# Patient Record
Sex: MALE | Race: BLACK OR AFRICAN AMERICAN | NOT HISPANIC OR LATINO | URBAN - METROPOLITAN AREA
[De-identification: names, ages, dates, MRNs, and addresses within clinical notes are randomized per-mention and may not be internally consistent; named-entity substitution may affect disease eponyms.]

---

## 2023-10-09 ENCOUNTER — APPOINTMENT (OUTPATIENT)
Dept: NEUROLOGY | Facility: HOSPITAL | Age: 63
End: 2023-10-09
Payer: COMMERCIAL

## 2023-11-20 ENCOUNTER — APPOINTMENT (OUTPATIENT)
Dept: NEUROLOGY | Facility: HOSPITAL | Age: 63
End: 2023-11-20
Payer: COMMERCIAL

## 2023-11-21 ENCOUNTER — APPOINTMENT (OUTPATIENT)
Dept: NEUROLOGY | Facility: HOSPITAL | Age: 63
End: 2023-11-21
Payer: COMMERCIAL

## 2023-12-19 ENCOUNTER — APPOINTMENT (OUTPATIENT)
Dept: NEUROLOGY | Facility: HOSPITAL | Age: 63
End: 2023-12-19
Payer: COMMERCIAL

## 2024-02-13 ENCOUNTER — APPOINTMENT (OUTPATIENT)
Dept: NEUROLOGY | Facility: HOSPITAL | Age: 64
End: 2024-02-13
Payer: COMMERCIAL

## 2025-03-22 ENCOUNTER — APPOINTMENT (OUTPATIENT)
Dept: RADIOLOGY | Facility: HOSPITAL | Age: 65
End: 2025-03-22
Payer: COMMERCIAL

## 2025-03-22 ENCOUNTER — HOSPITAL ENCOUNTER (INPATIENT)
Facility: HOSPITAL | Age: 65
End: 2025-03-22
Attending: EMERGENCY MEDICINE | Admitting: INTERNAL MEDICINE
Payer: COMMERCIAL

## 2025-03-22 ENCOUNTER — APPOINTMENT (OUTPATIENT)
Dept: CARDIOLOGY | Facility: HOSPITAL | Age: 65
End: 2025-03-22
Payer: COMMERCIAL

## 2025-03-22 DIAGNOSIS — Z93.0 TRACHEOSTOMY DEPENDENT (MULTI): ICD-10-CM

## 2025-03-22 DIAGNOSIS — D64.9 ANEMIA, UNSPECIFIED TYPE: ICD-10-CM

## 2025-03-22 DIAGNOSIS — R04.2 HEMOPTYSIS: ICD-10-CM

## 2025-03-22 DIAGNOSIS — I69.359 CVA, OLD, HEMIPARESIS (MULTI): Primary | ICD-10-CM

## 2025-03-22 DIAGNOSIS — K92.1 MELENA: ICD-10-CM

## 2025-03-22 DIAGNOSIS — M79.89 OTHER SPECIFIED SOFT TISSUE DISORDERS: ICD-10-CM

## 2025-03-22 PROBLEM — Z93.1 STATUS POST INSERTION OF PERCUTANEOUS ENDOSCOPIC GASTROSTOMY (PEG) TUBE (MULTI): Status: ACTIVE | Noted: 2025-03-22

## 2025-03-22 PROBLEM — E11.9 INSULIN DEPENDENT TYPE 2 DIABETES MELLITUS (MULTI): Status: ACTIVE | Noted: 2025-03-22

## 2025-03-22 PROBLEM — J18.9 PNEUMONIA DUE TO INFECTIOUS ORGANISM: Status: ACTIVE | Noted: 2025-03-22

## 2025-03-22 PROBLEM — Z79.4 INSULIN DEPENDENT TYPE 2 DIABETES MELLITUS (MULTI): Status: ACTIVE | Noted: 2025-03-22

## 2025-03-22 PROBLEM — I48.92 ATRIAL FLUTTER (MULTI): Status: ACTIVE | Noted: 2025-03-22

## 2025-03-22 PROBLEM — R53.1 RIGHT SIDED WEAKNESS: Status: ACTIVE | Noted: 2025-03-22

## 2025-03-22 LAB
ABO GROUP (TYPE) IN BLOOD: NORMAL
ABO GROUP (TYPE) IN BLOOD: NORMAL
ANION GAP SERPL CALC-SCNC: 16 MMOL/L (ref 10–20)
ANTIBODY SCREEN: NORMAL
APTT PPP: 25 SECONDS (ref 26–36)
BASOPHILS # BLD AUTO: 0.09 X10*3/UL (ref 0–0.1)
BASOPHILS NFR BLD AUTO: 1 %
BUN SERPL-MCNC: 36 MG/DL (ref 6–23)
CALCIUM SERPL-MCNC: 9.6 MG/DL (ref 8.6–10.3)
CHLORIDE SERPL-SCNC: 103 MMOL/L (ref 98–107)
CO2 SERPL-SCNC: 27 MMOL/L (ref 21–32)
CREAT SERPL-MCNC: 0.81 MG/DL (ref 0.5–1.3)
EGFRCR SERPLBLD CKD-EPI 2021: >90 ML/MIN/1.73M*2
EOSINOPHIL # BLD AUTO: 0.3 X10*3/UL (ref 0–0.7)
EOSINOPHIL NFR BLD AUTO: 3.2 %
ERYTHROCYTE [DISTWIDTH] IN BLOOD BY AUTOMATED COUNT: 15.6 % (ref 11.5–14.5)
GLUCOSE BLD MANUAL STRIP-MCNC: 180 MG/DL (ref 74–99)
GLUCOSE SERPL-MCNC: 223 MG/DL (ref 74–99)
HCT VFR BLD AUTO: 29.4 % (ref 41–52)
HGB BLD-MCNC: 8 G/DL (ref 13.5–17.5)
IMM GRANULOCYTES # BLD AUTO: 0.14 X10*3/UL (ref 0–0.7)
IMM GRANULOCYTES NFR BLD AUTO: 1.5 % (ref 0–0.9)
INR PPP: 1.4 (ref 0.9–1.1)
LYMPHOCYTES # BLD AUTO: 1.21 X10*3/UL (ref 1.2–4.8)
LYMPHOCYTES NFR BLD AUTO: 13.1 %
MCH RBC QN AUTO: 26.5 PG (ref 26–34)
MCHC RBC AUTO-ENTMCNC: 27.2 G/DL (ref 32–36)
MCV RBC AUTO: 97 FL (ref 80–100)
MONOCYTES # BLD AUTO: 0.38 X10*3/UL (ref 0.1–1)
MONOCYTES NFR BLD AUTO: 4.1 %
MRSA DNA SPEC QL NAA+PROBE: NOT DETECTED
NEUTROPHILS # BLD AUTO: 7.14 X10*3/UL (ref 1.2–7.7)
NEUTROPHILS NFR BLD AUTO: 77.1 %
NRBC BLD-RTO: 0.3 /100 WBCS (ref 0–0)
PLATELET # BLD AUTO: 197 X10*3/UL (ref 150–450)
POTASSIUM SERPL-SCNC: 4.3 MMOL/L (ref 3.5–5.3)
PROTHROMBIN TIME: 15.1 SECONDS (ref 9.8–12.4)
RBC # BLD AUTO: 3.02 X10*6/UL (ref 4.5–5.9)
RH FACTOR (ANTIGEN D): NORMAL
RH FACTOR (ANTIGEN D): NORMAL
SODIUM SERPL-SCNC: 142 MMOL/L (ref 136–145)
WBC # BLD AUTO: 9.3 X10*3/UL (ref 4.4–11.3)

## 2025-03-22 PROCEDURE — 71250 CT THORAX DX C-: CPT | Performed by: STUDENT IN AN ORGANIZED HEALTH CARE EDUCATION/TRAINING PROGRAM

## 2025-03-22 PROCEDURE — 71045 X-RAY EXAM CHEST 1 VIEW: CPT | Performed by: RADIOLOGY

## 2025-03-22 PROCEDURE — 36415 COLL VENOUS BLD VENIPUNCTURE: CPT | Performed by: EMERGENCY MEDICINE

## 2025-03-22 PROCEDURE — 87449 NOS EACH ORGANISM AG IA: CPT | Mod: PARLAB | Performed by: NURSE PRACTITIONER

## 2025-03-22 PROCEDURE — 85025 COMPLETE CBC W/AUTO DIFF WBC: CPT | Performed by: EMERGENCY MEDICINE

## 2025-03-22 PROCEDURE — 71275 CT ANGIOGRAPHY CHEST: CPT | Performed by: STUDENT IN AN ORGANIZED HEALTH CARE EDUCATION/TRAINING PROGRAM

## 2025-03-22 PROCEDURE — 99223 1ST HOSP IP/OBS HIGH 75: CPT | Performed by: NURSE PRACTITIONER

## 2025-03-22 PROCEDURE — 71250 CT THORAX DX C-: CPT

## 2025-03-22 PROCEDURE — 87899 AGENT NOS ASSAY W/OPTIC: CPT | Mod: PARLAB | Performed by: NURSE PRACTITIONER

## 2025-03-22 PROCEDURE — 2500000004 HC RX 250 GENERAL PHARMACY W/ HCPCS (ALT 636 FOR OP/ED): Performed by: NURSE PRACTITIONER

## 2025-03-22 PROCEDURE — 2500000005 HC RX 250 GENERAL PHARMACY W/O HCPCS: Performed by: EMERGENCY MEDICINE

## 2025-03-22 PROCEDURE — 71275 CT ANGIOGRAPHY CHEST: CPT

## 2025-03-22 PROCEDURE — 86900 BLOOD TYPING SEROLOGIC ABO: CPT | Performed by: EMERGENCY MEDICINE

## 2025-03-22 PROCEDURE — 71045 X-RAY EXAM CHEST 1 VIEW: CPT

## 2025-03-22 PROCEDURE — 99285 EMERGENCY DEPT VISIT HI MDM: CPT | Mod: 25 | Performed by: EMERGENCY MEDICINE

## 2025-03-22 PROCEDURE — 93005 ELECTROCARDIOGRAM TRACING: CPT

## 2025-03-22 PROCEDURE — 87640 STAPH A DNA AMP PROBE: CPT | Performed by: NURSE PRACTITIONER

## 2025-03-22 PROCEDURE — 80048 BASIC METABOLIC PNL TOTAL CA: CPT | Performed by: EMERGENCY MEDICINE

## 2025-03-22 PROCEDURE — 1200000002 HC GENERAL ROOM WITH TELEMETRY DAILY

## 2025-03-22 PROCEDURE — 94640 AIRWAY INHALATION TREATMENT: CPT

## 2025-03-22 PROCEDURE — 85610 PROTHROMBIN TIME: CPT | Performed by: EMERGENCY MEDICINE

## 2025-03-22 PROCEDURE — 2500000001 HC RX 250 WO HCPCS SELF ADMINISTERED DRUGS (ALT 637 FOR MEDICARE OP): Performed by: NURSE PRACTITIONER

## 2025-03-22 PROCEDURE — 2500000005 HC RX 250 GENERAL PHARMACY W/O HCPCS: Performed by: NURSE PRACTITIONER

## 2025-03-22 PROCEDURE — 2500000002 HC RX 250 W HCPCS SELF ADMINISTERED DRUGS (ALT 637 FOR MEDICARE OP, ALT 636 FOR OP/ED): Performed by: NURSE PRACTITIONER

## 2025-03-22 PROCEDURE — 2550000001 HC RX 255 CONTRASTS: Performed by: EMERGENCY MEDICINE

## 2025-03-22 PROCEDURE — 85730 THROMBOPLASTIN TIME PARTIAL: CPT | Performed by: EMERGENCY MEDICINE

## 2025-03-22 PROCEDURE — 82947 ASSAY GLUCOSE BLOOD QUANT: CPT

## 2025-03-22 RX ORDER — TRANEXAMIC ACID 100 MG/ML
500 INJECTION, SOLUTION INTRAVENOUS ONCE
Status: COMPLETED | OUTPATIENT
Start: 2025-03-22 | End: 2025-03-22

## 2025-03-22 RX ORDER — DOCUSATE SODIUM 100 MG/1
100 CAPSULE, LIQUID FILLED ORAL 2 TIMES DAILY
Status: DISCONTINUED | OUTPATIENT
Start: 2025-03-22 | End: 2025-03-25

## 2025-03-22 RX ORDER — ZINC OXIDE 20 G/100G
1 OINTMENT TOPICAL
Status: DISCONTINUED | OUTPATIENT
Start: 2025-03-22 | End: 2025-04-03 | Stop reason: HOSPADM

## 2025-03-22 RX ORDER — SUCRALFATE 1 G/1
1 TABLET ORAL 2 TIMES DAILY
COMMUNITY

## 2025-03-22 RX ORDER — IPRATROPIUM BROMIDE AND ALBUTEROL SULFATE 2.5; .5 MG/3ML; MG/3ML
3 SOLUTION RESPIRATORY (INHALATION)
COMMUNITY
End: 2025-04-03 | Stop reason: HOSPADM

## 2025-03-22 RX ORDER — ACETYLCYSTEINE 200 MG/ML
1 SOLUTION ORAL; RESPIRATORY (INHALATION)
Status: DISCONTINUED | OUTPATIENT
Start: 2025-03-22 | End: 2025-03-31

## 2025-03-22 RX ORDER — DEXTROSE 50 % IN WATER (D50W) INTRAVENOUS SYRINGE
12.5
Status: DISCONTINUED | OUTPATIENT
Start: 2025-03-22 | End: 2025-04-03 | Stop reason: HOSPADM

## 2025-03-22 RX ORDER — DEXTROSE 50 % IN WATER (D50W) INTRAVENOUS SYRINGE
25
Status: DISCONTINUED | OUTPATIENT
Start: 2025-03-22 | End: 2025-04-03 | Stop reason: HOSPADM

## 2025-03-22 RX ORDER — INSULIN LISPRO 100 [IU]/ML
0-10 INJECTION, SOLUTION INTRAVENOUS; SUBCUTANEOUS EVERY 6 HOURS
Status: DISCONTINUED | OUTPATIENT
Start: 2025-03-22 | End: 2025-04-03 | Stop reason: HOSPADM

## 2025-03-22 RX ORDER — INSULIN LISPRO 100 [IU]/ML
0-10 INJECTION, SOLUTION INTRAVENOUS; SUBCUTANEOUS
Status: DISCONTINUED | OUTPATIENT
Start: 2025-03-23 | End: 2025-03-22

## 2025-03-22 RX ORDER — METOPROLOL TARTRATE 50 MG/1
50 TABLET ORAL 3 TIMES DAILY
Status: DISCONTINUED | OUTPATIENT
Start: 2025-03-22 | End: 2025-03-28

## 2025-03-22 RX ORDER — INSULIN GLARGINE 100 [IU]/ML
30 INJECTION, SOLUTION SUBCUTANEOUS EVERY MORNING
COMMUNITY
End: 2025-04-03 | Stop reason: HOSPADM

## 2025-03-22 RX ORDER — VANCOMYCIN HYDROCHLORIDE 1 G/200ML
1000 INJECTION, SOLUTION INTRAVENOUS EVERY 12 HOURS
Status: DISCONTINUED | OUTPATIENT
Start: 2025-03-22 | End: 2025-03-24

## 2025-03-22 RX ORDER — TRAZODONE HYDROCHLORIDE 50 MG/1
50 TABLET ORAL NIGHTLY
Status: DISCONTINUED | OUTPATIENT
Start: 2025-03-22 | End: 2025-04-03 | Stop reason: HOSPADM

## 2025-03-22 RX ORDER — SUCRALFATE 1 G/1
1 TABLET ORAL
Status: DISCONTINUED | OUTPATIENT
Start: 2025-03-23 | End: 2025-03-27

## 2025-03-22 RX ORDER — CLOPIDOGREL BISULFATE 75 MG/1
75 TABLET ORAL DAILY
COMMUNITY
End: 2025-04-03 | Stop reason: HOSPADM

## 2025-03-22 RX ORDER — ATORVASTATIN CALCIUM 80 MG/1
80 TABLET, FILM COATED ORAL NIGHTLY
Status: DISCONTINUED | OUTPATIENT
Start: 2025-03-22 | End: 2025-03-25

## 2025-03-22 RX ORDER — ATORVASTATIN CALCIUM 80 MG/1
80 TABLET, FILM COATED ORAL NIGHTLY
COMMUNITY
End: 2025-04-03 | Stop reason: HOSPADM

## 2025-03-22 RX ORDER — ACETAMINOPHEN 325 MG/1
325 TABLET ORAL EVERY 6 HOURS PRN
Status: DISCONTINUED | OUTPATIENT
Start: 2025-03-22 | End: 2025-04-03 | Stop reason: HOSPADM

## 2025-03-22 RX ORDER — TRAZODONE HYDROCHLORIDE 50 MG/1
50 TABLET ORAL NIGHTLY
COMMUNITY

## 2025-03-22 RX ORDER — INSULIN GLARGINE 100 [IU]/ML
30 INJECTION, SOLUTION SUBCUTANEOUS NIGHTLY
Status: DISCONTINUED | OUTPATIENT
Start: 2025-03-22 | End: 2025-03-29

## 2025-03-22 RX ORDER — METOPROLOL TARTRATE 50 MG/1
50 TABLET ORAL 3 TIMES DAILY
COMMUNITY

## 2025-03-22 RX ORDER — ACETAMINOPHEN 325 MG/1
325 TABLET, CHEWABLE ORAL EVERY 6 HOURS PRN
COMMUNITY

## 2025-03-22 RX ORDER — ACETYLCYSTEINE 200 MG/ML
1 SOLUTION ORAL; RESPIRATORY (INHALATION)
COMMUNITY

## 2025-03-22 RX ORDER — DOCUSATE SODIUM 100 MG/1
100 CAPSULE, LIQUID FILLED ORAL 2 TIMES DAILY
COMMUNITY
End: 2025-04-03 | Stop reason: HOSPADM

## 2025-03-22 RX ORDER — INSULIN LISPRO 100 [IU]/ML
0-16 INJECTION, SOLUTION INTRAVENOUS; SUBCUTANEOUS 4 TIMES DAILY
COMMUNITY

## 2025-03-22 RX ORDER — CHOLECALCIFEROL (VITAMIN D3) 1250 MCG
50000 TABLET ORAL WEEKLY
COMMUNITY

## 2025-03-22 RX ORDER — VANCOMYCIN HYDROCHLORIDE 1 G/20ML
INJECTION, POWDER, LYOPHILIZED, FOR SOLUTION INTRAVENOUS DAILY PRN
Status: DISCONTINUED | OUTPATIENT
Start: 2025-03-22 | End: 2025-03-24

## 2025-03-22 RX ORDER — IPRATROPIUM BROMIDE AND ALBUTEROL SULFATE 2.5; .5 MG/3ML; MG/3ML
3 SOLUTION RESPIRATORY (INHALATION)
Status: DISCONTINUED | OUTPATIENT
Start: 2025-03-22 | End: 2025-03-25

## 2025-03-22 RX ORDER — ALBUTEROL SULFATE 0.83 MG/ML
2.5 SOLUTION RESPIRATORY (INHALATION) EVERY 6 HOURS PRN
COMMUNITY
End: 2025-04-03 | Stop reason: HOSPADM

## 2025-03-22 RX ORDER — CLOPIDOGREL BISULFATE 75 MG/1
75 TABLET ORAL DAILY
Status: DISCONTINUED | OUTPATIENT
Start: 2025-03-22 | End: 2025-04-03 | Stop reason: HOSPADM

## 2025-03-22 RX ORDER — ALBUTEROL SULFATE 0.83 MG/ML
2.5 SOLUTION RESPIRATORY (INHALATION) EVERY 2 HOUR PRN
Status: DISCONTINUED | OUTPATIENT
Start: 2025-03-22 | End: 2025-03-25

## 2025-03-22 RX ADMIN — INSULIN LISPRO 2 UNITS: 100 INJECTION, SOLUTION INTRAVENOUS; SUBCUTANEOUS at 20:55

## 2025-03-22 RX ADMIN — ATORVASTATIN CALCIUM 80 MG: 80 TABLET, FILM COATED ORAL at 20:54

## 2025-03-22 RX ADMIN — INSULIN GLARGINE 30 UNITS: 100 INJECTION, SOLUTION SUBCUTANEOUS at 20:56

## 2025-03-22 RX ADMIN — PIPERACILLIN SODIUM AND TAZOBACTAM SODIUM 3.38 G: 3; .375 INJECTION, SOLUTION INTRAVENOUS at 20:54

## 2025-03-22 RX ADMIN — Medication 40 PERCENT: at 19:09

## 2025-03-22 RX ADMIN — TRANEXAMIC ACID 500 MG: 1 INJECTION, SOLUTION INTRAVENOUS at 13:06

## 2025-03-22 RX ADMIN — METOPROLOL TARTRATE 50 MG: 50 TABLET, FILM COATED ORAL at 20:54

## 2025-03-22 RX ADMIN — IPRATROPIUM BROMIDE AND ALBUTEROL SULFATE 3 ML: .5; 3 SOLUTION RESPIRATORY (INHALATION) at 18:54

## 2025-03-22 RX ADMIN — VANCOMYCIN HYDROCHLORIDE 1000 MG: 1 INJECTION, SOLUTION INTRAVENOUS at 20:54

## 2025-03-22 RX ADMIN — ACETYLCYSTEINE 200 MG: 200 SOLUTION ORAL; RESPIRATORY (INHALATION) at 18:54

## 2025-03-22 RX ADMIN — TRAZODONE HYDROCHLORIDE 50 MG: 50 TABLET ORAL at 20:54

## 2025-03-22 RX ADMIN — IOHEXOL 79 ML: 350 INJECTION, SOLUTION INTRAVENOUS at 16:59

## 2025-03-22 SDOH — SOCIAL STABILITY: SOCIAL INSECURITY: WITHIN THE LAST YEAR, HAVE YOU BEEN AFRAID OF YOUR PARTNER OR EX-PARTNER?: NO

## 2025-03-22 SDOH — SOCIAL STABILITY: SOCIAL INSECURITY: HAS ANYONE EVER THREATENED TO HURT YOUR FAMILY OR YOUR PETS?: NO

## 2025-03-22 SDOH — ECONOMIC STABILITY: FOOD INSECURITY: HOW HARD IS IT FOR YOU TO PAY FOR THE VERY BASICS LIKE FOOD, HOUSING, MEDICAL CARE, AND HEATING?: NOT VERY HARD

## 2025-03-22 SDOH — SOCIAL STABILITY: SOCIAL INSECURITY
WITHIN THE LAST YEAR, HAVE YOU BEEN RAPED OR FORCED TO HAVE ANY KIND OF SEXUAL ACTIVITY BY YOUR PARTNER OR EX-PARTNER?: NO

## 2025-03-22 SDOH — SOCIAL STABILITY: SOCIAL INSECURITY: ARE YOU OR HAVE YOU BEEN THREATENED OR ABUSED PHYSICALLY, EMOTIONALLY, OR SEXUALLY BY ANYONE?: NO

## 2025-03-22 SDOH — SOCIAL STABILITY: SOCIAL INSECURITY: ARE THERE ANY APPARENT SIGNS OF INJURIES/BEHAVIORS THAT COULD BE RELATED TO ABUSE/NEGLECT?: NO

## 2025-03-22 SDOH — ECONOMIC STABILITY: HOUSING INSECURITY: AT ANY TIME IN THE PAST 12 MONTHS, WERE YOU HOMELESS OR LIVING IN A SHELTER (INCLUDING NOW)?: NO

## 2025-03-22 SDOH — SOCIAL STABILITY: SOCIAL INSECURITY
WITHIN THE LAST YEAR, HAVE YOU BEEN KICKED, HIT, SLAPPED, OR OTHERWISE PHYSICALLY HURT BY YOUR PARTNER OR EX-PARTNER?: NO

## 2025-03-22 SDOH — ECONOMIC STABILITY: TRANSPORTATION INSECURITY: IN THE PAST 12 MONTHS, HAS LACK OF TRANSPORTATION KEPT YOU FROM MEDICAL APPOINTMENTS OR FROM GETTING MEDICATIONS?: NO

## 2025-03-22 SDOH — ECONOMIC STABILITY: HOUSING INSECURITY: IN THE LAST 12 MONTHS, WAS THERE A TIME WHEN YOU WERE NOT ABLE TO PAY THE MORTGAGE OR RENT ON TIME?: NO

## 2025-03-22 SDOH — SOCIAL STABILITY: SOCIAL INSECURITY: HAVE YOU HAD THOUGHTS OF HARMING ANYONE ELSE?: NO

## 2025-03-22 SDOH — SOCIAL STABILITY: SOCIAL INSECURITY: WITHIN THE LAST YEAR, HAVE YOU BEEN HUMILIATED OR EMOTIONALLY ABUSED IN OTHER WAYS BY YOUR PARTNER OR EX-PARTNER?: NO

## 2025-03-22 SDOH — HEALTH STABILITY: MENTAL HEALTH: HOW OFTEN DO YOU HAVE SIX OR MORE DRINKS ON ONE OCCASION?: NEVER

## 2025-03-22 SDOH — SOCIAL STABILITY: SOCIAL INSECURITY: ABUSE: ADULT

## 2025-03-22 SDOH — SOCIAL STABILITY: SOCIAL INSECURITY: WERE YOU ABLE TO COMPLETE ALL THE BEHAVIORAL HEALTH SCREENINGS?: YES

## 2025-03-22 SDOH — ECONOMIC STABILITY: FOOD INSECURITY: WITHIN THE PAST 12 MONTHS, YOU WORRIED THAT YOUR FOOD WOULD RUN OUT BEFORE YOU GOT THE MONEY TO BUY MORE.: NEVER TRUE

## 2025-03-22 SDOH — SOCIAL STABILITY: SOCIAL INSECURITY: DO YOU FEEL ANYONE HAS EXPLOITED OR TAKEN ADVANTAGE OF YOU FINANCIALLY OR OF YOUR PERSONAL PROPERTY?: NO

## 2025-03-22 SDOH — ECONOMIC STABILITY: INCOME INSECURITY: IN THE PAST 12 MONTHS HAS THE ELECTRIC, GAS, OIL, OR WATER COMPANY THREATENED TO SHUT OFF SERVICES IN YOUR HOME?: NO

## 2025-03-22 SDOH — HEALTH STABILITY: MENTAL HEALTH: HOW OFTEN DO YOU HAVE A DRINK CONTAINING ALCOHOL?: NEVER

## 2025-03-22 SDOH — SOCIAL STABILITY: SOCIAL INSECURITY: DO YOU FEEL UNSAFE GOING BACK TO THE PLACE WHERE YOU ARE LIVING?: NO

## 2025-03-22 SDOH — ECONOMIC STABILITY: HOUSING INSECURITY: IN THE PAST 12 MONTHS, HOW MANY TIMES HAVE YOU MOVED WHERE YOU WERE LIVING?: 0

## 2025-03-22 SDOH — ECONOMIC STABILITY: FOOD INSECURITY: WITHIN THE PAST 12 MONTHS, THE FOOD YOU BOUGHT JUST DIDN'T LAST AND YOU DIDN'T HAVE MONEY TO GET MORE.: NEVER TRUE

## 2025-03-22 SDOH — HEALTH STABILITY: MENTAL HEALTH: HOW MANY DRINKS CONTAINING ALCOHOL DO YOU HAVE ON A TYPICAL DAY WHEN YOU ARE DRINKING?: PATIENT DOES NOT DRINK

## 2025-03-22 SDOH — SOCIAL STABILITY: SOCIAL INSECURITY: DOES ANYONE TRY TO KEEP YOU FROM HAVING/CONTACTING OTHER FRIENDS OR DOING THINGS OUTSIDE YOUR HOME?: NO

## 2025-03-22 SDOH — SOCIAL STABILITY: SOCIAL INSECURITY: HAVE YOU HAD ANY THOUGHTS OF HARMING ANYONE ELSE?: NO

## 2025-03-22 ASSESSMENT — ACTIVITIES OF DAILY LIVING (ADL)
TOILETING: DEPENDENT
HEARING - RIGHT EAR: FUNCTIONAL
ADEQUATE_TO_COMPLETE_ADL: YES
BATHING: DEPENDENT
PATIENT'S MEMORY ADEQUATE TO SAFELY COMPLETE DAILY ACTIVITIES?: YES
DRESSING YOURSELF: DEPENDENT
GROOMING: DEPENDENT
HEARING - LEFT EAR: FUNCTIONAL
WALKS IN HOME: DEPENDENT
JUDGMENT_ADEQUATE_SAFELY_COMPLETE_DAILY_ACTIVITIES: YES
FEEDING YOURSELF: DEPENDENT
LACK_OF_TRANSPORTATION: NO
LACK_OF_TRANSPORTATION: NO

## 2025-03-22 ASSESSMENT — COGNITIVE AND FUNCTIONAL STATUS - GENERAL
EATING MEALS: TOTAL
TURNING FROM BACK TO SIDE WHILE IN FLAT BAD: TOTAL
HELP NEEDED FOR BATHING: TOTAL
MOVING FROM LYING ON BACK TO SITTING ON SIDE OF FLAT BED WITH BEDRAILS: TOTAL
PERSONAL GROOMING: TOTAL
TOILETING: TOTAL
DRESSING REGULAR UPPER BODY CLOTHING: TOTAL
CLIMB 3 TO 5 STEPS WITH RAILING: TOTAL
STANDING UP FROM CHAIR USING ARMS: TOTAL
WALKING IN HOSPITAL ROOM: TOTAL
MOVING FROM LYING ON BACK TO SITTING ON SIDE OF FLAT BED WITH BEDRAILS: TOTAL
DRESSING REGULAR LOWER BODY CLOTHING: TOTAL
PATIENT BASELINE BEDBOUND: YES
PERSONAL GROOMING: TOTAL
DAILY ACTIVITIY SCORE: 6
MOBILITY SCORE: 6
STANDING UP FROM CHAIR USING ARMS: TOTAL
TOILETING: TOTAL
MOVING TO AND FROM BED TO CHAIR: TOTAL
WALKING IN HOSPITAL ROOM: TOTAL
DRESSING REGULAR LOWER BODY CLOTHING: TOTAL
EATING MEALS: TOTAL
DAILY ACTIVITIY SCORE: 6
MOVING TO AND FROM BED TO CHAIR: TOTAL
MOBILITY SCORE: 6
TURNING FROM BACK TO SIDE WHILE IN FLAT BAD: TOTAL
HELP NEEDED FOR BATHING: TOTAL
CLIMB 3 TO 5 STEPS WITH RAILING: TOTAL
DRESSING REGULAR UPPER BODY CLOTHING: TOTAL

## 2025-03-22 ASSESSMENT — PATIENT HEALTH QUESTIONNAIRE - PHQ9
2. FEELING DOWN, DEPRESSED OR HOPELESS: NOT AT ALL
1. LITTLE INTEREST OR PLEASURE IN DOING THINGS: NOT AT ALL
SUM OF ALL RESPONSES TO PHQ9 QUESTIONS 1 & 2: 0

## 2025-03-22 ASSESSMENT — LIFESTYLE VARIABLES
AUDIT-C TOTAL SCORE: 0
SKIP TO QUESTIONS 9-10: 1
HOW OFTEN DO YOU HAVE 6 OR MORE DRINKS ON ONE OCCASION: NEVER
HOW MANY STANDARD DRINKS CONTAINING ALCOHOL DO YOU HAVE ON A TYPICAL DAY: PATIENT DOES NOT DRINK
AUDIT-C TOTAL SCORE: 0
SKIP TO QUESTIONS 9-10: 1
HOW OFTEN DO YOU HAVE A DRINK CONTAINING ALCOHOL: NEVER
AUDIT-C TOTAL SCORE: 0

## 2025-03-22 ASSESSMENT — PAIN SCALES - GENERAL
PAINLEVEL_OUTOF10: 0 - NO PAIN
PAINLEVEL_OUTOF10: 0 - NO PAIN

## 2025-03-22 ASSESSMENT — PAIN - FUNCTIONAL ASSESSMENT: PAIN_FUNCTIONAL_ASSESSMENT: 0-10

## 2025-03-22 NOTE — PROGRESS NOTES
Vancomycin Dosing by Pharmacy- INITIAL    Bryan Nix is a 64 y.o. year old male who Pharmacy has been consulted for vancomycin dosing for pneumonia. Based on the patient's indication and renal status this patient will be dosed based on a goal AUC of 400-600.     Renal function is currently stable.    Visit Vitals  /74   Pulse 74   Temp 36.3 °C (97.3 °F) (Temporal)   Resp 13        Lab Results   Component Value Date    CREATININE 0.81 2025    CREATININE 0.93 2023    CREATININE 1.03 2023    CREATININE 0.96 2023    CREATININE 0.99 2023        Patient weight is as follows:   Vitals:    25 1255   Weight: 77.1 kg (170 lb)       Cultures:  No results found for the encounter in last 14 days.        No intake/output data recorded.  I/O during current shift:  No intake/output data recorded.    Temp (24hrs), Av.5 °C (97.7 °F), Min:36.3 °C (97.3 °F), Max:36.7 °C (98.1 °F)         Assessment/Plan     Patient will not be given a loading dose.  Will initiate vancomycin maintenance,  1000 mg every 12 hours.    This dosing regimen is predicted by [employer] to result in the following pharmacokinetic parameters:  Regimen: 1000 mg IV every 12 hours.  Start time: 19:43 on 2025  Exposure target: AUC24 (range)400-600 mg/L.hr   INK77-19: 404 mg/L.hr  AUC24,ss: 502 mg/L.hr  Probability of AUC24 > 400: 74 %  Ctrough,ss: 15.7 mg/L  Probability of Ctrough,ss > 20: 29 %    Follow-up level will be ordered on 3/23 at mid am unless clinically indicated sooner.  Will continue to monitor renal function daily while on vancomycin and order serum creatinine at least every 48 hours if not already ordered.  Follow for continued vancomycin needs, clinical response, and signs/symptoms of toxicity.       Bhargavi Simmons, PharmD

## 2025-03-22 NOTE — ED TRIAGE NOTES
Pt to ED via EMS from facility due to bleeding from trach site. Per EMS, pt has hx of CVA with right sided deficit.

## 2025-03-22 NOTE — PROGRESS NOTES
Pharmacy Medication History Review    Bryan Nix is a 64 y.o. male admitted for No Principal Problem: There is no principal problem currently on the Problem List. Please update the Problem List and refresh.. Pharmacy reviewed the patient's [id number] medications and allergies for accuracy.    The list below reflectives the updated PTA list. Please review each medication in order reconciliation for additional clarification and justification.  Prior to Admission medications    Medication Sig Start Date End Date Authorizing Provider   albuterol 2.5 mg /3 mL (0.083 %) nebulizer solution Take 3 mL (2.5 mg) by nebulization every 6 hours if needed.   Historical Provider, MD   apixaban (Eliquis) 5 mg tablet 1 tablet (5 mg) by g-tube route twice a day.   Historical Provider, MD   atorvastatin (Lipitor) 80 mg tablet 1 tablet (80 mg) by g-tube route once daily at bedtime.   Historical Provider, MD   clopidogrel (Plavix) 75 mg tablet 1 tablet (75 mg) by g-tube route once daily.   Historical Provider, MD   empagliflozin (Jardiance) 25 mg 1 tablet (25 mg) by g-tube route once daily in the morning.   Historical Provider, MD   metoprolol tartrate (Lopressor) 50 mg tablet 1 tablet by g-tube route 3 times a day.   Historical Provider, MD   sodium chloride (Ayr) 0.65 % nasal drops Administer 2 sprays into each nostril if needed for congestion.   Historical Provider, MD   sucralfate (Carafate) 1 gram tablet 1 tablet (1 g) by g-tube route twice a day.   Historical Provider, MD   traZODone (Desyrel) 50 mg tablet 1 tablet (50 mg) by g-tube route once daily at bedtime.   Historical Provider, MD   acetaminophen 325 mg chewable tablet 1 tablet (325 mg) by g-tube route every 6 hours if needed.   Historical Provider, MD   acetylcysteine (Mucomyst) 200 mg/mL (20 %) nebulizer solution Take 1 mL (200 mg) by nebulization 3 times a day. With Duoneb   Historical Provider, MD   cholecalciferol (Vitamin D3) 1,250 mcg (50,000 unit) tablet 1  tablet (50,000 Units) by g-tube route 1 (one) time per week. On Monday   Historical Provider, MD   docusate sodium (Colace) 100 mg capsule Take 1 capsule (100 mg) by mouth 2 times a day. G-tube   Historical Provider, MD   insulin glargine (Lantus Solostar U-100 Insulin) 100 unit/mL (3 mL) pen Inject 30 Units under the skin once daily in the morning.   Historical Provider, MD   insulin lispro (HumaLOG KwikPen Insulin) 100 unit/mL pen Inject 0-16 Units under the skin 4 times a day. Per sliding scale: 151-200 = 4 units, 201-250 = 8 units, 251-300 = 12 units, 301-350 = 16 units, >400 = notify MD   Historical Provider, MD   ipratropium-albuteroL (Duo-Neb) 0.5-2.5 mg/3 mL nebulizer solution Take 3 mL by nebulization 3 times a day. With Acetylcysteine   Historical MD Mart   oxygen (O2) gas therapy Inhale 2 each if needed (to maintain SPO2>90%). Via trach.   Historical Provider, MD        The list below reflectives the updated allergy list. Please review each documented allergy for additional clarification and justification.  Allergies  Reviewed by Marco A Estes MD on 3/22/2025   No Known Allergies         Below are additional concerns with the patient's PTA list.      Selina Jones

## 2025-03-22 NOTE — ED PROVIDER NOTES
Limitations to History: tracheostomy dependent  External Records Reviewed: nursing home; discharge summary from pontine stroke and tracheostomy placement in late February  Independent Historians: EMS    HPI  Bryan Nix is a 64 y.o. male with a history of pontine stroke, DM, anticoagulation for unclear indication, respiratory failure s/p tracheostomy in late February presenting with bloody sputum.  The patient is unable to provide any additional history but EMS reports that his family noted that he started having bloody secretions and sputum today.  It is unclear whether the blood is coming from around the tracheostomy or from within the tracheostomy and the patient's lungs.  They report his vital signs were otherwise stable.    [facility]  Past Medical History:   Diagnosis Date    Abnormal finding of blood chemistry, unspecified 05/18/2016    Abnormal blood chemistry    Acute upper respiratory infection, unspecified 12/02/2015    Acute upper respiratory infection    Elevated prostate specific antigen (PSA)     Elevated prostate specific antigen (PSA)    Encounter for screening for malignant neoplasm of colon 01/30/2021    Colon cancer screening    Encounter for screening for malignant neoplasm of prostate 11/30/2020    Prostate cancer screening    Essential (primary) hypertension 07/30/2013    Benign essential hypertension    Other conditions influencing health status 07/30/2013    Diabetes Mellitus Poorly Controlled    Other conditions influencing health status 12/02/2015    History of cough    Personal history of other endocrine, nutritional and metabolic disease 03/18/2021    History of hyperlipidemia    Personal history of other endocrine, nutritional and metabolic disease 03/18/2021    History of diabetes mellitus    Personal history of other specified conditions 05/18/2016    History of chest pain    Personal history of other specified conditions 05/18/2016    History of dyspnea    Personal history of other  "specified conditions 05/18/2016    History of dizziness    Personal history of other specified conditions 05/18/2016    History of fatigue       Meds  Current Outpatient Medications   Medication Instructions    hydrALAZINE (Apresoline) 50 mg tablet TAKE 1 TABLET BY MOUTH EVERY 8 HOURS       Allergies  Not on File     SHx       ------------------------------------------------------------------------------------------------------------------------------------------    /56   Pulse 82   Temp 36.7 °C (98.1 °F) (Temporal)   Resp 20   Ht 1.88 m (6' 2\")   Wt 77.1 kg (170 lb)   SpO2 97%   BMI 21.83 kg/m²     Physical Exam  Vitals and nursing note reviewed.   Constitutional:       General: He is not in acute distress.     Appearance: Normal appearance.   HENT:      Head: Normocephalic and atraumatic.      Right Ear: External ear normal.      Left Ear: External ear normal.   Eyes:      Extraocular Movements: Extraocular movements intact.      Conjunctiva/sclera: Conjunctivae normal.   Neck:      Comments: Tracheostomy in place, very small amount of skin breakdown around the tracheostomy with pink tissue, not actively bleeding  Cardiovascular:      Rate and Rhythm: Normal rate and regular rhythm.      Pulses: Normal pulses.      Heart sounds: Normal heart sounds. No murmur heard.     No friction rub. No gallop.   Pulmonary:      Effort: Pulmonary effort is normal. No respiratory distress.      Breath sounds: Rales (throughout) present. No wheezing or rhonchi.   Abdominal:      General: There is no distension.      Palpations: Abdomen is soft.      Tenderness: There is no abdominal tenderness. There is no guarding or rebound.   Musculoskeletal:         General: No swelling. Normal range of motion.      Cervical back: Neck supple.      Right lower leg: No edema.      Left lower leg: No edema.   Skin:     General: Skin is warm and dry.   Neurological:      Mental Status: He is alert. Mental status is at baseline. "   Psychiatric:         Mood and Affect: Mood normal.          ------------------------------------------------------------------------------------------------------------------------------------------    Medical Decision Making: This is a chronically ill-appearing 64-year-old male presenting to the emergency department with concerns for bloody sputum from his tracheostomy.  Vital signs are normal and stable.  On examination, there is a small amount of skin breakdown around tracheostomy, however there is no significant bleeding.  When respiratory therapy suctioned the patient, they did note fairly bloody secretions.  Patient is anticoagulated without any clear reason why but I suspect its likely related to his stroke.  His CBC reveals a chronic anemia that appears stable from his most recent labs that were obtained in an outside hospital.  He has a mild uremia which may contribute to platelet dysfunction in the setting of bleeding.  Type and screen was also sent.  Chest x-ray reveals no evidence of focal consolidation.  CT chest without contrast will be obtained to look for diffuse alveolar hemorrhage versus infectious etiology.  Given the concern for potential skin breakdown around his tracheostomy, TXA soaked gauze was placed around this.  Again I do believe that the bleeding is coming from an tracheostomy itself.  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  He is on minimal oxygen therapy at this time and I do believe that he stable for the floor for continued monitoring in the send of bleeding and his anticoagulant use.  He will be admitted to the on-call physician for further evaluation and management.    Diagnoses as of 03/22/25 2012   Hemoptysis   Anemia, unspecified type   Tracheostomy dependent (Multi)       Discussed with: admitting  physician      Marco A Estes MD  Emergency Medicine Attending       Marco A Estes MD  03/22/25 2019

## 2025-03-23 VITALS
WEIGHT: 170 LBS | HEIGHT: 74 IN | RESPIRATION RATE: 20 BRPM | OXYGEN SATURATION: 96 % | SYSTOLIC BLOOD PRESSURE: 167 MMHG | DIASTOLIC BLOOD PRESSURE: 73 MMHG | BODY MASS INDEX: 21.82 KG/M2 | HEART RATE: 72 BPM | TEMPERATURE: 98.8 F

## 2025-03-23 LAB
ANION GAP SERPL CALC-SCNC: 14 MMOL/L (ref 10–20)
BUN SERPL-MCNC: 35 MG/DL (ref 6–23)
CALCIUM SERPL-MCNC: 9.4 MG/DL (ref 8.6–10.3)
CHLORIDE SERPL-SCNC: 107 MMOL/L (ref 98–107)
CO2 SERPL-SCNC: 30 MMOL/L (ref 21–32)
CREAT SERPL-MCNC: 0.85 MG/DL (ref 0.5–1.3)
EGFRCR SERPLBLD CKD-EPI 2021: >90 ML/MIN/1.73M*2
ERYTHROCYTE [DISTWIDTH] IN BLOOD BY AUTOMATED COUNT: 15.7 % (ref 11.5–14.5)
GLUCOSE BLD MANUAL STRIP-MCNC: 156 MG/DL (ref 74–99)
GLUCOSE BLD MANUAL STRIP-MCNC: 161 MG/DL (ref 74–99)
GLUCOSE BLD MANUAL STRIP-MCNC: 243 MG/DL (ref 74–99)
GLUCOSE BLD MANUAL STRIP-MCNC: 246 MG/DL (ref 74–99)
GLUCOSE BLD MANUAL STRIP-MCNC: 268 MG/DL (ref 74–99)
GLUCOSE SERPL-MCNC: 170 MG/DL (ref 74–99)
HCT VFR BLD AUTO: 26.6 % (ref 41–52)
HGB BLD-MCNC: 7.8 G/DL (ref 13.5–17.5)
HOLD SPECIMEN: NORMAL
LEGIONELLA AG UR QL: NEGATIVE
MCH RBC QN AUTO: 27.2 PG (ref 26–34)
MCHC RBC AUTO-ENTMCNC: 29.3 G/DL (ref 32–36)
MCV RBC AUTO: 93 FL (ref 80–100)
NRBC BLD-RTO: 0.6 /100 WBCS (ref 0–0)
PLATELET # BLD AUTO: 205 X10*3/UL (ref 150–450)
POTASSIUM SERPL-SCNC: 4.2 MMOL/L (ref 3.5–5.3)
RBC # BLD AUTO: 2.87 X10*6/UL (ref 4.5–5.9)
S PNEUM AG UR QL: NEGATIVE
SODIUM SERPL-SCNC: 147 MMOL/L (ref 136–145)
WBC # BLD AUTO: 10.4 X10*3/UL (ref 4.4–11.3)

## 2025-03-23 PROCEDURE — 36415 COLL VENOUS BLD VENIPUNCTURE: CPT | Performed by: NURSE PRACTITIONER

## 2025-03-23 PROCEDURE — 2500000004 HC RX 250 GENERAL PHARMACY W/ HCPCS (ALT 636 FOR OP/ED): Performed by: NURSE PRACTITIONER

## 2025-03-23 PROCEDURE — 80048 BASIC METABOLIC PNL TOTAL CA: CPT | Performed by: NURSE PRACTITIONER

## 2025-03-23 PROCEDURE — 2500000001 HC RX 250 WO HCPCS SELF ADMINISTERED DRUGS (ALT 637 FOR MEDICARE OP): Performed by: NURSE PRACTITIONER

## 2025-03-23 PROCEDURE — 84145 PROCALCITONIN (PCT): CPT | Mod: PARLAB | Performed by: NURSE PRACTITIONER

## 2025-03-23 PROCEDURE — 2500000005 HC RX 250 GENERAL PHARMACY W/O HCPCS: Performed by: NURSE PRACTITIONER

## 2025-03-23 PROCEDURE — 2500000002 HC RX 250 W HCPCS SELF ADMINISTERED DRUGS (ALT 637 FOR MEDICARE OP, ALT 636 FOR OP/ED): Performed by: NURSE PRACTITIONER

## 2025-03-23 PROCEDURE — 1200000002 HC GENERAL ROOM WITH TELEMETRY DAILY

## 2025-03-23 PROCEDURE — 82947 ASSAY GLUCOSE BLOOD QUANT: CPT

## 2025-03-23 PROCEDURE — 85027 COMPLETE CBC AUTOMATED: CPT | Performed by: NURSE PRACTITIONER

## 2025-03-23 PROCEDURE — 94640 AIRWAY INHALATION TREATMENT: CPT

## 2025-03-23 RX ADMIN — PIPERACILLIN SODIUM AND TAZOBACTAM SODIUM 3.38 G: 3; .375 INJECTION, SOLUTION INTRAVENOUS at 01:40

## 2025-03-23 RX ADMIN — PIPERACILLIN SODIUM AND TAZOBACTAM SODIUM 3.38 G: 3; .375 INJECTION, SOLUTION INTRAVENOUS at 15:00

## 2025-03-23 RX ADMIN — INSULIN LISPRO 4 UNITS: 100 INJECTION, SOLUTION INTRAVENOUS; SUBCUTANEOUS at 14:00

## 2025-03-23 RX ADMIN — METOPROLOL TARTRATE 50 MG: 50 TABLET, FILM COATED ORAL at 15:00

## 2025-03-23 RX ADMIN — METOPROLOL TARTRATE 50 MG: 50 TABLET, FILM COATED ORAL at 20:21

## 2025-03-23 RX ADMIN — PIPERACILLIN SODIUM AND TAZOBACTAM SODIUM 3.38 G: 3; .375 INJECTION, SOLUTION INTRAVENOUS at 08:09

## 2025-03-23 RX ADMIN — INSULIN LISPRO 4 UNITS: 100 INJECTION, SOLUTION INTRAVENOUS; SUBCUTANEOUS at 23:54

## 2025-03-23 RX ADMIN — ZINC OXIDE 1 APPLICATION: 200 OINTMENT TOPICAL at 08:13

## 2025-03-23 RX ADMIN — PIPERACILLIN SODIUM AND TAZOBACTAM SODIUM 3.38 G: 3; .375 INJECTION, SOLUTION INTRAVENOUS at 20:21

## 2025-03-23 RX ADMIN — Medication 40 PERCENT: at 06:26

## 2025-03-23 RX ADMIN — ACETYLCYSTEINE 200 MG: 200 SOLUTION ORAL; RESPIRATORY (INHALATION) at 18:28

## 2025-03-23 RX ADMIN — IPRATROPIUM BROMIDE AND ALBUTEROL SULFATE 3 ML: .5; 3 SOLUTION RESPIRATORY (INHALATION) at 06:25

## 2025-03-23 RX ADMIN — SUCRALFATE 1 G: 1 TABLET ORAL at 08:00

## 2025-03-23 RX ADMIN — SUCRALFATE 1 G: 1 TABLET ORAL at 15:00

## 2025-03-23 RX ADMIN — ACETYLCYSTEINE 200 MG: 200 SOLUTION ORAL; RESPIRATORY (INHALATION) at 12:40

## 2025-03-23 RX ADMIN — Medication 40 PERCENT: at 19:31

## 2025-03-23 RX ADMIN — ATORVASTATIN CALCIUM 80 MG: 80 TABLET, FILM COATED ORAL at 20:21

## 2025-03-23 RX ADMIN — VANCOMYCIN HYDROCHLORIDE 1000 MG: 1 INJECTION, SOLUTION INTRAVENOUS at 09:06

## 2025-03-23 RX ADMIN — IPRATROPIUM BROMIDE AND ALBUTEROL SULFATE 3 ML: .5; 3 SOLUTION RESPIRATORY (INHALATION) at 18:28

## 2025-03-23 RX ADMIN — IPRATROPIUM BROMIDE AND ALBUTEROL SULFATE 3 ML: .5; 3 SOLUTION RESPIRATORY (INHALATION) at 12:40

## 2025-03-23 RX ADMIN — ACETYLCYSTEINE 200 MG: 200 SOLUTION ORAL; RESPIRATORY (INHALATION) at 06:25

## 2025-03-23 RX ADMIN — VANCOMYCIN HYDROCHLORIDE 1000 MG: 1 INJECTION, SOLUTION INTRAVENOUS at 20:21

## 2025-03-23 RX ADMIN — INSULIN LISPRO 2 UNITS: 100 INJECTION, SOLUTION INTRAVENOUS; SUBCUTANEOUS at 00:34

## 2025-03-23 RX ADMIN — METOPROLOL TARTRATE 50 MG: 50 TABLET, FILM COATED ORAL at 08:09

## 2025-03-23 RX ADMIN — INSULIN GLARGINE 30 UNITS: 100 INJECTION, SOLUTION SUBCUTANEOUS at 23:51

## 2025-03-23 RX ADMIN — ZINC OXIDE 1 APPLICATION: 200 OINTMENT TOPICAL at 20:24

## 2025-03-23 RX ADMIN — INSULIN LISPRO 2 UNITS: 100 INJECTION, SOLUTION INTRAVENOUS; SUBCUTANEOUS at 08:14

## 2025-03-23 RX ADMIN — TRAZODONE HYDROCHLORIDE 50 MG: 50 TABLET ORAL at 20:21

## 2025-03-23 ASSESSMENT — COGNITIVE AND FUNCTIONAL STATUS - GENERAL
EATING MEALS: TOTAL
STANDING UP FROM CHAIR USING ARMS: TOTAL
TURNING FROM BACK TO SIDE WHILE IN FLAT BAD: TOTAL
DRESSING REGULAR UPPER BODY CLOTHING: TOTAL
DAILY ACTIVITIY SCORE: 6
WALKING IN HOSPITAL ROOM: TOTAL
MOVING FROM LYING ON BACK TO SITTING ON SIDE OF FLAT BED WITH BEDRAILS: TOTAL
PERSONAL GROOMING: TOTAL
DRESSING REGULAR UPPER BODY CLOTHING: TOTAL
EATING MEALS: TOTAL
MOBILITY SCORE: 6
TURNING FROM BACK TO SIDE WHILE IN FLAT BAD: TOTAL
MOBILITY SCORE: 6
MOVING FROM LYING ON BACK TO SITTING ON SIDE OF FLAT BED WITH BEDRAILS: TOTAL
TOILETING: TOTAL
CLIMB 3 TO 5 STEPS WITH RAILING: TOTAL
CLIMB 3 TO 5 STEPS WITH RAILING: TOTAL
DRESSING REGULAR LOWER BODY CLOTHING: TOTAL
MOVING TO AND FROM BED TO CHAIR: TOTAL
TOILETING: TOTAL
DAILY ACTIVITIY SCORE: 6
HELP NEEDED FOR BATHING: TOTAL
PERSONAL GROOMING: TOTAL
WALKING IN HOSPITAL ROOM: TOTAL
HELP NEEDED FOR BATHING: TOTAL
MOVING TO AND FROM BED TO CHAIR: TOTAL
STANDING UP FROM CHAIR USING ARMS: TOTAL
DRESSING REGULAR LOWER BODY CLOTHING: TOTAL

## 2025-03-23 ASSESSMENT — PAIN SCALES - GENERAL
PAINLEVEL_OUTOF10: 2
PAINLEVEL_OUTOF10: 0 - NO PAIN

## 2025-03-23 NOTE — H&P
History Of Present Illness  Bryan Nix is a 64-year-old male with past medical history of atrial flutter on Eliquis, hyponatremia, IDDM, CAD, hypertension, chronic wounds, diffuse lymphadenopathy, anemia, history of smoking, history elevated PSA, CVA L hemisphere (2020) with right-sided deficits, Left ICA stenosis (85%) s/p angioplasty and left carotid artery stent (08/25/2023) and recent history of acute pontine stroke 02/27/25 with acute hypoxic respiratory failure s/p tracheostomy on 2/25/2025 and PEG.  Patient presents for bleeding from tracheostomy.  Patient is alert and oriented x 3, however limited verbal communication due to tracheostomy.  Wife is at bedside and assists with history.  She reports that patient was coughing blood out of his tracheostomy.  ER provider felt bleeding was likely superficial around site.  Patient has been on room air at skilled nursing facility.  He was placed on Airvo in the ED mainly to humidify oxygen.  Patient never desaturated per ER report.  Currently he is resting comfortably in the bed, breath sounds slightly rhonchorous, but unlabored.  Reports chills, but no fevers.  He has right-sided weakness and sensory deficits.  Denies headache, vision or hearing changes, chest pains, palpitations, shortness of breath, nausea, vomiting, abdominal pain, diarrhea, or complications of PEG tube site.  Wife reports patient skin became irritated in his groin due to adhesive from a condom cath previously, he has a small decubitus wound that she reports is healing well.  He receives tube feeds continuously.  Wife reports a patient was recently evaluated by speech for p.o. to intake, however he still remains n.p.o. except for meds and tube feeds through PEG. CODE STATUS reviewed: full code    ER Course: Hemodynamically stable, afebrile, SpO2 to 97% trach mask.  WBC 9.3, hemoglobin 8, hematocrit 29.4, platelets 197.  INR 1.4.  Glucose 223, BUN 36, otherwise CMP is normal.  CT of the chest  for PE pending.  CXR unremarkable. Trannexamic acid soak gauze wrapped around trach.     Past medical history: As above  Past surgical history: As above  Social history: Former smoker 37.5 pack years-quit smoking age 63, occasional alcohol use, no illicit drug use.  .  Works in maintenance.  6 children.  Family history: Noncontributory     Past Medical History  Past Medical History:   Diagnosis Date    Abnormal finding of blood chemistry, unspecified 05/18/2016    Abnormal blood chemistry    Acute upper respiratory infection, unspecified 12/02/2015    Acute upper respiratory infection    Elevated prostate specific antigen (PSA)     Elevated prostate specific antigen (PSA)    Encounter for screening for malignant neoplasm of colon 01/30/2021    Colon cancer screening    Encounter for screening for malignant neoplasm of prostate 11/30/2020    Prostate cancer screening    Essential (primary) hypertension 07/30/2013    Benign essential hypertension    Other conditions influencing health status 07/30/2013    Diabetes Mellitus Poorly Controlled    Other conditions influencing health status 12/02/2015    History of cough    Personal history of other endocrine, nutritional and metabolic disease 03/18/2021    History of hyperlipidemia    Personal history of other endocrine, nutritional and metabolic disease 03/18/2021    History of diabetes mellitus    Personal history of other specified conditions 05/18/2016    History of chest pain    Personal history of other specified conditions 05/18/2016    History of dyspnea    Personal history of other specified conditions 05/18/2016    History of dizziness    Personal history of other specified conditions 05/18/2016    History of fatigue       Surgical History  Past Surgical History:   Procedure Laterality Date    CT ANGIO CORONARY ART WITH HEARTFLOW IF SCORE >30%  8/23/2023    CT HEART CORONARY ANGIOGRAM 8/23/2023 [facility] CT    MR HEAD ANGIO WO IV CONTRAST  12/14/2020    MR  HEAD ANGIO WO IV CONTRAST 12/14/2020 [facility]    MR NECK ANGIO WO IV CONTRAST  12/14/2020    MR NECK ANGIO WO IV CONTRAST 12/14/2020 [facility]    OTHER SURGICAL HISTORY  12/01/2020    Hand fracture repair    OTHER SURGICAL HISTORY  12/01/2020    Nasal bone fracture repair        Social History  He reports that he has quit smoking. His smoking use included cigarettes. He has never used smokeless tobacco. No history on file for alcohol use and drug use.    Family History  No family history on file.     Allergies  Patient has no known allergies.    Review of Systems     Physical Exam  Constitutional:       General: He is awake. He is not in acute distress.     Appearance: Normal appearance. He is not toxic-appearing.   HENT:      Head: Atraumatic.      Nose: Nose normal.      Mouth/Throat:      Mouth: Mucous membranes are moist.   Eyes:      Conjunctiva/sclera: Conjunctivae normal.   Cardiovascular:      Rate and Rhythm: Normal rate and regular rhythm.      Pulses: Normal pulses.      Heart sounds: No murmur heard.  Pulmonary:      Effort: No respiratory distress.      Comments: Tracheostomy site midline, no drainage around dressing.  Rhonchorous breath sounds, unlabored respirations, RT bedside suctioning patient with thick blood-tinged sputum  Abdominal:      General: Bowel sounds are normal. There is no distension.      Palpations: Abdomen is soft.      Tenderness: There is no abdominal tenderness. There is no guarding.      Comments: Left upper quadrant PEG site tube site, no drainage or skin breakdown around tube site.   Musculoskeletal:         General: No swelling, deformity or signs of injury.      Cervical back: Neck supple.      Right lower leg: No edema.      Left lower leg: No edema.   Skin:     General: Skin is warm and dry.      Capillary Refill: Capillary refill takes less than 2 seconds.      Findings: Wound (sacrum) present. No ecchymosis or erythema.   Neurological:      Mental  "Status: He is alert and oriented to person, place, and time.      Cranial Nerves: No facial asymmetry.      Sensory: Sensory deficit (RUE, RLE) present.      Motor: Weakness present.      Comments: Right sided upper and lower extremity motor function deficits   Psychiatric:         Mood and Affect: Mood normal.         Behavior: Behavior is cooperative.          Last Recorded Vitals  Blood pressure 134/50, pulse 93, temperature 36.9 °C (98.4 °F), temperature source Temporal, resp. rate 22, height 1.88 m (6' 2\"), weight 77.1 kg (170 lb), SpO2 98%.    Relevant Results      Results for orders placed or performed during the hospital encounter of 03/22/25 (from the past 24 hours)   CBC and Auto Differential   Result Value Ref Range    WBC 9.3 4.4 - 11.3 x10*3/uL    nRBC 0.3 (H) 0.0 - 0.0 /100 WBCs    RBC 3.02 (L) 4.50 - 5.90 x10*6/uL    Hemoglobin 8.0 (L) 13.5 - 17.5 g/dL    Hematocrit 29.4 (L) 41.0 - 52.0 %    MCV 97 80 - 100 fL    MCH 26.5 26.0 - 34.0 pg    MCHC 27.2 (L) 32.0 - 36.0 g/dL    RDW 15.6 (H) 11.5 - 14.5 %    Platelets 197 150 - 450 x10*3/uL    Neutrophils % 77.1 40.0 - 80.0 %    Immature Granulocytes %, Automated 1.5 (H) 0.0 - 0.9 %    Lymphocytes % 13.1 13.0 - 44.0 %    Monocytes % 4.1 2.0 - 10.0 %    Eosinophils % 3.2 0.0 - 6.0 %    Basophils % 1.0 0.0 - 2.0 %    Neutrophils Absolute 7.14 1.20 - 7.70 x10*3/uL    Immature Granulocytes Absolute, Automated 0.14 0.00 - 0.70 x10*3/uL    Lymphocytes Absolute 1.21 1.20 - 4.80 x10*3/uL    Monocytes Absolute 0.38 0.10 - 1.00 x10*3/uL    Eosinophils Absolute 0.30 0.00 - 0.70 x10*3/uL    Basophils Absolute 0.09 0.00 - 0.10 x10*3/uL   Basic metabolic panel   Result Value Ref Range    Glucose 223 (H) 74 - 99 mg/dL    Sodium 142 136 - 145 mmol/L    Potassium 4.3 3.5 - 5.3 mmol/L    Chloride 103 98 - 107 mmol/L    Bicarbonate 27 21 - 32 mmol/L    Anion Gap 16 10 - 20 mmol/L    Urea Nitrogen 36 (H) 6 - 23 mg/dL    Creatinine 0.81 0.50 - 1.30 mg/dL    eGFR >90 >60 " mL/min/1.73m*2    Calcium 9.6 8.6 - 10.3 mg/dL   Protime-INR   Result Value Ref Range    Protime 15.1 (H) 9.8 - 12.4 seconds    INR 1.4 (H) 0.9 - 1.1   aPTT   Result Value Ref Range    aPTT 25 (L) 26 - 36 seconds   Type and Screen   Result Value Ref Range    ABO TYPE B     Rh TYPE POS     ANTIBODY SCREEN NEG    VERIFY ABO/Rh Group Test   Result Value Ref Range    ABO TYPE B     Rh TYPE POS    MRSA Surveillance for Vancomycin De-escalation, PCR    Specimen: Anterior Nares; Swab   Result Value Ref Range    MRSA PCR Not Detected Not Detected   POCT GLUCOSE   Result Value Ref Range    POCT Glucose 180 (H) 74 - 99 mg/dL   POCT GLUCOSE   Result Value Ref Range    POCT Glucose 161 (H) 74 - 99 mg/dL   POCT GLUCOSE   Result Value Ref Range    POCT Glucose 156 (H) 74 - 99 mg/dL   Basic metabolic panel   Result Value Ref Range    Glucose 170 (H) 74 - 99 mg/dL    Sodium 147 (H) 136 - 145 mmol/L    Potassium 4.2 3.5 - 5.3 mmol/L    Chloride 107 98 - 107 mmol/L    Bicarbonate 30 21 - 32 mmol/L    Anion Gap 14 10 - 20 mmol/L    Urea Nitrogen 35 (H) 6 - 23 mg/dL    Creatinine 0.85 0.50 - 1.30 mg/dL    eGFR >90 >60 mL/min/1.73m*2    Calcium 9.4 8.6 - 10.3 mg/dL   CBC   Result Value Ref Range    WBC 10.4 4.4 - 11.3 x10*3/uL    nRBC 0.6 (H) 0.0 - 0.0 /100 WBCs    RBC 2.87 (L) 4.50 - 5.90 x10*6/uL    Hemoglobin 7.8 (L) 13.5 - 17.5 g/dL    Hematocrit 26.6 (L) 41.0 - 52.0 %    MCV 93 80 - 100 fL    MCH 27.2 26.0 - 34.0 pg    MCHC 29.3 (L) 32.0 - 36.0 g/dL    RDW 15.7 (H) 11.5 - 14.5 %    Platelets 205 150 - 450 x10*3/uL   SST TOP   Result Value Ref Range    Extra Tube Hold for add-ons.      CT angio chest for pulmonary embolism    Result Date: 3/22/2025  Interpreted By:  Pamela Zurita, STUDY: CT ANGIO CHEST FOR PULMONARY EMBOLISM;  3/22/2025 4:56 pm   INDICATION: Signs/Symptoms:bloody secretions, eval for PE.     COMPARISON: CT chest without contrast 03/22/2025   ACCESSION NUMBER(S): [id number]   ORDERING CLINICIAN: PAMELA HAHN    TECHNIQUE: Contiguous axial images of the chest were obtained after the intravenous administration of iodinated contrast using angiographic PE protocol. Coronal and sagittal reformatted images were reconstructed from the axial data. MIP images were created on an independent workstation and reviewed.   FINDINGS:   MEDIASTINUM AND LYMPH NODES: Inflammatory fat stranding along the tracheostomy tract without discrete fluid collection. The esophageal wall appears within normal limits. There is redemonstration of diffuse lymphadenopathy in the bilateral axilla, mediastinum, bilateral supraclavicular as described in detail on separate report. Also notable or enlarged bilateral level 4 cervical lymph nodes (right > left).   VESSELS:  Normal caliber thoracic aorta without dissection. Moderate aortic atherosclerosis.  No acute pulmonary embolism.   HEART: Normal size.  Mild coronary artery calcifications. No significant pericardial effusion.   LUNG, AIRWAYS, PLEURA: There is slight increase in debris within the trachea. There has been clearing of secretions in the left upper/lower lobe bronchus. There is diffuse bilateral bronchial thickening slightly increased from prior study. There is peribronchovascular ground-glass opacity in the posterior right upper lobe and superior and basal segments of the right lower lobe. There is slightly worsened atelectasis in the lung bases remaining predominantly subsegmental in nature. There is a 0.8 cm nodule in the left upper lobe that is stable from 08/19/2023.   OSSEOUS STRUCTURES: No acute osseous abnormality.   CHEST WALL SOFT TISSUES: No discernible acute abnormality.   UPPER ABDOMEN/OTHER: No acute abnormality.       No acute pulmonary embolism.   Peribronchial vascular ground glass opacities in the right upper and lower lobe again concerning for pneumonia given patient's risk factors of tracheostomy and debris in the airways.   Redemonstration of diffuse lymphadenopathy in the lower  necks, axilla, and mediastinum. Findings are either diffusely reactive in nature, reflective of lymphoma/leukemia, meter deficiency, or less likely head neck neoplasm is previously postulated. However please correlate with past medical history.   Inflammatory changes along the tracheostomy tract without fluid collection. Correlate for erythema.   Unchanged 0.8 cm nodule in the left upper lobe dating back to 08/19/2023. Recommend 1 year follow up chest CT to ensure at least 2 years of stability.   MACRO: None.   Signed by: Pamela Zurita 3/22/2025 7:17 PM Dictation workstation:   [id number]    CT chest wo IV contrast    Result Date: 3/22/2025  Interpreted By:  Pamela Zurita, STUDY: CT CHEST WO IV CONTRAST;  3/22/2025 3:41 pm   INDICATION: Signs/Symptoms:eval for alveolar hemorrhage.     COMPARISON: CT head/neck 08/19/2023   ACCESSION NUMBER(S): [id number]   ORDERING CLINICIAN: PAMELA HAHN   TECHNIQUE: Contiguous axial images of the chest and upper abdomen were obtained without contrast. Coronal and sagittal reformatted images were reconstructed from the axial data.   FINDINGS:   MEDIASTINUM AND LYMPH NODES: There is fat stranding/edema along the tract of the tracheostomy about discrete fluid collection. The esophageal wall appears within normal limits. There are numerous enlarged bilateral supraclavicular lymph nodes measuring up to 1.4 cm on the left. There are numerous enlarged bilateral axillary lymph nodes measuring up to 1.1 cm on the right and 1.2 cm on the left. There are numerous prominent to mildly enlarged mediastinal lymph nodes as well. No pneumomediastinum.   VESSELS:  Normal caliber thoracic aorta. Moderate aortic atherosclerosis. The main pulmonary artery is normal in size.   HEART: Normal size.  Mild coronary artery calcifications. No significant pericardial effusion.   LUNG, AIRWAYS, PLEURA: Tracheostomy noted in appropriate position. There is trace mucus in the left mainstem bronchus and  at the origin of the left upper lobe and lower lobe bronchi. There are mild peribronchovascular ground-glass opacities in the posterior segment of the right upper lobe, basal segments of the right lower lobe. There is mild dependent bibasilar atelectasis. There is no pleural effusion.   OSSEOUS STRUCTURES: No acute osseous abnormality.   CHEST WALL SOFT TISSUES: No discernible acute abnormality.   UPPER ABDOMEN/OTHER: No acute abnormality.       1. Mild peribronchovascular ground-glass opacities in the posterior right upper lobe and basal segments of the right lower lobe. This appearance and distribution is not typical for alveolar hemorrhage which tends to be centrilobular and bilateral asymmetric in distribution. Findings are most suggestive of pneumonia.   2. Small amount of mucous debris in the left mainstem bronchus and at the origin of the left upper and lower lobe bronchi.   3. Diffuse lymphadenopathy in the bilateral axillary regions, mediastinum and left supraclavicular region particularly notable in the bilateral supraclavicular region. This is progressed in the left supraclavicular region and new elsewhere when compared to 08/19/2023 CTA head/neck. Correlate for any history of known head/neck malignancy or lymphoma.   4. Inflammation/fat stranding adjacent to the tracheostomy tube entry site noted. Correlate for air the met. No evidence of fluid collection.   MACRO: None.   Signed by: Marco A Zurita 3/22/2025 7:10 PM Dictation workstation:   [id number]    XR chest 1 view    Result Date: 3/22/2025  Interpreted By:  Fermin Stroud, STUDY: XR CHEST 1 VIEW   INDICATION: Signs/Symptoms:hemoptysis.   COMPARISON: August 19, 2023   ACCESSION NUMBER(S): [id number]   ORDERING CLINICIAN: MARCO A HAHN   FINDINGS: No consolidation, effusion, edema, or pneumothorax. Heart size within normal limits.       No evidence of acute intrathoracic abnormality.   Signed by: Fermin Stroud 3/22/2025 2:01 PM Dictation  workstation:   [id number]    XR chest 1 view    Result Date: 3/14/2025  * * *Final Report* * * DATE OF EXAM: Mar 14 2025  8:52AM   S5X   5290  -  XR CHEST 1V FRONTAL   / ACCESSION #  [id number] PROCEDURE REASON: new bleeding from trach      * * * * Physician Interpretation * * * *  EXAMINATION:  CHEST RADIOGRAPH (PORTABLE SINGLE VIEW AP) Exam Date/Time:  3/14/2025 8:52 AM Clinical History:  new bleeding from trach Comparison:  03/03/2025 RESULT: LINES, TUBES, AND DEVICES:  Tracheostomy. CARDIOMEDIASTINAL SILHOUETTE:  The cardiac and mediastinal silhouettes appear unremarkable. LUNGS AND PLEURA: No consolidation.  No pleural effusion. No pulmonary vascular congestion. No pneumothorax identified. OTHER:  N/A    IMPRESSION: No acute abnormality identified. : [facility]   Transcribe Date/Time: Mar 14 2025  9:07A Dictated by : PAMELA EVANS MD This examination was interpreted and the report reviewed and electronically signed by: PAMELA EVANS MD on Mar 14 2025  9:11AM  EST    US abdomen complete    Result Date: 3/11/2025  ABDOMEN ULTRASOUND-COMPLETE FINDINGS: Abdomen Ultrasound Complete: PROCEDURE: Multiple grayscale mages of the abdomen were obtained. FINDINGS:Multiple real time images demonstrate the liver with increased echogenicity consistent with fatty infiltration. There is no evidence of a discrete mass within the liver. The liver is enlarged, measured at 15.7 cm in size. The gallbladder is seen with no evidence of cholelithiasis. The gallbladder wall is within normal limits. The common bile duct is within normal limits. The visualized pancreas appears unremarkable. Both kidneys are seen with no evidence of hydronephrosis or a calculus. The spleen has its normal homogeneous echo appearance. There is no free fluid to suggest ascites. The visualized aorta appears unremarkable. The inferior vena cava appears patent. CONCLUSION: Enlarged fatty liver. ELECTRONICALLY SIGNED BY LEONEL LAN M.D.  3/11/2025 5:00:52 PM EDT.    XR chest 1 view    Result Date: 3/3/2025  * * *Final Report* * * DATE OF EXAM: Mar  3 2025  1:40PM   S5X   5290  -  XR CHEST 1V FRONTAL   / ACCESSION #  [id number] PROCEDURE REASON: resp failure      * * * * Physician Interpretation * * * *  EXAMINATION:  CHEST RADIOGRAPH (PORTABLE SINGLE VIEW AP) Exam Date/Time:  3/3/2025 1:40 PM Indication:   resp failure M:  XCP_4 Comparison:  1 day prior RESULT: Lines, tubes, and devices:  Tracheostomy tube remains in situ. Lungs and pleura:  Mild patchy opacities right lower lung zone seen on the prior study are no longer appreciated.  No confluent opacity.   Costophrenic angles are clear.  No pneumothorax. Cardiomediastinal silhouette:  Stable cardiomediastinal silhouette. Other:  -    IMPRESSION: Improved aeration right lower lung zone.  No acute cardiopulmonary finding. : [facility]   Transcribe Date/Time: Mar  3 2025  2:49P Dictated by : ISRAEL BENSON MD This examination was interpreted and the report reviewed and electronically signed by: ISRAEL BENSON MD on Mar  3 2025  2:49PM  EST    XR chest 1 view    Result Date: 3/2/2025  * * *Final Report* * * DATE OF EXAM: Mar  2 2025 11:25AM   MMX   5376  -  XR CHEST 1V FRONTAL PORT  / ACCESSION #  [id number] PROCEDURE REASON: Shortness of breath      * * * * Physician Interpretation * * * *  EXAMINATION:  CHEST RADIOGRAPH (PORTABLE SINGLE VIEW AP) Exam Date/Time:  3/2/2025 11:25 AM CLINICAL HISTORY: Shortness of breath MQ:  XCPR_5 Comparison:  02/20/2025. RESULT: This is a limited examination due to multiple wires and tubing obscuring the lower lungs. Lines, tubes, and devices:  A tracheostomy tube remains in place. Lungs and pleura:  There are increased bronchovascular markings in the right lower lung which may be due to bronchitis or early changes of aspiration pneumonia.  Clinical correlation and follow-up exams are recommended. Cardiomediastinal silhouette:  Stable cardiomediastinal  silhouette. Other:  The visualized bony thorax appears unremarkable.    IMPRESSION: Nonspecific parenchymal changes in the right lower lung as described above. A follow-up exam is recommended. : [facility]   Transcribe Date/Time: Mar  2 2025 11:53A Dictated by : BJ INIGUEZ MD This examination was interpreted and the report reviewed and electronically signed by: BJ INIGUEZ MD on Mar  2 2025 11:54AM  EST    XR chest 1 view    Result Date: 2/28/2025  * * *Final Report* * * DATE OF EXAM: Feb 28 2025 10:36AM   MMX   5290  -  XR CHEST 1V FRONTAL   / ACCESSION #  [id number] PROCEDURE REASON: Shortness of breath      * * * * Physician Interpretation * * * *  EXAMINATION:  CHEST RADIOGRAPH (SINGLE VIEW AP OR PA) CLINICAL HISTORY: Shortness of breath MQ:  XC1_5 Comparison:  02/22/2025 RESULT: Lines, tubes, and devices:  Tracheostomy tube is noted. Lungs and pleura:  No consolidation. No lung mass. No pleural effusion. Cardiomediastinal silhouette:  Normal cardiomediastinal silhouette. Other:  No acute osseous pathology.    IMPRESSION: No acute radiographic abnormality. : [facility]   Transcribe Date/Time: Feb 28 2025 12:07P Dictated by : DIANNE RAMOS MD This examination was interpreted and the report reviewed and electronically signed by: DIANNE RAMOS MD on Feb 28 2025 12:08PM  EST    XR abdomen 2 views supine and erect or decub    Result Date: 2/24/2025  * * *Final Report* * * DATE OF EXAM: Feb 24 2025  6:03PM   MMX   5289  -  XR ABDOMEN 1V SUPINE  / ACCESSION #  [id number] PROCEDURE REASON: Evaluate tube, line or lead position      * * * * Physician Interpretation * * * *  EXAMINATION:  XR ABDOMEN 1V SUPINE CLINICAL HISTORY:   Evaluate tube, line or lead position Technique:   XR ABDOMEN 1V SUPINE -- NOT APPLICABLE with 1 views on 1 images Comparison: 2/21/2025 RESULT: Feeding tube with distal tip at the pylorus antrum. Moderate colonic stool burden. No dilated bowel. Lung structures are  intact.    IMPRESSION: Feeding tube with distal tip at the pylorus antrum. Moderate colonic stool burden. No dilated bowel. : [facility]   Transcribe Date/Time: Feb 24 2025  6:23P Dictated by : PABLO RAMOS MD This examination was interpreted and the report reviewed and electronically signed by: PABLO RAMOS MD on Feb 24 2025  6:24PM  EST    CT head wo IV contrast    Result Date: 2/23/2025  * * *Final Report* * * DATE OF EXAM: Feb 23 2025 12:04PM   [facility]   0504  -  CT BRAIN WO IVCON  / ACCESSION #  [id number] PROCEDURE REASON: Stroke, follow up      * * * * Physician Interpretation * * * *  EXAMINATION: CT BRAIN WO IVCON CLINICAL HISTORY: Stroke, follow up TECHNIQUE:  Serial axial images without IV contrast were obtained from the vertex to the foramen magnum. MQ:  CTBWO_3 CT Radiation dose: Integrated Dose-Length Product (DLP) for this visit =   778  mGy*cm CT Dose Reduction Employed: Iterative recon COMPARISON: MRI brain 02/19/2025 RESULT: Post-operative change: None. Acute change: Evolving acute infarct along the central eli and superior left medulla (2:7-9).  No evidence of hemorrhagic transformation. Hemorrhage: No evidence of acute intracranial hemorrhage. Mass Lesion / Mass Effect: There is no evidence of an intracranial mass or extraaxial fluid collection. No significant mass effect. Chronic change: Stable remote left MCA territory encephalomalacia. Parenchyma: There is no significant volume loss. The brain parenchyma is otherwise within normal limits for age. Ventricles: The ventricles are within normal limits of size and configuration for age. Paranasal sinuses and skull base: Partial opacification of the left maxillary and sphenoid sinus and multiple bilateral ethmoid air cells.   The remaining visualized paranasal sinuses are grossly clear. The skull base and imaged soft tissues are unremarkable. Localizer images: No additional findings.    IMPRESSION: Evolving acute brainstem infarct.  No  evidence of hemorrhagic transformation. Stable remote left MCA territory infarct. : [facility]   Transcribe Date/Time: Feb 23 2025 12:48P Dictated by : FRANKY GUZMAN MD   This examination was interpreted and the report reviewed and electronically signed by: FRANKY GUZMAN MD on Feb 23 2025 12:52PM  EST    XR chest 1 view    Result Date: 2/22/2025  * * *Final Report* * * DATE OF EXAM: Feb 22 2025  6:56AM   MMX   5290  -  XR CHEST 1V FRONTAL   / ACCESSION #  [id number] PROCEDURE REASON: Evaluate tube, line,  or lead position      * * * * Physician Interpretation * * * *  EXAMINATION:  CHEST RADIOGRAPH (SINGLE VIEW AP OR PA) CLINICAL HISTORY: Evaluate tube, line,  or lead position MQ:  XC1_5 Comparison:  02/21/2025. RESULT: Lines, tubes, and devices:  An ET tube and feeding tube remain in place.   A poorly defined right venous catheter cannot be excluded. Lungs and pleura:  There are persistent bilateral lower lungs infiltrates seen on the right more than left suggestive of pneumonia such as aspiration pneumonia.  There is no definite pleural effusion. Cardiomediastinal silhouette:  Stable cardiomediastinal silhouette. Other:  The visualized bony thorax appears unremarkable.    IMPRESSION: Persistent bilateral lower lungs infiltrates as described above. A follow-up exam is recommended. : [facility]   Transcribe Date/Time: Feb 22 2025  8:20A Dictated by : BJ INIGUEZ MD This examination was interpreted and the report reviewed and electronically signed by: BJ INIGUEZ MD on Feb 22 2025  8:21AM  EST    XR chest 1 view    Result Date: 2/21/2025  * * *Final Report* * * DATE OF EXAM: Feb 21 2025  5:59PM   MMX   [id number]  -  XR CHEST 1V FRONTAL PORT  / ACCESSION #  [id number] PROCEDURE REASON: Evaluate tube, line,  or lead position      * * * * Physician Interpretation * * * *  EXAMINATION:  CHEST RADIOGRAPH (PORTABLE SINGLE VIEW AP) Exam Date/Time:  2/21/2025 5:59 PM CLINICAL HISTORY: Evaluate tube, line,   or lead position MQ:  XCPR_5 Comparison:  02/21/2025 RESULT: Lines, tubes, and devices: Endotracheal tube in place terminating in enteric tube in place terminating below the field of view. Lungs and pleura: No pneumothorax.  No pleural effusion.  Bilateral mild interstitial opacities.  The right costophrenic angle is not included in the field-of-view. Cardiomediastinal silhouette:  Stable cardiomediastinal silhouette. Other:  .    IMPRESSION: Mild interstitial opacities may be related to pulmonary vascular congestion. : [facility]   Transcribe Date/Time: Feb 21 2025  6:55P Dictated by : IRWIN VALDIVIA MD This examination was interpreted and the report reviewed and electronically signed by: IRWIN VALDIVIA MD on Feb 21 2025  6:57PM  EST    XR abdomen 2 views supine and erect or decub    Result Date: 2/21/2025  * * *Final Report* * * DATE OF EXAM: Feb 21 2025 10:32AM   MMX   [id number]  -  XR ABDOMEN 1V SUPINE  / ACCESSION #  [id number] PROCEDURE REASON: Evaluate tube, line or lead position      * * * * Physician Interpretation * * * *  Clinical Information: Feeding tube Single view of the abdomen was obtained. Feeding tube tip within the distal stomach. There is a nonspecific, nonobstructive bowel gas pattern. No abnormal abdominal masses are identified.  No pathologic calcifications.   No acute bony abnormalities are seen.    IMPRESSION: Nonspecific, nonobstructive bowel gas pattern. Feeding tube tip within the distal stomach. : [facility]   Transcribe Date/Time: Feb 21 2025 10:43A Dictated by : PAMELA EVANS MD This examination was interpreted and the report reviewed and electronically signed by: PAMELA EVANS MD on Feb 21 2025 10:44AM  EST    XR chest 1 view    Result Date: 2/21/2025  * * *Final Report* * * DATE OF EXAM: Feb 21 2025 10:32AM   MMX   [id number]  -  XR CHEST 1V FRONTAL PORT  / ACCESSION #  [id number] PROCEDURE REASON: Shortness of breath      * * * * Physician  Interpretation * * * *  EXAMINATION:  CHEST RADIOGRAPH (PORTABLE SINGLE VIEW AP) Exam Date/Time:  2/21/2025 10:32 AM Clinical History:  Shortness of breath Comparison:  02/17/2025 RESULT: LINES, TUBES, AND DEVICES:  Feeding tube tip beyond the inferior extent of the image. CARDIOMEDIASTINAL SILHOUETTE:  The cardiac and mediastinal silhouettes appear unremarkable. LUNGS AND PLEURA: No consolidation.  No pleural effusion. No pulmonary vascular congestion. No pneumothorax identified. OTHER:  N/A    IMPRESSION: No acute abnormality identified. : [facility]   Transcribe Date/Time: Feb 21 2025 10:41A Dictated by : PAMELA EVANS MD This examination was interpreted and the report reviewed and electronically signed by: PAMELA EVANS MD on Feb 21 2025 10:43AM  EST        Assessment/Plan   Assessment & Plan  Hemoptysis    Tracheostomy in place (Multi)    CVA, old, hemiparesis (Multi)    Status post insertion of percutaneous endoscopic gastrostomy (PEG) tube (Multi)    Pneumonia due to infectious organism    Right sided weakness    Atrial flutter (Multi)    Insulin dependent type 2 diabetes mellitus (Multi)      Telemetry monitoring  Hemoglobin stable 8's recently on review of outside labs, monitor for ongoing bleeding.  Daily H&H  Monitor for overt bleeding  Consult pulmonology, appreciate input  Pneumonia suspected for CT findings  Tracheostomy protocol  Expected cover with Zosyn and vancomycin  MRSA swab  Urine for Legionella antigen and strep pneumonia antigen  Supplemental oxygen as needed  Nebulizers as needed  RT evaluate and treat  Check procalcitonin  Typical 20% zinc oxide to wounds and groin  Continuous tube feeds, continue at 80 mL an hour x 22 hours  Accu-Chek every 6 hours  Hypoglycemia protocol  PT/OT  Anticoagulation and antiplatelet therapy on hold, resume once cleared by pulmonology  Continue patient's home medications for chronic issues as appropriate    Patient fully evaluated on March 23.   Continue to monitor H&H.  Pulmonary consultation obtained.  Continue broad-spectrum antibiotics to rule out infectious process.  Recheck labs in AM.            Zeus Bone MD

## 2025-03-23 NOTE — H&P
History Of Present Illness  Bryan Nix is a 64-year-old male with past medical history of atrial flutter on Eliquis, hyponatremia, IDDM, CAD, hypertension, chronic wounds, diffuse lymphadenopathy, anemia, history of smoking, history elevated PSA, CVA L hemisphere (2020) with right-sided deficits, Left ICA stenosis (85%) s/p angioplasty and left carotid artery stent (08/25/2023) and recent history of acute pontine stroke 02/27/25 with acute hypoxic respiratory failure s/p tracheostomy on 2/25/2025 and PEG.  Patient presents for bleeding from tracheostomy.  Patient is alert and oriented x 3, however limited verbal communication due to tracheostomy.  Wife is at bedside and assists with history.  She reports that patient was coughing blood out of his tracheostomy.  ER provider felt bleeding was likely superficial around site.  Patient has been on room air at skilled nursing facility.  He was placed on Airvo in the ED mainly to humidify oxygen.  Patient never desaturated per ER report.  Currently he is resting comfortably in the bed, breath sounds slightly rhonchorous, but unlabored.  Reports chills, but no fevers.  He has right-sided weakness and sensory deficits.  Denies headache, vision or hearing changes, chest pains, palpitations, shortness of breath, nausea, vomiting, abdominal pain, diarrhea, or complications of PEG tube site.  Wife reports patient skin became irritated in his groin due to adhesive from a condom cath previously, he has a small decubitus wound that she reports is healing well.  He receives tube feeds continuously.  Wife reports a patient was recently evaluated by speech for p.o. to intake, however he still remains n.p.o. except for meds and tube feeds through PEG. CODE STATUS reviewed: full code    ER Course: Hemodynamically stable, afebrile, SpO2 to 97% trach mask.  WBC 9.3, hemoglobin 8, hematocrit 29.4, platelets 197.  INR 1.4.  Glucose 223, BUN 36, otherwise CMP is normal.  CT of the chest  for PE pending.  CXR unremarkable. Trannexamic acid soak gauze wrapped around trach.     Past medical history: As above  Past surgical history: As above  Social history: Former smoker 37.5 pack years-quit smoking age 63, occasional alcohol use, no illicit drug use.  .  Works in maintenance.  6 children.  Family history: Noncontributory     Past Medical History  Past Medical History:   Diagnosis Date    Abnormal finding of blood chemistry, unspecified 05/18/2016    Abnormal blood chemistry    Acute upper respiratory infection, unspecified 12/02/2015    Acute upper respiratory infection    Elevated prostate specific antigen (PSA)     Elevated prostate specific antigen (PSA)    Encounter for screening for malignant neoplasm of colon 01/30/2021    Colon cancer screening    Encounter for screening for malignant neoplasm of prostate 11/30/2020    Prostate cancer screening    Essential (primary) hypertension 07/30/2013    Benign essential hypertension    Other conditions influencing health status 07/30/2013    Diabetes Mellitus Poorly Controlled    Other conditions influencing health status 12/02/2015    History of cough    Personal history of other endocrine, nutritional and metabolic disease 03/18/2021    History of hyperlipidemia    Personal history of other endocrine, nutritional and metabolic disease 03/18/2021    History of diabetes mellitus    Personal history of other specified conditions 05/18/2016    History of chest pain    Personal history of other specified conditions 05/18/2016    History of dyspnea    Personal history of other specified conditions 05/18/2016    History of dizziness    Personal history of other specified conditions 05/18/2016    History of fatigue       Surgical History  Past Surgical History:   Procedure Laterality Date    CT ANGIO CORONARY ART WITH HEARTFLOW IF SCORE >30%  8/23/2023    CT HEART CORONARY ANGIOGRAM 8/23/2023 [facility] CT    MR HEAD ANGIO WO IV CONTRAST  12/14/2020    MR  HEAD ANGIO WO IV CONTRAST 12/14/2020 [facility]    MR NECK ANGIO WO IV CONTRAST  12/14/2020    MR NECK ANGIO WO IV CONTRAST 12/14/2020 [facility]    OTHER SURGICAL HISTORY  12/01/2020    Hand fracture repair    OTHER SURGICAL HISTORY  12/01/2020    Nasal bone fracture repair        Social History  He reports that he has quit smoking. His smoking use included cigarettes. He has never used smokeless tobacco. No history on file for alcohol use and drug use.    Family History  No family history on file.     Allergies  Patient has no known allergies.    Review of Systems     Physical Exam  Constitutional:       General: He is awake. He is not in acute distress.     Appearance: Normal appearance. He is not toxic-appearing.   HENT:      Head: Atraumatic.      Nose: Nose normal.      Mouth/Throat:      Mouth: Mucous membranes are moist.   Eyes:      Conjunctiva/sclera: Conjunctivae normal.   Cardiovascular:      Rate and Rhythm: Normal rate and regular rhythm.      Pulses: Normal pulses.      Heart sounds: No murmur heard.  Pulmonary:      Effort: No respiratory distress.      Comments: Tracheostomy site midline, no drainage around dressing.  Rhonchorous breath sounds, unlabored respirations, RT bedside suctioning patient with thick blood-tinged sputum  Abdominal:      General: Bowel sounds are normal. There is no distension.      Palpations: Abdomen is soft.      Tenderness: There is no abdominal tenderness. There is no guarding.      Comments: Left upper quadrant PEG site tube site, no drainage or skin breakdown around tube site.   Musculoskeletal:         General: No swelling, deformity or signs of injury.      Cervical back: Neck supple.      Right lower leg: No edema.      Left lower leg: No edema.   Skin:     General: Skin is warm and dry.      Capillary Refill: Capillary refill takes less than 2 seconds.      Findings: Wound (sacrum) present. No ecchymosis or erythema.   Neurological:      Mental  "Status: He is alert and oriented to person, place, and time.      Cranial Nerves: No facial asymmetry.      Sensory: Sensory deficit (RUE, RLE) present.      Motor: Weakness present.      Comments: Right sided upper and lower extremity motor function deficits   Psychiatric:         Mood and Affect: Mood normal.         Behavior: Behavior is cooperative.          Last Recorded Vitals  Blood pressure 146/67, pulse 74, temperature 36.3 °C (97.3 °F), temperature source Temporal, resp. rate 13, height 1.88 m (6' 2\"), weight 77.1 kg (170 lb), SpO2 100%.    Relevant Results      Results for orders placed or performed during the hospital encounter of 03/22/25 (from the past 24 hours)   CBC and Auto Differential   Result Value Ref Range    WBC 9.3 4.4 - 11.3 x10*3/uL    nRBC 0.3 (H) 0.0 - 0.0 /100 WBCs    RBC 3.02 (L) 4.50 - 5.90 x10*6/uL    Hemoglobin 8.0 (L) 13.5 - 17.5 g/dL    Hematocrit 29.4 (L) 41.0 - 52.0 %    MCV 97 80 - 100 fL    MCH 26.5 26.0 - 34.0 pg    MCHC 27.2 (L) 32.0 - 36.0 g/dL    RDW 15.6 (H) 11.5 - 14.5 %    Platelets 197 150 - 450 x10*3/uL    Neutrophils % 77.1 40.0 - 80.0 %    Immature Granulocytes %, Automated 1.5 (H) 0.0 - 0.9 %    Lymphocytes % 13.1 13.0 - 44.0 %    Monocytes % 4.1 2.0 - 10.0 %    Eosinophils % 3.2 0.0 - 6.0 %    Basophils % 1.0 0.0 - 2.0 %    Neutrophils Absolute 7.14 1.20 - 7.70 x10*3/uL    Immature Granulocytes Absolute, Automated 0.14 0.00 - 0.70 x10*3/uL    Lymphocytes Absolute 1.21 1.20 - 4.80 x10*3/uL    Monocytes Absolute 0.38 0.10 - 1.00 x10*3/uL    Eosinophils Absolute 0.30 0.00 - 0.70 x10*3/uL    Basophils Absolute 0.09 0.00 - 0.10 x10*3/uL   Basic metabolic panel   Result Value Ref Range    Glucose 223 (H) 74 - 99 mg/dL    Sodium 142 136 - 145 mmol/L    Potassium 4.3 3.5 - 5.3 mmol/L    Chloride 103 98 - 107 mmol/L    Bicarbonate 27 21 - 32 mmol/L    Anion Gap 16 10 - 20 mmol/L    Urea Nitrogen 36 (H) 6 - 23 mg/dL    Creatinine 0.81 0.50 - 1.30 mg/dL    eGFR >90 >60 " mL/min/1.73m*2    Calcium 9.6 8.6 - 10.3 mg/dL   Protime-INR   Result Value Ref Range    Protime 15.1 (H) 9.8 - 12.4 seconds    INR 1.4 (H) 0.9 - 1.1   aPTT   Result Value Ref Range    aPTT 25 (L) 26 - 36 seconds   Type and Screen   Result Value Ref Range    ABO TYPE B     Rh TYPE POS     ANTIBODY SCREEN NEG    VERIFY ABO/Rh Group Test   Result Value Ref Range    ABO TYPE B     Rh TYPE POS    POCT GLUCOSE   Result Value Ref Range    POCT Glucose 180 (H) 74 - 99 mg/dL     CT angio chest for pulmonary embolism    Result Date: 3/22/2025  Interpreted By:  Pamela Zurita, STUDY: CT ANGIO CHEST FOR PULMONARY EMBOLISM;  3/22/2025 4:56 pm   INDICATION: Signs/Symptoms:bloody secretions, eval for PE.     COMPARISON: CT chest without contrast 03/22/2025   ACCESSION NUMBER(S): [id number]   ORDERING CLINICIAN: PAMELA HAHN   TECHNIQUE: Contiguous axial images of the chest were obtained after the intravenous administration of iodinated contrast using angiographic PE protocol. Coronal and sagittal reformatted images were reconstructed from the axial data. MIP images were created on an independent workstation and reviewed.   FINDINGS:   MEDIASTINUM AND LYMPH NODES: Inflammatory fat stranding along the tracheostomy tract without discrete fluid collection. The esophageal wall appears within normal limits. There is redemonstration of diffuse lymphadenopathy in the bilateral axilla, mediastinum, bilateral supraclavicular as described in detail on separate report. Also notable or enlarged bilateral level 4 cervical lymph nodes (right > left).   VESSELS:  Normal caliber thoracic aorta without dissection. Moderate aortic atherosclerosis.  No acute pulmonary embolism.   HEART: Normal size.  Mild coronary artery calcifications. No significant pericardial effusion.   LUNG, AIRWAYS, PLEURA: There is slight increase in debris within the trachea. There has been clearing of secretions in the left upper/lower lobe bronchus. There is diffuse  bilateral bronchial thickening slightly increased from prior study. There is peribronchovascular ground-glass opacity in the posterior right upper lobe and superior and basal segments of the right lower lobe. There is slightly worsened atelectasis in the lung bases remaining predominantly subsegmental in nature. There is a 0.8 cm nodule in the left upper lobe that is stable from 08/19/2023.   OSSEOUS STRUCTURES: No acute osseous abnormality.   CHEST WALL SOFT TISSUES: No discernible acute abnormality.   UPPER ABDOMEN/OTHER: No acute abnormality.       No acute pulmonary embolism.   Peribronchial vascular ground glass opacities in the right upper and lower lobe again concerning for pneumonia given patient's risk factors of tracheostomy and debris in the airways.   Redemonstration of diffuse lymphadenopathy in the lower necks, axilla, and mediastinum. Findings are either diffusely reactive in nature, reflective of lymphoma/leukemia, meter deficiency, or less likely head neck neoplasm is previously postulated. However please correlate with past medical history.   Inflammatory changes along the tracheostomy tract without fluid collection. Correlate for erythema.   Unchanged 0.8 cm nodule in the left upper lobe dating back to 08/19/2023. Recommend 1 year follow up chest CT to ensure at least 2 years of stability.   MACRO: None.   Signed by: Pamela Zurita 3/22/2025 7:17 PM Dictation workstation:   [id number]    CT chest wo IV contrast    Result Date: 3/22/2025  Interpreted By:  Pamela Zurita, STUDY: CT CHEST WO IV CONTRAST;  3/22/2025 3:41 pm   INDICATION: Signs/Symptoms:eval for alveolar hemorrhage.     COMPARISON: CT head/neck 08/19/2023   ACCESSION NUMBER(S): [id number]   ORDERING CLINICIAN: PAMELA HAHN   TECHNIQUE: Contiguous axial images of the chest and upper abdomen were obtained without contrast. Coronal and sagittal reformatted images were reconstructed from the axial data.   FINDINGS:   MEDIASTINUM  AND LYMPH NODES: There is fat stranding/edema along the tract of the tracheostomy about discrete fluid collection. The esophageal wall appears within normal limits. There are numerous enlarged bilateral supraclavicular lymph nodes measuring up to 1.4 cm on the left. There are numerous enlarged bilateral axillary lymph nodes measuring up to 1.1 cm on the right and 1.2 cm on the left. There are numerous prominent to mildly enlarged mediastinal lymph nodes as well. No pneumomediastinum.   VESSELS:  Normal caliber thoracic aorta. Moderate aortic atherosclerosis. The main pulmonary artery is normal in size.   HEART: Normal size.  Mild coronary artery calcifications. No significant pericardial effusion.   LUNG, AIRWAYS, PLEURA: Tracheostomy noted in appropriate position. There is trace mucus in the left mainstem bronchus and at the origin of the left upper lobe and lower lobe bronchi. There are mild peribronchovascular ground-glass opacities in the posterior segment of the right upper lobe, basal segments of the right lower lobe. There is mild dependent bibasilar atelectasis. There is no pleural effusion.   OSSEOUS STRUCTURES: No acute osseous abnormality.   CHEST WALL SOFT TISSUES: No discernible acute abnormality.   UPPER ABDOMEN/OTHER: No acute abnormality.       1. Mild peribronchovascular ground-glass opacities in the posterior right upper lobe and basal segments of the right lower lobe. This appearance and distribution is not typical for alveolar hemorrhage which tends to be centrilobular and bilateral asymmetric in distribution. Findings are most suggestive of pneumonia.   2. Small amount of mucous debris in the left mainstem bronchus and at the origin of the left upper and lower lobe bronchi.   3. Diffuse lymphadenopathy in the bilateral axillary regions, mediastinum and left supraclavicular region particularly notable in the bilateral supraclavicular region. This is progressed in the left supraclavicular region  and new elsewhere when compared to 08/19/2023 CTA head/neck. Correlate for any history of known head/neck malignancy or lymphoma.   4. Inflammation/fat stranding adjacent to the tracheostomy tube entry site noted. Correlate for air the met. No evidence of fluid collection.   MACRO: None.   Signed by: Marco A Zurita 3/22/2025 7:10 PM Dictation workstation:   [id number]    XR chest 1 view    Result Date: 3/22/2025  Interpreted By:  Fermin Stroud, STUDY: XR CHEST 1 VIEW   INDICATION: Signs/Symptoms:hemoptysis.   COMPARISON: August 19, 2023   ACCESSION NUMBER(S): [id number]   ORDERING CLINICIAN: MARCO A HAHN   FINDINGS: No consolidation, effusion, edema, or pneumothorax. Heart size within normal limits.       No evidence of acute intrathoracic abnormality.   Signed by: Fermin Stroud 3/22/2025 2:01 PM Dictation workstation:   [id number]    XR chest 1 view    Result Date: 3/14/2025  * * *Final Report* * * DATE OF EXAM: Mar 14 2025  8:52AM   S5X   5290  -  XR CHEST 1V FRONTAL   / ACCESSION #  [id number] PROCEDURE REASON: new bleeding from trach      * * * * Physician Interpretation * * * *  EXAMINATION:  CHEST RADIOGRAPH (PORTABLE SINGLE VIEW AP) Exam Date/Time:  3/14/2025 8:52 AM Clinical History:  new bleeding from trach Comparison:  03/03/2025 RESULT: LINES, TUBES, AND DEVICES:  Tracheostomy. CARDIOMEDIASTINAL SILHOUETTE:  The cardiac and mediastinal silhouettes appear unremarkable. LUNGS AND PLEURA: No consolidation.  No pleural effusion. No pulmonary vascular congestion. No pneumothorax identified. OTHER:  N/A    IMPRESSION: No acute abnormality identified. : PSCB   Transcribe Date/Time: Mar 14 2025  9:07A Dictated by : MARCO A EVANS MD This examination was interpreted and the report reviewed and electronically signed by: MARCO A EVANS MD on Mar 14 2025  9:11AM  EST    US abdomen complete    Result Date: 3/11/2025  ABDOMEN ULTRASOUND-COMPLETE FINDINGS: Abdomen Ultrasound Complete: PROCEDURE:  Multiple grayscale mages of the abdomen were obtained. FINDINGS:Multiple real time images demonstrate the liver with increased echogenicity consistent with fatty infiltration. There is no evidence of a discrete mass within the liver. The liver is enlarged, measured at 15.7 cm in size. The gallbladder is seen with no evidence of cholelithiasis. The gallbladder wall is within normal limits. The common bile duct is within normal limits. The visualized pancreas appears unremarkable. Both kidneys are seen with no evidence of hydronephrosis or a calculus. The spleen has its normal homogeneous echo appearance. There is no free fluid to suggest ascites. The visualized aorta appears unremarkable. The inferior vena cava appears patent. CONCLUSION: Enlarged fatty liver. ELECTRONICALLY SIGNED BY LEONEL LAN M.D. 3/11/2025 5:00:52 PM EDT.    XR chest 1 view    Result Date: 3/3/2025  * * *Final Report* * * DATE OF EXAM: Mar  3 2025  1:40PM   S5X   5290  -  XR CHEST 1V FRONTAL   / ACCESSION #  [id number] PROCEDURE REASON: resp failure      * * * * Physician Interpretation * * * *  EXAMINATION:  CHEST RADIOGRAPH (PORTABLE SINGLE VIEW AP) Exam Date/Time:  3/3/2025 1:40 PM Indication:   resp failure M:  XCP_4 Comparison:  1 day prior RESULT: Lines, tubes, and devices:  Tracheostomy tube remains in situ. Lungs and pleura:  Mild patchy opacities right lower lung zone seen on the prior study are no longer appreciated.  No confluent opacity.   Costophrenic angles are clear.  No pneumothorax. Cardiomediastinal silhouette:  Stable cardiomediastinal silhouette. Other:  -    IMPRESSION: Improved aeration right lower lung zone.  No acute cardiopulmonary finding. : PSCMATTHEW   Transcribe Date/Time: Mar  3 2025  2:49P Dictated by : ISRAEL BENSON MD This examination was interpreted and the report reviewed and electronically signed by: ISRAEL BENSON MD on Mar  3 2025  2:49PM  EST    XR chest 1 view    Result Date:  3/2/2025  * * *Final Report* * * DATE OF EXAM: Mar  2 2025 11:25AM   MMX   5376  -  XR CHEST 1V FRONTAL PORT  / ACCESSION #  [id number] PROCEDURE REASON: Shortness of breath      * * * * Physician Interpretation * * * *  EXAMINATION:  CHEST RADIOGRAPH (PORTABLE SINGLE VIEW AP) Exam Date/Time:  3/2/2025 11:25 AM CLINICAL HISTORY: Shortness of breath MQ:  XCPR_5 Comparison:  02/20/2025. RESULT: This is a limited examination due to multiple wires and tubing obscuring the lower lungs. Lines, tubes, and devices:  A tracheostomy tube remains in place. Lungs and pleura:  There are increased bronchovascular markings in the right lower lung which may be due to bronchitis or early changes of aspiration pneumonia.  Clinical correlation and follow-up exams are recommended. Cardiomediastinal silhouette:  Stable cardiomediastinal silhouette. Other:  The visualized bony thorax appears unremarkable.    IMPRESSION: Nonspecific parenchymal changes in the right lower lung as described above. A follow-up exam is recommended. : HANY   Transcribe Date/Time: Mar  2 2025 11:53A Dictated by : BJ INIGUEZ MD This examination was interpreted and the report reviewed and electronically signed by: BJ INIGUEZ MD on Mar  2 2025 11:54AM  EST    XR chest 1 view    Result Date: 2/28/2025  * * *Final Report* * * DATE OF EXAM: Feb 28 2025 10:36AM   MMX   5290  -  XR CHEST 1V FRONTAL   / ACCESSION #  [id number] PROCEDURE REASON: Shortness of breath      * * * * Physician Interpretation * * * *  EXAMINATION:  CHEST RADIOGRAPH (SINGLE VIEW AP OR PA) CLINICAL HISTORY: Shortness of breath MQ:  XC1_5 Comparison:  02/22/2025 RESULT: Lines, tubes, and devices:  Tracheostomy tube is noted. Lungs and pleura:  No consolidation. No lung mass. No pleural effusion. Cardiomediastinal silhouette:  Normal cardiomediastinal silhouette. Other:  No acute osseous pathology.    IMPRESSION: No acute radiographic abnormality. : HANY    Transcribe Date/Time: Feb 28 2025 12:07P Dictated by : DIANNE RAMOS MD This examination was interpreted and the report reviewed and electronically signed by: DIANNE RAMOS MD on Feb 28 2025 12:08PM  EST    XR abdomen 2 views supine and erect or decub    Result Date: 2/24/2025  * * *Final Report* * * DATE OF EXAM: Feb 24 2025  6:03PM   MMX   5289  -  XR ABDOMEN 1V SUPINE  / ACCESSION #  [id number] PROCEDURE REASON: Evaluate tube, line or lead position      * * * * Physician Interpretation * * * *  EXAMINATION:  XR ABDOMEN 1V SUPINE CLINICAL HISTORY:   Evaluate tube, line or lead position Technique:   XR ABDOMEN 1V SUPINE -- NOT APPLICABLE with 1 views on 1 images Comparison: 2/21/2025 RESULT: Feeding tube with distal tip at the pylorus antrum. Moderate colonic stool burden. No dilated bowel. Lung structures are intact.    IMPRESSION: Feeding tube with distal tip at the pylorus antrum. Moderate colonic stool burden. No dilated bowel. : [facility]   Transcribe Date/Time: Feb 24 2025  6:23P Dictated by : PABLO RAMOS MD This examination was interpreted and the report reviewed and electronically signed by: PABLO RAMOS MD on Feb 24 2025  6:24PM  EST    CT head wo IV contrast    Result Date: 2/23/2025  * * *Final Report* * * DATE OF EXAM: Feb 23 2025 12:04PM   [facility]   0504  -  CT BRAIN WO IVCON  / ACCESSION #  [id number] PROCEDURE REASON: Stroke, follow up      * * * * Physician Interpretation * * * *  EXAMINATION: CT BRAIN WO IVCON CLINICAL HISTORY: Stroke, follow up TECHNIQUE:  Serial axial images without IV contrast were obtained from the vertex to the foramen magnum. MQ:  CTBWO_3 CT Radiation dose: Integrated Dose-Length Product (DLP) for this visit =   778  mGy*cm CT Dose Reduction Employed: Iterative recon COMPARISON: MRI brain 02/19/2025 RESULT: Post-operative change: None. Acute change: Evolving acute infarct along the central eli and superior left medulla (2:7-9).  No evidence of hemorrhagic  transformation. Hemorrhage: No evidence of acute intracranial hemorrhage. Mass Lesion / Mass Effect: There is no evidence of an intracranial mass or extraaxial fluid collection. No significant mass effect. Chronic change: Stable remote left MCA territory encephalomalacia. Parenchyma: There is no significant volume loss. The brain parenchyma is otherwise within normal limits for age. Ventricles: The ventricles are within normal limits of size and configuration for age. Paranasal sinuses and skull base: Partial opacification of the left maxillary and sphenoid sinus and multiple bilateral ethmoid air cells.   The remaining visualized paranasal sinuses are grossly clear. The skull base and imaged soft tissues are unremarkable. Localizer images: No additional findings.    IMPRESSION: Evolving acute brainstem infarct.  No evidence of hemorrhagic transformation. Stable remote left MCA territory infarct. : PSCB   Transcribe Date/Time: Feb 23 2025 12:48P Dictated by : FRANKY GUZMAN MD   This examination was interpreted and the report reviewed and electronically signed by: FRANKY GUZMAN MD on Feb 23 2025 12:52PM  EST    XR chest 1 view    Result Date: 2/22/2025  * * *Final Report* * * DATE OF EXAM: Feb 22 2025  6:56AM   MMX   5290  -  XR CHEST 1V FRONTAL   / ACCESSION #  [id number] PROCEDURE REASON: Evaluate tube, line,  or lead position      * * * * Physician Interpretation * * * *  EXAMINATION:  CHEST RADIOGRAPH (SINGLE VIEW AP OR PA) CLINICAL HISTORY: Evaluate tube, line,  or lead position MQ:  XC1_5 Comparison:  02/21/2025. RESULT: Lines, tubes, and devices:  An ET tube and feeding tube remain in place.   A poorly defined right venous catheter cannot be excluded. Lungs and pleura:  There are persistent bilateral lower lungs infiltrates seen on the right more than left suggestive of pneumonia such as aspiration pneumonia.  There is no definite pleural effusion. Cardiomediastinal silhouette:  Stable  cardiomediastinal silhouette. Other:  The visualized bony thorax appears unremarkable.    IMPRESSION: Persistent bilateral lower lungs infiltrates as described above. A follow-up exam is recommended. : HANY   Transcribe Date/Time: Feb 22 2025  8:20A Dictated by : BJ INIGUEZ MD This examination was interpreted and the report reviewed and electronically signed by: BJ INIGUEZ MD on Feb 22 2025  8:21AM  EST    XR chest 1 view    Result Date: 2/21/2025  * * *Final Report* * * DATE OF EXAM: Feb 21 2025  5:59PM   MMX   5376  -  XR CHEST 1V FRONTAL PORT  / ACCESSION #  [id number] PROCEDURE REASON: Evaluate tube, line,  or lead position      * * * * Physician Interpretation * * * *  EXAMINATION:  CHEST RADIOGRAPH (PORTABLE SINGLE VIEW AP) Exam Date/Time:  2/21/2025 5:59 PM CLINICAL HISTORY: Evaluate tube, line,  or lead position MQ:  [id number] Comparison:  02/21/2025 RESULT: Lines, tubes, and devices: Endotracheal tube in place terminating in enteric tube in place terminating below the field of view. Lungs and pleura: No pneumothorax.  No pleural effusion.  Bilateral mild interstitial opacities.  The right costophrenic angle is not included in the field-of-view. Cardiomediastinal silhouette:  Stable cardiomediastinal silhouette. Other:  .    IMPRESSION: Mild interstitial opacities may be related to pulmonary vascular congestion. : [facility]   Transcribe Date/Time: Feb 21 2025  6:55P Dictated by : IRWIN VALDIVIA MD This examination was interpreted and the report reviewed and electronically signed by: IRWIN VALDIVIA MD on Feb 21 2025  6:57PM  EST    XR abdomen 2 views supine and erect or decub    Result Date: 2/21/2025  * * *Final Report* * * DATE OF EXAM: Feb 21 2025 10:32AM   MMX   [id number]  -  XR ABDOMEN 1V SUPINE  / ACCESSION #  [id number] PROCEDURE REASON: Evaluate tube, line or lead position      * * * * Physician Interpretation * * * *  Clinical Information: Feeding tube  Single view of the abdomen was obtained. Feeding tube tip within the distal stomach. There is a nonspecific, nonobstructive bowel gas pattern. No abnormal abdominal masses are identified.  No pathologic calcifications.   No acute bony abnormalities are seen.    IMPRESSION: Nonspecific, nonobstructive bowel gas pattern. Feeding tube tip within the distal stomach. : HANY   Transcribe Date/Time: Feb 21 2025 10:43A Dictated by : PAMELA EVANS MD This examination was interpreted and the report reviewed and electronically signed by: PAMELA EVANS MD on Feb 21 2025 10:44AM  EST    XR chest 1 view    Result Date: 2/21/2025  * * *Final Report* * * DATE OF EXAM: Feb 21 2025 10:32AM   MMX   [id number]  -  XR CHEST 1V FRONTAL PORT  / ACCESSION #  [id number] PROCEDURE REASON: Shortness of breath      * * * * Physician Interpretation * * * *  EXAMINATION:  CHEST RADIOGRAPH (PORTABLE SINGLE VIEW AP) Exam Date/Time:  2/21/2025 10:32 AM Clinical History:  Shortness of breath Comparison:  02/17/2025 RESULT: LINES, TUBES, AND DEVICES:  Feeding tube tip beyond the inferior extent of the image. CARDIOMEDIASTINAL SILHOUETTE:  The cardiac and mediastinal silhouettes appear unremarkable. LUNGS AND PLEURA: No consolidation.  No pleural effusion. No pulmonary vascular congestion. No pneumothorax identified. OTHER:  N/A    IMPRESSION: No acute abnormality identified. : HANY   Transcribe Date/Time: Feb 21 2025 10:41A Dictated by : PAMELA EVANS MD This examination was interpreted and the report reviewed and electronically signed by: PAMELA EVANS MD on Feb 21 2025 10:43AM  EST        Assessment/Plan   Assessment & Plan  Hemoptysis    Tracheostomy in place (Multi)    CVA, old, hemiparesis (Multi)    Status post insertion of percutaneous endoscopic gastrostomy (PEG) tube (Multi)    Pneumonia due to infectious organism    Right sided weakness    Atrial flutter (Multi)    Insulin dependent type 2 diabetes mellitus  (Multi)      Telemetry monitoring  Hemoglobin stable 8's recently on review of outside labs, monitor for ongoing bleeding.  Daily H&H  Monitor for overt bleeding  Consult pulmonology, appreciate input  Pneumonia suspected for CT findings  Tracheostomy protocol  Expected cover with Zosyn and vancomycin  MRSA swab  Urine for Legionella antigen and strep pneumonia antigen  Supplemental oxygen as needed  Nebulizers as needed  RT evaluate and treat  Check procalcitonin  Typical 20% zinc oxide to wounds and groin  Continuous tube feeds, continue at 80 mL an hour x 22 hours  Accu-Chek every 6 hours  Hypoglycemia protocol  PT/OT  Anticoagulation and antiplatelet therapy on hold, resume once cleared by pulmonology  Continue patient's home medications for chronic issues as appropriate                Malik Soares, APRN-CNP

## 2025-03-23 NOTE — CARE PLAN
The patient's goals for the shift include  getting placement    The clinical goals for the shift include safety and comfort

## 2025-03-23 NOTE — CARE PLAN
The patient's goals for the shift include      The clinical goals for the shift include start antibiotics, rest

## 2025-03-23 NOTE — CARE PLAN
Problem: Pain - Adult  Goal: Verbalizes/displays adequate comfort level or baseline comfort level  Outcome: Progressing     Problem: Safety - Adult  Goal: Free from fall injury  Outcome: Progressing     Problem: Discharge Planning  Goal: Discharge to home or other facility with appropriate resources  Outcome: Progressing     Problem: Chronic Conditions and Co-morbidities  Goal: Patient's chronic conditions and co-morbidity symptoms are monitored and maintained or improved  Outcome: Progressing     Problem: Nutrition  Goal: Nutrient intake appropriate for maintaining nutritional needs  Outcome: Progressing     Problem: Skin  Goal: Prevent/manage excess moisture  Outcome: Progressing  Goal: Prevent/minimize sheer/friction injuries  Outcome: Progressing  Flowsheets (Taken 3/23/2025 1941)  Prevent/minimize sheer/friction injuries: Complete micro-shifts as needed if patient unable. Adjust patient position to relieve pressure points, not a full turn   The patient's goals for the shift include      The clinical goals for the shift include Monitor VS+ continue ABX

## 2025-03-23 NOTE — CARE PLAN
Problem: Pain - Adult  Goal: Verbalizes/displays adequate comfort level or baseline comfort level  Outcome: Progressing     Problem: Safety - Adult  Goal: Free from fall injury  Outcome: Progressing     Problem: Discharge Planning  Goal: Discharge to home or other facility with appropriate resources  Outcome: Progressing     Problem: Chronic Conditions and Co-morbidities  Goal: Patient's chronic conditions and co-morbidity symptoms are monitored and maintained or improved  Outcome: Progressing     Problem: Nutrition  Goal: Nutrient intake appropriate for maintaining nutritional needs  Outcome: Progressing     Problem: Skin  Goal: Prevent/manage excess moisture  Outcome: Progressing  Flowsheets (Taken 3/22/2025 2326)  Prevent/manage excess moisture: Moisturize dry skin  Goal: Prevent/minimize sheer/friction injuries  Outcome: Progressing   The patient's goals for the shift include      The clinical goals for the shift include monitor hemoptysis

## 2025-03-24 ENCOUNTER — APPOINTMENT (OUTPATIENT)
Dept: RADIOLOGY | Facility: HOSPITAL | Age: 65
End: 2025-03-24
Payer: COMMERCIAL

## 2025-03-24 LAB
ALBUMIN SERPL BCP-MCNC: 3.5 G/DL (ref 3.4–5)
ALP SERPL-CCNC: 136 U/L (ref 33–136)
ALT SERPL W P-5'-P-CCNC: 66 U/L (ref 10–52)
ANION GAP SERPL CALC-SCNC: 13 MMOL/L (ref 10–20)
AST SERPL W P-5'-P-CCNC: 26 U/L (ref 9–39)
BILIRUB SERPL-MCNC: 0.4 MG/DL (ref 0–1.2)
BUN SERPL-MCNC: 44 MG/DL (ref 6–23)
CALCIUM SERPL-MCNC: 9.5 MG/DL (ref 8.6–10.3)
CHLORIDE SERPL-SCNC: 110 MMOL/L (ref 98–107)
CO2 SERPL-SCNC: 32 MMOL/L (ref 21–32)
CREAT SERPL-MCNC: 1.18 MG/DL (ref 0.5–1.3)
EGFRCR SERPLBLD CKD-EPI 2021: 68 ML/MIN/1.73M*2
ERYTHROCYTE [DISTWIDTH] IN BLOOD BY AUTOMATED COUNT: 16.1 % (ref 11.5–14.5)
GLUCOSE BLD MANUAL STRIP-MCNC: 199 MG/DL (ref 74–99)
GLUCOSE BLD MANUAL STRIP-MCNC: 233 MG/DL (ref 74–99)
GLUCOSE BLD MANUAL STRIP-MCNC: 299 MG/DL (ref 74–99)
GLUCOSE BLD MANUAL STRIP-MCNC: 308 MG/DL (ref 74–99)
GLUCOSE SERPL-MCNC: 227 MG/DL (ref 74–99)
HCT VFR BLD AUTO: 26.3 % (ref 41–52)
HGB BLD-MCNC: 7.5 G/DL (ref 13.5–17.5)
MCH RBC QN AUTO: 26.7 PG (ref 26–34)
MCHC RBC AUTO-ENTMCNC: 28.5 G/DL (ref 32–36)
MCV RBC AUTO: 94 FL (ref 80–100)
NRBC BLD-RTO: 0.9 /100 WBCS (ref 0–0)
PLATELET # BLD AUTO: 217 X10*3/UL (ref 150–450)
POTASSIUM SERPL-SCNC: 4 MMOL/L (ref 3.5–5.3)
PROCALCITONIN SERPL-MCNC: 0.98 NG/ML
PROT SERPL-MCNC: 7.1 G/DL (ref 6.4–8.2)
RBC # BLD AUTO: 2.81 X10*6/UL (ref 4.5–5.9)
SODIUM SERPL-SCNC: 151 MMOL/L (ref 136–145)
VANCOMYCIN SERPL-MCNC: 10.1 UG/ML (ref 5–20)
WBC # BLD AUTO: 14.7 X10*3/UL (ref 4.4–11.3)

## 2025-03-24 PROCEDURE — 1200000002 HC GENERAL ROOM WITH TELEMETRY DAILY

## 2025-03-24 PROCEDURE — 2500000004 HC RX 250 GENERAL PHARMACY W/ HCPCS (ALT 636 FOR OP/ED): Performed by: NURSE PRACTITIONER

## 2025-03-24 PROCEDURE — 80202 ASSAY OF VANCOMYCIN: CPT | Performed by: NURSE PRACTITIONER

## 2025-03-24 PROCEDURE — 71045 X-RAY EXAM CHEST 1 VIEW: CPT

## 2025-03-24 PROCEDURE — 82947 ASSAY GLUCOSE BLOOD QUANT: CPT

## 2025-03-24 PROCEDURE — 2500000002 HC RX 250 W HCPCS SELF ADMINISTERED DRUGS (ALT 637 FOR MEDICARE OP, ALT 636 FOR OP/ED): Performed by: NURSE PRACTITIONER

## 2025-03-24 PROCEDURE — 2500000001 HC RX 250 WO HCPCS SELF ADMINISTERED DRUGS (ALT 637 FOR MEDICARE OP): Performed by: NURSE PRACTITIONER

## 2025-03-24 PROCEDURE — 71045 X-RAY EXAM CHEST 1 VIEW: CPT | Performed by: RADIOLOGY

## 2025-03-24 PROCEDURE — 94640 AIRWAY INHALATION TREATMENT: CPT

## 2025-03-24 PROCEDURE — 2500000005 HC RX 250 GENERAL PHARMACY W/O HCPCS: Performed by: NURSE PRACTITIONER

## 2025-03-24 PROCEDURE — 80053 COMPREHEN METABOLIC PANEL: CPT | Performed by: INTERNAL MEDICINE

## 2025-03-24 PROCEDURE — 85027 COMPLETE CBC AUTOMATED: CPT | Performed by: INTERNAL MEDICINE

## 2025-03-24 PROCEDURE — 36415 COLL VENOUS BLD VENIPUNCTURE: CPT | Performed by: INTERNAL MEDICINE

## 2025-03-24 RX ADMIN — PIPERACILLIN SODIUM AND TAZOBACTAM SODIUM 3.38 G: 3; .375 INJECTION, SOLUTION INTRAVENOUS at 20:52

## 2025-03-24 RX ADMIN — IPRATROPIUM BROMIDE AND ALBUTEROL SULFATE 3 ML: .5; 3 SOLUTION RESPIRATORY (INHALATION) at 13:13

## 2025-03-24 RX ADMIN — PIPERACILLIN SODIUM AND TAZOBACTAM SODIUM 3.38 G: 3; .375 INJECTION, SOLUTION INTRAVENOUS at 08:13

## 2025-03-24 RX ADMIN — INSULIN GLARGINE 30 UNITS: 100 INJECTION, SOLUTION SUBCUTANEOUS at 21:20

## 2025-03-24 RX ADMIN — IPRATROPIUM BROMIDE AND ALBUTEROL SULFATE 3 ML: .5; 3 SOLUTION RESPIRATORY (INHALATION) at 07:02

## 2025-03-24 RX ADMIN — VANCOMYCIN HYDROCHLORIDE 1000 MG: 1 INJECTION, SOLUTION INTRAVENOUS at 09:07

## 2025-03-24 RX ADMIN — ACETYLCYSTEINE 200 MG: 200 SOLUTION ORAL; RESPIRATORY (INHALATION) at 07:02

## 2025-03-24 RX ADMIN — ACETYLCYSTEINE 200 MG: 200 SOLUTION ORAL; RESPIRATORY (INHALATION) at 13:13

## 2025-03-24 RX ADMIN — ACETYLCYSTEINE 200 MG: 200 SOLUTION ORAL; RESPIRATORY (INHALATION) at 19:22

## 2025-03-24 RX ADMIN — PIPERACILLIN SODIUM AND TAZOBACTAM SODIUM 3.38 G: 3; .375 INJECTION, SOLUTION INTRAVENOUS at 14:24

## 2025-03-24 RX ADMIN — METOPROLOL TARTRATE 50 MG: 50 TABLET, FILM COATED ORAL at 08:13

## 2025-03-24 RX ADMIN — INSULIN LISPRO 6 UNITS: 100 INJECTION, SOLUTION INTRAVENOUS; SUBCUTANEOUS at 14:13

## 2025-03-24 RX ADMIN — SUCRALFATE 1 G: 1 TABLET ORAL at 16:29

## 2025-03-24 RX ADMIN — INSULIN LISPRO 8 UNITS: 100 INJECTION, SOLUTION INTRAVENOUS; SUBCUTANEOUS at 21:20

## 2025-03-24 RX ADMIN — SUCRALFATE 1 G: 1 TABLET ORAL at 08:14

## 2025-03-24 RX ADMIN — METOPROLOL TARTRATE 50 MG: 50 TABLET, FILM COATED ORAL at 14:13

## 2025-03-24 RX ADMIN — IPRATROPIUM BROMIDE AND ALBUTEROL SULFATE 3 ML: .5; 3 SOLUTION RESPIRATORY (INHALATION) at 19:22

## 2025-03-24 RX ADMIN — METOPROLOL TARTRATE 50 MG: 50 TABLET, FILM COATED ORAL at 20:52

## 2025-03-24 RX ADMIN — INSULIN LISPRO 2 UNITS: 100 INJECTION, SOLUTION INTRAVENOUS; SUBCUTANEOUS at 08:13

## 2025-03-24 RX ADMIN — ATORVASTATIN CALCIUM 80 MG: 80 TABLET, FILM COATED ORAL at 20:52

## 2025-03-24 RX ADMIN — DOCUSATE SODIUM 100 MG: 100 CAPSULE, LIQUID FILLED ORAL at 20:52

## 2025-03-24 RX ADMIN — PIPERACILLIN SODIUM AND TAZOBACTAM SODIUM 3.38 G: 3; .375 INJECTION, SOLUTION INTRAVENOUS at 02:02

## 2025-03-24 RX ADMIN — ACETAMINOPHEN 325 MG: 325 TABLET, FILM COATED ORAL at 00:07

## 2025-03-24 RX ADMIN — Medication 40 L/MIN: at 20:52

## 2025-03-24 RX ADMIN — TRAZODONE HYDROCHLORIDE 50 MG: 50 TABLET ORAL at 20:52

## 2025-03-24 RX ADMIN — Medication 40 PERCENT: at 07:06

## 2025-03-24 ASSESSMENT — COGNITIVE AND FUNCTIONAL STATUS - GENERAL
DRESSING REGULAR LOWER BODY CLOTHING: TOTAL
EATING MEALS: TOTAL
DAILY ACTIVITIY SCORE: 6
WALKING IN HOSPITAL ROOM: TOTAL
MOBILITY SCORE: 6
MOVING TO AND FROM BED TO CHAIR: TOTAL
TURNING FROM BACK TO SIDE WHILE IN FLAT BAD: TOTAL
CLIMB 3 TO 5 STEPS WITH RAILING: TOTAL
TOILETING: TOTAL
PERSONAL GROOMING: TOTAL
STANDING UP FROM CHAIR USING ARMS: TOTAL
MOBILITY SCORE: 6
PERSONAL GROOMING: TOTAL
STANDING UP FROM CHAIR USING ARMS: TOTAL
DAILY ACTIVITIY SCORE: 6
MOVING TO AND FROM BED TO CHAIR: TOTAL
CLIMB 3 TO 5 STEPS WITH RAILING: TOTAL
TOILETING: TOTAL
DRESSING REGULAR LOWER BODY CLOTHING: TOTAL
TURNING FROM BACK TO SIDE WHILE IN FLAT BAD: TOTAL
MOVING FROM LYING ON BACK TO SITTING ON SIDE OF FLAT BED WITH BEDRAILS: TOTAL
DRESSING REGULAR UPPER BODY CLOTHING: TOTAL
HELP NEEDED FOR BATHING: TOTAL
WALKING IN HOSPITAL ROOM: TOTAL
HELP NEEDED FOR BATHING: TOTAL
EATING MEALS: TOTAL
DRESSING REGULAR UPPER BODY CLOTHING: TOTAL
MOVING FROM LYING ON BACK TO SITTING ON SIDE OF FLAT BED WITH BEDRAILS: TOTAL

## 2025-03-24 ASSESSMENT — PAIN SCALES - PAIN ASSESSMENT IN ADVANCED DEMENTIA (PAINAD)
TOTALSCORE: 0
FACIALEXPRESSION: SMILING OR INEXPRESSIVE
CONSOLABILITY: NO NEED TO CONSOLE
BODYLANGUAGE: RELAXED
BREATHING: NORMAL

## 2025-03-24 ASSESSMENT — PAIN - FUNCTIONAL ASSESSMENT
PAIN_FUNCTIONAL_ASSESSMENT: 0-10
PAIN_FUNCTIONAL_ASSESSMENT: 0-10

## 2025-03-24 ASSESSMENT — PAIN DESCRIPTION - LOCATION: LOCATION: BUTTOCKS

## 2025-03-24 ASSESSMENT — PAIN SCALES - GENERAL
PAINLEVEL_OUTOF10: 0 - NO PAIN
PAINLEVEL_OUTOF10: 2
PAINLEVEL_OUTOF10: 0 - NO PAIN

## 2025-03-24 ASSESSMENT — PAIN DESCRIPTION - DESCRIPTORS: DESCRIPTORS: ACHING

## 2025-03-24 ASSESSMENT — PAIN DESCRIPTION - ORIENTATION: ORIENTATION: LEFT;RIGHT

## 2025-03-24 NOTE — CARE PLAN
The patient's goals for the shift include      The clinical goals for the shift include Rest/comfort/safety maintained.        Problem: Pain - Adult  Goal: Verbalizes/displays adequate comfort level or baseline comfort level  Outcome: Progressing     Problem: Safety - Adult  Goal: Free from fall injury  Outcome: Progressing     Problem: Discharge Planning  Goal: Discharge to home or other facility with appropriate resources  Outcome: Progressing     Problem: Chronic Conditions and Co-morbidities  Goal: Patient's chronic conditions and co-morbidity symptoms are monitored and maintained or improved  Outcome: Progressing     Problem: Nutrition  Goal: Nutrient intake appropriate for maintaining nutritional needs  Outcome: Progressing     Problem: Skin  Goal: Decreased wound size/increased tissue granulation at next dressing change  Outcome: Progressing  Flowsheets (Taken 3/24/2025 1119)  Decreased wound size/increased tissue granulation at next dressing change: Promote sleep for wound healing  Goal: Participates in plan/prevention/treatment measures  Outcome: Progressing  Flowsheets (Taken 3/24/2025 1119)  Participates in plan/prevention/treatment measures: Elevate heels  Goal: Prevent/manage excess moisture  Outcome: Progressing  Flowsheets (Taken 3/24/2025 1119)  Prevent/manage excess moisture: Cleanse incontinence/protect with barrier cream  Goal: Prevent/minimize sheer/friction injuries  Outcome: Progressing  Flowsheets (Taken 3/24/2025 1119)  Prevent/minimize sheer/friction injuries:   Complete micro-shifts as needed if patient unable. Adjust patient position to relieve pressure points, not a full turn   Turn/reposition every 2 hours/use positioning/transfer devices   Use pull sheet  Goal: Promote/optimize nutrition  Outcome: Progressing  Flowsheets (Taken 3/24/2025 1119)  Promote/optimize nutrition:   Monitor/record intake including meals   Offer water/supplements/favorite foods  Goal: Promote skin  healing  Outcome: Progressing  Flowsheets (Taken 3/24/2025 1119)  Promote skin healing:   Turn/reposition every 2 hours/use positioning/transfer devices   Protective dressings over bony prominences   Assess skin/pad under line(s)/device(s)

## 2025-03-24 NOTE — PROGRESS NOTES
03/24/25 1100   Discharge Planning   Living Arrangements Other (Comment)   Support Systems Spouse/significant other;Family members   Assistance Needed Assist with ADL's   Type of Residence Skilled nursing facility   Home or Post Acute Services Post acute facilities (Rehab/SNF/etc)   Type of Post Acute Facility Services Skilled nursing   Expected Discharge Disposition SNF   Does the patient need discharge transport arranged? Yes   RoundTrip coordination needed? Yes   Has discharge transport been arranged? No   Patient Choice   Provider Choice list and CMS website (https://medicare.gov/care-compare#search) for post-acute Quality and Resource Measure Data were provided and reviewed with: Family   Intensity of Service   Intensity of Service 0-30 min     Met with patients wife at bedside, introduced self and role on care transitions team. Admission assessment completed with patients wife. Patient wife states prior to admission patient was at [facility] for skilled nursing. Patient wife states she does not want patient to return at discharge. Patients wife provided with SNF choice list to review. Patients wife provided updated address: [address] [place] [address] [place] [address], updated in EMR. Insurance and emergency contact information verified with patients wife. Care transitions team will continue to follow for discharge planning.

## 2025-03-24 NOTE — ASSESSMENT & PLAN NOTE
Zeus Bone MD  Physician  Internal Medicine     H&P      Signed     Date of Service: 3/23/2025 10:28 AM     Signed       Expand All Collapse All    History Of Present Illness  Bryan Nix is a 64-year-old male with past medical history of atrial flutter on Eliquis, hyponatremia, IDDM, CAD, hypertension, chronic wounds, diffuse lymphadenopathy, anemia, history of smoking, history elevated PSA, CVA L hemisphere (2020) with right-sided deficits, Left ICA stenosis (85%) s/p angioplasty and left carotid artery stent (08/25/2023) and recent history of acute pontine stroke 02/27/25 with acute hypoxic respiratory failure s/p tracheostomy on 2/25/2025 and PEG.  Patient presents for bleeding from tracheostomy.  Patient is alert and oriented x 3, however limited verbal communication due to tracheostomy.  Wife is at bedside and assists with history.  She reports that patient was coughing blood out of his tracheostomy.  ER provider felt bleeding was likely superficial around site.  Patient has been on room air at skilled nursing facility.  He was placed on Airvo in the ED mainly to humidify oxygen.  Patient never desaturated per ER report.  Currently he is resting comfortably in the bed, breath sounds slightly rhonchorous, but unlabored.  Reports chills, but no fevers.  He has right-sided weakness and sensory deficits.  Denies headache, vision or hearing changes, chest pains, palpitations, shortness of breath, nausea, vomiting, abdominal pain, diarrhea, or complications of PEG tube site.  Wife reports patient skin became irritated in his groin due to adhesive from a condom cath previously, he has a small decubitus wound that she reports is healing well.  He receives tube feeds continuously.  Wife reports a patient was recently evaluated by speech for p.o. to intake, however he still remains n.p.o. except for meds and tube feeds through PEG. CODE STATUS reviewed: full code     ER Course: Hemodynamically stable,  afebrile, SpO2 to 97% trach mask.  WBC 9.3, hemoglobin 8, hematocrit 29.4, platelets 197.  INR 1.4.  Glucose 223, BUN 36, otherwise CMP is normal.  CT of the chest for PE pending.  CXR unremarkable. Trannexamic acid soak gauze wrapped around trach.      Past medical history: As above  Past surgical history: As above  Social history: Former smoker 37.5 pack years-quit smoking age 63, occasional alcohol use, no illicit drug use.  .  Works in maintenance.  6 children.  Family history: Noncontributory     Past Medical History  Medical History        Past Medical History:   Diagnosis Date    Abnormal finding of blood chemistry, unspecified 05/18/2016     Abnormal blood chemistry    Acute upper respiratory infection, unspecified 12/02/2015     Acute upper respiratory infection    Elevated prostate specific antigen (PSA)       Elevated prostate specific antigen (PSA)    Encounter for screening for malignant neoplasm of colon 01/30/2021     Colon cancer screening    Encounter for screening for malignant neoplasm of prostate 11/30/2020     Prostate cancer screening    Essential (primary) hypertension 07/30/2013     Benign essential hypertension    Other conditions influencing health status 07/30/2013     Diabetes Mellitus Poorly Controlled    Other conditions influencing health status 12/02/2015     History of cough    Personal history of other endocrine, nutritional and metabolic disease 03/18/2021     History of hyperlipidemia    Personal history of other endocrine, nutritional and metabolic disease 03/18/2021     History of diabetes mellitus    Personal history of other specified conditions 05/18/2016     History of chest pain    Personal history of other specified conditions 05/18/2016     History of dyspnea    Personal history of other specified conditions 05/18/2016     History of dizziness    Personal history of other specified conditions 05/18/2016     History of fatigue            Surgical History  Surgical  History         Past Surgical History:   Procedure Laterality Date    CT ANGIO CORONARY ART WITH HEARTFLOW IF SCORE >30%   8/23/2023     CT HEART CORONARY ANGIOGRAM 8/23/2023 [facility] CT    MR HEAD ANGIO WO IV CONTRAST   12/14/2020     MR HEAD ANGIO WO IV CONTRAST 12/14/2020 [facility]    MR NECK ANGIO WO IV CONTRAST   12/14/2020     MR NECK ANGIO WO IV CONTRAST 12/14/2020 [facility]    OTHER SURGICAL HISTORY   12/01/2020     Hand fracture repair    OTHER SURGICAL HISTORY   12/01/2020     Nasal bone fracture repair            Social History  He reports that he has quit smoking. His smoking use included cigarettes. He has never used smokeless tobacco. No history on file for alcohol use and drug use.     Family History  Family History   No family history on file.        Allergies  Patient has no known allergies.     Review of Systems     Physical Exam  Constitutional:       General: He is awake. He is not in acute distress.     Appearance: Normal appearance. He is not toxic-appearing.   HENT:      Head: Atraumatic.      Nose: Nose normal.      Mouth/Throat:      Mouth: Mucous membranes are moist.   Eyes:      Conjunctiva/sclera: Conjunctivae normal.   Cardiovascular:      Rate and Rhythm: Normal rate and regular rhythm.      Pulses: Normal pulses.      Heart sounds: No murmur heard.  Pulmonary:      Effort: No respiratory distress.      Comments: Tracheostomy site midline, no drainage around dressing.  Rhonchorous breath sounds, unlabored respirations, RT bedside suctioning patient with thick blood-tinged sputum  Abdominal:      General: Bowel sounds are normal. There is no distension.      Palpations: Abdomen is soft.      Tenderness: There is no abdominal tenderness. There is no guarding.      Comments: Left upper quadrant PEG site tube site, no drainage or skin breakdown around tube site.   Musculoskeletal:         General: No swelling, deformity or signs of injury.      Cervical back: Neck  "supple.      Right lower leg: No edema.      Left lower leg: No edema.   Skin:     General: Skin is warm and dry.      Capillary Refill: Capillary refill takes less than 2 seconds.      Findings: Wound (sacrum) present. No ecchymosis or erythema.   Neurological:      Mental Status: He is alert and oriented to person, place, and time.      Cranial Nerves: No facial asymmetry.      Sensory: Sensory deficit (RUE, RLE) present.      Motor: Weakness present.      Comments: Right sided upper and lower extremity motor function deficits   Psychiatric:         Mood and Affect: Mood normal.         Behavior: Behavior is cooperative.            Last Recorded Vitals  Blood pressure 134/50, pulse 93, temperature 36.9 °C (98.4 °F), temperature source Temporal, resp. rate 22, height 1.88 m (6' 2\"), weight 77.1 kg (170 lb), SpO2 98%.     Relevant Results              Results for orders placed or performed during the hospital encounter of 03/22/25 (from the past 24 hours)   CBC and Auto Differential   Result Value Ref Range     WBC 9.3 4.4 - 11.3 x10*3/uL     nRBC 0.3 (H) 0.0 - 0.0 /100 WBCs     RBC 3.02 (L) 4.50 - 5.90 x10*6/uL     Hemoglobin 8.0 (L) 13.5 - 17.5 g/dL     Hematocrit 29.4 (L) 41.0 - 52.0 %     MCV 97 80 - 100 fL     MCH 26.5 26.0 - 34.0 pg     MCHC 27.2 (L) 32.0 - 36.0 g/dL     RDW 15.6 (H) 11.5 - 14.5 %     Platelets 197 150 - 450 x10*3/uL     Neutrophils % 77.1 40.0 - 80.0 %     Immature Granulocytes %, Automated 1.5 (H) 0.0 - 0.9 %     Lymphocytes % 13.1 13.0 - 44.0 %     Monocytes % 4.1 2.0 - 10.0 %     Eosinophils % 3.2 0.0 - 6.0 %     Basophils % 1.0 0.0 - 2.0 %     Neutrophils Absolute 7.14 1.20 - 7.70 x10*3/uL     Immature Granulocytes Absolute, Automated 0.14 0.00 - 0.70 x10*3/uL     Lymphocytes Absolute 1.21 1.20 - 4.80 x10*3/uL     Monocytes Absolute 0.38 0.10 - 1.00 x10*3/uL     Eosinophils Absolute 0.30 0.00 - 0.70 x10*3/uL     Basophils Absolute 0.09 0.00 - 0.10 x10*3/uL   Basic metabolic panel   Result " Value Ref Range     Glucose 223 (H) 74 - 99 mg/dL     Sodium 142 136 - 145 mmol/L     Potassium 4.3 3.5 - 5.3 mmol/L     Chloride 103 98 - 107 mmol/L     Bicarbonate 27 21 - 32 mmol/L     Anion Gap 16 10 - 20 mmol/L     Urea Nitrogen 36 (H) 6 - 23 mg/dL     Creatinine 0.81 0.50 - 1.30 mg/dL     eGFR >90 >60 mL/min/1.73m*2     Calcium 9.6 8.6 - 10.3 mg/dL   Protime-INR   Result Value Ref Range     Protime 15.1 (H) 9.8 - 12.4 seconds     INR 1.4 (H) 0.9 - 1.1   aPTT   Result Value Ref Range     aPTT 25 (L) 26 - 36 seconds   Type and Screen   Result Value Ref Range     ABO TYPE B       Rh TYPE POS       ANTIBODY SCREEN NEG     VERIFY ABO/Rh Group Test   Result Value Ref Range     ABO TYPE B       Rh TYPE POS     MRSA Surveillance for Vancomycin De-escalation, PCR     Specimen: Anterior Nares; Swab   Result Value Ref Range     MRSA PCR Not Detected Not Detected   POCT GLUCOSE   Result Value Ref Range     POCT Glucose 180 (H) 74 - 99 mg/dL   POCT GLUCOSE   Result Value Ref Range     POCT Glucose 161 (H) 74 - 99 mg/dL   POCT GLUCOSE   Result Value Ref Range     POCT Glucose 156 (H) 74 - 99 mg/dL   Basic metabolic panel   Result Value Ref Range     Glucose 170 (H) 74 - 99 mg/dL     Sodium 147 (H) 136 - 145 mmol/L     Potassium 4.2 3.5 - 5.3 mmol/L     Chloride 107 98 - 107 mmol/L     Bicarbonate 30 21 - 32 mmol/L     Anion Gap 14 10 - 20 mmol/L     Urea Nitrogen 35 (H) 6 - 23 mg/dL     Creatinine 0.85 0.50 - 1.30 mg/dL     eGFR >90 >60 mL/min/1.73m*2     Calcium 9.4 8.6 - 10.3 mg/dL   CBC   Result Value Ref Range     WBC 10.4 4.4 - 11.3 x10*3/uL     nRBC 0.6 (H) 0.0 - 0.0 /100 WBCs     RBC 2.87 (L) 4.50 - 5.90 x10*6/uL     Hemoglobin 7.8 (L) 13.5 - 17.5 g/dL     Hematocrit 26.6 (L) 41.0 - 52.0 %     MCV 93 80 - 100 fL     MCH 27.2 26.0 - 34.0 pg     MCHC 29.3 (L) 32.0 - 36.0 g/dL     RDW 15.7 (H) 11.5 - 14.5 %     Platelets 205 150 - 450 x10*3/uL   SST TOP   Result Value Ref Range     Extra Tube Hold for add-ons.        CT  angio chest for pulmonary embolism     Result Date: 3/22/2025  Interpreted By:  Pamela Zurita, STUDY: CT ANGIO CHEST FOR PULMONARY EMBOLISM;  3/22/2025 4:56 pm   INDICATION: Signs/Symptoms:bloody secretions, eval for PE.     COMPARISON: CT chest without contrast 03/22/2025   ACCESSION NUMBER(S): [id number]   ORDERING CLINICIAN: PAMELA HAHN   TECHNIQUE: Contiguous axial images of the chest were obtained after the intravenous administration of iodinated contrast using angiographic PE protocol. Coronal and sagittal reformatted images were reconstructed from the axial data. MIP images were created on an independent workstation and reviewed.   FINDINGS:   MEDIASTINUM AND LYMPH NODES: Inflammatory fat stranding along the tracheostomy tract without discrete fluid collection. The esophageal wall appears within normal limits. There is redemonstration of diffuse lymphadenopathy in the bilateral axilla, mediastinum, bilateral supraclavicular as described in detail on separate report. Also notable or enlarged bilateral level 4 cervical lymph nodes (right > left).   VESSELS:  Normal caliber thoracic aorta without dissection. Moderate aortic atherosclerosis.  No acute pulmonary embolism.   HEART: Normal size.  Mild coronary artery calcifications. No significant pericardial effusion.   LUNG, AIRWAYS, PLEURA: There is slight increase in debris within the trachea. There has been clearing of secretions in the left upper/lower lobe bronchus. There is diffuse bilateral bronchial thickening slightly increased from prior study. There is peribronchovascular ground-glass opacity in the posterior right upper lobe and superior and basal segments of the right lower lobe. There is slightly worsened atelectasis in the lung bases remaining predominantly subsegmental in nature. There is a 0.8 cm nodule in the left upper lobe that is stable from 08/19/2023.   OSSEOUS STRUCTURES: No acute osseous abnormality.   CHEST WALL SOFT TISSUES: No  discernible acute abnormality.   UPPER ABDOMEN/OTHER: No acute abnormality.        No acute pulmonary embolism.   Peribronchial vascular ground glass opacities in the right upper and lower lobe again concerning for pneumonia given patient's risk factors of tracheostomy and debris in the airways.   Redemonstration of diffuse lymphadenopathy in the lower necks, axilla, and mediastinum. Findings are either diffusely reactive in nature, reflective of lymphoma/leukemia, meter deficiency, or less likely head neck neoplasm is previously postulated. However please correlate with past medical history.   Inflammatory changes along the tracheostomy tract without fluid collection. Correlate for erythema.   Unchanged 0.8 cm nodule in the left upper lobe dating back to 08/19/2023. Recommend 1 year follow up chest CT to ensure at least 2 years of stability.   MACRO: None.   Signed by: Pamela Zurita 3/22/2025 7:17 PM Dictation workstation:   [id number]     CT chest wo IV contrast     Result Date: 3/22/2025  Interpreted By:  Pamela Zurita, STUDY: CT CHEST WO IV CONTRAST;  3/22/2025 3:41 pm   INDICATION: Signs/Symptoms:eval for alveolar hemorrhage.     COMPARISON: CT head/neck 08/19/2023   ACCESSION NUMBER(S): [id number]   ORDERING CLINICIAN: PAMELA HAHN   TECHNIQUE: Contiguous axial images of the chest and upper abdomen were obtained without contrast. Coronal and sagittal reformatted images were reconstructed from the axial data.   FINDINGS:   MEDIASTINUM AND LYMPH NODES: There is fat stranding/edema along the tract of the tracheostomy about discrete fluid collection. The esophageal wall appears within normal limits. There are numerous enlarged bilateral supraclavicular lymph nodes measuring up to 1.4 cm on the left. There are numerous enlarged bilateral axillary lymph nodes measuring up to 1.1 cm on the right and 1.2 cm on the left. There are numerous prominent to mildly enlarged mediastinal lymph nodes as well. No  pneumomediastinum.   VESSELS:  Normal caliber thoracic aorta. Moderate aortic atherosclerosis. The main pulmonary artery is normal in size.   HEART: Normal size.  Mild coronary artery calcifications. No significant pericardial effusion.   LUNG, AIRWAYS, PLEURA: Tracheostomy noted in appropriate position. There is trace mucus in the left mainstem bronchus and at the origin of the left upper lobe and lower lobe bronchi. There are mild peribronchovascular ground-glass opacities in the posterior segment of the right upper lobe, basal segments of the right lower lobe. There is mild dependent bibasilar atelectasis. There is no pleural effusion.   OSSEOUS STRUCTURES: No acute osseous abnormality.   CHEST WALL SOFT TISSUES: No discernible acute abnormality.   UPPER ABDOMEN/OTHER: No acute abnormality.        1. Mild peribronchovascular ground-glass opacities in the posterior right upper lobe and basal segments of the right lower lobe. This appearance and distribution is not typical for alveolar hemorrhage which tends to be centrilobular and bilateral asymmetric in distribution. Findings are most suggestive of pneumonia.   2. Small amount of mucous debris in the left mainstem bronchus and at the origin of the left upper and lower lobe bronchi.   3. Diffuse lymphadenopathy in the bilateral axillary regions, mediastinum and left supraclavicular region particularly notable in the bilateral supraclavicular region. This is progressed in the left supraclavicular region and new elsewhere when compared to 08/19/2023 CTA head/neck. Correlate for any history of known head/neck malignancy or lymphoma.   4. Inflammation/fat stranding adjacent to the tracheostomy tube entry site noted. Correlate for air the met. No evidence of fluid collection.   MACRO: None.   Signed by: Marco A Zurita 3/22/2025 7:10 PM Dictation workstation:   [id number]     XR chest 1 view     Result Date: 3/22/2025  Interpreted By:  Fermin Stroud, STUDY: XR CHEST  1 VIEW   INDICATION: Signs/Symptoms:hemoptysis.   COMPARISON: August 19, 2023   ACCESSION NUMBER(S): [id number]   ORDERING CLINICIAN: PAMELA HAHN   FINDINGS: No consolidation, effusion, edema, or pneumothorax. Heart size within normal limits.        No evidence of acute intrathoracic abnormality.   Signed by: Fermin Stroud 3/22/2025 2:01 PM Dictation workstation:   [id number]     XR chest 1 view     Result Date: 3/14/2025  * * *Final Report* * * DATE OF EXAM: Mar 14 2025  8:52AM   S5X   5290  -  XR CHEST 1V FRONTAL   / ACCESSION #  [id number] PROCEDURE REASON: new bleeding from trach      * * * * Physician Interpretation * * * *  EXAMINATION:  CHEST RADIOGRAPH (PORTABLE SINGLE VIEW AP) Exam Date/Time:  3/14/2025 8:52 AM Clinical History:  new bleeding from trach Comparison:  03/03/2025 RESULT: LINES, TUBES, AND DEVICES:  Tracheostomy. CARDIOMEDIASTINAL SILHOUETTE:  The cardiac and mediastinal silhouettes appear unremarkable. LUNGS AND PLEURA: No consolidation.  No pleural effusion. No pulmonary vascular congestion. No pneumothorax identified. OTHER:  N/A     IMPRESSION: No acute abnormality identified. : [facility]   Transcribe Date/Time: Mar 14 2025  9:07A Dictated by : PAMELA EVANS MD This examination was interpreted and the report reviewed and electronically signed by: PAMELA EVANS MD on Mar 14 2025  9:11AM  EST     US abdomen complete     Result Date: 3/11/2025  ABDOMEN ULTRASOUND-COMPLETE FINDINGS: Abdomen Ultrasound Complete: PROCEDURE: Multiple grayscale mages of the abdomen were obtained. FINDINGS:Multiple real time images demonstrate the liver with increased echogenicity consistent with fatty infiltration. There is no evidence of a discrete mass within the liver. The liver is enlarged, measured at 15.7 cm in size. The gallbladder is seen with no evidence of cholelithiasis. The gallbladder wall is within normal limits. The common bile duct is within normal limits. The visualized pancreas  appears unremarkable. Both kidneys are seen with no evidence of hydronephrosis or a calculus. The spleen has its normal homogeneous echo appearance. There is no free fluid to suggest ascites. The visualized aorta appears unremarkable. The inferior vena cava appears patent. CONCLUSION: Enlarged fatty liver. ELECTRONICALLY SIGNED BY LEONEL LAN M.D. 3/11/2025 5:00:52 PM EDT.     XR chest 1 view     Result Date: 3/3/2025  * * *Final Report* * * DATE OF EXAM: Mar  3 2025  1:40PM   S5X   5290  -  XR CHEST 1V FRONTAL   / ACCESSION #  [id number] PROCEDURE REASON: resp failure      * * * * Physician Interpretation * * * *  EXAMINATION:  CHEST RADIOGRAPH (PORTABLE SINGLE VIEW AP) Exam Date/Time:  3/3/2025 1:40 PM Indication:   resp failure M:  XCP_4 Comparison:  1 day prior RESULT: Lines, tubes, and devices:  Tracheostomy tube remains in situ. Lungs and pleura:  Mild patchy opacities right lower lung zone seen on the prior study are no longer appreciated.  No confluent opacity.   Costophrenic angles are clear.  No pneumothorax. Cardiomediastinal silhouette:  Stable cardiomediastinal silhouette. Other:  -     IMPRESSION: Improved aeration right lower lung zone.  No acute cardiopulmonary finding. : [facility]   Transcribe Date/Time: Mar  3 2025  2:49P Dictated by : ISRAEL BENSON MD This examination was interpreted and the report reviewed and electronically signed by: ISRAEL BENSON MD on Mar  3 2025  2:49PM  EST     XR chest 1 view     Result Date: 3/2/2025  * * *Final Report* * * DATE OF EXAM: Mar  2 2025 11:25AM   MMX   5376  -  XR CHEST 1V FRONTAL PORT  / ACCESSION #  [id number] PROCEDURE REASON: Shortness of breath      * * * * Physician Interpretation * * * *  EXAMINATION:  CHEST RADIOGRAPH (PORTABLE SINGLE VIEW AP) Exam Date/Time:  3/2/2025 11:25 AM CLINICAL HISTORY: Shortness of breath MQ:  XCPR_5 Comparison:  02/20/2025. RESULT: This is a limited examination due to multiple wires and tubing  obscuring the lower lungs. Lines, tubes, and devices:  A tracheostomy tube remains in place. Lungs and pleura:  There are increased bronchovascular markings in the right lower lung which may be due to bronchitis or early changes of aspiration pneumonia.  Clinical correlation and follow-up exams are recommended. Cardiomediastinal silhouette:  Stable cardiomediastinal silhouette. Other:  The visualized bony thorax appears unremarkable.     IMPRESSION: Nonspecific parenchymal changes in the right lower lung as described above. A follow-up exam is recommended. : [facility]   Transcribe Date/Time: Mar  2 2025 11:53A Dictated by : BJ INIGUEZ MD This examination was interpreted and the report reviewed and electronically signed by: BJ INIGUEZ MD on Mar  2 2025 11:54AM  EST     XR chest 1 view     Result Date: 2/28/2025  * * *Final Report* * * DATE OF EXAM: Feb 28 2025 10:36AM   MMX   5290  -  XR CHEST 1V FRONTAL   / ACCESSION #  [id number] PROCEDURE REASON: Shortness of breath      * * * * Physician Interpretation * * * *  EXAMINATION:  CHEST RADIOGRAPH (SINGLE VIEW AP OR PA) CLINICAL HISTORY: Shortness of breath MQ:  XC1_5 Comparison:  02/22/2025 RESULT: Lines, tubes, and devices:  Tracheostomy tube is noted. Lungs and pleura:  No consolidation. No lung mass. No pleural effusion. Cardiomediastinal silhouette:  Normal cardiomediastinal silhouette. Other:  No acute osseous pathology.     IMPRESSION: No acute radiographic abnormality. : [facility]   Transcribe Date/Time: Feb 28 2025 12:07P Dictated by : DIANNE RAMOS MD This examination was interpreted and the report reviewed and electronically signed by: DIANNE RAMOS MD on Feb 28 2025 12:08PM  EST     XR abdomen 2 views supine and erect or decub     Result Date: 2/24/2025  * * *Final Report* * * DATE OF EXAM: Feb 24 2025  6:03PM   MMX   5289  -  XR ABDOMEN 1V SUPINE  / ACCESSION #  [id number] PROCEDURE REASON: Evaluate tube, line or lead position       * * * * Physician Interpretation * * * *  EXAMINATION:  XR ABDOMEN 1V SUPINE CLINICAL HISTORY:   Evaluate tube, line or lead position Technique:   XR ABDOMEN 1V SUPINE -- NOT APPLICABLE with 1 views on 1 images Comparison: 2/21/2025 RESULT: Feeding tube with distal tip at the pylorus antrum. Moderate colonic stool burden. No dilated bowel. Lung structures are intact.     IMPRESSION: Feeding tube with distal tip at the pylorus antrum. Moderate colonic stool burden. No dilated bowel. : [facility]   Transcribe Date/Time: Feb 24 2025  6:23P Dictated by : PABLO RAMOS MD This examination was interpreted and the report reviewed and electronically signed by: PABLO RAMOS MD on Feb 24 2025  6:24PM  EST     CT head wo IV contrast     Result Date: 2/23/2025  * * *Final Report* * * DATE OF EXAM: Feb 23 2025 12:04PM   [facility]   0504  -  CT BRAIN WO IVCON  / ACCESSION #  [id number] PROCEDURE REASON: Stroke, follow up      * * * * Physician Interpretation * * * *  EXAMINATION: CT BRAIN WO IVCON CLINICAL HISTORY: Stroke, follow up TECHNIQUE:  Serial axial images without IV contrast were obtained from the vertex to the foramen magnum. MQ:  CTBWO_3 CT Radiation dose: Integrated Dose-Length Product (DLP) for this visit =   778  mGy*cm CT Dose Reduction Employed: Iterative recon COMPARISON: MRI brain 02/19/2025 RESULT: Post-operative change: None. Acute change: Evolving acute infarct along the central eli and superior left medulla (2:7-9).  No evidence of hemorrhagic transformation. Hemorrhage: No evidence of acute intracranial hemorrhage. Mass Lesion / Mass Effect: There is no evidence of an intracranial mass or extraaxial fluid collection. No significant mass effect. Chronic change: Stable remote left MCA territory encephalomalacia. Parenchyma: There is no significant volume loss. The brain parenchyma is otherwise within normal limits for age. Ventricles: The ventricles are within normal limits of size and configuration  for age. Paranasal sinuses and skull base: Partial opacification of the left maxillary and sphenoid sinus and multiple bilateral ethmoid air cells.   The remaining visualized paranasal sinuses are grossly clear. The skull base and imaged soft tissues are unremarkable. Localizer images: No additional findings.     IMPRESSION: Evolving acute brainstem infarct.  No evidence of hemorrhagic transformation. Stable remote left MCA territory infarct. : [facility]   Transcribe Date/Time: Feb 23 2025 12:48P Dictated by : FRANKY GUZMAN MD   This examination was interpreted and the report reviewed and electronically signed by: FRANKY GUZMAN MD on Feb 23 2025 12:52PM  EST     XR chest 1 view     Result Date: 2/22/2025  * * *Final Report* * * DATE OF EXAM: Feb 22 2025  6:56AM   MMX   [id number]  -  XR CHEST 1V FRONTAL   / ACCESSION #  [id number] PROCEDURE REASON: Evaluate tube, line,  or lead position      * * * * Physician Interpretation * * * *  EXAMINATION:  CHEST RADIOGRAPH (SINGLE VIEW AP OR PA) CLINICAL HISTORY: Evaluate tube, line,  or lead position MQ:  XC1_5 Comparison:  02/21/2025. RESULT: Lines, tubes, and devices:  An ET tube and feeding tube remain in place.   A poorly defined right venous catheter cannot be excluded. Lungs and pleura:  There are persistent bilateral lower lungs infiltrates seen on the right more than left suggestive of pneumonia such as aspiration pneumonia.  There is no definite pleural effusion. Cardiomediastinal silhouette:  Stable cardiomediastinal silhouette. Other:  The visualized bony thorax appears unremarkable.     IMPRESSION: Persistent bilateral lower lungs infiltrates as described above. A follow-up exam is recommended. : [facility]   Transcribe Date/Time: Feb 22 2025  8:20A Dictated by : BJ INIGUEZ MD This examination was interpreted and the report reviewed and electronically signed by: BJ INIGUEZ MD on Feb 22 2025  8:21AM  EST     XR chest 1 view     Result Date:  2/21/2025  * * *Final Report* * * DATE OF EXAM: Feb 21 2025  5:59PM   MMX   5376  -  XR CHEST 1V FRONTAL PORT  / ACCESSION #  [id number] PROCEDURE REASON: Evaluate tube, line,  or lead position      * * * * Physician Interpretation * * * *  EXAMINATION:  CHEST RADIOGRAPH (PORTABLE SINGLE VIEW AP) Exam Date/Time:  2/21/2025 5:59 PM CLINICAL HISTORY: Evaluate tube, line,  or lead position MQ:  XCPR_5 Comparison:  02/21/2025 RESULT: Lines, tubes, and devices: Endotracheal tube in place terminating in enteric tube in place terminating below the field of view. Lungs and pleura: No pneumothorax.  No pleural effusion.  Bilateral mild interstitial opacities.  The right costophrenic angle is not included in the field-of-view. Cardiomediastinal silhouette:  Stable cardiomediastinal silhouette. Other:  .     IMPRESSION: Mild interstitial opacities may be related to pulmonary vascular congestion. : [facility]   Transcribe Date/Time: Feb 21 2025  6:55P Dictated by : IRWIN VALDIVIA MD This examination was interpreted and the report reviewed and electronically signed by: IRWIN VALDIVIA MD on Feb 21 2025  6:57PM  EST     XR abdomen 2 views supine and erect or decub     Result Date: 2/21/2025  * * *Final Report* * * DATE OF EXAM: Feb 21 2025 10:32AM   MMX   [id number]  -  XR ABDOMEN 1V SUPINE  / ACCESSION #  [id number] PROCEDURE REASON: Evaluate tube, line or lead position      * * * * Physician Interpretation * * * *  Clinical Information: Feeding tube Single view of the abdomen was obtained. Feeding tube tip within the distal stomach. There is a nonspecific, nonobstructive bowel gas pattern. No abnormal abdominal masses are identified.  No pathologic calcifications.   No acute bony abnormalities are seen.     IMPRESSION: Nonspecific, nonobstructive bowel gas pattern. Feeding tube tip within the distal stomach. : [facility]   Transcribe Date/Time: Feb 21 2025 10:43A Dictated by : PAMELA EVANS MD  This examination was interpreted and the report reviewed and electronically signed by: PAMELA EVANS MD on Feb 21 2025 10:44AM  EST     XR chest 1 view     Result Date: 2/21/2025  * * *Final Report* * * DATE OF EXAM: Feb 21 2025 10:32AM   MMX   [id number]  -  XR CHEST 1V FRONTAL PORT  / ACCESSION #  [id number] PROCEDURE REASON: Shortness of breath      * * * * Physician Interpretation * * * *  EXAMINATION:  CHEST RADIOGRAPH (PORTABLE SINGLE VIEW AP) Exam Date/Time:  2/21/2025 10:32 AM Clinical History:  Shortness of breath Comparison:  02/17/2025 RESULT: LINES, TUBES, AND DEVICES:  Feeding tube tip beyond the inferior extent of the image. CARDIOMEDIASTINAL SILHOUETTE:  The cardiac and mediastinal silhouettes appear unremarkable. LUNGS AND PLEURA: No consolidation.  No pleural effusion. No pulmonary vascular congestion. No pneumothorax identified. OTHER:  N/A     IMPRESSION: No acute abnormality identified. : [facility]   Transcribe Date/Time: Feb 21 2025 10:41A Dictated by : PAMELA EVANS MD This examination was interpreted and the report reviewed and electronically signed by: PAMELA EVANS MD on Feb 21 2025 10:43AM  EST            Assessment/Plan     Assessment & Plan  Hemoptysis     Tracheostomy in place (Multi)     CVA, old, hemiparesis (Multi)     Status post insertion of percutaneous endoscopic gastrostomy (PEG) tube (Multi)     Pneumonia due to infectious organism     Right sided weakness     Atrial flutter (Multi)     Insulin dependent type 2 diabetes mellitus (Multi)        Telemetry monitoring  Hemoglobin stable 8's recently on review of outside labs, monitor for ongoing bleeding.  Daily H&H  Monitor for overt bleeding  Consult pulmonology, appreciate input  Pneumonia suspected for CT findings  Tracheostomy protocol  Expected cover with Zosyn and vancomycin  MRSA swab  Urine for Legionella antigen and strep pneumonia antigen  Supplemental oxygen as needed  Nebulizers as needed  RT evaluate and  treat  Check procalcitonin  Typical 20% zinc oxide to wounds and groin  Continuous tube feeds, continue at 80 mL an hour x 22 hours  Accu-Chek every 6 hours  Hypoglycemia protocol  PT/OT  Anticoagulation and antiplatelet therapy on hold, resume once cleared by pulmonology  Continue patient's home medications for chronic issues as appropriate     Patient fully evaluated on March 23.  Continue to monitor H&H.  Pulmonary consultation obtained.  Continue broad-spectrum antibiotics to rule out infectious process.  Recheck labs in AM.                 Zeus Bone MD

## 2025-03-24 NOTE — PROGRESS NOTES
Vancomycin Dosing by Pharmacy- Cessation of Therapy    Consult to pharmacy for vancomycin dosing has been discontinued by the prescriber, pharmacy will sign off at this time.    Please call pharmacy if there are further questions or re-enter a consult if vancomycin is resumed.     Lisa Gallo, PharmD

## 2025-03-24 NOTE — PROGRESS NOTES
Bryan Nix is a 64 y.o. male on day 2 of admission presenting with Hemoptysis.      Subjective   Patient fully evaluated on March 24.  Hemoptysis appears resolved.  Still with slightly elevated white count to be monitored.  Continue aggressive antibiotic regimen.  Repeat chest x-ray is pending.       Objective     Last Recorded Vitals  /66   Pulse 94   Temp 36.3 °C (97.3 °F)   Resp 20   Wt 77.1 kg (170 lb)   SpO2 97%   Intake/Output last 3 Shifts:    Intake/Output Summary (Last 24 hours) at 3/24/2025 1451  Last data filed at 3/24/2025 1424  Gross per 24 hour   Intake 1000 ml   Output --   Net 1000 ml       Admission Weight  Weight: 77.1 kg (170 lb) (03/22/25 1255)    Daily Weight  03/22/25 : 77.1 kg (170 lb)    Image Results  CT angio chest for pulmonary embolism  Narrative: Interpreted By:  Marco A Zurita,   STUDY:  CT ANGIO CHEST FOR PULMONARY EMBOLISM;  3/22/2025 4:56 pm      INDICATION:  Signs/Symptoms:bloody secretions, eval for PE.          COMPARISON:  CT chest without contrast 03/22/2025      ACCESSION NUMBER(S):  [id number]      ORDERING CLINICIAN:  MARCO A HAHN      TECHNIQUE:  Contiguous axial images of the chest were obtained after the  intravenous administration of iodinated contrast using angiographic  PE protocol. Coronal and sagittal reformatted images were  reconstructed from the axial data. MIP images were created on an  independent workstation and reviewed.      FINDINGS:      MEDIASTINUM AND LYMPH NODES: Inflammatory fat stranding along the  tracheostomy tract without discrete fluid collection. The esophageal  wall appears within normal limits. There is redemonstration of  diffuse lymphadenopathy in the bilateral axilla, mediastinum,  bilateral supraclavicular as described in detail on separate report.  Also notable or enlarged bilateral level 4 cervical lymph nodes  (right > left).      VESSELS:  Normal caliber thoracic aorta without dissection. Moderate  aortic  atherosclerosis.  No acute pulmonary embolism.      HEART: Normal size.  Mild coronary artery calcifications. No  significant pericardial effusion.      LUNG, AIRWAYS, PLEURA: There is slight increase in debris within the  trachea. There has been clearing of secretions in the left  upper/lower lobe bronchus. There is diffuse bilateral bronchial  thickening slightly increased from prior study. There is  peribronchovascular ground-glass opacity in the posterior right upper  lobe and superior and basal segments of the right lower lobe. There  is slightly worsened atelectasis in the lung bases remaining  predominantly subsegmental in nature. There is a 0.8 cm nodule in the  left upper lobe that is stable from 08/19/2023.      OSSEOUS STRUCTURES: No acute osseous abnormality.      CHEST WALL SOFT TISSUES: No discernible acute abnormality.      UPPER ABDOMEN/OTHER: No acute abnormality.      Impression: No acute pulmonary embolism.      Peribronchial vascular ground glass opacities in the right upper and  lower lobe again concerning for pneumonia given patient's risk  factors of tracheostomy and debris in the airways.      Redemonstration of diffuse lymphadenopathy in the lower necks,  axilla, and mediastinum. Findings are either diffusely reactive in  nature, reflective of lymphoma/leukemia, meter deficiency, or less  likely head neck neoplasm is previously postulated. However please  correlate with past medical history.      Inflammatory changes along the tracheostomy tract without fluid  collection. Correlate for erythema.      Unchanged 0.8 cm nodule in the left upper lobe dating back to  08/19/2023. Recommend 1 year follow up chest CT to ensure at least 2  years of stability.      MACRO:  None.      Signed by: Marco A Zurita 3/22/2025 7:17 PM  Dictation workstation:   [id number]  CT chest wo IV contrast  Narrative: Interpreted By:  Marco A Zurita,   STUDY:  CT CHEST WO IV CONTRAST;  3/22/2025 3:41 pm       INDICATION:  Signs/Symptoms:eval for alveolar hemorrhage.          COMPARISON:  CT head/neck 08/19/2023      ACCESSION NUMBER(S):  [id number]      ORDERING CLINICIAN:  PAMELA HAHN      TECHNIQUE:  Contiguous axial images of the chest and upper abdomen were obtained  without contrast. Coronal and sagittal reformatted images were  reconstructed from the axial data.      FINDINGS:      MEDIASTINUM AND LYMPH NODES: There is fat stranding/edema along the  tract of the tracheostomy about discrete fluid collection. The  esophageal wall appears within normal limits. There are numerous  enlarged bilateral supraclavicular lymph nodes measuring up to 1.4 cm  on the left. There are numerous enlarged bilateral axillary lymph  nodes measuring up to 1.1 cm on the right and 1.2 cm on the left.  There are numerous prominent to mildly enlarged mediastinal lymph  nodes as well. No pneumomediastinum.      VESSELS:  Normal caliber thoracic aorta. Moderate aortic  atherosclerosis. The main pulmonary artery is normal in size.      HEART: Normal size.  Mild coronary artery calcifications. No  significant pericardial effusion.      LUNG, AIRWAYS, PLEURA: Tracheostomy noted in appropriate position.  There is trace mucus in the left mainstem bronchus and at the origin  of the left upper lobe and lower lobe bronchi. There are mild  peribronchovascular ground-glass opacities in the posterior segment  of the right upper lobe, basal segments of the right lower lobe.  There is mild dependent bibasilar atelectasis. There is no pleural  effusion.      OSSEOUS STRUCTURES: No acute osseous abnormality.      CHEST WALL SOFT TISSUES: No discernible acute abnormality.      UPPER ABDOMEN/OTHER: No acute abnormality.      Impression: 1. Mild peribronchovascular ground-glass opacities in the posterior  right upper lobe and basal segments of the right lower lobe. This  appearance and distribution is not typical for alveolar hemorrhage  which tends to  be centrilobular and bilateral asymmetric in  distribution. Findings are most suggestive of pneumonia.      2. Small amount of mucous debris in the left mainstem bronchus and at  the origin of the left upper and lower lobe bronchi.      3. Diffuse lymphadenopathy in the bilateral axillary regions,  mediastinum and left supraclavicular region particularly notable in  the bilateral supraclavicular region. This is progressed in the left  supraclavicular region and new elsewhere when compared to 08/19/2023  CTA head/neck. Correlate for any history of known head/neck  malignancy or lymphoma.      4. Inflammation/fat stranding adjacent to the tracheostomy tube entry  site noted. Correlate for air the met. No evidence of fluid  collection.      MACRO:  None.      Signed by: Marco A Zurita 3/22/2025 7:10 PM  Dictation workstation:   [id number]  XR chest 1 view  Narrative: Interpreted By:  Fermin Stroud,   STUDY:  XR CHEST 1 VIEW      INDICATION:  Signs/Symptoms:hemoptysis.      COMPARISON:  August 19, 2023      ACCESSION NUMBER(S):  [id number]      ORDERING CLINICIAN:  MARCO A HAHN      FINDINGS:  No consolidation, effusion, edema, or pneumothorax. Heart size within  normal limits.      Impression: No evidence of acute intrathoracic abnormality.      Signed by: Fermin Stroud 3/22/2025 2:01 PM  Dictation workstation:   [id number]      Physical Exam    Relevant Results               Assessment/Plan   This patient currently has cardiac telemetry ordered; if you would like to modify or discontinue the telemetry order, click here to go to the orders activity to modify/discontinue the order.              Assessment & Plan  Hemoptysis    Tracheostomy in place (Multi)    CVA, old, hemiparesis (Multi)    Status post insertion of percutaneous endoscopic gastrostomy (PEG) tube (Multi)    Pneumonia due to infectious organism    Right sided weakness    Atrial flutter (Multi)    Insulin dependent type 2 diabetes mellitus (Multi)         Zeus Bone MD  Physician  Internal Medicine     H&P      Signed     Date of Service: 3/23/2025 10:28 AM     Signed       Expand All Collapse All    History Of Present Illness  Bryan Nix is a 64-year-old male with past medical history of atrial flutter on Eliquis, hyponatremia, IDDM, CAD, hypertension, chronic wounds, diffuse lymphadenopathy, anemia, history of smoking, history elevated PSA, CVA L hemisphere (2020) with right-sided deficits, Left ICA stenosis (85%) s/p angioplasty and left carotid artery stent (08/25/2023) and recent history of acute pontine stroke 02/27/25 with acute hypoxic respiratory failure s/p tracheostomy on 2/25/2025 and PEG.  Patient presents for bleeding from tracheostomy.  Patient is alert and oriented x 3, however limited verbal communication due to tracheostomy.  Wife is at bedside and assists with history.  She reports that patient was coughing blood out of his tracheostomy.  ER provider felt bleeding was likely superficial around site.  Patient has been on room air at skilled nursing facility.  He was placed on Airvo in the ED mainly to humidify oxygen.  Patient never desaturated per ER report.  Currently he is resting comfortably in the bed, breath sounds slightly rhonchorous, but unlabored.  Reports chills, but no fevers.  He has right-sided weakness and sensory deficits.  Denies headache, vision or hearing changes, chest pains, palpitations, shortness of breath, nausea, vomiting, abdominal pain, diarrhea, or complications of PEG tube site.  Wife reports patient skin became irritated in his groin due to adhesive from a condom cath previously, he has a small decubitus wound that she reports is healing well.  He receives tube feeds continuously.  Wife reports a patient was recently evaluated by speech for p.o. to intake, however he still remains n.p.o. except for meds and tube feeds through PEG. CODE STATUS reviewed: full code     ER Course: Hemodynamically stable,  afebrile, SpO2 to 97% trach mask.  WBC 9.3, hemoglobin 8, hematocrit 29.4, platelets 197.  INR 1.4.  Glucose 223, BUN 36, otherwise CMP is normal.  CT of the chest for PE pending.  CXR unremarkable. Trannexamic acid soak gauze wrapped around trach.      Past medical history: As above  Past surgical history: As above  Social history: Former smoker 37.5 pack years-quit smoking age 63, occasional alcohol use, no illicit drug use.  .  Works in maintenance.  6 children.  Family history: Noncontributory     Past Medical History  Medical History        Past Medical History:   Diagnosis Date    Abnormal finding of blood chemistry, unspecified 05/18/2016     Abnormal blood chemistry    Acute upper respiratory infection, unspecified 12/02/2015     Acute upper respiratory infection    Elevated prostate specific antigen (PSA)       Elevated prostate specific antigen (PSA)    Encounter for screening for malignant neoplasm of colon 01/30/2021     Colon cancer screening    Encounter for screening for malignant neoplasm of prostate 11/30/2020     Prostate cancer screening    Essential (primary) hypertension 07/30/2013     Benign essential hypertension    Other conditions influencing health status 07/30/2013     Diabetes Mellitus Poorly Controlled    Other conditions influencing health status 12/02/2015     History of cough    Personal history of other endocrine, nutritional and metabolic disease 03/18/2021     History of hyperlipidemia    Personal history of other endocrine, nutritional and metabolic disease 03/18/2021     History of diabetes mellitus    Personal history of other specified conditions 05/18/2016     History of chest pain    Personal history of other specified conditions 05/18/2016     History of dyspnea    Personal history of other specified conditions 05/18/2016     History of dizziness    Personal history of other specified conditions 05/18/2016     History of fatigue            Surgical History  Surgical  History         Past Surgical History:   Procedure Laterality Date    CT ANGIO CORONARY ART WITH HEARTFLOW IF SCORE >30%   8/23/2023     CT HEART CORONARY ANGIOGRAM 8/23/2023 [facility] CT    MR HEAD ANGIO WO IV CONTRAST   12/14/2020     MR HEAD ANGIO WO IV CONTRAST 12/14/2020 [facility]    MR NECK ANGIO WO IV CONTRAST   12/14/2020     MR NECK ANGIO WO IV CONTRAST 12/14/2020 [facility]    OTHER SURGICAL HISTORY   12/01/2020     Hand fracture repair    OTHER SURGICAL HISTORY   12/01/2020     Nasal bone fracture repair            Social History  He reports that he has quit smoking. His smoking use included cigarettes. He has never used smokeless tobacco. No history on file for alcohol use and drug use.     Family History  Family History   No family history on file.        Allergies  Patient has no known allergies.     Review of Systems     Physical Exam  Constitutional:       General: He is awake. He is not in acute distress.     Appearance: Normal appearance. He is not toxic-appearing.   HENT:      Head: Atraumatic.      Nose: Nose normal.      Mouth/Throat:      Mouth: Mucous membranes are moist.   Eyes:      Conjunctiva/sclera: Conjunctivae normal.   Cardiovascular:      Rate and Rhythm: Normal rate and regular rhythm.      Pulses: Normal pulses.      Heart sounds: No murmur heard.  Pulmonary:      Effort: No respiratory distress.      Comments: Tracheostomy site midline, no drainage around dressing.  Rhonchorous breath sounds, unlabored respirations, RT bedside suctioning patient with thick blood-tinged sputum  Abdominal:      General: Bowel sounds are normal. There is no distension.      Palpations: Abdomen is soft.      Tenderness: There is no abdominal tenderness. There is no guarding.      Comments: Left upper quadrant PEG site tube site, no drainage or skin breakdown around tube site.   Musculoskeletal:         General: No swelling, deformity or signs of injury.      Cervical back: Neck  "supple.      Right lower leg: No edema.      Left lower leg: No edema.   Skin:     General: Skin is warm and dry.      Capillary Refill: Capillary refill takes less than 2 seconds.      Findings: Wound (sacrum) present. No ecchymosis or erythema.   Neurological:      Mental Status: He is alert and oriented to person, place, and time.      Cranial Nerves: No facial asymmetry.      Sensory: Sensory deficit (RUE, RLE) present.      Motor: Weakness present.      Comments: Right sided upper and lower extremity motor function deficits   Psychiatric:         Mood and Affect: Mood normal.         Behavior: Behavior is cooperative.            Last Recorded Vitals  Blood pressure 134/50, pulse 93, temperature 36.9 °C (98.4 °F), temperature source Temporal, resp. rate 22, height 1.88 m (6' 2\"), weight 77.1 kg (170 lb), SpO2 98%.     Relevant Results              Results for orders placed or performed during the hospital encounter of 03/22/25 (from the past 24 hours)   CBC and Auto Differential   Result Value Ref Range     WBC 9.3 4.4 - 11.3 x10*3/uL     nRBC 0.3 (H) 0.0 - 0.0 /100 WBCs     RBC 3.02 (L) 4.50 - 5.90 x10*6/uL     Hemoglobin 8.0 (L) 13.5 - 17.5 g/dL     Hematocrit 29.4 (L) 41.0 - 52.0 %     MCV 97 80 - 100 fL     MCH 26.5 26.0 - 34.0 pg     MCHC 27.2 (L) 32.0 - 36.0 g/dL     RDW 15.6 (H) 11.5 - 14.5 %     Platelets 197 150 - 450 x10*3/uL     Neutrophils % 77.1 40.0 - 80.0 %     Immature Granulocytes %, Automated 1.5 (H) 0.0 - 0.9 %     Lymphocytes % 13.1 13.0 - 44.0 %     Monocytes % 4.1 2.0 - 10.0 %     Eosinophils % 3.2 0.0 - 6.0 %     Basophils % 1.0 0.0 - 2.0 %     Neutrophils Absolute 7.14 1.20 - 7.70 x10*3/uL     Immature Granulocytes Absolute, Automated 0.14 0.00 - 0.70 x10*3/uL     Lymphocytes Absolute 1.21 1.20 - 4.80 x10*3/uL     Monocytes Absolute 0.38 0.10 - 1.00 x10*3/uL     Eosinophils Absolute 0.30 0.00 - 0.70 x10*3/uL     Basophils Absolute 0.09 0.00 - 0.10 x10*3/uL   Basic metabolic panel   Result " Value Ref Range     Glucose 223 (H) 74 - 99 mg/dL     Sodium 142 136 - 145 mmol/L     Potassium 4.3 3.5 - 5.3 mmol/L     Chloride 103 98 - 107 mmol/L     Bicarbonate 27 21 - 32 mmol/L     Anion Gap 16 10 - 20 mmol/L     Urea Nitrogen 36 (H) 6 - 23 mg/dL     Creatinine 0.81 0.50 - 1.30 mg/dL     eGFR >90 >60 mL/min/1.73m*2     Calcium 9.6 8.6 - 10.3 mg/dL   Protime-INR   Result Value Ref Range     Protime 15.1 (H) 9.8 - 12.4 seconds     INR 1.4 (H) 0.9 - 1.1   aPTT   Result Value Ref Range     aPTT 25 (L) 26 - 36 seconds   Type and Screen   Result Value Ref Range     ABO TYPE B       Rh TYPE POS       ANTIBODY SCREEN NEG     VERIFY ABO/Rh Group Test   Result Value Ref Range     ABO TYPE B       Rh TYPE POS     MRSA Surveillance for Vancomycin De-escalation, PCR     Specimen: Anterior Nares; Swab   Result Value Ref Range     MRSA PCR Not Detected Not Detected   POCT GLUCOSE   Result Value Ref Range     POCT Glucose 180 (H) 74 - 99 mg/dL   POCT GLUCOSE   Result Value Ref Range     POCT Glucose 161 (H) 74 - 99 mg/dL   POCT GLUCOSE   Result Value Ref Range     POCT Glucose 156 (H) 74 - 99 mg/dL   Basic metabolic panel   Result Value Ref Range     Glucose 170 (H) 74 - 99 mg/dL     Sodium 147 (H) 136 - 145 mmol/L     Potassium 4.2 3.5 - 5.3 mmol/L     Chloride 107 98 - 107 mmol/L     Bicarbonate 30 21 - 32 mmol/L     Anion Gap 14 10 - 20 mmol/L     Urea Nitrogen 35 (H) 6 - 23 mg/dL     Creatinine 0.85 0.50 - 1.30 mg/dL     eGFR >90 >60 mL/min/1.73m*2     Calcium 9.4 8.6 - 10.3 mg/dL   CBC   Result Value Ref Range     WBC 10.4 4.4 - 11.3 x10*3/uL     nRBC 0.6 (H) 0.0 - 0.0 /100 WBCs     RBC 2.87 (L) 4.50 - 5.90 x10*6/uL     Hemoglobin 7.8 (L) 13.5 - 17.5 g/dL     Hematocrit 26.6 (L) 41.0 - 52.0 %     MCV 93 80 - 100 fL     MCH 27.2 26.0 - 34.0 pg     MCHC 29.3 (L) 32.0 - 36.0 g/dL     RDW 15.7 (H) 11.5 - 14.5 %     Platelets 205 150 - 450 x10*3/uL   SST TOP   Result Value Ref Range     Extra Tube Hold for add-ons.        CT  angio chest for pulmonary embolism     Result Date: 3/22/2025  Interpreted By:  Pamela Zurita, STUDY: CT ANGIO CHEST FOR PULMONARY EMBOLISM;  3/22/2025 4:56 pm   INDICATION: Signs/Symptoms:bloody secretions, eval for PE.     COMPARISON: CT chest without contrast 03/22/2025   ACCESSION NUMBER(S): [id number]   ORDERING CLINICIAN: PAMELA HAHN   TECHNIQUE: Contiguous axial images of the chest were obtained after the intravenous administration of iodinated contrast using angiographic PE protocol. Coronal and sagittal reformatted images were reconstructed from the axial data. MIP images were created on an independent workstation and reviewed.   FINDINGS:   MEDIASTINUM AND LYMPH NODES: Inflammatory fat stranding along the tracheostomy tract without discrete fluid collection. The esophageal wall appears within normal limits. There is redemonstration of diffuse lymphadenopathy in the bilateral axilla, mediastinum, bilateral supraclavicular as described in detail on separate report. Also notable or enlarged bilateral level 4 cervical lymph nodes (right > left).   VESSELS:  Normal caliber thoracic aorta without dissection. Moderate aortic atherosclerosis.  No acute pulmonary embolism.   HEART: Normal size.  Mild coronary artery calcifications. No significant pericardial effusion.   LUNG, AIRWAYS, PLEURA: There is slight increase in debris within the trachea. There has been clearing of secretions in the left upper/lower lobe bronchus. There is diffuse bilateral bronchial thickening slightly increased from prior study. There is peribronchovascular ground-glass opacity in the posterior right upper lobe and superior and basal segments of the right lower lobe. There is slightly worsened atelectasis in the lung bases remaining predominantly subsegmental in nature. There is a 0.8 cm nodule in the left upper lobe that is stable from 08/19/2023.   OSSEOUS STRUCTURES: No acute osseous abnormality.   CHEST WALL SOFT TISSUES: No  discernible acute abnormality.   UPPER ABDOMEN/OTHER: No acute abnormality.        No acute pulmonary embolism.   Peribronchial vascular ground glass opacities in the right upper and lower lobe again concerning for pneumonia given patient's risk factors of tracheostomy and debris in the airways.   Redemonstration of diffuse lymphadenopathy in the lower necks, axilla, and mediastinum. Findings are either diffusely reactive in nature, reflective of lymphoma/leukemia, meter deficiency, or less likely head neck neoplasm is previously postulated. However please correlate with past medical history.   Inflammatory changes along the tracheostomy tract without fluid collection. Correlate for erythema.   Unchanged 0.8 cm nodule in the left upper lobe dating back to 08/19/2023. Recommend 1 year follow up chest CT to ensure at least 2 years of stability.   MACRO: None.   Signed by: Pamela Zurita 3/22/2025 7:17 PM Dictation workstation:   [id number]     CT chest wo IV contrast     Result Date: 3/22/2025  Interpreted By:  Pamela Zurita, STUDY: CT CHEST WO IV CONTRAST;  3/22/2025 3:41 pm   INDICATION: Signs/Symptoms:eval for alveolar hemorrhage.     COMPARISON: CT head/neck 08/19/2023   ACCESSION NUMBER(S): [id number]   ORDERING CLINICIAN: PAMELA HAHN   TECHNIQUE: Contiguous axial images of the chest and upper abdomen were obtained without contrast. Coronal and sagittal reformatted images were reconstructed from the axial data.   FINDINGS:   MEDIASTINUM AND LYMPH NODES: There is fat stranding/edema along the tract of the tracheostomy about discrete fluid collection. The esophageal wall appears within normal limits. There are numerous enlarged bilateral supraclavicular lymph nodes measuring up to 1.4 cm on the left. There are numerous enlarged bilateral axillary lymph nodes measuring up to 1.1 cm on the right and 1.2 cm on the left. There are numerous prominent to mildly enlarged mediastinal lymph nodes as well. No  pneumomediastinum.   VESSELS:  Normal caliber thoracic aorta. Moderate aortic atherosclerosis. The main pulmonary artery is normal in size.   HEART: Normal size.  Mild coronary artery calcifications. No significant pericardial effusion.   LUNG, AIRWAYS, PLEURA: Tracheostomy noted in appropriate position. There is trace mucus in the left mainstem bronchus and at the origin of the left upper lobe and lower lobe bronchi. There are mild peribronchovascular ground-glass opacities in the posterior segment of the right upper lobe, basal segments of the right lower lobe. There is mild dependent bibasilar atelectasis. There is no pleural effusion.   OSSEOUS STRUCTURES: No acute osseous abnormality.   CHEST WALL SOFT TISSUES: No discernible acute abnormality.   UPPER ABDOMEN/OTHER: No acute abnormality.        1. Mild peribronchovascular ground-glass opacities in the posterior right upper lobe and basal segments of the right lower lobe. This appearance and distribution is not typical for alveolar hemorrhage which tends to be centrilobular and bilateral asymmetric in distribution. Findings are most suggestive of pneumonia.   2. Small amount of mucous debris in the left mainstem bronchus and at the origin of the left upper and lower lobe bronchi.   3. Diffuse lymphadenopathy in the bilateral axillary regions, mediastinum and left supraclavicular region particularly notable in the bilateral supraclavicular region. This is progressed in the left supraclavicular region and new elsewhere when compared to 08/19/2023 CTA head/neck. Correlate for any history of known head/neck malignancy or lymphoma.   4. Inflammation/fat stranding adjacent to the tracheostomy tube entry site noted. Correlate for air the met. No evidence of fluid collection.   MACRO: None.   Signed by: Marco A Zurita 3/22/2025 7:10 PM Dictation workstation:   [id number]     XR chest 1 view     Result Date: 3/22/2025  Interpreted By:  Fermin Stroud, STUDY: XR CHEST  1 VIEW   INDICATION: Signs/Symptoms:hemoptysis.   COMPARISON: August 19, 2023   ACCESSION NUMBER(S): [id number]   ORDERING CLINICIAN: PAMELA HAHN   FINDINGS: No consolidation, effusion, edema, or pneumothorax. Heart size within normal limits.        No evidence of acute intrathoracic abnormality.   Signed by: Fermin Stroud 3/22/2025 2:01 PM Dictation workstation:   [id number]     XR chest 1 view     Result Date: 3/14/2025  * * *Final Report* * * DATE OF EXAM: Mar 14 2025  8:52AM   S5X   [id number]  -  XR CHEST 1V FRONTAL   / ACCESSION #  [id number] PROCEDURE REASON: new bleeding from trach      * * * * Physician Interpretation * * * *  EXAMINATION:  CHEST RADIOGRAPH (PORTABLE SINGLE VIEW AP) Exam Date/Time:  3/14/2025 8:52 AM Clinical History:  new bleeding from trach Comparison:  03/03/2025 RESULT: LINES, TUBES, AND DEVICES:  Tracheostomy. CARDIOMEDIASTINAL SILHOUETTE:  The cardiac and mediastinal silhouettes appear unremarkable. LUNGS AND PLEURA: No consolidation.  No pleural effusion. No pulmonary vascular congestion. No pneumothorax identified. OTHER:  N/A     IMPRESSION: No acute abnormality identified. : [facility]   Transcribe Date/Time: Mar 14 2025  9:07A Dictated by : PAMELA EVANS MD This examination was interpreted and the report reviewed and electronically signed by: PAMELA EVANS MD on Mar 14 2025  9:11AM  EST     US abdomen complete     Result Date: 3/11/2025  ABDOMEN ULTRASOUND-COMPLETE FINDINGS: Abdomen Ultrasound Complete: PROCEDURE: Multiple grayscale mages of the abdomen were obtained. FINDINGS:Multiple real time images demonstrate the liver with increased echogenicity consistent with fatty infiltration. There is no evidence of a discrete mass within the liver. The liver is enlarged, measured at 15.7 cm in size. The gallbladder is seen with no evidence of cholelithiasis. The gallbladder wall is within normal limits. The common bile duct is within normal limits. The visualized pancreas  appears unremarkable. Both kidneys are seen with no evidence of hydronephrosis or a calculus. The spleen has its normal homogeneous echo appearance. There is no free fluid to suggest ascites. The visualized aorta appears unremarkable. The inferior vena cava appears patent. CONCLUSION: Enlarged fatty liver. ELECTRONICALLY SIGNED BY LEONEL LAN M.D. 3/11/2025 5:00:52 PM EDT.     XR chest 1 view     Result Date: 3/3/2025  * * *Final Report* * * DATE OF EXAM: Mar  3 2025  1:40PM   S5X   5290  -  XR CHEST 1V FRONTAL   / ACCESSION #  [id number] PROCEDURE REASON: resp failure      * * * * Physician Interpretation * * * *  EXAMINATION:  CHEST RADIOGRAPH (PORTABLE SINGLE VIEW AP) Exam Date/Time:  3/3/2025 1:40 PM Indication:   resp failure M:  XCP_4 Comparison:  1 day prior RESULT: Lines, tubes, and devices:  Tracheostomy tube remains in situ. Lungs and pleura:  Mild patchy opacities right lower lung zone seen on the prior study are no longer appreciated.  No confluent opacity.   Costophrenic angles are clear.  No pneumothorax. Cardiomediastinal silhouette:  Stable cardiomediastinal silhouette. Other:  -     IMPRESSION: Improved aeration right lower lung zone.  No acute cardiopulmonary finding. : [facility]   Transcribe Date/Time: Mar  3 2025  2:49P Dictated by : ISRAEL BENSON MD This examination was interpreted and the report reviewed and electronically signed by: ISRAEL BENSON MD on Mar  3 2025  2:49PM  EST     XR chest 1 view     Result Date: 3/2/2025  * * *Final Report* * * DATE OF EXAM: Mar  2 2025 11:25AM   MMX   5376  -  XR CHEST 1V FRONTAL PORT  / ACCESSION #  [id number] PROCEDURE REASON: Shortness of breath      * * * * Physician Interpretation * * * *  EXAMINATION:  CHEST RADIOGRAPH (PORTABLE SINGLE VIEW AP) Exam Date/Time:  3/2/2025 11:25 AM CLINICAL HISTORY: Shortness of breath MQ:  XCPR_5 Comparison:  02/20/2025. RESULT: This is a limited examination due to multiple wires and tubing  obscuring the lower lungs. Lines, tubes, and devices:  A tracheostomy tube remains in place. Lungs and pleura:  There are increased bronchovascular markings in the right lower lung which may be due to bronchitis or early changes of aspiration pneumonia.  Clinical correlation and follow-up exams are recommended. Cardiomediastinal silhouette:  Stable cardiomediastinal silhouette. Other:  The visualized bony thorax appears unremarkable.     IMPRESSION: Nonspecific parenchymal changes in the right lower lung as described above. A follow-up exam is recommended. : [facility]   Transcribe Date/Time: Mar  2 2025 11:53A Dictated by : BJ INIGUEZ MD This examination was interpreted and the report reviewed and electronically signed by: BJ INIGUEZ MD on Mar  2 2025 11:54AM  EST     XR chest 1 view     Result Date: 2/28/2025  * * *Final Report* * * DATE OF EXAM: Feb 28 2025 10:36AM   MMX   5290  -  XR CHEST 1V FRONTAL   / ACCESSION #  [id number] PROCEDURE REASON: Shortness of breath      * * * * Physician Interpretation * * * *  EXAMINATION:  CHEST RADIOGRAPH (SINGLE VIEW AP OR PA) CLINICAL HISTORY: Shortness of breath MQ:  XC1_5 Comparison:  02/22/2025 RESULT: Lines, tubes, and devices:  Tracheostomy tube is noted. Lungs and pleura:  No consolidation. No lung mass. No pleural effusion. Cardiomediastinal silhouette:  Normal cardiomediastinal silhouette. Other:  No acute osseous pathology.     IMPRESSION: No acute radiographic abnormality. : [facility]   Transcribe Date/Time: Feb 28 2025 12:07P Dictated by : DIANNE RAMOS MD This examination was interpreted and the report reviewed and electronically signed by: DIANNE RAMOS MD on Feb 28 2025 12:08PM  EST     XR abdomen 2 views supine and erect or decub     Result Date: 2/24/2025  * * *Final Report* * * DATE OF EXAM: Feb 24 2025  6:03PM   MMX   5289  -  XR ABDOMEN 1V SUPINE  / ACCESSION #  [id number] PROCEDURE REASON: Evaluate tube, line or lead position       * * * * Physician Interpretation * * * *  EXAMINATION:  XR ABDOMEN 1V SUPINE CLINICAL HISTORY:   Evaluate tube, line or lead position Technique:   XR ABDOMEN 1V SUPINE -- NOT APPLICABLE with 1 views on 1 images Comparison: 2/21/2025 RESULT: Feeding tube with distal tip at the pylorus antrum. Moderate colonic stool burden. No dilated bowel. Lung structures are intact.     IMPRESSION: Feeding tube with distal tip at the pylorus antrum. Moderate colonic stool burden. No dilated bowel. : [facility]   Transcribe Date/Time: Feb 24 2025  6:23P Dictated by : PABLO RAMOS MD This examination was interpreted and the report reviewed and electronically signed by: PABLO RAMOS MD on Feb 24 2025  6:24PM  EST     CT head wo IV contrast     Result Date: 2/23/2025  * * *Final Report* * * DATE OF EXAM: Feb 23 2025 12:04PM   [facility]   0504  -  CT BRAIN WO IVCON  / ACCESSION #  [id number] PROCEDURE REASON: Stroke, follow up      * * * * Physician Interpretation * * * *  EXAMINATION: CT BRAIN WO IVCON CLINICAL HISTORY: Stroke, follow up TECHNIQUE:  Serial axial images without IV contrast were obtained from the vertex to the foramen magnum. MQ:  CTBWO_3 CT Radiation dose: Integrated Dose-Length Product (DLP) for this visit =   778  mGy*cm CT Dose Reduction Employed: Iterative recon COMPARISON: MRI brain 02/19/2025 RESULT: Post-operative change: None. Acute change: Evolving acute infarct along the central eli and superior left medulla (2:7-9).  No evidence of hemorrhagic transformation. Hemorrhage: No evidence of acute intracranial hemorrhage. Mass Lesion / Mass Effect: There is no evidence of an intracranial mass or extraaxial fluid collection. No significant mass effect. Chronic change: Stable remote left MCA territory encephalomalacia. Parenchyma: There is no significant volume loss. The brain parenchyma is otherwise within normal limits for age. Ventricles: The ventricles are within normal limits of size and configuration  for age. Paranasal sinuses and skull base: Partial opacification of the left maxillary and sphenoid sinus and multiple bilateral ethmoid air cells.   The remaining visualized paranasal sinuses are grossly clear. The skull base and imaged soft tissues are unremarkable. Localizer images: No additional findings.     IMPRESSION: Evolving acute brainstem infarct.  No evidence of hemorrhagic transformation. Stable remote left MCA territory infarct. : [facility]   Transcribe Date/Time: Feb 23 2025 12:48P Dictated by : FRANKY GUZMAN MD   This examination was interpreted and the report reviewed and electronically signed by: FRANKY GUZMAN MD on Feb 23 2025 12:52PM  EST     XR chest 1 view     Result Date: 2/22/2025  * * *Final Report* * * DATE OF EXAM: Feb 22 2025  6:56AM   MMX   5290  -  XR CHEST 1V FRONTAL   / ACCESSION #  [id number] PROCEDURE REASON: Evaluate tube, line,  or lead position      * * * * Physician Interpretation * * * *  EXAMINATION:  CHEST RADIOGRAPH (SINGLE VIEW AP OR PA) CLINICAL HISTORY: Evaluate tube, line,  or lead position MQ:  XC1_5 Comparison:  02/21/2025. RESULT: Lines, tubes, and devices:  An ET tube and feeding tube remain in place.   A poorly defined right venous catheter cannot be excluded. Lungs and pleura:  There are persistent bilateral lower lungs infiltrates seen on the right more than left suggestive of pneumonia such as aspiration pneumonia.  There is no definite pleural effusion. Cardiomediastinal silhouette:  Stable cardiomediastinal silhouette. Other:  The visualized bony thorax appears unremarkable.     IMPRESSION: Persistent bilateral lower lungs infiltrates as described above. A follow-up exam is recommended. : [facility]   Transcribe Date/Time: Feb 22 2025  8:20A Dictated by : BJ INIGUEZ MD This examination was interpreted and the report reviewed and electronically signed by: BJ INIGUEZ MD on Feb 22 2025  8:21AM  EST     XR chest 1 view     Result Date:  2/21/2025  * * *Final Report* * * DATE OF EXAM: Feb 21 2025  5:59PM   MMX   5376  -  XR CHEST 1V FRONTAL PORT  / ACCESSION #  [id number] PROCEDURE REASON: Evaluate tube, line,  or lead position      * * * * Physician Interpretation * * * *  EXAMINATION:  CHEST RADIOGRAPH (PORTABLE SINGLE VIEW AP) Exam Date/Time:  2/21/2025 5:59 PM CLINICAL HISTORY: Evaluate tube, line,  or lead position MQ:  XCPR_5 Comparison:  02/21/2025 RESULT: Lines, tubes, and devices: Endotracheal tube in place terminating in enteric tube in place terminating below the field of view. Lungs and pleura: No pneumothorax.  No pleural effusion.  Bilateral mild interstitial opacities.  The right costophrenic angle is not included in the field-of-view. Cardiomediastinal silhouette:  Stable cardiomediastinal silhouette. Other:  .     IMPRESSION: Mild interstitial opacities may be related to pulmonary vascular congestion. : [facility]   Transcribe Date/Time: Feb 21 2025  6:55P Dictated by : IRWIN VALDIVIA MD This examination was interpreted and the report reviewed and electronically signed by: IRWNI VALDIVIA MD on Feb 21 2025  6:57PM  EST     XR abdomen 2 views supine and erect or decub     Result Date: 2/21/2025  * * *Final Report* * * DATE OF EXAM: Feb 21 2025 10:32AM   MMX   5289  -  XR ABDOMEN 1V SUPINE  / ACCESSION #  [id number] PROCEDURE REASON: Evaluate tube, line or lead position      * * * * Physician Interpretation * * * *  Clinical Information: Feeding tube Single view of the abdomen was obtained. Feeding tube tip within the distal stomach. There is a nonspecific, nonobstructive bowel gas pattern. No abnormal abdominal masses are identified.  No pathologic calcifications.   No acute bony abnormalities are seen.     IMPRESSION: Nonspecific, nonobstructive bowel gas pattern. Feeding tube tip within the distal stomach. : [facility]   Transcribe Date/Time: Feb 21 2025 10:43A Dictated by : PAMELA EVANS MD  This examination was interpreted and the report reviewed and electronically signed by: PAMELA EVANS MD on Feb 21 2025 10:44AM  EST     XR chest 1 view     Result Date: 2/21/2025  * * *Final Report* * * DATE OF EXAM: Feb 21 2025 10:32AM   MMX   5376  -  XR CHEST 1V FRONTAL PORT  / ACCESSION #  [id number] PROCEDURE REASON: Shortness of breath      * * * * Physician Interpretation * * * *  EXAMINATION:  CHEST RADIOGRAPH (PORTABLE SINGLE VIEW AP) Exam Date/Time:  2/21/2025 10:32 AM Clinical History:  Shortness of breath Comparison:  02/17/2025 RESULT: LINES, TUBES, AND DEVICES:  Feeding tube tip beyond the inferior extent of the image. CARDIOMEDIASTINAL SILHOUETTE:  The cardiac and mediastinal silhouettes appear unremarkable. LUNGS AND PLEURA: No consolidation.  No pleural effusion. No pulmonary vascular congestion. No pneumothorax identified. OTHER:  N/A     IMPRESSION: No acute abnormality identified. : [facility]   Transcribe Date/Time: Feb 21 2025 10:41A Dictated by : PAMELA EVANS MD This examination was interpreted and the report reviewed and electronically signed by: PAMELA EVANS MD on Feb 21 2025 10:43AM  EST            Assessment/Plan     Assessment & Plan  Hemoptysis     Tracheostomy in place (Multi)     CVA, old, hemiparesis (Multi)     Status post insertion of percutaneous endoscopic gastrostomy (PEG) tube (Multi)     Pneumonia due to infectious organism     Right sided weakness     Atrial flutter (Multi)     Insulin dependent type 2 diabetes mellitus (Multi)        Telemetry monitoring  Hemoglobin stable 8's recently on review of outside labs, monitor for ongoing bleeding.  Daily H&H  Monitor for overt bleeding  Consult pulmonology, appreciate input  Pneumonia suspected for CT findings  Tracheostomy protocol  Expected cover with Zosyn and vancomycin  MRSA swab  Urine for Legionella antigen and strep pneumonia antigen  Supplemental oxygen as needed  Nebulizers as needed  RT evaluate and  treat  Check procalcitonin  Typical 20% zinc oxide to wounds and groin  Continuous tube feeds, continue at 80 mL an hour x 22 hours  Accu-Chek every 6 hours  Hypoglycemia protocol  PT/OT  Anticoagulation and antiplatelet therapy on hold, resume once cleared by pulmonology  Continue patient's home medications for chronic issues as appropriate     Patient fully evaluated on March 23.  Continue to monitor H&H.  Pulmonary consultation obtained.  Continue broad-spectrum antibiotics to rule out infectious process.  Recheck labs in AM.                 MD Zeus Gipson MD

## 2025-03-24 NOTE — CONSULTS
"Nutrition Initial Assessment:   Nutrition Assessment    Reason for Assessment: Admission nursing screening    Patient is a 64 y.o. male presenting with bleeding from trach      Nutrition History:  Food and Nutrient History: Pt is on Diabetisouce AC at his nursing home.  Our equivalent is Glucerna 1.5 which will need to run lower over the 22 hour period of his feeding due to Diabetisouce AC being a 1.2 noah ml and Glucerna 1.5 being a 1.5 noah ml.  Adjusting TF and water flushes to meet equivalents.       Anthropometrics:  Height: 188 cm (6' 2\")   Weight: 77.1 kg (170 lb)   BMI (Calculated): 21.82  IBW/kg (Dietitian Calculated): 86 kg  Percent of IBW: 90 %       Weight History:  Wt Readings from Last 10 Encounters:   03/22/25 77.1 kg (170 lb)   09/29/23 75 kg (165 lb 5 oz)   01/20/22 78.2 kg (172 lb 5 oz)   12/16/21 76.7 kg (169 lb)   03/23/21 75.3 kg (166 lb)   03/18/21 76 kg (167 lb 8 oz)   03/01/21 74.4 kg (164 lb 2 oz)   01/29/21 75 kg (165 lb 5.5 oz)   12/11/20 75.1 kg (165 lb 8 oz)   11/30/20 75.9 kg (167 lb 4 oz)          Weight Change %:  Weight History / % Weight Change: Family said that the pt has lost weight since all of this happened in Feb.  Records indicate he was 167 lbs in March 2021, 169 lbs in Dec 2021, 172 lbs in Jan 2022, 165 lbs in Sept 2023 and 170 lbs in March 2025.  He was 170 lbs this admit.  Pt is difficult to weigh due to lack of easy mobillity.  Wt loss is difficult to tell with muscle loss from not moving, being another factor for weight loss    Nutrition Focused Physical Exam Findings:    Subcutaneous Fat Loss:   Defer Subcutaneous Fat Loss Assessment: Defer all  Defer All Reason: Pt is difficulty to move  Muscle Wasting:  Defer Muscle Wasting Assessment: Defer all  Defer All Reason: pt is difficult to move  Edema:  Edema: none  Physical Findings:  Skin: Positive (coccyx wound)  Mouth Findings: Dysphagia    Nutrition Significant Labs:  BMP Trend:   Results from last 7 days   Lab Units " 03/24/25  0532 03/23/25  0602 03/22/25  1354   GLUCOSE mg/dL 227* 170* 223*   CALCIUM mg/dL 9.5 9.4 9.6   SODIUM mmol/L 151* 147* 142   POTASSIUM mmol/L 4.0 4.2 4.3   CO2 mmol/L 32 30 27   CHLORIDE mmol/L 110* 107 103   BUN mg/dL 44* 35* 36*   CREATININE mg/dL 1.18 0.85 0.81        Nutrition Specific Medications:  Eliquis;lipitor; plavix; colace; lantus; lopressor     I/O:   Last BM Date: 03/24/25; Stool Appearance: Loose (03/22/25 1851)    Dietary Orders (From admission, onward)       Start     Ordered    03/24/25 1736  Enteral feeding with NPO PEG (percutaneous endoscopic gastric); 68; 22 hours; 200; Water; Every 4 hours  Diet effective now        Question Answer Comment   Tube feeding formula: Glucerna 1.5    Feeding route: PEG (percutaneous endoscopic gastric)    Tube feeding continuous rate (mL/hr): 68    Tube feeding cyclic (start / stop time): 22 hours    Tube feeding flush (mL): 200    Flush type: Water    Flush frequency: Every 4 hours        03/24/25 1736    03/22/25 1835  May Not Participate in Room Service  ( ROOM SERVICE MAY NOT PARTICIPATE)  Once        Question:  .  Answer:  Yes    03/22/25 1834                     Estimated Needs:      Method for Estimating Needs: 0401-5308  MSJ     Method for Estimating 24 Hour Protein Needs:   1.1-1.5 gm kg of IBW depending on renal function     Method for Estimating 24 Hour Fluid Needs: 4901-7897    20-30 ml kg of IBW as medically indicated  Patient on Order Fluid Restriction: No        Nutrition Diagnosis        Nutrition Diagnosis  Patient has Nutrition Diagnosis: Yes  Diagnosis Status (1): Active  Nutrition Diagnosis 1: Swallowing difficulty  Related to (1): mech and motor causes  As Evidenced by (1): pt has a trach and peg and is on a TF only, at this time  Additional Nutrition Diagnosis: Diagnosis 2  Diagnosis Status (2): Active  Nutrition Diagnosis 2: Increased nutrient needs  Related to (2): physiological causes  As Evidenced by (2): wound healing  needs       Nutrition Interventions/Recommendations   Nutrition prescription for enteral nutrition    Nutrition Recommendations:  Individualized Nutrition Prescription Provided for : Changing TF of GLucerna 1.5 to 68 ml hr 22 hrs for equivelent to facility TF.  This will provide 2244 noah and 122 gm pro with 1130 ml fluid toward fluid needs.  changing water flushes to 200 ml X 6 for a total of 2330 ml fluid per day or 27 ml kg of IBW.  Monitor labs and toleration of feeding,  Monitor for any needed changes in water flushes.    Nutrition Interventions/Goals:   Interventions: Enteral intake  Enteral Intake: Management of composition of enteral nutrition, Management of delivery rate of enteral nutrition, Management of flushing of feeding tube  Goal: toleration of TF at goal rate and a lower sodium  Coordination of Care with Providers: Nursing, Provider      Education Documentation  No documentation found.     Not at this time       Nutrition Monitoring and Evaluation   Food/Nutrient Related History Monitoring  Monitoring and Evaluation Plan: Enteral and parenteral nutrition intake determination  Enteral and Parenteral Nutrition Intake Determination: Enteral nutrition formula/solution, Enteral nutrition intake - Tolerate TF at goal rate, Enteral nutrition intake - To meet > 75% estimated energy needs, Enteral nutrition intake - Other  Criteria: toleratoin of TF at goal rate and normalinzing of sodium    Anthropometric Measurements  Monitoring and Evaluation Plan: Body weight    Biochemical Data, Medical Tests and Procedures  Monitoring and Evaluation Plan: Electrolyte/renal panel, Glucose/endocrine profile  Criteria: improved sodium and stable BMP  Criteria: glucose within desired range              Time Spent (min): 60 minutes

## 2025-03-25 ENCOUNTER — APPOINTMENT (OUTPATIENT)
Dept: CARDIOLOGY | Facility: HOSPITAL | Age: 65
End: 2025-03-25
Payer: COMMERCIAL

## 2025-03-25 LAB
ALBUMIN SERPL BCP-MCNC: 3.3 G/DL (ref 3.4–5)
ALP SERPL-CCNC: 167 U/L (ref 33–136)
ALT SERPL W P-5'-P-CCNC: 77 U/L (ref 10–52)
ANION GAP SERPL CALC-SCNC: 12 MMOL/L (ref 10–20)
AST SERPL W P-5'-P-CCNC: 36 U/L (ref 9–39)
BILIRUB SERPL-MCNC: 0.5 MG/DL (ref 0–1.2)
BUN SERPL-MCNC: 51 MG/DL (ref 6–23)
CALCIUM SERPL-MCNC: 9.4 MG/DL (ref 8.6–10.3)
CHLORIDE SERPL-SCNC: 111 MMOL/L (ref 98–107)
CHLORIDE UR-SCNC: <15 MMOL/L
CHLORIDE/CREATININE (MMOL/G) IN URINE: NORMAL
CO2 SERPL-SCNC: 34 MMOL/L (ref 21–32)
CREAT SERPL-MCNC: 1.5 MG/DL (ref 0.5–1.3)
CREAT UR-MCNC: 78.3 MG/DL (ref 20–370)
EGFRCR SERPLBLD CKD-EPI 2021: 51 ML/MIN/1.73M*2
ERYTHROCYTE [DISTWIDTH] IN BLOOD BY AUTOMATED COUNT: 16.9 % (ref 11.5–14.5)
GLUCOSE BLD MANUAL STRIP-MCNC: 264 MG/DL (ref 74–99)
GLUCOSE BLD MANUAL STRIP-MCNC: 317 MG/DL (ref 74–99)
GLUCOSE BLD MANUAL STRIP-MCNC: 344 MG/DL (ref 74–99)
GLUCOSE BLD MANUAL STRIP-MCNC: 346 MG/DL (ref 74–99)
GLUCOSE BLD MANUAL STRIP-MCNC: 387 MG/DL (ref 74–99)
GLUCOSE SERPL-MCNC: 280 MG/DL (ref 74–99)
HCT VFR BLD AUTO: 26.2 % (ref 41–52)
HEMOCCULT SP1 STL QL: POSITIVE
HGB BLD-MCNC: 7.3 G/DL (ref 13.5–17.5)
MAGNESIUM SERPL-MCNC: 2.61 MG/DL (ref 1.6–2.4)
MCH RBC QN AUTO: 26.6 PG (ref 26–34)
MCHC RBC AUTO-ENTMCNC: 27.9 G/DL (ref 32–36)
MCV RBC AUTO: 96 FL (ref 80–100)
MICROALBUMIN UR-MCNC: <7 MG/L
MICROALBUMIN/CREAT UR: NORMAL MG/G{CREAT}
NRBC BLD-RTO: 1.7 /100 WBCS (ref 0–0)
PLATELET # BLD AUTO: 218 X10*3/UL (ref 150–450)
POTASSIUM SERPL-SCNC: 3.9 MMOL/L (ref 3.5–5.3)
POTASSIUM UR-SCNC: 49 MMOL/L
POTASSIUM/CREAT UR-RTO: 63 MMOL/G CREAT
PROT SERPL-MCNC: 6.9 G/DL (ref 6.4–8.2)
RBC # BLD AUTO: 2.74 X10*6/UL (ref 4.5–5.9)
SODIUM SERPL-SCNC: 153 MMOL/L (ref 136–145)
SODIUM UR-SCNC: 16 MMOL/L
SODIUM/CREAT UR-RTO: 20 MMOL/G CREAT
UREA/CREAT UR-SRTO: 10.9 G/G CREAT
UUN UR-MCNC: 853 MG/DL
WBC # BLD AUTO: 14.8 X10*3/UL (ref 4.4–11.3)

## 2025-03-25 PROCEDURE — 82270 OCCULT BLOOD FECES: CPT

## 2025-03-25 PROCEDURE — 2500000001 HC RX 250 WO HCPCS SELF ADMINISTERED DRUGS (ALT 637 FOR MEDICARE OP): Performed by: NURSE PRACTITIONER

## 2025-03-25 PROCEDURE — 83935 ASSAY OF URINE OSMOLALITY: CPT | Mod: PARLAB | Performed by: INTERNAL MEDICINE

## 2025-03-25 PROCEDURE — 87506 IADNA-DNA/RNA PROBE TQ 6-11: CPT | Mod: PARLAB

## 2025-03-25 PROCEDURE — 94640 AIRWAY INHALATION TREATMENT: CPT

## 2025-03-25 PROCEDURE — 82043 UR ALBUMIN QUANTITATIVE: CPT | Performed by: INTERNAL MEDICINE

## 2025-03-25 PROCEDURE — 1200000002 HC GENERAL ROOM WITH TELEMETRY DAILY

## 2025-03-25 PROCEDURE — 36415 COLL VENOUS BLD VENIPUNCTURE: CPT | Performed by: INTERNAL MEDICINE

## 2025-03-25 PROCEDURE — 2500000004 HC RX 250 GENERAL PHARMACY W/ HCPCS (ALT 636 FOR OP/ED)

## 2025-03-25 PROCEDURE — 93005 ELECTROCARDIOGRAM TRACING: CPT

## 2025-03-25 PROCEDURE — 2500000002 HC RX 250 W HCPCS SELF ADMINISTERED DRUGS (ALT 637 FOR MEDICARE OP, ALT 636 FOR OP/ED): Performed by: INTERNAL MEDICINE

## 2025-03-25 PROCEDURE — 84075 ASSAY ALKALINE PHOSPHATASE: CPT | Performed by: INTERNAL MEDICINE

## 2025-03-25 PROCEDURE — 82947 ASSAY GLUCOSE BLOOD QUANT: CPT

## 2025-03-25 PROCEDURE — 93010 ELECTROCARDIOGRAM REPORT: CPT | Performed by: INTERNAL MEDICINE

## 2025-03-25 PROCEDURE — 85027 COMPLETE CBC AUTOMATED: CPT | Performed by: INTERNAL MEDICINE

## 2025-03-25 PROCEDURE — 2500000004 HC RX 250 GENERAL PHARMACY W/ HCPCS (ALT 636 FOR OP/ED): Performed by: NURSE PRACTITIONER

## 2025-03-25 PROCEDURE — 82570 ASSAY OF URINE CREATININE: CPT | Performed by: INTERNAL MEDICINE

## 2025-03-25 PROCEDURE — 83970 ASSAY OF PARATHORMONE: CPT | Mod: PARLAB | Performed by: INTERNAL MEDICINE

## 2025-03-25 PROCEDURE — 2500000005 HC RX 250 GENERAL PHARMACY W/O HCPCS: Performed by: INTERNAL MEDICINE

## 2025-03-25 PROCEDURE — 83735 ASSAY OF MAGNESIUM: CPT

## 2025-03-25 PROCEDURE — 87493 C DIFF AMPLIFIED PROBE: CPT | Mod: PARLAB

## 2025-03-25 PROCEDURE — 2500000002 HC RX 250 W HCPCS SELF ADMINISTERED DRUGS (ALT 637 FOR MEDICARE OP, ALT 636 FOR OP/ED): Performed by: NURSE PRACTITIONER

## 2025-03-25 PROCEDURE — 2500000004 HC RX 250 GENERAL PHARMACY W/ HCPCS (ALT 636 FOR OP/ED): Performed by: INTERNAL MEDICINE

## 2025-03-25 PROCEDURE — 89190 NASAL SMEAR FOR EOSINOPHILS: CPT | Mod: PARLAB | Performed by: INTERNAL MEDICINE

## 2025-03-25 PROCEDURE — 84540 ASSAY OF URINE/UREA-N: CPT | Performed by: INTERNAL MEDICINE

## 2025-03-25 RX ORDER — METOPROLOL TARTRATE 1 MG/ML
5 INJECTION, SOLUTION INTRAVENOUS ONCE
Status: COMPLETED | OUTPATIENT
Start: 2025-03-25 | End: 2025-03-25

## 2025-03-25 RX ORDER — DIGOXIN 0.25 MG/ML
500 INJECTION INTRAMUSCULAR; INTRAVENOUS ONCE
Status: COMPLETED | OUTPATIENT
Start: 2025-03-25 | End: 2025-03-25

## 2025-03-25 RX ORDER — IPRATROPIUM BROMIDE 0.5 MG/2.5ML
0.5 SOLUTION RESPIRATORY (INHALATION)
Status: DISCONTINUED | OUTPATIENT
Start: 2025-03-25 | End: 2025-03-31

## 2025-03-25 RX ORDER — IPRATROPIUM BROMIDE 0.5 MG/2.5ML
0.5 SOLUTION RESPIRATORY (INHALATION) EVERY 2 HOUR PRN
Status: DISCONTINUED | OUTPATIENT
Start: 2025-03-25 | End: 2025-04-03 | Stop reason: HOSPADM

## 2025-03-25 RX ORDER — DEXTROSE MONOHYDRATE 50 MG/ML
100 INJECTION, SOLUTION INTRAVENOUS CONTINUOUS
Status: ACTIVE | OUTPATIENT
Start: 2025-03-25 | End: 2025-03-26

## 2025-03-25 RX ORDER — PANTOPRAZOLE SODIUM 40 MG/10ML
80 INJECTION, POWDER, LYOPHILIZED, FOR SOLUTION INTRAVENOUS ONCE
Status: COMPLETED | OUTPATIENT
Start: 2025-03-25 | End: 2025-03-26

## 2025-03-25 RX ORDER — PANTOPRAZOLE SODIUM 40 MG/10ML
40 INJECTION, POWDER, LYOPHILIZED, FOR SOLUTION INTRAVENOUS 2 TIMES DAILY
Status: DISCONTINUED | OUTPATIENT
Start: 2025-03-26 | End: 2025-04-03 | Stop reason: HOSPADM

## 2025-03-25 RX ORDER — DOCUSATE SODIUM 50 MG/5ML
100 LIQUID ORAL 2 TIMES DAILY
Status: DISCONTINUED | OUTPATIENT
Start: 2025-03-25 | End: 2025-03-30

## 2025-03-25 RX ORDER — METOPROLOL TARTRATE 1 MG/ML
INJECTION, SOLUTION INTRAVENOUS
Status: DISPENSED
Start: 2025-03-25 | End: 2025-03-26

## 2025-03-25 RX ORDER — DIGOXIN 0.25 MG/ML
250 INJECTION INTRAMUSCULAR; INTRAVENOUS ONCE
Status: DISCONTINUED | OUTPATIENT
Start: 2025-03-26 | End: 2025-03-25

## 2025-03-25 RX ADMIN — ACETYLCYSTEINE 200 MG: 200 SOLUTION ORAL; RESPIRATORY (INHALATION) at 06:40

## 2025-03-25 RX ADMIN — DIGOXIN 500 MCG: 0.25 INJECTION INTRAMUSCULAR; INTRAVENOUS at 19:16

## 2025-03-25 RX ADMIN — INSULIN LISPRO 8 UNITS: 100 INJECTION, SOLUTION INTRAVENOUS; SUBCUTANEOUS at 16:31

## 2025-03-25 RX ADMIN — METOPROLOL TARTRATE 50 MG: 50 TABLET, FILM COATED ORAL at 21:18

## 2025-03-25 RX ADMIN — METOPROLOL TARTRATE 50 MG: 50 TABLET, FILM COATED ORAL at 09:53

## 2025-03-25 RX ADMIN — PIPERACILLIN SODIUM AND TAZOBACTAM SODIUM 3.38 G: 3; .375 INJECTION, SOLUTION INTRAVENOUS at 15:40

## 2025-03-25 RX ADMIN — INSULIN GLARGINE 30 UNITS: 100 INJECTION, SOLUTION SUBCUTANEOUS at 21:18

## 2025-03-25 RX ADMIN — DEXTROSE MONOHYDRATE 100 ML/HR: 50 INJECTION, SOLUTION INTRAVENOUS at 13:26

## 2025-03-25 RX ADMIN — Medication 30 PERCENT: at 07:24

## 2025-03-25 RX ADMIN — PIPERACILLIN SODIUM AND TAZOBACTAM SODIUM 3.38 G: 3; .375 INJECTION, SOLUTION INTRAVENOUS at 02:25

## 2025-03-25 RX ADMIN — INSULIN LISPRO 6 UNITS: 100 INJECTION, SOLUTION INTRAVENOUS; SUBCUTANEOUS at 02:25

## 2025-03-25 RX ADMIN — SUCRALFATE 1 G: 1 TABLET ORAL at 18:14

## 2025-03-25 RX ADMIN — PIPERACILLIN SODIUM AND TAZOBACTAM SODIUM 3.38 G: 3; .375 INJECTION, SOLUTION INTRAVENOUS at 09:49

## 2025-03-25 RX ADMIN — AMIODARONE HYDROCHLORIDE 150 MG: 1.5 INJECTION, SOLUTION INTRAVENOUS at 20:13

## 2025-03-25 RX ADMIN — METOPROLOL TARTRATE 50 MG: 50 TABLET, FILM COATED ORAL at 15:40

## 2025-03-25 RX ADMIN — ACETAMINOPHEN 325 MG: 325 TABLET, FILM COATED ORAL at 04:46

## 2025-03-25 RX ADMIN — TRAZODONE HYDROCHLORIDE 50 MG: 50 TABLET ORAL at 21:18

## 2025-03-25 RX ADMIN — SODIUM CHLORIDE, POTASSIUM CHLORIDE, SODIUM LACTATE AND CALCIUM CHLORIDE 500 ML: 600; 310; 30; 20 INJECTION, SOLUTION INTRAVENOUS at 19:14

## 2025-03-25 RX ADMIN — AMIODARONE HYDROCHLORIDE 1 MG/MIN: 1.8 INJECTION, SOLUTION INTRAVENOUS at 20:22

## 2025-03-25 RX ADMIN — INSULIN LISPRO 8 UNITS: 100 INJECTION, SOLUTION INTRAVENOUS; SUBCUTANEOUS at 09:47

## 2025-03-25 RX ADMIN — SUCRALFATE 1 G: 1 TABLET ORAL at 06:16

## 2025-03-25 RX ADMIN — ACETYLCYSTEINE 200 MG: 200 SOLUTION ORAL; RESPIRATORY (INHALATION) at 18:02

## 2025-03-25 RX ADMIN — METOPROLOL TARTRATE 5 MG: 5 INJECTION INTRAVENOUS at 12:22

## 2025-03-25 RX ADMIN — ACETAMINOPHEN 325 MG: 325 TABLET, FILM COATED ORAL at 21:18

## 2025-03-25 RX ADMIN — INSULIN LISPRO 8 UNITS: 100 INJECTION, SOLUTION INTRAVENOUS; SUBCUTANEOUS at 21:18

## 2025-03-25 RX ADMIN — IPRATROPIUM BROMIDE 0.5 MG: 0.5 SOLUTION RESPIRATORY (INHALATION) at 18:02

## 2025-03-25 RX ADMIN — IPRATROPIUM BROMIDE AND ALBUTEROL SULFATE 3 ML: .5; 3 SOLUTION RESPIRATORY (INHALATION) at 06:40

## 2025-03-25 RX ADMIN — SODIUM CHLORIDE 500 ML: 0.9 INJECTION, SOLUTION INTRAVENOUS at 11:00

## 2025-03-25 RX ADMIN — Medication 30 PERCENT: at 18:03

## 2025-03-25 RX ADMIN — PIPERACILLIN SODIUM AND TAZOBACTAM SODIUM 3.38 G: 3; .375 INJECTION, SOLUTION INTRAVENOUS at 21:18

## 2025-03-25 ASSESSMENT — COGNITIVE AND FUNCTIONAL STATUS - GENERAL
STANDING UP FROM CHAIR USING ARMS: TOTAL
TURNING FROM BACK TO SIDE WHILE IN FLAT BAD: TOTAL
TURNING FROM BACK TO SIDE WHILE IN FLAT BAD: TOTAL
WALKING IN HOSPITAL ROOM: TOTAL
MOVING TO AND FROM BED TO CHAIR: TOTAL
CLIMB 3 TO 5 STEPS WITH RAILING: TOTAL
DRESSING REGULAR UPPER BODY CLOTHING: TOTAL
MOVING FROM LYING ON BACK TO SITTING ON SIDE OF FLAT BED WITH BEDRAILS: TOTAL
DRESSING REGULAR UPPER BODY CLOTHING: TOTAL
DAILY ACTIVITIY SCORE: 6
CLIMB 3 TO 5 STEPS WITH RAILING: TOTAL
MOBILITY SCORE: 6
EATING MEALS: TOTAL
DAILY ACTIVITIY SCORE: 6
TOILETING: TOTAL
MOVING TO AND FROM BED TO CHAIR: TOTAL
WALKING IN HOSPITAL ROOM: TOTAL
MOVING FROM LYING ON BACK TO SITTING ON SIDE OF FLAT BED WITH BEDRAILS: TOTAL
DRESSING REGULAR LOWER BODY CLOTHING: TOTAL
HELP NEEDED FOR BATHING: TOTAL
HELP NEEDED FOR BATHING: TOTAL
PERSONAL GROOMING: TOTAL
TOILETING: TOTAL
PERSONAL GROOMING: TOTAL
DRESSING REGULAR LOWER BODY CLOTHING: TOTAL
EATING MEALS: TOTAL
STANDING UP FROM CHAIR USING ARMS: TOTAL
MOBILITY SCORE: 6

## 2025-03-25 ASSESSMENT — PAIN - FUNCTIONAL ASSESSMENT
PAIN_FUNCTIONAL_ASSESSMENT: 0-10
PAIN_FUNCTIONAL_ASSESSMENT: 0-10

## 2025-03-25 ASSESSMENT — PAIN SCALES - GENERAL
PAINLEVEL_OUTOF10: 0 - NO PAIN
PAINLEVEL_OUTOF10: 3

## 2025-03-25 ASSESSMENT — PAIN SCALES - PAIN ASSESSMENT IN ADVANCED DEMENTIA (PAINAD): TOTALSCORE: MEDICATION (SEE MAR);REPOSITIONED

## 2025-03-25 ASSESSMENT — PAIN SCALES - WONG BAKER: WONGBAKER_NUMERICALRESPONSE: HURTS LITTLE MORE

## 2025-03-25 NOTE — ASSESSMENT & PLAN NOTE
Zeus Bone MD  Physician  Internal Medicine     H&P      Signed     Date of Service: 3/23/2025 10:28 AM     Signed       Expand All Collapse All    History Of Present Illness  Bryan Nix is a 64-year-old male with past medical history of atrial flutter on Eliquis, hyponatremia, IDDM, CAD, hypertension, chronic wounds, diffuse lymphadenopathy, anemia, history of smoking, history elevated PSA, CVA L hemisphere (2020) with right-sided deficits, Left ICA stenosis (85%) s/p angioplasty and left carotid artery stent (08/25/2023) and recent history of acute pontine stroke 02/27/25 with acute hypoxic respiratory failure s/p tracheostomy on 2/25/2025 and PEG.  Patient presents for bleeding from tracheostomy.  Patient is alert and oriented x 3, however limited verbal communication due to tracheostomy.  Wife is at bedside and assists with history.  She reports that patient was coughing blood out of his tracheostomy.  ER provider felt bleeding was likely superficial around site.  Patient has been on room air at skilled nursing facility.  He was placed on Airvo in the ED mainly to humidify oxygen.  Patient never desaturated per ER report.  Currently he is resting comfortably in the bed, breath sounds slightly rhonchorous, but unlabored.  Reports chills, but no fevers.  He has right-sided weakness and sensory deficits.  Denies headache, vision or hearing changes, chest pains, palpitations, shortness of breath, nausea, vomiting, abdominal pain, diarrhea, or complications of PEG tube site.  Wife reports patient skin became irritated in his groin due to adhesive from a condom cath previously, he has a small decubitus wound that she reports is healing well.  He receives tube feeds continuously.  Wife reports a patient was recently evaluated by speech for p.o. to intake, however he still remains n.p.o. except for meds and tube feeds through PEG. CODE STATUS reviewed: full code     ER Course: Hemodynamically stable,  afebrile, SpO2 to 97% trach mask.  WBC 9.3, hemoglobin 8, hematocrit 29.4, platelets 197.  INR 1.4.  Glucose 223, BUN 36, otherwise CMP is normal.  CT of the chest for PE pending.  CXR unremarkable. Trannexamic acid soak gauze wrapped around trach.      Past medical history: As above  Past surgical history: As above  Social history: Former smoker 37.5 pack years-quit smoking age 63, occasional alcohol use, no illicit drug use.  .  Works in maintenance.  6 children.  Family history: Noncontributory     Past Medical History  Medical History        Past Medical History:   Diagnosis Date    Abnormal finding of blood chemistry, unspecified 05/18/2016     Abnormal blood chemistry    Acute upper respiratory infection, unspecified 12/02/2015     Acute upper respiratory infection    Elevated prostate specific antigen (PSA)       Elevated prostate specific antigen (PSA)    Encounter for screening for malignant neoplasm of colon 01/30/2021     Colon cancer screening    Encounter for screening for malignant neoplasm of prostate 11/30/2020     Prostate cancer screening    Essential (primary) hypertension 07/30/2013     Benign essential hypertension    Other conditions influencing health status 07/30/2013     Diabetes Mellitus Poorly Controlled    Other conditions influencing health status 12/02/2015     History of cough    Personal history of other endocrine, nutritional and metabolic disease 03/18/2021     History of hyperlipidemia    Personal history of other endocrine, nutritional and metabolic disease 03/18/2021     History of diabetes mellitus    Personal history of other specified conditions 05/18/2016     History of chest pain    Personal history of other specified conditions 05/18/2016     History of dyspnea    Personal history of other specified conditions 05/18/2016     History of dizziness    Personal history of other specified conditions 05/18/2016     History of fatigue            Surgical History  Surgical  History         Past Surgical History:   Procedure Laterality Date    CT ANGIO CORONARY ART WITH HEARTFLOW IF SCORE >30%   8/23/2023     CT HEART CORONARY ANGIOGRAM 8/23/2023 [facility] CT    MR HEAD ANGIO WO IV CONTRAST   12/14/2020     MR HEAD ANGIO WO IV CONTRAST 12/14/2020 [facility]    MR NECK ANGIO WO IV CONTRAST   12/14/2020     MR NECK ANGIO WO IV CONTRAST 12/14/2020 [facility]    OTHER SURGICAL HISTORY   12/01/2020     Hand fracture repair    OTHER SURGICAL HISTORY   12/01/2020     Nasal bone fracture repair            Social History  He reports that he has quit smoking. His smoking use included cigarettes. He has never used smokeless tobacco. No history on file for alcohol use and drug use.     Family History  Family History   No family history on file.        Allergies  Patient has no known allergies.     Review of Systems     Physical Exam  Constitutional:       General: He is awake. He is not in acute distress.     Appearance: Normal appearance. He is not toxic-appearing.   HENT:      Head: Atraumatic.      Nose: Nose normal.      Mouth/Throat:      Mouth: Mucous membranes are moist.   Eyes:      Conjunctiva/sclera: Conjunctivae normal.   Cardiovascular:      Rate and Rhythm: Normal rate and regular rhythm.      Pulses: Normal pulses.      Heart sounds: No murmur heard.  Pulmonary:      Effort: No respiratory distress.      Comments: Tracheostomy site midline, no drainage around dressing.  Rhonchorous breath sounds, unlabored respirations, RT bedside suctioning patient with thick blood-tinged sputum  Abdominal:      General: Bowel sounds are normal. There is no distension.      Palpations: Abdomen is soft.      Tenderness: There is no abdominal tenderness. There is no guarding.      Comments: Left upper quadrant PEG site tube site, no drainage or skin breakdown around tube site.   Musculoskeletal:         General: No swelling, deformity or signs of injury.      Cervical back: Neck  "supple.      Right lower leg: No edema.      Left lower leg: No edema.   Skin:     General: Skin is warm and dry.      Capillary Refill: Capillary refill takes less than 2 seconds.      Findings: Wound (sacrum) present. No ecchymosis or erythema.   Neurological:      Mental Status: He is alert and oriented to person, place, and time.      Cranial Nerves: No facial asymmetry.      Sensory: Sensory deficit (RUE, RLE) present.      Motor: Weakness present.      Comments: Right sided upper and lower extremity motor function deficits   Psychiatric:         Mood and Affect: Mood normal.         Behavior: Behavior is cooperative.            Last Recorded Vitals  Blood pressure 134/50, pulse 93, temperature 36.9 °C (98.4 °F), temperature source Temporal, resp. rate 22, height 1.88 m (6' 2\"), weight 77.1 kg (170 lb), SpO2 98%.     Relevant Results              Results for orders placed or performed during the hospital encounter of 03/22/25 (from the past 24 hours)   CBC and Auto Differential   Result Value Ref Range     WBC 9.3 4.4 - 11.3 x10*3/uL     nRBC 0.3 (H) 0.0 - 0.0 /100 WBCs     RBC 3.02 (L) 4.50 - 5.90 x10*6/uL     Hemoglobin 8.0 (L) 13.5 - 17.5 g/dL     Hematocrit 29.4 (L) 41.0 - 52.0 %     MCV 97 80 - 100 fL     MCH 26.5 26.0 - 34.0 pg     MCHC 27.2 (L) 32.0 - 36.0 g/dL     RDW 15.6 (H) 11.5 - 14.5 %     Platelets 197 150 - 450 x10*3/uL     Neutrophils % 77.1 40.0 - 80.0 %     Immature Granulocytes %, Automated 1.5 (H) 0.0 - 0.9 %     Lymphocytes % 13.1 13.0 - 44.0 %     Monocytes % 4.1 2.0 - 10.0 %     Eosinophils % 3.2 0.0 - 6.0 %     Basophils % 1.0 0.0 - 2.0 %     Neutrophils Absolute 7.14 1.20 - 7.70 x10*3/uL     Immature Granulocytes Absolute, Automated 0.14 0.00 - 0.70 x10*3/uL     Lymphocytes Absolute 1.21 1.20 - 4.80 x10*3/uL     Monocytes Absolute 0.38 0.10 - 1.00 x10*3/uL     Eosinophils Absolute 0.30 0.00 - 0.70 x10*3/uL     Basophils Absolute 0.09 0.00 - 0.10 x10*3/uL   Basic metabolic panel   Result " Value Ref Range     Glucose 223 (H) 74 - 99 mg/dL     Sodium 142 136 - 145 mmol/L     Potassium 4.3 3.5 - 5.3 mmol/L     Chloride 103 98 - 107 mmol/L     Bicarbonate 27 21 - 32 mmol/L     Anion Gap 16 10 - 20 mmol/L     Urea Nitrogen 36 (H) 6 - 23 mg/dL     Creatinine 0.81 0.50 - 1.30 mg/dL     eGFR >90 >60 mL/min/1.73m*2     Calcium 9.6 8.6 - 10.3 mg/dL   Protime-INR   Result Value Ref Range     Protime 15.1 (H) 9.8 - 12.4 seconds     INR 1.4 (H) 0.9 - 1.1   aPTT   Result Value Ref Range     aPTT 25 (L) 26 - 36 seconds   Type and Screen   Result Value Ref Range     ABO TYPE B       Rh TYPE POS       ANTIBODY SCREEN NEG     VERIFY ABO/Rh Group Test   Result Value Ref Range     ABO TYPE B       Rh TYPE POS     MRSA Surveillance for Vancomycin De-escalation, PCR     Specimen: Anterior Nares; Swab   Result Value Ref Range     MRSA PCR Not Detected Not Detected   POCT GLUCOSE   Result Value Ref Range     POCT Glucose 180 (H) 74 - 99 mg/dL   POCT GLUCOSE   Result Value Ref Range     POCT Glucose 161 (H) 74 - 99 mg/dL   POCT GLUCOSE   Result Value Ref Range     POCT Glucose 156 (H) 74 - 99 mg/dL   Basic metabolic panel   Result Value Ref Range     Glucose 170 (H) 74 - 99 mg/dL     Sodium 147 (H) 136 - 145 mmol/L     Potassium 4.2 3.5 - 5.3 mmol/L     Chloride 107 98 - 107 mmol/L     Bicarbonate 30 21 - 32 mmol/L     Anion Gap 14 10 - 20 mmol/L     Urea Nitrogen 35 (H) 6 - 23 mg/dL     Creatinine 0.85 0.50 - 1.30 mg/dL     eGFR >90 >60 mL/min/1.73m*2     Calcium 9.4 8.6 - 10.3 mg/dL   CBC   Result Value Ref Range     WBC 10.4 4.4 - 11.3 x10*3/uL     nRBC 0.6 (H) 0.0 - 0.0 /100 WBCs     RBC 2.87 (L) 4.50 - 5.90 x10*6/uL     Hemoglobin 7.8 (L) 13.5 - 17.5 g/dL     Hematocrit 26.6 (L) 41.0 - 52.0 %     MCV 93 80 - 100 fL     MCH 27.2 26.0 - 34.0 pg     MCHC 29.3 (L) 32.0 - 36.0 g/dL     RDW 15.7 (H) 11.5 - 14.5 %     Platelets 205 150 - 450 x10*3/uL   SST TOP   Result Value Ref Range     Extra Tube Hold for add-ons.        CT  angio chest for pulmonary embolism     Result Date: 3/22/2025  Interpreted By:  Pameal Zurita, STUDY: CT ANGIO CHEST FOR PULMONARY EMBOLISM;  3/22/2025 4:56 pm   INDICATION: Signs/Symptoms:bloody secretions, eval for PE.     COMPARISON: CT chest without contrast 03/22/2025   ACCESSION NUMBER(S): [id number]   ORDERING CLINICIAN: PAMELA HAHN   TECHNIQUE: Contiguous axial images of the chest were obtained after the intravenous administration of iodinated contrast using angiographic PE protocol. Coronal and sagittal reformatted images were reconstructed from the axial data. MIP images were created on an independent workstation and reviewed.   FINDINGS:   MEDIASTINUM AND LYMPH NODES: Inflammatory fat stranding along the tracheostomy tract without discrete fluid collection. The esophageal wall appears within normal limits. There is redemonstration of diffuse lymphadenopathy in the bilateral axilla, mediastinum, bilateral supraclavicular as described in detail on separate report. Also notable or enlarged bilateral level 4 cervical lymph nodes (right > left).   VESSELS:  Normal caliber thoracic aorta without dissection. Moderate aortic atherosclerosis.  No acute pulmonary embolism.   HEART: Normal size.  Mild coronary artery calcifications. No significant pericardial effusion.   LUNG, AIRWAYS, PLEURA: There is slight increase in debris within the trachea. There has been clearing of secretions in the left upper/lower lobe bronchus. There is diffuse bilateral bronchial thickening slightly increased from prior study. There is peribronchovascular ground-glass opacity in the posterior right upper lobe and superior and basal segments of the right lower lobe. There is slightly worsened atelectasis in the lung bases remaining predominantly subsegmental in nature. There is a 0.8 cm nodule in the left upper lobe that is stable from 08/19/2023.   OSSEOUS STRUCTURES: No acute osseous abnormality.   CHEST WALL SOFT TISSUES: No  discernible acute abnormality.   UPPER ABDOMEN/OTHER: No acute abnormality.        No acute pulmonary embolism.   Peribronchial vascular ground glass opacities in the right upper and lower lobe again concerning for pneumonia given patient's risk factors of tracheostomy and debris in the airways.   Redemonstration of diffuse lymphadenopathy in the lower necks, axilla, and mediastinum. Findings are either diffusely reactive in nature, reflective of lymphoma/leukemia, meter deficiency, or less likely head neck neoplasm is previously postulated. However please correlate with past medical history.   Inflammatory changes along the tracheostomy tract without fluid collection. Correlate for erythema.   Unchanged 0.8 cm nodule in the left upper lobe dating back to 08/19/2023. Recommend 1 year follow up chest CT to ensure at least 2 years of stability.   MACRO: None.   Signed by: Pamela Zurita 3/22/2025 7:17 PM Dictation workstation:   [id number]     CT chest wo IV contrast     Result Date: 3/22/2025  Interpreted By:  Pamela Zurita, STUDY: CT CHEST WO IV CONTRAST;  3/22/2025 3:41 pm   INDICATION: Signs/Symptoms:eval for alveolar hemorrhage.     COMPARISON: CT head/neck 08/19/2023   ACCESSION NUMBER(S): [id number]   ORDERING CLINICIAN: PAMELA HAHN   TECHNIQUE: Contiguous axial images of the chest and upper abdomen were obtained without contrast. Coronal and sagittal reformatted images were reconstructed from the axial data.   FINDINGS:   MEDIASTINUM AND LYMPH NODES: There is fat stranding/edema along the tract of the tracheostomy about discrete fluid collection. The esophageal wall appears within normal limits. There are numerous enlarged bilateral supraclavicular lymph nodes measuring up to 1.4 cm on the left. There are numerous enlarged bilateral axillary lymph nodes measuring up to 1.1 cm on the right and 1.2 cm on the left. There are numerous prominent to mildly enlarged mediastinal lymph nodes as well. No  pneumomediastinum.   VESSELS:  Normal caliber thoracic aorta. Moderate aortic atherosclerosis. The main pulmonary artery is normal in size.   HEART: Normal size.  Mild coronary artery calcifications. No significant pericardial effusion.   LUNG, AIRWAYS, PLEURA: Tracheostomy noted in appropriate position. There is trace mucus in the left mainstem bronchus and at the origin of the left upper lobe and lower lobe bronchi. There are mild peribronchovascular ground-glass opacities in the posterior segment of the right upper lobe, basal segments of the right lower lobe. There is mild dependent bibasilar atelectasis. There is no pleural effusion.   OSSEOUS STRUCTURES: No acute osseous abnormality.   CHEST WALL SOFT TISSUES: No discernible acute abnormality.   UPPER ABDOMEN/OTHER: No acute abnormality.        1. Mild peribronchovascular ground-glass opacities in the posterior right upper lobe and basal segments of the right lower lobe. This appearance and distribution is not typical for alveolar hemorrhage which tends to be centrilobular and bilateral asymmetric in distribution. Findings are most suggestive of pneumonia.   2. Small amount of mucous debris in the left mainstem bronchus and at the origin of the left upper and lower lobe bronchi.   3. Diffuse lymphadenopathy in the bilateral axillary regions, mediastinum and left supraclavicular region particularly notable in the bilateral supraclavicular region. This is progressed in the left supraclavicular region and new elsewhere when compared to 08/19/2023 CTA head/neck. Correlate for any history of known head/neck malignancy or lymphoma.   4. Inflammation/fat stranding adjacent to the tracheostomy tube entry site noted. Correlate for air the met. No evidence of fluid collection.   MACRO: None.   Signed by: Marco A Zurita 3/22/2025 7:10 PM Dictation workstation:   [id number]     XR chest 1 view     Result Date: 3/22/2025  Interpreted By:  Fermin Stroud, STUDY: XR CHEST  1 VIEW   INDICATION: Signs/Symptoms:hemoptysis.   COMPARISON: August 19, 2023   ACCESSION NUMBER(S): [id number]   ORDERING CLINICIAN: PAMELA HAHN   FINDINGS: No consolidation, effusion, edema, or pneumothorax. Heart size within normal limits.        No evidence of acute intrathoracic abnormality.   Signed by: Fermin Stroud 3/22/2025 2:01 PM Dictation workstation:   [id number]     XR chest 1 view     Result Date: 3/14/2025  * * *Final Report* * * DATE OF EXAM: Mar 14 2025  8:52AM   S5X   5290  -  XR CHEST 1V FRONTAL   / ACCESSION #  [id number] PROCEDURE REASON: new bleeding from trach      * * * * Physician Interpretation * * * *  EXAMINATION:  CHEST RADIOGRAPH (PORTABLE SINGLE VIEW AP) Exam Date/Time:  3/14/2025 8:52 AM Clinical History:  new bleeding from trach Comparison:  03/03/2025 RESULT: LINES, TUBES, AND DEVICES:  Tracheostomy. CARDIOMEDIASTINAL SILHOUETTE:  The cardiac and mediastinal silhouettes appear unremarkable. LUNGS AND PLEURA: No consolidation.  No pleural effusion. No pulmonary vascular congestion. No pneumothorax identified. OTHER:  N/A     IMPRESSION: No acute abnormality identified. : [facility]SC[facility]   Transcribe Date/Time: Mar 14 2025  9:07A Dictated by : PAMELA EVANS MD This examination was interpreted and the report reviewed and electronically signed by: PAMELA EVANS MD on Mar 14 2025  9:11AM  EST     US abdomen complete     Result Date: 3/11/2025  ABDOMEN ULTRASOUND-COMPLETE FINDINGS: Abdomen Ultrasound Complete: PROCEDURE: Multiple grayscale mages of the abdomen were obtained. FINDINGS:Multiple real time images demonstrate the liver with increased echogenicity consistent with fatty infiltration. There is no evidence of a discrete mass within the liver. The liver is enlarged, measured at 15.7 cm in size. The gallbladder is seen with no evidence of cholelithiasis. The gallbladder wall is within normal limits. The common bile duct is within normal limits. The visualized pancreas  appears unremarkable. Both kidneys are seen with no evidence of hydronephrosis or a calculus. The spleen has its normal homogeneous echo appearance. There is no free fluid to suggest ascites. The visualized aorta appears unremarkable. The inferior vena cava appears patent. CONCLUSION: Enlarged fatty liver. ELECTRONICALLY SIGNED BY LEONEL LAN M.D. 3/11/2025 5:00:52 PM EDT.     XR chest 1 view     Result Date: 3/3/2025  * * *Final Report* * * DATE OF EXAM: Mar  3 2025  1:40PM   S5X   5290  -  XR CHEST 1V FRONTAL   / ACCESSION #  [id number] PROCEDURE REASON: resp failure      * * * * Physician Interpretation * * * *  EXAMINATION:  CHEST RADIOGRAPH (PORTABLE SINGLE VIEW AP) Exam Date/Time:  3/3/2025 1:40 PM Indication:   resp failure M:  XCP_4 Comparison:  1 day prior RESULT: Lines, tubes, and devices:  Tracheostomy tube remains in situ. Lungs and pleura:  Mild patchy opacities right lower lung zone seen on the prior study are no longer appreciated.  No confluent opacity.   Costophrenic angles are clear.  No pneumothorax. Cardiomediastinal silhouette:  Stable cardiomediastinal silhouette. Other:  -     IMPRESSION: Improved aeration right lower lung zone.  No acute cardiopulmonary finding. : PSCB   Transcribe Date/Time: Mar  3 2025  2:49P Dictated by : ISRAEL BENSON MD This examination was interpreted and the report reviewed and electronically signed by: ISRAEL BENSON MD on Mar  3 2025  2:49PM  EST     XR chest 1 view     Result Date: 3/2/2025  * * *Final Report* * * DATE OF EXAM: Mar  2 2025 11:25AM   MMX   5376  -  XR CHEST 1V FRONTAL PORT  / ACCESSION #  [id number] PROCEDURE REASON: Shortness of breath      * * * * Physician Interpretation * * * *  EXAMINATION:  CHEST RADIOGRAPH (PORTABLE SINGLE VIEW AP) Exam Date/Time:  3/2/2025 11:25 AM CLINICAL HISTORY: Shortness of breath MQ:  XCPR_5 Comparison:  02/20/2025. RESULT: This is a limited examination due to multiple wires and tubing  obscuring the lower lungs. Lines, tubes, and devices:  A tracheostomy tube remains in place. Lungs and pleura:  There are increased bronchovascular markings in the right lower lung which may be due to bronchitis or early changes of aspiration pneumonia.  Clinical correlation and follow-up exams are recommended. Cardiomediastinal silhouette:  Stable cardiomediastinal silhouette. Other:  The visualized bony thorax appears unremarkable.     IMPRESSION: Nonspecific parenchymal changes in the right lower lung as described above. A follow-up exam is recommended. : [facility]   Transcribe Date/Time: Mar  2 2025 11:53A Dictated by : BJ INIGUEZ MD This examination was interpreted and the report reviewed and electronically signed by: BJ INIGUEZ MD on Mar  2 2025 11:54AM  EST     XR chest 1 view     Result Date: 2/28/2025  * * *Final Report* * * DATE OF EXAM: Feb 28 2025 10:36AM   MMX   5290  -  XR CHEST 1V FRONTAL   / ACCESSION #  [id number] PROCEDURE REASON: Shortness of breath      * * * * Physician Interpretation * * * *  EXAMINATION:  CHEST RADIOGRAPH (SINGLE VIEW AP OR PA) CLINICAL HISTORY: Shortness of breath MQ:  XC1_5 Comparison:  02/22/2025 RESULT: Lines, tubes, and devices:  Tracheostomy tube is noted. Lungs and pleura:  No consolidation. No lung mass. No pleural effusion. Cardiomediastinal silhouette:  Normal cardiomediastinal silhouette. Other:  No acute osseous pathology.     IMPRESSION: No acute radiographic abnormality. : [facility]   Transcribe Date/Time: Feb 28 2025 12:07P Dictated by : DIANNE RAMOS MD This examination was interpreted and the report reviewed and electronically signed by: DIANNE RAMOS MD on Feb 28 2025 12:08PM  EST     XR abdomen 2 views supine and erect or decub     Result Date: 2/24/2025  * * *Final Report* * * DATE OF EXAM: Feb 24 2025  6:03PM   MMX   5289  -  XR ABDOMEN 1V SUPINE  / ACCESSION #  [id number] PROCEDURE REASON: Evaluate tube, line or lead position       * * * * Physician Interpretation * * * *  EXAMINATION:  XR ABDOMEN 1V SUPINE CLINICAL HISTORY:   Evaluate tube, line or lead position Technique:   XR ABDOMEN 1V SUPINE -- NOT APPLICABLE with 1 views on 1 images Comparison: 2/21/2025 RESULT: Feeding tube with distal tip at the pylorus antrum. Moderate colonic stool burden. No dilated bowel. Lung structures are intact.     IMPRESSION: Feeding tube with distal tip at the pylorus antrum. Moderate colonic stool burden. No dilated bowel. : [facility]SC[facility]   Transcribe Date/Time: Feb 24 2025  6:23P Dictated by : PABLO RAMOS MD This examination was interpreted and the report reviewed and electronically signed by: PABLO RAMOS MD on Feb 24 2025  6:24PM  EST     CT head wo IV contrast     Result Date: 2/23/2025  * * *Final Report* * * DATE OF EXAM: Feb 23 2025 12:04PM   [facility]   0504  -  CT BRAIN WO IVCON  / ACCESSION #  [id number] PROCEDURE REASON: Stroke, follow up      * * * * Physician Interpretation * * * *  EXAMINATION: CT BRAIN WO IVCON CLINICAL HISTORY: Stroke, follow up TECHNIQUE:  Serial axial images without IV contrast were obtained from the vertex to the foramen magnum. MQ:  CTBWO_3 CT Radiation dose: Integrated Dose-Length Product (DLP) for this visit =   778  mGy*cm CT Dose Reduction Employed: Iterative recon COMPARISON: MRI brain 02/19/2025 RESULT: Post-operative change: None. Acute change: Evolving acute infarct along the central eli and superior left medulla (2:7-9).  No evidence of hemorrhagic transformation. Hemorrhage: No evidence of acute intracranial hemorrhage. Mass Lesion / Mass Effect: There is no evidence of an intracranial mass or extraaxial fluid collection. No significant mass effect. Chronic change: Stable remote left MCA territory encephalomalacia. Parenchyma: There is no significant volume loss. The brain parenchyma is otherwise within normal limits for age. Ventricles: The ventricles are within normal limits of size and configuration  for age. Paranasal sinuses and skull base: Partial opacification of the left maxillary and sphenoid sinus and multiple bilateral ethmoid air cells.   The remaining visualized paranasal sinuses are grossly clear. The skull base and imaged soft tissues are unremarkable. Localizer images: No additional findings.     IMPRESSION: Evolving acute brainstem infarct.  No evidence of hemorrhagic transformation. Stable remote left MCA territory infarct. : [facility]   Transcribe Date/Time: Feb 23 2025 12:48P Dictated by : FRANKY GUZMAN MD   This examination was interpreted and the report reviewed and electronically signed by: FRANKY GUZMAN MD on Feb 23 2025 12:52PM  EST     XR chest 1 view     Result Date: 2/22/2025  * * *Final Report* * * DATE OF EXAM: Feb 22 2025  6:56AM   MMX   [id number]  -  XR CHEST 1V FRONTAL   / ACCESSION #  [id number] PROCEDURE REASON: Evaluate tube, line,  or lead position      * * * * Physician Interpretation * * * *  EXAMINATION:  CHEST RADIOGRAPH (SINGLE VIEW AP OR PA) CLINICAL HISTORY: Evaluate tube, line,  or lead position MQ:  XC1_5 Comparison:  02/21/2025. RESULT: Lines, tubes, and devices:  An ET tube and feeding tube remain in place.   A poorly defined right venous catheter cannot be excluded. Lungs and pleura:  There are persistent bilateral lower lungs infiltrates seen on the right more than left suggestive of pneumonia such as aspiration pneumonia.  There is no definite pleural effusion. Cardiomediastinal silhouette:  Stable cardiomediastinal silhouette. Other:  The visualized bony thorax appears unremarkable.     IMPRESSION: Persistent bilateral lower lungs infiltrates as described above. A follow-up exam is recommended. : [facility]   Transcribe Date/Time: Feb 22 2025  8:20A Dictated by : BJ INIGUEZ MD This examination was interpreted and the report reviewed and electronically signed by: BJ INIGUEZ MD on Feb 22 2025  8:21AM  EST     XR chest 1 view     Result Date:  2/21/2025  * * *Final Report* * * DATE OF EXAM: Feb 21 2025  5:59PM   MMX   [id number]  -  XR CHEST 1V FRONTAL PORT  / ACCESSION #  [id number] PROCEDURE REASON: Evaluate tube, line,  or lead position      * * * * Physician Interpretation * * * *  EXAMINATION:  CHEST RADIOGRAPH (PORTABLE SINGLE VIEW AP) Exam Date/Time:  2/21/2025 5:59 PM CLINICAL HISTORY: Evaluate tube, line,  or lead position MQ:  XCPR_5 Comparison:  02/21/2025 RESULT: Lines, tubes, and devices: Endotracheal tube in place terminating in enteric tube in place terminating below the field of view. Lungs and pleura: No pneumothorax.  No pleural effusion.  Bilateral mild interstitial opacities.  The right costophrenic angle is not included in the field-of-view. Cardiomediastinal silhouette:  Stable cardiomediastinal silhouette. Other:  .     IMPRESSION: Mild interstitial opacities may be related to pulmonary vascular congestion. : [facility]   Transcribe Date/Time: Feb 21 2025  6:55P Dictated by : IRWIN VALDIVIA MD This examination was interpreted and the report reviewed and electronically signed by: IRWIN VALDIVIA MD on Feb 21 2025  6:57PM  EST     XR abdomen 2 views supine and erect or decub     Result Date: 2/21/2025  * * *Final Report* * * DATE OF EXAM: Feb 21 2025 10:32AM   MMX   [id number]  -  XR ABDOMEN 1V SUPINE  / ACCESSION #  [id number] PROCEDURE REASON: Evaluate tube, line or lead position      * * * * Physician Interpretation * * * *  Clinical Information: Feeding tube Single view of the abdomen was obtained. Feeding tube tip within the distal stomach. There is a nonspecific, nonobstructive bowel gas pattern. No abnormal abdominal masses are identified.  No pathologic calcifications.   No acute bony abnormalities are seen.     IMPRESSION: Nonspecific, nonobstructive bowel gas pattern. Feeding tube tip within the distal stomach. : [facility]   Transcribe Date/Time: Feb 21 2025 10:43A Dictated by : PAMELA EVANS MD  This examination was interpreted and the report reviewed and electronically signed by: PAMELA EVANS MD on Feb 21 2025 10:44AM  EST     XR chest 1 view     Result Date: 2/21/2025  * * *Final Report* * * DATE OF EXAM: Feb 21 2025 10:32AM   MMX   [id number]  -  XR CHEST 1V FRONTAL PORT  / ACCESSION #  [id number] PROCEDURE REASON: Shortness of breath      * * * * Physician Interpretation * * * *  EXAMINATION:  CHEST RADIOGRAPH (PORTABLE SINGLE VIEW AP) Exam Date/Time:  2/21/2025 10:32 AM Clinical History:  Shortness of breath Comparison:  02/17/2025 RESULT: LINES, TUBES, AND DEVICES:  Feeding tube tip beyond the inferior extent of the image. CARDIOMEDIASTINAL SILHOUETTE:  The cardiac and mediastinal silhouettes appear unremarkable. LUNGS AND PLEURA: No consolidation.  No pleural effusion. No pulmonary vascular congestion. No pneumothorax identified. OTHER:  N/A     IMPRESSION: No acute abnormality identified. : [facility]   Transcribe Date/Time: Feb 21 2025 10:41A Dictated by : PAMELA EVANS MD This examination was interpreted and the report reviewed and electronically signed by: PAMELA EVANS MD on Feb 21 2025 10:43AM  EST            Assessment/Plan     Assessment & Plan  Hemoptysis     Tracheostomy in place (Multi)     CVA, old, hemiparesis (Multi)     Status post insertion of percutaneous endoscopic gastrostomy (PEG) tube (Multi)     Pneumonia due to infectious organism     Right sided weakness     Atrial flutter (Multi)     Insulin dependent type 2 diabetes mellitus (Multi)        Telemetry monitoring  Hemoglobin stable 8's recently on review of outside labs, monitor for ongoing bleeding.  Daily H&H  Monitor for overt bleeding  Consult pulmonology, appreciate input  Pneumonia suspected for CT findings  Tracheostomy protocol  Expected cover with Zosyn and vancomycin  MRSA swab  Urine for Legionella antigen and strep pneumonia antigen  Supplemental oxygen as needed  Nebulizers as needed  RT evaluate and  treat  Check procalcitonin  Typical 20% zinc oxide to wounds and groin  Continuous tube feeds, continue at 80 mL an hour x 22 hours  Accu-Chek every 6 hours  Hypoglycemia protocol  PT/OT  Anticoagulation and antiplatelet therapy on hold, resume once cleared by pulmonology  Continue patient's home medications for chronic issues as appropriate     Patient fully evaluated on March 23.  Continue to monitor H&H.  Pulmonary consultation obtained.  Continue broad-spectrum antibiotics to rule out infectious process.  Recheck labs in AM.                 Zeus Bone MD

## 2025-03-25 NOTE — CONSULTS
Reason For Consult  Acute kidney injury/hypernatremia  History Of Present Illness  Bryan Nix is a 64 y.o. male admitted to [facility] with acute onset of hemoptysis diagnosed with pneumonia started on IV antibiotics.  Patient was noted to have elevation of the sodium to 153 with hyperchloremia and acute azotemia with BUN/creatinine elevated creatinine went up to 1.5 from baseline of 0.9 and acute anemia with hemoglobin dropping down to 7.3 today.  Patient received a CT scan angio of the chest with contrast for ruling out pulmonary embolus a couple days prior .  There is no evidence of pulmonary embolus.  Patient with elevation of the creatinine most likely secondary to dehydration and contrast nephropathy.  Patient also have history of benign prostatic enlargement with a PSA in the 4 range.  He had mild elevation of the transaminases with ALT of 66.  Prior to admission  patient was on Plavix and Eliquis for history of atrial flutter.  Both anticoagulants and antiplatelet agents have been placed on hold because of hemoptysis.     Patient also have generalized lymphadenopathy and had a pending appointment with hematology oncology anticoagulant clinic.  Medications with potential to cause renal failure are on hold including Jardiance.              Patient denies history of nocturia hematuria dysuria,  Endorses history of hesitancy secondary to BPH, no history of sickle cell nephrotic syndrome or hepatic disease.  Patient's spouse was present in the room she helped with patient's history General Appearance     Past Medical History  He has a past medical history of Abnormal finding of blood chemistry, unspecified (05/18/2016), Acute upper respiratory infection, unspecified (12/02/2015), Elevated prostate specific antigen (PSA), Encounter for screening for malignant neoplasm of colon (01/30/2021), Encounter for screening for malignant neoplasm of prostate (11/30/2020), Essential (primary) hypertension (07/30/2013),  Other conditions influencing health status (07/30/2013), Other conditions influencing health status (12/02/2015), Personal history of other endocrine, nutritional and metabolic disease (03/18/2021), Personal history of other endocrine, nutritional and metabolic disease (03/18/2021), Personal history of other specified conditions (05/18/2016), Personal history of other specified conditions (05/18/2016), Personal history of other specified conditions (05/18/2016), and Personal history of other specified conditions (05/18/2016).  CVA pontine  Coronary artery disease  Hypertension  BPH  Diabetes mellitus type 2  Sepsis  Atrial flutter  Generalized lymphadenopathy  Surgical History  He has a past surgical history that includes Other surgical history (12/01/2020); Other surgical history (12/01/2020); MR angio head wo IV contrast (12/14/2020); MR angio neck wo IV contrast (12/14/2020); and CT angio coronary art with heartflow if score >30% (8/23/2023).     Social History  He reports that he has quit smoking. His smoking use included cigarettes. He has never used smokeless tobacco. No history on file for alcohol use and drug use.    Family History  No family history on file.     Allergies  Patient has no known allergies.    Review of System  10 point review of system performed and negative except for what is mentioned in HPI  Physical Exam  General Appearance; asthenic habitus  Mild skin pallor  Poor skin turgor  HEENT; pupils react to light and accommodation  There is no nystagmus or ptosis; extraocular movements are intact  Neck; no adenopathy; no jugular vein distention; no bruit; no thyromegaly  Tracheostomy in place clean without exudation or induration  Lungs; minimal inspiratory coarse crackles at the bases  Heart; regular rate no gallop no rubs or thrills no lifts  Abdomen; active peristalsis no rebound or guarding there is a PEG tube in place  ; no CVA tenderness  Extremities; decreased muscle tone  Neurological;  pathological reflexes are absent       I&O 24HR    Intake/Output Summary (Last 24 hours) at 3/25/2025 1257  Last data filed at 3/25/2025 1029  Gross per 24 hour   Intake 2006 ml   Output --   Net 2006 ml       Vitals 24HR  Heart Rate:  [71-93]   Temp:  [36 °C (96.8 °F)-38.1 °C (100.6 °F)]   Resp:  [20-24]   BP: (105-146)/(57-67)   SpO2:  [94 %-100 %]         Relevant Results  Results for orders placed or performed during the hospital encounter of 03/22/25 (from the past 24 hours)   POCT GLUCOSE   Result Value Ref Range    POCT Glucose 299 (H) 74 - 99 mg/dL   POCT GLUCOSE   Result Value Ref Range    POCT Glucose 308 (H) 74 - 99 mg/dL   POCT GLUCOSE   Result Value Ref Range    POCT Glucose 264 (H) 74 - 99 mg/dL   CBC   Result Value Ref Range    WBC 14.8 (H) 4.4 - 11.3 x10*3/uL    nRBC 1.7 (H) 0.0 - 0.0 /100 WBCs    RBC 2.74 (L) 4.50 - 5.90 x10*6/uL    Hemoglobin 7.3 (L) 13.5 - 17.5 g/dL    Hematocrit 26.2 (L) 41.0 - 52.0 %    MCV 96 80 - 100 fL    MCH 26.6 26.0 - 34.0 pg    MCHC 27.9 (L) 32.0 - 36.0 g/dL    RDW 16.9 (H) 11.5 - 14.5 %    Platelets 218 150 - 450 x10*3/uL   Comprehensive Metabolic Panel   Result Value Ref Range    Glucose 280 (H) 74 - 99 mg/dL    Sodium 153 (H) 136 - 145 mmol/L    Potassium 3.9 3.5 - 5.3 mmol/L    Chloride 111 (H) 98 - 107 mmol/L    Bicarbonate 34 (H) 21 - 32 mmol/L    Anion Gap 12 10 - 20 mmol/L    Urea Nitrogen 51 (H) 6 - 23 mg/dL    Creatinine 1.50 (H) 0.50 - 1.30 mg/dL    eGFR 51 (L) >60 mL/min/1.73m*2    Calcium 9.4 8.6 - 10.3 mg/dL    Albumin 3.3 (L) 3.4 - 5.0 g/dL    Alkaline Phosphatase 167 (H) 33 - 136 U/L    Total Protein 6.9 6.4 - 8.2 g/dL    AST 36 9 - 39 U/L    Bilirubin, Total 0.5 0.0 - 1.2 mg/dL    ALT 77 (H) 10 - 52 U/L   POCT GLUCOSE   Result Value Ref Range    POCT Glucose 317 (H) 74 - 99 mg/dL    XR chest 1 view    Result Date: 3/24/2025  Interpreted By:  Wesly Castle, STUDY: XR CHEST 1 VIEW;  3/24/2025 1:25 pm   INDICATION: Signs/Symptoms:sob.   COMPARISON: 03/22/2025    ACCESSION NUMBER(S): [id number]   ORDERING CLINICIAN: RICHARD WELCH   FINDINGS: CARDIOMEDIASTINAL SILHOUETTE AND VASCULATURE:   Cardiac size:  Within normal limits. Aortic shadow:  Within normal limits.   Mediastinal contours: Within normal limits.   Pulmonary vasculature:  The central vasculature is unremarkable   LUNGS: Linear opacity at the left infrahilar region is probably from atelectasis, developed since the prior exam. The lungs otherwise are clear.   ABDOMEN AND OTHER FINDINGS: No remarkable upper abdominal findings.   BONES: No acute osseous changes.       1.  Left lower lung atelectasis.   Signed by: Wesly Castle 3/24/2025 4:36 PM Dictation workstation:   [id number]    CT angio chest for pulmonary embolism    Result Date: 3/22/2025  Interpreted By:  Pamela Zurita, STUDY: CT ANGIO CHEST FOR PULMONARY EMBOLISM;  3/22/2025 4:56 pm   INDICATION: Signs/Symptoms:bloody secretions, eval for PE.     COMPARISON: CT chest without contrast 03/22/2025   ACCESSION NUMBER(S): [id number]   ORDERING CLINICIAN: PAMELA HAHN   TECHNIQUE: Contiguous axial images of the chest were obtained after the intravenous administration of iodinated contrast using angiographic PE protocol. Coronal and sagittal reformatted images were reconstructed from the axial data. MIP images were created on an independent workstation and reviewed.   FINDINGS:   MEDIASTINUM AND LYMPH NODES: Inflammatory fat stranding along the tracheostomy tract without discrete fluid collection. The esophageal wall appears within normal limits. There is redemonstration of diffuse lymphadenopathy in the bilateral axilla, mediastinum, bilateral supraclavicular as described in detail on separate report. Also notable or enlarged bilateral level 4 cervical lymph nodes (right > left).   VESSELS:  Normal caliber thoracic aorta without dissection. Moderate aortic atherosclerosis.  No acute pulmonary embolism.   HEART: Normal size.  Mild coronary artery  calcifications. No significant pericardial effusion.   LUNG, AIRWAYS, PLEURA: There is slight increase in debris within the trachea. There has been clearing of secretions in the left upper/lower lobe bronchus. There is diffuse bilateral bronchial thickening slightly increased from prior study. There is peribronchovascular ground-glass opacity in the posterior right upper lobe and superior and basal segments of the right lower lobe. There is slightly worsened atelectasis in the lung bases remaining predominantly subsegmental in nature. There is a 0.8 cm nodule in the left upper lobe that is stable from 08/19/2023.   OSSEOUS STRUCTURES: No acute osseous abnormality.   CHEST WALL SOFT TISSUES: No discernible acute abnormality.   UPPER ABDOMEN/OTHER: No acute abnormality.       No acute pulmonary embolism.   Peribronchial vascular ground glass opacities in the right upper and lower lobe again concerning for pneumonia given patient's risk factors of tracheostomy and debris in the airways.   Redemonstration of diffuse lymphadenopathy in the lower necks, axilla, and mediastinum. Findings are either diffusely reactive in nature, reflective of lymphoma/leukemia, meter deficiency, or less likely head neck neoplasm is previously postulated. However please correlate with past medical history.   Inflammatory changes along the tracheostomy tract without fluid collection. Correlate for erythema.   Unchanged 0.8 cm nodule in the left upper lobe dating back to 08/19/2023. Recommend 1 year follow up chest CT to ensure at least 2 years of stability.   MACRO: None.   Signed by: Marco A Zurita 3/22/2025 7:17 PM Dictation workstation:   [id number]    CT chest wo IV contrast    Result Date: 3/22/2025  Interpreted By:  Marco A Zurita, STUDY: CT CHEST WO IV CONTRAST;  3/22/2025 3:41 pm   INDICATION: Signs/Symptoms:eval for alveolar hemorrhage.     COMPARISON: CT head/neck 08/19/2023   ACCESSION NUMBER(S): [id number]   ORDERING  CLINICIAN: PAMELA HAHN   TECHNIQUE: Contiguous axial images of the chest and upper abdomen were obtained without contrast. Coronal and sagittal reformatted images were reconstructed from the axial data.   FINDINGS:   MEDIASTINUM AND LYMPH NODES: There is fat stranding/edema along the tract of the tracheostomy about discrete fluid collection. The esophageal wall appears within normal limits. There are numerous enlarged bilateral supraclavicular lymph nodes measuring up to 1.4 cm on the left. There are numerous enlarged bilateral axillary lymph nodes measuring up to 1.1 cm on the right and 1.2 cm on the left. There are numerous prominent to mildly enlarged mediastinal lymph nodes as well. No pneumomediastinum.   VESSELS:  Normal caliber thoracic aorta. Moderate aortic atherosclerosis. The main pulmonary artery is normal in size.   HEART: Normal size.  Mild coronary artery calcifications. No significant pericardial effusion.   LUNG, AIRWAYS, PLEURA: Tracheostomy noted in appropriate position. There is trace mucus in the left mainstem bronchus and at the origin of the left upper lobe and lower lobe bronchi. There are mild peribronchovascular ground-glass opacities in the posterior segment of the right upper lobe, basal segments of the right lower lobe. There is mild dependent bibasilar atelectasis. There is no pleural effusion.   OSSEOUS STRUCTURES: No acute osseous abnormality.   CHEST WALL SOFT TISSUES: No discernible acute abnormality.   UPPER ABDOMEN/OTHER: No acute abnormality.       1. Mild peribronchovascular ground-glass opacities in the posterior right upper lobe and basal segments of the right lower lobe. This appearance and distribution is not typical for alveolar hemorrhage which tends to be centrilobular and bilateral asymmetric in distribution. Findings are most suggestive of pneumonia.   2. Small amount of mucous debris in the left mainstem bronchus and at the origin of the left upper and lower lobe  bronchi.   3. Diffuse lymphadenopathy in the bilateral axillary regions, mediastinum and left supraclavicular region particularly notable in the bilateral supraclavicular region. This is progressed in the left supraclavicular region and new elsewhere when compared to 08/19/2023 CTA head/neck. Correlate for any history of known head/neck malignancy or lymphoma.   4. Inflammation/fat stranding adjacent to the tracheostomy tube entry site noted. Correlate for air the met. No evidence of fluid collection.   MACRO: None.   Signed by: Marco A Zurita 3/22/2025 7:10 PM Dictation workstation:   [id number]    XR chest 1 view    Result Date: 3/22/2025  Interpreted By:  Fermin Stroud, STUDY: XR CHEST 1 VIEW   INDICATION: Signs/Symptoms:hemoptysis.   COMPARISON: August 19, 2023   ACCESSION NUMBER(S): [id number]   ORDERING CLINICIAN: MARCO A HAHN   FINDINGS: No consolidation, effusion, edema, or pneumothorax. Heart size within normal limits.       No evidence of acute intrathoracic abnormality.   Signed by: Fermin Stroud 3/22/2025 2:01 PM Dictation workstation:   [id number]    XR chest 1 view    Result Date: 3/14/2025  * * *Final Report* * * DATE OF EXAM: Mar 14 2025  8:52AM   S5X   5290  -  XR CHEST 1V FRONTAL   / ACCESSION #  [id number] PROCEDURE REASON: new bleeding from trach      * * * * Physician Interpretation * * * *  EXAMINATION:  CHEST RADIOGRAPH (PORTABLE SINGLE VIEW AP) Exam Date/Time:  3/14/2025 8:52 AM Clinical History:  new bleeding from trach Comparison:  03/03/2025 RESULT: LINES, TUBES, AND DEVICES:  Tracheostomy. CARDIOMEDIASTINAL SILHOUETTE:  The cardiac and mediastinal silhouettes appear unremarkable. LUNGS AND PLEURA: No consolidation.  No pleural effusion. No pulmonary vascular congestion. No pneumothorax identified. OTHER:  N/A    IMPRESSION: No acute abnormality identified. : [facility]   Transcribe Date/Time: Mar 14 2025  9:07A Dictated by : MARCO A EVANS MD This examination was  interpreted and the report reviewed and electronically signed by: PAMELA EVANS MD on Mar 14 2025  9:11AM  EST    US abdomen complete    Result Date: 3/11/2025  ABDOMEN ULTRASOUND-COMPLETE FINDINGS: Abdomen Ultrasound Complete: PROCEDURE: Multiple grayscale mages of the abdomen were obtained. FINDINGS:Multiple real time images demonstrate the liver with increased echogenicity consistent with fatty infiltration. There is no evidence of a discrete mass within the liver. The liver is enlarged, measured at 15.7 cm in size. The gallbladder is seen with no evidence of cholelithiasis. The gallbladder wall is within normal limits. The common bile duct is within normal limits. The visualized pancreas appears unremarkable. Both kidneys are seen with no evidence of hydronephrosis or a calculus. The spleen has its normal homogeneous echo appearance. There is no free fluid to suggest ascites. The visualized aorta appears unremarkable. The inferior vena cava appears patent. CONCLUSION: Enlarged fatty liver. ELECTRONICALLY SIGNED BY LEONEL LAN M.D. 3/11/2025 5:00:52 PM EDT.    XR chest 1 view    Result Date: 3/3/2025  * * *Final Report* * * DATE OF EXAM: Mar  3 2025  1:40PM   S5X   5290  -  XR CHEST 1V FRONTAL   / ACCESSION #  [id number] PROCEDURE REASON: resp failure      * * * * Physician Interpretation * * * *  EXAMINATION:  CHEST RADIOGRAPH (PORTABLE SINGLE VIEW AP) Exam Date/Time:  3/3/2025 1:40 PM Indication:   resp failure M:  XCP_4 Comparison:  1 day prior RESULT: Lines, tubes, and devices:  Tracheostomy tube remains in situ. Lungs and pleura:  Mild patchy opacities right lower lung zone seen on the prior study are no longer appreciated.  No confluent opacity.   Costophrenic angles are clear.  No pneumothorax. Cardiomediastinal silhouette:  Stable cardiomediastinal silhouette. Other:  -    IMPRESSION: Improved aeration right lower lung zone.  No acute cardiopulmonary finding. : [facility]    Transcribe Date/Time: Mar  3 2025  2:49P Dictated by : ISRAEL BENSON MD This examination was interpreted and the report reviewed and electronically signed by: ISRAEL BENSON MD on Mar  3 2025  2:49PM  EST    XR chest 1 view    Result Date: 3/2/2025  * * *Final Report* * * DATE OF EXAM: Mar  2 2025 11:25AM   MMX   5376  -  XR CHEST 1V FRONTAL PORT  / ACCESSION #  [id number] PROCEDURE REASON: Shortness of breath      * * * * Physician Interpretation * * * *  EXAMINATION:  CHEST RADIOGRAPH (PORTABLE SINGLE VIEW AP) Exam Date/Time:  3/2/2025 11:25 AM CLINICAL HISTORY: Shortness of breath MQ:  XCPR_5 Comparison:  02/20/2025. RESULT: This is a limited examination due to multiple wires and tubing obscuring the lower lungs. Lines, tubes, and devices:  A tracheostomy tube remains in place. Lungs and pleura:  There are increased bronchovascular markings in the right lower lung which may be due to bronchitis or early changes of aspiration pneumonia.  Clinical correlation and follow-up exams are recommended. Cardiomediastinal silhouette:  Stable cardiomediastinal silhouette. Other:  The visualized bony thorax appears unremarkable.    IMPRESSION: Nonspecific parenchymal changes in the right lower lung as described above. A follow-up exam is recommended. : [facility]   Transcribe Date/Time: Mar  2 2025 11:53A Dictated by : BJ INIGUEZ MD This examination was interpreted and the report reviewed and electronically signed by: BJ INIGUEZ MD on Mar  2 2025 11:54AM  EST    XR chest 1 view    Result Date: 2/28/2025  * * *Final Report* * * DATE OF EXAM: Feb 28 2025 10:36AM   MMX   5290  -  XR CHEST 1V FRONTAL   / ACCESSION #  [id number] PROCEDURE REASON: Shortness of breath      * * * * Physician Interpretation * * * *  EXAMINATION:  CHEST RADIOGRAPH (SINGLE VIEW AP OR PA) CLINICAL HISTORY: Shortness of breath MQ:  XC1_5 Comparison:  02/22/2025 RESULT: Lines, tubes, and devices:  Tracheostomy tube is noted. Lungs and  pleura:  No consolidation. No lung mass. No pleural effusion. Cardiomediastinal silhouette:  Normal cardiomediastinal silhouette. Other:  No acute osseous pathology.    IMPRESSION: No acute radiographic abnormality. : [facility]   Transcribe Date/Time: Feb 28 2025 12:07P Dictated by : DIANNE RAMOS MD This examination was interpreted and the report reviewed and electronically signed by: DIANNE RAMOS MD on Feb 28 2025 12:08PM  EST    XR abdomen 2 views supine and erect or decub    Result Date: 2/24/2025  * * *Final Report* * * DATE OF EXAM: Feb 24 2025  6:03PM   MMX   5289  -  XR ABDOMEN 1V SUPINE  / ACCESSION #  [id number] PROCEDURE REASON: Evaluate tube, line or lead position      * * * * Physician Interpretation * * * *  EXAMINATION:  XR ABDOMEN 1V SUPINE CLINICAL HISTORY:   Evaluate tube, line or lead position Technique:   XR ABDOMEN 1V SUPINE -- NOT APPLICABLE with 1 views on 1 images Comparison: 2/21/2025 RESULT: Feeding tube with distal tip at the pylorus antrum. Moderate colonic stool burden. No dilated bowel. Lung structures are intact.    IMPRESSION: Feeding tube with distal tip at the pylorus antrum. Moderate colonic stool burden. No dilated bowel. : [facility]   Transcribe Date/Time: Feb 24 2025  6:23P Dictated by : PABLO RAMOS MD This examination was interpreted and the report reviewed and electronically signed by: PABLO RAMOS MD on Feb 24 2025  6:24PM  EST       Assessment/Plan     Acute kidney injury  Secondary to potential nephrotoxins versus radiocontrast nephropathy  Anemia of possible iron deficiency versus anemia renal disease  Hypernatremia  Hyperchloremia  Transaminitis  Lymphadenopathy  Benign prostatic enlargement  Plan; correct fluid deficit of 4.29 L  Urinary indices  Iron indices  Discontinue potential nephrotoxins  Check serum myoglobin    Assessment & Plan  Hemoptysis    Tracheostomy in place (Multi)    CVA, old, hemiparesis (Multi)    Status post insertion of  percutaneous endoscopic gastrostomy (PEG) tube (Multi)    Pneumonia due to infectious organism    Right sided weakness    Atrial flutter (Multi)    Insulin dependent type 2 diabetes mellitus (Multi)  Thank you very much for allowing me to participate in the care of this patient    I spent 60 minutes in the professional and overall care of this patient.      Matthew Johnson MD

## 2025-03-25 NOTE — PROGRESS NOTES
Nutrition Progress Note    Pt's sodium was 153 today.  I talked to attending, Dr Bone.  Water deficit is 3.6 L per calculation.  He agreed to increase water flushes to 250 ml X 6 per day.  With fluid from TF, (1130 ml day), pt will get a total of 2630 ml fluid per day or 30 ml kg of IBW.  Pt was also started on IV fluid per nephrologist.  Will continue to monitor closely.

## 2025-03-25 NOTE — PROGRESS NOTES
03/25/25 1108   Discharge Planning   Home or Post Acute Services Post acute facilities (Rehab/SNF/etc)   Type of Post Acute Facility Services Skilled nursing   Expected Discharge Disposition SNF     Received voicemail from patients wife with SNF choices. Patients wife provided preferences of [facility], [facility], [facility] and [facility]. Requested for DSC to send referrals.

## 2025-03-25 NOTE — CARE PLAN
Notified of tachycardia in the 140s.  Patient just received metoprolol approximately 30 minutes ago.  Blood pressure is within normal limits and no fever or leukocytosis noted.  Will give 500 cc of fluid and reassess.  Instructed nurse to notify attending.

## 2025-03-25 NOTE — PROGRESS NOTES
Bryan Nix is a 64 y.o. male on day 3 of admission presenting with Hemoptysis.      Subjective   Patient fully evaluated on March 24.  Hemoptysis appears resolved.  Still with slightly elevated white count to be monitored.  Continue aggressive antibiotic regimen.  Repeat chest x-ray is pending.       Objective     Last Recorded Vitals  /64   Pulse 79   Temp 36 °C (96.8 °F)   Resp 22   Wt 77.1 kg (170 lb)   SpO2 96%   Intake/Output last 3 Shifts:    Intake/Output Summary (Last 24 hours) at 3/25/2025 1610  Last data filed at 3/25/2025 1326  Gross per 24 hour   Intake 1856 ml   Output --   Net 1856 ml       Admission Weight  Weight: 77.1 kg (170 lb) (03/22/25 1255)    Daily Weight  03/22/25 : 77.1 kg (170 lb)    Image Results  XR chest 1 view  Narrative: Interpreted By:  Wesly Castle,   STUDY:  XR CHEST 1 VIEW;  3/24/2025 1:25 pm      INDICATION:  Signs/Symptoms:sob.      COMPARISON:  03/22/2025      ACCESSION NUMBER(S):  [id number]      ORDERING CLINICIAN:  RICHARD WELCH      FINDINGS:  CARDIOMEDIASTINAL SILHOUETTE AND VASCULATURE:      Cardiac size:  Within normal limits.  Aortic shadow:  Within normal limits.      Mediastinal contours: Within normal limits.      Pulmonary vasculature:  The central vasculature is unremarkable      LUNGS:  Linear opacity at the left infrahilar region is probably from  atelectasis, developed since the prior exam. The lungs otherwise are  clear.      ABDOMEN AND OTHER FINDINGS:  No remarkable upper abdominal findings.      BONES:  No acute osseous changes.      Impression: 1.  Left lower lung atelectasis.      Signed by: Wesly Castle 3/24/2025 4:36 PM  Dictation workstation:   [id number]      Physical Exam    Relevant Results               Assessment/Plan   This patient currently has cardiac telemetry ordered; if you would like to modify or discontinue the telemetry order, click here to go to the orders activity to modify/discontinue the order.              Assessment  & Plan  Hemoptysis    Tracheostomy in place (Multi)    CVA, old, hemiparesis (Multi)    Status post insertion of percutaneous endoscopic gastrostomy (PEG) tube (Multi)    Pneumonia due to infectious organism    Right sided weakness    Atrial flutter (Multi)    Insulin dependent type 2 diabetes mellitus (Multi)        Zeus Bone MD  Physician  Internal Medicine     H&P      Signed     Date of Service: 3/23/2025 10:28 AM     Signed       Expand All Collapse All    History Of Present Illness  Bryan Nix is a 64-year-old male with past medical history of atrial flutter on Eliquis, hyponatremia, IDDM, CAD, hypertension, chronic wounds, diffuse lymphadenopathy, anemia, history of smoking, history elevated PSA, CVA L hemisphere (2020) with right-sided deficits, Left ICA stenosis (85%) s/p angioplasty and left carotid artery stent (08/25/2023) and recent history of acute pontine stroke 02/27/25 with acute hypoxic respiratory failure s/p tracheostomy on 2/25/2025 and PEG.  Patient presents for bleeding from tracheostomy.  Patient is alert and oriented x 3, however limited verbal communication due to tracheostomy.  Wife is at bedside and assists with history.  She reports that patient was coughing blood out of his tracheostomy.  ER provider felt bleeding was likely superficial around site.  Patient has been on room air at skilled nursing facility.  He was placed on Airvo in the ED mainly to humidify oxygen.  Patient never desaturated per ER report.  Currently he is resting comfortably in the bed, breath sounds slightly rhonchorous, but unlabored.  Reports chills, but no fevers.  He has right-sided weakness and sensory deficits.  Denies headache, vision or hearing changes, chest pains, palpitations, shortness of breath, nausea, vomiting, abdominal pain, diarrhea, or complications of PEG tube site.  Wife reports patient skin became irritated in his groin due to adhesive from a condom cath previously, he has a small  decubitus wound that she reports is healing well.  He receives tube feeds continuously.  Wife reports a patient was recently evaluated by speech for p.o. to intake, however he still remains n.p.o. except for meds and tube feeds through PEG. CODE STATUS reviewed: full code     ER Course: Hemodynamically stable, afebrile, SpO2 to 97% trach mask.  WBC 9.3, hemoglobin 8, hematocrit 29.4, platelets 197.  INR 1.4.  Glucose 223, BUN 36, otherwise CMP is normal.  CT of the chest for PE pending.  CXR unremarkable. Trannexamic acid soak gauze wrapped around trach.      Past medical history: As above  Past surgical history: As above  Social history: Former smoker 37.5 pack years-quit smoking age 63, occasional alcohol use, no illicit drug use.  .  Works in maintenance.  6 children.  Family history: Noncontributory     Past Medical History  Medical History        Past Medical History:   Diagnosis Date    Abnormal finding of blood chemistry, unspecified 05/18/2016     Abnormal blood chemistry    Acute upper respiratory infection, unspecified 12/02/2015     Acute upper respiratory infection    Elevated prostate specific antigen (PSA)       Elevated prostate specific antigen (PSA)    Encounter for screening for malignant neoplasm of colon 01/30/2021     Colon cancer screening    Encounter for screening for malignant neoplasm of prostate 11/30/2020     Prostate cancer screening    Essential (primary) hypertension 07/30/2013     Benign essential hypertension    Other conditions influencing health status 07/30/2013     Diabetes Mellitus Poorly Controlled    Other conditions influencing health status 12/02/2015     History of cough    Personal history of other endocrine, nutritional and metabolic disease 03/18/2021     History of hyperlipidemia    Personal history of other endocrine, nutritional and metabolic disease 03/18/2021     History of diabetes mellitus    Personal history of other specified conditions 05/18/2016      History of chest pain    Personal history of other specified conditions 05/18/2016     History of dyspnea    Personal history of other specified conditions 05/18/2016     History of dizziness    Personal history of other specified conditions 05/18/2016     History of fatigue            Surgical History  Surgical History         Past Surgical History:   Procedure Laterality Date    CT ANGIO CORONARY ART WITH HEARTFLOW IF SCORE >30%   8/23/2023     CT HEART CORONARY ANGIOGRAM 8/23/2023 [facility] CT    MR HEAD ANGIO WO IV CONTRAST   12/14/2020     MR HEAD ANGIO WO IV CONTRAST 12/14/2020 [facility]    MR NECK ANGIO WO IV CONTRAST   12/14/2020     MR NECK ANGIO WO IV CONTRAST 12/14/2020 [facility]    OTHER SURGICAL HISTORY   12/01/2020     Hand fracture repair    OTHER SURGICAL HISTORY   12/01/2020     Nasal bone fracture repair            Social History  He reports that he has quit smoking. His smoking use included cigarettes. He has never used smokeless tobacco. No history on file for alcohol use and drug use.     Family History  Family History   No family history on file.        Allergies  Patient has no known allergies.     Review of Systems     Physical Exam  Constitutional:       General: He is awake. He is not in acute distress.     Appearance: Normal appearance. He is not toxic-appearing.   HENT:      Head: Atraumatic.      Nose: Nose normal.      Mouth/Throat:      Mouth: Mucous membranes are moist.   Eyes:      Conjunctiva/sclera: Conjunctivae normal.   Cardiovascular:      Rate and Rhythm: Normal rate and regular rhythm.      Pulses: Normal pulses.      Heart sounds: No murmur heard.  Pulmonary:      Effort: No respiratory distress.      Comments: Tracheostomy site midline, no drainage around dressing.  Rhonchorous breath sounds, unlabored respirations, RT bedside suctioning patient with thick blood-tinged sputum  Abdominal:      General: Bowel sounds are normal. There is no distension.       "Palpations: Abdomen is soft.      Tenderness: There is no abdominal tenderness. There is no guarding.      Comments: Left upper quadrant PEG site tube site, no drainage or skin breakdown around tube site.   Musculoskeletal:         General: No swelling, deformity or signs of injury.      Cervical back: Neck supple.      Right lower leg: No edema.      Left lower leg: No edema.   Skin:     General: Skin is warm and dry.      Capillary Refill: Capillary refill takes less than 2 seconds.      Findings: Wound (sacrum) present. No ecchymosis or erythema.   Neurological:      Mental Status: He is alert and oriented to person, place, and time.      Cranial Nerves: No facial asymmetry.      Sensory: Sensory deficit (RUE, RLE) present.      Motor: Weakness present.      Comments: Right sided upper and lower extremity motor function deficits   Psychiatric:         Mood and Affect: Mood normal.         Behavior: Behavior is cooperative.            Last Recorded Vitals  Blood pressure 134/50, pulse 93, temperature 36.9 °C (98.4 °F), temperature source Temporal, resp. rate 22, height 1.88 m (6' 2\"), weight 77.1 kg (170 lb), SpO2 98%.     Relevant Results              Results for orders placed or performed during the hospital encounter of 03/22/25 (from the past 24 hours)   CBC and Auto Differential   Result Value Ref Range     WBC 9.3 4.4 - 11.3 x10*3/uL     nRBC 0.3 (H) 0.0 - 0.0 /100 WBCs     RBC 3.02 (L) 4.50 - 5.90 x10*6/uL     Hemoglobin 8.0 (L) 13.5 - 17.5 g/dL     Hematocrit 29.4 (L) 41.0 - 52.0 %     MCV 97 80 - 100 fL     MCH 26.5 26.0 - 34.0 pg     MCHC 27.2 (L) 32.0 - 36.0 g/dL     RDW 15.6 (H) 11.5 - 14.5 %     Platelets 197 150 - 450 x10*3/uL     Neutrophils % 77.1 40.0 - 80.0 %     Immature Granulocytes %, Automated 1.5 (H) 0.0 - 0.9 %     Lymphocytes % 13.1 13.0 - 44.0 %     Monocytes % 4.1 2.0 - 10.0 %     Eosinophils % 3.2 0.0 - 6.0 %     Basophils % 1.0 0.0 - 2.0 %     Neutrophils Absolute 7.14 1.20 - 7.70 " x10*3/uL     Immature Granulocytes Absolute, Automated 0.14 0.00 - 0.70 x10*3/uL     Lymphocytes Absolute 1.21 1.20 - 4.80 x10*3/uL     Monocytes Absolute 0.38 0.10 - 1.00 x10*3/uL     Eosinophils Absolute 0.30 0.00 - 0.70 x10*3/uL     Basophils Absolute 0.09 0.00 - 0.10 x10*3/uL   Basic metabolic panel   Result Value Ref Range     Glucose 223 (H) 74 - 99 mg/dL     Sodium 142 136 - 145 mmol/L     Potassium 4.3 3.5 - 5.3 mmol/L     Chloride 103 98 - 107 mmol/L     Bicarbonate 27 21 - 32 mmol/L     Anion Gap 16 10 - 20 mmol/L     Urea Nitrogen 36 (H) 6 - 23 mg/dL     Creatinine 0.81 0.50 - 1.30 mg/dL     eGFR >90 >60 mL/min/1.73m*2     Calcium 9.6 8.6 - 10.3 mg/dL   Protime-INR   Result Value Ref Range     Protime 15.1 (H) 9.8 - 12.4 seconds     INR 1.4 (H) 0.9 - 1.1   aPTT   Result Value Ref Range     aPTT 25 (L) 26 - 36 seconds   Type and Screen   Result Value Ref Range     ABO TYPE B       Rh TYPE POS       ANTIBODY SCREEN NEG     VERIFY ABO/Rh Group Test   Result Value Ref Range     ABO TYPE B       Rh TYPE POS     MRSA Surveillance for Vancomycin De-escalation, PCR     Specimen: Anterior Nares; Swab   Result Value Ref Range     MRSA PCR Not Detected Not Detected   POCT GLUCOSE   Result Value Ref Range     POCT Glucose 180 (H) 74 - 99 mg/dL   POCT GLUCOSE   Result Value Ref Range     POCT Glucose 161 (H) 74 - 99 mg/dL   POCT GLUCOSE   Result Value Ref Range     POCT Glucose 156 (H) 74 - 99 mg/dL   Basic metabolic panel   Result Value Ref Range     Glucose 170 (H) 74 - 99 mg/dL     Sodium 147 (H) 136 - 145 mmol/L     Potassium 4.2 3.5 - 5.3 mmol/L     Chloride 107 98 - 107 mmol/L     Bicarbonate 30 21 - 32 mmol/L     Anion Gap 14 10 - 20 mmol/L     Urea Nitrogen 35 (H) 6 - 23 mg/dL     Creatinine 0.85 0.50 - 1.30 mg/dL     eGFR >90 >60 mL/min/1.73m*2     Calcium 9.4 8.6 - 10.3 mg/dL   CBC   Result Value Ref Range     WBC 10.4 4.4 - 11.3 x10*3/uL     nRBC 0.6 (H) 0.0 - 0.0 /100 WBCs     RBC 2.87 (L) 4.50 - 5.90  x10*6/uL     Hemoglobin 7.8 (L) 13.5 - 17.5 g/dL     Hematocrit 26.6 (L) 41.0 - 52.0 %     MCV 93 80 - 100 fL     MCH 27.2 26.0 - 34.0 pg     MCHC 29.3 (L) 32.0 - 36.0 g/dL     RDW 15.7 (H) 11.5 - 14.5 %     Platelets 205 150 - 450 x10*3/uL   SST TOP   Result Value Ref Range     Extra Tube Hold for add-ons.        CT angio chest for pulmonary embolism     Result Date: 3/22/2025  Interpreted By:  Pamela Zurita, STUDY: CT ANGIO CHEST FOR PULMONARY EMBOLISM;  3/22/2025 4:56 pm   INDICATION: Signs/Symptoms:bloody secretions, eval for PE.     COMPARISON: CT chest without contrast 03/22/2025   ACCESSION NUMBER(S): [id number]   ORDERING CLINICIAN: PAMELA HAHN   TECHNIQUE: Contiguous axial images of the chest were obtained after the intravenous administration of iodinated contrast using angiographic PE protocol. Coronal and sagittal reformatted images were reconstructed from the axial data. MIP images were created on an independent workstation and reviewed.   FINDINGS:   MEDIASTINUM AND LYMPH NODES: Inflammatory fat stranding along the tracheostomy tract without discrete fluid collection. The esophageal wall appears within normal limits. There is redemonstration of diffuse lymphadenopathy in the bilateral axilla, mediastinum, bilateral supraclavicular as described in detail on separate report. Also notable or enlarged bilateral level 4 cervical lymph nodes (right > left).   VESSELS:  Normal caliber thoracic aorta without dissection. Moderate aortic atherosclerosis.  No acute pulmonary embolism.   HEART: Normal size.  Mild coronary artery calcifications. No significant pericardial effusion.   LUNG, AIRWAYS, PLEURA: There is slight increase in debris within the trachea. There has been clearing of secretions in the left upper/lower lobe bronchus. There is diffuse bilateral bronchial thickening slightly increased from prior study. There is peribronchovascular ground-glass opacity in the posterior right upper lobe and  superior and basal segments of the right lower lobe. There is slightly worsened atelectasis in the lung bases remaining predominantly subsegmental in nature. There is a 0.8 cm nodule in the left upper lobe that is stable from 08/19/2023.   OSSEOUS STRUCTURES: No acute osseous abnormality.   CHEST WALL SOFT TISSUES: No discernible acute abnormality.   UPPER ABDOMEN/OTHER: No acute abnormality.        No acute pulmonary embolism.   Peribronchial vascular ground glass opacities in the right upper and lower lobe again concerning for pneumonia given patient's risk factors of tracheostomy and debris in the airways.   Redemonstration of diffuse lymphadenopathy in the lower necks, axilla, and mediastinum. Findings are either diffusely reactive in nature, reflective of lymphoma/leukemia, meter deficiency, or less likely head neck neoplasm is previously postulated. However please correlate with past medical history.   Inflammatory changes along the tracheostomy tract without fluid collection. Correlate for erythema.   Unchanged 0.8 cm nodule in the left upper lobe dating back to 08/19/2023. Recommend 1 year follow up chest CT to ensure at least 2 years of stability.   MACRO: None.   Signed by: Pamela Zurita 3/22/2025 7:17 PM Dictation workstation:   [id number]     CT chest wo IV contrast     Result Date: 3/22/2025  Interpreted By:  Pamela Zurita, STUDY: CT CHEST WO IV CONTRAST;  3/22/2025 3:41 pm   INDICATION: Signs/Symptoms:eval for alveolar hemorrhage.     COMPARISON: CT head/neck 08/19/2023   ACCESSION NUMBER(S): [id number]   ORDERING CLINICIAN: PAMELA HAHN   TECHNIQUE: Contiguous axial images of the chest and upper abdomen were obtained without contrast. Coronal and sagittal reformatted images were reconstructed from the axial data.   FINDINGS:   MEDIASTINUM AND LYMPH NODES: There is fat stranding/edema along the tract of the tracheostomy about discrete fluid collection. The esophageal wall appears within  normal limits. There are numerous enlarged bilateral supraclavicular lymph nodes measuring up to 1.4 cm on the left. There are numerous enlarged bilateral axillary lymph nodes measuring up to 1.1 cm on the right and 1.2 cm on the left. There are numerous prominent to mildly enlarged mediastinal lymph nodes as well. No pneumomediastinum.   VESSELS:  Normal caliber thoracic aorta. Moderate aortic atherosclerosis. The main pulmonary artery is normal in size.   HEART: Normal size.  Mild coronary artery calcifications. No significant pericardial effusion.   LUNG, AIRWAYS, PLEURA: Tracheostomy noted in appropriate position. There is trace mucus in the left mainstem bronchus and at the origin of the left upper lobe and lower lobe bronchi. There are mild peribronchovascular ground-glass opacities in the posterior segment of the right upper lobe, basal segments of the right lower lobe. There is mild dependent bibasilar atelectasis. There is no pleural effusion.   OSSEOUS STRUCTURES: No acute osseous abnormality.   CHEST WALL SOFT TISSUES: No discernible acute abnormality.   UPPER ABDOMEN/OTHER: No acute abnormality.        1. Mild peribronchovascular ground-glass opacities in the posterior right upper lobe and basal segments of the right lower lobe. This appearance and distribution is not typical for alveolar hemorrhage which tends to be centrilobular and bilateral asymmetric in distribution. Findings are most suggestive of pneumonia.   2. Small amount of mucous debris in the left mainstem bronchus and at the origin of the left upper and lower lobe bronchi.   3. Diffuse lymphadenopathy in the bilateral axillary regions, mediastinum and left supraclavicular region particularly notable in the bilateral supraclavicular region. This is progressed in the left supraclavicular region and new elsewhere when compared to 08/19/2023 CTA head/neck. Correlate for any history of known head/neck malignancy or lymphoma.   4.  Inflammation/fat stranding adjacent to the tracheostomy tube entry site noted. Correlate for air the met. No evidence of fluid collection.   MACRO: None.   Signed by: Marco A Zurita 3/22/2025 7:10 PM Dictation workstation:   [id number]     XR chest 1 view     Result Date: 3/22/2025  Interpreted By:  Fermin Stroud, STUDY: XR CHEST 1 VIEW   INDICATION: Signs/Symptoms:hemoptysis.   COMPARISON: August 19, 2023   ACCESSION NUMBER(S): [id number]   ORDERING CLINICIAN: MARCO A HAHN   FINDINGS: No consolidation, effusion, edema, or pneumothorax. Heart size within normal limits.        No evidence of acute intrathoracic abnormality.   Signed by: Fermin Stroud 3/22/2025 2:01 PM Dictation workstation:   [id number]     XR chest 1 view     Result Date: 3/14/2025  * * *Final Report* * * DATE OF EXAM: Mar 14 2025  8:52AM   S5X   5290  -  XR CHEST 1V FRONTAL   / ACCESSION #  [id number] PROCEDURE REASON: new bleeding from trach      * * * * Physician Interpretation * * * *  EXAMINATION:  CHEST RADIOGRAPH (PORTABLE SINGLE VIEW AP) Exam Date/Time:  3/14/2025 8:52 AM Clinical History:  new bleeding from trach Comparison:  03/03/2025 RESULT: LINES, TUBES, AND DEVICES:  Tracheostomy. CARDIOMEDIASTINAL SILHOUETTE:  The cardiac and mediastinal silhouettes appear unremarkable. LUNGS AND PLEURA: No consolidation.  No pleural effusion. No pulmonary vascular congestion. No pneumothorax identified. OTHER:  N/A     IMPRESSION: No acute abnormality identified. : HANY   Transcribe Date/Time: Mar 14 2025  9:07A Dictated by : MARCO A EVANS MD This examination was interpreted and the report reviewed and electronically signed by: MARCO A EVANS MD on Mar 14 2025  9:11AM  EST     US abdomen complete     Result Date: 3/11/2025  ABDOMEN ULTRASOUND-COMPLETE FINDINGS: Abdomen Ultrasound Complete: PROCEDURE: Multiple grayscale mages of the abdomen were obtained. FINDINGS:Multiple real time images demonstrate the liver with increased  echogenicity consistent with fatty infiltration. There is no evidence of a discrete mass within the liver. The liver is enlarged, measured at 15.7 cm in size. The gallbladder is seen with no evidence of cholelithiasis. The gallbladder wall is within normal limits. The common bile duct is within normal limits. The visualized pancreas appears unremarkable. Both kidneys are seen with no evidence of hydronephrosis or a calculus. The spleen has its normal homogeneous echo appearance. There is no free fluid to suggest ascites. The visualized aorta appears unremarkable. The inferior vena cava appears patent. CONCLUSION: Enlarged fatty liver. ELECTRONICALLY SIGNED BY LEONEL LAN M.D. 3/11/2025 5:00:52 PM EDT.     XR chest 1 view     Result Date: 3/3/2025  * * *Final Report* * * DATE OF EXAM: Mar  3 2025  1:40PM   S5X   5290  -  XR CHEST 1V FRONTAL   / ACCESSION #  [id number] PROCEDURE REASON: resp failure      * * * * Physician Interpretation * * * *  EXAMINATION:  CHEST RADIOGRAPH (PORTABLE SINGLE VIEW AP) Exam Date/Time:  3/3/2025 1:40 PM Indication:   resp failure M:  XCP_4 Comparison:  1 day prior RESULT: Lines, tubes, and devices:  Tracheostomy tube remains in situ. Lungs and pleura:  Mild patchy opacities right lower lung zone seen on the prior study are no longer appreciated.  No confluent opacity.   Costophrenic angles are clear.  No pneumothorax. Cardiomediastinal silhouette:  Stable cardiomediastinal silhouette. Other:  -     IMPRESSION: Improved aeration right lower lung zone.  No acute cardiopulmonary finding. : PSCMATTHEW   Transcribe Date/Time: Mar  3 2025  2:49P Dictated by : ISRAEL BENSON MD This examination was interpreted and the report reviewed and electronically signed by: ISRAEL BENSON MD on Mar  3 2025  2:49PM  EST     XR chest 1 view     Result Date: 3/2/2025  * * *Final Report* * * DATE OF EXAM: Mar  2 2025 11:25AM   MMX   5376  -  XR CHEST 1V FRONTAL PORT  / ACCESSION #   [id number] PROCEDURE REASON: Shortness of breath      * * * * Physician Interpretation * * * *  EXAMINATION:  CHEST RADIOGRAPH (PORTABLE SINGLE VIEW AP) Exam Date/Time:  3/2/2025 11:25 AM CLINICAL HISTORY: Shortness of breath MQ:  XCPR_5 Comparison:  02/20/2025. RESULT: This is a limited examination due to multiple wires and tubing obscuring the lower lungs. Lines, tubes, and devices:  A tracheostomy tube remains in place. Lungs and pleura:  There are increased bronchovascular markings in the right lower lung which may be due to bronchitis or early changes of aspiration pneumonia.  Clinical correlation and follow-up exams are recommended. Cardiomediastinal silhouette:  Stable cardiomediastinal silhouette. Other:  The visualized bony thorax appears unremarkable.     IMPRESSION: Nonspecific parenchymal changes in the right lower lung as described above. A follow-up exam is recommended. : HANY   Transcribe Date/Time: Mar  2 2025 11:53A Dictated by : BJ INIGUEZ MD This examination was interpreted and the report reviewed and electronically signed by: BJ INIGUEZ MD on Mar  2 2025 11:54AM  EST     XR chest 1 view     Result Date: 2/28/2025  * * *Final Report* * * DATE OF EXAM: Feb 28 2025 10:36AM   MMX   5290  -  XR CHEST 1V FRONTAL   / ACCESSION #  [id number] PROCEDURE REASON: Shortness of breath      * * * * Physician Interpretation * * * *  EXAMINATION:  CHEST RADIOGRAPH (SINGLE VIEW AP OR PA) CLINICAL HISTORY: Shortness of breath MQ:  XC1_5 Comparison:  02/22/2025 RESULT: Lines, tubes, and devices:  Tracheostomy tube is noted. Lungs and pleura:  No consolidation. No lung mass. No pleural effusion. Cardiomediastinal silhouette:  Normal cardiomediastinal silhouette. Other:  No acute osseous pathology.     IMPRESSION: No acute radiographic abnormality. : [facility]   Transcribe Date/Time: Feb 28 2025 12:07P Dictated by : DIANNE RAMOS MD This examination was interpreted and the report  reviewed and electronically signed by: DIANNE RAMOS MD on Feb 28 2025 12:08PM  EST     XR abdomen 2 views supine and erect or decub     Result Date: 2/24/2025  * * *Final Report* * * DATE OF EXAM: Feb 24 2025  6:03PM   MMX   5289  -  XR ABDOMEN 1V SUPINE  / ACCESSION #  [id number] PROCEDURE REASON: Evaluate tube, line or lead position      * * * * Physician Interpretation * * * *  EXAMINATION:  XR ABDOMEN 1V SUPINE CLINICAL HISTORY:   Evaluate tube, line or lead position Technique:   XR ABDOMEN 1V SUPINE -- NOT APPLICABLE with 1 views on 1 images Comparison: 2/21/2025 RESULT: Feeding tube with distal tip at the pylorus antrum. Moderate colonic stool burden. No dilated bowel. Lung structures are intact.     IMPRESSION: Feeding tube with distal tip at the pylorus antrum. Moderate colonic stool burden. No dilated bowel. : HANY   Transcribe Date/Time: Feb 24 2025  6:23P Dictated by : PABLO RAMOS MD This examination was interpreted and the report reviewed and electronically signed by: PABLO RAMOS MD on Feb 24 2025  6:24PM  EST     CT head wo IV contrast     Result Date: 2/23/2025  * * *Final Report* * * DATE OF EXAM: Feb 23 2025 12:04PM   [facility]   [id number]  -  CT BRAIN WO IVCON  / ACCESSION #  [id number] PROCEDURE REASON: Stroke, follow up      * * * * Physician Interpretation * * * *  EXAMINATION: CT BRAIN WO IVCON CLINICAL HISTORY: Stroke, follow up TECHNIQUE:  Serial axial images without IV contrast were obtained from the vertex to the foramen magnum. MQ:  CTBWO_3 CT Radiation dose: Integrated Dose-Length Product (DLP) for this visit =   778  mGy*cm CT Dose Reduction Employed: Iterative recon COMPARISON: MRI brain 02/19/2025 RESULT: Post-operative change: None. Acute change: Evolving acute infarct along the central eli and superior left medulla (2:7-9).  No evidence of hemorrhagic transformation. Hemorrhage: No evidence of acute intracranial hemorrhage. Mass Lesion / Mass Effect: There is no evidence of  an intracranial mass or extraaxial fluid collection. No significant mass effect. Chronic change: Stable remote left MCA territory encephalomalacia. Parenchyma: There is no significant volume loss. The brain parenchyma is otherwise within normal limits for age. Ventricles: The ventricles are within normal limits of size and configuration for age. Paranasal sinuses and skull base: Partial opacification of the left maxillary and sphenoid sinus and multiple bilateral ethmoid air cells.   The remaining visualized paranasal sinuses are grossly clear. The skull base and imaged soft tissues are unremarkable. Localizer images: No additional findings.     IMPRESSION: Evolving acute brainstem infarct.  No evidence of hemorrhagic transformation. Stable remote left MCA territory infarct. : [facility]   Transcribe Date/Time: Feb 23 2025 12:48P Dictated by : FRANKY GUZMAN MD   This examination was interpreted and the report reviewed and electronically signed by: FRANKY GUZMAN MD on Feb 23 2025 12:52PM  EST     XR chest 1 view     Result Date: 2/22/2025  * * *Final Report* * * DATE OF EXAM: Feb 22 2025  6:56AM   [facility]   [id number]  -  XR CHEST 1V FRONTAL   / ACCESSION #  [id number] PROCEDURE REASON: Evaluate tube, line,  or lead position      * * * * Physician Interpretation * * * *  EXAMINATION:  CHEST RADIOGRAPH (SINGLE VIEW AP OR PA) CLINICAL HISTORY: Evaluate tube, line,  or lead position MQ:  XC1_5 Comparison:  02/21/2025. RESULT: Lines, tubes, and devices:  An ET tube and feeding tube remain in place.   A poorly defined right venous catheter cannot be excluded. Lungs and pleura:  There are persistent bilateral lower lungs infiltrates seen on the right more than left suggestive of pneumonia such as aspiration pneumonia.  There is no definite pleural effusion. Cardiomediastinal silhouette:  Stable cardiomediastinal silhouette. Other:  The visualized bony thorax appears unremarkable.     IMPRESSION: Persistent bilateral lower lungs  infiltrates as described above. A follow-up exam is recommended. : [facility]   Transcribe Date/Time: Feb 22 2025  8:20A Dictated by : BJ INIGUEZ MD This examination was interpreted and the report reviewed and electronically signed by: BJ INIGUEZ MD on Feb 22 2025  8:21AM  EST     XR chest 1 view     Result Date: 2/21/2025  * * *Final Report* * * DATE OF EXAM: Feb 21 2025  5:59PM   [facility]   [id number]  -  XR CHEST 1V FRONTAL PORT  / ACCESSION #  [id number] PROCEDURE REASON: Evaluate tube, line,  or lead position      * * * * Physician Interpretation * * * *  EXAMINATION:  CHEST RADIOGRAPH (PORTABLE SINGLE VIEW AP) Exam Date/Time:  2/21/2025 5:59 PM CLINICAL HISTORY: Evaluate tube, line,  or lead position MQ:  XCPR_5 Comparison:  02/21/2025 RESULT: Lines, tubes, and devices: Endotracheal tube in place terminating in enteric tube in place terminating below the field of view. Lungs and pleura: No pneumothorax.  No pleural effusion.  Bilateral mild interstitial opacities.  The right costophrenic angle is not included in the field-of-view. Cardiomediastinal silhouette:  Stable cardiomediastinal silhouette. Other:  .     IMPRESSION: Mild interstitial opacities may be related to pulmonary vascular congestion. : [facility]   Transcribe Date/Time: Feb 21 2025  6:55P Dictated by : IRWIN VALDIVIA MD This examination was interpreted and the report reviewed and electronically signed by: IRWIN VALDIVIA MD on Feb 21 2025  6:57PM  EST     XR abdomen 2 views supine and erect or decub     Result Date: 2/21/2025  * * *Final Report* * * DATE OF EXAM: Feb 21 2025 10:32AM   [facility]   [id number]  -  XR ABDOMEN 1V SUPINE  / ACCESSION #  [id number] PROCEDURE REASON: Evaluate tube, line or lead position      * * * * Physician Interpretation * * * *  Clinical Information: Feeding tube Single view of the abdomen was obtained. Feeding tube tip within the distal stomach. There is a nonspecific, nonobstructive bowel  gas pattern. No abnormal abdominal masses are identified.  No pathologic calcifications.   No acute bony abnormalities are seen.     IMPRESSION: Nonspecific, nonobstructive bowel gas pattern. Feeding tube tip within the distal stomach. : [facility]   Transcribe Date/Time: Feb 21 2025 10:43A Dictated by : PAMELA EVANS MD This examination was interpreted and the report reviewed and electronically signed by: PAMELA EVANS MD on Feb 21 2025 10:44AM  EST     XR chest 1 view     Result Date: 2/21/2025  * * *Final Report* * * DATE OF EXAM: Feb 21 2025 10:32AM   [facility]   [id number]  -  XR CHEST 1V FRONTAL PORT  / ACCESSION #  [id number] PROCEDURE REASON: Shortness of breath      * * * * Physician Interpretation * * * *  EXAMINATION:  CHEST RADIOGRAPH (PORTABLE SINGLE VIEW AP) Exam Date/Time:  2/21/2025 10:32 AM Clinical History:  Shortness of breath Comparison:  02/17/2025 RESULT: LINES, TUBES, AND DEVICES:  Feeding tube tip beyond the inferior extent of the image. CARDIOMEDIASTINAL SILHOUETTE:  The cardiac and mediastinal silhouettes appear unremarkable. LUNGS AND PLEURA: No consolidation.  No pleural effusion. No pulmonary vascular congestion. No pneumothorax identified. OTHER:  N/A     IMPRESSION: No acute abnormality identified. : [facility]   Transcribe Date/Time: Feb 21 2025 10:41A Dictated by : PAMELA EVANS MD This examination was interpreted and the report reviewed and electronically signed by: PAMELA EVANS MD on Feb 21 2025 10:43AM  EST            Assessment/Plan     Assessment & Plan  Hemoptysis     Tracheostomy in place (Multi)     CVA, old, hemiparesis (Multi)     Status post insertion of percutaneous endoscopic gastrostomy (PEG) tube (Multi)     Pneumonia due to infectious organism     Right sided weakness     Atrial flutter (Multi)     Insulin dependent type 2 diabetes mellitus (Multi)        Telemetry monitoring  Hemoglobin stable 8's recently on review of outside labs, monitor for ongoing  bleeding.  Daily H&H  Monitor for overt bleeding  Consult pulmonology, appreciate input  Pneumonia suspected for CT findings  Tracheostomy protocol  Expected cover with Zosyn and vancomycin  MRSA swab  Urine for Legionella antigen and strep pneumonia antigen  Supplemental oxygen as needed  Nebulizers as needed  RT evaluate and treat  Check procalcitonin  Typical 20% zinc oxide to wounds and groin  Continuous tube feeds, continue at 80 mL an hour x 22 hours  Accu-Chek every 6 hours  Hypoglycemia protocol  PT/OT  Anticoagulation and antiplatelet therapy on hold, resume once cleared by pulmonology  Continue patient's home medications for chronic issues as appropriate     Patient fully evaluated on March 23.  Continue to monitor H&H.  Pulmonary consultation obtained.  Continue broad-spectrum antibiotics to rule out infectious process.  Recheck labs in AM.                 Zeus Bone MD                           Patient fully evaluated  3/25  for    Problem List Items Addressed This Visit          Pulmonary and Pneumonias    * (Principal) Hemoptysis - Primary     Other Visit Diagnoses       Anemia, unspecified type        Tracheostomy dependent (Multi)              Patient seen resting in bed with head of bed elevated, no s/s or c/o acute difficulties at this time.  Vital signs for last 24 hours Temp:  [36 °C (96.8 °F)-38.1 °C (100.6 °F)] 36 °C (96.8 °F)  Heart Rate:  [71-93] 79  Resp:  [20-24] 22  BP: (105-146)/(57-67) 105/64  FiO2 (%):  [30 %-42 %] 30 %    I/O this shift:  In: 468 [NG/GT:268; IV Piggyback:200]  Out: -   Patient still requiring frequent cardiac and SPO2 monitoring. Continue aggressive pulmonary hygiene and oral hygiene. Off loading as tolerated for skin integrity. Medications and labs reviewed-   Results for orders placed or performed during the hospital encounter of 03/22/25 (from the past 24 hours)   POCT GLUCOSE   Result Value Ref Range    POCT Glucose 308 (H) 74 - 99 mg/dL   POCT GLUCOSE    Result Value Ref Range    POCT Glucose 264 (H) 74 - 99 mg/dL   CBC   Result Value Ref Range    WBC 14.8 (H) 4.4 - 11.3 x10*3/uL    nRBC 1.7 (H) 0.0 - 0.0 /100 WBCs    RBC 2.74 (L) 4.50 - 5.90 x10*6/uL    Hemoglobin 7.3 (L) 13.5 - 17.5 g/dL    Hematocrit 26.2 (L) 41.0 - 52.0 %    MCV 96 80 - 100 fL    MCH 26.6 26.0 - 34.0 pg    MCHC 27.9 (L) 32.0 - 36.0 g/dL    RDW 16.9 (H) 11.5 - 14.5 %    Platelets 218 150 - 450 x10*3/uL   Comprehensive Metabolic Panel   Result Value Ref Range    Glucose 280 (H) 74 - 99 mg/dL    Sodium 153 (H) 136 - 145 mmol/L    Potassium 3.9 3.5 - 5.3 mmol/L    Chloride 111 (H) 98 - 107 mmol/L    Bicarbonate 34 (H) 21 - 32 mmol/L    Anion Gap 12 10 - 20 mmol/L    Urea Nitrogen 51 (H) 6 - 23 mg/dL    Creatinine 1.50 (H) 0.50 - 1.30 mg/dL    eGFR 51 (L) >60 mL/min/1.73m*2    Calcium 9.4 8.6 - 10.3 mg/dL    Albumin 3.3 (L) 3.4 - 5.0 g/dL    Alkaline Phosphatase 167 (H) 33 - 136 U/L    Total Protein 6.9 6.4 - 8.2 g/dL    AST 36 9 - 39 U/L    Bilirubin, Total 0.5 0.0 - 1.2 mg/dL    ALT 77 (H) 10 - 52 U/L   POCT GLUCOSE   Result Value Ref Range    POCT Glucose 317 (H) 74 - 99 mg/dL      Patient recently received an antibiotic (last 12 hours)       Date/Time Action Medication Dose    03/25/25 1540 New Bag    piperacillin-tazobactam (Zosyn) 3.375 g in dextrose (iso) IV 50 mL 3.375 g    03/25/25 0949 New Bag    piperacillin-tazobactam (Zosyn) 3.375 g in dextrose (iso) IV 50 mL 3.375 g           Pt fully evaluated 3/25. Hematocrit 26.2 and hemoglobin 7.3. WBC at 14.8. Added cardio and increased fluids and free water flushes to q4. Plan discussed with interdisciplinary team, continue current and repeat labs in the AM.     Discharge planning discussed with patient and care team. Therapy evaluations ordered. [facility]-  , anticipate HHC/SNF at discharge. Patient aware and agreeable to current plan, continue plan as above.     I spent a total of 50 minutes on the date of the service which included preparing to  see the patient, face-to-face patient care, completing clinical documentation, obtaining and/or reviewing separately obtained history, performing a medically appropriate examination, counseling and educating the patient/family/caregiver, ordering medications, tests, or procedures, communicating with other HCPs (not separately reported), independently interpreting results (not separately reported), communicating results to the patient/family/caregiver, and care coordination (not separately reported).        JONAS ABARCA

## 2025-03-25 NOTE — CARE PLAN
The patient's goals for the shift include      The clinical goals for the shift include pt to reman HDS throughout shift      Problem: Pain - Adult  Goal: Verbalizes/displays adequate comfort level or baseline comfort level  Outcome: Progressing     Problem: Safety - Adult  Goal: Free from fall injury  Outcome: Progressing     Problem: Discharge Planning  Goal: Discharge to home or other facility with appropriate resources  Outcome: Progressing     Problem: Chronic Conditions and Co-morbidities  Goal: Patient's chronic conditions and co-morbidity symptoms are monitored and maintained or improved  Outcome: Progressing     Problem: Nutrition  Goal: Nutrient intake appropriate for maintaining nutritional needs  Outcome: Progressing     Problem: Skin  Goal: Decreased wound size/increased tissue granulation at next dressing change  Outcome: Progressing  Goal: Participates in plan/prevention/treatment measures  Outcome: Progressing  Goal: Prevent/manage excess moisture  Outcome: Progressing  Goal: Prevent/minimize sheer/friction injuries  Outcome: Progressing  Flowsheets (Taken 3/25/2025 0113)  Prevent/minimize sheer/friction injuries:   Use pull sheet   Turn/reposition every 2 hours/use positioning/transfer devices  Goal: Promote/optimize nutrition  Outcome: Progressing  Goal: Promote skin healing  Outcome: Progressing     Problem: Diabetes  Goal: Achieve decreasing blood glucose levels by end of shift  Outcome: Progressing  Goal: Increase stability of blood glucose readings by end of shift  Outcome: Progressing  Goal: Decrease in ketones present in urine by end of shift  Outcome: Progressing  Goal: Maintain electrolyte levels within acceptable range throughout shift  Outcome: Progressing  Goal: Maintain glucose levels >70mg/dl to <250mg/dl throughout shift  Outcome: Progressing  Goal: No changes in neurological exam by end of shift  Outcome: Progressing  Goal: Learn about and adhere to nutrition recommendations by end  of shift  Outcome: Progressing  Goal: Vital signs within normal range for age by end of shift  Outcome: Progressing  Goal: Increase self care and/or family involovement by end of shift  Outcome: Progressing  Goal: Receive DSME education by end of shift  Outcome: Progressing

## 2025-03-26 ENCOUNTER — APPOINTMENT (OUTPATIENT)
Dept: CARDIOLOGY | Facility: HOSPITAL | Age: 65
End: 2025-03-26
Payer: COMMERCIAL

## 2025-03-26 ENCOUNTER — APPOINTMENT (OUTPATIENT)
Dept: RADIOLOGY | Facility: HOSPITAL | Age: 65
End: 2025-03-26
Payer: COMMERCIAL

## 2025-03-26 PROBLEM — K92.1 MELENA: Status: ACTIVE | Noted: 2025-03-26

## 2025-03-26 LAB
ALBUMIN SERPL BCP-MCNC: 3.2 G/DL (ref 3.4–5)
ALP SERPL-CCNC: 182 U/L (ref 33–136)
ALT SERPL W P-5'-P-CCNC: 127 U/L (ref 10–52)
ANION GAP SERPL CALC-SCNC: 12 MMOL/L (ref 10–20)
AST SERPL W P-5'-P-CCNC: 97 U/L (ref 9–39)
ATRIAL RATE: 122 BPM
ATRIAL RATE: 153 BPM
ATRIAL RATE: 97 BPM
BILIRUB SERPL-MCNC: 0.4 MG/DL (ref 0–1.2)
BUN SERPL-MCNC: 45 MG/DL (ref 6–23)
C COLI+JEJ+UPSA DNA STL QL NAA+PROBE: NOT DETECTED
C DIF TOX TCDA+TCDB STL QL NAA+PROBE: NOT DETECTED
CALCIUM SERPL-MCNC: 8.5 MG/DL (ref 8.6–10.3)
CHLORIDE SERPL-SCNC: 112 MMOL/L (ref 98–107)
CO2 SERPL-SCNC: 33 MMOL/L (ref 21–32)
CREAT SERPL-MCNC: 1.35 MG/DL (ref 0.5–1.3)
EC STX1 GENE STL QL NAA+PROBE: NOT DETECTED
EC STX2 GENE STL QL NAA+PROBE: NOT DETECTED
EGFRCR SERPLBLD CKD-EPI 2021: 58 ML/MIN/1.73M*2
EOSINOPHIL SMEAR: ABNORMAL
ERYTHROCYTE [DISTWIDTH] IN BLOOD BY AUTOMATED COUNT: 16.6 % (ref 11.5–14.5)
FERRITIN SERPL-MCNC: 66 NG/ML (ref 20–300)
GLUCOSE BLD MANUAL STRIP-MCNC: 307 MG/DL (ref 74–99)
GLUCOSE BLD MANUAL STRIP-MCNC: 312 MG/DL (ref 74–99)
GLUCOSE BLD MANUAL STRIP-MCNC: 365 MG/DL (ref 74–99)
GLUCOSE BLD MANUAL STRIP-MCNC: 374 MG/DL (ref 74–99)
GLUCOSE SERPL-MCNC: 290 MG/DL (ref 74–99)
HCT VFR BLD AUTO: 26 % (ref 41–52)
HGB BLD-MCNC: 7.3 G/DL (ref 13.5–17.5)
IRON SATN MFR SERPL: 18 % (ref 25–45)
IRON SERPL-MCNC: 42 UG/DL (ref 35–150)
MCH RBC QN AUTO: 27 PG (ref 26–34)
MCHC RBC AUTO-ENTMCNC: 28.1 G/DL (ref 32–36)
MCV RBC AUTO: 96 FL (ref 80–100)
MYOGLOBIN SERPL-MCNC: 151 UG/L
NOROVIRUS GI + GII RNA STL NAA+PROBE: NOT DETECTED
NRBC BLD-RTO: 2.1 /100 WBCS (ref 0–0)
OSMOLALITY UR: 705 MOSM/KG (ref 200–1200)
P AXIS: 23 DEGREES
P OFFSET: 201 MS
P OFFSET: 208 MS
P ONSET: 179 MS
P ONSET: 181 MS
PHOSPHATE SERPL-MCNC: 2.7 MG/DL (ref 2.5–4.9)
PLATELET # BLD AUTO: 223 X10*3/UL (ref 150–450)
POTASSIUM SERPL-SCNC: 3.7 MMOL/L (ref 3.5–5.3)
PR INTERVAL: 98 MS
PROT SERPL-MCNC: 6.1 G/DL (ref 6.4–8.2)
PTH-INTACT SERPL-MCNC: 85.2 PG/ML (ref 18.5–88)
Q ONSET: 228 MS
Q ONSET: 229 MS
Q ONSET: 230 MS
QRS COUNT: 16 BEATS
QRS COUNT: 24 BEATS
QRS COUNT: 25 BEATS
QRS DURATION: 78 MS
QRS DURATION: 78 MS
QRS DURATION: 84 MS
QT INTERVAL: 250 MS
QT INTERVAL: 320 MS
QT INTERVAL: 378 MS
QTC CALCULATION(BAZETT): 400 MS
QTC CALCULATION(BAZETT): 480 MS
QTC CALCULATION(BAZETT): 492 MS
QTC FREDERICIA: 342 MS
QTC FREDERICIA: 426 MS
QTC FREDERICIA: 443 MS
R AXIS: -23 DEGREES
R AXIS: -23 DEGREES
R AXIS: -26 DEGREES
RBC # BLD AUTO: 2.7 X10*6/UL (ref 4.5–5.9)
RV RNA STL NAA+PROBE: NOT DETECTED
SALMONELLA DNA STL QL NAA+PROBE: NOT DETECTED
SHIGELLA DNA SPEC QL NAA+PROBE: NOT DETECTED
SODIUM SERPL-SCNC: 153 MMOL/L (ref 136–145)
T AXIS: 113 DEGREES
T AXIS: 82 DEGREES
T AXIS: 95 DEGREES
T OFFSET: 354 MS
T OFFSET: 390 MS
T OFFSET: 417 MS
TIBC SERPL-MCNC: 238 UG/DL (ref 240–445)
UIBC SERPL-MCNC: 196 UG/DL (ref 110–370)
URATE SERPL-MCNC: 5.6 MG/DL (ref 4–7.5)
V CHOLERAE DNA STL QL NAA+PROBE: NOT DETECTED
VENTRICULAR RATE: 142 BPM
VENTRICULAR RATE: 154 BPM
VENTRICULAR RATE: 97 BPM
WBC # BLD AUTO: 12.2 X10*3/UL (ref 4.4–11.3)
Y ENTEROCOL DNA STL QL NAA+PROBE: NOT DETECTED

## 2025-03-26 PROCEDURE — 82947 ASSAY GLUCOSE BLOOD QUANT: CPT

## 2025-03-26 PROCEDURE — 2060000001 HC INTERMEDIATE ICU ROOM DAILY

## 2025-03-26 PROCEDURE — 85027 COMPLETE CBC AUTOMATED: CPT | Performed by: INTERNAL MEDICINE

## 2025-03-26 PROCEDURE — 2500000001 HC RX 250 WO HCPCS SELF ADMINISTERED DRUGS (ALT 637 FOR MEDICARE OP): Performed by: NURSE PRACTITIONER

## 2025-03-26 PROCEDURE — 36415 COLL VENOUS BLD VENIPUNCTURE: CPT | Performed by: INTERNAL MEDICINE

## 2025-03-26 PROCEDURE — 2500000005 HC RX 250 GENERAL PHARMACY W/O HCPCS: Performed by: INTERNAL MEDICINE

## 2025-03-26 PROCEDURE — 80053 COMPREHEN METABOLIC PANEL: CPT | Performed by: INTERNAL MEDICINE

## 2025-03-26 PROCEDURE — 99221 1ST HOSP IP/OBS SF/LOW 40: CPT | Performed by: NURSE PRACTITIONER

## 2025-03-26 PROCEDURE — 2500000002 HC RX 250 W HCPCS SELF ADMINISTERED DRUGS (ALT 637 FOR MEDICARE OP, ALT 636 FOR OP/ED): Performed by: INTERNAL MEDICINE

## 2025-03-26 PROCEDURE — 84100 ASSAY OF PHOSPHORUS: CPT | Performed by: INTERNAL MEDICINE

## 2025-03-26 PROCEDURE — 2500000004 HC RX 250 GENERAL PHARMACY W/ HCPCS (ALT 636 FOR OP/ED)

## 2025-03-26 PROCEDURE — 74018 RADEX ABDOMEN 1 VIEW: CPT

## 2025-03-26 PROCEDURE — 82728 ASSAY OF FERRITIN: CPT | Mod: PARLAB | Performed by: INTERNAL MEDICINE

## 2025-03-26 PROCEDURE — 83874 ASSAY OF MYOGLOBIN: CPT | Mod: PARLAB | Performed by: INTERNAL MEDICINE

## 2025-03-26 PROCEDURE — 2500000004 HC RX 250 GENERAL PHARMACY W/ HCPCS (ALT 636 FOR OP/ED): Performed by: INTERNAL MEDICINE

## 2025-03-26 PROCEDURE — 2500000002 HC RX 250 W HCPCS SELF ADMINISTERED DRUGS (ALT 637 FOR MEDICARE OP, ALT 636 FOR OP/ED): Performed by: NURSE PRACTITIONER

## 2025-03-26 PROCEDURE — 2500000004 HC RX 250 GENERAL PHARMACY W/ HCPCS (ALT 636 FOR OP/ED): Performed by: NURSE PRACTITIONER

## 2025-03-26 PROCEDURE — 94640 AIRWAY INHALATION TREATMENT: CPT

## 2025-03-26 PROCEDURE — 74018 RADEX ABDOMEN 1 VIEW: CPT | Performed by: RADIOLOGY

## 2025-03-26 PROCEDURE — 84550 ASSAY OF BLOOD/URIC ACID: CPT | Performed by: INTERNAL MEDICINE

## 2025-03-26 PROCEDURE — 93005 ELECTROCARDIOGRAM TRACING: CPT

## 2025-03-26 PROCEDURE — 83550 IRON BINDING TEST: CPT | Performed by: INTERNAL MEDICINE

## 2025-03-26 PROCEDURE — 93010 ELECTROCARDIOGRAM REPORT: CPT | Performed by: INTERNAL MEDICINE

## 2025-03-26 PROCEDURE — 2500000001 HC RX 250 WO HCPCS SELF ADMINISTERED DRUGS (ALT 637 FOR MEDICARE OP)

## 2025-03-26 RX ORDER — AMIODARONE HYDROCHLORIDE 200 MG/1
200 TABLET ORAL 2 TIMES DAILY
Status: DISCONTINUED | OUTPATIENT
Start: 2025-03-26 | End: 2025-04-03 | Stop reason: HOSPADM

## 2025-03-26 RX ORDER — DEXTROSE MONOHYDRATE 50 MG/ML
100 INJECTION, SOLUTION INTRAVENOUS CONTINUOUS
Status: ACTIVE | OUTPATIENT
Start: 2025-03-26 | End: 2025-03-27

## 2025-03-26 RX ADMIN — ACETYLCYSTEINE 200 MG: 200 SOLUTION ORAL; RESPIRATORY (INHALATION) at 06:39

## 2025-03-26 RX ADMIN — METOPROLOL TARTRATE 50 MG: 50 TABLET, FILM COATED ORAL at 16:00

## 2025-03-26 RX ADMIN — INSULIN LISPRO 10 UNITS: 100 INJECTION, SOLUTION INTRAVENOUS; SUBCUTANEOUS at 02:20

## 2025-03-26 RX ADMIN — INSULIN LISPRO 10 UNITS: 100 INJECTION, SOLUTION INTRAVENOUS; SUBCUTANEOUS at 20:50

## 2025-03-26 RX ADMIN — PIPERACILLIN SODIUM AND TAZOBACTAM SODIUM 3.38 G: 3; .375 INJECTION, SOLUTION INTRAVENOUS at 09:03

## 2025-03-26 RX ADMIN — ACETYLCYSTEINE 200 MG: 200 SOLUTION ORAL; RESPIRATORY (INHALATION) at 11:44

## 2025-03-26 RX ADMIN — INSULIN LISPRO 8 UNITS: 100 INJECTION, SOLUTION INTRAVENOUS; SUBCUTANEOUS at 14:27

## 2025-03-26 RX ADMIN — INSULIN LISPRO 8 UNITS: 100 INJECTION, SOLUTION INTRAVENOUS; SUBCUTANEOUS at 08:37

## 2025-03-26 RX ADMIN — Medication 30 L/MIN: at 06:39

## 2025-03-26 RX ADMIN — DEXTROSE MONOHYDRATE 100 ML/HR: 50 INJECTION, SOLUTION INTRAVENOUS at 12:15

## 2025-03-26 RX ADMIN — PANTOPRAZOLE SODIUM 80 MG: 40 INJECTION, POWDER, FOR SOLUTION INTRAVENOUS at 00:26

## 2025-03-26 RX ADMIN — PIPERACILLIN SODIUM AND TAZOBACTAM SODIUM 3.38 G: 3; .375 INJECTION, SOLUTION INTRAVENOUS at 16:00

## 2025-03-26 RX ADMIN — IPRATROPIUM BROMIDE 0.5 MG: 0.5 SOLUTION RESPIRATORY (INHALATION) at 06:39

## 2025-03-26 RX ADMIN — PANTOPRAZOLE SODIUM 40 MG: 40 INJECTION, POWDER, FOR SOLUTION INTRAVENOUS at 20:48

## 2025-03-26 RX ADMIN — PIPERACILLIN SODIUM AND TAZOBACTAM SODIUM 3.38 G: 3; .375 INJECTION, SOLUTION INTRAVENOUS at 20:48

## 2025-03-26 RX ADMIN — METOPROLOL TARTRATE 50 MG: 50 TABLET, FILM COATED ORAL at 09:04

## 2025-03-26 RX ADMIN — IPRATROPIUM BROMIDE 0.5 MG: 0.5 SOLUTION RESPIRATORY (INHALATION) at 11:44

## 2025-03-26 RX ADMIN — PIPERACILLIN SODIUM AND TAZOBACTAM SODIUM 3.38 G: 3; .375 INJECTION, SOLUTION INTRAVENOUS at 01:48

## 2025-03-26 RX ADMIN — PANTOPRAZOLE SODIUM 40 MG: 40 INJECTION, POWDER, FOR SOLUTION INTRAVENOUS at 09:04

## 2025-03-26 RX ADMIN — DEXTROSE MONOHYDRATE 100 ML/HR: 50 INJECTION, SOLUTION INTRAVENOUS at 11:41

## 2025-03-26 RX ADMIN — AMIODARONE HYDROCHLORIDE 0.5 MG/MIN: 1.8 INJECTION, SOLUTION INTRAVENOUS at 14:14

## 2025-03-26 RX ADMIN — TRAZODONE HYDROCHLORIDE 50 MG: 50 TABLET ORAL at 20:48

## 2025-03-26 RX ADMIN — DEXTROSE MONOHYDRATE 100 ML/HR: 50 INJECTION, SOLUTION INTRAVENOUS at 22:41

## 2025-03-26 RX ADMIN — ACETYLCYSTEINE 200 MG: 200 SOLUTION ORAL; RESPIRATORY (INHALATION) at 19:36

## 2025-03-26 RX ADMIN — METOPROLOL TARTRATE 50 MG: 50 TABLET, FILM COATED ORAL at 20:48

## 2025-03-26 RX ADMIN — IPRATROPIUM BROMIDE 0.5 MG: 0.5 SOLUTION RESPIRATORY (INHALATION) at 19:36

## 2025-03-26 RX ADMIN — AMIODARONE HYDROCHLORIDE 200 MG: 200 TABLET ORAL at 18:52

## 2025-03-26 RX ADMIN — INSULIN GLARGINE 30 UNITS: 100 INJECTION, SOLUTION SUBCUTANEOUS at 20:50

## 2025-03-26 RX ADMIN — AMIODARONE HYDROCHLORIDE 0.5 MG/MIN: 1.8 INJECTION, SOLUTION INTRAVENOUS at 01:47

## 2025-03-26 RX ADMIN — SUCRALFATE 1 G: 1 TABLET ORAL at 06:10

## 2025-03-26 RX ADMIN — IRON SUCROSE 200 MG: 20 INJECTION, SOLUTION INTRAVENOUS at 14:15

## 2025-03-26 RX ADMIN — SUCRALFATE 1 G: 1 TABLET ORAL at 16:16

## 2025-03-26 ASSESSMENT — COGNITIVE AND FUNCTIONAL STATUS - GENERAL
STANDING UP FROM CHAIR USING ARMS: TOTAL
MOVING FROM LYING ON BACK TO SITTING ON SIDE OF FLAT BED WITH BEDRAILS: TOTAL
MOVING TO AND FROM BED TO CHAIR: TOTAL
HELP NEEDED FOR BATHING: TOTAL
TURNING FROM BACK TO SIDE WHILE IN FLAT BAD: TOTAL
EATING MEALS: TOTAL
WALKING IN HOSPITAL ROOM: TOTAL
DAILY ACTIVITIY SCORE: 6
CLIMB 3 TO 5 STEPS WITH RAILING: TOTAL
MOBILITY SCORE: 6
DRESSING REGULAR LOWER BODY CLOTHING: TOTAL
PERSONAL GROOMING: TOTAL
TOILETING: TOTAL
DRESSING REGULAR UPPER BODY CLOTHING: TOTAL

## 2025-03-26 ASSESSMENT — PAIN - FUNCTIONAL ASSESSMENT: PAIN_FUNCTIONAL_ASSESSMENT: 0-10

## 2025-03-26 ASSESSMENT — PAIN SCALES - GENERAL: PAINLEVEL_OUTOF10: 0 - NO PAIN

## 2025-03-26 NOTE — H&P (VIEW-ONLY)
"Reason For Consult  Rectal bleeding    This note was created using voice recognition transcription software. Despite proofreading, unintentional typographical errors may be present. Please contact the GI office with any questions or concerns.       This is a 63 yo Male with a PMH of Aflutter (on Eliquis), hyponatremia, IDDM, CAD, hypertension, chronic wounds, diffuse lymphadenopathy, anemia, tobacco use, elevated PSA, CVA LMCA (8/23 and 12/20) with right-sided deficits and an acute nonhemorrhagic brainstem infarct (2/27/25), left ICA stenosis (85%) s/p angioplasty and left carotid artery stent (08/25/2023), and recent history of acute pontine stroke 02/27/25 with acute hypoxic respiratory failure s/p tracheostomy on 2/25/2025 and PEG  who presented to [facility] on 3/22/25 with reports of coughing out blood from tracheostomy.  GI was consulted for rectal bleeding.    Wife and brother are at the bedside.      Subjective  Per wife, patient coughed up dark blood clots out of his trach (this is what brought him in).  She agrees he was previously noncompliant with prior AC orders and recently placed on Eliquis/Plavix after his most recent stroke.  Black stools in FMS.         EGD-2/25/25 for PEG placement and showed gastric erosions with ulcerations and an adherent clot over a duodenal ulcer.  Colonoscopy-Per wife, awhile ago with unknown findings.  BM-Daily.  Normal consistency.  No bleeding or weight loss.  FHX-\"cancer in the family\"  SHX-No tobacco/illicits/ETOH.  Was smoking cigarettes up until his last stroke.  Ab Sx-PEG as stated above.  NSAIDs-Denies         Allergies: as mentioned in H&P      A 10 point review of system is negative except for what is mentioned in the HPI    Vital Signs: Reviewed    Physical Exam:  General: Polite, calm, resting  Skin:  Warm and dry, no jaundice  HEENT: +Trach on RA, no scleral icterus, no conjunctival pallor, normocephalic, atraumatic, mucous membranes moist  Neck:  atraumatic, no " JVD  Chest:  Clear to auscultation bilaterally. No wheezes, rales, or rhonchi  CV:  Regular rate and rhythm.  Positive S1/S2  Abdomen: no distension, +BS, soft, non-tender to palpation, no rebound tenderness, no guarding, no rigidity, no discernible ascites   Extremities: no lower extremity edema, chronic pigmentary changes, no cyanosis  Neurological:  A&Ox3  Psychiatric: cooperative     Investigations:  Labs, radiological imaging and cardiac work up were reviewed      Objective:         3/25/2025    11:46 PM 3/26/2025     2:11 AM 3/26/2025     4:06 AM 3/26/2025     6:00 AM 3/26/2025     7:59 AM 3/26/2025     9:24 AM 3/26/2025    11:13 AM   Vitals   Systolic 109 104 110 135 135 94 117   Diastolic 52 54 66 67 80 50 55   BP Location Right arm Right arm Right arm  Right arm Right arm Right arm   Heart Rate 67 102  119 119 133 94   Temp 36.9 °C (98.4 °F) 37.9 °C (100.2 °F) 36.9 °C (98.4 °F) 36.2 °C (97.2 °F) 36.1 °C (97 °F) 36.3 °C (97.3 °F) 36.2 °C (97.2 °F)   Resp 24  26  21 20 20          Medications:  acetylcysteine, 1 mL, nebulization, TID  [Held by provider] apixaban, 5 mg, g-tube, BID  [Held by provider] clopidogrel, 75 mg, g-tube, Daily  docusate sodium, 100 mg, oral, BID  [START ON 3/30/2025] epoetin nicko or biosimilar, 20,000 Units, subcutaneous, Every Sunday  insulin glargine, 30 Units, subcutaneous, Nightly  insulin lispro, 0-10 Units, subcutaneous, q6h  ipratropium, 0.5 mg, nebulization, TID  iron sucrose, 200 mg, intravenous, Daily  metoprolol tartrate, 50 mg, g-tube, TID  oxygen, , inhalation, Continuous - Inhalation  pantoprazole, 40 mg, intravenous, BID  piperacillin-tazobactam, 3.375 g, intravenous, q6h  sucralfate, 1 g, g-tube, BID AC  traZODone, 50 mg, g-tube, Nightly         Recent Results (from the past 72 hours)   POCT GLUCOSE    Collection Time: 03/23/25  4:01 PM   Result Value Ref Range    POCT Glucose 268 (H) 74 - 99 mg/dL   POCT GLUCOSE    Collection Time: 03/23/25 11:37 PM   Result Value Ref  Range    POCT Glucose 243 (H) 74 - 99 mg/dL   Vancomycin    Collection Time: 03/24/25  5:32 AM   Result Value Ref Range    Vancomycin 10.1 5.0 - 20.0 ug/mL   CBC    Collection Time: 03/24/25  5:32 AM   Result Value Ref Range    WBC 14.7 (H) 4.4 - 11.3 x10*3/uL    nRBC 0.9 (H) 0.0 - 0.0 /100 WBCs    RBC 2.81 (L) 4.50 - 5.90 x10*6/uL    Hemoglobin 7.5 (L) 13.5 - 17.5 g/dL    Hematocrit 26.3 (L) 41.0 - 52.0 %    MCV 94 80 - 100 fL    MCH 26.7 26.0 - 34.0 pg    MCHC 28.5 (L) 32.0 - 36.0 g/dL    RDW 16.1 (H) 11.5 - 14.5 %    Platelets 217 150 - 450 x10*3/uL   Comprehensive Metabolic Panel    Collection Time: 03/24/25  5:32 AM   Result Value Ref Range    Glucose 227 (H) 74 - 99 mg/dL    Sodium 151 (H) 136 - 145 mmol/L    Potassium 4.0 3.5 - 5.3 mmol/L    Chloride 110 (H) 98 - 107 mmol/L    Bicarbonate 32 21 - 32 mmol/L    Anion Gap 13 10 - 20 mmol/L    Urea Nitrogen 44 (H) 6 - 23 mg/dL    Creatinine 1.18 0.50 - 1.30 mg/dL    eGFR 68 >60 mL/min/1.73m*2    Calcium 9.5 8.6 - 10.3 mg/dL    Albumin 3.5 3.4 - 5.0 g/dL    Alkaline Phosphatase 136 33 - 136 U/L    Total Protein 7.1 6.4 - 8.2 g/dL    AST 26 9 - 39 U/L    Bilirubin, Total 0.4 0.0 - 1.2 mg/dL    ALT 66 (H) 10 - 52 U/L   POCT GLUCOSE    Collection Time: 03/24/25  6:48 AM   Result Value Ref Range    POCT Glucose 199 (H) 74 - 99 mg/dL   POCT GLUCOSE    Collection Time: 03/24/25 11:09 AM   Result Value Ref Range    POCT Glucose 233 (H) 74 - 99 mg/dL   POCT GLUCOSE    Collection Time: 03/24/25  2:04 PM   Result Value Ref Range    POCT Glucose 299 (H) 74 - 99 mg/dL   POCT GLUCOSE    Collection Time: 03/24/25  9:04 PM   Result Value Ref Range    POCT Glucose 308 (H) 74 - 99 mg/dL   POCT GLUCOSE    Collection Time: 03/25/25  2:09 AM   Result Value Ref Range    POCT Glucose 264 (H) 74 - 99 mg/dL   CBC    Collection Time: 03/25/25  5:58 AM   Result Value Ref Range    WBC 14.8 (H) 4.4 - 11.3 x10*3/uL    nRBC 1.7 (H) 0.0 - 0.0 /100 WBCs    RBC 2.74 (L) 4.50 - 5.90 x10*6/uL     Hemoglobin 7.3 (L) 13.5 - 17.5 g/dL    Hematocrit 26.2 (L) 41.0 - 52.0 %    MCV 96 80 - 100 fL    MCH 26.6 26.0 - 34.0 pg    MCHC 27.9 (L) 32.0 - 36.0 g/dL    RDW 16.9 (H) 11.5 - 14.5 %    Platelets 218 150 - 450 x10*3/uL   Comprehensive Metabolic Panel    Collection Time: 03/25/25  5:58 AM   Result Value Ref Range    Glucose 280 (H) 74 - 99 mg/dL    Sodium 153 (H) 136 - 145 mmol/L    Potassium 3.9 3.5 - 5.3 mmol/L    Chloride 111 (H) 98 - 107 mmol/L    Bicarbonate 34 (H) 21 - 32 mmol/L    Anion Gap 12 10 - 20 mmol/L    Urea Nitrogen 51 (H) 6 - 23 mg/dL    Creatinine 1.50 (H) 0.50 - 1.30 mg/dL    eGFR 51 (L) >60 mL/min/1.73m*2    Calcium 9.4 8.6 - 10.3 mg/dL    Albumin 3.3 (L) 3.4 - 5.0 g/dL    Alkaline Phosphatase 167 (H) 33 - 136 U/L    Total Protein 6.9 6.4 - 8.2 g/dL    AST 36 9 - 39 U/L    Bilirubin, Total 0.5 0.0 - 1.2 mg/dL    ALT 77 (H) 10 - 52 U/L   Magnesium    Collection Time: 03/25/25  5:58 AM   Result Value Ref Range    Magnesium 2.61 (H) 1.60 - 2.40 mg/dL   Electrocardiogram, 12-lead PRN ACS symptoms    Collection Time: 03/25/25  7:30 AM   Result Value Ref Range    Ventricular Rate 97 BPM    Atrial Rate 97 BPM    FL Interval 98 ms    QRS Duration 84 ms    QT Interval 378 ms    QTC Calculation(Bazett) 480 ms    P Axis 23 degrees    R Axis -23 degrees    T Axis 95 degrees    QRS Count 16 beats    Q Onset 228 ms    P Onset 179 ms    P Offset 208 ms    T Offset 417 ms    QTC Fredericia 443 ms   POCT GLUCOSE    Collection Time: 03/25/25  9:26 AM   Result Value Ref Range    POCT Glucose 317 (H) 74 - 99 mg/dL   Electrocardiogram, 12-lead PRN ACS symptoms    Collection Time: 03/25/25 11:25 AM   Result Value Ref Range    Ventricular Rate 142 BPM    Atrial Rate 122 BPM    QRS Duration 78 ms    QT Interval 320 ms    QTC Calculation(Bazett) 492 ms    R Axis -23 degrees    T Axis 113 degrees    QRS Count 24 beats    Q Onset 230 ms    T Offset 390 ms    QTC Fredericia 426 ms   Albumin-Creatinine Ratio, Urine Random     Collection Time: 03/25/25  4:03 PM   Result Value Ref Range    Albumin, Urine Random <7.0 Not established mg/L    Creatinine, Urine Random 78.3 20.0 - 370.0 mg/dL    Albumin/Creatinine Ratio     Urine electrolytes    Collection Time: 03/25/25  4:03 PM   Result Value Ref Range    Sodium, Urine Random 16 mmol/L    Sodium/Creatinine Ratio 20 Not established. mmol/g Creat    Potassium, Urine Random 49 mmol/L    Potassium/Creatinine Ratio 63 Not established mmol/g Creat    Chloride, Urine Random <15 mmol/L    Chloride/Creatinine Ratio      Creatinine, Urine Random 78.3 20.0 - 370.0 mg/dL   Osmolality, urine    Collection Time: 03/25/25  4:03 PM   Result Value Ref Range    Osmolality, Urine Random 705 200 - 1,200 mOsm/kg   Urea Nitrogen, Urine Random    Collection Time: 03/25/25  4:03 PM   Result Value Ref Range    Urea Nitrogen, Urine Random 853 mg/dL    Creatinine, Urine Random 78.3 20.0 - 370.0 mg/dL    Urea Nitrogen/Creatinine Ratio 10.9 Not established. g/g creat   Eosinophil smear    Collection Time: 03/25/25  4:03 PM   Result Value Ref Range    Eosinophils None (N) (none)   POCT GLUCOSE    Collection Time: 03/25/25  4:13 PM   Result Value Ref Range    POCT Glucose 346 (H) 74 - 99 mg/dL   POCT GLUCOSE    Collection Time: 03/25/25  7:03 PM   Result Value Ref Range    POCT Glucose 387 (H) 74 - 99 mg/dL   Electrocardiogram, 12-lead PRN ACS symptoms    Collection Time: 03/25/25  7:10 PM   Result Value Ref Range    Ventricular Rate 154 BPM    Atrial Rate 153 BPM    QRS Duration 78 ms    QT Interval 250 ms    QTC Calculation(Bazett) 400 ms    R Axis -26 degrees    T Axis 82 degrees    QRS Count 25 beats    Q Onset 229 ms    P Onset 181 ms    P Offset 201 ms    T Offset 354 ms    QTC Fredericia 342 ms   POCT GLUCOSE    Collection Time: 03/25/25  8:29 PM   Result Value Ref Range    POCT Glucose 344 (H) 74 - 99 mg/dL   Occult Blood, Stool    Collection Time: 03/25/25 10:32 PM    Specimen: Stool   Result Value Ref Range     Occult Blood, Stool X1 Positive (A) Negative   C. difficile, PCR    Collection Time: 03/25/25 10:32 PM    Specimen: Stool   Result Value Ref Range    C. difficile, PCR Not Detected Not Detected   Stool Pathogen Panel, PCR    Collection Time: 03/25/25 10:32 PM    Specimen: Stool   Result Value Ref Range    Campylobacter Group Not Detected Not Detected    Salmonella species Not Detected Not Detected    Shigella species Not Detected Not Detected    Vibrio Group Not Detected Not Detected    Yersinia Enterocolitica Not Detected Not Detected    Shiga Toxin 1 Not Detected Not Detected    Shiga Toxin 2 Not Detected Not Detected    Norovirus GI/GII Not Detected Not Detected    Rotavirus A Not Detected Not Detected   PTH, Intact    Collection Time: 03/25/25 11:23 PM   Result Value Ref Range    Parathyroid Hormone, Intact 85.2 18.5 - 88.0 pg/mL   POCT GLUCOSE    Collection Time: 03/26/25  2:10 AM   Result Value Ref Range    POCT Glucose 374 (H) 74 - 99 mg/dL   Iron and TIBC    Collection Time: 03/26/25  5:46 AM   Result Value Ref Range    Iron 42 35 - 150 ug/dL    UIBC 196 110 - 370 ug/dL    TIBC 238 (L) 240 - 445 ug/dL    % Saturation 18 (L) 25 - 45 %   Ferritin    Collection Time: 03/26/25  5:46 AM   Result Value Ref Range    Ferritin 66 20 - 300 ng/mL   Uric Acid    Collection Time: 03/26/25  5:46 AM   Result Value Ref Range    Uric Acid 5.6 4.0 - 7.5 mg/dL   Myoglobin    Collection Time: 03/26/25  5:46 AM   Result Value Ref Range    Myoglobin 151 (H) <=92 ug/L   CBC    Collection Time: 03/26/25  5:46 AM   Result Value Ref Range    WBC 12.2 (H) 4.4 - 11.3 x10*3/uL    nRBC 2.1 (H) 0.0 - 0.0 /100 WBCs    RBC 2.70 (L) 4.50 - 5.90 x10*6/uL    Hemoglobin 7.3 (L) 13.5 - 17.5 g/dL    Hematocrit 26.0 (L) 41.0 - 52.0 %    MCV 96 80 - 100 fL    MCH 27.0 26.0 - 34.0 pg    MCHC 28.1 (L) 32.0 - 36.0 g/dL    RDW 16.6 (H) 11.5 - 14.5 %    Platelets 223 150 - 450 x10*3/uL   Comprehensive Metabolic Panel    Collection Time: 03/26/25  5:46  AM   Result Value Ref Range    Glucose 290 (H) 74 - 99 mg/dL    Sodium 153 (H) 136 - 145 mmol/L    Potassium 3.7 3.5 - 5.3 mmol/L    Chloride 112 (H) 98 - 107 mmol/L    Bicarbonate 33 (H) 21 - 32 mmol/L    Anion Gap 12 10 - 20 mmol/L    Urea Nitrogen 45 (H) 6 - 23 mg/dL    Creatinine 1.35 (H) 0.50 - 1.30 mg/dL    eGFR 58 (L) >60 mL/min/1.73m*2    Calcium 8.5 (L) 8.6 - 10.3 mg/dL    Albumin 3.2 (L) 3.4 - 5.0 g/dL    Alkaline Phosphatase 182 (H) 33 - 136 U/L    Total Protein 6.1 (L) 6.4 - 8.2 g/dL    AST 97 (H) 9 - 39 U/L    Bilirubin, Total 0.4 0.0 - 1.2 mg/dL     (H) 10 - 52 U/L   Phosphorus    Collection Time: 03/26/25  5:46 AM   Result Value Ref Range    Phosphorus 2.7 2.5 - 4.9 mg/dL   POCT GLUCOSE    Collection Time: 03/26/25  8:28 AM   Result Value Ref Range    POCT Glucose 307 (H) 74 - 99 mg/dL          Assessment:  This is a 65 yo Male with a PMH of Aflutter (on Eliquis), hyponatremia, IDDM, CAD, hypertension, chronic wounds, diffuse lymphadenopathy, anemia, tobacco use, elevated PSA, CVA LMCA (8/23 and 12/20) with right-sided deficits and an acute nonhemorrhagic brainstem infarct (2/27/25), left ICA stenosis (85%) s/p angioplasty and left carotid artery stent (08/25/2023), and recent history of acute pontine stroke 02/27/25 with acute hypoxic respiratory failure s/p tracheostomy on 2/25/2025 and PEG  who presented to [facility] on 3/22/25 with reports of coughing out blood from tracheostomy.  GI was consulted for rectal bleeding. Previously notated to be noncompliant with Brilinta/ASA regimen prior to recent stroke.  Placed on Plavix/Eliquis after most recent stroke.  Had 1 episode of coughing up dark blood on 3/22 and now having black stools in the FMS.  2/25/25 for PEG placement and showed gastric erosions with ulcerations and an adherent clot over a duodenal ulcer.  CTA 3/22 was unremarkable for any bleeding.  US from 3/11/25 showed fatty, enlarged liver, no cholelithiasis.  Also notated to have  elevated LFT's that are trending up.    Of note, patient had a rapid called on him 3/25 for Afib RVR, cardiology is following him and has him on Metoprolol for rate control and is on IV amiodarone.  Not a candidate for electrical cardioversion at this time.    Hgb 7.3 and was 8.0 on 8.0.  Last normal Hgb was in 2023 and there's a length of time where there was no labs per the system.    It's likely that the ulcers didn't heal and patient was placed on DAPT which delayed healing and triggered a bleed.  Discussed risks of moving forward with endoscopic procedure such as a cardiovascular event, not waking up, perforation etc and wife and patient are in agreeance with moving forward at some point.        Plan  1.)  Rectal bleeding-Trend hgb, monitor for overt bleeding, transfuse as necessary, avoid NSAIDs.  Will plan for EGD 2/27.  NPO after midnight.  Nursing to shut off tube feeds after MN.        Discussed above patient assessment and plan with Dr. Thorne.    I spent 65 minutes in the professional and overall care of this patient.      03/26/25 at 1:02 PM - JOSEPH Edward-CNP

## 2025-03-26 NOTE — PROGRESS NOTES
Bryan Nix is a 64 y.o. male on day 4 of admission presenting with Hemoptysis.      Subjective   Patient fully evaluated on March 24.  Hemoptysis appears resolved.  Still with slightly elevated white count to be monitored.  Continue aggressive antibiotic regimen.  Repeat chest x-ray is pending.       Objective     Last Recorded Vitals  /66 (BP Location: Right arm, Patient Position: Lying)   Pulse (!) 134 Comment: rn notified  Temp 35.9 °C (96.6 °F) (Temporal)   Resp 19   Wt 77.1 kg (170 lb)   SpO2 95%   Intake/Output last 3 Shifts:    Intake/Output Summary (Last 24 hours) at 3/26/2025 1444  Last data filed at 3/25/2025 2314  Gross per 24 hour   Intake 1976.29 ml   Output 250 ml   Net 1726.29 ml       Admission Weight  Weight: 77.1 kg (170 lb) (03/22/25 1255)    Daily Weight  03/22/25 : 77.1 kg (170 lb)    Image Results  Electrocardiogram, 12-lead PRN ACS symptoms  Sinus rhythm with short AZ with Premature atrial complexes with Aberrant conduction  Minimal voltage criteria for LVH, may be normal variant  ST & T wave abnormality, consider anterolateral ischemia  Abnormal ECG  When compared with ECG of 25-MAR-2025 07:25, (unconfirmed)  No significant change was found  Electrocardiogram, 12-lead PRN ACS symptoms  Undetermined rhythm  Nonspecific ST and T wave abnormality  Abnormal ECG  When compared with ECG of 25-MAR-2025 11:20, (unconfirmed)  Current undetermined rhythm precludes rhythm comparison, needs review  T wave inversion more evident in Anterior leads  Electrocardiogram, 12-lead PRN ACS symptoms  Atrial fibrillation with rapid ventricular response with premature ventricular or aberrantly conducted complexes  ST & T wave abnormality, consider lateral ischemia  Abnormal ECG  When compared with ECG of 25-MAR-2025 07:26, (unconfirmed)  Atrial fibrillation has replaced Sinus rhythm  ST now depressed in Lateral leads  T wave inversion less evident in Anterolateral leads      Physical Exam    Relevant  Results               Assessment/Plan   This patient currently has cardiac telemetry ordered; if you would like to modify or discontinue the telemetry order, click here to go to the orders activity to modify/discontinue the order.              Assessment & Plan  Hemoptysis    Tracheostomy in place (Multi)    CVA, old, hemiparesis (Multi)    Status post insertion of percutaneous endoscopic gastrostomy (PEG) tube (Multi)    Pneumonia due to infectious organism    Right sided weakness    Atrial flutter (Multi)    Insulin dependent type 2 diabetes mellitus (Multi)        Zeus Bone MD  Physician  Internal Medicine     H&P      Signed     Date of Service: 3/23/2025 10:28 AM     Signed       Expand All Collapse All    History Of Present Illness  Bryan Nix is a 64-year-old male with past medical history of atrial flutter on Eliquis, hyponatremia, IDDM, CAD, hypertension, chronic wounds, diffuse lymphadenopathy, anemia, history of smoking, history elevated PSA, CVA L hemisphere (2020) with right-sided deficits, Left ICA stenosis (85%) s/p angioplasty and left carotid artery stent (08/25/2023) and recent history of acute pontine stroke 02/27/25 with acute hypoxic respiratory failure s/p tracheostomy on 2/25/2025 and PEG.  Patient presents for bleeding from tracheostomy.  Patient is alert and oriented x 3, however limited verbal communication due to tracheostomy.  Wife is at bedside and assists with history.  She reports that patient was coughing blood out of his tracheostomy.  ER provider felt bleeding was likely superficial around site.  Patient has been on room air at skilled nursing facility.  He was placed on Airvo in the ED mainly to humidify oxygen.  Patient never desaturated per ER report.  Currently he is resting comfortably in the bed, breath sounds slightly rhonchorous, but unlabored.  Reports chills, but no fevers.  He has right-sided weakness and sensory deficits.  Denies headache, vision or hearing  changes, chest pains, palpitations, shortness of breath, nausea, vomiting, abdominal pain, diarrhea, or complications of PEG tube site.  Wife reports patient skin became irritated in his groin due to adhesive from a condom cath previously, he has a small decubitus wound that she reports is healing well.  He receives tube feeds continuously.  Wife reports a patient was recently evaluated by speech for p.o. to intake, however he still remains n.p.o. except for meds and tube feeds through PEG. CODE STATUS reviewed: full code     ER Course: Hemodynamically stable, afebrile, SpO2 to 97% trach mask.  WBC 9.3, hemoglobin 8, hematocrit 29.4, platelets 197.  INR 1.4.  Glucose 223, BUN 36, otherwise CMP is normal.  CT of the chest for PE pending.  CXR unremarkable. Trannexamic acid soak gauze wrapped around trach.      Past medical history: As above  Past surgical history: As above  Social history: Former smoker 37.5 pack years-quit smoking age 63, occasional alcohol use, no illicit drug use.  .  Works in maintenance.  6 children.  Family history: Noncontributory     Past Medical History  Medical History        Past Medical History:   Diagnosis Date    Abnormal finding of blood chemistry, unspecified 05/18/2016     Abnormal blood chemistry    Acute upper respiratory infection, unspecified 12/02/2015     Acute upper respiratory infection    Elevated prostate specific antigen (PSA)       Elevated prostate specific antigen (PSA)    Encounter for screening for malignant neoplasm of colon 01/30/2021     Colon cancer screening    Encounter for screening for malignant neoplasm of prostate 11/30/2020     Prostate cancer screening    Essential (primary) hypertension 07/30/2013     Benign essential hypertension    Other conditions influencing health status 07/30/2013     Diabetes Mellitus Poorly Controlled    Other conditions influencing health status 12/02/2015     History of cough    Personal history of other endocrine,  nutritional and metabolic disease 03/18/2021     History of hyperlipidemia    Personal history of other endocrine, nutritional and metabolic disease 03/18/2021     History of diabetes mellitus    Personal history of other specified conditions 05/18/2016     History of chest pain    Personal history of other specified conditions 05/18/2016     History of dyspnea    Personal history of other specified conditions 05/18/2016     History of dizziness    Personal history of other specified conditions 05/18/2016     History of fatigue            Surgical History  Surgical History         Past Surgical History:   Procedure Laterality Date    CT ANGIO CORONARY ART WITH HEARTFLOW IF SCORE >30%   8/23/2023     CT HEART CORONARY ANGIOGRAM 8/23/2023 [facility] CT    MR HEAD ANGIO WO IV CONTRAST   12/14/2020     MR HEAD ANGIO WO IV CONTRAST 12/14/2020 [facility]    MR NECK ANGIO WO IV CONTRAST   12/14/2020     MR NECK ANGIO WO IV CONTRAST 12/14/2020 [facility]    OTHER SURGICAL HISTORY   12/01/2020     Hand fracture repair    OTHER SURGICAL HISTORY   12/01/2020     Nasal bone fracture repair            Social History  He reports that he has quit smoking. His smoking use included cigarettes. He has never used smokeless tobacco. No history on file for alcohol use and drug use.     Family History  Family History   No family history on file.        Allergies  Patient has no known allergies.     Review of Systems     Physical Exam  Constitutional:       General: He is awake. He is not in acute distress.     Appearance: Normal appearance. He is not toxic-appearing.   HENT:      Head: Atraumatic.      Nose: Nose normal.      Mouth/Throat:      Mouth: Mucous membranes are moist.   Eyes:      Conjunctiva/sclera: Conjunctivae normal.   Cardiovascular:      Rate and Rhythm: Normal rate and regular rhythm.      Pulses: Normal pulses.      Heart sounds: No murmur heard.  Pulmonary:      Effort: No respiratory distress.       "Comments: Tracheostomy site midline, no drainage around dressing.  Rhonchorous breath sounds, unlabored respirations, RT bedside suctioning patient with thick blood-tinged sputum  Abdominal:      General: Bowel sounds are normal. There is no distension.      Palpations: Abdomen is soft.      Tenderness: There is no abdominal tenderness. There is no guarding.      Comments: Left upper quadrant PEG site tube site, no drainage or skin breakdown around tube site.   Musculoskeletal:         General: No swelling, deformity or signs of injury.      Cervical back: Neck supple.      Right lower leg: No edema.      Left lower leg: No edema.   Skin:     General: Skin is warm and dry.      Capillary Refill: Capillary refill takes less than 2 seconds.      Findings: Wound (sacrum) present. No ecchymosis or erythema.   Neurological:      Mental Status: He is alert and oriented to person, place, and time.      Cranial Nerves: No facial asymmetry.      Sensory: Sensory deficit (RUE, RLE) present.      Motor: Weakness present.      Comments: Right sided upper and lower extremity motor function deficits   Psychiatric:         Mood and Affect: Mood normal.         Behavior: Behavior is cooperative.            Last Recorded Vitals  Blood pressure 134/50, pulse 93, temperature 36.9 °C (98.4 °F), temperature source Temporal, resp. rate 22, height 1.88 m (6' 2\"), weight 77.1 kg (170 lb), SpO2 98%.     Relevant Results              Results for orders placed or performed during the hospital encounter of 03/22/25 (from the past 24 hours)   CBC and Auto Differential   Result Value Ref Range     WBC 9.3 4.4 - 11.3 x10*3/uL     nRBC 0.3 (H) 0.0 - 0.0 /100 WBCs     RBC 3.02 (L) 4.50 - 5.90 x10*6/uL     Hemoglobin 8.0 (L) 13.5 - 17.5 g/dL     Hematocrit 29.4 (L) 41.0 - 52.0 %     MCV 97 80 - 100 fL     MCH 26.5 26.0 - 34.0 pg     MCHC 27.2 (L) 32.0 - 36.0 g/dL     RDW 15.6 (H) 11.5 - 14.5 %     Platelets 197 150 - 450 x10*3/uL     Neutrophils % " 77.1 40.0 - 80.0 %     Immature Granulocytes %, Automated 1.5 (H) 0.0 - 0.9 %     Lymphocytes % 13.1 13.0 - 44.0 %     Monocytes % 4.1 2.0 - 10.0 %     Eosinophils % 3.2 0.0 - 6.0 %     Basophils % 1.0 0.0 - 2.0 %     Neutrophils Absolute 7.14 1.20 - 7.70 x10*3/uL     Immature Granulocytes Absolute, Automated 0.14 0.00 - 0.70 x10*3/uL     Lymphocytes Absolute 1.21 1.20 - 4.80 x10*3/uL     Monocytes Absolute 0.38 0.10 - 1.00 x10*3/uL     Eosinophils Absolute 0.30 0.00 - 0.70 x10*3/uL     Basophils Absolute 0.09 0.00 - 0.10 x10*3/uL   Basic metabolic panel   Result Value Ref Range     Glucose 223 (H) 74 - 99 mg/dL     Sodium 142 136 - 145 mmol/L     Potassium 4.3 3.5 - 5.3 mmol/L     Chloride 103 98 - 107 mmol/L     Bicarbonate 27 21 - 32 mmol/L     Anion Gap 16 10 - 20 mmol/L     Urea Nitrogen 36 (H) 6 - 23 mg/dL     Creatinine 0.81 0.50 - 1.30 mg/dL     eGFR >90 >60 mL/min/1.73m*2     Calcium 9.6 8.6 - 10.3 mg/dL   Protime-INR   Result Value Ref Range     Protime 15.1 (H) 9.8 - 12.4 seconds     INR 1.4 (H) 0.9 - 1.1   aPTT   Result Value Ref Range     aPTT 25 (L) 26 - 36 seconds   Type and Screen   Result Value Ref Range     ABO TYPE B       Rh TYPE POS       ANTIBODY SCREEN NEG     VERIFY ABO/Rh Group Test   Result Value Ref Range     ABO TYPE B       Rh TYPE POS     MRSA Surveillance for Vancomycin De-escalation, PCR     Specimen: Anterior Nares; Swab   Result Value Ref Range     MRSA PCR Not Detected Not Detected   POCT GLUCOSE   Result Value Ref Range     POCT Glucose 180 (H) 74 - 99 mg/dL   POCT GLUCOSE   Result Value Ref Range     POCT Glucose 161 (H) 74 - 99 mg/dL   POCT GLUCOSE   Result Value Ref Range     POCT Glucose 156 (H) 74 - 99 mg/dL   Basic metabolic panel   Result Value Ref Range     Glucose 170 (H) 74 - 99 mg/dL     Sodium 147 (H) 136 - 145 mmol/L     Potassium 4.2 3.5 - 5.3 mmol/L     Chloride 107 98 - 107 mmol/L     Bicarbonate 30 21 - 32 mmol/L     Anion Gap 14 10 - 20 mmol/L     Urea Nitrogen  35 (H) 6 - 23 mg/dL     Creatinine 0.85 0.50 - 1.30 mg/dL     eGFR >90 >60 mL/min/1.73m*2     Calcium 9.4 8.6 - 10.3 mg/dL   CBC   Result Value Ref Range     WBC 10.4 4.4 - 11.3 x10*3/uL     nRBC 0.6 (H) 0.0 - 0.0 /100 WBCs     RBC 2.87 (L) 4.50 - 5.90 x10*6/uL     Hemoglobin 7.8 (L) 13.5 - 17.5 g/dL     Hematocrit 26.6 (L) 41.0 - 52.0 %     MCV 93 80 - 100 fL     MCH 27.2 26.0 - 34.0 pg     MCHC 29.3 (L) 32.0 - 36.0 g/dL     RDW 15.7 (H) 11.5 - 14.5 %     Platelets 205 150 - 450 x10*3/uL   SST TOP   Result Value Ref Range     Extra Tube Hold for add-ons.        CT angio chest for pulmonary embolism     Result Date: 3/22/2025  Interpreted By:  Pamela Zurita, STUDY: CT ANGIO CHEST FOR PULMONARY EMBOLISM;  3/22/2025 4:56 pm   INDICATION: Signs/Symptoms:bloody secretions, eval for PE.     COMPARISON: CT chest without contrast 03/22/2025   ACCESSION NUMBER(S): [id number]   ORDERING CLINICIAN: PAMELA HAHN   TECHNIQUE: Contiguous axial images of the chest were obtained after the intravenous administration of iodinated contrast using angiographic PE protocol. Coronal and sagittal reformatted images were reconstructed from the axial data. MIP images were created on an independent workstation and reviewed.   FINDINGS:   MEDIASTINUM AND LYMPH NODES: Inflammatory fat stranding along the tracheostomy tract without discrete fluid collection. The esophageal wall appears within normal limits. There is redemonstration of diffuse lymphadenopathy in the bilateral axilla, mediastinum, bilateral supraclavicular as described in detail on separate report. Also notable or enlarged bilateral level 4 cervical lymph nodes (right > left).   VESSELS:  Normal caliber thoracic aorta without dissection. Moderate aortic atherosclerosis.  No acute pulmonary embolism.   HEART: Normal size.  Mild coronary artery calcifications. No significant pericardial effusion.   LUNG, AIRWAYS, PLEURA: There is slight increase in debris within the trachea.  There has been clearing of secretions in the left upper/lower lobe bronchus. There is diffuse bilateral bronchial thickening slightly increased from prior study. There is peribronchovascular ground-glass opacity in the posterior right upper lobe and superior and basal segments of the right lower lobe. There is slightly worsened atelectasis in the lung bases remaining predominantly subsegmental in nature. There is a 0.8 cm nodule in the left upper lobe that is stable from 08/19/2023.   OSSEOUS STRUCTURES: No acute osseous abnormality.   CHEST WALL SOFT TISSUES: No discernible acute abnormality.   UPPER ABDOMEN/OTHER: No acute abnormality.        No acute pulmonary embolism.   Peribronchial vascular ground glass opacities in the right upper and lower lobe again concerning for pneumonia given patient's risk factors of tracheostomy and debris in the airways.   Redemonstration of diffuse lymphadenopathy in the lower necks, axilla, and mediastinum. Findings are either diffusely reactive in nature, reflective of lymphoma/leukemia, meter deficiency, or less likely head neck neoplasm is previously postulated. However please correlate with past medical history.   Inflammatory changes along the tracheostomy tract without fluid collection. Correlate for erythema.   Unchanged 0.8 cm nodule in the left upper lobe dating back to 08/19/2023. Recommend 1 year follow up chest CT to ensure at least 2 years of stability.   MACRO: None.   Signed by: Pamela Zurita 3/22/2025 7:17 PM Dictation workstation:   [id number]     CT chest wo IV contrast     Result Date: 3/22/2025  Interpreted By:  Pamela Zurita, STUDY: CT CHEST WO IV CONTRAST;  3/22/2025 3:41 pm   INDICATION: Signs/Symptoms:eval for alveolar hemorrhage.     COMPARISON: CT head/neck 08/19/2023   ACCESSION NUMBER(S): [id number]   ORDERING CLINICIAN: PAMELA HAHN   TECHNIQUE: Contiguous axial images of the chest and upper abdomen were obtained without contrast. Coronal  and sagittal reformatted images were reconstructed from the axial data.   FINDINGS:   MEDIASTINUM AND LYMPH NODES: There is fat stranding/edema along the tract of the tracheostomy about discrete fluid collection. The esophageal wall appears within normal limits. There are numerous enlarged bilateral supraclavicular lymph nodes measuring up to 1.4 cm on the left. There are numerous enlarged bilateral axillary lymph nodes measuring up to 1.1 cm on the right and 1.2 cm on the left. There are numerous prominent to mildly enlarged mediastinal lymph nodes as well. No pneumomediastinum.   VESSELS:  Normal caliber thoracic aorta. Moderate aortic atherosclerosis. The main pulmonary artery is normal in size.   HEART: Normal size.  Mild coronary artery calcifications. No significant pericardial effusion.   LUNG, AIRWAYS, PLEURA: Tracheostomy noted in appropriate position. There is trace mucus in the left mainstem bronchus and at the origin of the left upper lobe and lower lobe bronchi. There are mild peribronchovascular ground-glass opacities in the posterior segment of the right upper lobe, basal segments of the right lower lobe. There is mild dependent bibasilar atelectasis. There is no pleural effusion.   OSSEOUS STRUCTURES: No acute osseous abnormality.   CHEST WALL SOFT TISSUES: No discernible acute abnormality.   UPPER ABDOMEN/OTHER: No acute abnormality.        1. Mild peribronchovascular ground-glass opacities in the posterior right upper lobe and basal segments of the right lower lobe. This appearance and distribution is not typical for alveolar hemorrhage which tends to be centrilobular and bilateral asymmetric in distribution. Findings are most suggestive of pneumonia.   2. Small amount of mucous debris in the left mainstem bronchus and at the origin of the left upper and lower lobe bronchi.   3. Diffuse lymphadenopathy in the bilateral axillary regions, mediastinum and left supraclavicular region particularly  notable in the bilateral supraclavicular region. This is progressed in the left supraclavicular region and new elsewhere when compared to 08/19/2023 CTA head/neck. Correlate for any history of known head/neck malignancy or lymphoma.   4. Inflammation/fat stranding adjacent to the tracheostomy tube entry site noted. Correlate for air the met. No evidence of fluid collection.   MACRO: None.   Signed by: Marco A Zurita 3/22/2025 7:10 PM Dictation workstation:   [id number]     XR chest 1 view     Result Date: 3/22/2025  Interpreted By:  Fermin Stroud, STUDY: XR CHEST 1 VIEW   INDICATION: Signs/Symptoms:hemoptysis.   COMPARISON: August 19, 2023   ACCESSION NUMBER(S): [id number]   ORDERING CLINICIAN: MARCO A HAHN   FINDINGS: No consolidation, effusion, edema, or pneumothorax. Heart size within normal limits.        No evidence of acute intrathoracic abnormality.   Signed by: Fermin Stroud 3/22/2025 2:01 PM Dictation workstation:   [id number]     XR chest 1 view     Result Date: 3/14/2025  * * *Final Report* * * DATE OF EXAM: Mar 14 2025  8:52AM   S5X   5290  -  XR CHEST 1V FRONTAL   / ACCESSION #  [id number] PROCEDURE REASON: new bleeding from trach      * * * * Physician Interpretation * * * *  EXAMINATION:  CHEST RADIOGRAPH (PORTABLE SINGLE VIEW AP) Exam Date/Time:  3/14/2025 8:52 AM Clinical History:  new bleeding from trach Comparison:  03/03/2025 RESULT: LINES, TUBES, AND DEVICES:  Tracheostomy. CARDIOMEDIASTINAL SILHOUETTE:  The cardiac and mediastinal silhouettes appear unremarkable. LUNGS AND PLEURA: No consolidation.  No pleural effusion. No pulmonary vascular congestion. No pneumothorax identified. OTHER:  N/A     IMPRESSION: No acute abnormality identified. : PSCB   Transcribe Date/Time: Mar 14 2025  9:07A Dictated by : MARCO A EVANS MD This examination was interpreted and the report reviewed and electronically signed by: MARCO A EVANS MD on Mar 14 2025  9:11AM  EST     US abdomen  complete     Result Date: 3/11/2025  ABDOMEN ULTRASOUND-COMPLETE FINDINGS: Abdomen Ultrasound Complete: PROCEDURE: Multiple grayscale mages of the abdomen were obtained. FINDINGS:Multiple real time images demonstrate the liver with increased echogenicity consistent with fatty infiltration. There is no evidence of a discrete mass within the liver. The liver is enlarged, measured at 15.7 cm in size. The gallbladder is seen with no evidence of cholelithiasis. The gallbladder wall is within normal limits. The common bile duct is within normal limits. The visualized pancreas appears unremarkable. Both kidneys are seen with no evidence of hydronephrosis or a calculus. The spleen has its normal homogeneous echo appearance. There is no free fluid to suggest ascites. The visualized aorta appears unremarkable. The inferior vena cava appears patent. CONCLUSION: Enlarged fatty liver. ELECTRONICALLY SIGNED BY LEONEL LAN M.D. 3/11/2025 5:00:52 PM EDT.     XR chest 1 view     Result Date: 3/3/2025  * * *Final Report* * * DATE OF EXAM: Mar  3 2025  1:40PM   S5X   5290  -  XR CHEST 1V FRONTAL   / ACCESSION #  [id number] PROCEDURE REASON: resp failure      * * * * Physician Interpretation * * * *  EXAMINATION:  CHEST RADIOGRAPH (PORTABLE SINGLE VIEW AP) Exam Date/Time:  3/3/2025 1:40 PM Indication:   resp failure M:  XCP_4 Comparison:  1 day prior RESULT: Lines, tubes, and devices:  Tracheostomy tube remains in situ. Lungs and pleura:  Mild patchy opacities right lower lung zone seen on the prior study are no longer appreciated.  No confluent opacity.   Costophrenic angles are clear.  No pneumothorax. Cardiomediastinal silhouette:  Stable cardiomediastinal silhouette. Other:  -     IMPRESSION: Improved aeration right lower lung zone.  No acute cardiopulmonary finding. : HANY   Transcribe Date/Time: Mar  3 2025  2:49P Dictated by : ISRAEL BENSON MD This examination was interpreted and the report reviewed  and electronically signed by: ISRAEL BENSON MD on Mar  3 2025  2:49PM  EST     XR chest 1 view     Result Date: 3/2/2025  * * *Final Report* * * DATE OF EXAM: Mar  2 2025 11:25AM   MMX   5376  -  XR CHEST 1V FRONTAL PORT  / ACCESSION #  [id number] PROCEDURE REASON: Shortness of breath      * * * * Physician Interpretation * * * *  EXAMINATION:  CHEST RADIOGRAPH (PORTABLE SINGLE VIEW AP) Exam Date/Time:  3/2/2025 11:25 AM CLINICAL HISTORY: Shortness of breath MQ:  XCPR_5 Comparison:  02/20/2025. RESULT: This is a limited examination due to multiple wires and tubing obscuring the lower lungs. Lines, tubes, and devices:  A tracheostomy tube remains in place. Lungs and pleura:  There are increased bronchovascular markings in the right lower lung which may be due to bronchitis or early changes of aspiration pneumonia.  Clinical correlation and follow-up exams are recommended. Cardiomediastinal silhouette:  Stable cardiomediastinal silhouette. Other:  The visualized bony thorax appears unremarkable.     IMPRESSION: Nonspecific parenchymal changes in the right lower lung as described above. A follow-up exam is recommended. : PSCB   Transcribe Date/Time: Mar  2 2025 11:53A Dictated by : BJ INIGUEZ MD This examination was interpreted and the report reviewed and electronically signed by: BJ INIGUEZ MD on Mar  2 2025 11:54AM  EST     XR chest 1 view     Result Date: 2/28/2025  * * *Final Report* * * DATE OF EXAM: Feb 28 2025 10:36AM   MMX   5290  -  XR CHEST 1V FRONTAL   / ACCESSION #  [id number] PROCEDURE REASON: Shortness of breath      * * * * Physician Interpretation * * * *  EXAMINATION:  CHEST RADIOGRAPH (SINGLE VIEW AP OR PA) CLINICAL HISTORY: Shortness of breath MQ:  XC1_5 Comparison:  02/22/2025 RESULT: Lines, tubes, and devices:  Tracheostomy tube is noted. Lungs and pleura:  No consolidation. No lung mass. No pleural effusion. Cardiomediastinal silhouette:  Normal cardiomediastinal  silhouette. Other:  No acute osseous pathology.     IMPRESSION: No acute radiographic abnormality. : [facility]   Transcribe Date/Time: Feb 28 2025 12:07P Dictated by : DIANNE RAMOS MD This examination was interpreted and the report reviewed and electronically signed by: DIANNE RAMOS MD on Feb 28 2025 12:08PM  EST     XR abdomen 2 views supine and erect or decub     Result Date: 2/24/2025  * * *Final Report* * * DATE OF EXAM: Feb 24 2025  6:03PM   MMX   5289  -  XR ABDOMEN 1V SUPINE  / ACCESSION #  [id number] PROCEDURE REASON: Evaluate tube, line or lead position      * * * * Physician Interpretation * * * *  EXAMINATION:  XR ABDOMEN 1V SUPINE CLINICAL HISTORY:   Evaluate tube, line or lead position Technique:   XR ABDOMEN 1V SUPINE -- NOT APPLICABLE with 1 views on 1 images Comparison: 2/21/2025 RESULT: Feeding tube with distal tip at the pylorus antrum. Moderate colonic stool burden. No dilated bowel. Lung structures are intact.     IMPRESSION: Feeding tube with distal tip at the pylorus antrum. Moderate colonic stool burden. No dilated bowel. : [facility]   Transcribe Date/Time: Feb 24 2025  6:23P Dictated by : PABLO RAMOS MD This examination was interpreted and the report reviewed and electronically signed by: PABLO RAMOS MD on Feb 24 2025  6:24PM  EST     CT head wo IV contrast     Result Date: 2/23/2025  * * *Final Report* * * DATE OF EXAM: Feb 23 2025 12:04PM   [facility]   [id number]  -  CT BRAIN WO IVCON  / ACCESSION #  [id number] PROCEDURE REASON: Stroke, follow up      * * * * Physician Interpretation * * * *  EXAMINATION: CT BRAIN WO IVCON CLINICAL HISTORY: Stroke, follow up TECHNIQUE:  Serial axial images without IV contrast were obtained from the vertex to the foramen magnum. MQ:  CTBWO_3 CT Radiation dose: Integrated Dose-Length Product (DLP) for this visit =   778  mGy*cm CT Dose Reduction Employed: Iterative recon COMPARISON: MRI brain 02/19/2025 RESULT: Post-operative change: None. Acute  change: Evolving acute infarct along the central eli and superior left medulla (2:7-9).  No evidence of hemorrhagic transformation. Hemorrhage: No evidence of acute intracranial hemorrhage. Mass Lesion / Mass Effect: There is no evidence of an intracranial mass or extraaxial fluid collection. No significant mass effect. Chronic change: Stable remote left MCA territory encephalomalacia. Parenchyma: There is no significant volume loss. The brain parenchyma is otherwise within normal limits for age. Ventricles: The ventricles are within normal limits of size and configuration for age. Paranasal sinuses and skull base: Partial opacification of the left maxillary and sphenoid sinus and multiple bilateral ethmoid air cells.   The remaining visualized paranasal sinuses are grossly clear. The skull base and imaged soft tissues are unremarkable. Localizer images: No additional findings.     IMPRESSION: Evolving acute brainstem infarct.  No evidence of hemorrhagic transformation. Stable remote left MCA territory infarct. : [facility]   Transcribe Date/Time: Feb 23 2025 12:48P Dictated by : FRANKY GUZMAN MD   This examination was interpreted and the report reviewed and electronically signed by: FRANKY GUZMAN MD on Feb 23 2025 12:52PM  EST     XR chest 1 view     Result Date: 2/22/2025  * * *Final Report* * * DATE OF EXAM: Feb 22 2025  6:56AM   [facility]   [id number]  -  XR CHEST 1V FRONTAL   / ACCESSION #  [id number] PROCEDURE REASON: Evaluate tube, line,  or lead position      * * * * Physician Interpretation * * * *  EXAMINATION:  CHEST RADIOGRAPH (SINGLE VIEW AP OR PA) CLINICAL HISTORY: Evaluate tube, line,  or lead position MQ:  XC1_5 Comparison:  02/21/2025. RESULT: Lines, tubes, and devices:  An ET tube and feeding tube remain in place.   A poorly defined right venous catheter cannot be excluded. Lungs and pleura:  There are persistent bilateral lower lungs infiltrates seen on the right more than left suggestive of pneumonia  such as aspiration pneumonia.  There is no definite pleural effusion. Cardiomediastinal silhouette:  Stable cardiomediastinal silhouette. Other:  The visualized bony thorax appears unremarkable.     IMPRESSION: Persistent bilateral lower lungs infiltrates as described above. A follow-up exam is recommended. : [facility]   Transcribe Date/Time: Feb 22 2025  8:20A Dictated by : BJ INIGUEZ MD This examination was interpreted and the report reviewed and electronically signed by: BJ INIGUEZ MD on Feb 22 2025  8:21AM  EST     XR chest 1 view     Result Date: 2/21/2025  * * *Final Report* * * DATE OF EXAM: Feb 21 2025  5:59PM   [facility]   [id number]  -  XR CHEST 1V FRONTAL PORT  / ACCESSION #  [id number] PROCEDURE REASON: Evaluate tube, line,  or lead position      * * * * Physician Interpretation * * * *  EXAMINATION:  CHEST RADIOGRAPH (PORTABLE SINGLE VIEW AP) Exam Date/Time:  2/21/2025 5:59 PM CLINICAL HISTORY: Evaluate tube, line,  or lead position MQ:  XCPR_5 Comparison:  02/21/2025 RESULT: Lines, tubes, and devices: Endotracheal tube in place terminating in enteric tube in place terminating below the field of view. Lungs and pleura: No pneumothorax.  No pleural effusion.  Bilateral mild interstitial opacities.  The right costophrenic angle is not included in the field-of-view. Cardiomediastinal silhouette:  Stable cardiomediastinal silhouette. Other:  .     IMPRESSION: Mild interstitial opacities may be related to pulmonary vascular congestion. : [facility]   Transcribe Date/Time: Feb 21 2025  6:55P Dictated by : IRWIN VALDIVIA MD This examination was interpreted and the report reviewed and electronically signed by: IRWIN VALDIVIA MD on Feb 21 2025  6:57PM  EST     XR abdomen 2 views supine and erect or decub     Result Date: 2/21/2025  * * *Final Report* * * DATE OF EXAM: Feb 21 2025 10:32AM   [facility]   [id number]  -  XR ABDOMEN 1V SUPINE  / ACCESSION #  [id number] PROCEDURE REASON:  Evaluate tube, line or lead position      * * * * Physician Interpretation * * * *  Clinical Information: Feeding tube Single view of the abdomen was obtained. Feeding tube tip within the distal stomach. There is a nonspecific, nonobstructive bowel gas pattern. No abnormal abdominal masses are identified.  No pathologic calcifications.   No acute bony abnormalities are seen.     IMPRESSION: Nonspecific, nonobstructive bowel gas pattern. Feeding tube tip within the distal stomach. : [facility]   Transcribe Date/Time: Feb 21 2025 10:43A Dictated by : PAMELA EVANS MD This examination was interpreted and the report reviewed and electronically signed by: PAMELA EVANS MD on Feb 21 2025 10:44AM  EST     XR chest 1 view     Result Date: 2/21/2025  * * *Final Report* * * DATE OF EXAM: Feb 21 2025 10:32AM   [facility]   [id number]  -  XR CHEST 1V FRONTAL PORT  / ACCESSION #  [id number] PROCEDURE REASON: Shortness of breath      * * * * Physician Interpretation * * * *  EXAMINATION:  CHEST RADIOGRAPH (PORTABLE SINGLE VIEW AP) Exam Date/Time:  2/21/2025 10:32 AM Clinical History:  Shortness of breath Comparison:  02/17/2025 RESULT: LINES, TUBES, AND DEVICES:  Feeding tube tip beyond the inferior extent of the image. CARDIOMEDIASTINAL SILHOUETTE:  The cardiac and mediastinal silhouettes appear unremarkable. LUNGS AND PLEURA: No consolidation.  No pleural effusion. No pulmonary vascular congestion. No pneumothorax identified. OTHER:  N/A     IMPRESSION: No acute abnormality identified. : [facility]   Transcribe Date/Time: Feb 21 2025 10:41A Dictated by : PAMELA EVANS MD This examination was interpreted and the report reviewed and electronically signed by: PAMELA EVANS MD on Feb 21 2025 10:43AM  EST            Assessment/Plan     Assessment & Plan  Hemoptysis     Tracheostomy in place (Multi)     CVA, old, hemiparesis (Multi)     Status post insertion of percutaneous endoscopic gastrostomy (PEG) tube (Multi)      Pneumonia due to infectious organism     Right sided weakness     Atrial flutter (Multi)     Insulin dependent type 2 diabetes mellitus (Multi)        Telemetry monitoring  Hemoglobin stable 8's recently on review of outside labs, monitor for ongoing bleeding.  Daily H&H  Monitor for overt bleeding  Consult pulmonology, appreciate input  Pneumonia suspected for CT findings  Tracheostomy protocol  Expected cover with Zosyn and vancomycin  MRSA swab  Urine for Legionella antigen and strep pneumonia antigen  Supplemental oxygen as needed  Nebulizers as needed  RT evaluate and treat  Check procalcitonin  Typical 20% zinc oxide to wounds and groin  Continuous tube feeds, continue at 80 mL an hour x 22 hours  Accu-Chek every 6 hours  Hypoglycemia protocol  PT/OT  Anticoagulation and antiplatelet therapy on hold, resume once cleared by pulmonology  Continue patient's home medications for chronic issues as appropriate     Patient fully evaluated on March 23.  Continue to monitor H&H.  Pulmonary consultation obtained.  Continue broad-spectrum antibiotics to rule out infectious process.  Recheck labs in AM.                 Zeus Bone MD                           Patient fully evaluated  3/25  for    Problem List Items Addressed This Visit       * (Principal) Hemoptysis - Primary     Other Visit Diagnoses       Anemia, unspecified type        Tracheostomy dependent (Multi)              Patient seen resting in bed with head of bed elevated, no s/s or c/o acute difficulties at this time.  Vital signs for last 24 hours Temp:  [35.9 °C (96.6 °F)-37.9 °C (100.2 °F)] 35.9 °C (96.6 °F)  Heart Rate:  [] 134  Resp:  [19-26] 19  BP: ()/(50-80) 138/66  FiO2 (%):  [30 %] 30 %    No intake/output data recorded.  Patient still requiring frequent cardiac and SPO2 monitoring. Continue aggressive pulmonary hygiene and oral hygiene. Off loading as tolerated for skin integrity. Medications and labs reviewed-   Results for  orders placed or performed during the hospital encounter of 03/22/25 (from the past 24 hours)   Albumin-Creatinine Ratio, Urine Random   Result Value Ref Range    Albumin, Urine Random <7.0 Not established mg/L    Creatinine, Urine Random 78.3 20.0 - 370.0 mg/dL    Albumin/Creatinine Ratio     Urine electrolytes   Result Value Ref Range    Sodium, Urine Random 16 mmol/L    Sodium/Creatinine Ratio 20 Not established. mmol/g Creat    Potassium, Urine Random 49 mmol/L    Potassium/Creatinine Ratio 63 Not established mmol/g Creat    Chloride, Urine Random <15 mmol/L    Chloride/Creatinine Ratio      Creatinine, Urine Random 78.3 20.0 - 370.0 mg/dL   Osmolality, urine   Result Value Ref Range    Osmolality, Urine Random 705 200 - 1,200 mOsm/kg   Urea Nitrogen, Urine Random   Result Value Ref Range    Urea Nitrogen, Urine Random 853 mg/dL    Creatinine, Urine Random 78.3 20.0 - 370.0 mg/dL    Urea Nitrogen/Creatinine Ratio 10.9 Not established. g/g creat   Eosinophil smear   Result Value Ref Range    Eosinophils None (N) (none)   POCT GLUCOSE   Result Value Ref Range    POCT Glucose 346 (H) 74 - 99 mg/dL   POCT GLUCOSE   Result Value Ref Range    POCT Glucose 387 (H) 74 - 99 mg/dL   Electrocardiogram, 12-lead PRN ACS symptoms   Result Value Ref Range    Ventricular Rate 154 BPM    Atrial Rate 153 BPM    QRS Duration 78 ms    QT Interval 250 ms    QTC Calculation(Bazett) 400 ms    R Axis -26 degrees    T Axis 82 degrees    QRS Count 25 beats    Q Onset 229 ms    P Onset 181 ms    P Offset 201 ms    T Offset 354 ms    QTC Fredericia 342 ms   POCT GLUCOSE   Result Value Ref Range    POCT Glucose 344 (H) 74 - 99 mg/dL   Occult Blood, Stool    Specimen: Stool   Result Value Ref Range    Occult Blood, Stool X1 Positive (A) Negative   C. difficile, PCR    Specimen: Stool   Result Value Ref Range    C. difficile, PCR Not Detected Not Detected   Stool Pathogen Panel, PCR    Specimen: Stool   Result Value Ref Range    Campylobacter  Group Not Detected Not Detected    Salmonella species Not Detected Not Detected    Shigella species Not Detected Not Detected    Vibrio Group Not Detected Not Detected    Yersinia Enterocolitica Not Detected Not Detected    Shiga Toxin 1 Not Detected Not Detected    Shiga Toxin 2 Not Detected Not Detected    Norovirus GI/GII Not Detected Not Detected    Rotavirus A Not Detected Not Detected   PTH, Intact   Result Value Ref Range    Parathyroid Hormone, Intact 85.2 18.5 - 88.0 pg/mL   POCT GLUCOSE   Result Value Ref Range    POCT Glucose 374 (H) 74 - 99 mg/dL   Iron and TIBC   Result Value Ref Range    Iron 42 35 - 150 ug/dL    UIBC 196 110 - 370 ug/dL    TIBC 238 (L) 240 - 445 ug/dL    % Saturation 18 (L) 25 - 45 %   Ferritin   Result Value Ref Range    Ferritin 66 20 - 300 ng/mL   Uric Acid   Result Value Ref Range    Uric Acid 5.6 4.0 - 7.5 mg/dL   Myoglobin   Result Value Ref Range    Myoglobin 151 (H) <=92 ug/L   CBC   Result Value Ref Range    WBC 12.2 (H) 4.4 - 11.3 x10*3/uL    nRBC 2.1 (H) 0.0 - 0.0 /100 WBCs    RBC 2.70 (L) 4.50 - 5.90 x10*6/uL    Hemoglobin 7.3 (L) 13.5 - 17.5 g/dL    Hematocrit 26.0 (L) 41.0 - 52.0 %    MCV 96 80 - 100 fL    MCH 27.0 26.0 - 34.0 pg    MCHC 28.1 (L) 32.0 - 36.0 g/dL    RDW 16.6 (H) 11.5 - 14.5 %    Platelets 223 150 - 450 x10*3/uL   Comprehensive Metabolic Panel   Result Value Ref Range    Glucose 290 (H) 74 - 99 mg/dL    Sodium 153 (H) 136 - 145 mmol/L    Potassium 3.7 3.5 - 5.3 mmol/L    Chloride 112 (H) 98 - 107 mmol/L    Bicarbonate 33 (H) 21 - 32 mmol/L    Anion Gap 12 10 - 20 mmol/L    Urea Nitrogen 45 (H) 6 - 23 mg/dL    Creatinine 1.35 (H) 0.50 - 1.30 mg/dL    eGFR 58 (L) >60 mL/min/1.73m*2    Calcium 8.5 (L) 8.6 - 10.3 mg/dL    Albumin 3.2 (L) 3.4 - 5.0 g/dL    Alkaline Phosphatase 182 (H) 33 - 136 U/L    Total Protein 6.1 (L) 6.4 - 8.2 g/dL    AST 97 (H) 9 - 39 U/L    Bilirubin, Total 0.4 0.0 - 1.2 mg/dL     (H) 10 - 52 U/L   Phosphorus   Result Value Ref  Range    Phosphorus 2.7 2.5 - 4.9 mg/dL   POCT GLUCOSE   Result Value Ref Range    POCT Glucose 307 (H) 74 - 99 mg/dL   POCT GLUCOSE   Result Value Ref Range    POCT Glucose 312 (H) 74 - 99 mg/dL      Patient recently received an antibiotic (last 12 hours)       Date/Time Action Medication Dose    03/25/25 1540 New Bag    piperacillin-tazobactam (Zosyn) 3.375 g in dextrose (iso) IV 50 mL 3.375 g    03/25/25 0949 New Bag    piperacillin-tazobactam (Zosyn) 3.375 g in dextrose (iso) IV 50 mL 3.375 g           Pt fully evaluated 3/25. Hematocrit 26.2 and hemoglobin 7.3. WBC at 14.8. Added cardio and increased fluids and free water flushes to q4. Plan discussed with interdisciplinary team, continue current and repeat labs in the AM.     Discharge planning discussed with patient and care team. [facility] evaluations ordered[facility] at [facility]. Patient aware and agreeable to current plan, continue plan as above.     I spent a total of 50 minutes on the date of the service which included preparing to see the patient, face-to-face patient care, completing clinical documentation, obtaining and/or reviewing separately obtained history, performing a medically appropriate examination, counseling and educating the patient/family/caregiver, ordering medications, tests, or procedures, communicating with other HCPs (not separately reported), independently interpreting results (not separately reported), communicating results to the patient/family/caregiver, and care coordination (not separately reported).      Patient fully evaluated  03/26  for    Problem List Items Addressed This Visit       * (Principal) Hemoptysis - Primary     Other Visit Diagnoses       Anemia, unspecified type        Tracheostomy dependent (Multi)              Patient seen resting in bed with head of bed elevated, no s/s or c/o acute difficulties at this time.  Vital signs for last 24 hours Temp:  [35.9 °C (96.6 °F)-37.9 °C (100.2 °F)]  35.9 °C (96.6 °F)  Heart Rate:  [] 134  Resp:  [19-26] 19  BP: ()/(50-80) 138/66  FiO2 (%):  [30 %] 30 %    No intake/output data recorded.  Patient still requiring frequent cardiac and SPO2 monitoring. Continue aggressive pulmonary hygiene and oral hygiene. Off loading as tolerated for skin integrity. Medications and labs reviewed-   Results for orders placed or performed during the hospital encounter of 03/22/25 (from the past 24 hours)   Albumin-Creatinine Ratio, Urine Random   Result Value Ref Range    Albumin, Urine Random <7.0 Not established mg/L    Creatinine, Urine Random 78.3 20.0 - 370.0 mg/dL    Albumin/Creatinine Ratio     Urine electrolytes   Result Value Ref Range    Sodium, Urine Random 16 mmol/L    Sodium/Creatinine Ratio 20 Not established. mmol/g Creat    Potassium, Urine Random 49 mmol/L    Potassium/Creatinine Ratio 63 Not established mmol/g Creat    Chloride, Urine Random <15 mmol/L    Chloride/Creatinine Ratio      Creatinine, Urine Random 78.3 20.0 - 370.0 mg/dL   Osmolality, urine   Result Value Ref Range    Osmolality, Urine Random 705 200 - 1,200 mOsm/kg   Urea Nitrogen, Urine Random   Result Value Ref Range    Urea Nitrogen, Urine Random 853 mg/dL    Creatinine, Urine Random 78.3 20.0 - 370.0 mg/dL    Urea Nitrogen/Creatinine Ratio 10.9 Not established. g/g creat   Eosinophil smear   Result Value Ref Range    Eosinophils None (N) (none)   POCT GLUCOSE   Result Value Ref Range    POCT Glucose 346 (H) 74 - 99 mg/dL   POCT GLUCOSE   Result Value Ref Range    POCT Glucose 387 (H) 74 - 99 mg/dL   Electrocardiogram, 12-lead PRN ACS symptoms   Result Value Ref Range    Ventricular Rate 154 BPM    Atrial Rate 153 BPM    QRS Duration 78 ms    QT Interval 250 ms    QTC Calculation(Bazett) 400 ms    R Axis -26 degrees    T Axis 82 degrees    QRS Count 25 beats    Q Onset 229 ms    P Onset 181 ms    P Offset 201 ms    T Offset 354 ms    QTC Fredericia 342 ms   POCT GLUCOSE   Result Value  Ref Range    POCT Glucose 344 (H) 74 - 99 mg/dL   Occult Blood, Stool    Specimen: Stool   Result Value Ref Range    Occult Blood, Stool X1 Positive (A) Negative   C. difficile, PCR    Specimen: Stool   Result Value Ref Range    C. difficile, PCR Not Detected Not Detected   Stool Pathogen Panel, PCR    Specimen: Stool   Result Value Ref Range    Campylobacter Group Not Detected Not Detected    Salmonella species Not Detected Not Detected    Shigella species Not Detected Not Detected    Vibrio Group Not Detected Not Detected    Yersinia Enterocolitica Not Detected Not Detected    Shiga Toxin 1 Not Detected Not Detected    Shiga Toxin 2 Not Detected Not Detected    Norovirus GI/GII Not Detected Not Detected    Rotavirus A Not Detected Not Detected   PTH, Intact   Result Value Ref Range    Parathyroid Hormone, Intact 85.2 18.5 - 88.0 pg/mL   POCT GLUCOSE   Result Value Ref Range    POCT Glucose 374 (H) 74 - 99 mg/dL   Iron and TIBC   Result Value Ref Range    Iron 42 35 - 150 ug/dL    UIBC 196 110 - 370 ug/dL    TIBC 238 (L) 240 - 445 ug/dL    % Saturation 18 (L) 25 - 45 %   Ferritin   Result Value Ref Range    Ferritin 66 20 - 300 ng/mL   Uric Acid   Result Value Ref Range    Uric Acid 5.6 4.0 - 7.5 mg/dL   Myoglobin   Result Value Ref Range    Myoglobin 151 (H) <=92 ug/L   CBC   Result Value Ref Range    WBC 12.2 (H) 4.4 - 11.3 x10*3/uL    nRBC 2.1 (H) 0.0 - 0.0 /100 WBCs    RBC 2.70 (L) 4.50 - 5.90 x10*6/uL    Hemoglobin 7.3 (L) 13.5 - 17.5 g/dL    Hematocrit 26.0 (L) 41.0 - 52.0 %    MCV 96 80 - 100 fL    MCH 27.0 26.0 - 34.0 pg    MCHC 28.1 (L) 32.0 - 36.0 g/dL    RDW 16.6 (H) 11.5 - 14.5 %    Platelets 223 150 - 450 x10*3/uL   Comprehensive Metabolic Panel   Result Value Ref Range    Glucose 290 (H) 74 - 99 mg/dL    Sodium 153 (H) 136 - 145 mmol/L    Potassium 3.7 3.5 - 5.3 mmol/L    Chloride 112 (H) 98 - 107 mmol/L    Bicarbonate 33 (H) 21 - 32 mmol/L    Anion Gap 12 10 - 20 mmol/L    Urea Nitrogen 45 (H) 6 - 23  mg/dL    Creatinine 1.35 (H) 0.50 - 1.30 mg/dL    eGFR 58 (L) >60 mL/min/1.73m*2    Calcium 8.5 (L) 8.6 - 10.3 mg/dL    Albumin 3.2 (L) 3.4 - 5.0 g/dL    Alkaline Phosphatase 182 (H) 33 - 136 U/L    Total Protein 6.1 (L) 6.4 - 8.2 g/dL    AST 97 (H) 9 - 39 U/L    Bilirubin, Total 0.4 0.0 - 1.2 mg/dL     (H) 10 - 52 U/L   Phosphorus   Result Value Ref Range    Phosphorus 2.7 2.5 - 4.9 mg/dL   POCT GLUCOSE   Result Value Ref Range    POCT Glucose 307 (H) 74 - 99 mg/dL   POCT GLUCOSE   Result Value Ref Range    POCT Glucose 312 (H) 74 - 99 mg/dL      Patient recently received an antibiotic (last 12 hours)       Date/Time Action Medication Dose    03/26/25 0903 New Bag    piperacillin-tazobactam (Zosyn) 3.375 g in dextrose (iso) IV 50 mL 3.375 g           Plan discussed with interdisciplinary team, patient with hemoccult positive, protonix 40mg IVP BID, GI consulted, appreciate input. Patient seen by nephrology today with plan for IV iron and erythropoietin, potential nephrotoxins and hepatotoxins, monitor renal chemistries and CBC continue current and repeat labs in the AM.     Discharge planning discussed with patient and care team. Therapy evaluations ordered. Anticipate HHC/SNF at discharge. Patient aware and agreeable to current plan, continue plan as above.     I spent a total of 50 minutes on the date of the service which included preparing to see the patient, face-to-face patient care, completing clinical documentation, obtaining and/or reviewing separately obtained history, performing a medically appropriate examination, counseling and educating the patient/family/caregiver, ordering medications, tests, or procedures, communicating with other HCPs (not separately reported), independently interpreting results (not separately reported), communicating results to the patient/family/caregiver, and care coordination (not separately reported).   Alondra Barron

## 2025-03-26 NOTE — SIGNIFICANT EVENT
"Internal Medicine Medicine Rapid Response Note    Name: Bryan Nix  Age: 64 y.o.  Location: 2/812-A  Code Status: Full Code    Narrative Summary:    This is a 64-year-old male with a past medical history of a flutter on Eliquis, diabetes, CAD, hypertension, CVA with residual deficits, chronic hypoxic respiratory failure s/p tracheostomy who presented on 3/23 for bleeding from his tracheostomy site.  Rapid response was called for atrial fibrillation with RVR.  On initial evaluation the patient was sustaining heart rates around 150 bpm, his vitals are stable with maps around 70.  EKG obtained showed atrial flutter with heart rate 154 bpm the patient does wake up to his name but is unable to speak without his tracheostomy valve and has minimal movement of his extremities which is baseline.  He does have diminished kidney function however creatinine clearance does permit loaded with digoxin.  The patient has been in and out of atrial flutter throughout the day according to nursing staff and received a 1 one-time dose of Lopressor earlier today.  Given his residual deficits from CVA the decision was made to also start the patient on amiodarone infusion for better rate control.  We will continue to follow    Physical Exam:    /58   Pulse (!) 155   Temp 37.2 °C (99 °F)   Resp 22   Ht 1.88 m (6' 2\")   Wt 77.1 kg (170 lb)   SpO2 95%   BMI 21.83 kg/m²     Physical Exam:    GENERAL: Chronically ill, tracheostomy in place, responsive to name  HEENT: Normocephalic and atraumatic.  CARDIOVASCULAR: Tachycardic, irregular rhythm.  No murmurs, rubs, or gallops. S1/S2.  RESPIRATORY: Clear to auscultation b/l. No wheezes, rales or rhonchi. Normal effort.   ABDOMEN: Soft, non-tender. Not distended. No rebound or guarding.  EXTREMITIES: No peripheral edema.  NEUROLOGICAL: At baseline  SKIN: Warm and dry, no lesions, no rashes.  PSYCH: Appropriate mood and affect.    Assessment/Plan:    A flutter with RVR  -EKG during " rapid response showed ventricular rate of 154 bpm  -Vitals remained stable throughout  -Will load with digoxin and start amiodarone for rate control.  Eliquis held due to concerns for bleeding  -Cardiology consulted, primary team updated      Disposition: Intermediate Care Unit      Sukumar Peña MD  [facility]

## 2025-03-26 NOTE — CONSULTS
"Reason For Consult  Rectal bleeding    This note was created using voice recognition transcription software. Despite proofreading, unintentional typographical errors may be present. Please contact the GI office with any questions or concerns.       This is a 63 yo Male with a PMH of Aflutter (on Eliquis), hyponatremia, IDDM, CAD, hypertension, chronic wounds, diffuse lymphadenopathy, anemia, tobacco use, elevated PSA, CVA LMCA (8/23 and 12/20) with right-sided deficits and an acute nonhemorrhagic brainstem infarct (2/27/25), left ICA stenosis (85%) s/p angioplasty and left carotid artery stent (08/25/2023), and recent history of acute pontine stroke 02/27/25 with acute hypoxic respiratory failure s/p tracheostomy on 2/25/2025 and PEG  who presented to [facility] on 3/22/25 with reports of coughing out blood from tracheostomy.  GI was consulted for rectal bleeding.    Wife and brother are at the bedside.      Subjective  Per wife, patient coughed up dark blood clots out of his trach (this is what brought him in).  She agrees he was previously noncompliant with prior AC orders and recently placed on Eliquis/Plavix after his most recent stroke.  Black stools in FMS.         EGD-2/25/25 for PEG placement and showed gastric erosions with ulcerations and an adherent clot over a duodenal ulcer.  Colonoscopy-Per wife, awhile ago with unknown findings.  BM-Daily.  Normal consistency.  No bleeding or weight loss.  FHX-\"cancer in the family\"  SHX-No tobacco/illicits/ETOH.  Was smoking cigarettes up until his last stroke.  Ab Sx-PEG as stated above.  NSAIDs-Denies         Allergies: as mentioned in H&P      A 10 point review of system is negative except for what is mentioned in the HPI    Vital Signs: Reviewed    Physical Exam:  General: Polite, calm, resting  Skin:  Warm and dry, no jaundice  HEENT: +Trach on RA, no scleral icterus, no conjunctival pallor, normocephalic, atraumatic, mucous membranes moist  Neck:  atraumatic, no " JVD  Chest:  Clear to auscultation bilaterally. No wheezes, rales, or rhonchi  CV:  Regular rate and rhythm.  Positive S1/S2  Abdomen: no distension, +BS, soft, non-tender to palpation, no rebound tenderness, no guarding, no rigidity, no discernible ascites   Extremities: no lower extremity edema, chronic pigmentary changes, no cyanosis  Neurological:  A&Ox3  Psychiatric: cooperative     Investigations:  Labs, radiological imaging and cardiac work up were reviewed      Objective:         3/25/2025    11:46 PM 3/26/2025     2:11 AM 3/26/2025     4:06 AM 3/26/2025     6:00 AM 3/26/2025     7:59 AM 3/26/2025     9:24 AM 3/26/2025    11:13 AM   Vitals   Systolic 109 104 110 135 135 94 117   Diastolic 52 54 66 67 80 50 55   BP Location Right arm Right arm Right arm  Right arm Right arm Right arm   Heart Rate 67 102  119 119 133 94   Temp 36.9 °C (98.4 °F) 37.9 °C (100.2 °F) 36.9 °C (98.4 °F) 36.2 °C (97.2 °F) 36.1 °C (97 °F) 36.3 °C (97.3 °F) 36.2 °C (97.2 °F)   Resp 24  26  21 20 20          Medications:  acetylcysteine, 1 mL, nebulization, TID  [Held by provider] apixaban, 5 mg, g-tube, BID  [Held by provider] clopidogrel, 75 mg, g-tube, Daily  docusate sodium, 100 mg, oral, BID  [START ON 3/30/2025] epoetin nicko or biosimilar, 20,000 Units, subcutaneous, Every Sunday  insulin glargine, 30 Units, subcutaneous, Nightly  insulin lispro, 0-10 Units, subcutaneous, q6h  ipratropium, 0.5 mg, nebulization, TID  iron sucrose, 200 mg, intravenous, Daily  metoprolol tartrate, 50 mg, g-tube, TID  oxygen, , inhalation, Continuous - Inhalation  pantoprazole, 40 mg, intravenous, BID  piperacillin-tazobactam, 3.375 g, intravenous, q6h  sucralfate, 1 g, g-tube, BID AC  traZODone, 50 mg, g-tube, Nightly         Recent Results (from the past 72 hours)   POCT GLUCOSE    Collection Time: 03/23/25  4:01 PM   Result Value Ref Range    POCT Glucose 268 (H) 74 - 99 mg/dL   POCT GLUCOSE    Collection Time: 03/23/25 11:37 PM   Result Value Ref  Range    POCT Glucose 243 (H) 74 - 99 mg/dL   Vancomycin    Collection Time: 03/24/25  5:32 AM   Result Value Ref Range    Vancomycin 10.1 5.0 - 20.0 ug/mL   CBC    Collection Time: 03/24/25  5:32 AM   Result Value Ref Range    WBC 14.7 (H) 4.4 - 11.3 x10*3/uL    nRBC 0.9 (H) 0.0 - 0.0 /100 WBCs    RBC 2.81 (L) 4.50 - 5.90 x10*6/uL    Hemoglobin 7.5 (L) 13.5 - 17.5 g/dL    Hematocrit 26.3 (L) 41.0 - 52.0 %    MCV 94 80 - 100 fL    MCH 26.7 26.0 - 34.0 pg    MCHC 28.5 (L) 32.0 - 36.0 g/dL    RDW 16.1 (H) 11.5 - 14.5 %    Platelets 217 150 - 450 x10*3/uL   Comprehensive Metabolic Panel    Collection Time: 03/24/25  5:32 AM   Result Value Ref Range    Glucose 227 (H) 74 - 99 mg/dL    Sodium 151 (H) 136 - 145 mmol/L    Potassium 4.0 3.5 - 5.3 mmol/L    Chloride 110 (H) 98 - 107 mmol/L    Bicarbonate 32 21 - 32 mmol/L    Anion Gap 13 10 - 20 mmol/L    Urea Nitrogen 44 (H) 6 - 23 mg/dL    Creatinine 1.18 0.50 - 1.30 mg/dL    eGFR 68 >60 mL/min/1.73m*2    Calcium 9.5 8.6 - 10.3 mg/dL    Albumin 3.5 3.4 - 5.0 g/dL    Alkaline Phosphatase 136 33 - 136 U/L    Total Protein 7.1 6.4 - 8.2 g/dL    AST 26 9 - 39 U/L    Bilirubin, Total 0.4 0.0 - 1.2 mg/dL    ALT 66 (H) 10 - 52 U/L   POCT GLUCOSE    Collection Time: 03/24/25  6:48 AM   Result Value Ref Range    POCT Glucose 199 (H) 74 - 99 mg/dL   POCT GLUCOSE    Collection Time: 03/24/25 11:09 AM   Result Value Ref Range    POCT Glucose 233 (H) 74 - 99 mg/dL   POCT GLUCOSE    Collection Time: 03/24/25  2:04 PM   Result Value Ref Range    POCT Glucose 299 (H) 74 - 99 mg/dL   POCT GLUCOSE    Collection Time: 03/24/25  9:04 PM   Result Value Ref Range    POCT Glucose 308 (H) 74 - 99 mg/dL   POCT GLUCOSE    Collection Time: 03/25/25  2:09 AM   Result Value Ref Range    POCT Glucose 264 (H) 74 - 99 mg/dL   CBC    Collection Time: 03/25/25  5:58 AM   Result Value Ref Range    WBC 14.8 (H) 4.4 - 11.3 x10*3/uL    nRBC 1.7 (H) 0.0 - 0.0 /100 WBCs    RBC 2.74 (L) 4.50 - 5.90 x10*6/uL     Hemoglobin 7.3 (L) 13.5 - 17.5 g/dL    Hematocrit 26.2 (L) 41.0 - 52.0 %    MCV 96 80 - 100 fL    MCH 26.6 26.0 - 34.0 pg    MCHC 27.9 (L) 32.0 - 36.0 g/dL    RDW 16.9 (H) 11.5 - 14.5 %    Platelets 218 150 - 450 x10*3/uL   Comprehensive Metabolic Panel    Collection Time: 03/25/25  5:58 AM   Result Value Ref Range    Glucose 280 (H) 74 - 99 mg/dL    Sodium 153 (H) 136 - 145 mmol/L    Potassium 3.9 3.5 - 5.3 mmol/L    Chloride 111 (H) 98 - 107 mmol/L    Bicarbonate 34 (H) 21 - 32 mmol/L    Anion Gap 12 10 - 20 mmol/L    Urea Nitrogen 51 (H) 6 - 23 mg/dL    Creatinine 1.50 (H) 0.50 - 1.30 mg/dL    eGFR 51 (L) >60 mL/min/1.73m*2    Calcium 9.4 8.6 - 10.3 mg/dL    Albumin 3.3 (L) 3.4 - 5.0 g/dL    Alkaline Phosphatase 167 (H) 33 - 136 U/L    Total Protein 6.9 6.4 - 8.2 g/dL    AST 36 9 - 39 U/L    Bilirubin, Total 0.5 0.0 - 1.2 mg/dL    ALT 77 (H) 10 - 52 U/L   Magnesium    Collection Time: 03/25/25  5:58 AM   Result Value Ref Range    Magnesium 2.61 (H) 1.60 - 2.40 mg/dL   Electrocardiogram, 12-lead PRN ACS symptoms    Collection Time: 03/25/25  7:30 AM   Result Value Ref Range    Ventricular Rate 97 BPM    Atrial Rate 97 BPM    MI Interval 98 ms    QRS Duration 84 ms    QT Interval 378 ms    QTC Calculation(Bazett) 480 ms    P Axis 23 degrees    R Axis -23 degrees    T Axis 95 degrees    QRS Count 16 beats    Q Onset 228 ms    P Onset 179 ms    P Offset 208 ms    T Offset 417 ms    QTC Fredericia 443 ms   POCT GLUCOSE    Collection Time: 03/25/25  9:26 AM   Result Value Ref Range    POCT Glucose 317 (H) 74 - 99 mg/dL   Electrocardiogram, 12-lead PRN ACS symptoms    Collection Time: 03/25/25 11:25 AM   Result Value Ref Range    Ventricular Rate 142 BPM    Atrial Rate 122 BPM    QRS Duration 78 ms    QT Interval 320 ms    QTC Calculation(Bazett) 492 ms    R Axis -23 degrees    T Axis 113 degrees    QRS Count 24 beats    Q Onset 230 ms    T Offset 390 ms    QTC Fredericia 426 ms   Albumin-Creatinine Ratio, Urine Random     Collection Time: 03/25/25  4:03 PM   Result Value Ref Range    Albumin, Urine Random <7.0 Not established mg/L    Creatinine, Urine Random 78.3 20.0 - 370.0 mg/dL    Albumin/Creatinine Ratio     Urine electrolytes    Collection Time: 03/25/25  4:03 PM   Result Value Ref Range    Sodium, Urine Random 16 mmol/L    Sodium/Creatinine Ratio 20 Not established. mmol/g Creat    Potassium, Urine Random 49 mmol/L    Potassium/Creatinine Ratio 63 Not established mmol/g Creat    Chloride, Urine Random <15 mmol/L    Chloride/Creatinine Ratio      Creatinine, Urine Random 78.3 20.0 - 370.0 mg/dL   Osmolality, urine    Collection Time: 03/25/25  4:03 PM   Result Value Ref Range    Osmolality, Urine Random 705 200 - 1,200 mOsm/kg   Urea Nitrogen, Urine Random    Collection Time: 03/25/25  4:03 PM   Result Value Ref Range    Urea Nitrogen, Urine Random 853 mg/dL    Creatinine, Urine Random 78.3 20.0 - 370.0 mg/dL    Urea Nitrogen/Creatinine Ratio 10.9 Not established. g/g creat   Eosinophil smear    Collection Time: 03/25/25  4:03 PM   Result Value Ref Range    Eosinophils None (N) (none)   POCT GLUCOSE    Collection Time: 03/25/25  4:13 PM   Result Value Ref Range    POCT Glucose 346 (H) 74 - 99 mg/dL   POCT GLUCOSE    Collection Time: 03/25/25  7:03 PM   Result Value Ref Range    POCT Glucose 387 (H) 74 - 99 mg/dL   Electrocardiogram, 12-lead PRN ACS symptoms    Collection Time: 03/25/25  7:10 PM   Result Value Ref Range    Ventricular Rate 154 BPM    Atrial Rate 153 BPM    QRS Duration 78 ms    QT Interval 250 ms    QTC Calculation(Bazett) 400 ms    R Axis -26 degrees    T Axis 82 degrees    QRS Count 25 beats    Q Onset 229 ms    P Onset 181 ms    P Offset 201 ms    T Offset 354 ms    QTC Fredericia 342 ms   POCT GLUCOSE    Collection Time: 03/25/25  8:29 PM   Result Value Ref Range    POCT Glucose 344 (H) 74 - 99 mg/dL   Occult Blood, Stool    Collection Time: 03/25/25 10:32 PM    Specimen: Stool   Result Value Ref Range     Occult Blood, Stool X1 Positive (A) Negative   C. difficile, PCR    Collection Time: 03/25/25 10:32 PM    Specimen: Stool   Result Value Ref Range    C. difficile, PCR Not Detected Not Detected   Stool Pathogen Panel, PCR    Collection Time: 03/25/25 10:32 PM    Specimen: Stool   Result Value Ref Range    Campylobacter Group Not Detected Not Detected    Salmonella species Not Detected Not Detected    Shigella species Not Detected Not Detected    Vibrio Group Not Detected Not Detected    Yersinia Enterocolitica Not Detected Not Detected    Shiga Toxin 1 Not Detected Not Detected    Shiga Toxin 2 Not Detected Not Detected    Norovirus GI/GII Not Detected Not Detected    Rotavirus A Not Detected Not Detected   PTH, Intact    Collection Time: 03/25/25 11:23 PM   Result Value Ref Range    Parathyroid Hormone, Intact 85.2 18.5 - 88.0 pg/mL   POCT GLUCOSE    Collection Time: 03/26/25  2:10 AM   Result Value Ref Range    POCT Glucose 374 (H) 74 - 99 mg/dL   Iron and TIBC    Collection Time: 03/26/25  5:46 AM   Result Value Ref Range    Iron 42 35 - 150 ug/dL    UIBC 196 110 - 370 ug/dL    TIBC 238 (L) 240 - 445 ug/dL    % Saturation 18 (L) 25 - 45 %   Ferritin    Collection Time: 03/26/25  5:46 AM   Result Value Ref Range    Ferritin 66 20 - 300 ng/mL   Uric Acid    Collection Time: 03/26/25  5:46 AM   Result Value Ref Range    Uric Acid 5.6 4.0 - 7.5 mg/dL   Myoglobin    Collection Time: 03/26/25  5:46 AM   Result Value Ref Range    Myoglobin 151 (H) <=92 ug/L   CBC    Collection Time: 03/26/25  5:46 AM   Result Value Ref Range    WBC 12.2 (H) 4.4 - 11.3 x10*3/uL    nRBC 2.1 (H) 0.0 - 0.0 /100 WBCs    RBC 2.70 (L) 4.50 - 5.90 x10*6/uL    Hemoglobin 7.3 (L) 13.5 - 17.5 g/dL    Hematocrit 26.0 (L) 41.0 - 52.0 %    MCV 96 80 - 100 fL    MCH 27.0 26.0 - 34.0 pg    MCHC 28.1 (L) 32.0 - 36.0 g/dL    RDW 16.6 (H) 11.5 - 14.5 %    Platelets 223 150 - 450 x10*3/uL   Comprehensive Metabolic Panel    Collection Time: 03/26/25  5:46  AM   Result Value Ref Range    Glucose 290 (H) 74 - 99 mg/dL    Sodium 153 (H) 136 - 145 mmol/L    Potassium 3.7 3.5 - 5.3 mmol/L    Chloride 112 (H) 98 - 107 mmol/L    Bicarbonate 33 (H) 21 - 32 mmol/L    Anion Gap 12 10 - 20 mmol/L    Urea Nitrogen 45 (H) 6 - 23 mg/dL    Creatinine 1.35 (H) 0.50 - 1.30 mg/dL    eGFR 58 (L) >60 mL/min/1.73m*2    Calcium 8.5 (L) 8.6 - 10.3 mg/dL    Albumin 3.2 (L) 3.4 - 5.0 g/dL    Alkaline Phosphatase 182 (H) 33 - 136 U/L    Total Protein 6.1 (L) 6.4 - 8.2 g/dL    AST 97 (H) 9 - 39 U/L    Bilirubin, Total 0.4 0.0 - 1.2 mg/dL     (H) 10 - 52 U/L   Phosphorus    Collection Time: 03/26/25  5:46 AM   Result Value Ref Range    Phosphorus 2.7 2.5 - 4.9 mg/dL   POCT GLUCOSE    Collection Time: 03/26/25  8:28 AM   Result Value Ref Range    POCT Glucose 307 (H) 74 - 99 mg/dL          Assessment:  This is a 65 yo Male with a PMH of Aflutter (on Eliquis), hyponatremia, IDDM, CAD, hypertension, chronic wounds, diffuse lymphadenopathy, anemia, tobacco use, elevated PSA, CVA LMCA (8/23 and 12/20) with right-sided deficits and an acute nonhemorrhagic brainstem infarct (2/27/25), left ICA stenosis (85%) s/p angioplasty and left carotid artery stent (08/25/2023), and recent history of acute pontine stroke 02/27/25 with acute hypoxic respiratory failure s/p tracheostomy on 2/25/2025 and PEG  who presented to [facility]25 with reports of coughing out blood from tracheostomy.  GI was consulted for rectal bleeding. Previously notated to be noncompliant with Brilinta/ASA regimen prior to recent stroke.  Placed on Plavix/Eliquis after most recent stroke.  Had 1 episode of coughing up dark blood on 3/22 and now having black stools in the FMS.  2/25/25 for PEG placement and showed gastric erosions with ulcerations and an adherent clot over a duodenal ulcer.  CTA 3/22 was unremarkable for any bleeding.  US from 3/11/25 showed fatty, enlarged liver, no cholelithiasis.  Also notated to have  elevated LFT's that are trending up.    Of note, patient had a rapid called on him 3/25 for Afib RVR, cardiology is following him and has him on Metoprolol for rate control and is on IV amiodarone.  Not a candidate for electrical cardioversion at this time.    Hgb 7.3 and was 8.0 on 8.0.  Last normal Hgb was in 2023 and there's a length of time where there was no labs per the system.    It's likely that the ulcers didn't heal and patient was placed on DAPT which delayed healing and triggered a bleed.  Discussed risks of moving forward with endoscopic procedure such as a cardiovascular event, not waking up, perforation etc and wife and patient are in agreeance with moving forward at some point.        Plan  1.)  Rectal bleeding-Trend hgb, monitor for overt bleeding, transfuse as necessary, avoid NSAIDs.  Will plan for EGD 2/27.  NPO after midnight.  Nursing to shut off tube feeds after MN.        Discussed above patient assessment and plan with Dr. Thorne.    I spent 65 minutes in the professional and overall care of this patient.      03/26/25 at 1:02 PM - JOSEPH Edward-CNP

## 2025-03-26 NOTE — PROGRESS NOTES
Patient fully evaluated    for    Acute kidney injury  Hypernatremia  Anemia of blood loss renal disease  Transaminitis  Rhabdomyolysis  Problem List Items Addressed This Visit       * (Principal) Hemoptysis - Primary     Other Visit Diagnoses       Anemia, unspecified type        Tracheostomy dependent (Multi)              Patient seen resting in bed with head of bed elevated, no s/s or c/o acute difficulties at this time.  Vital signs for last 24 hours Temp:  [36 °C (96.8 °F)-37.9 °C (100.2 °F)] 36.2 °C (97.2 °F)  Heart Rate:  [] 94  Resp:  [20-26] 20  BP: ()/(50-80) 117/55  FiO2 (%):  [30 %] 30 %     Patient still requiring frequent cardiac and SPO2 monitoring. Continue aggressive pulmonary hygiene and oral hygiene. Off loading as tolerated for skin integrity. Medications and labs reviewed-   Results for orders placed or performed during the hospital encounter of 03/22/25 (from the past 24 hours)   Albumin-Creatinine Ratio, Urine Random   Result Value Ref Range    Albumin, Urine Random <7.0 Not established mg/L    Creatinine, Urine Random 78.3 20.0 - 370.0 mg/dL    Albumin/Creatinine Ratio     Urine electrolytes   Result Value Ref Range    Sodium, Urine Random 16 mmol/L    Sodium/Creatinine Ratio 20 Not established. mmol/g Creat    Potassium, Urine Random 49 mmol/L    Potassium/Creatinine Ratio 63 Not established mmol/g Creat    Chloride, Urine Random <15 mmol/L    Chloride/Creatinine Ratio      Creatinine, Urine Random 78.3 20.0 - 370.0 mg/dL   Osmolality, urine   Result Value Ref Range    Osmolality, Urine Random 705 200 - 1,200 mOsm/kg   Urea Nitrogen, Urine Random   Result Value Ref Range    Urea Nitrogen, Urine Random 853 mg/dL    Creatinine, Urine Random 78.3 20.0 - 370.0 mg/dL    Urea Nitrogen/Creatinine Ratio 10.9 Not established. g/g creat   Eosinophil smear   Result Value Ref Range    Eosinophils None (N) (none)   POCT GLUCOSE   Result Value Ref Range    POCT Glucose 346 (H) 74 - 99 mg/dL    POCT GLUCOSE   Result Value Ref Range    POCT Glucose 387 (H) 74 - 99 mg/dL   Electrocardiogram, 12-lead PRN ACS symptoms   Result Value Ref Range    Ventricular Rate 154 BPM    Atrial Rate 153 BPM    QRS Duration 78 ms    QT Interval 250 ms    QTC Calculation(Bazett) 400 ms    R Axis -26 degrees    T Axis 82 degrees    QRS Count 25 beats    Q Onset 229 ms    P Onset 181 ms    P Offset 201 ms    T Offset 354 ms    QTC Fredericia 342 ms   POCT GLUCOSE   Result Value Ref Range    POCT Glucose 344 (H) 74 - 99 mg/dL   Occult Blood, Stool    Specimen: Stool   Result Value Ref Range    Occult Blood, Stool X1 Positive (A) Negative   C. difficile, PCR    Specimen: Stool   Result Value Ref Range    C. difficile, PCR Not Detected Not Detected   Stool Pathogen Panel, PCR    Specimen: Stool   Result Value Ref Range    Campylobacter Group Not Detected Not Detected    Salmonella species Not Detected Not Detected    Shigella species Not Detected Not Detected    Vibrio Group Not Detected Not Detected    Yersinia Enterocolitica Not Detected Not Detected    Shiga Toxin 1 Not Detected Not Detected    Shiga Toxin 2 Not Detected Not Detected    Norovirus GI/GII Not Detected Not Detected    Rotavirus A Not Detected Not Detected   PTH, Intact   Result Value Ref Range    Parathyroid Hormone, Intact 85.2 18.5 - 88.0 pg/mL   POCT GLUCOSE   Result Value Ref Range    POCT Glucose 374 (H) 74 - 99 mg/dL   Iron and TIBC   Result Value Ref Range    Iron 42 35 - 150 ug/dL    UIBC 196 110 - 370 ug/dL    TIBC 238 (L) 240 - 445 ug/dL    % Saturation 18 (L) 25 - 45 %   Ferritin   Result Value Ref Range    Ferritin 66 20 - 300 ng/mL   Uric Acid   Result Value Ref Range    Uric Acid 5.6 4.0 - 7.5 mg/dL   Myoglobin   Result Value Ref Range    Myoglobin 151 (H) <=92 ug/L   CBC   Result Value Ref Range    WBC 12.2 (H) 4.4 - 11.3 x10*3/uL    nRBC 2.1 (H) 0.0 - 0.0 /100 WBCs    RBC 2.70 (L) 4.50 - 5.90 x10*6/uL    Hemoglobin 7.3 (L) 13.5 - 17.5 g/dL     Hematocrit 26.0 (L) 41.0 - 52.0 %    MCV 96 80 - 100 fL    MCH 27.0 26.0 - 34.0 pg    MCHC 28.1 (L) 32.0 - 36.0 g/dL    RDW 16.6 (H) 11.5 - 14.5 %    Platelets 223 150 - 450 x10*3/uL   Comprehensive Metabolic Panel   Result Value Ref Range    Glucose 290 (H) 74 - 99 mg/dL    Sodium 153 (H) 136 - 145 mmol/L    Potassium 3.7 3.5 - 5.3 mmol/L    Chloride 112 (H) 98 - 107 mmol/L    Bicarbonate 33 (H) 21 - 32 mmol/L    Anion Gap 12 10 - 20 mmol/L    Urea Nitrogen 45 (H) 6 - 23 mg/dL    Creatinine 1.35 (H) 0.50 - 1.30 mg/dL    eGFR 58 (L) >60 mL/min/1.73m*2    Calcium 8.5 (L) 8.6 - 10.3 mg/dL    Albumin 3.2 (L) 3.4 - 5.0 g/dL    Alkaline Phosphatase 182 (H) 33 - 136 U/L    Total Protein 6.1 (L) 6.4 - 8.2 g/dL    AST 97 (H) 9 - 39 U/L    Bilirubin, Total 0.4 0.0 - 1.2 mg/dL     (H) 10 - 52 U/L   Phosphorus   Result Value Ref Range    Phosphorus 2.7 2.5 - 4.9 mg/dL   POCT GLUCOSE   Result Value Ref Range    POCT Glucose 307 (H) 74 - 99 mg/dL      Patient fractional excretion of sodium is 0.2%  Patient fractional excretion of urea is 32.16%  Positive Hemoccult stools  Patient recently received an antibiotic (last 12 hours)       Date/Time Action Medication Dose    03/26/25 0903 New Bag    piperacillin-tazobactam (Zosyn) 3.375 g in dextrose (iso) IV 50 mL 3.375 g    03/26/25 0148 New Bag    piperacillin-tazobactam (Zosyn) 3.375 g in dextrose (iso) IV 50 mL 3.375 g           Plan discussed with interdisciplinary team, continue current and repeat labs in the AM.         I spent a total of 50 minutes on the date of the service which included preparing to see the patient, face-to-face patient care, completing clinical documentation, obtaining and/or reviewing separately obtained history, performing a medically appropriate examination, counseling and educating the patient/family/caregiver, ordering medications, tests, or procedures, communicating with other HCPs (not separately reported), independently interpreting results  (not separately reported), communicating results to the patient/family/caregiver, and care coordination (not separately reported).         Physical Exam  General Appearance; asthenic habitus  Mild skin pallor  Poor skin turgor  HEENT; pupils react to light and accommodation  There is no nystagmus or ptosis; extraocular movements are intact  Neck; no adenopathy; no jugular vein distention; no bruit; no thyromegaly  Tracheostomy in place clean without exudation or induration  Lungs; minimal inspiratory coarse crackles at the bases  Heart; regular rate no gallop no rubs or thrills no lifts  Abdomen; active peristalsis no rebound or guarding there is a PEG tube in place  ; no CVA tenderness  Extremities; decreased muscle tone  Neurological; pathological reflexes are absent      Assessment/Plan     Acute kidney injury  Hypernatremia  Anemia of blood loss renal disease  Transaminitis  Rhabdomyolysis    Plan  IV iron and erythropoietin  Continue to hold potential nephrotoxins and hepatotoxins  Continue to monitor renal chemistries and CBC  Matthew Johnson MD

## 2025-03-26 NOTE — ASSESSMENT & PLAN NOTE
Zeus Bone MD  Physician  Internal Medicine     H&P      Signed     Date of Service: 3/23/2025 10:28 AM     Signed       Expand All Collapse All    History Of Present Illness  Bryan Nix is a 64-year-old male with past medical history of atrial flutter on Eliquis, hyponatremia, IDDM, CAD, hypertension, chronic wounds, diffuse lymphadenopathy, anemia, history of smoking, history elevated PSA, CVA L hemisphere (2020) with right-sided deficits, Left ICA stenosis (85%) s/p angioplasty and left carotid artery stent (08/25/2023) and recent history of acute pontine stroke 02/27/25 with acute hypoxic respiratory failure s/p tracheostomy on 2/25/2025 and PEG.  Patient presents for bleeding from tracheostomy.  Patient is alert and oriented x 3, however limited verbal communication due to tracheostomy.  Wife is at bedside and assists with history.  She reports that patient was coughing blood out of his tracheostomy.  ER provider felt bleeding was likely superficial around site.  Patient has been on room air at skilled nursing facility.  He was placed on Airvo in the ED mainly to humidify oxygen.  Patient never desaturated per ER report.  Currently he is resting comfortably in the bed, breath sounds slightly rhonchorous, but unlabored.  Reports chills, but no fevers.  He has right-sided weakness and sensory deficits.  Denies headache, vision or hearing changes, chest pains, palpitations, shortness of breath, nausea, vomiting, abdominal pain, diarrhea, or complications of PEG tube site.  Wife reports patient skin became irritated in his groin due to adhesive from a condom cath previously, he has a small decubitus wound that she reports is healing well.  He receives tube feeds continuously.  Wife reports a patient was recently evaluated by speech for p.o. to intake, however he still remains n.p.o. except for meds and tube feeds through PEG. CODE STATUS reviewed: full code     ER Course: Hemodynamically stable,  afebrile, SpO2 to 97% trach mask.  WBC 9.3, hemoglobin 8, hematocrit 29.4, platelets 197.  INR 1.4.  Glucose 223, BUN 36, otherwise CMP is normal.  CT of the chest for PE pending.  CXR unremarkable. Trannexamic acid soak gauze wrapped around trach.      Past medical history: As above  Past surgical history: As above  Social history: Former smoker 37.5 pack years-quit smoking age 63, occasional alcohol use, no illicit drug use.  .  Works in maintenance.  6 children.  Family history: Noncontributory     Past Medical History  Medical History        Past Medical History:   Diagnosis Date    Abnormal finding of blood chemistry, unspecified 05/18/2016     Abnormal blood chemistry    Acute upper respiratory infection, unspecified 12/02/2015     Acute upper respiratory infection    Elevated prostate specific antigen (PSA)       Elevated prostate specific antigen (PSA)    Encounter for screening for malignant neoplasm of colon 01/30/2021     Colon cancer screening    Encounter for screening for malignant neoplasm of prostate 11/30/2020     Prostate cancer screening    Essential (primary) hypertension 07/30/2013     Benign essential hypertension    Other conditions influencing health status 07/30/2013     Diabetes Mellitus Poorly Controlled    Other conditions influencing health status 12/02/2015     History of cough    Personal history of other endocrine, nutritional and metabolic disease 03/18/2021     History of hyperlipidemia    Personal history of other endocrine, nutritional and metabolic disease 03/18/2021     History of diabetes mellitus    Personal history of other specified conditions 05/18/2016     History of chest pain    Personal history of other specified conditions 05/18/2016     History of dyspnea    Personal history of other specified conditions 05/18/2016     History of dizziness    Personal history of other specified conditions 05/18/2016     History of fatigue            Surgical History  Surgical  History         Past Surgical History:   Procedure Laterality Date    CT ANGIO CORONARY ART WITH HEARTFLOW IF SCORE >30%   8/23/2023     CT HEART CORONARY ANGIOGRAM 8/23/2023 [facility] CT    MR HEAD ANGIO WO IV CONTRAST   12/14/2020     MR HEAD ANGIO WO IV CONTRAST 12/14/2020 [facility]    MR NECK ANGIO WO IV CONTRAST   12/14/2020     MR NECK ANGIO WO IV CONTRAST 12/14/2020 [facility]    OTHER SURGICAL HISTORY   12/01/2020     Hand fracture repair    OTHER SURGICAL HISTORY   12/01/2020     Nasal bone fracture repair            Social History  He reports that he has quit smoking. His smoking use included cigarettes. He has never used smokeless tobacco. No history on file for alcohol use and drug use.     Family History  Family History   No family history on file.        Allergies  Patient has no known allergies.     Review of Systems     Physical Exam  Constitutional:       General: He is awake. He is not in acute distress.     Appearance: Normal appearance. He is not toxic-appearing.   HENT:      Head: Atraumatic.      Nose: Nose normal.      Mouth/Throat:      Mouth: Mucous membranes are moist.   Eyes:      Conjunctiva/sclera: Conjunctivae normal.   Cardiovascular:      Rate and Rhythm: Normal rate and regular rhythm.      Pulses: Normal pulses.      Heart sounds: No murmur heard.  Pulmonary:      Effort: No respiratory distress.      Comments: Tracheostomy site midline, no drainage around dressing.  Rhonchorous breath sounds, unlabored respirations, RT bedside suctioning patient with thick blood-tinged sputum  Abdominal:      General: Bowel sounds are normal. There is no distension.      Palpations: Abdomen is soft.      Tenderness: There is no abdominal tenderness. There is no guarding.      Comments: Left upper quadrant PEG site tube site, no drainage or skin breakdown around tube site.   Musculoskeletal:         General: No swelling, deformity or signs of injury.      Cervical back: Neck  "supple.      Right lower leg: No edema.      Left lower leg: No edema.   Skin:     General: Skin is warm and dry.      Capillary Refill: Capillary refill takes less than 2 seconds.      Findings: Wound (sacrum) present. No ecchymosis or erythema.   Neurological:      Mental Status: He is alert and oriented to person, place, and time.      Cranial Nerves: No facial asymmetry.      Sensory: Sensory deficit (RUE, RLE) present.      Motor: Weakness present.      Comments: Right sided upper and lower extremity motor function deficits   Psychiatric:         Mood and Affect: Mood normal.         Behavior: Behavior is cooperative.            Last Recorded Vitals  Blood pressure 134/50, pulse 93, temperature 36.9 °C (98.4 °F), temperature source Temporal, resp. rate 22, height 1.88 m (6' 2\"), weight 77.1 kg (170 lb), SpO2 98%.     Relevant Results              Results for orders placed or performed during the hospital encounter of 03/22/25 (from the past 24 hours)   CBC and Auto Differential   Result Value Ref Range     WBC 9.3 4.4 - 11.3 x10*3/uL     nRBC 0.3 (H) 0.0 - 0.0 /100 WBCs     RBC 3.02 (L) 4.50 - 5.90 x10*6/uL     Hemoglobin 8.0 (L) 13.5 - 17.5 g/dL     Hematocrit 29.4 (L) 41.0 - 52.0 %     MCV 97 80 - 100 fL     MCH 26.5 26.0 - 34.0 pg     MCHC 27.2 (L) 32.0 - 36.0 g/dL     RDW 15.6 (H) 11.5 - 14.5 %     Platelets 197 150 - 450 x10*3/uL     Neutrophils % 77.1 40.0 - 80.0 %     Immature Granulocytes %, Automated 1.5 (H) 0.0 - 0.9 %     Lymphocytes % 13.1 13.0 - 44.0 %     Monocytes % 4.1 2.0 - 10.0 %     Eosinophils % 3.2 0.0 - 6.0 %     Basophils % 1.0 0.0 - 2.0 %     Neutrophils Absolute 7.14 1.20 - 7.70 x10*3/uL     Immature Granulocytes Absolute, Automated 0.14 0.00 - 0.70 x10*3/uL     Lymphocytes Absolute 1.21 1.20 - 4.80 x10*3/uL     Monocytes Absolute 0.38 0.10 - 1.00 x10*3/uL     Eosinophils Absolute 0.30 0.00 - 0.70 x10*3/uL     Basophils Absolute 0.09 0.00 - 0.10 x10*3/uL   Basic metabolic panel   Result " Value Ref Range     Glucose 223 (H) 74 - 99 mg/dL     Sodium 142 136 - 145 mmol/L     Potassium 4.3 3.5 - 5.3 mmol/L     Chloride 103 98 - 107 mmol/L     Bicarbonate 27 21 - 32 mmol/L     Anion Gap 16 10 - 20 mmol/L     Urea Nitrogen 36 (H) 6 - 23 mg/dL     Creatinine 0.81 0.50 - 1.30 mg/dL     eGFR >90 >60 mL/min/1.73m*2     Calcium 9.6 8.6 - 10.3 mg/dL   Protime-INR   Result Value Ref Range     Protime 15.1 (H) 9.8 - 12.4 seconds     INR 1.4 (H) 0.9 - 1.1   aPTT   Result Value Ref Range     aPTT 25 (L) 26 - 36 seconds   Type and Screen   Result Value Ref Range     ABO TYPE B       Rh TYPE POS       ANTIBODY SCREEN NEG     VERIFY ABO/Rh Group Test   Result Value Ref Range     ABO TYPE B       Rh TYPE POS     MRSA Surveillance for Vancomycin De-escalation, PCR     Specimen: Anterior Nares; Swab   Result Value Ref Range     MRSA PCR Not Detected Not Detected   POCT GLUCOSE   Result Value Ref Range     POCT Glucose 180 (H) 74 - 99 mg/dL   POCT GLUCOSE   Result Value Ref Range     POCT Glucose 161 (H) 74 - 99 mg/dL   POCT GLUCOSE   Result Value Ref Range     POCT Glucose 156 (H) 74 - 99 mg/dL   Basic metabolic panel   Result Value Ref Range     Glucose 170 (H) 74 - 99 mg/dL     Sodium 147 (H) 136 - 145 mmol/L     Potassium 4.2 3.5 - 5.3 mmol/L     Chloride 107 98 - 107 mmol/L     Bicarbonate 30 21 - 32 mmol/L     Anion Gap 14 10 - 20 mmol/L     Urea Nitrogen 35 (H) 6 - 23 mg/dL     Creatinine 0.85 0.50 - 1.30 mg/dL     eGFR >90 >60 mL/min/1.73m*2     Calcium 9.4 8.6 - 10.3 mg/dL   CBC   Result Value Ref Range     WBC 10.4 4.4 - 11.3 x10*3/uL     nRBC 0.6 (H) 0.0 - 0.0 /100 WBCs     RBC 2.87 (L) 4.50 - 5.90 x10*6/uL     Hemoglobin 7.8 (L) 13.5 - 17.5 g/dL     Hematocrit 26.6 (L) 41.0 - 52.0 %     MCV 93 80 - 100 fL     MCH 27.2 26.0 - 34.0 pg     MCHC 29.3 (L) 32.0 - 36.0 g/dL     RDW 15.7 (H) 11.5 - 14.5 %     Platelets 205 150 - 450 x10*3/uL   SST TOP   Result Value Ref Range     Extra Tube Hold for add-ons.        CT  angio chest for pulmonary embolism     Result Date: 3/22/2025  Interpreted By:  Pamela Zurita, STUDY: CT ANGIO CHEST FOR PULMONARY EMBOLISM;  3/22/2025 4:56 pm   INDICATION: Signs/Symptoms:bloody secretions, eval for PE.     COMPARISON: CT chest without contrast 03/22/2025   ACCESSION NUMBER(S): [id number]   ORDERING CLINICIAN: PAMELA HAHN   TECHNIQUE: Contiguous axial images of the chest were obtained after the intravenous administration of iodinated contrast using angiographic PE protocol. Coronal and sagittal reformatted images were reconstructed from the axial data. MIP images were created on an independent workstation and reviewed.   FINDINGS:   MEDIASTINUM AND LYMPH NODES: Inflammatory fat stranding along the tracheostomy tract without discrete fluid collection. The esophageal wall appears within normal limits. There is redemonstration of diffuse lymphadenopathy in the bilateral axilla, mediastinum, bilateral supraclavicular as described in detail on separate report. Also notable or enlarged bilateral level 4 cervical lymph nodes (right > left).   VESSELS:  Normal caliber thoracic aorta without dissection. Moderate aortic atherosclerosis.  No acute pulmonary embolism.   HEART: Normal size.  Mild coronary artery calcifications. No significant pericardial effusion.   LUNG, AIRWAYS, PLEURA: There is slight increase in debris within the trachea. There has been clearing of secretions in the left upper/lower lobe bronchus. There is diffuse bilateral bronchial thickening slightly increased from prior study. There is peribronchovascular ground-glass opacity in the posterior right upper lobe and superior and basal segments of the right lower lobe. There is slightly worsened atelectasis in the lung bases remaining predominantly subsegmental in nature. There is a 0.8 cm nodule in the left upper lobe that is stable from 08/19/2023.   OSSEOUS STRUCTURES: No acute osseous abnormality.   CHEST WALL SOFT TISSUES: No  discernible acute abnormality.   UPPER ABDOMEN/OTHER: No acute abnormality.        No acute pulmonary embolism.   Peribronchial vascular ground glass opacities in the right upper and lower lobe again concerning for pneumonia given patient's risk factors of tracheostomy and debris in the airways.   Redemonstration of diffuse lymphadenopathy in the lower necks, axilla, and mediastinum. Findings are either diffusely reactive in nature, reflective of lymphoma/leukemia, meter deficiency, or less likely head neck neoplasm is previously postulated. However please correlate with past medical history.   Inflammatory changes along the tracheostomy tract without fluid collection. Correlate for erythema.   Unchanged 0.8 cm nodule in the left upper lobe dating back to 08/19/2023. Recommend 1 year follow up chest CT to ensure at least 2 years of stability.   MACRO: None.   Signed by: Pamela Zurita 3/22/2025 7:17 PM Dictation workstation:   [id number]     CT chest wo IV contrast     Result Date: 3/22/2025  Interpreted By:  Pamela Zurita, STUDY: CT CHEST WO IV CONTRAST;  3/22/2025 3:41 pm   INDICATION: Signs/Symptoms:eval for alveolar hemorrhage.     COMPARISON: CT head/neck 08/19/2023   ACCESSION NUMBER(S): [id number]   ORDERING CLINICIAN: PAMELA HAHN   TECHNIQUE: Contiguous axial images of the chest and upper abdomen were obtained without contrast. Coronal and sagittal reformatted images were reconstructed from the axial data.   FINDINGS:   MEDIASTINUM AND LYMPH NODES: There is fat stranding/edema along the tract of the tracheostomy about discrete fluid collection. The esophageal wall appears within normal limits. There are numerous enlarged bilateral supraclavicular lymph nodes measuring up to 1.4 cm on the left. There are numerous enlarged bilateral axillary lymph nodes measuring up to 1.1 cm on the right and 1.2 cm on the left. There are numerous prominent to mildly enlarged mediastinal lymph nodes as well. No  pneumomediastinum.   VESSELS:  Normal caliber thoracic aorta. Moderate aortic atherosclerosis. The main pulmonary artery is normal in size.   HEART: Normal size.  Mild coronary artery calcifications. No significant pericardial effusion.   LUNG, AIRWAYS, PLEURA: Tracheostomy noted in appropriate position. There is trace mucus in the left mainstem bronchus and at the origin of the left upper lobe and lower lobe bronchi. There are mild peribronchovascular ground-glass opacities in the posterior segment of the right upper lobe, basal segments of the right lower lobe. There is mild dependent bibasilar atelectasis. There is no pleural effusion.   OSSEOUS STRUCTURES: No acute osseous abnormality.   CHEST WALL SOFT TISSUES: No discernible acute abnormality.   UPPER ABDOMEN/OTHER: No acute abnormality.        1. Mild peribronchovascular ground-glass opacities in the posterior right upper lobe and basal segments of the right lower lobe. This appearance and distribution is not typical for alveolar hemorrhage which tends to be centrilobular and bilateral asymmetric in distribution. Findings are most suggestive of pneumonia.   2. Small amount of mucous debris in the left mainstem bronchus and at the origin of the left upper and lower lobe bronchi.   3. Diffuse lymphadenopathy in the bilateral axillary regions, mediastinum and left supraclavicular region particularly notable in the bilateral supraclavicular region. This is progressed in the left supraclavicular region and new elsewhere when compared to 08/19/2023 CTA head/neck. Correlate for any history of known head/neck malignancy or lymphoma.   4. Inflammation/fat stranding adjacent to the tracheostomy tube entry site noted. Correlate for air the met. No evidence of fluid collection.   MACRO: None.   Signed by: Marco A Zurita 3/22/2025 7:10 PM Dictation workstation:   [id number]     XR chest 1 view     Result Date: 3/22/2025  Interpreted By:  Fermin Stroud, STUDY: XR CHEST  1 VIEW   INDICATION: Signs/Symptoms:hemoptysis.   COMPARISON: August 19, 2023   ACCESSION NUMBER(S): [id number]   ORDERING CLINICIAN: PAMELA HAHN   FINDINGS: No consolidation, effusion, edema, or pneumothorax. Heart size within normal limits.        No evidence of acute intrathoracic abnormality.   Signed by: Fermin Stroud 3/22/2025 2:01 PM Dictation workstation:   [id number]     XR chest 1 view     Result Date: 3/14/2025  * * *Final Report* * * DATE OF EXAM: Mar 14 2025  8:52AM   S5X   5290  -  XR CHEST 1V FRONTAL   / ACCESSION #  [id number] PROCEDURE REASON: new bleeding from trach      * * * * Physician Interpretation * * * *  EXAMINATION:  CHEST RADIOGRAPH (PORTABLE SINGLE VIEW AP) Exam Date/Time:  3/14/2025 8:52 AM Clinical History:  new bleeding from trach Comparison:  03/03/2025 RESULT: LINES, TUBES, AND DEVICES:  Tracheostomy. CARDIOMEDIASTINAL SILHOUETTE:  The cardiac and mediastinal silhouettes appear unremarkable. LUNGS AND PLEURA: No consolidation.  No pleural effusion. No pulmonary vascular congestion. No pneumothorax identified. OTHER:  N/A     IMPRESSION: No acute abnormality identified. : [facility]   Transcribe Date/Time: Mar 14 2025  9:07A Dictated by : PAMELA EVANS MD This examination was interpreted and the report reviewed and electronically signed by: PAMELA EVANS MD on Mar 14 2025  9:11AM  EST     US abdomen complete     Result Date: 3/11/2025  ABDOMEN ULTRASOUND-COMPLETE FINDINGS: Abdomen Ultrasound Complete: PROCEDURE: Multiple grayscale mages of the abdomen were obtained. FINDINGS:Multiple real time images demonstrate the liver with increased echogenicity consistent with fatty infiltration. There is no evidence of a discrete mass within the liver. The liver is enlarged, measured at 15.7 cm in size. The gallbladder is seen with no evidence of cholelithiasis. The gallbladder wall is within normal limits. The common bile duct is within normal limits. The visualized pancreas  appears unremarkable. Both kidneys are seen with no evidence of hydronephrosis or a calculus. The spleen has its normal homogeneous echo appearance. There is no free fluid to suggest ascites. The visualized aorta appears unremarkable. The inferior vena cava appears patent. CONCLUSION: Enlarged fatty liver. ELECTRONICALLY SIGNED BY LEONEL LAN M.D. 3/11/2025 5:00:52 PM EDT.     XR chest 1 view     Result Date: 3/3/2025  * * *Final Report* * * DATE OF EXAM: Mar  3 2025  1:40PM   S5X   5290  -  XR CHEST 1V FRONTAL   / ACCESSION #  [id number] PROCEDURE REASON: resp failure      * * * * Physician Interpretation * * * *  EXAMINATION:  CHEST RADIOGRAPH (PORTABLE SINGLE VIEW AP) Exam Date/Time:  3/3/2025 1:40 PM Indication:   resp failure M:  XCP_4 Comparison:  1 day prior RESULT: Lines, tubes, and devices:  Tracheostomy tube remains in situ. Lungs and pleura:  Mild patchy opacities right lower lung zone seen on the prior study are no longer appreciated.  No confluent opacity.   Costophrenic angles are clear.  No pneumothorax. Cardiomediastinal silhouette:  Stable cardiomediastinal silhouette. Other:  -     IMPRESSION: Improved aeration right lower lung zone.  No acute cardiopulmonary finding. : [facility]   Transcribe Date/Time: Mar  3 2025  2:49P Dictated by : ISRAEL BENSON MD This examination was interpreted and the report reviewed and electronically signed by: ISRAEL BENSON MD on Mar  3 2025  2:49PM  EST     XR chest 1 view     Result Date: 3/2/2025  * * *Final Report* * * DATE OF EXAM: Mar  2 2025 11:25AM   MMX   5376  -  XR CHEST 1V FRONTAL PORT  / ACCESSION #  [id number] PROCEDURE REASON: Shortness of breath      * * * * Physician Interpretation * * * *  EXAMINATION:  CHEST RADIOGRAPH (PORTABLE SINGLE VIEW AP) Exam Date/Time:  3/2/2025 11:25 AM CLINICAL HISTORY: Shortness of breath MQ:  XCPR_5 Comparison:  02/20/2025. RESULT: This is a limited examination due to multiple wires and tubing  obscuring the lower lungs. Lines, tubes, and devices:  A tracheostomy tube remains in place. Lungs and pleura:  There are increased bronchovascular markings in the right lower lung which may be due to bronchitis or early changes of aspiration pneumonia.  Clinical correlation and follow-up exams are recommended. Cardiomediastinal silhouette:  Stable cardiomediastinal silhouette. Other:  The visualized bony thorax appears unremarkable.     IMPRESSION: Nonspecific parenchymal changes in the right lower lung as described above. A follow-up exam is recommended. : [facility]   Transcribe Date/Time: Mar  2 2025 11:53A Dictated by : BJ INIGUEZ MD This examination was interpreted and the report reviewed and electronically signed by: BJ INIGUEZ MD on Mar  2 2025 11:54AM  EST     XR chest 1 view     Result Date: 2/28/2025  * * *Final Report* * * DATE OF EXAM: Feb 28 2025 10:36AM   MMX   5290  -  XR CHEST 1V FRONTAL   / ACCESSION #  [id number] PROCEDURE REASON: Shortness of breath      * * * * Physician Interpretation * * * *  EXAMINATION:  CHEST RADIOGRAPH (SINGLE VIEW AP OR PA) CLINICAL HISTORY: Shortness of breath MQ:  XC1_5 Comparison:  02/22/2025 RESULT: Lines, tubes, and devices:  Tracheostomy tube is noted. Lungs and pleura:  No consolidation. No lung mass. No pleural effusion. Cardiomediastinal silhouette:  Normal cardiomediastinal silhouette. Other:  No acute osseous pathology.     IMPRESSION: No acute radiographic abnormality. : [facility]   Transcribe Date/Time: Feb 28 2025 12:07P Dictated by : DIANNE RAMOS MD This examination was interpreted and the report reviewed and electronically signed by: DIANNE RAMOS MD on Feb 28 2025 12:08PM  EST     XR abdomen 2 views supine and erect or decub     Result Date: 2/24/2025  * * *Final Report* * * DATE OF EXAM: Feb 24 2025  6:03PM   MMX   5289  -  XR ABDOMEN 1V SUPINE  / ACCESSION #  [id number] PROCEDURE REASON: Evaluate tube, line or lead position       * * * * Physician Interpretation * * * *  EXAMINATION:  XR ABDOMEN 1V SUPINE CLINICAL HISTORY:   Evaluate tube, line or lead position Technique:   XR ABDOMEN 1V SUPINE -- NOT APPLICABLE with 1 views on 1 images Comparison: 2/21/2025 RESULT: Feeding tube with distal tip at the pylorus antrum. Moderate colonic stool burden. No dilated bowel. Lung structures are intact.     IMPRESSION: Feeding tube with distal tip at the pylorus antrum. Moderate colonic stool burden. No dilated bowel. : [facility]   Transcribe Date/Time: Feb 24 2025  6:23P Dictated by : PABLO RAMOS MD This examination was interpreted and the report reviewed and electronically signed by: PABLO RAMOS MD on Feb 24 2025  6:24PM  EST     CT head wo IV contrast     Result Date: 2/23/2025  * * *Final Report* * * DATE OF EXAM: Feb 23 2025 12:04PM   [facility]   0504  -  CT BRAIN WO IVCON  / ACCESSION #  [id number] PROCEDURE REASON: Stroke, follow up      * * * * Physician Interpretation * * * *  EXAMINATION: CT BRAIN WO IVCON CLINICAL HISTORY: Stroke, follow up TECHNIQUE:  Serial axial images without IV contrast were obtained from the vertex to the foramen magnum. MQ:  CTBWO_3 CT Radiation dose: Integrated Dose-Length Product (DLP) for this visit =   778  mGy*cm CT Dose Reduction Employed: Iterative recon COMPARISON: MRI brain 02/19/2025 RESULT: Post-operative change: None. Acute change: Evolving acute infarct along the central eli and superior left medulla (2:7-9).  No evidence of hemorrhagic transformation. Hemorrhage: No evidence of acute intracranial hemorrhage. Mass Lesion / Mass Effect: There is no evidence of an intracranial mass or extraaxial fluid collection. No significant mass effect. Chronic change: Stable remote left MCA territory encephalomalacia. Parenchyma: There is no significant volume loss. The brain parenchyma is otherwise within normal limits for age. Ventricles: The ventricles are within normal limits of size and configuration  for age. Paranasal sinuses and skull base: Partial opacification of the left maxillary and sphenoid sinus and multiple bilateral ethmoid air cells.   The remaining visualized paranasal sinuses are grossly clear. The skull base and imaged soft tissues are unremarkable. Localizer images: No additional findings.     IMPRESSION: Evolving acute brainstem infarct.  No evidence of hemorrhagic transformation. Stable remote left MCA territory infarct. : [facility]   Transcribe Date/Time: Feb 23 2025 12:48P Dictated by : FRANKY GUZMAN MD   This examination was interpreted and the report reviewed and electronically signed by: FRANKY GUZMAN MD on Feb 23 2025 12:52PM  EST     XR chest 1 view     Result Date: 2/22/2025  * * *Final Report* * * DATE OF EXAM: Feb 22 2025  6:56AM   MMX   [id number]  -  XR CHEST 1V FRONTAL   / ACCESSION #  [id number] PROCEDURE REASON: Evaluate tube, line,  or lead position      * * * * Physician Interpretation * * * *  EXAMINATION:  CHEST RADIOGRAPH (SINGLE VIEW AP OR PA) CLINICAL HISTORY: Evaluate tube, line,  or lead position MQ:  XC1_5 Comparison:  02/21/2025. RESULT: Lines, tubes, and devices:  An ET tube and feeding tube remain in place.   A poorly defined right venous catheter cannot be excluded. Lungs and pleura:  There are persistent bilateral lower lungs infiltrates seen on the right more than left suggestive of pneumonia such as aspiration pneumonia.  There is no definite pleural effusion. Cardiomediastinal silhouette:  Stable cardiomediastinal silhouette. Other:  The visualized bony thorax appears unremarkable.     IMPRESSION: Persistent bilateral lower lungs infiltrates as described above. A follow-up exam is recommended. : [facility]   Transcribe Date/Time: Feb 22 2025  8:20A Dictated by : BJ INIGUEZ MD This examination was interpreted and the report reviewed and electronically signed by: BJ INIGUEZ MD on Feb 22 2025  8:21AM  EST     XR chest 1 view     Result Date:  2/21/2025  * * *Final Report* * * DATE OF EXAM: Feb 21 2025  5:59PM   MMX   [id number]  -  XR CHEST 1V FRONTAL PORT  / ACCESSION #  [id number] PROCEDURE REASON: Evaluate tube, line,  or lead position      * * * * Physician Interpretation * * * *  EXAMINATION:  CHEST RADIOGRAPH (PORTABLE SINGLE VIEW AP) Exam Date/Time:  2/21/2025 5:59 PM CLINICAL HISTORY: Evaluate tube, line,  or lead position MQ:  XCPR_5 Comparison:  02/21/2025 RESULT: Lines, tubes, and devices: Endotracheal tube in place terminating in enteric tube in place terminating below the field of view. Lungs and pleura: No pneumothorax.  No pleural effusion.  Bilateral mild interstitial opacities.  The right costophrenic angle is not included in the field-of-view. Cardiomediastinal silhouette:  Stable cardiomediastinal silhouette. Other:  .     IMPRESSION: Mild interstitial opacities may be related to pulmonary vascular congestion. : [facility]   Transcribe Date/Time: Feb 21 2025  6:55P Dictated by : IRWIN VALDIVIA MD This examination was interpreted and the report reviewed and electronically signed by: IRWIN VALDIVIA MD on Feb 21 2025  6:57PM  EST     XR abdomen 2 views supine and erect or decub     Result Date: 2/21/2025  * * *Final Report* * * DATE OF EXAM: Feb 21 2025 10:32AM   MMX   [id number]  -  XR ABDOMEN 1V SUPINE  / ACCESSION #  [id number] PROCEDURE REASON: Evaluate tube, line or lead position      * * * * Physician Interpretation * * * *  Clinical Information: Feeding tube Single view of the abdomen was obtained. Feeding tube tip within the distal stomach. There is a nonspecific, nonobstructive bowel gas pattern. No abnormal abdominal masses are identified.  No pathologic calcifications.   No acute bony abnormalities are seen.     IMPRESSION: Nonspecific, nonobstructive bowel gas pattern. Feeding tube tip within the distal stomach. : [facility]   Transcribe Date/Time: Feb 21 2025 10:43A Dictated by : PAMELA EVANS MD  This examination was interpreted and the report reviewed and electronically signed by: PAMELA EVANS MD on Feb 21 2025 10:44AM  EST     XR chest 1 view     Result Date: 2/21/2025  * * *Final Report* * * DATE OF EXAM: Feb 21 2025 10:32AM   MMX   [id number]  -  XR CHEST 1V FRONTAL PORT  / ACCESSION #  [id number] PROCEDURE REASON: Shortness of breath      * * * * Physician Interpretation * * * *  EXAMINATION:  CHEST RADIOGRAPH (PORTABLE SINGLE VIEW AP) Exam Date/Time:  2/21/2025 10:32 AM Clinical History:  Shortness of breath Comparison:  02/17/2025 RESULT: LINES, TUBES, AND DEVICES:  Feeding tube tip beyond the inferior extent of the image. CARDIOMEDIASTINAL SILHOUETTE:  The cardiac and mediastinal silhouettes appear unremarkable. LUNGS AND PLEURA: No consolidation.  No pleural effusion. No pulmonary vascular congestion. No pneumothorax identified. OTHER:  N/A     IMPRESSION: No acute abnormality identified. : [facility]   Transcribe Date/Time: Feb 21 2025 10:41A Dictated by : PAMELA EVANS MD This examination was interpreted and the report reviewed and electronically signed by: PAMELA EVANS MD on Feb 21 2025 10:43AM  EST            Assessment/Plan     Assessment & Plan  Hemoptysis     Tracheostomy in place (Multi)     CVA, old, hemiparesis (Multi)     Status post insertion of percutaneous endoscopic gastrostomy (PEG) tube (Multi)     Pneumonia due to infectious organism     Right sided weakness     Atrial flutter (Multi)     Insulin dependent type 2 diabetes mellitus (Multi)        Telemetry monitoring  Hemoglobin stable 8's recently on review of outside labs, monitor for ongoing bleeding.  Daily H&H  Monitor for overt bleeding  Consult pulmonology, appreciate input  Pneumonia suspected for CT findings  Tracheostomy protocol  Expected cover with Zosyn and vancomycin  MRSA swab  Urine for Legionella antigen and strep pneumonia antigen  Supplemental oxygen as needed  Nebulizers as needed  RT evaluate and  treat  Check procalcitonin  Typical 20% zinc oxide to wounds and groin  Continuous tube feeds, continue at 80 mL an hour x 22 hours  Accu-Chek every 6 hours  Hypoglycemia protocol  PT/OT  Anticoagulation and antiplatelet therapy on hold, resume once cleared by pulmonology  Continue patient's home medications for chronic issues as appropriate     Patient fully evaluated on March 23.  Continue to monitor H&H.  Pulmonary consultation obtained.  Continue broad-spectrum antibiotics to rule out infectious process.  Recheck labs in AM.                 Zeus Bone MD

## 2025-03-26 NOTE — CARE PLAN
RN called stating patient had a bowel movement that was loose and very dark. Hemoccult ordered. CBC already ordered for AM. H&H this morning was 7.3/26.2. He was admitted due to blood clots in trach tube. Eliquis and plavix held already. Pulmonology consulted. Continue to monitor. Attending to follow.    Update: RN stated that stool is now pouring out of patient. Added c. Diff and stool studies. Fecal management system ordered. Attending to follow.    Update: hemoccult positive. Protonix 80mg IVP x1 ordered for now. Continue protonix 40mg IVP BID. Informed RN to notify Dr. Bone for possible GI consult. Attending to follow.

## 2025-03-26 NOTE — CONSULTS
Cardiology Consult    Impression:  Hemoptysis  Paroxysmal atrial fibrillation.  Stroke with hemiparesis  Chronic respiratory failure.  Status post tracheostomy.  CAD.  No angina.  Hypertension  Hyperlipidemia  Diabetes  Carotid disease status post PTA 2023  Plan:  Continue metoprolol for rate control  Continue IV amiodarone load.  If he does not convert with amiodarone IV, we will optimize rate control with dig loading.  Electrical cardioversion not an option as Eliquis on hold for severe anemia, hemoptysis.  Workup of anemia, hemoptysis per medical team.  CC  Hemoptysis  HPI:  64-year-old man brought to hospital for bleeding from his tracheostomy.  Hemoglobin found to be 7.3.  Plavix and Eliquis placed on hold.  Initial EKG showed sinus rhythm.  Yesterday he went into atrial fibs.  Initially he was in and out of A-fib.  Last night a rapid response was called for rapid A-fib.  He was started on amiodarone infusion.  Heart rate improved but he remains in atrial fibrillation.  He has no awareness of arrhythmia.  Breathing has been okay.  Meds:  Scheduled medications  acetylcysteine, 1 mL, nebulization, TID  [Held by provider] apixaban, 5 mg, g-tube, BID  [Held by provider] clopidogrel, 75 mg, g-tube, Daily  docusate sodium, 100 mg, oral, BID  [START ON 3/30/2025] epoetin nicko or biosimilar, 20,000 Units, subcutaneous, Every Sunday  insulin glargine, 30 Units, subcutaneous, Nightly  insulin lispro, 0-10 Units, subcutaneous, q6h  ipratropium, 0.5 mg, nebulization, TID  iron sucrose, 200 mg, intravenous, Daily  metoprolol tartrate, 50 mg, g-tube, TID  oxygen, , inhalation, Continuous - Inhalation  pantoprazole, 40 mg, intravenous, BID  piperacillin-tazobactam, 3.375 g, intravenous, q6h  sucralfate, 1 g, g-tube, BID AC  traZODone, 50 mg, g-tube, Nightly      Continuous medications  amiodarone, 0.5 mg/min, Last Rate: 0.5 mg/min (03/26/25 0147)  dextrose 5%, 100 mL/hr, Last Rate: 100 mL/hr (03/26/25 1141)  dextrose 5%, 100  mL/hr      PRN medications  PRN medications: acetaminophen, dextrose, dextrose, glucagon, glucagon, ipratropium, sodium chloride, zinc oxide    PMHx:  Paroxysmal atrial flutter.  Had an episode February 2025.  Converted spontaneously.  Stroke 2020, 2023, 2/20/2025.  Carotid disease.  Status post PTA 2023.  CAD.  Severe RCA disease.  Moderate LCx disease.  Enlarged cervical lymphadenopathy suspicious for malignancy  Hypertension  Hyperlipidemia  Diabetes  Trach  PEG  Social history:  No cigarettes or alcohol  Family history:  Noncontributory  Review of systems:  Deferred  Physical exam:  Vitals:    03/26/25 1144   BP:    Pulse:    Resp:    Temp:    SpO2: 98%      Appears comfortable.  JVP not visible.  Normal heart sounds.  No murmurs.  Decreased breath sounds.  No edema.  EKG:  Atrial fibrillation  Echo:  2/22/2025: EF 55%.  Mild hypokinesis of the basal inferior wall.  Labs:  Lab Results   Component Value Date    WBC 12.2 (H) 03/26/2025    HGB 7.3 (L) 03/26/2025    HCT 26.0 (L) 03/26/2025     03/26/2025    CHOL 162 08/21/2023    TRIG 124 08/21/2023    HDL 35.4 (A) 08/21/2023     (H) 03/26/2025    AST 97 (H) 03/26/2025     (H) 03/26/2025    K 3.7 03/26/2025     (H) 03/26/2025    CREATININE 1.35 (H) 03/26/2025    BUN 45 (H) 03/26/2025    CO2 33 (H) 03/26/2025    TSH 0.52 12/13/2020    INR 1.4 (H) 03/22/2025    HGBA1C 8.6 (H) 03/05/2025     par

## 2025-03-27 ENCOUNTER — ANESTHESIA (OUTPATIENT)
Dept: GASTROENTEROLOGY | Facility: HOSPITAL | Age: 65
End: 2025-03-27
Payer: COMMERCIAL

## 2025-03-27 ENCOUNTER — ANESTHESIA EVENT (OUTPATIENT)
Dept: GASTROENTEROLOGY | Facility: HOSPITAL | Age: 65
End: 2025-03-27
Payer: COMMERCIAL

## 2025-03-27 ENCOUNTER — APPOINTMENT (OUTPATIENT)
Dept: GASTROENTEROLOGY | Facility: HOSPITAL | Age: 65
End: 2025-03-27
Payer: COMMERCIAL

## 2025-03-27 LAB
ABO GROUP (TYPE) IN BLOOD: NORMAL
ALBUMIN SERPL BCP-MCNC: 3 G/DL (ref 3.4–5)
ALP SERPL-CCNC: 191 U/L (ref 33–136)
ALT SERPL W P-5'-P-CCNC: 352 U/L (ref 10–52)
ANION GAP SERPL CALC-SCNC: 9 MMOL/L (ref 10–20)
ANTIBODY SCREEN: NORMAL
AST SERPL W P-5'-P-CCNC: 230 U/L (ref 9–39)
ATRIAL RATE: 63 BPM
BILIRUB SERPL-MCNC: 0.4 MG/DL (ref 0–1.2)
BUN SERPL-MCNC: 36 MG/DL (ref 6–23)
CALCIUM SERPL-MCNC: 8.1 MG/DL (ref 8.6–10.3)
CHLORIDE SERPL-SCNC: 107 MMOL/L (ref 98–107)
CO2 SERPL-SCNC: 34 MMOL/L (ref 21–32)
CREAT SERPL-MCNC: 1.19 MG/DL (ref 0.5–1.3)
EGFRCR SERPLBLD CKD-EPI 2021: 68 ML/MIN/1.73M*2
ERYTHROCYTE [DISTWIDTH] IN BLOOD BY AUTOMATED COUNT: 16.4 % (ref 11.5–14.5)
GLUCOSE BLD MANUAL STRIP-MCNC: 188 MG/DL (ref 74–99)
GLUCOSE BLD MANUAL STRIP-MCNC: 266 MG/DL (ref 74–99)
GLUCOSE BLD MANUAL STRIP-MCNC: 271 MG/DL (ref 74–99)
GLUCOSE BLD MANUAL STRIP-MCNC: 357 MG/DL (ref 74–99)
GLUCOSE SERPL-MCNC: 318 MG/DL (ref 74–99)
HCT VFR BLD AUTO: 24.7 % (ref 41–52)
HGB BLD-MCNC: 6.7 G/DL (ref 13.5–17.5)
HOLD SPECIMEN: NORMAL
MCH RBC QN AUTO: 26.4 PG (ref 26–34)
MCHC RBC AUTO-ENTMCNC: 27.1 G/DL (ref 32–36)
MCV RBC AUTO: 97 FL (ref 80–100)
NRBC BLD-RTO: 5.5 /100 WBCS (ref 0–0)
P AXIS: 72 DEGREES
P OFFSET: 223 MS
P ONSET: 176 MS
PLATELET # BLD AUTO: 212 X10*3/UL (ref 150–450)
POTASSIUM SERPL-SCNC: 3.4 MMOL/L (ref 3.5–5.3)
PR INTERVAL: 106 MS
PROT SERPL-MCNC: 6 G/DL (ref 6.4–8.2)
Q ONSET: 229 MS
QRS COUNT: 11 BEATS
QRS DURATION: 84 MS
QT INTERVAL: 452 MS
QTC CALCULATION(BAZETT): 462 MS
QTC FREDERICIA: 459 MS
R AXIS: -6 DEGREES
RBC # BLD AUTO: 2.54 X10*6/UL (ref 4.5–5.9)
RH FACTOR (ANTIGEN D): NORMAL
SODIUM SERPL-SCNC: 147 MMOL/L (ref 136–145)
T AXIS: 106 DEGREES
T OFFSET: 455 MS
VENTRICULAR RATE: 63 BPM
WBC # BLD AUTO: 11.9 X10*3/UL (ref 4.4–11.3)

## 2025-03-27 PROCEDURE — 80053 COMPREHEN METABOLIC PANEL: CPT | Performed by: INTERNAL MEDICINE

## 2025-03-27 PROCEDURE — 94640 AIRWAY INHALATION TREATMENT: CPT

## 2025-03-27 PROCEDURE — 2500000002 HC RX 250 W HCPCS SELF ADMINISTERED DRUGS (ALT 637 FOR MEDICARE OP, ALT 636 FOR OP/ED): Performed by: INTERNAL MEDICINE

## 2025-03-27 PROCEDURE — 2500000005 HC RX 250 GENERAL PHARMACY W/O HCPCS: Performed by: INTERNAL MEDICINE

## 2025-03-27 PROCEDURE — A43239 PR EDG TRANSORAL BIOPSY SINGLE/MULTIPLE: Performed by: ANESTHESIOLOGIST ASSISTANT

## 2025-03-27 PROCEDURE — 43239 EGD BIOPSY SINGLE/MULTIPLE: CPT | Performed by: STUDENT IN AN ORGANIZED HEALTH CARE EDUCATION/TRAINING PROGRAM

## 2025-03-27 PROCEDURE — 36415 COLL VENOUS BLD VENIPUNCTURE: CPT | Performed by: STUDENT IN AN ORGANIZED HEALTH CARE EDUCATION/TRAINING PROGRAM

## 2025-03-27 PROCEDURE — 2500000002 HC RX 250 W HCPCS SELF ADMINISTERED DRUGS (ALT 637 FOR MEDICARE OP, ALT 636 FOR OP/ED): Performed by: NURSE PRACTITIONER

## 2025-03-27 PROCEDURE — 97165 OT EVAL LOW COMPLEX 30 MIN: CPT | Mod: GO

## 2025-03-27 PROCEDURE — 5A0945A ASSISTANCE WITH RESPIRATORY VENTILATION, 24-96 CONSECUTIVE HOURS, HIGH NASAL FLOW/VELOCITY: ICD-10-PCS | Performed by: INTERNAL MEDICINE

## 2025-03-27 PROCEDURE — 88305 TISSUE EXAM BY PATHOLOGIST: CPT | Performed by: PATHOLOGY

## 2025-03-27 PROCEDURE — 3700000001 HC GENERAL ANESTHESIA TIME - INITIAL BASE CHARGE

## 2025-03-27 PROCEDURE — 7100000001 HC RECOVERY ROOM TIME - INITIAL BASE CHARGE

## 2025-03-27 PROCEDURE — 82947 ASSAY GLUCOSE BLOOD QUANT: CPT

## 2025-03-27 PROCEDURE — 99140 ANES COMP EMERGENCY COND: CPT | Performed by: ANESTHESIOLOGY

## 2025-03-27 PROCEDURE — 2500000004 HC RX 250 GENERAL PHARMACY W/ HCPCS (ALT 636 FOR OP/ED): Performed by: NURSE PRACTITIONER

## 2025-03-27 PROCEDURE — 2060000001 HC INTERMEDIATE ICU ROOM DAILY

## 2025-03-27 PROCEDURE — 88342 IMHCHEM/IMCYTCHM 1ST ANTB: CPT | Performed by: PATHOLOGY

## 2025-03-27 PROCEDURE — 36415 COLL VENOUS BLD VENIPUNCTURE: CPT | Performed by: INTERNAL MEDICINE

## 2025-03-27 PROCEDURE — 2500000001 HC RX 250 WO HCPCS SELF ADMINISTERED DRUGS (ALT 637 FOR MEDICARE OP): Performed by: NURSE PRACTITIONER

## 2025-03-27 PROCEDURE — 97162 PT EVAL MOD COMPLEX 30 MIN: CPT | Mod: GP

## 2025-03-27 PROCEDURE — 86900 BLOOD TYPING SEROLOGIC ABO: CPT | Performed by: STUDENT IN AN ORGANIZED HEALTH CARE EDUCATION/TRAINING PROGRAM

## 2025-03-27 PROCEDURE — 88305 TISSUE EXAM BY PATHOLOGIST: CPT | Mod: TC,PARLAB | Performed by: STUDENT IN AN ORGANIZED HEALTH CARE EDUCATION/TRAINING PROGRAM

## 2025-03-27 PROCEDURE — 7100000002 HC RECOVERY ROOM TIME - EACH INCREMENTAL 1 MINUTE

## 2025-03-27 PROCEDURE — 2500000001 HC RX 250 WO HCPCS SELF ADMINISTERED DRUGS (ALT 637 FOR MEDICARE OP)

## 2025-03-27 PROCEDURE — 2500000004 HC RX 250 GENERAL PHARMACY W/ HCPCS (ALT 636 FOR OP/ED): Performed by: ANESTHESIOLOGIST ASSISTANT

## 2025-03-27 PROCEDURE — 2500000004 HC RX 250 GENERAL PHARMACY W/ HCPCS (ALT 636 FOR OP/ED): Performed by: INTERNAL MEDICINE

## 2025-03-27 PROCEDURE — 2500000004 HC RX 250 GENERAL PHARMACY W/ HCPCS (ALT 636 FOR OP/ED)

## 2025-03-27 PROCEDURE — 0DB78ZX EXCISION OF STOMACH, PYLORUS, VIA NATURAL OR ARTIFICIAL OPENING ENDOSCOPIC, DIAGNOSTIC: ICD-10-PCS | Performed by: STUDENT IN AN ORGANIZED HEALTH CARE EDUCATION/TRAINING PROGRAM

## 2025-03-27 PROCEDURE — 85027 COMPLETE CBC AUTOMATED: CPT | Performed by: INTERNAL MEDICINE

## 2025-03-27 PROCEDURE — 3700000002 HC GENERAL ANESTHESIA TIME - EACH INCREMENTAL 1 MINUTE

## 2025-03-27 PROCEDURE — A43239 PR EDG TRANSORAL BIOPSY SINGLE/MULTIPLE: Performed by: ANESTHESIOLOGY

## 2025-03-27 RX ORDER — KETAMINE HCL IN NACL, ISO-OSM 100MG/10ML
SYRINGE (ML) INJECTION AS NEEDED
Status: DISCONTINUED | OUTPATIENT
Start: 2025-03-27 | End: 2025-03-27

## 2025-03-27 RX ORDER — SUCRALFATE 1 G/10ML
1 SUSPENSION ORAL EVERY 6 HOURS SCHEDULED
Status: DISCONTINUED | OUTPATIENT
Start: 2025-03-27 | End: 2025-04-03 | Stop reason: HOSPADM

## 2025-03-27 RX ORDER — SODIUM CHLORIDE, SODIUM LACTATE, POTASSIUM CHLORIDE, CALCIUM CHLORIDE 600; 310; 30; 20 MG/100ML; MG/100ML; MG/100ML; MG/100ML
INJECTION, SOLUTION INTRAVENOUS CONTINUOUS PRN
Status: DISCONTINUED | OUTPATIENT
Start: 2025-03-27 | End: 2025-03-27

## 2025-03-27 RX ADMIN — Medication 5 MG: at 11:08

## 2025-03-27 RX ADMIN — INSULIN LISPRO 6 UNITS: 100 INJECTION, SOLUTION INTRAVENOUS; SUBCUTANEOUS at 08:54

## 2025-03-27 RX ADMIN — DOCUSATE SODIUM LIQUID 100 MG: 100 LIQUID ORAL at 21:44

## 2025-03-27 RX ADMIN — INSULIN LISPRO 10 UNITS: 100 INJECTION, SOLUTION INTRAVENOUS; SUBCUTANEOUS at 02:39

## 2025-03-27 RX ADMIN — ACETYLCYSTEINE 200 MG: 200 SOLUTION ORAL; RESPIRATORY (INHALATION) at 13:04

## 2025-03-27 RX ADMIN — ACETAMINOPHEN 325 MG: 325 TABLET, FILM COATED ORAL at 14:24

## 2025-03-27 RX ADMIN — PIPERACILLIN SODIUM AND TAZOBACTAM SODIUM 3.38 G: 3; .375 INJECTION, SOLUTION INTRAVENOUS at 21:46

## 2025-03-27 RX ADMIN — Medication 30 L/MIN: at 19:15

## 2025-03-27 RX ADMIN — Medication 30 L/MIN: at 06:20

## 2025-03-27 RX ADMIN — AMIODARONE HYDROCHLORIDE 200 MG: 200 TABLET ORAL at 21:45

## 2025-03-27 RX ADMIN — METOPROLOL TARTRATE 50 MG: 50 TABLET, FILM COATED ORAL at 14:16

## 2025-03-27 RX ADMIN — ACETAMINOPHEN 325 MG: 325 TABLET, FILM COATED ORAL at 03:34

## 2025-03-27 RX ADMIN — SUCRALFATE 1 G: 1 TABLET ORAL at 14:16

## 2025-03-27 RX ADMIN — PANTOPRAZOLE SODIUM 40 MG: 40 INJECTION, POWDER, FOR SOLUTION INTRAVENOUS at 21:45

## 2025-03-27 RX ADMIN — SUCRALFATE 1 G: 1 SUSPENSION ORAL at 21:46

## 2025-03-27 RX ADMIN — INSULIN GLARGINE 30 UNITS: 100 INJECTION, SOLUTION SUBCUTANEOUS at 21:44

## 2025-03-27 RX ADMIN — ACETYLCYSTEINE 200 MG: 200 SOLUTION ORAL; RESPIRATORY (INHALATION) at 06:20

## 2025-03-27 RX ADMIN — INSULIN LISPRO 6 UNITS: 100 INJECTION, SOLUTION INTRAVENOUS; SUBCUTANEOUS at 21:46

## 2025-03-27 RX ADMIN — PANTOPRAZOLE SODIUM 40 MG: 40 INJECTION, POWDER, FOR SOLUTION INTRAVENOUS at 14:17

## 2025-03-27 RX ADMIN — AMIODARONE HYDROCHLORIDE 200 MG: 200 TABLET ORAL at 14:17

## 2025-03-27 RX ADMIN — PIPERACILLIN SODIUM AND TAZOBACTAM SODIUM 3.38 G: 3; .375 INJECTION, SOLUTION INTRAVENOUS at 15:17

## 2025-03-27 RX ADMIN — TRAZODONE HYDROCHLORIDE 50 MG: 50 TABLET ORAL at 21:45

## 2025-03-27 RX ADMIN — SODIUM CHLORIDE, POTASSIUM CHLORIDE, SODIUM LACTATE AND CALCIUM CHLORIDE: 600; 310; 30; 20 INJECTION, SOLUTION INTRAVENOUS at 11:05

## 2025-03-27 RX ADMIN — ACETYLCYSTEINE 200 MG: 200 SOLUTION ORAL; RESPIRATORY (INHALATION) at 19:15

## 2025-03-27 RX ADMIN — IPRATROPIUM BROMIDE 0.5 MG: 0.5 SOLUTION RESPIRATORY (INHALATION) at 19:15

## 2025-03-27 RX ADMIN — PIPERACILLIN SODIUM AND TAZOBACTAM SODIUM 3.38 G: 3; .375 INJECTION, SOLUTION INTRAVENOUS at 02:25

## 2025-03-27 RX ADMIN — IRON SUCROSE 200 MG: 20 INJECTION, SOLUTION INTRAVENOUS at 14:17

## 2025-03-27 RX ADMIN — IRON SUCROSE 200 MG: 20 INJECTION, SOLUTION INTRAVENOUS at 05:48

## 2025-03-27 RX ADMIN — INSULIN LISPRO 2 UNITS: 100 INJECTION, SOLUTION INTRAVENOUS; SUBCUTANEOUS at 16:22

## 2025-03-27 RX ADMIN — IPRATROPIUM BROMIDE 0.5 MG: 0.5 SOLUTION RESPIRATORY (INHALATION) at 13:02

## 2025-03-27 RX ADMIN — IPRATROPIUM BROMIDE 0.5 MG: 0.5 SOLUTION RESPIRATORY (INHALATION) at 06:20

## 2025-03-27 SDOH — HEALTH STABILITY: MENTAL HEALTH: CURRENT SMOKER: 0

## 2025-03-27 ASSESSMENT — COGNITIVE AND FUNCTIONAL STATUS - GENERAL
STANDING UP FROM CHAIR USING ARMS: TOTAL
MOBILITY SCORE: 6
PERSONAL GROOMING: TOTAL
EATING MEALS: TOTAL
DAILY ACTIVITIY SCORE: 6
MOVING TO AND FROM BED TO CHAIR: TOTAL
HELP NEEDED FOR BATHING: TOTAL
WALKING IN HOSPITAL ROOM: TOTAL
DRESSING REGULAR UPPER BODY CLOTHING: TOTAL
MOVING FROM LYING ON BACK TO SITTING ON SIDE OF FLAT BED WITH BEDRAILS: TOTAL
TOILETING: TOTAL
DRESSING REGULAR LOWER BODY CLOTHING: TOTAL
TURNING FROM BACK TO SIDE WHILE IN FLAT BAD: TOTAL
CLIMB 3 TO 5 STEPS WITH RAILING: TOTAL

## 2025-03-27 ASSESSMENT — PAIN SCALES - GENERAL
PAINLEVEL_OUTOF10: 3
PAINLEVEL_OUTOF10: 0 - NO PAIN
PAINLEVEL_OUTOF10: 0 - NO PAIN
PAIN_LEVEL: 0

## 2025-03-27 ASSESSMENT — PAIN SCALES - PAIN ASSESSMENT IN ADVANCED DEMENTIA (PAINAD)
BREATHING: NORMAL
CONSOLABILITY: NO NEED TO CONSOLE
TOTALSCORE: 0
TOTALSCORE: 0
BREATHING: NORMAL
FACIALEXPRESSION: SMILING OR INEXPRESSIVE
CONSOLABILITY: NO NEED TO CONSOLE
BODYLANGUAGE: RELAXED
FACIALEXPRESSION: SMILING OR INEXPRESSIVE
BODYLANGUAGE: RELAXED

## 2025-03-27 ASSESSMENT — PAIN - FUNCTIONAL ASSESSMENT
PAIN_FUNCTIONAL_ASSESSMENT: WONG-BAKER FACES
PAIN_FUNCTIONAL_ASSESSMENT: WONG-BAKER FACES
PAIN_FUNCTIONAL_ASSESSMENT: 0-10

## 2025-03-27 NOTE — CARE PLAN
The patient's goals for the shift include  rest    The clinical goals for the shift include comfort and rest      Problem: Safety - Adult  Goal: Free from fall injury  Outcome: Progressing  Flowsheets (Taken 3/26/2025 2214)  Free from fall injury: Instruct family/caregiver on patient safety     Problem: Chronic Conditions and Co-morbidities  Goal: Patient's chronic conditions and co-morbidity symptoms are monitored and maintained or improved  Outcome: Progressing  Flowsheets (Taken 3/26/2025 2214)  Care Plan - Patient's Chronic Conditions and Co-Morbidity Symptoms are Monitored and Maintained or Improved:   Monitor and assess patient's chronic conditions and comorbid symptoms for stability, deterioration, or improvement   Collaborate with multidisciplinary team to address chronic and comorbid conditions and prevent exacerbation or deterioration   Update acute care plan with appropriate goals if chronic or comorbid symptoms are exacerbated and prevent overall improvement and discharge     Problem: Skin  Goal: Prevent/manage excess moisture  Outcome: Progressing  Flowsheets (Taken 3/26/2025 2214)  Prevent/manage excess moisture:   Cleanse incontinence/protect with barrier cream   Follow provider orders for dressing changes   Moisturize dry skin   Monitor for/manage infection if present  Goal: Prevent/minimize sheer/friction injuries  Outcome: Progressing  Flowsheets (Taken 3/26/2025 2214)  Prevent/minimize sheer/friction injuries:   Complete micro-shifts as needed if patient unable. Adjust patient position to relieve pressure points, not a full turn   Turn/reposition every 2 hours/use positioning/transfer devices   HOB 30 degrees or less   Use pull sheet   Increase activity/out of bed for meals

## 2025-03-27 NOTE — ANESTHESIA PREPROCEDURE EVALUATION
Patient: Bryan Nix    Procedure Information       Date/Time: 03/27/25 0830    Scheduled providers: Pritesh Carlton MD    Procedure: EGD    Location: [facility]            Relevant Problems   Anesthesia (within normal limits)      Cardiac   (+) Atrial flutter (Multi)      Pulmonary   (+) Pneumonia due to infectious organism      Endocrine   (+) Insulin dependent type 2 diabetes mellitus (Multi)      ID   (+) Pneumonia due to infectious organism       Clinical information reviewed:   Tobacco  Allergies  Meds   Med Hx  Surg Hx   Fam Hx  Soc Hx        NPO Detail:  No data recorded     Physical Exam    Airway  TM distance: <3 FB     Cardiovascular   Rhythm: regular     Dental    Pulmonary   (+) rhonchi     Abdominal   (+) scaphoid       Other findings: Well healed tracheostomy          Anesthesia Plan    History of general anesthesia?: yes  History of complications of general anesthesia?: no    ASA 4 - emergent     MAC   (Pt's. POA and Guardian is a [employer] and she adamantly refuses all blood products, even up to and including the consequence of death to her spouse Bryan Nix.)  The patient is not a current smoker.  Patient was previously instructed to abstain from smoking on day of procedure.  Patient did not smoke on day of procedure.    intravenous induction   Anesthetic plan and risks discussed with patient and legal guardian.  Use of blood products discussed with legal guardian who did not consent to blood products.    Plan discussed with CRNA.

## 2025-03-27 NOTE — PROGRESS NOTES
Bryan Nix is a 64 y.o. male on day 5 of admission presenting with Hemoptysis.      Subjective   Patient fully evaluated on March 24.  Hemoptysis appears resolved.  Still with slightly elevated white count to be monitored.  Continue aggressive antibiotic regimen.  Repeat chest x-ray is pending.       Objective     Last Recorded Vitals  BP 96/53 (BP Location: Left arm, Patient Position: Sitting)   Pulse 51   Temp 36.3 °C (97.3 °F) (Temporal)   Resp 18   Wt 77.1 kg (170 lb)   SpO2 100%   Intake/Output last 3 Shifts:    Intake/Output Summary (Last 24 hours) at 3/27/2025 1646  Last data filed at 3/27/2025 1200  Gross per 24 hour   Intake 2813.33 ml   Output 2325 ml   Net 488.33 ml       Admission Weight  Weight: 77.1 kg (170 lb) (03/22/25 1255)    Daily Weight  03/22/25 : 77.1 kg (170 lb)    Image Results  Esophagogastroduodenoscopy (EGD)  Table formatting from the original result was not included.  Impression  Small hiatal hernia  Moderate erythematous mucosa in the body of the stomach and antrum;   performed cold forceps biopsy to rule out H. pylori  Moderate erythematous mucosa in the duodenal bulb and 1st part of the   duodenum    Findings  Z-line 45 cm from the incisors  Small hiatal hernia without Riccardo lesions present. Hill grade I hiatal   hernia  Moderate erythematous mucosa in the body of the stomach and antrum;   performed cold forceps biopsy to rule out H. pylori. PEG tube bumper seen   in the gastric body, site unremarkable  Moderate erythematous mucosa in the duodenal bulb and 1st part of the   duodenum    Recommendation  Await pathology results     Indication  Melena, Hemoptysis, Anemia, unspecified type    Staff  Staff Role   Pritesh Carlton MD Proceduralist     Medications  See Anesthesia Record.     Preprocedure  A history and physical has been performed, and patient medication   allergies have been reviewed. The patient's tolerance of previous   anesthesia has been reviewed. The risks and  benefits of the procedure and   the sedation options and risks were discussed with the patient. All   questions were answered and informed consent obtained.    Details of the Procedure  The patient underwent monitored anesthesia care, which was administered by   an anesthesia professional. The patient's blood pressure, ECG, ETCO2,   heart rate, level of consciousness, oxygen and respirations were monitored   throughout the procedure. The scope was introduced through the mouth and   advanced to the second part of the duodenum. Retroflexion was performed in   the cardia. The patient experienced no blood loss. The procedure was not   difficult. The patient tolerated the procedure well. There were no   apparent adverse events.     Events  Procedure Events   Event Event Time   ENDO SCOPE IN TIME 3/27/2025 11:10 AM   ENDO SCOPE OUT TIME 3/27/2025 11:13 AM     Specimens  ID Type Source Tests Collected by Time   1 : COLD BIOPSY Tissue STOMACH ANTRUM BIOPSY SURGICAL PATHOLOGY EXAM Pritesh Carlton MD 3/27/2025 1111     Procedure Location  [facility] [address]  [address] [facility] [address]  [phone]    Referring Provider  JOSEPH Edward-CNP    Procedure Provider  Pritesh Carlton MD  Electrocardiogram, 12-lead PRN ACS symptoms  Sinus rhythm with short VA  T wave abnormality, consider anterior ischemia  Abnormal ECG  When compared with ECG of 25-MAR-2025 19:08, (unconfirmed)  Previous ECG has undetermined rhythm, needs review  Nonspecific T wave abnormality now evident in Inferior leads      Physical Exam    Relevant Results               Assessment/Plan   This patient currently has cardiac telemetry ordered; if you would like to modify or discontinue the telemetry order, click here to go to the orders activity to modify/discontinue the order.              Assessment & Plan  Hemoptysis    Tracheostomy in place (Multi)    CVA, old, hemiparesis (Multi)    Status post insertion of  percutaneous endoscopic gastrostomy (PEG) tube (Multi)    Pneumonia due to infectious organism    Right sided weakness    Atrial flutter (Multi)    Insulin dependent type 2 diabetes mellitus (Multi)        Zeus Bone MD  Physician  Internal Medicine     H&P      Signed     Date of Service: 3/23/2025 10:28 AM     Signed       Expand All Collapse All    History Of Present Illness  Bryan Nix is a 64-year-old male with past medical history of atrial flutter on Eliquis, hyponatremia, IDDM, CAD, hypertension, chronic wounds, diffuse lymphadenopathy, anemia, history of smoking, history elevated PSA, CVA L hemisphere (2020) with right-sided deficits, Left ICA stenosis (85%) s/p angioplasty and left carotid artery stent (08/25/2023) and recent history of acute pontine stroke 02/27/25 with acute hypoxic respiratory failure s/p tracheostomy on 2/25/2025 and PEG.  Patient presents for bleeding from tracheostomy.  Patient is alert and oriented x 3, however limited verbal communication due to tracheostomy.  Wife is at bedside and assists with history.  She reports that patient was coughing blood out of his tracheostomy.  ER provider felt bleeding was likely superficial around site.  Patient has been on room air at [facility] nursing [facility].  He was placed on Airvo in the ED mainly to humidify oxygen.  Patient never desaturated per ER report.  Currently he is resting comfortably in the bed, breath sounds slightly rhonchorous, but unlabored.  Reports chills, but no fevers.  He has right-sided weakness and sensory deficits.  Denies headache, vision or hearing changes, chest pains, palpitations, shortness of breath, nausea, vomiting, abdominal pain, diarrhea, or complications of PEG tube site.  Wife reports patient skin became irritated in his groin due to adhesive from a condom cath previously, he has a small decubitus wound that she reports is healing well.  He receives tube feeds continuously.  Wife reports a patient was  recently evaluated by speech for p.o. to intake, however he still remains n.p.o. except for meds and tube feeds through PEG. CODE STATUS reviewed: full code     ER Course: Hemodynamically stable, afebrile, SpO2 to 97% trach mask.  WBC 9.3, hemoglobin 8, hematocrit 29.4, platelets 197.  INR 1.4.  Glucose 223, BUN 36, otherwise CMP is normal.  CT of the chest for PE pending.  CXR unremarkable. Trannexamic acid soak gauze wrapped around trach.      Past medical history: As above  Past surgical history: As above  Social history: Former smoker 37.5 pack years-quit smoking age 63, occasional alcohol use, no illicit drug use.  .  Works in maintenance.  6 children.  Family history: Noncontributory     Past Medical History  Medical History        Past Medical History:   Diagnosis Date    Abnormal finding of blood chemistry, unspecified 05/18/2016     Abnormal blood chemistry    Acute upper respiratory infection, unspecified 12/02/2015     Acute upper respiratory infection    Elevated prostate specific antigen (PSA)       Elevated prostate specific antigen (PSA)    Encounter for screening for malignant neoplasm of colon 01/30/2021     Colon cancer screening    Encounter for screening for malignant neoplasm of prostate 11/30/2020     Prostate cancer screening    Essential (primary) hypertension 07/30/2013     Benign essential hypertension    Other conditions influencing health status 07/30/2013     Diabetes Mellitus Poorly Controlled    Other conditions influencing health status 12/02/2015     History of cough    Personal history of other endocrine, nutritional and metabolic disease 03/18/2021     History of hyperlipidemia    Personal history of other endocrine, nutritional and metabolic disease 03/18/2021     History of diabetes mellitus    Personal history of other specified conditions 05/18/2016     History of chest pain    Personal history of other specified conditions 05/18/2016     History of dyspnea    Personal  history of other specified conditions 05/18/2016     History of dizziness    Personal history of other specified conditions 05/18/2016     History of fatigue            Surgical History  Surgical History         Past Surgical History:   Procedure Laterality Date    CT ANGIO CORONARY ART WITH HEARTFLOW IF SCORE >30%   8/23/2023     CT HEART CORONARY ANGIOGRAM 8/23/2023 [facility]    MR HEAD ANGIO WO IV CONTRAST   12/14/2020     MR HEAD ANGIO WO IV CONTRAST 12/14/2020 [facility]    MR NECK ANGIO WO IV CONTRAST   12/14/2020     MR NECK ANGIO WO IV CONTRAST 12/14/2020 [facility]    OTHER SURGICAL HISTORY   12/01/2020     Hand fracture repair    OTHER SURGICAL HISTORY   12/01/2020     Nasal bone fracture repair            Social History  He reports that he has quit smoking. His smoking use included cigarettes. He has never used smokeless tobacco. No history on file for alcohol use and drug use.     Family History  Family History   No family history on file.        Allergies  Patient has no known allergies.     Review of Systems     Physical Exam  Constitutional:       General: He is awake. He is not in acute distress.     Appearance: Normal appearance. He is not toxic-appearing.   HENT:      Head: Atraumatic.      Nose: Nose normal.      Mouth/Throat:      Mouth: Mucous membranes are moist.   Eyes:      Conjunctiva/sclera: Conjunctivae normal.   Cardiovascular:      Rate and Rhythm: Normal rate and regular rhythm.      Pulses: Normal pulses.      Heart sounds: No murmur heard.  Pulmonary:      Effort: No respiratory distress.      Comments: Tracheostomy site midline, no drainage around dressing.  Rhonchorous breath sounds, unlabored respirations, RT bedside suctioning patient with thick blood-tinged sputum  Abdominal:      General: Bowel sounds are normal. There is no distension.      Palpations: Abdomen is soft.      Tenderness: There is no abdominal tenderness. There is no guarding.      Comments:  "Left upper quadrant PEG site tube site, no drainage or skin breakdown around tube site.   Musculoskeletal:         General: No swelling, deformity or signs of injury.      Cervical back: Neck supple.      Right lower leg: No edema.      Left lower leg: No edema.   Skin:     General: Skin is warm and dry.      Capillary Refill: Capillary refill takes less than 2 seconds.      Findings: Wound (sacrum) present. No ecchymosis or erythema.   Neurological:      Mental Status: He is alert and oriented to person, place, and time.      Cranial Nerves: No facial asymmetry.      Sensory: Sensory deficit (RUE, RLE) present.      Motor: Weakness present.      Comments: Right sided upper and lower extremity motor function deficits   Psychiatric:         Mood and Affect: Mood normal.         Behavior: Behavior is cooperative.            Last Recorded Vitals  Blood pressure 134/50, pulse 93, temperature 36.9 °C (98.4 °F), temperature source Temporal, resp. rate 22, height 1.88 m (6' 2\"), weight 77.1 kg (170 lb), SpO2 98%.     Relevant Results              Results for orders placed or performed during the hospital encounter of 03/22/25 (from the past 24 hours)   CBC and Auto Differential   Result Value Ref Range     WBC 9.3 4.4 - 11.3 x10*3/uL     nRBC 0.3 (H) 0.0 - 0.0 /100 WBCs     RBC 3.02 (L) 4.50 - 5.90 x10*6/uL     Hemoglobin 8.0 (L) 13.5 - 17.5 g/dL     Hematocrit 29.4 (L) 41.0 - 52.0 %     MCV 97 80 - 100 fL     MCH 26.5 26.0 - 34.0 pg     MCHC 27.2 (L) 32.0 - 36.0 g/dL     RDW 15.6 (H) 11.5 - 14.5 %     Platelets 197 150 - 450 x10*3/uL     Neutrophils % 77.1 40.0 - 80.0 %     Immature Granulocytes %, Automated 1.5 (H) 0.0 - 0.9 %     Lymphocytes % 13.1 13.0 - 44.0 %     Monocytes % 4.1 2.0 - 10.0 %     Eosinophils % 3.2 0.0 - 6.0 %     Basophils % 1.0 0.0 - 2.0 %     Neutrophils Absolute 7.14 1.20 - 7.70 x10*3/uL     Immature Granulocytes Absolute, Automated 0.14 0.00 - 0.70 x10*3/uL     Lymphocytes Absolute 1.21 1.20 - " 4.80 x10*3/uL     Monocytes Absolute 0.38 0.10 - 1.00 x10*3/uL     Eosinophils Absolute 0.30 0.00 - 0.70 x10*3/uL     Basophils Absolute 0.09 0.00 - 0.10 x10*3/uL   Basic metabolic panel   Result Value Ref Range     Glucose 223 (H) 74 - 99 mg/dL     Sodium 142 136 - 145 mmol/L     Potassium 4.3 3.5 - 5.3 mmol/L     Chloride 103 98 - 107 mmol/L     Bicarbonate 27 21 - 32 mmol/L     Anion Gap 16 10 - 20 mmol/L     Urea Nitrogen 36 (H) 6 - 23 mg/dL     Creatinine 0.81 0.50 - 1.30 mg/dL     eGFR >90 >60 mL/min/1.73m*2     Calcium 9.6 8.6 - 10.3 mg/dL   Protime-INR   Result Value Ref Range     Protime 15.1 (H) 9.8 - 12.4 seconds     INR 1.4 (H) 0.9 - 1.1   aPTT   Result Value Ref Range     aPTT 25 (L) 26 - 36 seconds   Type and Screen   Result Value Ref Range     ABO TYPE B       Rh TYPE POS       ANTIBODY SCREEN NEG     VERIFY ABO/Rh Group Test   Result Value Ref Range     ABO TYPE B       Rh TYPE POS     MRSA Surveillance for Vancomycin De-escalation, PCR     Specimen: Anterior Nares; Swab   Result Value Ref Range     MRSA PCR Not Detected Not Detected   POCT GLUCOSE   Result Value Ref Range     POCT Glucose 180 (H) 74 - 99 mg/dL   POCT GLUCOSE   Result Value Ref Range     POCT Glucose 161 (H) 74 - 99 mg/dL   POCT GLUCOSE   Result Value Ref Range     POCT Glucose 156 (H) 74 - 99 mg/dL   Basic metabolic panel   Result Value Ref Range     Glucose 170 (H) 74 - 99 mg/dL     Sodium 147 (H) 136 - 145 mmol/L     Potassium 4.2 3.5 - 5.3 mmol/L     Chloride 107 98 - 107 mmol/L     Bicarbonate 30 21 - 32 mmol/L     Anion Gap 14 10 - 20 mmol/L     Urea Nitrogen 35 (H) 6 - 23 mg/dL     Creatinine 0.85 0.50 - 1.30 mg/dL     eGFR >90 >60 mL/min/1.73m*2     Calcium 9.4 8.6 - 10.3 mg/dL   CBC   Result Value Ref Range     WBC 10.4 4.4 - 11.3 x10*3/uL     nRBC 0.6 (H) 0.0 - 0.0 /100 WBCs     RBC 2.87 (L) 4.50 - 5.90 x10*6/uL     Hemoglobin 7.8 (L) 13.5 - 17.5 g/dL     Hematocrit 26.6 (L) 41.0 - 52.0 %     MCV 93 80 - 100 fL     MCH 27.2  26.0 - 34.0 pg     MCHC 29.3 (L) 32.0 - 36.0 g/dL     RDW 15.7 (H) 11.5 - 14.5 %     Platelets 205 150 - 450 x10*3/uL   SST TOP   Result Value Ref Range     Extra Tube Hold for add-ons.        CT angio chest for pulmonary embolism     Result Date: 3/22/2025  Interpreted By:  Pamela Zurita, STUDY: CT ANGIO CHEST FOR PULMONARY EMBOLISM;  3/22/2025 4:56 pm   INDICATION: Signs/Symptoms:bloody secretions, eval for PE.     COMPARISON: CT chest without contrast 03/22/2025   ACCESSION NUMBER(S): [id number]   ORDERING CLINICIAN: PAMELA HAHN   TECHNIQUE: Contiguous axial images of the chest were obtained after the intravenous administration of iodinated contrast using angiographic PE protocol. Coronal and sagittal reformatted images were reconstructed from the axial data. MIP images were created on an independent workstation and reviewed.   FINDINGS:   MEDIASTINUM AND LYMPH NODES: Inflammatory fat stranding along the tracheostomy tract without discrete fluid collection. The esophageal wall appears within normal limits. There is redemonstration of diffuse lymphadenopathy in the bilateral axilla, mediastinum, bilateral supraclavicular as described in detail on separate report. Also notable or enlarged bilateral level 4 cervical lymph nodes (right > left).   VESSELS:  Normal caliber thoracic aorta without dissection. Moderate aortic atherosclerosis.  No acute pulmonary embolism.   HEART: Normal size.  Mild coronary artery calcifications. No significant pericardial effusion.   LUNG, AIRWAYS, PLEURA: There is slight increase in debris within the trachea. There has been clearing of secretions in the left upper/lower lobe bronchus. There is diffuse bilateral bronchial thickening slightly increased from prior study. There is peribronchovascular ground-glass opacity in the posterior right upper lobe and superior and basal segments of the right lower lobe. There is slightly worsened atelectasis in the lung bases remaining  predominantly subsegmental in nature. There is a 0.8 cm nodule in the left upper lobe that is stable from 08/19/2023.   OSSEOUS STRUCTURES: No acute osseous abnormality.   CHEST WALL SOFT TISSUES: No discernible acute abnormality.   UPPER ABDOMEN/OTHER: No acute abnormality.        No acute pulmonary embolism.   Peribronchial vascular ground glass opacities in the right upper and lower lobe again concerning for pneumonia given patient's risk factors of tracheostomy and debris in the airways.   Redemonstration of diffuse lymphadenopathy in the lower necks, axilla, and mediastinum. Findings are either diffusely reactive in nature, reflective of lymphoma/leukemia, meter deficiency, or less likely head neck neoplasm is previously postulated. However please correlate with past medical history.   Inflammatory changes along the tracheostomy tract without fluid collection. Correlate for erythema.   Unchanged 0.8 cm nodule in the left upper lobe dating back to 08/19/2023. Recommend 1 year follow up chest CT to ensure at least 2 years of stability.   MACRO: None.   Signed by: Pamela Zurita 3/22/2025 7:17 PM Dictation workstation:   [id number]     CT chest wo IV contrast     Result Date: 3/22/2025  Interpreted By:  Pamela Zurita, STUDY: CT CHEST WO IV CONTRAST;  3/22/2025 3:41 pm   INDICATION: Signs/Symptoms:eval for alveolar hemorrhage.     COMPARISON: CT head/neck 08/19/2023   ACCESSION NUMBER(S): [id number]   ORDERING CLINICIAN: PAMELA HAHN   TECHNIQUE: Contiguous axial images of the chest and upper abdomen were obtained without contrast. Coronal and sagittal reformatted images were reconstructed from the axial data.   FINDINGS:   MEDIASTINUM AND LYMPH NODES: There is fat stranding/edema along the tract of the tracheostomy about discrete fluid collection. The esophageal wall appears within normal limits. There are numerous enlarged bilateral supraclavicular lymph nodes measuring up to 1.4 cm on the left. There  are numerous enlarged bilateral axillary lymph nodes measuring up to 1.1 cm on the right and 1.2 cm on the left. There are numerous prominent to mildly enlarged mediastinal lymph nodes as well. No pneumomediastinum.   VESSELS:  Normal caliber thoracic aorta. Moderate aortic atherosclerosis. The main pulmonary artery is normal in size.   HEART: Normal size.  Mild coronary artery calcifications. No significant pericardial effusion.   LUNG, AIRWAYS, PLEURA: Tracheostomy noted in appropriate position. There is trace mucus in the left mainstem bronchus and at the origin of the left upper lobe and lower lobe bronchi. There are mild peribronchovascular ground-glass opacities in the posterior segment of the right upper lobe, basal segments of the right lower lobe. There is mild dependent bibasilar atelectasis. There is no pleural effusion.   OSSEOUS STRUCTURES: No acute osseous abnormality.   CHEST WALL SOFT TISSUES: No discernible acute abnormality.   UPPER ABDOMEN/OTHER: No acute abnormality.        1. Mild peribronchovascular ground-glass opacities in the posterior right upper lobe and basal segments of the right lower lobe. This appearance and distribution is not typical for alveolar hemorrhage which tends to be centrilobular and bilateral asymmetric in distribution. Findings are most suggestive of pneumonia.   2. Small amount of mucous debris in the left mainstem bronchus and at the origin of the left upper and lower lobe bronchi.   3. Diffuse lymphadenopathy in the bilateral axillary regions, mediastinum and left supraclavicular region particularly notable in the bilateral supraclavicular region. This is progressed in the left supraclavicular region and new elsewhere when compared to 08/19/2023 CTA head/neck. Correlate for any history of known head/neck malignancy or lymphoma.   4. Inflammation/fat stranding adjacent to the tracheostomy tube entry site noted. Correlate for air the met. No evidence of fluid collection.    MACRO: None.   Signed by: Marco A Zurita 3/22/2025 7:10 PM Dictation workstation:   [id number]     XR chest 1 view     Result Date: 3/22/2025  Interpreted By:  Fermin Stroud, STUDY: XR CHEST 1 VIEW   INDICATION: Signs/Symptoms:hemoptysis.   COMPARISON: August 19, 2023   ACCESSION NUMBER(S): [id number]   ORDERING CLINICIAN: MARCO A HAHN   FINDINGS: No consolidation, effusion, edema, or pneumothorax. Heart size within normal limits.        No evidence of acute intrathoracic abnormality.   Signed by: Fermin Stroud 3/22/2025 2:01 PM Dictation workstation:   [id number]     XR chest 1 view     Result Date: 3/14/2025  * * *Final Report* * * DATE OF EXAM: Mar 14 2025  8:52AM   S5X   [id number]  -  XR CHEST 1V FRONTAL   / ACCESSION #  [id number] PROCEDURE REASON: new bleeding from trach      * * * * Physician Interpretation * * * *  EXAMINATION:  CHEST RADIOGRAPH (PORTABLE SINGLE VIEW AP) Exam Date/Time:  3/14/2025 8:52 AM Clinical History:  new bleeding from trach Comparison:  03/03/2025 RESULT: LINES, TUBES, AND DEVICES:  Tracheostomy. CARDIOMEDIASTINAL SILHOUETTE:  The cardiac and mediastinal silhouettes appear unremarkable. LUNGS AND PLEURA: No consolidation.  No pleural effusion. No pulmonary vascular congestion. No pneumothorax identified. OTHER:  N/A     IMPRESSION: No acute abnormality identified. : HANY   Transcribe Date/Time: Mar 14 2025  9:07A Dictated by : MARCO A EVANS MD This examination was interpreted and the report reviewed and electronically signed by: MARCO A EVANS MD on Mar 14 2025  9:11AM  EST     US abdomen complete     Result Date: 3/11/2025  ABDOMEN ULTRASOUND-COMPLETE FINDINGS: Abdomen Ultrasound Complete: PROCEDURE: Multiple grayscale mages of the abdomen were obtained. FINDINGS:Multiple real time images demonstrate the liver with increased echogenicity consistent with fatty infiltration. There is no evidence of a discrete mass within the liver. The liver is enlarged, measured at  15.7 cm in size. The gallbladder is seen with no evidence of cholelithiasis. The gallbladder wall is within normal limits. The common bile duct is within normal limits. The visualized pancreas appears unremarkable. Both kidneys are seen with no evidence of hydronephrosis or a calculus. The spleen has its normal homogeneous echo appearance. There is no free fluid to suggest ascites. The visualized aorta appears unremarkable. The inferior vena cava appears patent. CONCLUSION: Enlarged fatty liver. ELECTRONICALLY SIGNED BY LEONEL LAN M.D. 3/11/2025 5:00:52 PM EDT.     XR chest 1 view     Result Date: 3/3/2025  * * *Final Report* * * DATE OF EXAM: Mar  3 2025  1:40PM   S5X   5290  -  XR CHEST 1V FRONTAL   / ACCESSION #  [id number] PROCEDURE REASON: resp failure      * * * * Physician Interpretation * * * *  EXAMINATION:  CHEST RADIOGRAPH (PORTABLE SINGLE VIEW AP) Exam Date/Time:  3/3/2025 1:40 PM Indication:   resp failure M:  XCP_4 Comparison:  1 day prior RESULT: Lines, tubes, and devices:  Tracheostomy tube remains in situ. Lungs and pleura:  Mild patchy opacities right lower lung zone seen on the prior study are no longer appreciated.  No confluent opacity.   Costophrenic angles are clear.  No pneumothorax. Cardiomediastinal silhouette:  Stable cardiomediastinal silhouette. Other:  -     IMPRESSION: Improved aeration right lower lung zone.  No acute cardiopulmonary finding. : HANY   Transcribe Date/Time: Mar  3 2025  2:49P Dictated by : ISRAEL BENSON MD This examination was interpreted and the report reviewed and electronically signed by: ISRAEL BENSON MD on Mar  3 2025  2:49PM  EST     XR chest 1 view     Result Date: 3/2/2025  * * *Final Report* * * DATE OF EXAM: Mar  2 2025 11:25AM   MMX   [id number]  -  XR CHEST 1V FRONTAL PORT  / ACCESSION #  [id number] PROCEDURE REASON: Shortness of breath      * * * * Physician Interpretation * * * *  EXAMINATION:  CHEST RADIOGRAPH (PORTABLE SINGLE VIEW  AP) Exam Date/Time:  3/2/2025 11:25 AM CLINICAL HISTORY: Shortness of breath MQ:  XCPR_5 Comparison:  02/20/2025. RESULT: This is a limited examination due to multiple wires and tubing obscuring the lower lungs. Lines, tubes, and devices:  A tracheostomy tube remains in place. Lungs and pleura:  There are increased bronchovascular markings in the right lower lung which may be due to bronchitis or early changes of aspiration pneumonia.  Clinical correlation and follow-up exams are recommended. Cardiomediastinal silhouette:  Stable cardiomediastinal silhouette. Other:  The visualized bony thorax appears unremarkable.     IMPRESSION: Nonspecific parenchymal changes in the right lower lung as described above. A follow-up exam is recommended. : [facility]   Transcribe Date/Time: Mar  2 2025 11:53A Dictated by : JB INIGUEZ MD This examination was interpreted and the report reviewed and electronically signed by: BJ INIGUEZ MD on Mar  2 2025 11:54AM  EST     XR chest 1 view     Result Date: 2/28/2025  * * *Final Report* * * DATE OF EXAM: Feb 28 2025 10:36AM   MMX   [id number]  -  XR CHEST 1V FRONTAL   / ACCESSION #  [id number] PROCEDURE REASON: Shortness of breath      * * * * Physician Interpretation * * * *  EXAMINATION:  CHEST RADIOGRAPH (SINGLE VIEW AP OR PA) CLINICAL HISTORY: Shortness of breath MQ:  XC1_5 Comparison:  02/22/2025 RESULT: Lines, tubes, and devices:  Tracheostomy tube is noted. Lungs and pleura:  No consolidation. No lung mass. No pleural effusion. Cardiomediastinal silhouette:  Normal cardiomediastinal silhouette. Other:  No acute osseous pathology.     IMPRESSION: No acute radiographic abnormality. : [facility]   Transcribe Date/Time: Feb 28 2025 12:07P Dictated by : DIANNE RAMOS MD This examination was interpreted and the report reviewed and electronically signed by: DIANNE RAMOS MD on Feb 28 2025 12:08PM  EST     XR abdomen 2 views supine and erect or decub     Result Date:  2/24/2025  * * *Final Report* * * DATE OF EXAM: Feb 24 2025  6:03PM   MMX   [id number]  -  XR ABDOMEN 1V SUPINE  / ACCESSION #  [id number] PROCEDURE REASON: Evaluate tube, line or lead position      * * * * Physician Interpretation * * * *  EXAMINATION:  XR ABDOMEN 1V SUPINE CLINICAL HISTORY:   Evaluate tube, line or lead position Technique:   XR ABDOMEN 1V SUPINE -- NOT APPLICABLE with 1 views on 1 images Comparison: 2/21/2025 RESULT: Feeding tube with distal tip at the pylorus antrum. Moderate colonic stool burden. No dilated bowel. Lung structures are intact.     IMPRESSION: Feeding tube with distal tip at the pylorus antrum. Moderate colonic stool burden. No dilated bowel. : [facility]   Transcribe Date/Time: Feb 24 2025  6:23P Dictated by : PABLO RAMOS MD This examination was interpreted and the report reviewed and electronically signed by: PABLO RAMOS MD on Feb 24 2025  6:24PM  EST     CT head wo IV contrast     Result Date: 2/23/2025  * * *Final Report* * * DATE OF EXAM: Feb 23 2025 12:04PM   [facility]   0504  -  CT BRAIN WO IVCON  / ACCESSION #  [id number] PROCEDURE REASON: Stroke, follow up      * * * * Physician Interpretation * * * *  EXAMINATION: CT BRAIN WO IVCON CLINICAL HISTORY: Stroke, follow up TECHNIQUE:  Serial axial images without IV contrast were obtained from the vertex to the foramen magnum. MQ:  CTBWO_3 CT Radiation dose: Integrated Dose-Length Product (DLP) for this visit =   778  mGy*cm CT Dose Reduction Employed: Iterative recon COMPARISON: MRI brain 02/19/2025 RESULT: Post-operative change: None. Acute change: Evolving acute infarct along the central eli and superior left medulla (2:7-9).  No evidence of hemorrhagic transformation. Hemorrhage: No evidence of acute intracranial hemorrhage. Mass Lesion / Mass Effect: There is no evidence of an intracranial mass or extraaxial fluid collection. No significant mass effect. Chronic change: Stable remote left MCA territory encephalomalacia.  Parenchyma: There is no significant volume loss. The brain parenchyma is otherwise within normal limits for age. Ventricles: The ventricles are within normal limits of size and configuration for age. Paranasal sinuses and skull base: Partial opacification of the left maxillary and sphenoid sinus and multiple bilateral ethmoid air cells.   The remaining visualized paranasal sinuses are grossly clear. The skull base and imaged soft tissues are unremarkable. Localizer images: No additional findings.     IMPRESSION: Evolving acute brainstem infarct.  No evidence of hemorrhagic transformation. Stable remote left MCA territory infarct. : [facility]   Transcribe Date/Time: Feb 23 2025 12:48P Dictated by : FRANKY GUZMAN MD   This examination was interpreted and the report reviewed and electronically signed by: FRANKY GUZMAN MD on Feb 23 2025 12:52PM  EST     XR chest 1 view     Result Date: 2/22/2025  * * *Final Report* * * DATE OF EXAM: Feb 22 2025  6:56AM   MMX   5290  -  XR CHEST 1V FRONTAL   / ACCESSION #  [id number] PROCEDURE REASON: Evaluate tube, line,  or lead position      * * * * Physician Interpretation * * * *  EXAMINATION:  CHEST RADIOGRAPH (SINGLE VIEW AP OR PA) CLINICAL HISTORY: Evaluate tube, line,  or lead position MQ:  XC1_5 Comparison:  02/21/2025. RESULT: Lines, tubes, and devices:  An ET tube and feeding tube remain in place.   A poorly defined right venous catheter cannot be excluded. Lungs and pleura:  There are persistent bilateral lower lungs infiltrates seen on the right more than left suggestive of pneumonia such as aspiration pneumonia.  There is no definite pleural effusion. Cardiomediastinal silhouette:  Stable cardiomediastinal silhouette. Other:  The visualized bony thorax appears unremarkable.     IMPRESSION: Persistent bilateral lower lungs infiltrates as described above. A follow-up exam is recommended. : [facility]   Transcribe Date/Time: Feb 22 2025  8:20A Dictated by :  BJ INIGUEZ MD This examination was interpreted and the report reviewed and electronically signed by: BJ INIGUEZ MD on Feb 22 2025  8:21AM  EST     XR chest 1 view     Result Date: 2/21/2025  * * *Final Report* * * DATE OF EXAM: Feb 21 2025  5:59PM   MMX   5376  -  XR CHEST 1V FRONTAL PORT  / ACCESSION #  [id number] PROCEDURE REASON: Evaluate tube, line,  or lead position      * * * * Physician Interpretation * * * *  EXAMINATION:  CHEST RADIOGRAPH (PORTABLE SINGLE VIEW AP) Exam Date/Time:  2/21/2025 5:59 PM CLINICAL HISTORY: Evaluate tube, line,  or lead position MQ:  XCPR_5 Comparison:  02/21/2025 RESULT: Lines, tubes, and devices: Endotracheal tube in place terminating in enteric tube in place terminating below the field of view. Lungs and pleura: No pneumothorax.  No pleural effusion.  Bilateral mild interstitial opacities.  The right costophrenic angle is not included in the field-of-view. Cardiomediastinal silhouette:  Stable cardiomediastinal silhouette. Other:  .     IMPRESSION: Mild interstitial opacities may be related to pulmonary vascular congestion. : [facility]   Transcribe Date/Time: Feb 21 2025  6:55P Dictated by : IRWIN VALDIVIA MD This examination was interpreted and the report reviewed and electronically signed by: IRWIN VALDIVIA MD on Feb 21 2025  6:57PM  EST     XR abdomen 2 views supine and erect or decub     Result Date: 2/21/2025  * * *Final Report* * * DATE OF EXAM: Feb 21 2025 10:32AM   MMX   5289  -  XR ABDOMEN 1V SUPINE  / ACCESSION #  [id number] PROCEDURE REASON: Evaluate tube, line or lead position      * * * * Physician Interpretation * * * *  Clinical Information: Feeding tube Single view of the abdomen was obtained. Feeding tube tip within the distal stomach. There is a nonspecific, nonobstructive bowel gas pattern. No abnormal abdominal masses are identified.  No pathologic calcifications.   No acute bony abnormalities are seen.     IMPRESSION:  Nonspecific, nonobstructive bowel gas pattern. Feeding tube tip within the distal stomach. : [facility]   Transcribe Date/Time: Feb 21 2025 10:43A Dictated by : PAMELA EVANS MD This examination was interpreted and the report reviewed and electronically signed by: PAMELA EVANS MD on Feb 21 2025 10:44AM  EST     XR chest 1 view     Result Date: 2/21/2025  * * *Final Report* * * DATE OF EXAM: Feb 21 2025 10:32AM   MMX   5376  -  XR CHEST 1V FRONTAL PORT  / ACCESSION #  [id number] PROCEDURE REASON: Shortness of breath      * * * * Physician Interpretation * * * *  EXAMINATION:  CHEST RADIOGRAPH (PORTABLE SINGLE VIEW AP) Exam Date/Time:  2/21/2025 10:32 AM Clinical History:  Shortness of breath Comparison:  02/17/2025 RESULT: LINES, TUBES, AND DEVICES:  Feeding tube tip beyond the inferior extent of the image. CARDIOMEDIASTINAL SILHOUETTE:  The cardiac and mediastinal silhouettes appear unremarkable. LUNGS AND PLEURA: No consolidation.  No pleural effusion. No pulmonary vascular congestion. No pneumothorax identified. OTHER:  N/A     IMPRESSION: No acute abnormality identified. : [facility]   Transcribe Date/Time: Feb 21 2025 10:41A Dictated by : PAMELA EVANS MD This examination was interpreted and the report reviewed and electronically signed by: PAMELA EVANS MD on Feb 21 2025 10:43AM  EST            Assessment/Plan     Assessment & Plan  Hemoptysis     Tracheostomy in place (Multi)     CVA, old, hemiparesis (Multi)     Status post insertion of percutaneous endoscopic gastrostomy (PEG) tube (Multi)     Pneumonia due to infectious organism     Right sided weakness     Atrial flutter (Multi)     Insulin dependent type 2 diabetes mellitus (Multi)        Telemetry monitoring  Hemoglobin stable 8's recently on review of outside labs, monitor for ongoing bleeding.  Daily H&H  Monitor for overt bleeding  Consult pulmonology, appreciate input  Pneumonia suspected for CT findings  Tracheostomy  protocol  Expected cover with Zosyn and vancomycin  MRSA swab  Urine for Legionella antigen and strep pneumonia antigen  Supplemental oxygen as needed  Nebulizers as needed  RT evaluate and treat  Check procalcitonin  Typical 20% zinc oxide to wounds and groin  Continuous tube feeds, continue at 80 mL an hour x 22 hours  Accu-Chek every 6 hours  Hypoglycemia protocol  PT/OT  Anticoagulation and antiplatelet therapy on hold, resume once cleared by pulmonology  Continue patient's home medications for chronic issues as appropriate     Patient fully evaluated on March 23.  Continue to monitor H&H.  Pulmonary consultation obtained.  Continue broad-spectrum antibiotics to rule out infectious process.  Recheck labs in AM.                 Zeus Bone MD                    Melena           Patient fully evaluated  3/25  for    Problem List Items Addressed This Visit          Gastrointestinal and Abdominal    Melena    Relevant Orders    Esophagogastroduodenoscopy (EGD) (Completed)    Surgical Pathology Exam       Pulmonary and Pneumonias    * (Principal) Hemoptysis - Primary    Relevant Orders    Esophagogastroduodenoscopy (EGD) (Completed)    Surgical Pathology Exam     Other Visit Diagnoses       Anemia, unspecified type        Relevant Orders    Esophagogastroduodenoscopy (EGD) (Completed)    Surgical Pathology Exam    Tracheostomy dependent (Multi)        Relevant Orders    Surgical Pathology Exam          Patient seen resting in bed with head of bed elevated, no s/s or c/o acute difficulties at this time.  Vital signs for last 24 hours Temp:  [35.1 °C (95.2 °F)-36.6 °C (97.9 °F)] 36.3 °C (97.3 °F)  Heart Rate:  [51-67] 51  Resp:  [16-23] 18  BP: ()/(50-65) 96/53  FiO2 (%):  [30 %-50 %] 30 %    I/O this shift:  In: -   Out: 1000 [Urine:1000]  Patient still requiring frequent cardiac and SPO2 monitoring. Continue aggressive pulmonary hygiene and oral hygiene. Off loading as tolerated for skin integrity.  Medications and labs reviewed-   Results for orders placed or performed during the hospital encounter of 03/22/25 (from the past 24 hours)   Electrocardiogram, 12-lead PRN ACS symptoms   Result Value Ref Range    Ventricular Rate 63 BPM    Atrial Rate 63 BPM    WA Interval 106 ms    QRS Duration 84 ms    QT Interval 452 ms    QTC Calculation(Bazett) 462 ms    P Axis 72 degrees    R Axis -6 degrees    T Axis 106 degrees    QRS Count 11 beats    Q Onset 229 ms    P Onset 176 ms    P Offset 223 ms    T Offset 455 ms    QTC Fredericia 459 ms   POCT GLUCOSE   Result Value Ref Range    POCT Glucose 365 (H) 74 - 99 mg/dL   POCT GLUCOSE   Result Value Ref Range    POCT Glucose 357 (H) 74 - 99 mg/dL   SST TOP   Result Value Ref Range    Extra Tube Hold for add-ons.    CBC   Result Value Ref Range    WBC 11.9 (H) 4.4 - 11.3 x10*3/uL    nRBC 5.5 (H) 0.0 - 0.0 /100 WBCs    RBC 2.54 (L) 4.50 - 5.90 x10*6/uL    Hemoglobin 6.7 (L) 13.5 - 17.5 g/dL    Hematocrit 24.7 (L) 41.0 - 52.0 %    MCV 97 80 - 100 fL    MCH 26.4 26.0 - 34.0 pg    MCHC 27.1 (L) 32.0 - 36.0 g/dL    RDW 16.4 (H) 11.5 - 14.5 %    Platelets 212 150 - 450 x10*3/uL   Comprehensive Metabolic Panel   Result Value Ref Range    Glucose 318 (H) 74 - 99 mg/dL    Sodium 147 (H) 136 - 145 mmol/L    Potassium 3.4 (L) 3.5 - 5.3 mmol/L    Chloride 107 98 - 107 mmol/L    Bicarbonate 34 (H) 21 - 32 mmol/L    Anion Gap 9 (L) 10 - 20 mmol/L    Urea Nitrogen 36 (H) 6 - 23 mg/dL    Creatinine 1.19 0.50 - 1.30 mg/dL    eGFR 68 >60 mL/min/1.73m*2    Calcium 8.1 (L) 8.6 - 10.3 mg/dL    Albumin 3.0 (L) 3.4 - 5.0 g/dL    Alkaline Phosphatase 191 (H) 33 - 136 U/L    Total Protein 6.0 (L) 6.4 - 8.2 g/dL     (H) 9 - 39 U/L    Bilirubin, Total 0.4 0.0 - 1.2 mg/dL     (H) 10 - 52 U/L   Type And Screen   Result Value Ref Range    ABO TYPE B     Rh TYPE POS     ANTIBODY SCREEN NEG    POCT GLUCOSE   Result Value Ref Range    POCT Glucose 188 (H) 74 - 99 mg/dL      Patient recently  received an antibiotic (last 12 hours)       Date/Time Action Medication Dose    03/25/25 1540 New Bag    piperacillin-tazobactam (Zosyn) 3.375 g in dextrose (iso) IV 50 mL 3.375 g    03/25/25 0949 New Bag    piperacillin-tazobactam (Zosyn) 3.375 g in dextrose (iso) IV 50 mL 3.375 g           Pt fully evaluated 3/25. Hematocrit 26.2 and hemoglobin 7.3. WBC at 14.8. Added cardio and increased fluids and free water flushes to q4. Plan discussed with interdisciplinary team, continue current and repeat labs in the AM.     Discharge planning discussed with patient and care team. [facility]. Patient aware and agreeable to current plan, continue plan as above.     I spent a total of 50 minutes on the date of the service which included preparing to see the patient, face-to-face patient care, completing clinical documentation, obtaining and/or reviewing separately obtained history, performing a medically appropriate examination, counseling and educating the patient/family/caregiver, ordering medications, tests, or procedures, communicating with other HCPs (not separately reported), independently interpreting results (not separately reported), communicating results to the patient/family/caregiver, and care coordination (not separately reported).      Patient fully evaluated  03/26  for    Problem List Items Addressed This Visit          Gastrointestinal and Abdominal    Melena    Relevant Orders    Esophagogastroduodenoscopy (EGD) (Completed)    Surgical Pathology Exam       Pulmonary and Pneumonias    * (Principal) Hemoptysis - Primary    Relevant Orders    Esophagogastroduodenoscopy (EGD) (Completed)    Surgical Pathology Exam     Other Visit Diagnoses       Anemia, unspecified type        Relevant Orders    Esophagogastroduodenoscopy (EGD) (Completed)    Surgical Pathology Exam    Tracheostomy dependent (Multi)        Relevant Orders    Surgical Pathology Exam           Patient seen resting in bed with head of bed elevated, no s/s or c/o acute difficulties at this time.  Vital signs for last 24 hours Temp:  [35.1 °C (95.2 °F)-36.6 °C (97.9 °F)] 36.3 °C (97.3 °F)  Heart Rate:  [51-67] 51  Resp:  [16-23] 18  BP: ()/(50-65) 96/53  FiO2 (%):  [30 %-50 %] 30 %    I/O this shift:  In: -   Out: 1000 [Urine:1000]  Patient still requiring frequent cardiac and SPO2 monitoring. Continue aggressive pulmonary hygiene and oral hygiene. Off loading as tolerated for skin integrity. Medications and labs reviewed-   Results for orders placed or performed during the hospital encounter of 03/22/25 (from the past 24 hours)   Electrocardiogram, 12-lead PRN ACS symptoms   Result Value Ref Range    Ventricular Rate 63 BPM    Atrial Rate 63 BPM    AZ Interval 106 ms    QRS Duration 84 ms    QT Interval 452 ms    QTC Calculation(Bazett) 462 ms    P Axis 72 degrees    R Axis -6 degrees    T Axis 106 degrees    QRS Count 11 beats    Q Onset 229 ms    P Onset 176 ms    P Offset 223 ms    T Offset 455 ms    QTC Fredericia 459 ms   POCT GLUCOSE   Result Value Ref Range    POCT Glucose 365 (H) 74 - 99 mg/dL   POCT GLUCOSE   Result Value Ref Range    POCT Glucose 357 (H) 74 - 99 mg/dL   SST TOP   Result Value Ref Range    Extra Tube Hold for add-ons.    CBC   Result Value Ref Range    WBC 11.9 (H) 4.4 - 11.3 x10*3/uL    nRBC 5.5 (H) 0.0 - 0.0 /100 WBCs    RBC 2.54 (L) 4.50 - 5.90 x10*6/uL    Hemoglobin 6.7 (L) 13.5 - 17.5 g/dL    Hematocrit 24.7 (L) 41.0 - 52.0 %    MCV 97 80 - 100 fL    MCH 26.4 26.0 - 34.0 pg    MCHC 27.1 (L) 32.0 - 36.0 g/dL    RDW 16.4 (H) 11.5 - 14.5 %    Platelets 212 150 - 450 x10*3/uL   Comprehensive Metabolic Panel   Result Value Ref Range    Glucose 318 (H) 74 - 99 mg/dL    Sodium 147 (H) 136 - 145 mmol/L    Potassium 3.4 (L) 3.5 - 5.3 mmol/L    Chloride 107 98 - 107 mmol/L    Bicarbonate 34 (H) 21 - 32 mmol/L    Anion Gap 9 (L) 10 - 20 mmol/L    Urea Nitrogen 36 (H) 6 -  23 mg/dL    Creatinine 1.19 0.50 - 1.30 mg/dL    eGFR 68 >60 mL/min/1.73m*2    Calcium 8.1 (L) 8.6 - 10.3 mg/dL    Albumin 3.0 (L) 3.4 - 5.0 g/dL    Alkaline Phosphatase 191 (H) 33 - 136 U/L    Total Protein 6.0 (L) 6.4 - 8.2 g/dL     (H) 9 - 39 U/L    Bilirubin, Total 0.4 0.0 - 1.2 mg/dL     (H) 10 - 52 U/L   Type And Screen   Result Value Ref Range    ABO TYPE B     Rh TYPE POS     ANTIBODY SCREEN NEG    POCT GLUCOSE   Result Value Ref Range    POCT Glucose 188 (H) 74 - 99 mg/dL      Patient recently received an antibiotic (last 12 hours)       Date/Time Action Medication Dose    03/26/25 0903 New Bag    piperacillin-tazobactam (Zosyn) 3.375 g in dextrose (iso) IV 50 mL 3.375 g           Plan discussed with interdisciplinary team, patient with hemoccult positive, protonix 40mg IVP BID, GI consulted, appreciate input. Patient seen by nephrology today with plan for IV iron and erythropoietin, potential nephrotoxins and hepatotoxins, monitor renal chemistries and CBC continue current and repeat labs in the AM.     Discharge planning discussed with patient and care team. Therapy evaluations ordered. Anticipate HHC/SNF at discharge. Patient aware and agreeable to current plan, continue plan as above.     I spent a total of 50 minutes on the date of the service which included preparing to see the patient, face-to-face patient care, completing clinical documentation, obtaining and/or reviewing separately obtained history, performing a medically appropriate examination, counseling and educating the patient/family/caregiver, ordering medications, tests, or procedures, communicating with other HCPs (not separately reported), independently interpreting results (not separately reported), communicating results to the patient/family/caregiver, and care coordination (not separately reported).   ALEXANDRE LUNA    Patient fully evaluated 3/27   for    Problem List Items Addressed This Visit           Gastrointestinal and Abdominal    Melena    Relevant Orders    Esophagogastroduodenoscopy (EGD) (Completed)    Surgical Pathology Exam       Pulmonary and Pneumonias    * (Principal) Hemoptysis - Primary    Relevant Orders    Esophagogastroduodenoscopy (EGD) (Completed)    Surgical Pathology Exam     Other Visit Diagnoses       Anemia, unspecified type        Relevant Orders    Esophagogastroduodenoscopy (EGD) (Completed)    Surgical Pathology Exam    Tracheostomy dependent (Multi)        Relevant Orders    Surgical Pathology Exam          Patient seen resting in bed with head of bed elevated, no s/s or c/o acute difficulties at this time.  Vital signs for last 24 hours Temp:  [35.1 °C (95.2 °F)-36.6 °C (97.9 °F)] 36.3 °C (97.3 °F)  Heart Rate:  [51-67] 51  Resp:  [16-23] 18  BP: ()/(50-65) 96/53  FiO2 (%):  [30 %-50 %] 30 %    I/O this shift:  In: -   Out: 1000 [Urine:1000]  Patient still requiring frequent cardiac and SPO2 monitoring. Continue aggressive pulmonary hygiene and oral hygiene. Off loading as tolerated for skin integrity. Medications and labs reviewed-   Results for orders placed or performed during the hospital encounter of 03/22/25 (from the past 24 hours)   Electrocardiogram, 12-lead PRN ACS symptoms   Result Value Ref Range    Ventricular Rate 63 BPM    Atrial Rate 63 BPM    MI Interval 106 ms    QRS Duration 84 ms    QT Interval 452 ms    QTC Calculation(Bazett) 462 ms    P Axis 72 degrees    R Axis -6 degrees    T Axis 106 degrees    QRS Count 11 beats    Q Onset 229 ms    P Onset 176 ms    P Offset 223 ms    T Offset 455 ms    QTC Fredericia 459 ms   POCT GLUCOSE   Result Value Ref Range    POCT Glucose 365 (H) 74 - 99 mg/dL   POCT GLUCOSE   Result Value Ref Range    POCT Glucose 357 (H) 74 - 99 mg/dL   SST TOP   Result Value Ref Range    Extra Tube Hold for add-ons.    CBC   Result Value Ref Range    WBC 11.9 (H) 4.4 - 11.3 x10*3/uL    nRBC 5.5 (H) 0.0 - 0.0 /100 WBCs    RBC 2.54 (L) 4.50  - 5.90 x10*6/uL    Hemoglobin 6.7 (L) 13.5 - 17.5 g/dL    Hematocrit 24.7 (L) 41.0 - 52.0 %    MCV 97 80 - 100 fL    MCH 26.4 26.0 - 34.0 pg    MCHC 27.1 (L) 32.0 - 36.0 g/dL    RDW 16.4 (H) 11.5 - 14.5 %    Platelets 212 150 - 450 x10*3/uL   Comprehensive Metabolic Panel   Result Value Ref Range    Glucose 318 (H) 74 - 99 mg/dL    Sodium 147 (H) 136 - 145 mmol/L    Potassium 3.4 (L) 3.5 - 5.3 mmol/L    Chloride 107 98 - 107 mmol/L    Bicarbonate 34 (H) 21 - 32 mmol/L    Anion Gap 9 (L) 10 - 20 mmol/L    Urea Nitrogen 36 (H) 6 - 23 mg/dL    Creatinine 1.19 0.50 - 1.30 mg/dL    eGFR 68 >60 mL/min/1.73m*2    Calcium 8.1 (L) 8.6 - 10.3 mg/dL    Albumin 3.0 (L) 3.4 - 5.0 g/dL    Alkaline Phosphatase 191 (H) 33 - 136 U/L    Total Protein 6.0 (L) 6.4 - 8.2 g/dL     (H) 9 - 39 U/L    Bilirubin, Total 0.4 0.0 - 1.2 mg/dL     (H) 10 - 52 U/L   Type And Screen   Result Value Ref Range    ABO TYPE B     Rh TYPE POS     ANTIBODY SCREEN NEG    POCT GLUCOSE   Result Value Ref Range    POCT Glucose 188 (H) 74 - 99 mg/dL      Patient recently received an antibiotic (last 12 hours)       Date/Time Action Medication Dose    03/27/25 1517 New Bag    piperacillin-tazobactam (Zosyn) 3.375 g in dextrose (iso) IV 50 mL 3.375 g           Patient fully evaluated 3/27. Patient in bed and family in room upon exam. Vitals today include Temp:  [35.1 °C (95.2 °F)-36.6 °C (97.9 °F)] 36.3 °C (97.3 °F), Heart Rate:  [51-67] 51, Resp:  [16-23] 18, BP: ()/(50-65) 96/53, FiO2 (%):  [30 %-50 %] 30 %. Pertinent labs today include WBC 11.9, Hbg 6.8, plts 212. Na 147, K 3.4, Cl 107, HCO3- 34, BUN 36, Cr 1.19. glucose 318. Patient declined blood transfusion, starting Procrit to try to increase counts. Patient underwent EGD today, findings include small hiatal hernia and signs of gastric and duodenal irritation. Continue to monitor.     Plan discussed with interdisciplinary team, continue current and repeat labs in the AM. Discharge  planning discussed with patient and care team. Patient aware and agreeable to current plan, continue plan as above.     I spent a total of 50 minutes on the date of the service which included preparing to see the patient, face-to-face patient care, completing clinical documentation, obtaining and/or reviewing separately obtained history, performing a medically appropriate examination, counseling and educating the patient/family/caregiver, ordering medications, tests, or procedures, communicating with other HCPs (not separately reported), independently interpreting results (not separately reported), communicating results to the patient/family/caregiver, and care coordination (not separately reported).     ALEXANDER Feliciano

## 2025-03-27 NOTE — INTERVAL H&P NOTE
H&P reviewed. The patient was examined and there are no changes to the H&P.    Patient is Zoroastrian refusing blood transfusions, hemoglobin noted at 6.7, patient planned to have an upper endoscopy for suspected acute upper GI bleeding.  Risks and benefits explained to the patient and family, risks of not receiving blood transfusion to correct the blood level explained including risks of significant bleeding among others that might lead to multiple complications and death, patient and family understand and verbalized their understanding, they refuse blood transfusions, and they accept to proceed with upper endoscopy knowing the risk of complications and death among others, will optimize hemodynamic status otherwise as much as possible, will proceed with EGD in view of active bleeding which renders the EGD a medical emergency

## 2025-03-27 NOTE — PROGRESS NOTES
Physical Therapy    Physical Therapy Evaluation    Patient Name: Bryan Nix  MRN: [id number]  Today's Date: 3/27/2025   Time Calculation  Start Time: 1408  Stop Time: 1431  Time Calculation (min): 23 min  812/812-A    Assessment/Plan   PT Assessment  PT Assessment Results: Decreased strength, Decreased endurance, Impaired balance, Decreased mobility  Rehab Prognosis: Good  Barriers to Discharge Home: Physical needs  Physical Needs: 24hr mobility assistance needed, High falls risk due to function or environment, Returning to long-term care/other facility  End of Session Communication: Bedside nurse  Assessment Comment: Pt demonstrates impaired mobility throughout the session requring increased level of assistance. Pt not at baseline and will benefit from further acute PT services and therapy s/p discharge to regain function and independence.  End of Session Patient Position: Bed, 2 rail up, Alarm off, not on at start of session (all needs in reach, no complaints noted, family and MDs present)  IP OR SWING BED PT PLAN  Inpatient or Swing Bed: Inpatient  PT Plan  Treatment/Interventions: Bed mobility, Transfer training, Gait training  PT Plan: Ongoing PT  PT Frequency: 2 times per week  PT Discharge Recommendations: Moderate intensity level of continued care  PT Recommended Transfer Status: Total assist  PT - OK to Discharge: Yes    Subjective     Current Problem:  1. Hemoptysis  Esophagogastroduodenoscopy (EGD)    Esophagogastroduodenoscopy (EGD)    Surgical Pathology Exam    Surgical Pathology Exam      2. Anemia, unspecified type  Esophagogastroduodenoscopy (EGD)    Esophagogastroduodenoscopy (EGD)    Surgical Pathology Exam    Surgical Pathology Exam      3. Tracheostomy dependent (Multi)  Surgical Pathology Exam    Surgical Pathology Exam      4. Melena  Esophagogastroduodenoscopy (EGD)    Esophagogastroduodenoscopy (EGD)    Surgical Pathology Exam    Surgical Pathology Exam        Patient Active Problem List    Diagnosis    Hemoptysis    Tracheostomy in place (Multi)    CVA, old, hemiparesis (Multi)    Status post insertion of percutaneous endoscopic gastrostomy (PEG) tube (Multi)    Pneumonia due to infectious organism    Right sided weakness    Atrial flutter (Multi)    Insulin dependent type 2 diabetes mellitus (Multi)    Melena     General Visit Information:  General  Reason for Referral: PT Eval and Treat  Referred By: Zeus Bone MD  Past Medical History Relevant to Rehab: 65 yo Male with a PMH of Aflutter (on Eliquis), hyponatremia, IDDM, CAD, hypertension, chronic wounds, diffuse lymphadenopathy, anemia, tobacco use, elevated PSA, CVA LMCA (8/23 and 12/20) with right-sided deficits and an acute nonhemorrhagic brainstem infarct (2/27/25), left ICA stenosis (85%) s/p angioplasty and left carotid artery stent (08/25/2023), and recent history of acute pontine stroke 02/27/25 with acute hypoxic respiratory failure s/p tracheostomy on 2/25/2025 and PEG  who presented to [facility] on 3/22/25 with reports of coughing out blood from tracheostomy.  GI was consulted for rectal bleeding.  Family/Caregiver Present:  (brother initially present then wife arrived towards end of session)  Co-Treatment: OT  Co-Treatment Reason: maximize safety and functional mobility  Prior to Session Communication: Bedside nurse  Patient Position Received: Bed, 2 rail up, Alarm off, not on at start of session  General Comment: Pt agreeable to PT.    Home Living:  Home Living  Home Living Comments: Per patient's wife, pt was most recently at [facility].    Prior Level of Function:  Prior Function Per Pt/Caregiver Report  Level of [place]:  (Prior to CVA in Feb 2025, pt was completely independent, since CVS pt has been dependent with all mobility/ADLs and only recently has begun to work with therapy on sitting EOB.)    Precautions:  Precautions  Medical Precautions: Fall precautions (trach, PEG, FMS, piv)    Vital Signs:  Vital  Signs  Vital Signs Comment: VSS    Objective     Pain:  Pain Assessment  Pain Assessment:  (buttock pain, RN notified for medication, repositioned for comfort s/p session)    Cognition:  Cognition  Overall Cognitive Status: Within Functional Limits (pt able to nod and shake head to questions appropriately)    General Assessments:  Sensation  Light Touch:  (pt nods yes to having sensation in B LEs)  Strength  Strength Comments: B LE PROM WFL, B LE strength 0/5 with exception of trace in L foot    Functional Assessments:  Bed Mobility  Bed Mobility:  (Pt is dependent for all bed mobility at this time. Completed supine B LE PROM to assess ROM and strength as well.)     Outcome Measures:  [facility] Basic Mobility  Turning from your back to your side while in a flat bed without using bedrails: Total  Moving from lying on your back to sitting on the side of a flat bed without using bedrails: Total  Moving to and from bed to chair (including a wheelchair): Total  Standing up from a chair using your arms (e.g. wheelchair or bedside chair): Total  To walk in hospital room: Total  Climbing 3-5 steps with railing: Total  Basic Mobility - Total Score: 6    Goals:  Encounter Problems       Encounter Problems (Active)       PT Problem       STG - Pt will perform 2 sets of B LE AAROM in all planes (Progressing)       Start:  03/27/25    Expected End:  04/10/25               Pain - Adult            Education Documentation  Precautions, taught by Pippa Angel PT at 3/27/2025  3:25 PM.  Learner: Family, Patient  Readiness: Acceptance  Method: Explanation  Response: Demonstrated Understanding  Comment: safety awareness    Mobility Training, taught by Pippa Angel PT at 3/27/2025  3:25 PM.  Learner: Family, Patient  Readiness: Acceptance  Method: Explanation  Response: Demonstrated Understanding  Comment: safety awareness    Education Comments  No comments found.

## 2025-03-27 NOTE — DISCHARGE INSTRUCTIONS

## 2025-03-27 NOTE — PROGRESS NOTES
Occupational Therapy    Occupational Therapy    Evaluation    Patient Name: Bryan Nix  MRN: [id number]  Today's Date: 3/27/2025  Time Calculation  Start Time: 1409  Stop Time: 1431  Time Calculation (min): 22 min  812/812-A    Assessment  IP OT Assessment  OT Assessment: Pt. presents with a decline in self-care and BUE functioning and would benefit from skilled OT services to maximize independence in adls.  Prognosis: Fair  Barriers to Discharge Home: Caregiver assistance, Physical needs  Caregiver Assistance: Caregiver assistance needed per identified barriers - however, level of patient's required assistance exceeds assistance available at home  Physical Needs: 24hr ADL assistance needed  Evaluation/Treatment Tolerance: Patient tolerated treatment well  Medical Staff Made Aware: Yes  End of Session Communication: Bedside nurse  End of Session Patient Position: Bed, 2 rail up, Alarm off, not on at start of session (spouse present. physician present)    Plan:  Treatment Interventions: ADL retraining, UE strengthening/ROM, Endurance training, Patient/family training, Equipment evaluation/education, Neuromuscular reeducation, Compensatory technique education, UE splinting  OT Frequency: 2 times per week  OT Discharge Recommendations: Moderate intensity level of continued care  OT Recommended Transfer Status: Dependent, Assist of 2  OT - OK to Discharge: Yes (once cleared by medical team)    Subjective     Current Problem:  1. Hemoptysis  Esophagogastroduodenoscopy (EGD)    Esophagogastroduodenoscopy (EGD)    Surgical Pathology Exam    Surgical Pathology Exam      2. Anemia, unspecified type  Esophagogastroduodenoscopy (EGD)    Esophagogastroduodenoscopy (EGD)    Surgical Pathology Exam    Surgical Pathology Exam      3. Tracheostomy dependent (Multi)  Surgical Pathology Exam    Surgical Pathology Exam      4. Melena  Esophagogastroduodenoscopy (EGD)    Esophagogastroduodenoscopy (EGD)    Surgical Pathology Exam     Surgical Pathology Exam          General:  General  Reason for Referral: OT eval and treat for adls  Referred By: Zeus Bone MD  Past Medical History Relevant to Rehab: 65 yo Male with a PMH of Aflutter,hyponatremia, IDDM, CAD, hypertension, chronic wounds, diffuse lymphadenopathy, anemia, tobacco use, elevated PSA, CVA LMCA (8/23 and 12/20) with right-sided deficits and an acute nonhemorrhagic brainstem infarct (2/17/25), left ICA stenosis (85%) s/p angioplasty and left carotid artery stent (08/25/2023), and recent history of acute pontine stroke 02/17/25 with acute hypoxic respiratory failure s/p tracheostomy on 2/25/2025 and PEG  who presented to [facility] on 3/22/25 with reports of coughing out blood from tracheostomy.  GI was consulted for rectal bleeding. s/p EGD 3/27/25  Family/Caregiver Present:  (brother initially present and then spouse came near end of session)  Co-Treatment: PT  Co-Treatment Reason: to maximize pt. safety and mobility  Prior to Session Communication: Bedside nurse  Patient Position Received: Bed, 2 rail up, Alarm off, not on at start of session  General Comment: pt. agreeable to therapy, able to mouth words    Precautions:  Medical Precautions: Fall precautions  Precautions Comment: trach, peg, fms, iv, O2, NPO    Pain:  Pain Assessment  Pain Assessment: 0-10  0-10 (Numeric) Pain Score:  (buttock pain, RN notified for medication, repositioned for comfort s/p session)    Objective     Home Living:  Home Living Comments: Per spouse, pt. was last home prior to most recent CVA on 2/17/25. Since then pt. was at [facility] and [facility] skilled facility.     Prior Function:  Prior Function Comments: Prior to CVA (2/17/25), pt. was living at home with spouse, ind. with adls, working as a , driving, ambulating without device, but did have some R hand weakness. Since CVA, pt. dependent with all adls/transfers/non-ambulatory. Pt. is NPO.    IADL History:  Homemaking Responsibilities:  No    ADL:  ADL Comments: Pt. is totally dependent with all bathing/dressing/toileting/grooming.    Activity Tolerance:  Endurance: Decreased tolerance for upright activites    Bed Mobility/Transfers:   Bed Mobility  Bed Mobility:  (pt. boosted up in bed with total assist of 2. dependent)  Transfers  Transfer:  (did not attempt. would need nithya lift)    Sensation:  Light Touch: No apparent deficits    Strength:  Strength Comments: BUE PROM wfl. trace L wrist/finger arom    Coordination:  Coordination Comment: unable     Hand Function:  Hand Function  Gross Grasp: Impaired  Coordination: Impaired    Extremities:    RUE :  (0/5- no arom. prom wfl)   LUE:  (trace movement in L wrist/hand-non-functional); moderate edema noted in hand    Outcome Measures: [facility] Daily Activity  Putting on and taking off regular lower body clothing: Total  Bathing (including washing, rinsing, drying): Total  Putting on and taking off regular upper body clothing: Total  Toileting, which includes using toilet, bedpan or urinal: Total  Taking care of personal grooming such as brushing teeth: Total  Eating Meals: Total  Daily Activity - Total Score: 6     EDUCATION:     Education Documentation    ADL Training, taught by Opal Preciado, OT at 3/27/2025  4:20 PM.  Learner: Patient  Readiness: Acceptance  Method: Explanation, Demonstration  Response: Needs Reinforcement    Education Comments  No comments found.        Goals:   Encounter Problems       Encounter Problems (Active)       OT Goals       Pt. will tolerate bue aarom exercises x 10 min. to encourage functional return in prep. for  use in grooming. (Progressing)       Start:  03/27/25    Expected End:  04/10/25            Pt. will tolerate long sitting in bed with max. assist of 2 in prep. for functional transfers/sitting balance. (Progressing)       Start:  03/27/25    Expected End:  04/10/25            Pt. will improve communication skills as able with use of compensatory techniques.  (Progressing)       Start:  03/27/25    Expected End:  04/10/25            Pt.'s family will demo. knowledge of bue prom exercises and proper positioning to decrease edema of hands. (Progressing)       Start:  03/27/25    Expected End:  04/10/25

## 2025-03-27 NOTE — PROGRESS NOTES
03/27/25 1436   Discharge Planning   Home or Post Acute Services Post acute facilities (Rehab/SNF/etc)   Type of Post Acute Facility Services Skilled nursing   Expected Discharge Disposition SNF     Reviewed careport responses: [facility] skilled and [facility] unable to accept. [facility] and [facility] are reviewing. Met with patients wife at bedside to update. Patient wife states she does not want [facility]. Patients wife states she was looking at list for additional preferences. PT/OT evaluation is pending.

## 2025-03-27 NOTE — ASSESSMENT & PLAN NOTE
Zeus Bone MD  Physician  Internal Medicine     H&P      Signed     Date of Service: 3/23/2025 10:28 AM     Signed       Expand All Collapse All    History Of Present Illness  Bryan Nix is a 64-year-old male with past medical history of atrial flutter on Eliquis, hyponatremia, IDDM, CAD, hypertension, chronic wounds, diffuse lymphadenopathy, anemia, history of smoking, history elevated PSA, CVA L hemisphere (2020) with right-sided deficits, Left ICA stenosis (85%) s/p angioplasty and left carotid artery stent (08/25/2023) and recent history of acute pontine stroke 02/27/25 with acute hypoxic respiratory failure s/p tracheostomy on 2/25/2025 and PEG.  Patient presents for bleeding from tracheostomy.  Patient is alert and oriented x 3, however limited verbal communication due to tracheostomy.  Wife is at bedside and assists with history.  She reports that patient was coughing blood out of his tracheostomy.  ER provider felt bleeding was likely superficial around site.  Patient has been on room air at skilled nursing facility.  He was placed on Airvo in the ED mainly to humidify oxygen.  Patient never desaturated per ER report.  Currently he is resting comfortably in the bed, breath sounds slightly rhonchorous, but unlabored.  Reports chills, but no fevers.  He has right-sided weakness and sensory deficits.  Denies headache, vision or hearing changes, chest pains, palpitations, shortness of breath, nausea, vomiting, abdominal pain, diarrhea, or complications of PEG tube site.  Wife reports patient skin became irritated in his groin due to adhesive from a condom cath previously, he has a small decubitus wound that she reports is healing well.  He receives tube feeds continuously.  Wife reports a patient was recently evaluated by speech for p.o. to intake, however he still remains n.p.o. except for meds and tube feeds through PEG. CODE STATUS reviewed: full code     ER Course: Hemodynamically stable,  afebrile, SpO2 to 97% trach mask.  WBC 9.3, hemoglobin 8, hematocrit 29.4, platelets 197.  INR 1.4.  Glucose 223, BUN 36, otherwise CMP is normal.  CT of the chest for PE pending.  CXR unremarkable. Trannexamic acid soak gauze wrapped around trach.      Past medical history: As above  Past surgical history: As above  Social history: Former smoker 37.5 pack years-quit smoking age 63, occasional alcohol use, no illicit drug use.  .  Works in maintenance.  6 children.  Family history: Noncontributory     Past Medical History  Medical History        Past Medical History:   Diagnosis Date    Abnormal finding of blood chemistry, unspecified 05/18/2016     Abnormal blood chemistry    Acute upper respiratory infection, unspecified 12/02/2015     Acute upper respiratory infection    Elevated prostate specific antigen (PSA)       Elevated prostate specific antigen (PSA)    Encounter for screening for malignant neoplasm of colon 01/30/2021     Colon cancer screening    Encounter for screening for malignant neoplasm of prostate 11/30/2020     Prostate cancer screening    Essential (primary) hypertension 07/30/2013     Benign essential hypertension    Other conditions influencing health status 07/30/2013     Diabetes Mellitus Poorly Controlled    Other conditions influencing health status 12/02/2015     History of cough    Personal history of other endocrine, nutritional and metabolic disease 03/18/2021     History of hyperlipidemia    Personal history of other endocrine, nutritional and metabolic disease 03/18/2021     History of diabetes mellitus    Personal history of other specified conditions 05/18/2016     History of chest pain    Personal history of other specified conditions 05/18/2016     History of dyspnea    Personal history of other specified conditions 05/18/2016     History of dizziness    Personal history of other specified conditions 05/18/2016     History of fatigue            Surgical History  Surgical  History         Past Surgical History:   Procedure Laterality Date    CT ANGIO CORONARY ART WITH HEARTFLOW IF SCORE >30%   8/23/2023     CT HEART CORONARY ANGIOGRAM 8/23/2023 [facility]    MR HEAD ANGIO WO IV CONTRAST   12/14/2020     MR HEAD ANGIO WO IV CONTRAST 12/14/2020 [facility]    MR NECK ANGIO WO IV CONTRAST   12/14/2020     MR NECK ANGIO WO IV CONTRAST 12/14/2020 [facility]    OTHER SURGICAL HISTORY   12/01/2020     Hand fracture repair    OTHER SURGICAL HISTORY   12/01/2020     Nasal bone fracture repair            Social History  He reports that he has quit smoking. His smoking use included cigarettes. He has never used smokeless tobacco. No history on file for alcohol use and drug use.     Family History  Family History   No family history on file.        Allergies  Patient has no known allergies.     Review of Systems     Physical Exam  Constitutional:       General: He is awake. He is not in acute distress.     Appearance: Normal appearance. He is not toxic-appearing.   HENT:      Head: Atraumatic.      Nose: Nose normal.      Mouth/Throat:      Mouth: Mucous membranes are moist.   Eyes:      Conjunctiva/sclera: Conjunctivae normal.   Cardiovascular:      Rate and Rhythm: Normal rate and regular rhythm.      Pulses: Normal pulses.      Heart sounds: No murmur heard.  Pulmonary:      Effort: No respiratory distress.      Comments: Tracheostomy site midline, no drainage around dressing.  Rhonchorous breath sounds, unlabored respirations, RT bedside suctioning patient with thick blood-tinged sputum  Abdominal:      General: Bowel sounds are normal. There is no distension.      Palpations: Abdomen is soft.      Tenderness: There is no abdominal tenderness. There is no guarding.      Comments: Left upper quadrant PEG site tube site, no drainage or skin breakdown around tube site.   Musculoskeletal:         General: No swelling, deformity or signs of injury.      Cervical back: Neck  "supple.      Right lower leg: No edema.      Left lower leg: No edema.   Skin:     General: Skin is warm and dry.      Capillary Refill: Capillary refill takes less than 2 seconds.      Findings: Wound (sacrum) present. No ecchymosis or erythema.   Neurological:      Mental Status: He is alert and oriented to person, place, and time.      Cranial Nerves: No facial asymmetry.      Sensory: Sensory deficit (RUE, RLE) present.      Motor: Weakness present.      Comments: Right sided upper and lower extremity motor function deficits   Psychiatric:         Mood and Affect: Mood normal.         Behavior: Behavior is cooperative.            Last Recorded Vitals  Blood pressure 134/50, pulse 93, temperature 36.9 °C (98.4 °F), temperature source Temporal, resp. rate 22, height 1.88 m (6' 2\"), weight 77.1 kg (170 lb), SpO2 98%.     Relevant Results              Results for orders placed or performed during the hospital encounter of 03/22/25 (from the past 24 hours)   CBC and Auto Differential   Result Value Ref Range     WBC 9.3 4.4 - 11.3 x10*3/uL     nRBC 0.3 (H) 0.0 - 0.0 /100 WBCs     RBC 3.02 (L) 4.50 - 5.90 x10*6/uL     Hemoglobin 8.0 (L) 13.5 - 17.5 g/dL     Hematocrit 29.4 (L) 41.0 - 52.0 %     MCV 97 80 - 100 fL     MCH 26.5 26.0 - 34.0 pg     MCHC 27.2 (L) 32.0 - 36.0 g/dL     RDW 15.6 (H) 11.5 - 14.5 %     Platelets 197 150 - 450 x10*3/uL     Neutrophils % 77.1 40.0 - 80.0 %     Immature Granulocytes %, Automated 1.5 (H) 0.0 - 0.9 %     Lymphocytes % 13.1 13.0 - 44.0 %     Monocytes % 4.1 2.0 - 10.0 %     Eosinophils % 3.2 0.0 - 6.0 %     Basophils % 1.0 0.0 - 2.0 %     Neutrophils Absolute 7.14 1.20 - 7.70 x10*3/uL     Immature Granulocytes Absolute, Automated 0.14 0.00 - 0.70 x10*3/uL     Lymphocytes Absolute 1.21 1.20 - 4.80 x10*3/uL     Monocytes Absolute 0.38 0.10 - 1.00 x10*3/uL     Eosinophils Absolute 0.30 0.00 - 0.70 x10*3/uL     Basophils Absolute 0.09 0.00 - 0.10 x10*3/uL   Basic metabolic panel   Result " Value Ref Range     Glucose 223 (H) 74 - 99 mg/dL     Sodium 142 136 - 145 mmol/L     Potassium 4.3 3.5 - 5.3 mmol/L     Chloride 103 98 - 107 mmol/L     Bicarbonate 27 21 - 32 mmol/L     Anion Gap 16 10 - 20 mmol/L     Urea Nitrogen 36 (H) 6 - 23 mg/dL     Creatinine 0.81 0.50 - 1.30 mg/dL     eGFR >90 >60 mL/min/1.73m*2     Calcium 9.6 8.6 - 10.3 mg/dL   Protime-INR   Result Value Ref Range     Protime 15.1 (H) 9.8 - 12.4 seconds     INR 1.4 (H) 0.9 - 1.1   aPTT   Result Value Ref Range     aPTT 25 (L) 26 - 36 seconds   Type and Screen   Result Value Ref Range     ABO TYPE B       Rh TYPE POS       ANTIBODY SCREEN NEG     VERIFY ABO/Rh Group Test   Result Value Ref Range     ABO TYPE B       Rh TYPE POS     MRSA Surveillance for Vancomycin De-escalation, PCR     Specimen: Anterior Nares; Swab   Result Value Ref Range     MRSA PCR Not Detected Not Detected   POCT GLUCOSE   Result Value Ref Range     POCT Glucose 180 (H) 74 - 99 mg/dL   POCT GLUCOSE   Result Value Ref Range     POCT Glucose 161 (H) 74 - 99 mg/dL   POCT GLUCOSE   Result Value Ref Range     POCT Glucose 156 (H) 74 - 99 mg/dL   Basic metabolic panel   Result Value Ref Range     Glucose 170 (H) 74 - 99 mg/dL     Sodium 147 (H) 136 - 145 mmol/L     Potassium 4.2 3.5 - 5.3 mmol/L     Chloride 107 98 - 107 mmol/L     Bicarbonate 30 21 - 32 mmol/L     Anion Gap 14 10 - 20 mmol/L     Urea Nitrogen 35 (H) 6 - 23 mg/dL     Creatinine 0.85 0.50 - 1.30 mg/dL     eGFR >90 >60 mL/min/1.73m*2     Calcium 9.4 8.6 - 10.3 mg/dL   CBC   Result Value Ref Range     WBC 10.4 4.4 - 11.3 x10*3/uL     nRBC 0.6 (H) 0.0 - 0.0 /100 WBCs     RBC 2.87 (L) 4.50 - 5.90 x10*6/uL     Hemoglobin 7.8 (L) 13.5 - 17.5 g/dL     Hematocrit 26.6 (L) 41.0 - 52.0 %     MCV 93 80 - 100 fL     MCH 27.2 26.0 - 34.0 pg     MCHC 29.3 (L) 32.0 - 36.0 g/dL     RDW 15.7 (H) 11.5 - 14.5 %     Platelets 205 150 - 450 x10*3/uL   SST TOP   Result Value Ref Range     Extra Tube Hold for add-ons.        CT  angio chest for pulmonary embolism     Result Date: 3/22/2025  Interpreted By:  Pamela Zurita, STUDY: CT ANGIO CHEST FOR PULMONARY EMBOLISM;  3/22/2025 4:56 pm   INDICATION: Signs/Symptoms:bloody secretions, eval for PE.     COMPARISON: CT chest without contrast 03/22/2025   ACCESSION NUMBER(S): [id number]   ORDERING CLINICIAN: PAMELA HAHN   TECHNIQUE: Contiguous axial images of the chest were obtained after the intravenous administration of iodinated contrast using angiographic PE protocol. Coronal and sagittal reformatted images were reconstructed from the axial data. MIP images were created on an independent workstation and reviewed.   FINDINGS:   MEDIASTINUM AND LYMPH NODES: Inflammatory fat stranding along the tracheostomy tract without discrete fluid collection. The esophageal wall appears within normal limits. There is redemonstration of diffuse lymphadenopathy in the bilateral axilla, mediastinum, bilateral supraclavicular as described in detail on separate report. Also notable or enlarged bilateral level 4 cervical lymph nodes (right > left).   VESSELS:  Normal caliber thoracic aorta without dissection. Moderate aortic atherosclerosis.  No acute pulmonary embolism.   HEART: Normal size.  Mild coronary artery calcifications. No significant pericardial effusion.   LUNG, AIRWAYS, PLEURA: There is slight increase in debris within the trachea. There has been clearing of secretions in the left upper/lower lobe bronchus. There is diffuse bilateral bronchial thickening slightly increased from prior study. There is peribronchovascular ground-glass opacity in the posterior right upper lobe and superior and basal segments of the right lower lobe. There is slightly worsened atelectasis in the lung bases remaining predominantly subsegmental in nature. There is a 0.8 cm nodule in the left upper lobe that is stable from 08/19/2023.   OSSEOUS STRUCTURES: No acute osseous abnormality.   CHEST WALL SOFT TISSUES: No  discernible acute abnormality.   UPPER ABDOMEN/OTHER: No acute abnormality.        No acute pulmonary embolism.   Peribronchial vascular ground glass opacities in the right upper and lower lobe again concerning for pneumonia given patient's risk factors of tracheostomy and debris in the airways.   Redemonstration of diffuse lymphadenopathy in the lower necks, axilla, and mediastinum. Findings are either diffusely reactive in nature, reflective of lymphoma/leukemia, meter deficiency, or less likely head neck neoplasm is previously postulated. However please correlate with past medical history.   Inflammatory changes along the tracheostomy tract without fluid collection. Correlate for erythema.   Unchanged 0.8 cm nodule in the left upper lobe dating back to 08/19/2023. Recommend 1 year follow up chest CT to ensure at least 2 years of stability.   MACRO: None.   Signed by: Pamela Zurita 3/22/2025 7:17 PM Dictation workstation:   [id number]     CT chest wo IV contrast     Result Date: 3/22/2025  Interpreted By:  Pamela Zurita, STUDY: CT CHEST WO IV CONTRAST;  3/22/2025 3:41 pm   INDICATION: Signs/Symptoms:eval for alveolar hemorrhage.     COMPARISON: CT head/neck 08/19/2023   ACCESSION NUMBER(S): [id number]   ORDERING CLINICIAN: PAMELA HAHN   TECHNIQUE: Contiguous axial images of the chest and upper abdomen were obtained without contrast. Coronal and sagittal reformatted images were reconstructed from the axial data.   FINDINGS:   MEDIASTINUM AND LYMPH NODES: There is fat stranding/edema along the tract of the tracheostomy about discrete fluid collection. The esophageal wall appears within normal limits. There are numerous enlarged bilateral supraclavicular lymph nodes measuring up to 1.4 cm on the left. There are numerous enlarged bilateral axillary lymph nodes measuring up to 1.1 cm on the right and 1.2 cm on the left. There are numerous prominent to mildly enlarged mediastinal lymph nodes as well. No  pneumomediastinum.   VESSELS:  Normal caliber thoracic aorta. Moderate aortic atherosclerosis. The main pulmonary artery is normal in size.   HEART: Normal size.  Mild coronary artery calcifications. No significant pericardial effusion.   LUNG, AIRWAYS, PLEURA: Tracheostomy noted in appropriate position. There is trace mucus in the left mainstem bronchus and at the origin of the left upper lobe and lower lobe bronchi. There are mild peribronchovascular ground-glass opacities in the posterior segment of the right upper lobe, basal segments of the right lower lobe. There is mild dependent bibasilar atelectasis. There is no pleural effusion.   OSSEOUS STRUCTURES: No acute osseous abnormality.   CHEST WALL SOFT TISSUES: No discernible acute abnormality.   UPPER ABDOMEN/OTHER: No acute abnormality.        1. Mild peribronchovascular ground-glass opacities in the posterior right upper lobe and basal segments of the right lower lobe. This appearance and distribution is not typical for alveolar hemorrhage which tends to be centrilobular and bilateral asymmetric in distribution. Findings are most suggestive of pneumonia.   2. Small amount of mucous debris in the left mainstem bronchus and at the origin of the left upper and lower lobe bronchi.   3. Diffuse lymphadenopathy in the bilateral axillary regions, mediastinum and left supraclavicular region particularly notable in the bilateral supraclavicular region. This is progressed in the left supraclavicular region and new elsewhere when compared to 08/19/2023 CTA head/neck. Correlate for any history of known head/neck malignancy or lymphoma.   4. Inflammation/fat stranding adjacent to the tracheostomy tube entry site noted. Correlate for air the met. No evidence of fluid collection.   MACRO: None.   Signed by: Marco A Zurita 3/22/2025 7:10 PM Dictation workstation:   [id number]     XR chest 1 view     Result Date: 3/22/2025  Interpreted By:  Fermin Stroud, STUDY: XR CHEST  1 VIEW   INDICATION: Signs/Symptoms:hemoptysis.   COMPARISON: August 19, 2023   ACCESSION NUMBER(S): [id number]   ORDERING CLINICIAN: PAMELA HAHN   FINDINGS: No consolidation, effusion, edema, or pneumothorax. Heart size within normal limits.        No evidence of acute intrathoracic abnormality.   Signed by: Fermin Stroud 3/22/2025 2:01 PM Dictation workstation:   [id number]     XR chest 1 view     Result Date: 3/14/2025  * * *Final Report* * * DATE OF EXAM: Mar 14 2025  8:52AM   S5X   5290  -  XR CHEST 1V FRONTAL   / ACCESSION #  [id number] PROCEDURE REASON: new bleeding from trach      * * * * Physician Interpretation * * * *  EXAMINATION:  CHEST RADIOGRAPH (PORTABLE SINGLE VIEW AP) Exam Date/Time:  3/14/2025 8:52 AM Clinical History:  new bleeding from trach Comparison:  03/03/2025 RESULT: LINES, TUBES, AND DEVICES:  Tracheostomy. CARDIOMEDIASTINAL SILHOUETTE:  The cardiac and mediastinal silhouettes appear unremarkable. LUNGS AND PLEURA: No consolidation.  No pleural effusion. No pulmonary vascular congestion. No pneumothorax identified. OTHER:  N/A     IMPRESSION: No acute abnormality identified. : [facility]   Transcribe Date/Time: Mar 14 2025  9:07A Dictated by : PAMELA EVANS MD This examination was interpreted and the report reviewed and electronically signed by: PAMELA EVANS MD on Mar 14 2025  9:11AM  EST     US abdomen complete     Result Date: 3/11/2025  ABDOMEN ULTRASOUND-COMPLETE FINDINGS: Abdomen Ultrasound Complete: PROCEDURE: Multiple grayscale mages of the abdomen were obtained. FINDINGS:Multiple real time images demonstrate the liver with increased echogenicity consistent with fatty infiltration. There is no evidence of a discrete mass within the liver. The liver is enlarged, measured at 15.7 cm in size. The gallbladder is seen with no evidence of cholelithiasis. The gallbladder wall is within normal limits. The common bile duct is within normal limits. The visualized pancreas  appears unremarkable. Both kidneys are seen with no evidence of hydronephrosis or a calculus. The spleen has its normal homogeneous echo appearance. There is no free fluid to suggest ascites. The visualized aorta appears unremarkable. The inferior vena cava appears patent. CONCLUSION: Enlarged fatty liver. ELECTRONICALLY SIGNED BY LEONEL LAN M.D. 3/11/2025 5:00:52 PM EDT.     XR chest 1 view     Result Date: 3/3/2025  * * *Final Report* * * DATE OF EXAM: Mar  3 2025  1:40PM   S5X   5290  -  XR CHEST 1V FRONTAL   / ACCESSION #  [id number] PROCEDURE REASON: resp failure      * * * * Physician Interpretation * * * *  EXAMINATION:  CHEST RADIOGRAPH (PORTABLE SINGLE VIEW AP) Exam Date/Time:  3/3/2025 1:40 PM Indication:   resp failure M:  XCP_4 Comparison:  1 day prior RESULT: Lines, tubes, and devices:  Tracheostomy tube remains in situ. Lungs and pleura:  Mild patchy opacities right lower lung zone seen on the prior study are no longer appreciated.  No confluent opacity.   Costophrenic angles are clear.  No pneumothorax. Cardiomediastinal silhouette:  Stable cardiomediastinal silhouette. Other:  -     IMPRESSION: Improved aeration right lower lung zone.  No acute cardiopulmonary finding. : [facility]   Transcribe Date/Time: Mar  3 2025  2:49P Dictated by : ISRAEL BENSON MD This examination was interpreted and the report reviewed and electronically signed by: ISRAEL BENSON MD on Mar  3 2025  2:49PM  EST     XR chest 1 view     Result Date: 3/2/2025  * * *Final Report* * * DATE OF EXAM: Mar  2 2025 11:25AM   MMX   5376  -  XR CHEST 1V FRONTAL PORT  / ACCESSION #  [id number] PROCEDURE REASON: Shortness of breath      * * * * Physician Interpretation * * * *  EXAMINATION:  CHEST RADIOGRAPH (PORTABLE SINGLE VIEW AP) Exam Date/Time:  3/2/2025 11:25 AM CLINICAL HISTORY: Shortness of breath MQ:  XCPR_5 Comparison:  02/20/2025. RESULT: This is a limited examination due to multiple wires and tubing  obscuring the lower lungs. Lines, tubes, and devices:  A tracheostomy tube remains in place. Lungs and pleura:  There are increased bronchovascular markings in the right lower lung which may be due to bronchitis or early changes of aspiration pneumonia.  Clinical correlation and follow-up exams are recommended. Cardiomediastinal silhouette:  Stable cardiomediastinal silhouette. Other:  The visualized bony thorax appears unremarkable.     IMPRESSION: Nonspecific parenchymal changes in the right lower lung as described above. A follow-up exam is recommended. : [facility]   Transcribe Date/Time: Mar  2 2025 11:53A Dictated by : BJ INIGUEZ MD This examination was interpreted and the report reviewed and electronically signed by: BJ INIGUEZ MD on Mar  2 2025 11:54AM  EST     XR chest 1 view     Result Date: 2/28/2025  * * *Final Report* * * DATE OF EXAM: Feb 28 2025 10:36AM   MMX   5290  -  XR CHEST 1V FRONTAL   / ACCESSION #  [id number] PROCEDURE REASON: Shortness of breath      * * * * Physician Interpretation * * * *  EXAMINATION:  CHEST RADIOGRAPH (SINGLE VIEW AP OR PA) CLINICAL HISTORY: Shortness of breath MQ:  XC1_5 Comparison:  02/22/2025 RESULT: Lines, tubes, and devices:  Tracheostomy tube is noted. Lungs and pleura:  No consolidation. No lung mass. No pleural effusion. Cardiomediastinal silhouette:  Normal cardiomediastinal silhouette. Other:  No acute osseous pathology.     IMPRESSION: No acute radiographic abnormality. : [facility]   Transcribe Date/Time: Feb 28 2025 12:07P Dictated by : DIANNE RAMOS MD This examination was interpreted and the report reviewed and electronically signed by: DIANNE RAMOS MD on Feb 28 2025 12:08PM  EST     XR abdomen 2 views supine and erect or decub     Result Date: 2/24/2025  * * *Final Report* * * DATE OF EXAM: Feb 24 2025  6:03PM   MMX   5289  -  XR ABDOMEN 1V SUPINE  / ACCESSION #  [id number] PROCEDURE REASON: Evaluate tube, line or lead position       * * * * Physician Interpretation * * * *  EXAMINATION:  XR ABDOMEN 1V SUPINE CLINICAL HISTORY:   Evaluate tube, line or lead position Technique:   XR ABDOMEN 1V SUPINE -- NOT APPLICABLE with 1 views on 1 images Comparison: 2/21/2025 RESULT: Feeding tube with distal tip at the pylorus antrum. Moderate colonic stool burden. No dilated bowel. Lung structures are intact.     IMPRESSION: Feeding tube with distal tip at the pylorus antrum. Moderate colonic stool burden. No dilated bowel. : [facility]   Transcribe Date/Time: Feb 24 2025  6:23P Dictated by : PABLO RAMOS MD This examination was interpreted and the report reviewed and electronically signed by: PABLO RAMOS MD on Feb 24 2025  6:24PM  EST     CT head wo IV contrast     Result Date: 2/23/2025  * * *Final Report* * * DATE OF EXAM: Feb 23 2025 12:04PM   [facility]   0504  -  CT BRAIN WO IVCON  / ACCESSION #  [id number] PROCEDURE REASON: Stroke, follow up      * * * * Physician Interpretation * * * *  EXAMINATION: CT BRAIN WO IVCON CLINICAL HISTORY: Stroke, follow up TECHNIQUE:  Serial axial images without IV contrast were obtained from the vertex to the foramen magnum. MQ:  CTBWO_3 CT Radiation dose: Integrated Dose-Length Product (DLP) for this visit =   778  mGy*cm CT Dose Reduction Employed: Iterative recon COMPARISON: MRI brain 02/19/2025 RESULT: Post-operative change: None. Acute change: Evolving acute infarct along the central eli and superior left medulla (2:7-9).  No evidence of hemorrhagic transformation. Hemorrhage: No evidence of acute intracranial hemorrhage. Mass Lesion / Mass Effect: There is no evidence of an intracranial mass or extraaxial fluid collection. No significant mass effect. Chronic change: Stable remote left MCA territory encephalomalacia. Parenchyma: There is no significant volume loss. The brain parenchyma is otherwise within normal limits for age. Ventricles: The ventricles are within normal limits of size and configuration  for age. Paranasal sinuses and skull base: Partial opacification of the left maxillary and sphenoid sinus and multiple bilateral ethmoid air cells.   The remaining visualized paranasal sinuses are grossly clear. The skull base and imaged soft tissues are unremarkable. Localizer images: No additional findings.     IMPRESSION: Evolving acute brainstem infarct.  No evidence of hemorrhagic transformation. Stable remote left MCA territory infarct. : [facility]   Transcribe Date/Time: Feb 23 2025 12:48P Dictated by : FRANKY GUZMAN MD   This examination was interpreted and the report reviewed and electronically signed by: FRANKY GUZMAN MD on Feb 23 2025 12:52PM  EST     XR chest 1 view     Result Date: 2/22/2025  * * *Final Report* * * DATE OF EXAM: Feb 22 2025  6:56AM   MMX   [id number]  -  XR CHEST 1V FRONTAL   / ACCESSION #  [id number] PROCEDURE REASON: Evaluate tube, line,  or lead position      * * * * Physician Interpretation * * * *  EXAMINATION:  CHEST RADIOGRAPH (SINGLE VIEW AP OR PA) CLINICAL HISTORY: Evaluate tube, line,  or lead position MQ:  XC1_5 Comparison:  02/21/2025. RESULT: Lines, tubes, and devices:  An ET tube and feeding tube remain in place.   A poorly defined right venous catheter cannot be excluded. Lungs and pleura:  There are persistent bilateral lower lungs infiltrates seen on the right more than left suggestive of pneumonia such as aspiration pneumonia.  There is no definite pleural effusion. Cardiomediastinal silhouette:  Stable cardiomediastinal silhouette. Other:  The visualized bony thorax appears unremarkable.     IMPRESSION: Persistent bilateral lower lungs infiltrates as described above. A follow-up exam is recommended. : [facility]   Transcribe Date/Time: Feb 22 2025  8:20A Dictated by : BJ INIGUEZ MD This examination was interpreted and the report reviewed and electronically signed by: BJ INIGUEZ MD on Feb 22 2025  8:21AM  EST     XR chest 1 view     Result Date:  2/21/2025  * * *Final Report* * * DATE OF EXAM: Feb 21 2025  5:59PM   MMX   [id number]  -  XR CHEST 1V FRONTAL PORT  / ACCESSION #  [id number] PROCEDURE REASON: Evaluate tube, line,  or lead position      * * * * Physician Interpretation * * * *  EXAMINATION:  CHEST RADIOGRAPH (PORTABLE SINGLE VIEW AP) Exam Date/Time:  2/21/2025 5:59 PM CLINICAL HISTORY: Evaluate tube, line,  or lead position MQ:  XCPR_5 Comparison:  02/21/2025 RESULT: Lines, tubes, and devices: Endotracheal tube in place terminating in enteric tube in place terminating below the field of view. Lungs and pleura: No pneumothorax.  No pleural effusion.  Bilateral mild interstitial opacities.  The right costophrenic angle is not included in the field-of-view. Cardiomediastinal silhouette:  Stable cardiomediastinal silhouette. Other:  .     IMPRESSION: Mild interstitial opacities may be related to pulmonary vascular congestion. : [facility]   Transcribe Date/Time: Feb 21 2025  6:55P Dictated by : IRWIN VALDIVIA MD This examination was interpreted and the report reviewed and electronically signed by: IRWIN VALDIVIA MD on Feb 21 2025  6:57PM  EST     XR abdomen 2 views supine and erect or decub     Result Date: 2/21/2025  * * *Final Report* * * DATE OF EXAM: Feb 21 2025 10:32AM   MMX   [id number]  -  XR ABDOMEN 1V SUPINE  / ACCESSION #  [id number] PROCEDURE REASON: Evaluate tube, line or lead position      * * * * Physician Interpretation * * * *  Clinical Information: Feeding tube Single view of the abdomen was obtained. Feeding tube tip within the distal stomach. There is a nonspecific, nonobstructive bowel gas pattern. No abnormal abdominal masses are identified.  No pathologic calcifications.   No acute bony abnormalities are seen.     IMPRESSION: Nonspecific, nonobstructive bowel gas pattern. Feeding tube tip within the distal stomach. : [facility]   Transcribe Date/Time: Feb 21 2025 10:43A Dictated by : PAMELA EVANS MD  This examination was interpreted and the report reviewed and electronically signed by: PAMELA EVANS MD on Feb 21 2025 10:44AM  EST     XR chest 1 view     Result Date: 2/21/2025  * * *Final Report* * * DATE OF EXAM: Feb 21 2025 10:32AM   MMX   [id number]  -  XR CHEST 1V FRONTAL PORT  / ACCESSION #  [id number] PROCEDURE REASON: Shortness of breath      * * * * Physician Interpretation * * * *  EXAMINATION:  CHEST RADIOGRAPH (PORTABLE SINGLE VIEW AP) Exam Date/Time:  2/21/2025 10:32 AM Clinical History:  Shortness of breath Comparison:  02/17/2025 RESULT: LINES, TUBES, AND DEVICES:  Feeding tube tip beyond the inferior extent of the image. CARDIOMEDIASTINAL SILHOUETTE:  The cardiac and mediastinal silhouettes appear unremarkable. LUNGS AND PLEURA: No consolidation.  No pleural effusion. No pulmonary vascular congestion. No pneumothorax identified. OTHER:  N/A     IMPRESSION: No acute abnormality identified. : [facility]   Transcribe Date/Time: Feb 21 2025 10:41A Dictated by : PAMELA EVANS MD This examination was interpreted and the report reviewed and electronically signed by: PAMELA EVANS MD on Feb 21 2025 10:43AM  EST            Assessment/Plan     Assessment & Plan  Hemoptysis     Tracheostomy in place (Multi)     CVA, old, hemiparesis (Multi)     Status post insertion of percutaneous endoscopic gastrostomy (PEG) tube (Multi)     Pneumonia due to infectious organism     Right sided weakness     Atrial flutter (Multi)     Insulin dependent type 2 diabetes mellitus (Multi)        Telemetry monitoring  Hemoglobin stable 8's recently on review of outside labs, monitor for ongoing bleeding.  Daily H&H  Monitor for overt bleeding  Consult pulmonology, appreciate input  Pneumonia suspected for CT findings  Tracheostomy protocol  Expected cover with Zosyn and vancomycin  MRSA swab  Urine for Legionella antigen and strep pneumonia antigen  Supplemental oxygen as needed  Nebulizers as needed  RT evaluate and  treat  Check procalcitonin  Typical 20% zinc oxide to wounds and groin  Continuous tube feeds, continue at 80 mL an hour x 22 hours  Accu-Chek every 6 hours  Hypoglycemia protocol  PT/OT  Anticoagulation and antiplatelet therapy on hold, resume once cleared by pulmonology  Continue patient's home medications for chronic issues as appropriate     Patient fully evaluated on March 23.  Continue to monitor H&H.  Pulmonary consultation obtained.  Continue broad-spectrum antibiotics to rule out infectious process.  Recheck labs in AM.                 Zeus Bone MD

## 2025-03-27 NOTE — PROGRESS NOTES
Cardiology Progress    Impression:  Hemoptysis  Paroxysmal atrial fibrillation.  Converted with IV Amio.  Stroke with hemiparesis  Chronic respiratory failure.  Status post tracheostomy.  CAD.  No angina.  Hypertension  Hyperlipidemia  Diabetes  Carotid disease status post PTA 2023  Plan:  Continue oral amiodarone  Continue current beta-blocker dose  Eliquis remains on hold.  Resume when/if okay with medical team.  HPI:  Converted to sinus rhythm.  Has maintained sinus rhythm overnight.  Brighter today.  No cardiac symptoms.  Meds:  Scheduled medications  acetylcysteine, 1 mL, nebulization, TID  amiodarone, 200 mg, g-tube, BID  [Held by provider] apixaban, 5 mg, g-tube, BID  [Held by provider] clopidogrel, 75 mg, g-tube, Daily  docusate sodium, 100 mg, oral, BID  [START ON 3/30/2025] epoetin nicko or biosimilar, 20,000 Units, subcutaneous, Every Sunday  insulin glargine, 30 Units, subcutaneous, Nightly  insulin lispro, 0-10 Units, subcutaneous, q6h  ipratropium, 0.5 mg, nebulization, TID  iron sucrose, 200 mg, intravenous, Daily  metoprolol tartrate, 50 mg, g-tube, TID  oxygen, , inhalation, Continuous - Inhalation  pantoprazole, 40 mg, intravenous, BID  piperacillin-tazobactam, 3.375 g, intravenous, q6h  sucralfate, 1 g, g-tube, BID AC  traZODone, 50 mg, g-tube, Nightly      Continuous medications  dextrose 5%, 100 mL/hr, Last Rate: 100 mL/hr (03/27/25 0553)      PRN medications  PRN medications: acetaminophen, dextrose, dextrose, glucagon, glucagon, ipratropium, sodium chloride, zinc oxide    Physical exam:  Vitals:    03/27/25 0620   BP:    Pulse:    Resp:    Temp:    SpO2: 98%      No JVD.  Decreased breath sounds.  No edema.  EKG:  Telemetry shows sinus rhythm  Echo:  2/22/2025: EF 55%. Mild hypokinesis of the basal inferior wall.    Labs:  Lab Results   Component Value Date    WBC 12.2 (H) 03/26/2025    HGB 7.3 (L) 03/26/2025    HCT 26.0 (L) 03/26/2025     03/26/2025    CHOL 162 08/21/2023    TRIG 124  08/21/2023    HDL 35.4 (A) 08/21/2023     (H) 03/27/2025     (H) 03/27/2025     (H) 03/27/2025    K 3.4 (L) 03/27/2025     03/27/2025    CREATININE 1.19 03/27/2025    BUN 36 (H) 03/27/2025    CO2 34 (H) 03/27/2025    TSH 0.52 12/13/2020    INR 1.4 (H) 03/22/2025    HGBA1C 8.6 (H) 03/05/2025     par

## 2025-03-27 NOTE — ANESTHESIA POSTPROCEDURE EVALUATION
Patient: Bryan Nix    Procedure Summary       Date: 03/27/25 Room / Location: [facility]    Anesthesia Start: 1103 Anesthesia Stop: 1121    Procedure: EGD Diagnosis:       Hemoptysis      Anemia, unspecified type      Melena    Scheduled Providers: Pritesh Carlton MD Responsible Provider: Sin Lincoln MD    Anesthesia Type: MAC ASA Status: 4 - Emergent            Anesthesia Type: MAC    Vitals Value Taken Time   /65 03/27/25 1121   Temp 36 °C (96.8 °F) 03/27/25 1121   Pulse 65 03/27/25 1121   Resp 22 03/27/25 1121   SpO2 100 % 03/27/25 1121       Anesthesia Post Evaluation    Patient location during evaluation: PACU  Patient participation: complete - patient participated  Level of consciousness: awake and alert  Pain score: 0  Pain management: adequate  Airway patency: patent  Cardiovascular status: acceptable  Respiratory status: acceptable  Hydration status: acceptable  Postoperative Nausea and Vomiting: none    No notable events documented.

## 2025-03-27 NOTE — PROGRESS NOTES
Patient fully evaluated    for    Acute kidney injury  Hypernatremia  Anemia of blood loss renal disease  Transaminitis  Rhabdomyolysis  Problem List Items Addressed This Visit       * (Principal) Hemoptysis - Primary    Relevant Orders    Esophagogastroduodenoscopy (EGD) (Completed)    Surgical Pathology Exam    Melena    Relevant Orders    Esophagogastroduodenoscopy (EGD) (Completed)    Surgical Pathology Exam     Other Visit Diagnoses       Anemia, unspecified type        Relevant Orders    Esophagogastroduodenoscopy (EGD) (Completed)    Surgical Pathology Exam    Tracheostomy dependent (Multi)        Relevant Orders    Surgical Pathology Exam          Patient seen resting in  Chair,   Renal Chemistries down trending  LFT are elevated  AST;239  ALT;352  BILI;0.4  HG 6.7  WBC;58669    .  Vital signs for last 24 hours Temp:  [35.1 °C (95.2 °F)-36.6 °C (97.9 °F)] 36 °C (96.8 °F)  Heart Rate:  [] 60  Resp:  [16-23] 23  BP: ()/(50-66) 124/55  FiO2 (%):  [30 %-50 %] 50 %     Patient still requiring frequent cardiac and SPO2 monitoring. Continue aggressive pulmonary hygiene and oral hygiene. Off loading as tolerated for skin integrity. Medications and labs reviewed-   Results for orders placed or performed during the hospital encounter of 03/22/25 (from the past 24 hours)   POCT GLUCOSE   Result Value Ref Range    POCT Glucose 312 (H) 74 - 99 mg/dL   Electrocardiogram, 12-lead PRN ACS symptoms   Result Value Ref Range    Ventricular Rate 63 BPM    Atrial Rate 63 BPM    TX Interval 106 ms    QRS Duration 84 ms    QT Interval 452 ms    QTC Calculation(Bazett) 462 ms    P Axis 72 degrees    R Axis -6 degrees    T Axis 106 degrees    QRS Count 11 beats    Q Onset 229 ms    P Onset 176 ms    P Offset 223 ms    T Offset 455 ms    QTC Fredericia 459 ms   POCT GLUCOSE   Result Value Ref Range    POCT Glucose 365 (H) 74 - 99 mg/dL   POCT GLUCOSE   Result Value Ref Range    POCT Glucose 357 (H) 74 - 99 mg/dL   [facility] TOP    Result Value Ref Range    Extra Tube Hold for add-ons.    CBC   Result Value Ref Range    WBC 11.9 (H) 4.4 - 11.3 x10*3/uL    nRBC 5.5 (H) 0.0 - 0.0 /100 WBCs    RBC 2.54 (L) 4.50 - 5.90 x10*6/uL    Hemoglobin 6.7 (L) 13.5 - 17.5 g/dL    Hematocrit 24.7 (L) 41.0 - 52.0 %    MCV 97 80 - 100 fL    MCH 26.4 26.0 - 34.0 pg    MCHC 27.1 (L) 32.0 - 36.0 g/dL    RDW 16.4 (H) 11.5 - 14.5 %    Platelets 212 150 - 450 x10*3/uL   Comprehensive Metabolic Panel   Result Value Ref Range    Glucose 318 (H) 74 - 99 mg/dL    Sodium 147 (H) 136 - 145 mmol/L    Potassium 3.4 (L) 3.5 - 5.3 mmol/L    Chloride 107 98 - 107 mmol/L    Bicarbonate 34 (H) 21 - 32 mmol/L    Anion Gap 9 (L) 10 - 20 mmol/L    Urea Nitrogen 36 (H) 6 - 23 mg/dL    Creatinine 1.19 0.50 - 1.30 mg/dL    eGFR 68 >60 mL/min/1.73m*2    Calcium 8.1 (L) 8.6 - 10.3 mg/dL    Albumin 3.0 (L) 3.4 - 5.0 g/dL    Alkaline Phosphatase 191 (H) 33 - 136 U/L    Total Protein 6.0 (L) 6.4 - 8.2 g/dL     (H) 9 - 39 U/L    Bilirubin, Total 0.4 0.0 - 1.2 mg/dL     (H) 10 - 52 U/L   Type And Screen   Result Value Ref Range    ABO TYPE B     Rh TYPE POS     ANTIBODY SCREEN NEG       Patient fractional excretion of sodium is 0.2%  Patient fractional excretion of urea is 32.16%  Positive Hemoccult stools  Patient recently received an antibiotic (last 12 hours)       Date/Time Action Medication Dose    03/26/25 0903 New Bag    piperacillin-tazobactam (Zosyn) 3.375 g in dextrose (iso) IV 50 mL 3.375 g    03/26/25 0148 New Bag    piperacillin-tazobactam (Zosyn) 3.375 g in dextrose (iso) IV 50 mL 3.375 g           Plan discussed with interdisciplinary team, continue current and repeat labs in the AM.         I spent a total of 50 minutes on the date of the service which included preparing to see the patient, face-to-face patient care, completing clinical documentation, obtaining and/or reviewing separately obtained history, performing a medically appropriate examination,  counseling and educating the patient/family/caregiver, ordering medications, tests, or procedures, communicating with other HCPs (not separately reported), independently interpreting results (not separately reported), communicating results to the patient/family/caregiver, and care coordination (not separately reported).         Physical Exam  General Appearance; asthenic habitus  Mild skin pallor  Poor skin turgor  HEENT; pupils react to light and accommodation  There is no nystagmus or ptosis; extraocular movements are intact  Neck; no adenopathy; no jugular vein distention; no bruit; no thyromegaly  Tracheostomy in place clean without exudation or induration  Lungs; minimal inspiratory coarse crackles at the bases  Heart; regular rate no gallop no rubs or thrills no lifts  Abdomen; active peristalsis no rebound or guarding there is a PEG tube in place  ; no CVA tenderness  Extremities; decreased muscle tone  Neurological; pathological reflexes are absent      Assessment/Plan     Acute kidney injury  Hypernatremia  Anemia of blood loss renal disease  Transaminitis  Rhabdomyolysis    Plan  IV iron and erythropoietin  Continue to hold potential nephrotoxins and hepatotoxins  Continue to monitor renal chemistries and CBC  Matthew Johnson MD

## 2025-03-27 NOTE — PROGRESS NOTES
This note was created using voice recognition transcription software. Despite proofreading, unintentional typographical errors may be present. Please contact the GI office with any questions or concerns.       Subjective  Wife and brother at bedside.  Patient is very alert/awake an smiling.  Per nursing, only had brown stool in FMS.        Physical Exam:  General: Polite, calm, resting  Skin:  Warm and dry, no jaundice  HEENT: No scleral icterus, no conjunctival pallor, normocephalic, atraumatic, mucous membranes moist  Neck:  +Trach, atraumatic, trachea midline, no JVD  Chest:  Clear to auscultation bilaterally. No wheezes, rales, or rhonchi  CV:  Regular rate and rhythm.  Positive S1/S2  Abdomen: +PEG, no distension, +BS, soft, non-tender to palpation, no rebound tenderness, no guarding, no rigidity, no discernible ascites   Extremities: no lower extremity edema, chronic pigmentary changes, no cyanosis  Neurological:  A&Ox3  Psychiatric: cooperative     Investigations:  Labs, radiological imaging and cardiac work up were reviewed        Objective:         3/27/2025    11:30 AM 3/27/2025    11:35 AM 3/27/2025    11:45 AM 3/27/2025    12:00 PM 3/27/2025    12:15 PM 3/27/2025    12:30 PM 3/27/2025     3:40 PM   Vitals   Systolic 123  116 124 130 122 96   Diastolic 59  59 55 61 59 53   BP Location       Left arm   Heart Rate 61  59 60 62 62 51   Temp       36.3 °C (97.3 °F)   Resp 20 18 20 23 18 16 18          Medications:  acetylcysteine, 1 mL, nebulization, TID  amiodarone, 200 mg, g-tube, BID  [Held by provider] apixaban, 5 mg, g-tube, BID  [Held by provider] clopidogrel, 75 mg, g-tube, Daily  docusate sodium, 100 mg, oral, BID  [START ON 3/30/2025] epoetin nicko or biosimilar, 20,000 Units, subcutaneous, Every Sunday  insulin glargine, 30 Units, subcutaneous, Nightly  insulin lispro, 0-10 Units, subcutaneous, q6h  ipratropium, 0.5 mg, nebulization, TID  iron sucrose, 200 mg, intravenous, Daily  metoprolol tartrate,  50 mg, g-tube, TID  oxygen, , inhalation, Continuous - Inhalation  pantoprazole, 40 mg, intravenous, BID  piperacillin-tazobactam, 3.375 g, intravenous, q6h  sucralfate, 1 g, g-tube, BID AC  traZODone, 50 mg, g-tube, Nightly         Recent Results (from the past 72 hours)   POCT GLUCOSE    Collection Time: 03/24/25  9:04 PM   Result Value Ref Range    POCT Glucose 308 (H) 74 - 99 mg/dL   POCT GLUCOSE    Collection Time: 03/25/25  2:09 AM   Result Value Ref Range    POCT Glucose 264 (H) 74 - 99 mg/dL   CBC    Collection Time: 03/25/25  5:58 AM   Result Value Ref Range    WBC 14.8 (H) 4.4 - 11.3 x10*3/uL    nRBC 1.7 (H) 0.0 - 0.0 /100 WBCs    RBC 2.74 (L) 4.50 - 5.90 x10*6/uL    Hemoglobin 7.3 (L) 13.5 - 17.5 g/dL    Hematocrit 26.2 (L) 41.0 - 52.0 %    MCV 96 80 - 100 fL    MCH 26.6 26.0 - 34.0 pg    MCHC 27.9 (L) 32.0 - 36.0 g/dL    RDW 16.9 (H) 11.5 - 14.5 %    Platelets 218 150 - 450 x10*3/uL   Comprehensive Metabolic Panel    Collection Time: 03/25/25  5:58 AM   Result Value Ref Range    Glucose 280 (H) 74 - 99 mg/dL    Sodium 153 (H) 136 - 145 mmol/L    Potassium 3.9 3.5 - 5.3 mmol/L    Chloride 111 (H) 98 - 107 mmol/L    Bicarbonate 34 (H) 21 - 32 mmol/L    Anion Gap 12 10 - 20 mmol/L    Urea Nitrogen 51 (H) 6 - 23 mg/dL    Creatinine 1.50 (H) 0.50 - 1.30 mg/dL    eGFR 51 (L) >60 mL/min/1.73m*2    Calcium 9.4 8.6 - 10.3 mg/dL    Albumin 3.3 (L) 3.4 - 5.0 g/dL    Alkaline Phosphatase 167 (H) 33 - 136 U/L    Total Protein 6.9 6.4 - 8.2 g/dL    AST 36 9 - 39 U/L    Bilirubin, Total 0.5 0.0 - 1.2 mg/dL    ALT 77 (H) 10 - 52 U/L   Magnesium    Collection Time: 03/25/25  5:58 AM   Result Value Ref Range    Magnesium 2.61 (H) 1.60 - 2.40 mg/dL   Electrocardiogram, 12-lead PRN ACS symptoms    Collection Time: 03/25/25  7:30 AM   Result Value Ref Range    Ventricular Rate 97 BPM    Atrial Rate 97 BPM    VA Interval 98 ms    QRS Duration 84 ms    QT Interval 378 ms    QTC Calculation(Bazett) 480 ms    P Axis 23 degrees     R Axis -23 degrees    T Axis 95 degrees    QRS Count 16 beats    Q Onset 228 ms    P Onset 179 ms    P Offset 208 ms    T Offset 417 ms    QTC Fredericia 443 ms   POCT GLUCOSE    Collection Time: 03/25/25  9:26 AM   Result Value Ref Range    POCT Glucose 317 (H) 74 - 99 mg/dL   Electrocardiogram, 12-lead PRN ACS symptoms    Collection Time: 03/25/25 11:25 AM   Result Value Ref Range    Ventricular Rate 142 BPM    Atrial Rate 122 BPM    QRS Duration 78 ms    QT Interval 320 ms    QTC Calculation(Bazett) 492 ms    R Axis -23 degrees    T Axis 113 degrees    QRS Count 24 beats    Q Onset 230 ms    T Offset 390 ms    QTC Fredericia 426 ms   Albumin-Creatinine Ratio, Urine Random    Collection Time: 03/25/25  4:03 PM   Result Value Ref Range    Albumin, Urine Random <7.0 Not established mg/L    Creatinine, Urine Random 78.3 20.0 - 370.0 mg/dL    Albumin/Creatinine Ratio     Urine electrolytes    Collection Time: 03/25/25  4:03 PM   Result Value Ref Range    Sodium, Urine Random 16 mmol/L    Sodium/Creatinine Ratio 20 Not established. mmol/g Creat    Potassium, Urine Random 49 mmol/L    Potassium/Creatinine Ratio 63 Not established mmol/g Creat    Chloride, Urine Random <15 mmol/L    Chloride/Creatinine Ratio      Creatinine, Urine Random 78.3 20.0 - 370.0 mg/dL   Osmolality, urine    Collection Time: 03/25/25  4:03 PM   Result Value Ref Range    Osmolality, Urine Random 705 200 - 1,200 mOsm/kg   Urea Nitrogen, Urine Random    Collection Time: 03/25/25  4:03 PM   Result Value Ref Range    Urea Nitrogen, Urine Random 853 mg/dL    Creatinine, Urine Random 78.3 20.0 - 370.0 mg/dL    Urea Nitrogen/Creatinine Ratio 10.9 Not established. g/g creat   Eosinophil smear    Collection Time: 03/25/25  4:03 PM   Result Value Ref Range    Eosinophils None (N) (none)   POCT GLUCOSE    Collection Time: 03/25/25  4:13 PM   Result Value Ref Range    POCT Glucose 346 (H) 74 - 99 mg/dL   POCT GLUCOSE    Collection Time: 03/25/25  7:03 PM    Result Value Ref Range    POCT Glucose 387 (H) 74 - 99 mg/dL   Electrocardiogram, 12-lead PRN ACS symptoms    Collection Time: 03/25/25  7:10 PM   Result Value Ref Range    Ventricular Rate 154 BPM    Atrial Rate 153 BPM    QRS Duration 78 ms    QT Interval 250 ms    QTC Calculation(Bazett) 400 ms    R Axis -26 degrees    T Axis 82 degrees    QRS Count 25 beats    Q Onset 229 ms    P Onset 181 ms    P Offset 201 ms    T Offset 354 ms    QTC Fredericia 342 ms   POCT GLUCOSE    Collection Time: 03/25/25  8:29 PM   Result Value Ref Range    POCT Glucose 344 (H) 74 - 99 mg/dL   Occult Blood, Stool    Collection Time: 03/25/25 10:32 PM    Specimen: Stool   Result Value Ref Range    Occult Blood, Stool X1 Positive (A) Negative   C. difficile, PCR    Collection Time: 03/25/25 10:32 PM    Specimen: Stool   Result Value Ref Range    C. difficile, PCR Not Detected Not Detected   Stool Pathogen Panel, PCR    Collection Time: 03/25/25 10:32 PM    Specimen: Stool   Result Value Ref Range    Campylobacter Group Not Detected Not Detected    Salmonella species Not Detected Not Detected    Shigella species Not Detected Not Detected    Vibrio Group Not Detected Not Detected    Yersinia Enterocolitica Not Detected Not Detected    Shiga Toxin 1 Not Detected Not Detected    Shiga Toxin 2 Not Detected Not Detected    Norovirus GI/GII Not Detected Not Detected    Rotavirus A Not Detected Not Detected   PTH, Intact    Collection Time: 03/25/25 11:23 PM   Result Value Ref Range    Parathyroid Hormone, Intact 85.2 18.5 - 88.0 pg/mL   POCT GLUCOSE    Collection Time: 03/26/25  2:10 AM   Result Value Ref Range    POCT Glucose 374 (H) 74 - 99 mg/dL   Iron and TIBC    Collection Time: 03/26/25  5:46 AM   Result Value Ref Range    Iron 42 35 - 150 ug/dL    UIBC 196 110 - 370 ug/dL    TIBC 238 (L) 240 - 445 ug/dL    % Saturation 18 (L) 25 - 45 %   Ferritin    Collection Time: 03/26/25  5:46 AM   Result Value Ref Range    Ferritin 66 20 - 300  ng/mL   Uric Acid    Collection Time: 03/26/25  5:46 AM   Result Value Ref Range    Uric Acid 5.6 4.0 - 7.5 mg/dL   Myoglobin    Collection Time: 03/26/25  5:46 AM   Result Value Ref Range    Myoglobin 151 (H) <=92 ug/L   CBC    Collection Time: 03/26/25  5:46 AM   Result Value Ref Range    WBC 12.2 (H) 4.4 - 11.3 x10*3/uL    nRBC 2.1 (H) 0.0 - 0.0 /100 WBCs    RBC 2.70 (L) 4.50 - 5.90 x10*6/uL    Hemoglobin 7.3 (L) 13.5 - 17.5 g/dL    Hematocrit 26.0 (L) 41.0 - 52.0 %    MCV 96 80 - 100 fL    MCH 27.0 26.0 - 34.0 pg    MCHC 28.1 (L) 32.0 - 36.0 g/dL    RDW 16.6 (H) 11.5 - 14.5 %    Platelets 223 150 - 450 x10*3/uL   Comprehensive Metabolic Panel    Collection Time: 03/26/25  5:46 AM   Result Value Ref Range    Glucose 290 (H) 74 - 99 mg/dL    Sodium 153 (H) 136 - 145 mmol/L    Potassium 3.7 3.5 - 5.3 mmol/L    Chloride 112 (H) 98 - 107 mmol/L    Bicarbonate 33 (H) 21 - 32 mmol/L    Anion Gap 12 10 - 20 mmol/L    Urea Nitrogen 45 (H) 6 - 23 mg/dL    Creatinine 1.35 (H) 0.50 - 1.30 mg/dL    eGFR 58 (L) >60 mL/min/1.73m*2    Calcium 8.5 (L) 8.6 - 10.3 mg/dL    Albumin 3.2 (L) 3.4 - 5.0 g/dL    Alkaline Phosphatase 182 (H) 33 - 136 U/L    Total Protein 6.1 (L) 6.4 - 8.2 g/dL    AST 97 (H) 9 - 39 U/L    Bilirubin, Total 0.4 0.0 - 1.2 mg/dL     (H) 10 - 52 U/L   Phosphorus    Collection Time: 03/26/25  5:46 AM   Result Value Ref Range    Phosphorus 2.7 2.5 - 4.9 mg/dL   POCT GLUCOSE    Collection Time: 03/26/25  8:28 AM   Result Value Ref Range    POCT Glucose 307 (H) 74 - 99 mg/dL   POCT GLUCOSE    Collection Time: 03/26/25  2:20 PM   Result Value Ref Range    POCT Glucose 312 (H) 74 - 99 mg/dL   Electrocardiogram, 12-lead PRN ACS symptoms    Collection Time: 03/26/25  5:55 PM   Result Value Ref Range    Ventricular Rate 63 BPM    Atrial Rate 63 BPM    UT Interval 106 ms    QRS Duration 84 ms    QT Interval 452 ms    QTC Calculation(Bazett) 462 ms    P Axis 72 degrees    R Axis -6 degrees    T Axis 106 degrees     QRS Count 11 beats    Q Onset 229 ms    P Onset 176 ms    P Offset 223 ms    T Offset 455 ms    QTC Fredericia 459 ms   POCT GLUCOSE    Collection Time: 03/26/25  8:38 PM   Result Value Ref Range    POCT Glucose 365 (H) 74 - 99 mg/dL   POCT GLUCOSE    Collection Time: 03/27/25  2:02 AM   Result Value Ref Range    POCT Glucose 357 (H) 74 - 99 mg/dL   SST TOP    Collection Time: 03/27/25  5:49 AM   Result Value Ref Range    Extra Tube Hold for add-ons.    CBC    Collection Time: 03/27/25  5:50 AM   Result Value Ref Range    WBC 11.9 (H) 4.4 - 11.3 x10*3/uL    nRBC 5.5 (H) 0.0 - 0.0 /100 WBCs    RBC 2.54 (L) 4.50 - 5.90 x10*6/uL    Hemoglobin 6.7 (L) 13.5 - 17.5 g/dL    Hematocrit 24.7 (L) 41.0 - 52.0 %    MCV 97 80 - 100 fL    MCH 26.4 26.0 - 34.0 pg    MCHC 27.1 (L) 32.0 - 36.0 g/dL    RDW 16.4 (H) 11.5 - 14.5 %    Platelets 212 150 - 450 x10*3/uL   Comprehensive Metabolic Panel    Collection Time: 03/27/25  5:50 AM   Result Value Ref Range    Glucose 318 (H) 74 - 99 mg/dL    Sodium 147 (H) 136 - 145 mmol/L    Potassium 3.4 (L) 3.5 - 5.3 mmol/L    Chloride 107 98 - 107 mmol/L    Bicarbonate 34 (H) 21 - 32 mmol/L    Anion Gap 9 (L) 10 - 20 mmol/L    Urea Nitrogen 36 (H) 6 - 23 mg/dL    Creatinine 1.19 0.50 - 1.30 mg/dL    eGFR 68 >60 mL/min/1.73m*2    Calcium 8.1 (L) 8.6 - 10.3 mg/dL    Albumin 3.0 (L) 3.4 - 5.0 g/dL    Alkaline Phosphatase 191 (H) 33 - 136 U/L    Total Protein 6.0 (L) 6.4 - 8.2 g/dL     (H) 9 - 39 U/L    Bilirubin, Total 0.4 0.0 - 1.2 mg/dL     (H) 10 - 52 U/L   Type And Screen    Collection Time: 03/27/25  9:07 AM   Result Value Ref Range    ABO TYPE B     Rh TYPE POS     ANTIBODY SCREEN NEG    POCT GLUCOSE    Collection Time: 03/27/25  2:51 PM   Result Value Ref Range    POCT Glucose 188 (H) 74 - 99 mg/dL          Assessment:  This is a 63 yo Male with a PMH of Aflutter (on Eliquis), hyponatremia, IDDM, CAD, hypertension, chronic wounds, diffuse lymphadenopathy, anemia, tobacco use,  elevated PSA, CVA LMCA (8/23 and 12/20) with right-sided deficits and an acute nonhemorrhagic brainstem infarct (2/27/25), left ICA stenosis (85%) s/p angioplasty and left carotid artery stent (08/25/2023), and recent history of acute pontine stroke 02/27/25 with acute hypoxic respiratory failure s/p tracheostomy on 2/25/2025 and PEG  who presented to [facility] on 3/22/25 with reports of coughing out blood from tracheostomy.  GI was consulted for rectal bleeding. Previously notated to be noncompliant with Brilinta/ASA regimen prior to recent stroke.  Placed on Plavix/Eliquis after most recent stroke.  Had 1 episode of coughing up dark blood on 3/22 through trach and then was having black stools in the FMS.  2/25/25 for PEG placement and showed gastric erosions with ulcerations and an adherent clot over a duodenal ulcer.  CTA 3/22 was unremarkable for any bleeding.  US from 3/11/25 showed fatty, enlarged liver, no cholelithiasis.  Also notated to have elevated LFT's that are trending up.      EGD was completed 3/27 showing duodenitis/gastritis.      Hgb 6.8 (wife refused blood yesterday stating he is a Yazidi, brother denies this and states his brother would want to get blood).         Plan  1.)  Rectal bleeding-Trend hgb, monitor for overt bleeding, transfuse as necessary, avoid NSAIDs.  If patient rebleeds, please call GI.  Carafate increased 3/27 to qid from bid and liquid suspension.       Discussed patient and above stated assessment and plan with Dr. Schulz.  Will sign off.  I spent 45 minutes in the professional and overall care of this patient.      03/27/25 at 5:29 PM - JOSEPH Edward-CNP

## 2025-03-28 ENCOUNTER — APPOINTMENT (OUTPATIENT)
Dept: RADIOLOGY | Facility: HOSPITAL | Age: 65
End: 2025-03-28
Payer: COMMERCIAL

## 2025-03-28 LAB
ALBUMIN SERPL BCP-MCNC: 2.9 G/DL (ref 3.4–5)
ALP SERPL-CCNC: 185 U/L (ref 33–136)
ALT SERPL W P-5'-P-CCNC: 271 U/L (ref 10–52)
ANION GAP SERPL CALC-SCNC: 11 MMOL/L (ref 10–20)
AST SERPL W P-5'-P-CCNC: 113 U/L (ref 9–39)
BASOPHILS # BLD AUTO: 0.09 X10*3/UL (ref 0–0.1)
BASOPHILS NFR BLD AUTO: 0.6 %
BILIRUB SERPL-MCNC: 0.5 MG/DL (ref 0–1.2)
BUN SERPL-MCNC: 25 MG/DL (ref 6–23)
CALCIUM SERPL-MCNC: 8.5 MG/DL (ref 8.6–10.3)
CHLORIDE SERPL-SCNC: 108 MMOL/L (ref 98–107)
CO2 SERPL-SCNC: 31 MMOL/L (ref 21–32)
CREAT SERPL-MCNC: 0.89 MG/DL (ref 0.5–1.3)
DACRYOCYTES BLD QL SMEAR: NORMAL
EGFRCR SERPLBLD CKD-EPI 2021: >90 ML/MIN/1.73M*2
EOSINOPHIL # BLD AUTO: 0.32 X10*3/UL (ref 0–0.7)
EOSINOPHIL NFR BLD AUTO: 2.3 %
ERYTHROCYTE [DISTWIDTH] IN BLOOD BY AUTOMATED COUNT: 16.2 % (ref 11.5–14.5)
GLUCOSE BLD MANUAL STRIP-MCNC: 259 MG/DL (ref 74–99)
GLUCOSE BLD MANUAL STRIP-MCNC: 260 MG/DL (ref 74–99)
GLUCOSE BLD MANUAL STRIP-MCNC: 274 MG/DL (ref 74–99)
GLUCOSE BLD MANUAL STRIP-MCNC: 277 MG/DL (ref 74–99)
GLUCOSE SERPL-MCNC: 272 MG/DL (ref 74–99)
HCT VFR BLD AUTO: 24.9 % (ref 41–52)
HGB BLD-MCNC: 6.8 G/DL (ref 13.5–17.5)
IMM GRANULOCYTES # BLD AUTO: 1.04 X10*3/UL (ref 0–0.7)
IMM GRANULOCYTES NFR BLD AUTO: 7.4 % (ref 0–0.9)
LYMPHOCYTES # BLD AUTO: 2.43 X10*3/UL (ref 1.2–4.8)
LYMPHOCYTES NFR BLD AUTO: 17.4 %
MCH RBC QN AUTO: 26.5 PG (ref 26–34)
MCHC RBC AUTO-ENTMCNC: 27.3 G/DL (ref 32–36)
MCV RBC AUTO: 97 FL (ref 80–100)
MONOCYTES # BLD AUTO: 0.37 X10*3/UL (ref 0.1–1)
MONOCYTES NFR BLD AUTO: 2.7 %
NEUTROPHILS # BLD AUTO: 9.71 X10*3/UL (ref 1.2–7.7)
NEUTROPHILS NFR BLD AUTO: 69.6 %
NRBC BLD-RTO: 8.2 /100 WBCS (ref 0–0)
OVALOCYTES BLD QL SMEAR: NORMAL
PLATELET # BLD AUTO: 250 X10*3/UL (ref 150–450)
POLYCHROMASIA BLD QL SMEAR: NORMAL
POTASSIUM SERPL-SCNC: 3.7 MMOL/L (ref 3.5–5.3)
PROT SERPL-MCNC: 6 G/DL (ref 6.4–8.2)
RBC # BLD AUTO: 2.57 X10*6/UL (ref 4.5–5.9)
RBC MORPH BLD: NORMAL
SODIUM SERPL-SCNC: 146 MMOL/L (ref 136–145)
TARGETS BLD QL SMEAR: NORMAL
WBC # BLD AUTO: 14 X10*3/UL (ref 4.4–11.3)

## 2025-03-28 PROCEDURE — 84075 ASSAY ALKALINE PHOSPHATASE: CPT | Performed by: INTERNAL MEDICINE

## 2025-03-28 PROCEDURE — 2500000002 HC RX 250 W HCPCS SELF ADMINISTERED DRUGS (ALT 637 FOR MEDICARE OP, ALT 636 FOR OP/ED): Performed by: NURSE PRACTITIONER

## 2025-03-28 PROCEDURE — 99233 SBSQ HOSP IP/OBS HIGH 50: CPT | Performed by: NURSE PRACTITIONER

## 2025-03-28 PROCEDURE — 2500000004 HC RX 250 GENERAL PHARMACY W/ HCPCS (ALT 636 FOR OP/ED)

## 2025-03-28 PROCEDURE — 2500000005 HC RX 250 GENERAL PHARMACY W/O HCPCS: Performed by: INTERNAL MEDICINE

## 2025-03-28 PROCEDURE — 2500000002 HC RX 250 W HCPCS SELF ADMINISTERED DRUGS (ALT 637 FOR MEDICARE OP, ALT 636 FOR OP/ED): Performed by: INTERNAL MEDICINE

## 2025-03-28 PROCEDURE — 85025 COMPLETE CBC W/AUTO DIFF WBC: CPT | Performed by: INTERNAL MEDICINE

## 2025-03-28 PROCEDURE — 2060000001 HC INTERMEDIATE ICU ROOM DAILY

## 2025-03-28 PROCEDURE — 71045 X-RAY EXAM CHEST 1 VIEW: CPT

## 2025-03-28 PROCEDURE — 82947 ASSAY GLUCOSE BLOOD QUANT: CPT

## 2025-03-28 PROCEDURE — 71045 X-RAY EXAM CHEST 1 VIEW: CPT | Performed by: STUDENT IN AN ORGANIZED HEALTH CARE EDUCATION/TRAINING PROGRAM

## 2025-03-28 PROCEDURE — 2500000004 HC RX 250 GENERAL PHARMACY W/ HCPCS (ALT 636 FOR OP/ED): Performed by: NURSE PRACTITIONER

## 2025-03-28 PROCEDURE — 94640 AIRWAY INHALATION TREATMENT: CPT

## 2025-03-28 PROCEDURE — 2500000001 HC RX 250 WO HCPCS SELF ADMINISTERED DRUGS (ALT 637 FOR MEDICARE OP): Performed by: NURSE PRACTITIONER

## 2025-03-28 PROCEDURE — 2500000004 HC RX 250 GENERAL PHARMACY W/ HCPCS (ALT 636 FOR OP/ED): Performed by: INTERNAL MEDICINE

## 2025-03-28 PROCEDURE — 36415 COLL VENOUS BLD VENIPUNCTURE: CPT | Performed by: INTERNAL MEDICINE

## 2025-03-28 RX ORDER — METOPROLOL TARTRATE 50 MG/1
50 TABLET ORAL 2 TIMES DAILY
Status: DISCONTINUED | OUTPATIENT
Start: 2025-03-29 | End: 2025-04-03 | Stop reason: HOSPADM

## 2025-03-28 RX ADMIN — PANTOPRAZOLE SODIUM 40 MG: 40 INJECTION, POWDER, FOR SOLUTION INTRAVENOUS at 09:13

## 2025-03-28 RX ADMIN — PIPERACILLIN SODIUM AND TAZOBACTAM SODIUM 3.38 G: 3; .375 INJECTION, SOLUTION INTRAVENOUS at 02:39

## 2025-03-28 RX ADMIN — TRAZODONE HYDROCHLORIDE 50 MG: 50 TABLET ORAL at 20:46

## 2025-03-28 RX ADMIN — AMIODARONE HYDROCHLORIDE 200 MG: 200 TABLET ORAL at 09:13

## 2025-03-28 RX ADMIN — Medication 30 PERCENT: at 06:42

## 2025-03-28 RX ADMIN — SUCRALFATE 1 G: 1 SUSPENSION ORAL at 20:46

## 2025-03-28 RX ADMIN — AMIODARONE HYDROCHLORIDE 200 MG: 200 TABLET ORAL at 20:46

## 2025-03-28 RX ADMIN — INSULIN LISPRO 6 UNITS: 100 INJECTION, SOLUTION INTRAVENOUS; SUBCUTANEOUS at 14:12

## 2025-03-28 RX ADMIN — ZINC OXIDE 1 APPLICATION: 200 OINTMENT TOPICAL at 09:30

## 2025-03-28 RX ADMIN — INSULIN LISPRO 6 UNITS: 100 INJECTION, SOLUTION INTRAVENOUS; SUBCUTANEOUS at 20:47

## 2025-03-28 RX ADMIN — SUCRALFATE 1 G: 1 SUSPENSION ORAL at 14:16

## 2025-03-28 RX ADMIN — PIPERACILLIN SODIUM AND TAZOBACTAM SODIUM 3.38 G: 3; .375 INJECTION, SOLUTION INTRAVENOUS at 20:46

## 2025-03-28 RX ADMIN — PIPERACILLIN SODIUM AND TAZOBACTAM SODIUM 3.38 G: 3; .375 INJECTION, SOLUTION INTRAVENOUS at 14:16

## 2025-03-28 RX ADMIN — SUCRALFATE 1 G: 1 SUSPENSION ORAL at 09:13

## 2025-03-28 RX ADMIN — INSULIN LISPRO 6 UNITS: 100 INJECTION, SOLUTION INTRAVENOUS; SUBCUTANEOUS at 02:38

## 2025-03-28 RX ADMIN — INSULIN GLARGINE 30 UNITS: 100 INJECTION, SOLUTION SUBCUTANEOUS at 20:46

## 2025-03-28 RX ADMIN — ACETYLCYSTEINE 200 MG: 200 SOLUTION ORAL; RESPIRATORY (INHALATION) at 12:17

## 2025-03-28 RX ADMIN — PANTOPRAZOLE SODIUM 40 MG: 40 INJECTION, POWDER, FOR SOLUTION INTRAVENOUS at 20:46

## 2025-03-28 RX ADMIN — Medication 30 L/MIN: at 19:47

## 2025-03-28 RX ADMIN — IRON SUCROSE 200 MG: 20 INJECTION, SOLUTION INTRAVENOUS at 05:55

## 2025-03-28 RX ADMIN — PIPERACILLIN SODIUM AND TAZOBACTAM SODIUM 3.38 G: 3; .375 INJECTION, SOLUTION INTRAVENOUS at 09:15

## 2025-03-28 RX ADMIN — SUCRALFATE 1 G: 1 SUSPENSION ORAL at 02:39

## 2025-03-28 RX ADMIN — IPRATROPIUM BROMIDE 0.5 MG: 0.5 SOLUTION RESPIRATORY (INHALATION) at 12:15

## 2025-03-28 RX ADMIN — IPRATROPIUM BROMIDE 0.5 MG: 0.5 SOLUTION RESPIRATORY (INHALATION) at 06:35

## 2025-03-28 RX ADMIN — IPRATROPIUM BROMIDE 0.5 MG: 0.5 SOLUTION RESPIRATORY (INHALATION) at 19:47

## 2025-03-28 RX ADMIN — INSULIN LISPRO 6 UNITS: 100 INJECTION, SOLUTION INTRAVENOUS; SUBCUTANEOUS at 09:15

## 2025-03-28 RX ADMIN — ACETYLCYSTEINE 200 MG: 200 SOLUTION ORAL; RESPIRATORY (INHALATION) at 06:42

## 2025-03-28 RX ADMIN — ACETYLCYSTEINE 200 MG: 200 SOLUTION ORAL; RESPIRATORY (INHALATION) at 19:47

## 2025-03-28 ASSESSMENT — COGNITIVE AND FUNCTIONAL STATUS - GENERAL
HELP NEEDED FOR BATHING: TOTAL
TOILETING: TOTAL
DRESSING REGULAR UPPER BODY CLOTHING: TOTAL
WALKING IN HOSPITAL ROOM: TOTAL
DRESSING REGULAR UPPER BODY CLOTHING: TOTAL
MOBILITY SCORE: 6
EATING MEALS: TOTAL
DRESSING REGULAR LOWER BODY CLOTHING: TOTAL
DAILY ACTIVITIY SCORE: 6
HELP NEEDED FOR BATHING: TOTAL
MOVING FROM LYING ON BACK TO SITTING ON SIDE OF FLAT BED WITH BEDRAILS: TOTAL
DAILY ACTIVITIY SCORE: 6
CLIMB 3 TO 5 STEPS WITH RAILING: TOTAL
TURNING FROM BACK TO SIDE WHILE IN FLAT BAD: TOTAL
TURNING FROM BACK TO SIDE WHILE IN FLAT BAD: TOTAL
CLIMB 3 TO 5 STEPS WITH RAILING: TOTAL
STANDING UP FROM CHAIR USING ARMS: TOTAL
MOVING TO AND FROM BED TO CHAIR: TOTAL
MOVING FROM LYING ON BACK TO SITTING ON SIDE OF FLAT BED WITH BEDRAILS: TOTAL
MOBILITY SCORE: 6
DRESSING REGULAR LOWER BODY CLOTHING: TOTAL
TOILETING: TOTAL
WALKING IN HOSPITAL ROOM: TOTAL
PERSONAL GROOMING: TOTAL
EATING MEALS: TOTAL
MOVING TO AND FROM BED TO CHAIR: TOTAL
STANDING UP FROM CHAIR USING ARMS: TOTAL
PERSONAL GROOMING: TOTAL

## 2025-03-28 ASSESSMENT — PAIN SCALES - GENERAL: PAINLEVEL_OUTOF10: 0 - NO PAIN

## 2025-03-28 ASSESSMENT — PAIN - FUNCTIONAL ASSESSMENT: PAIN_FUNCTIONAL_ASSESSMENT: 0-10

## 2025-03-28 NOTE — PROGRESS NOTES
Cardiology Progress    Impression:  Hemoptysis  Anemia  Paroxysmal atrial fibrillation.  Maintaining sinus rhythm.  On amiodarone.  Stroke with hemiparesis  Chronic respiratory failure.  Status post tracheostomy.  CAD.  No angina.  Hypertension  Hyperlipidemia  Diabetes  Carotid disease status post PTA 2023  Plan:  Continue oral amiodarone  Reduce metoprolol to twice daily  Eliquis remains on hold for severe anemia.  HPI:  No new problems.  Remains sinus rhythm.  Meds:  Scheduled medications  acetylcysteine, 1 mL, nebulization, TID  amiodarone, 200 mg, g-tube, BID  [Held by provider] apixaban, 5 mg, g-tube, BID  [Held by provider] clopidogrel, 75 mg, g-tube, Daily  docusate sodium, 100 mg, oral, BID  [START ON 3/30/2025] epoetin nicko or biosimilar, 30,000 Units, subcutaneous, Every Sunday  insulin glargine, 30 Units, subcutaneous, Nightly  insulin lispro, 0-10 Units, subcutaneous, q6h  ipratropium, 0.5 mg, nebulization, TID  [START ON 3/29/2025] metoprolol tartrate, 50 mg, g-tube, BID  oxygen, , inhalation, Continuous - Inhalation  pantoprazole, 40 mg, intravenous, BID  piperacillin-tazobactam, 3.375 g, intravenous, q6h  sucralfate, 1 g, oral, q6h LIBAN  traZODone, 50 mg, g-tube, Nightly      Continuous medications     PRN medications  PRN medications: acetaminophen, dextrose, dextrose, glucagon, glucagon, ipratropium, sodium chloride, zinc oxide    Physical exam:  Vitals:    03/28/25 1116   BP: 148/68   Pulse: 71   Resp: 20   Temp: 36.9 °C (98.4 °F)   SpO2: 99%      No JVD.  Decreased breath sounds.  Trace edema.  EKG:  Telemetry shows sinus rhythm.  Echo:  2/22/2025: EF 55%. Mild hypokinesis of the basal inferior wall.    Labs:  Lab Results   Component Value Date    WBC 14.0 (H) 03/28/2025    HGB 6.8 (L) 03/28/2025    HCT 24.9 (L) 03/28/2025     03/28/2025    CHOL 162 08/21/2023    TRIG 124 08/21/2023    HDL 35.4 (A) 08/21/2023     (H) 03/28/2025     (H) 03/28/2025     (H) 03/28/2025    K  3.7 03/28/2025     (H) 03/28/2025    CREATININE 0.89 03/28/2025    BUN 25 (H) 03/28/2025    CO2 31 03/28/2025    TSH 0.52 12/13/2020    INR 1.4 (H) 03/22/2025    HGBA1C 8.6 (H) 03/05/2025     par

## 2025-03-28 NOTE — PROGRESS NOTES
Bryan Nix is a 64 y.o. male on day 6 of admission presenting with Hemoptysis.      Subjective   Patient fully evaluated on March 24.  Hemoptysis appears resolved.  Still with slightly elevated white count to be monitored.  Continue aggressive antibiotic regimen.  Repeat chest x-ray is pending.       Objective     Last Recorded Vitals  /68 (BP Location: Left arm, Patient Position: Lying)   Pulse 71   Temp 36.9 °C (98.4 °F) (Temporal)   Resp 20   Wt 77.1 kg (170 lb)   SpO2 99%   Intake/Output last 3 Shifts:    Intake/Output Summary (Last 24 hours) at 3/28/2025 1408  Last data filed at 3/28/2025 1031  Gross per 24 hour   Intake 1666 ml   Output 1300 ml   Net 366 ml       Admission Weight  Weight: 77.1 kg (170 lb) (03/22/25 1255)    Daily Weight  03/22/25 : 77.1 kg (170 lb)    Image Results  Esophagogastroduodenoscopy (EGD)  Table formatting from the original result was not included.  Impression  Small hiatal hernia  Moderate erythematous mucosa in the body of the stomach and antrum;   performed cold forceps biopsy to rule out H. pylori  Moderate erythematous mucosa in the duodenal bulb and 1st part of the   duodenum    Findings  Z-line 45 cm from the incisors  Small hiatal hernia without Riccardo lesions present. Hill grade I hiatal   hernia  Moderate erythematous mucosa in the body of the stomach and antrum;   performed cold forceps biopsy to rule out H. pylori. PEG tube bumper seen   in the gastric body, site unremarkable  Moderate erythematous mucosa in the duodenal bulb and 1st part of the   duodenum    Recommendation  Await pathology results     Indication  Melena, Hemoptysis, Anemia, unspecified type    Staff  Staff Role   Pritesh Carlton MD Proceduralist     Medications  See Anesthesia Record.     Preprocedure  A history and physical has been performed, and patient medication   allergies have been reviewed. The patient's tolerance of previous   anesthesia has been reviewed. The risks and benefits of  the procedure and   the sedation options and risks were discussed with the patient. All   questions were answered and informed consent obtained.    Details of the Procedure  The patient underwent monitored anesthesia care, which was administered by   an anesthesia professional. The patient's blood pressure, ECG, ETCO2,   heart rate, level of consciousness, oxygen and respirations were monitored   throughout the procedure. The scope was introduced through the mouth and   advanced to the second part of the duodenum. Retroflexion was performed in   the cardia. The patient experienced no blood loss. The procedure was not   difficult. The patient tolerated the procedure well. There were no   apparent adverse events.     Events  Procedure Events   Event Event Time   ENDO SCOPE IN TIME 3/27/2025 11:10 AM   ENDO SCOPE OUT TIME 3/27/2025 11:13 AM     Specimens  ID Type Source Tests Collected by Time   1 : COLD BIOPSY Tissue STOMACH ANTRUM BIOPSY SURGICAL PATHOLOGY EXAM Pritesh Carlton MD 3/27/2025 1111     Procedure Location  [facility] [address]  [address] [facility] [place] [address]  [phone]    Referring Provider  JOSEPH Edward-CNP    Procedure Provider  Pritesh Carlton MD  Electrocardiogram, 12-lead PRN ACS symptoms  Sinus rhythm with short GA  T wave abnormality, consider anterior ischemia  Abnormal ECG  When compared with ECG of 25-MAR-2025 19:08, (unconfirmed)  Previous ECG has undetermined rhythm, needs review  Nonspecific T wave abnormality now evident in Inferior leads      Physical Exam    Relevant Results               Assessment/Plan   This patient currently has cardiac telemetry ordered; if you would like to modify or discontinue the telemetry order, click here to go to the orders activity to modify/discontinue the order.              Assessment & Plan  Hemoptysis    Tracheostomy in place (Multi)    CVA, old, hemiparesis (Multi)    Status post insertion of percutaneous  endoscopic gastrostomy (PEG) tube (Multi)    Pneumonia due to infectious organism    Right sided weakness    Atrial flutter (Multi)    Insulin dependent type 2 diabetes mellitus (Multi)        Zeus Bone MD  Physician  Internal Medicine     H&P      Signed     Date of Service: 3/23/2025 10:28 AM     Signed       Expand All Collapse All    History Of Present Illness  Bryan Nix is a 64-year-old male with past medical history of atrial flutter on Eliquis, hyponatremia, IDDM, CAD, hypertension, chronic wounds, diffuse lymphadenopathy, anemia, history of smoking, history elevated PSA, CVA L hemisphere (2020) with right-sided deficits, Left ICA stenosis (85%) s/p angioplasty and left carotid artery stent (08/25/2023) and recent history of acute pontine stroke 02/27/25 with acute hypoxic respiratory failure s/p tracheostomy on 2/25/2025 and PEG.  Patient presents for bleeding from tracheostomy.  Patient is alert and oriented x 3, however limited verbal communication due to tracheostomy.  Wife is at bedside and assists with history.  She reports that patient was coughing blood out of his tracheostomy.  ER provider felt bleeding was likely superficial around site.  Patient has been on room air at [facility].  He was placed on Airvo in the ED mainly to humidify oxygen.  Patient never desaturated per ER report.  Currently he is resting comfortably in the bed, breath sounds slightly rhonchorous, but unlabored.  Reports chills, but no fevers.  He has right-sided weakness and sensory deficits.  Denies headache, vision or hearing changes, chest pains, palpitations, shortness of breath, nausea, vomiting, abdominal pain, diarrhea, or complications of PEG tube site.  Wife reports patient skin became irritated in his groin due to adhesive from a condom cath previously, he has a small decubitus wound that she reports is healing well.  He receives tube feeds continuously.  Wife reports a patient was recently  evaluated by speech for p.o. to intake, however he still remains n.p.o. except for meds and tube feeds through PEG. CODE STATUS reviewed: full code     ER Course: Hemodynamically stable, afebrile, SpO2 to 97% trach mask.  WBC 9.3, hemoglobin 8, hematocrit 29.4, platelets 197.  INR 1.4.  Glucose 223, BUN 36, otherwise CMP is normal.  CT of the chest for PE pending.  CXR unremarkable. Trannexamic acid soak gauze wrapped around trach.      Past medical history: As above  Past surgical history: As above  Social history: Former smoker 37.5 pack years-quit smoking age 63, occasional alcohol use, no illicit drug use.  .  Works in maintenance.  6 children.  Family history: Noncontributory     Past Medical History  Medical History        Past Medical History:   Diagnosis Date    Abnormal finding of blood chemistry, unspecified 05/18/2016     Abnormal blood chemistry    Acute upper respiratory infection, unspecified 12/02/2015     Acute upper respiratory infection    Elevated prostate specific antigen (PSA)       Elevated prostate specific antigen (PSA)    Encounter for screening for malignant neoplasm of colon 01/30/2021     Colon cancer screening    Encounter for screening for malignant neoplasm of prostate 11/30/2020     Prostate cancer screening    Essential (primary) hypertension 07/30/2013     Benign essential hypertension    Other conditions influencing health status 07/30/2013     Diabetes Mellitus Poorly Controlled    Other conditions influencing health status 12/02/2015     History of cough    Personal history of other endocrine, nutritional and metabolic disease 03/18/2021     History of hyperlipidemia    Personal history of other endocrine, nutritional and metabolic disease 03/18/2021     History of diabetes mellitus    Personal history of other specified conditions 05/18/2016     History of chest pain    Personal history of other specified conditions 05/18/2016     History of dyspnea    Personal history of  other specified conditions 05/18/2016     History of dizziness    Personal history of other specified conditions 05/18/2016     History of fatigue            Surgical History  Surgical History         Past Surgical History:   Procedure Laterality Date    CT ANGIO CORONARY ART WITH HEARTFLOW IF SCORE >30%   8/23/2023     CT HEART CORONARY ANGIOGRAM 8/23/2023 [facility] CT    MR HEAD ANGIO WO IV CONTRAST   12/14/2020     MR HEAD ANGIO WO IV CONTRAST 12/14/2020 [facility]    MR NECK ANGIO WO IV CONTRAST   12/14/2020     MR NECK ANGIO WO IV CONTRAST 12/14/2020 [facility]    OTHER SURGICAL HISTORY   12/01/2020     Hand fracture repair    OTHER SURGICAL HISTORY   12/01/2020     Nasal bone fracture repair            Social History  He reports that he has quit smoking. His smoking use included cigarettes. He has never used smokeless tobacco. No history on file for alcohol use and drug use.     Family History  Family History   No family history on file.        Allergies  Patient has no known allergies.     Review of Systems     Physical Exam  Constitutional:       General: He is awake. He is not in acute distress.     Appearance: Normal appearance. He is not toxic-appearing.   HENT:      Head: Atraumatic.      Nose: Nose normal.      Mouth/Throat:      Mouth: Mucous membranes are moist.   Eyes:      Conjunctiva/sclera: Conjunctivae normal.   Cardiovascular:      Rate and Rhythm: Normal rate and regular rhythm.      Pulses: Normal pulses.      Heart sounds: No murmur heard.  Pulmonary:      Effort: No respiratory distress.      Comments: Tracheostomy site midline, no drainage around dressing.  Rhonchorous breath sounds, unlabored respirations, RT bedside suctioning patient with thick blood-tinged sputum  Abdominal:      General: Bowel sounds are normal. There is no distension.      Palpations: Abdomen is soft.      Tenderness: There is no abdominal tenderness. There is no guarding.      Comments: Left upper  "quadrant PEG site tube site, no drainage or skin breakdown around tube site.   Musculoskeletal:         General: No swelling, deformity or signs of injury.      Cervical back: Neck supple.      Right lower leg: No edema.      Left lower leg: No edema.   Skin:     General: Skin is warm and dry.      Capillary Refill: Capillary refill takes less than 2 seconds.      Findings: Wound (sacrum) present. No ecchymosis or erythema.   Neurological:      Mental Status: He is alert and oriented to person, place, and time.      Cranial Nerves: No facial asymmetry.      Sensory: Sensory deficit (RUE, RLE) present.      Motor: Weakness present.      Comments: Right sided upper and lower extremity motor function deficits   Psychiatric:         Mood and Affect: Mood normal.         Behavior: Behavior is cooperative.            Last Recorded Vitals  Blood pressure 134/50, pulse 93, temperature 36.9 °C (98.4 °F), temperature source Temporal, resp. rate 22, height 1.88 m (6' 2\"), weight 77.1 kg (170 lb), SpO2 98%.     Relevant Results              Results for orders placed or performed during the hospital encounter of 03/22/25 (from the past 24 hours)   CBC and Auto Differential   Result Value Ref Range     WBC 9.3 4.4 - 11.3 x10*3/uL     nRBC 0.3 (H) 0.0 - 0.0 /100 WBCs     RBC 3.02 (L) 4.50 - 5.90 x10*6/uL     Hemoglobin 8.0 (L) 13.5 - 17.5 g/dL     Hematocrit 29.4 (L) 41.0 - 52.0 %     MCV 97 80 - 100 fL     MCH 26.5 26.0 - 34.0 pg     MCHC 27.2 (L) 32.0 - 36.0 g/dL     RDW 15.6 (H) 11.5 - 14.5 %     Platelets 197 150 - 450 x10*3/uL     Neutrophils % 77.1 40.0 - 80.0 %     Immature Granulocytes %, Automated 1.5 (H) 0.0 - 0.9 %     Lymphocytes % 13.1 13.0 - 44.0 %     Monocytes % 4.1 2.0 - 10.0 %     Eosinophils % 3.2 0.0 - 6.0 %     Basophils % 1.0 0.0 - 2.0 %     Neutrophils Absolute 7.14 1.20 - 7.70 x10*3/uL     Immature Granulocytes Absolute, Automated 0.14 0.00 - 0.70 x10*3/uL     Lymphocytes Absolute 1.21 1.20 - 4.80 x10*3/uL "     Monocytes Absolute 0.38 0.10 - 1.00 x10*3/uL     Eosinophils Absolute 0.30 0.00 - 0.70 x10*3/uL     Basophils Absolute 0.09 0.00 - 0.10 x10*3/uL   Basic metabolic panel   Result Value Ref Range     Glucose 223 (H) 74 - 99 mg/dL     Sodium 142 136 - 145 mmol/L     Potassium 4.3 3.5 - 5.3 mmol/L     Chloride 103 98 - 107 mmol/L     Bicarbonate 27 21 - 32 mmol/L     Anion Gap 16 10 - 20 mmol/L     Urea Nitrogen 36 (H) 6 - 23 mg/dL     Creatinine 0.81 0.50 - 1.30 mg/dL     eGFR >90 >60 mL/min/1.73m*2     Calcium 9.6 8.6 - 10.3 mg/dL   Protime-INR   Result Value Ref Range     Protime 15.1 (H) 9.8 - 12.4 seconds     INR 1.4 (H) 0.9 - 1.1   aPTT   Result Value Ref Range     aPTT 25 (L) 26 - 36 seconds   Type and Screen   Result Value Ref Range     ABO TYPE B       Rh TYPE POS       ANTIBODY SCREEN NEG     VERIFY ABO/Rh Group Test   Result Value Ref Range     ABO TYPE B       Rh TYPE POS     MRSA Surveillance for Vancomycin De-escalation, PCR     Specimen: Anterior Nares; Swab   Result Value Ref Range     MRSA PCR Not Detected Not Detected   POCT GLUCOSE   Result Value Ref Range     POCT Glucose 180 (H) 74 - 99 mg/dL   POCT GLUCOSE   Result Value Ref Range     POCT Glucose 161 (H) 74 - 99 mg/dL   POCT GLUCOSE   Result Value Ref Range     POCT Glucose 156 (H) 74 - 99 mg/dL   Basic metabolic panel   Result Value Ref Range     Glucose 170 (H) 74 - 99 mg/dL     Sodium 147 (H) 136 - 145 mmol/L     Potassium 4.2 3.5 - 5.3 mmol/L     Chloride 107 98 - 107 mmol/L     Bicarbonate 30 21 - 32 mmol/L     Anion Gap 14 10 - 20 mmol/L     Urea Nitrogen 35 (H) 6 - 23 mg/dL     Creatinine 0.85 0.50 - 1.30 mg/dL     eGFR >90 >60 mL/min/1.73m*2     Calcium 9.4 8.6 - 10.3 mg/dL   CBC   Result Value Ref Range     WBC 10.4 4.4 - 11.3 x10*3/uL     nRBC 0.6 (H) 0.0 - 0.0 /100 WBCs     RBC 2.87 (L) 4.50 - 5.90 x10*6/uL     Hemoglobin 7.8 (L) 13.5 - 17.5 g/dL     Hematocrit 26.6 (L) 41.0 - 52.0 %     MCV 93 80 - 100 fL     MCH 27.2 26.0 - 34.0  pg     MCHC 29.3 (L) 32.0 - 36.0 g/dL     RDW 15.7 (H) 11.5 - 14.5 %     Platelets 205 150 - 450 x10*3/uL   SST TOP   Result Value Ref Range     Extra Tube Hold for add-ons.        CT angio chest for pulmonary embolism     Result Date: 3/22/2025  Interpreted By:  Pamela Zurita, STUDY: CT ANGIO CHEST FOR PULMONARY EMBOLISM;  3/22/2025 4:56 pm   INDICATION: Signs/Symptoms:bloody secretions, eval for PE.     COMPARISON: CT chest without contrast 03/22/2025   ACCESSION NUMBER(S): [id number]   ORDERING CLINICIAN: PAMELA HAHN   TECHNIQUE: Contiguous axial images of the chest were obtained after the intravenous administration of iodinated contrast using angiographic PE protocol. Coronal and sagittal reformatted images were reconstructed from the axial data. MIP images were created on an independent workstation and reviewed.   FINDINGS:   MEDIASTINUM AND LYMPH NODES: Inflammatory fat stranding along the tracheostomy tract without discrete fluid collection. The esophageal wall appears within normal limits. There is redemonstration of diffuse lymphadenopathy in the bilateral axilla, mediastinum, bilateral supraclavicular as described in detail on separate report. Also notable or enlarged bilateral level 4 cervical lymph nodes (right > left).   VESSELS:  Normal caliber thoracic aorta without dissection. Moderate aortic atherosclerosis.  No acute pulmonary embolism.   HEART: Normal size.  Mild coronary artery calcifications. No significant pericardial effusion.   LUNG, AIRWAYS, PLEURA: There is slight increase in debris within the trachea. There has been clearing of secretions in the left upper/lower lobe bronchus. There is diffuse bilateral bronchial thickening slightly increased from prior study. There is peribronchovascular ground-glass opacity in the posterior right upper lobe and superior and basal segments of the right lower lobe. There is slightly worsened atelectasis in the lung bases remaining predominantly  subsegmental in nature. There is a 0.8 cm nodule in the left upper lobe that is stable from 08/19/2023.   OSSEOUS STRUCTURES: No acute osseous abnormality.   CHEST WALL SOFT TISSUES: No discernible acute abnormality.   UPPER ABDOMEN/OTHER: No acute abnormality.        No acute pulmonary embolism.   Peribronchial vascular ground glass opacities in the right upper and lower lobe again concerning for pneumonia given patient's risk factors of tracheostomy and debris in the airways.   Redemonstration of diffuse lymphadenopathy in the lower necks, axilla, and mediastinum. Findings are either diffusely reactive in nature, reflective of lymphoma/leukemia, meter deficiency, or less likely head neck neoplasm is previously postulated. However please correlate with past medical history.   Inflammatory changes along the tracheostomy tract without fluid collection. Correlate for erythema.   Unchanged 0.8 cm nodule in the left upper lobe dating back to 08/19/2023. Recommend 1 year follow up chest CT to ensure at least 2 years of stability.   MACRO: None.   Signed by: Pamela Zurita 3/22/2025 7:17 PM Dictation workstation:   [id number]     CT chest wo IV contrast     Result Date: 3/22/2025  Interpreted By:  Pamela Zurita, STUDY: CT CHEST WO IV CONTRAST;  3/22/2025 3:41 pm   INDICATION: Signs/Symptoms:eval for alveolar hemorrhage.     COMPARISON: CT head/neck 08/19/2023   ACCESSION NUMBER(S): [id number]   ORDERING CLINICIAN: PAMELA HAHN   TECHNIQUE: Contiguous axial images of the chest and upper abdomen were obtained without contrast. Coronal and sagittal reformatted images were reconstructed from the axial data.   FINDINGS:   MEDIASTINUM AND LYMPH NODES: There is fat stranding/edema along the tract of the tracheostomy about discrete fluid collection. The esophageal wall appears within normal limits. There are numerous enlarged bilateral supraclavicular lymph nodes measuring up to 1.4 cm on the left. There are numerous  enlarged bilateral axillary lymph nodes measuring up to 1.1 cm on the right and 1.2 cm on the left. There are numerous prominent to mildly enlarged mediastinal lymph nodes as well. No pneumomediastinum.   VESSELS:  Normal caliber thoracic aorta. Moderate aortic atherosclerosis. The main pulmonary artery is normal in size.   HEART: Normal size.  Mild coronary artery calcifications. No significant pericardial effusion.   LUNG, AIRWAYS, PLEURA: Tracheostomy noted in appropriate position. There is trace mucus in the left mainstem bronchus and at the origin of the left upper lobe and lower lobe bronchi. There are mild peribronchovascular ground-glass opacities in the posterior segment of the right upper lobe, basal segments of the right lower lobe. There is mild dependent bibasilar atelectasis. There is no pleural effusion.   OSSEOUS STRUCTURES: No acute osseous abnormality.   CHEST WALL SOFT TISSUES: No discernible acute abnormality.   UPPER ABDOMEN/OTHER: No acute abnormality.        1. Mild peribronchovascular ground-glass opacities in the posterior right upper lobe and basal segments of the right lower lobe. This appearance and distribution is not typical for alveolar hemorrhage which tends to be centrilobular and bilateral asymmetric in distribution. Findings are most suggestive of pneumonia.   2. Small amount of mucous debris in the left mainstem bronchus and at the origin of the left upper and lower lobe bronchi.   3. Diffuse lymphadenopathy in the bilateral axillary regions, mediastinum and left supraclavicular region particularly notable in the bilateral supraclavicular region. This is progressed in the left supraclavicular region and new elsewhere when compared to 08/19/2023 CTA head/neck. Correlate for any history of known head/neck malignancy or lymphoma.   4. Inflammation/fat stranding adjacent to the tracheostomy tube entry site noted. Correlate for air the met. No evidence of fluid collection.   MACRO:  None.   Signed by: Marco A Zurita 3/22/2025 7:10 PM Dictation workstation:   [id number]     XR chest 1 view     Result Date: 3/22/2025  Interpreted By:  Fermin Stroud, STUDY: XR CHEST 1 VIEW   INDICATION: Signs/Symptoms:hemoptysis.   COMPARISON: August 19, 2023   ACCESSION NUMBER(S): [id number]   ORDERING CLINICIAN: MARCO A HAHN   FINDINGS: No consolidation, effusion, edema, or pneumothorax. Heart size within normal limits.        No evidence of acute intrathoracic abnormality.   Signed by: Fermin Stroud 3/22/2025 2:01 PM Dictation workstation:   [id number]     XR chest 1 view     Result Date: 3/14/2025  * * *Final Report* * * DATE OF EXAM: Mar 14 2025  8:52AM   S5X   [id number]  -  XR CHEST 1V FRONTAL   / ACCESSION #  [id number] PROCEDURE REASON: new bleeding from trach      * * * * Physician Interpretation * * * *  EXAMINATION:  CHEST RADIOGRAPH (PORTABLE SINGLE VIEW AP) Exam Date/Time:  3/14/2025 8:52 AM Clinical History:  new bleeding from trach Comparison:  03/03/2025 RESULT: LINES, TUBES, AND DEVICES:  Tracheostomy. CARDIOMEDIASTINAL SILHOUETTE:  The cardiac and mediastinal silhouettes appear unremarkable. LUNGS AND PLEURA: No consolidation.  No pleural effusion. No pulmonary vascular congestion. No pneumothorax identified. OTHER:  N/A     IMPRESSION: No acute abnormality identified. : HANY   Transcribe Date/Time: Mar 14 2025  9:07A Dictated by : MARCO A EVANS MD This examination was interpreted and the report reviewed and electronically signed by: MARCO A EVANS MD on Mar 14 2025  9:11AM  EST     US abdomen complete     Result Date: 3/11/2025  ABDOMEN ULTRASOUND-COMPLETE FINDINGS: Abdomen Ultrasound Complete: PROCEDURE: Multiple grayscale mages of the abdomen were obtained. FINDINGS:Multiple real time images demonstrate the liver with increased echogenicity consistent with fatty infiltration. There is no evidence of a discrete mass within the liver. The liver is enlarged, measured at 15.7 cm  in size. The gallbladder is seen with no evidence of cholelithiasis. The gallbladder wall is within normal limits. The common bile duct is within normal limits. The visualized pancreas appears unremarkable. Both kidneys are seen with no evidence of hydronephrosis or a calculus. The spleen has its normal homogeneous echo appearance. There is no free fluid to suggest ascites. The visualized aorta appears unremarkable. The inferior vena cava appears patent. CONCLUSION: Enlarged fatty liver. ELECTRONICALLY SIGNED BY LEONEL LAN M.D. 3/11/2025 5:00:52 PM EDT.     XR chest 1 view     Result Date: 3/3/2025  * * *Final Report* * * DATE OF EXAM: Mar  3 2025  1:40PM   S5X   [id number]  -  XR CHEST 1V FRONTAL   / ACCESSION #  [id number] PROCEDURE REASON: resp failure      * * * * Physician Interpretation * * * *  EXAMINATION:  CHEST RADIOGRAPH (PORTABLE SINGLE VIEW AP) Exam Date/Time:  3/3/2025 1:40 PM Indication:   resp failure M:  XCP_4 Comparison:  1 day prior RESULT: Lines, tubes, and devices:  Tracheostomy tube remains in situ. Lungs and pleura:  Mild patchy opacities right lower lung zone seen on the prior study are no longer appreciated.  No confluent opacity.   Costophrenic angles are clear.  No pneumothorax. Cardiomediastinal silhouette:  Stable cardiomediastinal silhouette. Other:  -     IMPRESSION: Improved aeration right lower lung zone.  No acute cardiopulmonary finding. : HANY   Transcribe Date/Time: Mar  3 2025  2:49P Dictated by : ISRAEL BENSON MD This examination was interpreted and the report reviewed and electronically signed by: ISRAEL BENSON MD on Mar  3 2025  2:49PM  EST     XR chest 1 view     Result Date: 3/2/2025  * * *Final Report* * * DATE OF EXAM: Mar  2 2025 11:25AM   MMX   [id number]  -  XR CHEST 1V FRONTAL PORT  / ACCESSION #  [id number] PROCEDURE REASON: Shortness of breath      * * * * Physician Interpretation * * * *  EXAMINATION:  CHEST RADIOGRAPH (PORTABLE SINGLE VIEW AP)  Exam Date/Time:  3/2/2025 11:25 AM CLINICAL HISTORY: Shortness of breath MQ:  XCPR_5 Comparison:  02/20/2025. RESULT: This is a limited examination due to multiple wires and tubing obscuring the lower lungs. Lines, tubes, and devices:  A tracheostomy tube remains in place. Lungs and pleura:  There are increased bronchovascular markings in the right lower lung which may be due to bronchitis or early changes of aspiration pneumonia.  Clinical correlation and follow-up exams are recommended. Cardiomediastinal silhouette:  Stable cardiomediastinal silhouette. Other:  The visualized bony thorax appears unremarkable.     IMPRESSION: Nonspecific parenchymal changes in the right lower lung as described above. A follow-up exam is recommended. : [facility]   Transcribe Date/Time: Mar  2 2025 11:53A Dictated by : BJ INIGUEZ MD This examination was interpreted and the report reviewed and electronically signed by: BJ INIGUEZ MD on Mar  2 2025 11:54AM  EST     XR chest 1 view     Result Date: 2/28/2025  * * *Final Report* * * DATE OF EXAM: Feb 28 2025 10:36AM   MMX   [id number]  -  XR CHEST 1V FRONTAL   / ACCESSION #  [id number] PROCEDURE REASON: Shortness of breath      * * * * Physician Interpretation * * * *  EXAMINATION:  CHEST RADIOGRAPH (SINGLE VIEW AP OR PA) CLINICAL HISTORY: Shortness of breath MQ:  XC1_5 Comparison:  02/22/2025 RESULT: Lines, tubes, and devices:  Tracheostomy tube is noted. Lungs and pleura:  No consolidation. No lung mass. No pleural effusion. Cardiomediastinal silhouette:  Normal cardiomediastinal silhouette. Other:  No acute osseous pathology.     IMPRESSION: No acute radiographic abnormality. : [facility]   Transcribe Date/Time: Feb 28 2025 12:07P Dictated by : DIANNE RAMOS MD This examination was interpreted and the report reviewed and electronically signed by: DIANNE RAMOS MD on Feb 28 2025 12:08PM  EST     XR abdomen 2 views supine and erect or decub     Result Date:  2/24/2025  * * *Final Report* * * DATE OF EXAM: Feb 24 2025  6:03PM   MMX   [id number]  -  XR ABDOMEN 1V SUPINE  / ACCESSION #  [id number] PROCEDURE REASON: Evaluate tube, line or lead position      * * * * Physician Interpretation * * * *  EXAMINATION:  XR ABDOMEN 1V SUPINE CLINICAL HISTORY:   Evaluate tube, line or lead position Technique:   XR ABDOMEN 1V SUPINE -- NOT APPLICABLE with 1 views on 1 images Comparison: 2/21/2025 RESULT: Feeding tube with distal tip at the pylorus antrum. Moderate colonic stool burden. No dilated bowel. Lung structures are intact.     IMPRESSION: Feeding tube with distal tip at the pylorus antrum. Moderate colonic stool burden. No dilated bowel. : [facility]   Transcribe Date/Time: Feb 24 2025  6:23P Dictated by : PABLO RAMOS MD This examination was interpreted and the report reviewed and electronically signed by: PABLO RAMOS MD on Feb 24 2025  6:24PM  EST     CT head wo IV contrast     Result Date: 2/23/2025  * * *Final Report* * * DATE OF EXAM: Feb 23 2025 12:04PM   [facility]   0504  -  CT BRAIN WO IVCON  / ACCESSION #  [id number] PROCEDURE REASON: Stroke, follow up      * * * * Physician Interpretation * * * *  EXAMINATION: CT BRAIN WO IVCON CLINICAL HISTORY: Stroke, follow up TECHNIQUE:  Serial axial images without IV contrast were obtained from the vertex to the foramen magnum. MQ:  CTBWO_3 CT Radiation dose: Integrated Dose-Length Product (DLP) for this visit =   778  mGy*cm CT Dose Reduction Employed: Iterative recon COMPARISON: MRI brain 02/19/2025 RESULT: Post-operative change: None. Acute change: Evolving acute infarct along the central eli and superior left medulla (2:7-9).  No evidence of hemorrhagic transformation. Hemorrhage: No evidence of acute intracranial hemorrhage. Mass Lesion / Mass Effect: There is no evidence of an intracranial mass or extraaxial fluid collection. No significant mass effect. Chronic change: Stable remote left MCA territory encephalomalacia.  Parenchyma: There is no significant volume loss. The brain parenchyma is otherwise within normal limits for age. Ventricles: The ventricles are within normal limits of size and configuration for age. Paranasal sinuses and skull base: Partial opacification of the left maxillary and sphenoid sinus and multiple bilateral ethmoid air cells.   The remaining visualized paranasal sinuses are grossly clear. The skull base and imaged soft tissues are unremarkable. Localizer images: No additional findings.     IMPRESSION: Evolving acute brainstem infarct.  No evidence of hemorrhagic transformation. Stable remote left MCA territory infarct. : [facility]   Transcribe Date/Time: Feb 23 2025 12:48P Dictated by : FRANKY GUZMAN MD   This examination was interpreted and the report reviewed and electronically signed by: FRANKY GUZMAN MD on Feb 23 2025 12:52PM  EST     XR chest 1 view     Result Date: 2/22/2025  * * *Final Report* * * DATE OF EXAM: Feb 22 2025  6:56AM   MMX   5290  -  XR CHEST 1V FRONTAL   / ACCESSION #  [id number] PROCEDURE REASON: Evaluate tube, line,  or lead position      * * * * Physician Interpretation * * * *  EXAMINATION:  CHEST RADIOGRAPH (SINGLE VIEW AP OR PA) CLINICAL HISTORY: Evaluate tube, line,  or lead position MQ:  XC1_5 Comparison:  02/21/2025. RESULT: Lines, tubes, and devices:  An ET tube and feeding tube remain in place.   A poorly defined right venous catheter cannot be excluded. Lungs and pleura:  There are persistent bilateral lower lungs infiltrates seen on the right more than left suggestive of pneumonia such as aspiration pneumonia.  There is no definite pleural effusion. Cardiomediastinal silhouette:  Stable cardiomediastinal silhouette. Other:  The visualized bony thorax appears unremarkable.     IMPRESSION: Persistent bilateral lower lungs infiltrates as described above. A follow-up exam is recommended. : [facility]   Transcribe Date/Time: Feb 22 2025  8:20A Dictated by :  BJ INIGUEZ MD This examination was interpreted and the report reviewed and electronically signed by: BJ INIGUEZ MD on Feb 22 2025  8:21AM  EST     XR chest 1 view     Result Date: 2/21/2025  * * *Final Report* * * DATE OF EXAM: Feb 21 2025  5:59PM   MMX   5376  -  XR CHEST 1V FRONTAL PORT  / ACCESSION #  [id number] PROCEDURE REASON: Evaluate tube, line,  or lead position      * * * * Physician Interpretation * * * *  EXAMINATION:  CHEST RADIOGRAPH (PORTABLE SINGLE VIEW AP) Exam Date/Time:  2/21/2025 5:59 PM CLINICAL HISTORY: Evaluate tube, line,  or lead position MQ:  XCPR_5 Comparison:  02/21/2025 RESULT: Lines, tubes, and devices: Endotracheal tube in place terminating in enteric tube in place terminating below the field of view. Lungs and pleura: No pneumothorax.  No pleural effusion.  Bilateral mild interstitial opacities.  The right costophrenic angle is not included in the field-of-view. Cardiomediastinal silhouette:  Stable cardiomediastinal silhouette. Other:  .     IMPRESSION: Mild interstitial opacities may be related to pulmonary vascular congestion. : [facility]   Transcribe Date/Time: Feb 21 2025  6:55P Dictated by : IRWIN VALDIVIA MD This examination was interpreted and the report reviewed and electronically signed by: IRWIN VALDIVIA MD on Feb 21 2025  6:57PM  EST     XR abdomen 2 views supine and erect or decub     Result Date: 2/21/2025  * * *Final Report* * * DATE OF EXAM: Feb 21 2025 10:32AM   MMX   5289  -  XR ABDOMEN 1V SUPINE  / ACCESSION #  [id number] PROCEDURE REASON: Evaluate tube, line or lead position      * * * * Physician Interpretation * * * *  Clinical Information: Feeding tube Single view of the abdomen was obtained. Feeding tube tip within the distal stomach. There is a nonspecific, nonobstructive bowel gas pattern. No abnormal abdominal masses are identified.  No pathologic calcifications.   No acute bony abnormalities are seen.     IMPRESSION:  Nonspecific, nonobstructive bowel gas pattern. Feeding tube tip within the distal stomach. : [facility]   Transcribe Date/Time: Feb 21 2025 10:43A Dictated by : PAMELA EVANS MD This examination was interpreted and the report reviewed and electronically signed by: APMELA EVANS MD on Feb 21 2025 10:44AM  EST     XR chest 1 view     Result Date: 2/21/2025  * * *Final Report* * * DATE OF EXAM: Feb 21 2025 10:32AM   MMX   [id number]  -  XR CHEST 1V FRONTAL PORT  / ACCESSION #  [id number] PROCEDURE REASON: Shortness of breath      * * * * Physician Interpretation * * * *  EXAMINATION:  CHEST RADIOGRAPH (PORTABLE SINGLE VIEW AP) Exam Date/Time:  2/21/2025 10:32 AM Clinical History:  Shortness of breath Comparison:  02/17/2025 RESULT: LINES, TUBES, AND DEVICES:  Feeding tube tip beyond the inferior extent of the image. CARDIOMEDIASTINAL SILHOUETTE:  The cardiac and mediastinal silhouettes appear unremarkable. LUNGS AND PLEURA: No consolidation.  No pleural effusion. No pulmonary vascular congestion. No pneumothorax identified. OTHER:  N/A     IMPRESSION: No acute abnormality identified. : [facility]   Transcribe Date/Time: Feb 21 2025 10:41A Dictated by : PAMELA EVANS MD This examination was interpreted and the report reviewed and electronically signed by: PAMELA EVANS MD on Feb 21 2025 10:43AM  EST            Assessment/Plan     Assessment & Plan  Hemoptysis     Tracheostomy in place (Multi)     CVA, old, hemiparesis (Multi)     Status post insertion of percutaneous endoscopic gastrostomy (PEG) tube (Multi)     Pneumonia due to infectious organism     Right sided weakness     Atrial flutter (Multi)     Insulin dependent type 2 diabetes mellitus (Multi)        Telemetry monitoring  Hemoglobin stable 8's recently on review of outside labs, monitor for ongoing bleeding.  Daily H&H  Monitor for overt bleeding  Consult pulmonology, appreciate input  Pneumonia suspected for CT findings  Tracheostomy  protocol  Expected cover with Zosyn and vancomycin  MRSA swab  Urine for Legionella antigen and strep pneumonia antigen  Supplemental oxygen as needed  Nebulizers as needed  RT evaluate and treat  Check procalcitonin  Typical 20% zinc oxide to wounds and groin  Continuous tube feeds, continue at 80 mL an hour x 22 hours  Accu-Chek every 6 hours  Hypoglycemia protocol  PT/OT  Anticoagulation and antiplatelet therapy on hold, resume once cleared by pulmonology  Continue patient's home medications for chronic issues as appropriate     Patient fully evaluated on March 23.  Continue to monitor H&H.  Pulmonary consultation obtained.  Continue broad-spectrum antibiotics to rule out infectious process.  Recheck labs in AM.                 Zeus Bone MD                    Melena           Patient fully evaluated  3/25  for    Problem List Items Addressed This Visit       * (Principal) Hemoptysis - Primary    Relevant Orders    Esophagogastroduodenoscopy (EGD) (Completed)    Surgical Pathology Exam    Melena    Relevant Orders    Esophagogastroduodenoscopy (EGD) (Completed)    Surgical Pathology Exam     Other Visit Diagnoses       Anemia, unspecified type        Relevant Orders    Esophagogastroduodenoscopy (EGD) (Completed)    Surgical Pathology Exam    Tracheostomy dependent (Multi)        Relevant Orders    Surgical Pathology Exam          Patient seen resting in bed with head of bed elevated, no s/s or c/o acute difficulties at this time.  Vital signs for last 24 hours Temp:  [36.3 °C (97.3 °F)-36.9 °C (98.4 °F)] 36.9 °C (98.4 °F)  Heart Rate:  [51-72] 71  Resp:  [17-24] 20  BP: ()/(53-68) 148/68  FiO2 (%):  [30 %] 30 %    I/O this shift:  In: -   Out: 700 [Stool:700]  Patient still requiring frequent cardiac and SPO2 monitoring. Continue aggressive pulmonary hygiene and oral hygiene. Off loading as tolerated for skin integrity. Medications and labs reviewed-   Results for orders placed or performed during  the hospital encounter of 03/22/25 (from the past 24 hours)   POCT GLUCOSE   Result Value Ref Range    POCT Glucose 188 (H) 74 - 99 mg/dL   POCT GLUCOSE   Result Value Ref Range    POCT Glucose 266 (H) 74 - 99 mg/dL   POCT GLUCOSE   Result Value Ref Range    POCT Glucose 271 (H) 74 - 99 mg/dL   POCT GLUCOSE   Result Value Ref Range    POCT Glucose 277 (H) 74 - 99 mg/dL   Comprehensive Metabolic Panel   Result Value Ref Range    Glucose 272 (H) 74 - 99 mg/dL    Sodium 146 (H) 136 - 145 mmol/L    Potassium 3.7 3.5 - 5.3 mmol/L    Chloride 108 (H) 98 - 107 mmol/L    Bicarbonate 31 21 - 32 mmol/L    Anion Gap 11 10 - 20 mmol/L    Urea Nitrogen 25 (H) 6 - 23 mg/dL    Creatinine 0.89 0.50 - 1.30 mg/dL    eGFR >90 >60 mL/min/1.73m*2    Calcium 8.5 (L) 8.6 - 10.3 mg/dL    Albumin 2.9 (L) 3.4 - 5.0 g/dL    Alkaline Phosphatase 185 (H) 33 - 136 U/L    Total Protein 6.0 (L) 6.4 - 8.2 g/dL     (H) 9 - 39 U/L    Bilirubin, Total 0.5 0.0 - 1.2 mg/dL     (H) 10 - 52 U/L   CBC and Auto Differential   Result Value Ref Range    WBC 14.0 (H) 4.4 - 11.3 x10*3/uL    nRBC 8.2 (H) 0.0 - 0.0 /100 WBCs    RBC 2.57 (L) 4.50 - 5.90 x10*6/uL    Hemoglobin 6.8 (L) 13.5 - 17.5 g/dL    Hematocrit 24.9 (L) 41.0 - 52.0 %    MCV 97 80 - 100 fL    MCH 26.5 26.0 - 34.0 pg    MCHC 27.3 (L) 32.0 - 36.0 g/dL    RDW 16.2 (H) 11.5 - 14.5 %    Platelets 250 150 - 450 x10*3/uL    Neutrophils % 69.6 40.0 - 80.0 %    Immature Granulocytes %, Automated 7.4 (H) 0.0 - 0.9 %    Lymphocytes % 17.4 13.0 - 44.0 %    Monocytes % 2.7 2.0 - 10.0 %    Eosinophils % 2.3 0.0 - 6.0 %    Basophils % 0.6 0.0 - 2.0 %    Neutrophils Absolute 9.71 (H) 1.20 - 7.70 x10*3/uL    Immature Granulocytes Absolute, Automated 1.04 (H) 0.00 - 0.70 x10*3/uL    Lymphocytes Absolute 2.43 1.20 - 4.80 x10*3/uL    Monocytes Absolute 0.37 0.10 - 1.00 x10*3/uL    Eosinophils Absolute 0.32 0.00 - 0.70 x10*3/uL    Basophils Absolute 0.09 0.00 - 0.10 x10*3/uL   Morphology   Result Value  Ref Range    RBC Morphology See Below     Polychromasia Mild     Target Cells Few     Ovalocytes Few     Teardrop Cells Few    POCT GLUCOSE   Result Value Ref Range    POCT Glucose 274 (H) 74 - 99 mg/dL   POCT GLUCOSE   Result Value Ref Range    POCT Glucose 260 (H) 74 - 99 mg/dL      Patient recently received an antibiotic (last 12 hours)       Date/Time Action Medication Dose    03/25/25 1540 New Bag    piperacillin-tazobactam (Zosyn) 3.375 g in dextrose (iso) IV 50 mL 3.375 g    03/25/25 0949 New Bag    piperacillin-tazobactam (Zosyn) 3.375 g in dextrose (iso) IV 50 mL 3.375 g           Pt fully evaluated 3/25. Hematocrit 26.2 and hemoglobin 7.3. WBC at 14.8. Added cardio and increased fluids and free water flushes to q4. Plan discussed with interdisciplinary team, continue current and repeat labs in the AM.     Discharge planning discussed with patient and care team. Therapy evaluations ordered. [facility]  , anticipate HHC/SNF at discharge. Patient aware and agreeable to current plan, continue plan as above.     I spent a total of 50 minutes on the date of the service which included preparing to see the patient, face-to-face patient care, completing clinical documentation, obtaining and/or reviewing separately obtained history, performing a medically appropriate examination, counseling and educating the patient/family/caregiver, ordering medications, tests, or procedures, communicating with other HCPs (not separately reported), independently interpreting results (not separately reported), communicating results to the patient/family/caregiver, and care coordination (not separately reported).      Patient fully evaluated  03/26  for    Problem List Items Addressed This Visit       * (Principal) Hemoptysis - Primary    Relevant Orders    Esophagogastroduodenoscopy (EGD) (Completed)    Surgical Pathology Exam    Melena    Relevant Orders    Esophagogastroduodenoscopy (EGD) (Completed)    Surgical Pathology Exam      Other Visit Diagnoses       Anemia, unspecified type        Relevant Orders    Esophagogastroduodenoscopy (EGD) (Completed)    Surgical Pathology Exam    Tracheostomy dependent (Multi)        Relevant Orders    Surgical Pathology Exam          Patient seen resting in bed with head of bed elevated, no s/s or c/o acute difficulties at this time.  Vital signs for last 24 hours Temp:  [36.3 °C (97.3 °F)-36.9 °C (98.4 °F)] 36.9 °C (98.4 °F)  Heart Rate:  [51-72] 71  Resp:  [17-24] 20  BP: ()/(53-68) 148/68  FiO2 (%):  [30 %] 30 %    I/O this shift:  In: -   Out: 700 [Stool:700]  Patient still requiring frequent cardiac and SPO2 monitoring. Continue aggressive pulmonary hygiene and oral hygiene. Off loading as tolerated for skin integrity. Medications and labs reviewed-   Results for orders placed or performed during the hospital encounter of 03/22/25 (from the past 24 hours)   POCT GLUCOSE   Result Value Ref Range    POCT Glucose 188 (H) 74 - 99 mg/dL   POCT GLUCOSE   Result Value Ref Range    POCT Glucose 266 (H) 74 - 99 mg/dL   POCT GLUCOSE   Result Value Ref Range    POCT Glucose 271 (H) 74 - 99 mg/dL   POCT GLUCOSE   Result Value Ref Range    POCT Glucose 277 (H) 74 - 99 mg/dL   Comprehensive Metabolic Panel   Result Value Ref Range    Glucose 272 (H) 74 - 99 mg/dL    Sodium 146 (H) 136 - 145 mmol/L    Potassium 3.7 3.5 - 5.3 mmol/L    Chloride 108 (H) 98 - 107 mmol/L    Bicarbonate 31 21 - 32 mmol/L    Anion Gap 11 10 - 20 mmol/L    Urea Nitrogen 25 (H) 6 - 23 mg/dL    Creatinine 0.89 0.50 - 1.30 mg/dL    eGFR >90 >60 mL/min/1.73m*2    Calcium 8.5 (L) 8.6 - 10.3 mg/dL    Albumin 2.9 (L) 3.4 - 5.0 g/dL    Alkaline Phosphatase 185 (H) 33 - 136 U/L    Total Protein 6.0 (L) 6.4 - 8.2 g/dL     (H) 9 - 39 U/L    Bilirubin, Total 0.5 0.0 - 1.2 mg/dL     (H) 10 - 52 U/L   CBC and Auto Differential   Result Value Ref Range    WBC 14.0 (H) 4.4 - 11.3 x10*3/uL    nRBC 8.2 (H) 0.0 - 0.0 /100 WBCs    RBC  2.57 (L) 4.50 - 5.90 x10*6/uL    Hemoglobin 6.8 (L) 13.5 - 17.5 g/dL    Hematocrit 24.9 (L) 41.0 - 52.0 %    MCV 97 80 - 100 fL    MCH 26.5 26.0 - 34.0 pg    MCHC 27.3 (L) 32.0 - 36.0 g/dL    RDW 16.2 (H) 11.5 - 14.5 %    Platelets 250 150 - 450 x10*3/uL    Neutrophils % 69.6 40.0 - 80.0 %    Immature Granulocytes %, Automated 7.4 (H) 0.0 - 0.9 %    Lymphocytes % 17.4 13.0 - 44.0 %    Monocytes % 2.7 2.0 - 10.0 %    Eosinophils % 2.3 0.0 - 6.0 %    Basophils % 0.6 0.0 - 2.0 %    Neutrophils Absolute 9.71 (H) 1.20 - 7.70 x10*3/uL    Immature Granulocytes Absolute, Automated 1.04 (H) 0.00 - 0.70 x10*3/uL    Lymphocytes Absolute 2.43 1.20 - 4.80 x10*3/uL    Monocytes Absolute 0.37 0.10 - 1.00 x10*3/uL    Eosinophils Absolute 0.32 0.00 - 0.70 x10*3/uL    Basophils Absolute 0.09 0.00 - 0.10 x10*3/uL   Morphology   Result Value Ref Range    RBC Morphology See Below     Polychromasia Mild     Target Cells Few     Ovalocytes Few     Teardrop Cells Few    POCT GLUCOSE   Result Value Ref Range    POCT Glucose 274 (H) 74 - 99 mg/dL   POCT GLUCOSE   Result Value Ref Range    POCT Glucose 260 (H) 74 - 99 mg/dL      Patient recently received an antibiotic (last 12 hours)       Date/Time Action Medication Dose    03/26/25 0903 New Bag    piperacillin-tazobactam (Zosyn) 3.375 g in dextrose (iso) IV 50 mL 3.375 g           Plan discussed with interdisciplinary team, patient with hemoccult positive, protonix 40mg IVP BID, GI consulted, appreciate input. Patient seen by nephrology today with plan for IV iron and erythropoietin, potential nephrotoxins and hepatotoxins, monitor renal chemistries and CBC continue current and repeat labs in the AM.     Discharge planning discussed with patient and care team. Therapy evaluations ordered. Anticipate HHC/SNF at discharge. Patient aware and agreeable to current plan, continue plan as above.     I spent a total of 50 minutes on the date of the service which included preparing to see the  patient, face-to-face patient care, completing clinical documentation, obtaining and/or reviewing separately obtained history, performing a medically appropriate examination, counseling and educating the patient/family/caregiver, ordering medications, tests, or procedures, communicating with other HCPs (not separately reported), independently interpreting results (not separately reported), communicating results to the patient/family/caregiver, and care coordination (not separately reported).   ALEXANDER Vivas    Patient fully evaluated 3/27   for    Problem List Items Addressed This Visit       * (Principal) Hemoptysis - Primary    Relevant Orders    Esophagogastroduodenoscopy (EGD) (Completed)    Surgical Pathology Exam    Melena    Relevant Orders    Esophagogastroduodenoscopy (EGD) (Completed)    Surgical Pathology Exam     Other Visit Diagnoses       Anemia, unspecified type        Relevant Orders    Esophagogastroduodenoscopy (EGD) (Completed)    Surgical Pathology Exam    Tracheostomy dependent (Multi)        Relevant Orders    Surgical Pathology Exam          Patient seen resting in bed with head of bed elevated, no s/s or c/o acute difficulties at this time.  Vital signs for last 24 hours Temp:  [36.3 °C (97.3 °F)-36.9 °C (98.4 °F)] 36.9 °C (98.4 °F)  Heart Rate:  [51-72] 71  Resp:  [17-24] 20  BP: ()/(53-68) 148/68  FiO2 (%):  [30 %] 30 %    I/O this shift:  In: -   Out: 700 [Stool:700]  Patient still requiring frequent cardiac and SPO2 monitoring. Continue aggressive pulmonary hygiene and oral hygiene. Off loading as tolerated for skin integrity. Medications and labs reviewed-   Results for orders placed or performed during the hospital encounter of 03/22/25 (from the past 24 hours)   POCT GLUCOSE   Result Value Ref Range    POCT Glucose 188 (H) 74 - 99 mg/dL   POCT GLUCOSE   Result Value Ref Range    POCT Glucose 266 (H) 74 - 99 mg/dL   POCT GLUCOSE   Result Value Ref Range    POCT Glucose 271  (H) 74 - 99 mg/dL   POCT GLUCOSE   Result Value Ref Range    POCT Glucose 277 (H) 74 - 99 mg/dL   Comprehensive Metabolic Panel   Result Value Ref Range    Glucose 272 (H) 74 - 99 mg/dL    Sodium 146 (H) 136 - 145 mmol/L    Potassium 3.7 3.5 - 5.3 mmol/L    Chloride 108 (H) 98 - 107 mmol/L    Bicarbonate 31 21 - 32 mmol/L    Anion Gap 11 10 - 20 mmol/L    Urea Nitrogen 25 (H) 6 - 23 mg/dL    Creatinine 0.89 0.50 - 1.30 mg/dL    eGFR >90 >60 mL/min/1.73m*2    Calcium 8.5 (L) 8.6 - 10.3 mg/dL    Albumin 2.9 (L) 3.4 - 5.0 g/dL    Alkaline Phosphatase 185 (H) 33 - 136 U/L    Total Protein 6.0 (L) 6.4 - 8.2 g/dL     (H) 9 - 39 U/L    Bilirubin, Total 0.5 0.0 - 1.2 mg/dL     (H) 10 - 52 U/L   CBC and Auto Differential   Result Value Ref Range    WBC 14.0 (H) 4.4 - 11.3 x10*3/uL    nRBC 8.2 (H) 0.0 - 0.0 /100 WBCs    RBC 2.57 (L) 4.50 - 5.90 x10*6/uL    Hemoglobin 6.8 (L) 13.5 - 17.5 g/dL    Hematocrit 24.9 (L) 41.0 - 52.0 %    MCV 97 80 - 100 fL    MCH 26.5 26.0 - 34.0 pg    MCHC 27.3 (L) 32.0 - 36.0 g/dL    RDW 16.2 (H) 11.5 - 14.5 %    Platelets 250 150 - 450 x10*3/uL    Neutrophils % 69.6 40.0 - 80.0 %    Immature Granulocytes %, Automated 7.4 (H) 0.0 - 0.9 %    Lymphocytes % 17.4 13.0 - 44.0 %    Monocytes % 2.7 2.0 - 10.0 %    Eosinophils % 2.3 0.0 - 6.0 %    Basophils % 0.6 0.0 - 2.0 %    Neutrophils Absolute 9.71 (H) 1.20 - 7.70 x10*3/uL    Immature Granulocytes Absolute, Automated 1.04 (H) 0.00 - 0.70 x10*3/uL    Lymphocytes Absolute 2.43 1.20 - 4.80 x10*3/uL    Monocytes Absolute 0.37 0.10 - 1.00 x10*3/uL    Eosinophils Absolute 0.32 0.00 - 0.70 x10*3/uL    Basophils Absolute 0.09 0.00 - 0.10 x10*3/uL   Morphology   Result Value Ref Range    RBC Morphology See Below     Polychromasia Mild     Target Cells Few     Ovalocytes Few     Teardrop Cells Few    POCT GLUCOSE   Result Value Ref Range    POCT Glucose 274 (H) 74 - 99 mg/dL   POCT GLUCOSE   Result Value Ref Range    POCT Glucose 260 (H) 74 - 99  mg/dL      Patient recently received an antibiotic (last 12 hours)       Date/Time Action Medication Dose    03/27/25 1510 New Bag    piperacillin-tazobactam (Zosyn) 3.375 g in dextrose (iso) IV 50 mL 3.375 g           Patient fully evaluated 3/27. Patient in bed and family in room upon exam. Vitals today include Temp:  [35.1 °C (95.2 °F)-36.6 °C (97.9 °F)] 36.3 °C (97.3 °F), Heart Rate:  [51-67] 51, Resp:  [16-23] 18, BP: ()/(50-65) 96/53, FiO2 (%):  [30 %-50 %] 30 %. Pertinent labs today include WBC 11.9, Hbg 6.8, plts 212. Na 147, K 3.4, Cl 107, HCO3- 34, BUN 36, Cr 1.19. glucose 318. Patient declined blood transfusion, starting Procrit to try to increase counts. Patient underwent EGD today, findings include small hiatal hernia and signs of gastric and duodenal irritation. Continue to monitor.     Plan discussed with interdisciplinary team, continue current and repeat labs in the AM. Discharge planning discussed with patient and care team. Patient aware and agreeable to current plan, continue plan as above.     I spent a total of 50 minutes on the date of the service which included preparing to see the patient, face-to-face patient care, completing clinical documentation, obtaining and/or reviewing separately obtained history, performing a medically appropriate examination, counseling and educating the patient/family/caregiver, ordering medications, tests, or procedures, communicating with other HCPs (not separately reported), independently interpreting results (not separately reported), communicating results to the patient/family/caregiver, and care coordination (not separately reported).     ALEXANDER Feliciano    Patient fully evaluated  03/28  for    Problem List Items Addressed This Visit       * (Principal) Hemoptysis - Primary    Relevant Orders    Esophagogastroduodenoscopy (EGD) (Completed)    Surgical Pathology Exam    Melena    Relevant Orders    Esophagogastroduodenoscopy (EGD) (Completed)     Surgical Pathology Exam     Other Visit Diagnoses       Anemia, unspecified type        Relevant Orders    Esophagogastroduodenoscopy (EGD) (Completed)    Surgical Pathology Exam    Tracheostomy dependent (Multi)        Relevant Orders    Surgical Pathology Exam          Patient seen resting in bed with head of bed elevated, no s/s or c/o acute difficulties at this time.  Vital signs for last 24 hours Temp:  [36.3 °C (97.3 °F)-36.9 °C (98.4 °F)] 36.9 °C (98.4 °F)  Heart Rate:  [51-72] 71  Resp:  [17-24] 20  BP: ()/(53-68) 148/68  FiO2 (%):  [30 %] 30 %    I/O this shift:  In: -   Out: 700 [Stool:700]  Patient still requiring frequent cardiac and SPO2 monitoring. Continue aggressive pulmonary hygiene and oral hygiene. Off loading as tolerated for skin integrity. Medications and labs reviewed-   Results for orders placed or performed during the hospital encounter of 03/22/25 (from the past 24 hours)   POCT GLUCOSE   Result Value Ref Range    POCT Glucose 188 (H) 74 - 99 mg/dL   POCT GLUCOSE   Result Value Ref Range    POCT Glucose 266 (H) 74 - 99 mg/dL   POCT GLUCOSE   Result Value Ref Range    POCT Glucose 271 (H) 74 - 99 mg/dL   POCT GLUCOSE   Result Value Ref Range    POCT Glucose 277 (H) 74 - 99 mg/dL   Comprehensive Metabolic Panel   Result Value Ref Range    Glucose 272 (H) 74 - 99 mg/dL    Sodium 146 (H) 136 - 145 mmol/L    Potassium 3.7 3.5 - 5.3 mmol/L    Chloride 108 (H) 98 - 107 mmol/L    Bicarbonate 31 21 - 32 mmol/L    Anion Gap 11 10 - 20 mmol/L    Urea Nitrogen 25 (H) 6 - 23 mg/dL    Creatinine 0.89 0.50 - 1.30 mg/dL    eGFR >90 >60 mL/min/1.73m*2    Calcium 8.5 (L) 8.6 - 10.3 mg/dL    Albumin 2.9 (L) 3.4 - 5.0 g/dL    Alkaline Phosphatase 185 (H) 33 - 136 U/L    Total Protein 6.0 (L) 6.4 - 8.2 g/dL     (H) 9 - 39 U/L    Bilirubin, Total 0.5 0.0 - 1.2 mg/dL     (H) 10 - 52 U/L   CBC and Auto Differential   Result Value Ref Range    WBC 14.0 (H) 4.4 - 11.3 x10*3/uL    nRBC 8.2 (H) 0.0  - 0.0 /100 WBCs    RBC 2.57 (L) 4.50 - 5.90 x10*6/uL    Hemoglobin 6.8 (L) 13.5 - 17.5 g/dL    Hematocrit 24.9 (L) 41.0 - 52.0 %    MCV 97 80 - 100 fL    MCH 26.5 26.0 - 34.0 pg    MCHC 27.3 (L) 32.0 - 36.0 g/dL    RDW 16.2 (H) 11.5 - 14.5 %    Platelets 250 150 - 450 x10*3/uL    Neutrophils % 69.6 40.0 - 80.0 %    Immature Granulocytes %, Automated 7.4 (H) 0.0 - 0.9 %    Lymphocytes % 17.4 13.0 - 44.0 %    Monocytes % 2.7 2.0 - 10.0 %    Eosinophils % 2.3 0.0 - 6.0 %    Basophils % 0.6 0.0 - 2.0 %    Neutrophils Absolute 9.71 (H) 1.20 - 7.70 x10*3/uL    Immature Granulocytes Absolute, Automated 1.04 (H) 0.00 - 0.70 x10*3/uL    Lymphocytes Absolute 2.43 1.20 - 4.80 x10*3/uL    Monocytes Absolute 0.37 0.10 - 1.00 x10*3/uL    Eosinophils Absolute 0.32 0.00 - 0.70 x10*3/uL    Basophils Absolute 0.09 0.00 - 0.10 x10*3/uL   Morphology   Result Value Ref Range    RBC Morphology See Below     Polychromasia Mild     Target Cells Few     Ovalocytes Few     Teardrop Cells Few    POCT GLUCOSE   Result Value Ref Range    POCT Glucose 274 (H) 74 - 99 mg/dL   POCT GLUCOSE   Result Value Ref Range    POCT Glucose 260 (H) 74 - 99 mg/dL      Patient recently received an antibiotic (last 12 hours)       Date/Time Action Medication Dose    03/28/25 0915 New Bag    piperacillin-tazobactam (Zosyn) 3.375 g in dextrose (iso) IV 50 mL 3.375 g    03/28/25 0239 New Bag    piperacillin-tazobactam (Zosyn) 3.375 g in dextrose (iso) IV 50 mL 3.375 g           Plan discussed with interdisciplinary team, no blood transfusion as patient is [facility], seen by nephrology today and epogen increased to aid in H/H, renal chemistries down trending, liver function improving, no further hemoptysis noted. WBC elevated today @ 14.0 from 11.9,  chest xray ordered, will  continue IV antibiotics, monitor for bleeding, will continue current and repeat labs in the AM.     Discharge planning discussed with patient and care team. Therapy evaluations  ordered. Anticipate HHC/SNF at discharge. Patient aware and agreeable to current plan, continue plan as above.     I spent a total of 50 minutes on the date of the service which included preparing to see the patient, face-to-face patient care, completing clinical documentation, obtaining and/or reviewing separately obtained history, performing a medically appropriate examination, counseling and educating the patient/family/caregiver, ordering medications, tests, or procedures, communicating with other HCPs (not separately reported), independently interpreting results (not separately reported), communicating results to the patient/family/caregiver, and care coordination (not separately reported).

## 2025-03-28 NOTE — CARE PLAN
The clinical goals for the shift include Patient to rest comfortably      Problem: Pain - Adult  Goal: Verbalizes/displays adequate comfort level or baseline comfort level  Outcome: Progressing     Problem: Discharge Planning  Goal: Discharge to home or other facility with appropriate resources  Outcome: Progressing     Problem: Chronic Conditions and Co-morbidities  Goal: Patient's chronic conditions and co-morbidity symptoms are monitored and maintained or improved  Outcome: Progressing     Problem: Skin  Goal: Decreased wound size/increased tissue granulation at next dressing change  Outcome: Progressing  Goal: Participates in plan/prevention/treatment measures  Outcome: Progressing     Problem: Diabetes  Goal: Achieve decreasing blood glucose levels by end of shift  Outcome: Progressing  Goal: Increase stability of blood glucose readings by end of shift  Outcome: Progressing  Goal: Decrease in ketones present in urine by end of shift  Outcome: Progressing  Goal: Maintain electrolyte levels within acceptable range throughout shift  Outcome: Progressing  Goal: Maintain glucose levels >70mg/dl to <250mg/dl throughout shift  Outcome: Progressing  Goal: No changes in neurological exam by end of shift  Outcome: Progressing  Goal: Learn about and adhere to nutrition recommendations by end of shift  Outcome: Progressing  Goal: Vital signs within normal range for age by end of shift  Outcome: Progressing  Goal: Increase self care and/or family involovement by end of shift  Outcome: Progressing  Goal: Receive DSME education by end of shift  Outcome: Progressing     Problem: Pain  Goal: Takes deep breaths with improved pain control throughout the shift  Outcome: Progressing  Goal: Turns in bed with improved pain control throughout the shift  Outcome: Progressing  Goal: Walks with improved pain control throughout the shift  Outcome: Progressing  Goal: Performs ADL's with improved pain control throughout shift  Outcome:  Progressing  Goal: Participates in PT with improved pain control throughout the shift  Outcome: Progressing  Goal: Free from opioid side effects throughout the shift  Outcome: Progressing  Goal: Free from acute confusion related to pain meds throughout the shift  Outcome: Progressing     Problem: Safety - Adult  Goal: Free from fall injury  Outcome: Met     Problem: Nutrition  Goal: Nutrient intake appropriate for maintaining nutritional needs  Outcome: Met     Problem: Skin  Goal: Prevent/manage excess moisture  Outcome: Met  Goal: Prevent/minimize sheer/friction injuries  Outcome: Met  Goal: Promote/optimize nutrition  Outcome: Met  Goal: Promote skin healing  Outcome: Met

## 2025-03-28 NOTE — PROGRESS NOTES
Patient fully evaluated    for    Acute kidney injury  Hypernatremia  Anemia of blood loss renal disease  Transaminitis  Rhabdomyolysis  Patient denies any shortness of breath or chest pain  CBC stable on erythropoietin  Problem List Items Addressed This Visit       * (Principal) Hemoptysis - Primary    Relevant Orders    Esophagogastroduodenoscopy (EGD) (Completed)    Surgical Pathology Exam    Melena    Relevant Orders    Esophagogastroduodenoscopy (EGD) (Completed)    Surgical Pathology Exam     Other Visit Diagnoses       Anemia, unspecified type        Relevant Orders    Esophagogastroduodenoscopy (EGD) (Completed)    Surgical Pathology Exam    Tracheostomy dependent (Multi)        Relevant Orders    Surgical Pathology Exam          Patient seen resting in  Chair,   Renal Chemistries down trending  Liver functions improving  AST today is 113  ALT is 271    .  Vital signs for last 24 hours Temp:  [36.3 °C (97.3 °F)-36.9 °C (98.4 °F)] 36.9 °C (98.4 °F)  Heart Rate:  [51-72] 71  Resp:  [16-24] 20  BP: ()/(53-68) 148/68  FiO2 (%):  [30 %] 30 %     Patient still requiring frequent cardiac and SPO2 monitoring. Continue aggressive pulmonary hygiene and oral hygiene. Off loading as tolerated for skin integrity. Medications and labs reviewed-   Results for orders placed or performed during the hospital encounter of 03/22/25 (from the past 24 hours)   POCT GLUCOSE   Result Value Ref Range    POCT Glucose 188 (H) 74 - 99 mg/dL   POCT GLUCOSE   Result Value Ref Range    POCT Glucose 266 (H) 74 - 99 mg/dL   POCT GLUCOSE   Result Value Ref Range    POCT Glucose 271 (H) 74 - 99 mg/dL   POCT GLUCOSE   Result Value Ref Range    POCT Glucose 277 (H) 74 - 99 mg/dL   Comprehensive Metabolic Panel   Result Value Ref Range    Glucose 272 (H) 74 - 99 mg/dL    Sodium 146 (H) 136 - 145 mmol/L    Potassium 3.7 3.5 - 5.3 mmol/L    Chloride 108 (H) 98 - 107 mmol/L    Bicarbonate 31 21 - 32 mmol/L    Anion Gap 11 10 - 20 mmol/L     Urea Nitrogen 25 (H) 6 - 23 mg/dL    Creatinine 0.89 0.50 - 1.30 mg/dL    eGFR >90 >60 mL/min/1.73m*2    Calcium 8.5 (L) 8.6 - 10.3 mg/dL    Albumin 2.9 (L) 3.4 - 5.0 g/dL    Alkaline Phosphatase 185 (H) 33 - 136 U/L    Total Protein 6.0 (L) 6.4 - 8.2 g/dL     (H) 9 - 39 U/L    Bilirubin, Total 0.5 0.0 - 1.2 mg/dL     (H) 10 - 52 U/L   CBC and Auto Differential   Result Value Ref Range    WBC 14.0 (H) 4.4 - 11.3 x10*3/uL    nRBC 8.2 (H) 0.0 - 0.0 /100 WBCs    RBC 2.57 (L) 4.50 - 5.90 x10*6/uL    Hemoglobin 6.8 (L) 13.5 - 17.5 g/dL    Hematocrit 24.9 (L) 41.0 - 52.0 %    MCV 97 80 - 100 fL    MCH 26.5 26.0 - 34.0 pg    MCHC 27.3 (L) 32.0 - 36.0 g/dL    RDW 16.2 (H) 11.5 - 14.5 %    Platelets 250 150 - 450 x10*3/uL    Neutrophils % 69.6 40.0 - 80.0 %    Immature Granulocytes %, Automated 7.4 (H) 0.0 - 0.9 %    Lymphocytes % 17.4 13.0 - 44.0 %    Monocytes % 2.7 2.0 - 10.0 %    Eosinophils % 2.3 0.0 - 6.0 %    Basophils % 0.6 0.0 - 2.0 %    Neutrophils Absolute 9.71 (H) 1.20 - 7.70 x10*3/uL    Immature Granulocytes Absolute, Automated 1.04 (H) 0.00 - 0.70 x10*3/uL    Lymphocytes Absolute 2.43 1.20 - 4.80 x10*3/uL    Monocytes Absolute 0.37 0.10 - 1.00 x10*3/uL    Eosinophils Absolute 0.32 0.00 - 0.70 x10*3/uL    Basophils Absolute 0.09 0.00 - 0.10 x10*3/uL   Morphology   Result Value Ref Range    RBC Morphology See Below     Polychromasia Mild     Target Cells Few     Ovalocytes Few     Teardrop Cells Few    POCT GLUCOSE   Result Value Ref Range    POCT Glucose 274 (H) 74 - 99 mg/dL      Patient fractional excretion of sodium is 0.2%  Patient fractional excretion of urea is 32.16%  Positive Hemoccult stools  Patient recently received an antibiotic (last 12 hours)       Date/Time Action Medication Dose    03/26/25 0903 New Bag    piperacillin-tazobactam (Zosyn) 3.375 g in dextrose (iso) IV 50 mL 3.375 g    03/26/25 0148 New Bag    piperacillin-tazobactam (Zosyn) 3.375 g in dextrose (iso) IV 50 mL 3.375 g            Plan discussed with interdisciplinary team, continue current and repeat labs in the AM.         I spent a total of 50 minutes on the date of the service which included preparing to see the patient, face-to-face patient care, completing clinical documentation, obtaining and/or reviewing separately obtained history, performing a medically appropriate examination, counseling and educating the patient/family/caregiver, ordering medications, tests, or procedures, communicating with other HCPs (not separately reported), independently interpreting results (not separately reported), communicating results to the patient/family/caregiver, and care coordination (not separately reported).         Physical Exam  General Appearance; asthenic habitus  Mild skin pallor  Poor skin turgor  HEENT; pupils react to light and accommodation  There is no nystagmus or ptosis; extraocular movements are intact  Neck; no adenopathy; no jugular vein distention; no bruit; no thyromegaly  Tracheostomy in place clean without exudation or induration  Lungs; minimal inspiratory coarse crackles at the bases  Heart; regular rate no gallop no rubs or thrills no lifts  Abdomen; active peristalsis no rebound or guarding there is a PEG tube in place  ; no CVA tenderness  Extremities; decreased muscle tone  Neurological; pathological reflexes are absent      Assessment/Plan     Acute kidney injury  Hypernatremia  Anemia of blood loss renal disease  Transaminitis  Rhabdomyolysis    Plan  IV iron and erythropoietin  Continue to hold potential nephrotoxins and hepatotoxins  Continue to monitor renal chemistries and CBC  Matthew Johnson MD

## 2025-03-28 NOTE — PROGRESS NOTES
03/28/25 1232   Discharge Planning   Home or Post Acute Services Post acute facilities (Rehab/SNF/etc)   Type of Post Acute Facility Services Skilled nursing   Expected Discharge Disposition SNF     Met with patients wife at bedside to follow up on discharge plan. Patients wife provided SNF preferences of [facility]. Requested for [facility] to send referrals.

## 2025-03-28 NOTE — ASSESSMENT & PLAN NOTE
Zeus Bone MD  Physician  Internal Medicine     H&P      Signed     Date of Service: 3/23/2025 10:28 AM     Signed       Expand All Collapse All    History Of Present Illness  Bryan Nix is a 64-year-old male with past medical history of atrial flutter on Eliquis, hyponatremia, IDDM, CAD, hypertension, chronic wounds, diffuse lymphadenopathy, anemia, history of smoking, history elevated PSA, CVA L hemisphere (2020) with right-sided deficits, Left ICA stenosis (85%) s/p angioplasty and left carotid artery stent (08/25/2023) and recent history of acute pontine stroke 02/27/25 with acute hypoxic respiratory failure s/p tracheostomy on 2/25/2025 and PEG.  Patient presents for bleeding from tracheostomy.  Patient is alert and oriented x 3, however limited verbal communication due to tracheostomy.  Wife is at bedside and assists with history.  She reports that patient was coughing blood out of his tracheostomy.  ER provider felt bleeding was likely superficial around site.  Patient has been on room air at skilled nursing facility.  He was placed on Airvo in the ED mainly to humidify oxygen.  Patient never desaturated per ER report.  Currently he is resting comfortably in the bed, breath sounds slightly rhonchorous, but unlabored.  Reports chills, but no fevers.  He has right-sided weakness and sensory deficits.  Denies headache, vision or hearing changes, chest pains, palpitations, shortness of breath, nausea, vomiting, abdominal pain, diarrhea, or complications of PEG tube site.  Wife reports patient skin became irritated in his groin due to adhesive from a condom cath previously, he has a small decubitus wound that she reports is healing well.  He receives tube feeds continuously.  Wife reports a patient was recently evaluated by speech for p.o. to intake, however he still remains n.p.o. except for meds and tube feeds through PEG. CODE STATUS reviewed: full code     ER Course: Hemodynamically stable,  afebrile, SpO2 to 97% trach mask.  WBC 9.3, hemoglobin 8, hematocrit 29.4, platelets 197.  INR 1.4.  Glucose 223, BUN 36, otherwise CMP is normal.  CT of the chest for PE pending.  CXR unremarkable. Trannexamic acid soak gauze wrapped around trach.      Past medical history: As above  Past surgical history: As above  Social history: Former smoker 37.5 pack years-quit smoking age 63, occasional alcohol use, no illicit drug use.  .  Works in maintenance.  6 children.  Family history: Noncontributory     Past Medical History  Medical History        Past Medical History:   Diagnosis Date    Abnormal finding of blood chemistry, unspecified 05/18/2016     Abnormal blood chemistry    Acute upper respiratory infection, unspecified 12/02/2015     Acute upper respiratory infection    Elevated prostate specific antigen (PSA)       Elevated prostate specific antigen (PSA)    Encounter for screening for malignant neoplasm of colon 01/30/2021     Colon cancer screening    Encounter for screening for malignant neoplasm of prostate 11/30/2020     Prostate cancer screening    Essential (primary) hypertension 07/30/2013     Benign essential hypertension    Other conditions influencing health status 07/30/2013     Diabetes Mellitus Poorly Controlled    Other conditions influencing health status 12/02/2015     History of cough    Personal history of other endocrine, nutritional and metabolic disease 03/18/2021     History of hyperlipidemia    Personal history of other endocrine, nutritional and metabolic disease 03/18/2021     History of diabetes mellitus    Personal history of other specified conditions 05/18/2016     History of chest pain    Personal history of other specified conditions 05/18/2016     History of dyspnea    Personal history of other specified conditions 05/18/2016     History of dizziness    Personal history of other specified conditions 05/18/2016     History of fatigue            Surgical History  Surgical  History         Past Surgical History:   Procedure Laterality Date    CT ANGIO CORONARY ART WITH HEARTFLOW IF SCORE >30%   8/23/2023     CT HEART CORONARY ANGIOGRAM 8/23/2023 [facility] CT    MR HEAD ANGIO WO IV CONTRAST   12/14/2020     MR HEAD ANGIO WO IV CONTRAST 12/14/2020 [facility]    MR NECK ANGIO WO IV CONTRAST   12/14/2020     MR NECK ANGIO WO IV CONTRAST 12/14/2020 [facility]    OTHER SURGICAL HISTORY   12/01/2020     Hand fracture repair    OTHER SURGICAL HISTORY   12/01/2020     Nasal bone fracture repair            Social History  He reports that he has quit smoking. His smoking use included cigarettes. He has never used smokeless tobacco. No history on file for alcohol use and drug use.     Family History  Family History   No family history on file.        Allergies  Patient has no known allergies.     Review of Systems     Physical Exam  Constitutional:       General: He is awake. He is not in acute distress.     Appearance: Normal appearance. He is not toxic-appearing.   HENT:      Head: Atraumatic.      Nose: Nose normal.      Mouth/Throat:      Mouth: Mucous membranes are moist.   Eyes:      Conjunctiva/sclera: Conjunctivae normal.   Cardiovascular:      Rate and Rhythm: Normal rate and regular rhythm.      Pulses: Normal pulses.      Heart sounds: No murmur heard.  Pulmonary:      Effort: No respiratory distress.      Comments: Tracheostomy site midline, no drainage around dressing.  Rhonchorous breath sounds, unlabored respirations, RT bedside suctioning patient with thick blood-tinged sputum  Abdominal:      General: Bowel sounds are normal. There is no distension.      Palpations: Abdomen is soft.      Tenderness: There is no abdominal tenderness. There is no guarding.      Comments: Left upper quadrant PEG site tube site, no drainage or skin breakdown around tube site.   Musculoskeletal:         General: No swelling, deformity or signs of injury.      Cervical back: Neck  "supple.      Right lower leg: No edema.      Left lower leg: No edema.   Skin:     General: Skin is warm and dry.      Capillary Refill: Capillary refill takes less than 2 seconds.      Findings: Wound (sacrum) present. No ecchymosis or erythema.   Neurological:      Mental Status: He is alert and oriented to person, place, and time.      Cranial Nerves: No facial asymmetry.      Sensory: Sensory deficit (RUE, RLE) present.      Motor: Weakness present.      Comments: Right sided upper and lower extremity motor function deficits   Psychiatric:         Mood and Affect: Mood normal.         Behavior: Behavior is cooperative.            Last Recorded Vitals  Blood pressure 134/50, pulse 93, temperature 36.9 °C (98.4 °F), temperature source Temporal, resp. rate 22, height 1.88 m (6' 2\"), weight 77.1 kg (170 lb), SpO2 98%.     Relevant Results              Results for orders placed or performed during the hospital encounter of 03/22/25 (from the past 24 hours)   CBC and Auto Differential   Result Value Ref Range     WBC 9.3 4.4 - 11.3 x10*3/uL     nRBC 0.3 (H) 0.0 - 0.0 /100 WBCs     RBC 3.02 (L) 4.50 - 5.90 x10*6/uL     Hemoglobin 8.0 (L) 13.5 - 17.5 g/dL     Hematocrit 29.4 (L) 41.0 - 52.0 %     MCV 97 80 - 100 fL     MCH 26.5 26.0 - 34.0 pg     MCHC 27.2 (L) 32.0 - 36.0 g/dL     RDW 15.6 (H) 11.5 - 14.5 %     Platelets 197 150 - 450 x10*3/uL     Neutrophils % 77.1 40.0 - 80.0 %     Immature Granulocytes %, Automated 1.5 (H) 0.0 - 0.9 %     Lymphocytes % 13.1 13.0 - 44.0 %     Monocytes % 4.1 2.0 - 10.0 %     Eosinophils % 3.2 0.0 - 6.0 %     Basophils % 1.0 0.0 - 2.0 %     Neutrophils Absolute 7.14 1.20 - 7.70 x10*3/uL     Immature Granulocytes Absolute, Automated 0.14 0.00 - 0.70 x10*3/uL     Lymphocytes Absolute 1.21 1.20 - 4.80 x10*3/uL     Monocytes Absolute 0.38 0.10 - 1.00 x10*3/uL     Eosinophils Absolute 0.30 0.00 - 0.70 x10*3/uL     Basophils Absolute 0.09 0.00 - 0.10 x10*3/uL   Basic metabolic panel   Result " Value Ref Range     Glucose 223 (H) 74 - 99 mg/dL     Sodium 142 136 - 145 mmol/L     Potassium 4.3 3.5 - 5.3 mmol/L     Chloride 103 98 - 107 mmol/L     Bicarbonate 27 21 - 32 mmol/L     Anion Gap 16 10 - 20 mmol/L     Urea Nitrogen 36 (H) 6 - 23 mg/dL     Creatinine 0.81 0.50 - 1.30 mg/dL     eGFR >90 >60 mL/min/1.73m*2     Calcium 9.6 8.6 - 10.3 mg/dL   Protime-INR   Result Value Ref Range     Protime 15.1 (H) 9.8 - 12.4 seconds     INR 1.4 (H) 0.9 - 1.1   aPTT   Result Value Ref Range     aPTT 25 (L) 26 - 36 seconds   Type and Screen   Result Value Ref Range     ABO TYPE B       Rh TYPE POS       ANTIBODY SCREEN NEG     VERIFY ABO/Rh Group Test   Result Value Ref Range     ABO TYPE B       Rh TYPE POS     MRSA Surveillance for Vancomycin De-escalation, PCR     Specimen: Anterior Nares; Swab   Result Value Ref Range     MRSA PCR Not Detected Not Detected   POCT GLUCOSE   Result Value Ref Range     POCT Glucose 180 (H) 74 - 99 mg/dL   POCT GLUCOSE   Result Value Ref Range     POCT Glucose 161 (H) 74 - 99 mg/dL   POCT GLUCOSE   Result Value Ref Range     POCT Glucose 156 (H) 74 - 99 mg/dL   Basic metabolic panel   Result Value Ref Range     Glucose 170 (H) 74 - 99 mg/dL     Sodium 147 (H) 136 - 145 mmol/L     Potassium 4.2 3.5 - 5.3 mmol/L     Chloride 107 98 - 107 mmol/L     Bicarbonate 30 21 - 32 mmol/L     Anion Gap 14 10 - 20 mmol/L     Urea Nitrogen 35 (H) 6 - 23 mg/dL     Creatinine 0.85 0.50 - 1.30 mg/dL     eGFR >90 >60 mL/min/1.73m*2     Calcium 9.4 8.6 - 10.3 mg/dL   CBC   Result Value Ref Range     WBC 10.4 4.4 - 11.3 x10*3/uL     nRBC 0.6 (H) 0.0 - 0.0 /100 WBCs     RBC 2.87 (L) 4.50 - 5.90 x10*6/uL     Hemoglobin 7.8 (L) 13.5 - 17.5 g/dL     Hematocrit 26.6 (L) 41.0 - 52.0 %     MCV 93 80 - 100 fL     MCH 27.2 26.0 - 34.0 pg     MCHC 29.3 (L) 32.0 - 36.0 g/dL     RDW 15.7 (H) 11.5 - 14.5 %     Platelets 205 150 - 450 x10*3/uL   SST TOP   Result Value Ref Range     Extra Tube Hold for add-ons.        CT  angio chest for pulmonary embolism     Result Date: 3/22/2025  Interpreted By:  Pamela Zurita, STUDY: CT ANGIO CHEST FOR PULMONARY EMBOLISM;  3/22/2025 4:56 pm   INDICATION: Signs/Symptoms:bloody secretions, eval for PE.     COMPARISON: CT chest without contrast 03/22/2025   ACCESSION NUMBER(S): [id number]   ORDERING CLINICIAN: PAMELA HAHN   TECHNIQUE: Contiguous axial images of the chest were obtained after the intravenous administration of iodinated contrast using angiographic PE protocol. Coronal and sagittal reformatted images were reconstructed from the axial data. MIP images were created on an independent workstation and reviewed.   FINDINGS:   MEDIASTINUM AND LYMPH NODES: Inflammatory fat stranding along the tracheostomy tract without discrete fluid collection. The esophageal wall appears within normal limits. There is redemonstration of diffuse lymphadenopathy in the bilateral axilla, mediastinum, bilateral supraclavicular as described in detail on separate report. Also notable or enlarged bilateral level 4 cervical lymph nodes (right > left).   VESSELS:  Normal caliber thoracic aorta without dissection. Moderate aortic atherosclerosis.  No acute pulmonary embolism.   HEART: Normal size.  Mild coronary artery calcifications. No significant pericardial effusion.   LUNG, AIRWAYS, PLEURA: There is slight increase in debris within the trachea. There has been clearing of secretions in the left upper/lower lobe bronchus. There is diffuse bilateral bronchial thickening slightly increased from prior study. There is peribronchovascular ground-glass opacity in the posterior right upper lobe and superior and basal segments of the right lower lobe. There is slightly worsened atelectasis in the lung bases remaining predominantly subsegmental in nature. There is a 0.8 cm nodule in the left upper lobe that is stable from 08/19/2023.   OSSEOUS STRUCTURES: No acute osseous abnormality.   CHEST WALL SOFT TISSUES: No  discernible acute abnormality.   UPPER ABDOMEN/OTHER: No acute abnormality.        No acute pulmonary embolism.   Peribronchial vascular ground glass opacities in the right upper and lower lobe again concerning for pneumonia given patient's risk factors of tracheostomy and debris in the airways.   Redemonstration of diffuse lymphadenopathy in the lower necks, axilla, and mediastinum. Findings are either diffusely reactive in nature, reflective of lymphoma/leukemia, meter deficiency, or less likely head neck neoplasm is previously postulated. However please correlate with past medical history.   Inflammatory changes along the tracheostomy tract without fluid collection. Correlate for erythema.   Unchanged 0.8 cm nodule in the left upper lobe dating back to 08/19/2023. Recommend 1 year follow up chest CT to ensure at least 2 years of stability.   MACRO: None.   Signed by: Pamela Zurita 3/22/2025 7:17 PM Dictation workstation:   [id number]     CT chest wo IV contrast     Result Date: 3/22/2025  Interpreted By:  Pamela Zurita, STUDY: CT CHEST WO IV CONTRAST;  3/22/2025 3:41 pm   INDICATION: Signs/Symptoms:eval for alveolar hemorrhage.     COMPARISON: CT head/neck 08/19/2023   ACCESSION NUMBER(S): [id number]   ORDERING CLINICIAN: PAMELA HAHN   TECHNIQUE: Contiguous axial images of the chest and upper abdomen were obtained without contrast. Coronal and sagittal reformatted images were reconstructed from the axial data.   FINDINGS:   MEDIASTINUM AND LYMPH NODES: There is fat stranding/edema along the tract of the tracheostomy about discrete fluid collection. The esophageal wall appears within normal limits. There are numerous enlarged bilateral supraclavicular lymph nodes measuring up to 1.4 cm on the left. There are numerous enlarged bilateral axillary lymph nodes measuring up to 1.1 cm on the right and 1.2 cm on the left. There are numerous prominent to mildly enlarged mediastinal lymph nodes as well. No  pneumomediastinum.   VESSELS:  Normal caliber thoracic aorta. Moderate aortic atherosclerosis. The main pulmonary artery is normal in size.   HEART: Normal size.  Mild coronary artery calcifications. No significant pericardial effusion.   LUNG, AIRWAYS, PLEURA: Tracheostomy noted in appropriate position. There is trace mucus in the left mainstem bronchus and at the origin of the left upper lobe and lower lobe bronchi. There are mild peribronchovascular ground-glass opacities in the posterior segment of the right upper lobe, basal segments of the right lower lobe. There is mild dependent bibasilar atelectasis. There is no pleural effusion.   OSSEOUS STRUCTURES: No acute osseous abnormality.   CHEST WALL SOFT TISSUES: No discernible acute abnormality.   UPPER ABDOMEN/OTHER: No acute abnormality.        1. Mild peribronchovascular ground-glass opacities in the posterior right upper lobe and basal segments of the right lower lobe. This appearance and distribution is not typical for alveolar hemorrhage which tends to be centrilobular and bilateral asymmetric in distribution. Findings are most suggestive of pneumonia.   2. Small amount of mucous debris in the left mainstem bronchus and at the origin of the left upper and lower lobe bronchi.   3. Diffuse lymphadenopathy in the bilateral axillary regions, mediastinum and left supraclavicular region particularly notable in the bilateral supraclavicular region. This is progressed in the left supraclavicular region and new elsewhere when compared to 08/19/2023 CTA head/neck. Correlate for any history of known head/neck malignancy or lymphoma.   4. Inflammation/fat stranding adjacent to the tracheostomy tube entry site noted. Correlate for air the met. No evidence of fluid collection.   MACRO: None.   Signed by: Marco A Zurita 3/22/2025 7:10 PM Dictation workstation:   [id number]     XR chest 1 view     Result Date: 3/22/2025  Interpreted By:  Fermin Stroud, STUDY: XR CHEST  1 VIEW   INDICATION: Signs/Symptoms:hemoptysis.   COMPARISON: August 19, 2023   ACCESSION NUMBER(S): [id number]   ORDERING CLINICIAN: PAMELA HAHN   FINDINGS: No consolidation, effusion, edema, or pneumothorax. Heart size within normal limits.        No evidence of acute intrathoracic abnormality.   Signed by: Fermin Stroud 3/22/2025 2:01 PM Dictation workstation:   [id number]     XR chest 1 view     Result Date: 3/14/2025  * * *Final Report* * * DATE OF EXAM: Mar 14 2025  8:52AM   S5X   5290  -  XR CHEST 1V FRONTAL   / ACCESSION #  [id number] PROCEDURE REASON: new bleeding from trach      * * * * Physician Interpretation * * * *  EXAMINATION:  CHEST RADIOGRAPH (PORTABLE SINGLE VIEW AP) Exam Date/Time:  3/14/2025 8:52 AM Clinical History:  new bleeding from trach Comparison:  03/03/2025 RESULT: LINES, TUBES, AND DEVICES:  Tracheostomy. CARDIOMEDIASTINAL SILHOUETTE:  The cardiac and mediastinal silhouettes appear unremarkable. LUNGS AND PLEURA: No consolidation.  No pleural effusion. No pulmonary vascular congestion. No pneumothorax identified. OTHER:  N/A     IMPRESSION: No acute abnormality identified. : [facility]   Transcribe Date/Time: Mar 14 2025  9:07A Dictated by : PAMELA EVANS MD This examination was interpreted and the report reviewed and electronically signed by: PAMELA EVANS MD on Mar 14 2025  9:11AM  EST     US abdomen complete     Result Date: 3/11/2025  ABDOMEN ULTRASOUND-COMPLETE FINDINGS: Abdomen Ultrasound Complete: PROCEDURE: Multiple grayscale mages of the abdomen were obtained. FINDINGS:Multiple real time images demonstrate the liver with increased echogenicity consistent with fatty infiltration. There is no evidence of a discrete mass within the liver. The liver is enlarged, measured at 15.7 cm in size. The gallbladder is seen with no evidence of cholelithiasis. The gallbladder wall is within normal limits. The common bile duct is within normal limits. The visualized pancreas  appears unremarkable. Both kidneys are seen with no evidence of hydronephrosis or a calculus. The spleen has its normal homogeneous echo appearance. There is no free fluid to suggest ascites. The visualized aorta appears unremarkable. The inferior vena cava appears patent. CONCLUSION: Enlarged fatty liver. ELECTRONICALLY SIGNED BY LEONEL LAN M.D. 3/11/2025 5:00:52 PM EDT.     XR chest 1 view     Result Date: 3/3/2025  * * *Final Report* * * DATE OF EXAM: Mar  3 2025  1:40PM   S5X   5290  -  XR CHEST 1V FRONTAL   / ACCESSION #  [id number] PROCEDURE REASON: resp failure      * * * * Physician Interpretation * * * *  EXAMINATION:  CHEST RADIOGRAPH (PORTABLE SINGLE VIEW AP) Exam Date/Time:  3/3/2025 1:40 PM Indication:   resp failure M:  XCP_4 Comparison:  1 day prior RESULT: Lines, tubes, and devices:  Tracheostomy tube remains in situ. Lungs and pleura:  Mild patchy opacities right lower lung zone seen on the prior study are no longer appreciated.  No confluent opacity.   Costophrenic angles are clear.  No pneumothorax. Cardiomediastinal silhouette:  Stable cardiomediastinal silhouette. Other:  -     IMPRESSION: Improved aeration right lower lung zone.  No acute cardiopulmonary finding. : [facility]   Transcribe Date/Time: Mar  3 2025  2:49P Dictated by : ISRAEL BENSON MD This examination was interpreted and the report reviewed and electronically signed by: ISRAEL BENSON MD on Mar  3 2025  2:49PM  EST     XR chest 1 view     Result Date: 3/2/2025  * * *Final Report* * * DATE OF EXAM: Mar  2 2025 11:25AM   MMX   5376  -  XR CHEST 1V FRONTAL PORT  / ACCESSION #  [id number] PROCEDURE REASON: Shortness of breath      * * * * Physician Interpretation * * * *  EXAMINATION:  CHEST RADIOGRAPH (PORTABLE SINGLE VIEW AP) Exam Date/Time:  3/2/2025 11:25 AM CLINICAL HISTORY: Shortness of breath MQ:  XCPR_5 Comparison:  02/20/2025. RESULT: This is a limited examination due to multiple wires and tubing  obscuring the lower lungs. Lines, tubes, and devices:  A tracheostomy tube remains in place. Lungs and pleura:  There are increased bronchovascular markings in the right lower lung which may be due to bronchitis or early changes of aspiration pneumonia.  Clinical correlation and follow-up exams are recommended. Cardiomediastinal silhouette:  Stable cardiomediastinal silhouette. Other:  The visualized bony thorax appears unremarkable.     IMPRESSION: Nonspecific parenchymal changes in the right lower lung as described above. A follow-up exam is recommended. : [facility]   Transcribe Date/Time: Mar  2 2025 11:53A Dictated by : BJ INIGUEZ MD This examination was interpreted and the report reviewed and electronically signed by: BJ INIGUEZ MD on Mar  2 2025 11:54AM  EST     XR chest 1 view     Result Date: 2/28/2025  * * *Final Report* * * DATE OF EXAM: Feb 28 2025 10:36AM   MMX   5290  -  XR CHEST 1V FRONTAL   / ACCESSION #  [id number] PROCEDURE REASON: Shortness of breath      * * * * Physician Interpretation * * * *  EXAMINATION:  CHEST RADIOGRAPH (SINGLE VIEW AP OR PA) CLINICAL HISTORY: Shortness of breath MQ:  XC1_5 Comparison:  02/22/2025 RESULT: Lines, tubes, and devices:  Tracheostomy tube is noted. Lungs and pleura:  No consolidation. No lung mass. No pleural effusion. Cardiomediastinal silhouette:  Normal cardiomediastinal silhouette. Other:  No acute osseous pathology.     IMPRESSION: No acute radiographic abnormality. : [facility]   Transcribe Date/Time: Feb 28 2025 12:07P Dictated by : DIANNE RAMOS MD This examination was interpreted and the report reviewed and electronically signed by: DIANNE RAMOS MD on Feb 28 2025 12:08PM  EST     XR abdomen 2 views supine and erect or decub     Result Date: 2/24/2025  * * *Final Report* * * DATE OF EXAM: Feb 24 2025  6:03PM   MMX   5289  -  XR ABDOMEN 1V SUPINE  / ACCESSION #  [id number] PROCEDURE REASON: Evaluate tube, line or lead position       * * * * Physician Interpretation * * * *  EXAMINATION:  XR ABDOMEN 1V SUPINE CLINICAL HISTORY:   Evaluate tube, line or lead position Technique:   XR ABDOMEN 1V SUPINE -- NOT APPLICABLE with 1 views on 1 images Comparison: 2/21/2025 RESULT: Feeding tube with distal tip at the pylorus antrum. Moderate colonic stool burden. No dilated bowel. Lung structures are intact.     IMPRESSION: Feeding tube with distal tip at the pylorus antrum. Moderate colonic stool burden. No dilated bowel. : [facility]   Transcribe Date/Time: Feb 24 2025  6:23P Dictated by : PABLO RAMOS MD This examination was interpreted and the report reviewed and electronically signed by: PABLO RAMOS MD on Feb 24 2025  6:24PM  EST     CT head wo IV contrast     Result Date: 2/23/2025  * * *Final Report* * * DATE OF EXAM: Feb 23 2025 12:04PM   [facility]   0504  -  CT BRAIN WO IVCON  / ACCESSION #  [id number] PROCEDURE REASON: Stroke, follow up      * * * * Physician Interpretation * * * *  EXAMINATION: CT BRAIN WO IVCON CLINICAL HISTORY: Stroke, follow up TECHNIQUE:  Serial axial images without IV contrast were obtained from the vertex to the foramen magnum. MQ:  CTBWO_3 CT Radiation dose: Integrated Dose-Length Product (DLP) for this visit =   778  mGy*cm CT Dose Reduction Employed: Iterative recon COMPARISON: MRI brain 02/19/2025 RESULT: Post-operative change: None. Acute change: Evolving acute infarct along the central eli and superior left medulla (2:7-9).  No evidence of hemorrhagic transformation. Hemorrhage: No evidence of acute intracranial hemorrhage. Mass Lesion / Mass Effect: There is no evidence of an intracranial mass or extraaxial fluid collection. No significant mass effect. Chronic change: Stable remote left MCA territory encephalomalacia. Parenchyma: There is no significant volume loss. The brain parenchyma is otherwise within normal limits for age. Ventricles: The ventricles are within normal limits of size and configuration  for age. Paranasal sinuses and skull base: Partial opacification of the left maxillary and sphenoid sinus and multiple bilateral ethmoid air cells.   The remaining visualized paranasal sinuses are grossly clear. The skull base and imaged soft tissues are unremarkable. Localizer images: No additional findings.     IMPRESSION: Evolving acute brainstem infarct.  No evidence of hemorrhagic transformation. Stable remote left MCA territory infarct. : [facility]   Transcribe Date/Time: Feb 23 2025 12:48P Dictated by : FRANKY GUZMAN MD   This examination was interpreted and the report reviewed and electronically signed by: FRANKY GUZMAN MD on Feb 23 2025 12:52PM  EST     XR chest 1 view     Result Date: 2/22/2025  * * *Final Report* * * DATE OF EXAM: Feb 22 2025  6:56AM   MMX   [id number]  -  XR CHEST 1V FRONTAL   / ACCESSION #  [id number] PROCEDURE REASON: Evaluate tube, line,  or lead position      * * * * Physician Interpretation * * * *  EXAMINATION:  CHEST RADIOGRAPH (SINGLE VIEW AP OR PA) CLINICAL HISTORY: Evaluate tube, line,  or lead position MQ:  XC1_5 Comparison:  02/21/2025. RESULT: Lines, tubes, and devices:  An ET tube and feeding tube remain in place.   A poorly defined right venous catheter cannot be excluded. Lungs and pleura:  There are persistent bilateral lower lungs infiltrates seen on the right more than left suggestive of pneumonia such as aspiration pneumonia.  There is no definite pleural effusion. Cardiomediastinal silhouette:  Stable cardiomediastinal silhouette. Other:  The visualized bony thorax appears unremarkable.     IMPRESSION: Persistent bilateral lower lungs infiltrates as described above. A follow-up exam is recommended. : [facility]   Transcribe Date/Time: Feb 22 2025  8:20A Dictated by : BJ INIGUEZ MD This examination was interpreted and the report reviewed and electronically signed by: BJ INIGUEZ MD on Feb 22 2025  8:21AM  EST     XR chest 1 view     Result Date:  2/21/2025  * * *Final Report* * * DATE OF EXAM: Feb 21 2025  5:59PM   MMX   [id number]  -  XR CHEST 1V FRONTAL PORT  / ACCESSION #  [id number] PROCEDURE REASON: Evaluate tube, line,  or lead position      * * * * Physician Interpretation * * * *  EXAMINATION:  CHEST RADIOGRAPH (PORTABLE SINGLE VIEW AP) Exam Date/Time:  2/21/2025 5:59 PM CLINICAL HISTORY: Evaluate tube, line,  or lead position MQ:  XCPR_5 Comparison:  02/21/2025 RESULT: Lines, tubes, and devices: Endotracheal tube in place terminating in enteric tube in place terminating below the field of view. Lungs and pleura: No pneumothorax.  No pleural effusion.  Bilateral mild interstitial opacities.  The right costophrenic angle is not included in the field-of-view. Cardiomediastinal silhouette:  Stable cardiomediastinal silhouette. Other:  .     IMPRESSION: Mild interstitial opacities may be related to pulmonary vascular congestion. : [facility]   Transcribe Date/Time: Feb 21 2025  6:55P Dictated by : IRWIN VALDIVIA MD This examination was interpreted and the report reviewed and electronically signed by: IRWIN VALDIVIA MD on Feb 21 2025  6:57PM  EST     XR abdomen 2 views supine and erect or decub     Result Date: 2/21/2025  * * *Final Report* * * DATE OF EXAM: Feb 21 2025 10:32AM   MMX   [id number]  -  XR ABDOMEN 1V SUPINE  / ACCESSION #  [id number] PROCEDURE REASON: Evaluate tube, line or lead position      * * * * Physician Interpretation * * * *  Clinical Information: Feeding tube Single view of the abdomen was obtained. Feeding tube tip within the distal stomach. There is a nonspecific, nonobstructive bowel gas pattern. No abnormal abdominal masses are identified.  No pathologic calcifications.   No acute bony abnormalities are seen.     IMPRESSION: Nonspecific, nonobstructive bowel gas pattern. Feeding tube tip within the distal stomach. : [facility]   Transcribe Date/Time: Feb 21 2025 10:43A Dictated by : PAMELA EVANS MD  This examination was interpreted and the report reviewed and electronically signed by: PAMELA EVANS MD on Feb 21 2025 10:44AM  EST     XR chest 1 view     Result Date: 2/21/2025  * * *Final Report* * * DATE OF EXAM: Feb 21 2025 10:32AM   MMX   [id number]  -  XR CHEST 1V FRONTAL PORT  / ACCESSION #  [id number] PROCEDURE REASON: Shortness of breath      * * * * Physician Interpretation * * * *  EXAMINATION:  CHEST RADIOGRAPH (PORTABLE SINGLE VIEW AP) Exam Date/Time:  2/21/2025 10:32 AM Clinical History:  Shortness of breath Comparison:  02/17/2025 RESULT: LINES, TUBES, AND DEVICES:  Feeding tube tip beyond the inferior extent of the image. CARDIOMEDIASTINAL SILHOUETTE:  The cardiac and mediastinal silhouettes appear unremarkable. LUNGS AND PLEURA: No consolidation.  No pleural effusion. No pulmonary vascular congestion. No pneumothorax identified. OTHER:  N/A     IMPRESSION: No acute abnormality identified. : [facility]   Transcribe Date/Time: Feb 21 2025 10:41A Dictated by : PAMELA EVANS MD This examination was interpreted and the report reviewed and electronically signed by: PAMELA EVANS MD on Feb 21 2025 10:43AM  EST            Assessment/Plan     Assessment & Plan  Hemoptysis     Tracheostomy in place (Multi)     CVA, old, hemiparesis (Multi)     Status post insertion of percutaneous endoscopic gastrostomy (PEG) tube (Multi)     Pneumonia due to infectious organism     Right sided weakness     Atrial flutter (Multi)     Insulin dependent type 2 diabetes mellitus (Multi)        Telemetry monitoring  Hemoglobin stable 8's recently on review of outside labs, monitor for ongoing bleeding.  Daily H&H  Monitor for overt bleeding  Consult pulmonology, appreciate input  Pneumonia suspected for CT findings  Tracheostomy protocol  Expected cover with Zosyn and vancomycin  MRSA swab  Urine for Legionella antigen and strep pneumonia antigen  Supplemental oxygen as needed  Nebulizers as needed  RT evaluate and  treat  Check procalcitonin  Typical 20% zinc oxide to wounds and groin  Continuous tube feeds, continue at 80 mL an hour x 22 hours  Accu-Chek every 6 hours  Hypoglycemia protocol  PT/OT  Anticoagulation and antiplatelet therapy on hold, resume once cleared by pulmonology  Continue patient's home medications for chronic issues as appropriate     Patient fully evaluated on March 23.  Continue to monitor H&H.  Pulmonary consultation obtained.  Continue broad-spectrum antibiotics to rule out infectious process.  Recheck labs in AM.                 Zeus Bone MD

## 2025-03-28 NOTE — CONSULTS
"Nutrition Initial Assessment:   Nutrition Assessment         Patient is a 64 y.o. male presenting with bleeding from trach      Nutrition History:  Food and Nutrient History: Pt was NPO for an EGD today.  Pt has a hiatal hernia; duodenal ulcer and had a biopsy for H pylori.  His sodium went from 153 to 147.       Anthropometrics:  Height: 188 cm (6' 2\")   Weight: 77.1 kg (170 lb)   BMI (Calculated): 21.82  IBW/kg (Dietitian Calculated): 86 kg  Percent of IBW: 90 %       Weight History:   Wt Readings from Last 10 Encounters:   03/22/25 77.1 kg (170 lb)   09/29/23 75 kg (165 lb 5 oz)   01/20/22 78.2 kg (172 lb 5 oz)   12/16/21 76.7 kg (169 lb)   03/23/21 75.3 kg (166 lb)   03/18/21 76 kg (167 lb 8 oz)   03/01/21 74.4 kg (164 lb 2 oz)   01/29/21 75 kg (165 lb 5.5 oz)   12/11/20 75.1 kg (165 lb 8 oz)   11/30/20 75.9 kg (167 lb 4 oz)         Weight Change %:       Nutrition Focused Physical Exam Findings:    Subcutaneous Fat Loss:   Defer Subcutaneous Fat Loss Assessment: Defer all  Defer All Reason: Pt is difficulty to move  Muscle Wasting:  Defer Muscle Wasting Assessment: Defer all  Defer All Reason: pt is difficult to move  Edema:  Edema Location: some edema  Physical Findings:  Skin: Positive (coccyx)    Nutrition Significant Labs:  BMP Trend:   Results from last 7 days   Lab Units 03/27/25  0550 03/26/25  0546 03/25/25  0558 03/24/25  0532   GLUCOSE mg/dL 318* 290* 280* 227*   CALCIUM mg/dL 8.1* 8.5* 9.4 9.5   SODIUM mmol/L 147* 153* 153* 151*   POTASSIUM mmol/L 3.4* 3.7 3.9 4.0   CO2 mmol/L 34* 33* 34* 32   CHLORIDE mmol/L 107 112* 111* 110*   BUN mg/dL 36* 45* 51* 44*   CREATININE mg/dL 1.19 1.35* 1.50* 1.18        Nutrition Specific Medications:  Eliquis; lipitor; plavix; colace; lantus; lopressor    I/O:   Last BM Date: 03/25/25; Stool Appearance: Formed (03/27/25 0830)    Dietary Orders (From admission, onward)       Start     Ordered    03/25/25 1333  Enteral feeding with NPO PEG (percutaneous endoscopic " gastric); 68; 22 hours; 250; Water; Every 4 hours  Diet effective now        Question Answer Comment   Tube feeding formula: Glucerna 1.5    Feeding route: PEG (percutaneous endoscopic gastric)    Tube feeding continuous rate (mL/hr): 68    Tube feeding cyclic (start / stop time): 22 hours    Tube feeding flush (mL): 250    Flush type: Water    Flush frequency: Every 4 hours        03/25/25 1332    03/22/25 1835  May Not Participate in Room Service  ( ROOM SERVICE MAY NOT PARTICIPATE)  Once        Question:  .  Answer:  Yes    03/22/25 1834                     Estimated Needs:      Method for Estimating Needs: 7883-5969  MSJ     Method for Estimating 24 Hour Protein Needs:   1.1-1.5 gm kg of IBW depending on renal function     Method for Estimating 24 Hour Fluid Needs: 2358-2549    20-30 ml kg of IBW as medically indicated  Patient on Order Fluid Restriction: No        Nutrition Diagnosis        Nutrition Diagnosis  Patient has Nutrition Diagnosis: Yes  Diagnosis Status (1): Active  Nutrition Diagnosis 1: Swallowing difficulty  Related to (1): mech and motor causes  As Evidenced by (1): pt has a trach and peg and is on a TF only, at this time  Additional Nutrition Diagnosis: Diagnosis 2  Diagnosis Status (2): Active  Nutrition Diagnosis 2: Increased nutrient needs  Related to (2): physiological causes  As Evidenced by (2): wound healing needs       Nutrition Interventions/Recommendations        Nutrition Recommendations:  Individualized Nutrition Prescription Provided for : Continue TF of GLucerna 1.5 to 68 ml hr 22 hrs for equivelent to facility TF.  This will provide 2244 noah and 122 gm pro with 1130 ml fluid toward fluid needs.  changing water flushes to 200 ml X 6 for a total of 2330 ml fluid per day or 27 ml kg of IBW.  Monitor labs and toleration of feeding,  Monitor for any needed changes in water flushes.    Nutrition Interventions/Goals:   Interventions: Enteral intake  Enteral Intake: Management of  composition of enteral nutrition, Management of delivery rate of enteral nutrition, Management of flushing of feeding tube  Goal: toleration of TF at goal rate and a lower sodium  Coordination of Care with Providers: Nursing, Provider      Education Documentation  No documentation found.    N/A        Nutrition Monitoring and Evaluation   Food/Nutrient Related History Monitoring  Monitoring and Evaluation Plan: Enteral and parenteral nutrition intake determination  Enteral and Parenteral Nutrition Intake Determination: Enteral nutrition formula/solution, Enteral nutrition intake - Tolerate TF at goal rate, Enteral nutrition intake - To meet > 75% estimated energy needs, Enteral nutrition intake - Other  Criteria: toleratoin of TF at goal rate and normalinzing of sodium    Anthropometric Measurements  Monitoring and Evaluation Plan: Body weight    Biochemical Data, Medical Tests and Procedures  Monitoring and Evaluation Plan: Electrolyte/renal panel, Glucose/endocrine profile  Criteria: improved sodium and stable BMP  Criteria: glucose within desired range              Time Spent (min): 30 minutes

## 2025-03-29 LAB
ALBUMIN SERPL BCP-MCNC: 3 G/DL (ref 3.4–5)
ALP SERPL-CCNC: 281 U/L (ref 33–136)
ALT SERPL W P-5'-P-CCNC: 200 U/L (ref 10–52)
ANION GAP SERPL CALC-SCNC: 9 MMOL/L (ref 10–20)
AST SERPL W P-5'-P-CCNC: 48 U/L (ref 9–39)
BASO STIPL BLD QL SMEAR: PRESENT
BASOPHILS # BLD MANUAL: 0 X10*3/UL (ref 0–0.1)
BASOPHILS NFR BLD MANUAL: 0 %
BILIRUB SERPL-MCNC: 0.5 MG/DL (ref 0–1.2)
BUN SERPL-MCNC: 27 MG/DL (ref 6–23)
CALCIUM SERPL-MCNC: 8.7 MG/DL (ref 8.6–10.3)
CHLORIDE SERPL-SCNC: 109 MMOL/L (ref 98–107)
CO2 SERPL-SCNC: 32 MMOL/L (ref 21–32)
CREAT SERPL-MCNC: 0.96 MG/DL (ref 0.5–1.3)
EGFRCR SERPLBLD CKD-EPI 2021: 88 ML/MIN/1.73M*2
EOSINOPHIL # BLD MANUAL: 0.25 X10*3/UL (ref 0–0.7)
EOSINOPHIL NFR BLD MANUAL: 2 %
ERYTHROCYTE [DISTWIDTH] IN BLOOD BY AUTOMATED COUNT: 17.4 % (ref 11.5–14.5)
GLUCOSE BLD MANUAL STRIP-MCNC: 335 MG/DL (ref 74–99)
GLUCOSE BLD MANUAL STRIP-MCNC: 340 MG/DL (ref 74–99)
GLUCOSE BLD MANUAL STRIP-MCNC: 342 MG/DL (ref 74–99)
GLUCOSE BLD MANUAL STRIP-MCNC: 363 MG/DL (ref 74–99)
GLUCOSE SERPL-MCNC: 363 MG/DL (ref 74–99)
HCT VFR BLD AUTO: 26.4 % (ref 41–52)
HGB BLD-MCNC: 7.4 G/DL (ref 13.5–17.5)
IMM GRANULOCYTES # BLD AUTO: 1.07 X10*3/UL (ref 0–0.7)
IMM GRANULOCYTES NFR BLD AUTO: 8.5 % (ref 0–0.9)
LYMPHOCYTES # BLD MANUAL: 1.26 X10*3/UL (ref 1.2–4.8)
LYMPHOCYTES NFR BLD MANUAL: 10 %
MCH RBC QN AUTO: 27.1 PG (ref 26–34)
MCHC RBC AUTO-ENTMCNC: 28 G/DL (ref 32–36)
MCV RBC AUTO: 97 FL (ref 80–100)
METAMYELOCYTES # BLD MANUAL: 0.13 X10*3/UL
METAMYELOCYTES NFR BLD MANUAL: 1 %
MONOCYTES # BLD MANUAL: 0 X10*3/UL (ref 0.1–1)
MONOCYTES NFR BLD MANUAL: 0 %
MYELOCYTES # BLD MANUAL: 0.25 X10*3/UL
MYELOCYTES NFR BLD MANUAL: 2 %
NEUTROPHILS # BLD MANUAL: 10.71 X10*3/UL (ref 1.2–7.7)
NEUTS BAND # BLD MANUAL: 0.5 X10*3/UL (ref 0–0.7)
NEUTS BAND NFR BLD MANUAL: 4 %
NEUTS SEG # BLD MANUAL: 10.21 X10*3/UL (ref 1.2–7)
NEUTS SEG NFR BLD MANUAL: 81 %
NRBC BLD-RTO: 9.3 /100 WBCS (ref 0–0)
PLATELET # BLD AUTO: 285 X10*3/UL (ref 150–450)
POLYCHROMASIA BLD QL SMEAR: ABNORMAL
POTASSIUM SERPL-SCNC: 4.1 MMOL/L (ref 3.5–5.3)
PROT SERPL-MCNC: 6.2 G/DL (ref 6.4–8.2)
RBC # BLD AUTO: 2.73 X10*6/UL (ref 4.5–5.9)
RBC MORPH BLD: ABNORMAL
SODIUM SERPL-SCNC: 146 MMOL/L (ref 136–145)
TOTAL CELLS COUNTED BLD: 100
WBC # BLD AUTO: 12.6 X10*3/UL (ref 4.4–11.3)

## 2025-03-29 PROCEDURE — 85007 BL SMEAR W/DIFF WBC COUNT: CPT | Performed by: INTERNAL MEDICINE

## 2025-03-29 PROCEDURE — 2500000005 HC RX 250 GENERAL PHARMACY W/O HCPCS: Performed by: INTERNAL MEDICINE

## 2025-03-29 PROCEDURE — 2500000002 HC RX 250 W HCPCS SELF ADMINISTERED DRUGS (ALT 637 FOR MEDICARE OP, ALT 636 FOR OP/ED): Performed by: NURSE PRACTITIONER

## 2025-03-29 PROCEDURE — 36415 COLL VENOUS BLD VENIPUNCTURE: CPT | Performed by: INTERNAL MEDICINE

## 2025-03-29 PROCEDURE — 82947 ASSAY GLUCOSE BLOOD QUANT: CPT

## 2025-03-29 PROCEDURE — 80053 COMPREHEN METABOLIC PANEL: CPT | Performed by: INTERNAL MEDICINE

## 2025-03-29 PROCEDURE — 2500000001 HC RX 250 WO HCPCS SELF ADMINISTERED DRUGS (ALT 637 FOR MEDICARE OP): Performed by: NURSE PRACTITIONER

## 2025-03-29 PROCEDURE — 94640 AIRWAY INHALATION TREATMENT: CPT

## 2025-03-29 PROCEDURE — 2500000002 HC RX 250 W HCPCS SELF ADMINISTERED DRUGS (ALT 637 FOR MEDICARE OP, ALT 636 FOR OP/ED): Performed by: INTERNAL MEDICINE

## 2025-03-29 PROCEDURE — 2500000004 HC RX 250 GENERAL PHARMACY W/ HCPCS (ALT 636 FOR OP/ED)

## 2025-03-29 PROCEDURE — 2060000001 HC INTERMEDIATE ICU ROOM DAILY

## 2025-03-29 PROCEDURE — 2500000004 HC RX 250 GENERAL PHARMACY W/ HCPCS (ALT 636 FOR OP/ED): Performed by: NURSE PRACTITIONER

## 2025-03-29 PROCEDURE — 85027 COMPLETE CBC AUTOMATED: CPT | Performed by: INTERNAL MEDICINE

## 2025-03-29 PROCEDURE — 2500000004 HC RX 250 GENERAL PHARMACY W/ HCPCS (ALT 636 FOR OP/ED): Performed by: INTERNAL MEDICINE

## 2025-03-29 PROCEDURE — 2500000001 HC RX 250 WO HCPCS SELF ADMINISTERED DRUGS (ALT 637 FOR MEDICARE OP): Performed by: INTERNAL MEDICINE

## 2025-03-29 RX ORDER — INSULIN GLARGINE 100 [IU]/ML
40 INJECTION, SOLUTION SUBCUTANEOUS NIGHTLY
Status: DISCONTINUED | OUTPATIENT
Start: 2025-03-29 | End: 2025-04-01

## 2025-03-29 RX ORDER — DEXTROSE MONOHYDRATE 50 MG/ML
50 INJECTION, SOLUTION INTRAVENOUS CONTINUOUS
Status: ACTIVE | OUTPATIENT
Start: 2025-03-29 | End: 2025-03-30

## 2025-03-29 RX ADMIN — PIPERACILLIN SODIUM AND TAZOBACTAM SODIUM 3.38 G: 3; .375 INJECTION, SOLUTION INTRAVENOUS at 21:43

## 2025-03-29 RX ADMIN — PANTOPRAZOLE SODIUM 40 MG: 40 INJECTION, POWDER, FOR SOLUTION INTRAVENOUS at 21:40

## 2025-03-29 RX ADMIN — Medication 30 L/MIN: at 21:44

## 2025-03-29 RX ADMIN — PIPERACILLIN SODIUM AND TAZOBACTAM SODIUM 3.38 G: 3; .375 INJECTION, SOLUTION INTRAVENOUS at 09:11

## 2025-03-29 RX ADMIN — METOPROLOL TARTRATE 50 MG: 50 TABLET, FILM COATED ORAL at 21:40

## 2025-03-29 RX ADMIN — INSULIN GLARGINE 40 UNITS: 100 INJECTION, SOLUTION SUBCUTANEOUS at 21:40

## 2025-03-29 RX ADMIN — SUCRALFATE 1 G: 1 SUSPENSION ORAL at 14:29

## 2025-03-29 RX ADMIN — INSULIN LISPRO 8 UNITS: 100 INJECTION, SOLUTION INTRAVENOUS; SUBCUTANEOUS at 01:55

## 2025-03-29 RX ADMIN — AMIODARONE HYDROCHLORIDE 200 MG: 200 TABLET ORAL at 21:40

## 2025-03-29 RX ADMIN — PIPERACILLIN SODIUM AND TAZOBACTAM SODIUM 3.38 G: 3; .375 INJECTION, SOLUTION INTRAVENOUS at 14:29

## 2025-03-29 RX ADMIN — SUCRALFATE 1 G: 1 SUSPENSION ORAL at 01:48

## 2025-03-29 RX ADMIN — IPRATROPIUM BROMIDE 0.5 MG: 0.5 SOLUTION RESPIRATORY (INHALATION) at 19:03

## 2025-03-29 RX ADMIN — IPRATROPIUM BROMIDE 0.5 MG: 0.5 SOLUTION RESPIRATORY (INHALATION) at 12:33

## 2025-03-29 RX ADMIN — DEXTROSE MONOHYDRATE 50 ML/HR: 50 INJECTION, SOLUTION INTRAVENOUS at 18:02

## 2025-03-29 RX ADMIN — ACETYLCYSTEINE 200 MG: 200 SOLUTION ORAL; RESPIRATORY (INHALATION) at 19:04

## 2025-03-29 RX ADMIN — ACETYLCYSTEINE 200 MG: 200 SOLUTION ORAL; RESPIRATORY (INHALATION) at 12:34

## 2025-03-29 RX ADMIN — PIPERACILLIN SODIUM AND TAZOBACTAM SODIUM 3.38 G: 3; .375 INJECTION, SOLUTION INTRAVENOUS at 01:48

## 2025-03-29 RX ADMIN — INSULIN LISPRO 8 UNITS: 100 INJECTION, SOLUTION INTRAVENOUS; SUBCUTANEOUS at 15:09

## 2025-03-29 RX ADMIN — ACETYLCYSTEINE 200 MG: 200 SOLUTION ORAL; RESPIRATORY (INHALATION) at 07:10

## 2025-03-29 RX ADMIN — SUCRALFATE 1 G: 1 SUSPENSION ORAL at 09:12

## 2025-03-29 RX ADMIN — METOPROLOL TARTRATE 50 MG: 50 TABLET, FILM COATED ORAL at 09:10

## 2025-03-29 RX ADMIN — Medication 30 L/MIN: at 07:10

## 2025-03-29 RX ADMIN — ACETAMINOPHEN 325 MG: 325 TABLET, FILM COATED ORAL at 01:48

## 2025-03-29 RX ADMIN — AMIODARONE HYDROCHLORIDE 200 MG: 200 TABLET ORAL at 09:10

## 2025-03-29 RX ADMIN — INSULIN LISPRO 10 UNITS: 100 INJECTION, SOLUTION INTRAVENOUS; SUBCUTANEOUS at 09:05

## 2025-03-29 RX ADMIN — IPRATROPIUM BROMIDE 0.5 MG: 0.5 SOLUTION RESPIRATORY (INHALATION) at 07:10

## 2025-03-29 RX ADMIN — TRAZODONE HYDROCHLORIDE 50 MG: 50 TABLET ORAL at 21:40

## 2025-03-29 RX ADMIN — SUCRALFATE 1 G: 1 SUSPENSION ORAL at 21:43

## 2025-03-29 RX ADMIN — PANTOPRAZOLE SODIUM 40 MG: 40 INJECTION, POWDER, FOR SOLUTION INTRAVENOUS at 09:10

## 2025-03-29 RX ADMIN — INSULIN LISPRO 8 UNITS: 100 INJECTION, SOLUTION INTRAVENOUS; SUBCUTANEOUS at 21:39

## 2025-03-29 ASSESSMENT — PAIN SCALES - GENERAL
PAINLEVEL_OUTOF10: 3
PAINLEVEL_OUTOF10: 0 - NO PAIN

## 2025-03-29 ASSESSMENT — COGNITIVE AND FUNCTIONAL STATUS - GENERAL
WALKING IN HOSPITAL ROOM: TOTAL
MOBILITY SCORE: 6
PERSONAL GROOMING: TOTAL
DRESSING REGULAR UPPER BODY CLOTHING: TOTAL
TURNING FROM BACK TO SIDE WHILE IN FLAT BAD: TOTAL
CLIMB 3 TO 5 STEPS WITH RAILING: TOTAL
EATING MEALS: TOTAL
MOVING FROM LYING ON BACK TO SITTING ON SIDE OF FLAT BED WITH BEDRAILS: TOTAL
MOVING TO AND FROM BED TO CHAIR: TOTAL
HELP NEEDED FOR BATHING: TOTAL
DRESSING REGULAR LOWER BODY CLOTHING: TOTAL
TOILETING: TOTAL
DAILY ACTIVITIY SCORE: 6
STANDING UP FROM CHAIR USING ARMS: TOTAL

## 2025-03-29 ASSESSMENT — PAIN - FUNCTIONAL ASSESSMENT: PAIN_FUNCTIONAL_ASSESSMENT: 0-10

## 2025-03-29 NOTE — PROGRESS NOTES
Patient fully evaluated    for    Acute kidney injury  Hypernatremia  Anemia of blood loss renal disease  Transaminitis  Rhabdomyolysis  Patient denies any shortness of breath or chest pain  CBC stable on erythropoietin    Problem List Items Addressed This Visit       * (Principal) Hemoptysis - Primary    Relevant Orders    Esophagogastroduodenoscopy (EGD) (Completed)    Surgical Pathology Exam    Melena    Relevant Orders    Esophagogastroduodenoscopy (EGD) (Completed)    Surgical Pathology Exam     Other Visit Diagnoses       Anemia, unspecified type        Relevant Orders    Esophagogastroduodenoscopy (EGD) (Completed)    Surgical Pathology Exam    Tracheostomy dependent (Multi)        Relevant Orders    Surgical Pathology Exam          Patient seen resting in bed  Liver transaminases continue to trend down AST today 48  ALT is 200  Renal chemistries are normal BUN 27 creatinine 0.96  Sodium is 146' corrected for hyperglycemia is 152.  Hemoglobin is stable at 7.4  WBC count at 09565  Yellow secretions coming from trach site      .  Vital signs for last 24 hours Temp:  [35.8 °C (96.4 °F)-36.8 °C (98.2 °F)] 35.8 °C (96.4 °F)  Heart Rate:  [59-77] 59  Resp:  [18-20] 18  BP: (130-161)/(59-72) 136/63  FiO2 (%):  [30 %] 30 %     Patient still requiring frequent cardiac and SPO2 monitoring. Continue aggressive pulmonary hygiene and oral hygiene. Off loading as tolerated for skin integrity. Medications and labs reviewed-   Results for orders placed or performed during the hospital encounter of 03/22/25 (from the past 24 hours)   POCT GLUCOSE   Result Value Ref Range    POCT Glucose 259 (H) 74 - 99 mg/dL   POCT GLUCOSE   Result Value Ref Range    POCT Glucose 335 (H) 74 - 99 mg/dL   Comprehensive Metabolic Panel   Result Value Ref Range    Glucose 363 (H) 74 - 99 mg/dL    Sodium 146 (H) 136 - 145 mmol/L    Potassium 4.1 3.5 - 5.3 mmol/L    Chloride 109 (H) 98 - 107 mmol/L    Bicarbonate 32 21 - 32 mmol/L    Anion Gap 9  (L) 10 - 20 mmol/L    Urea Nitrogen 27 (H) 6 - 23 mg/dL    Creatinine 0.96 0.50 - 1.30 mg/dL    eGFR 88 >60 mL/min/1.73m*2    Calcium 8.7 8.6 - 10.3 mg/dL    Albumin 3.0 (L) 3.4 - 5.0 g/dL    Alkaline Phosphatase 281 (H) 33 - 136 U/L    Total Protein 6.2 (L) 6.4 - 8.2 g/dL    AST 48 (H) 9 - 39 U/L    Bilirubin, Total 0.5 0.0 - 1.2 mg/dL     (H) 10 - 52 U/L   CBC and Auto Differential   Result Value Ref Range    WBC 12.6 (H) 4.4 - 11.3 x10*3/uL    nRBC 9.3 (H) 0.0 - 0.0 /100 WBCs    RBC 2.73 (L) 4.50 - 5.90 x10*6/uL    Hemoglobin 7.4 (L) 13.5 - 17.5 g/dL    Hematocrit 26.4 (L) 41.0 - 52.0 %    MCV 97 80 - 100 fL    MCH 27.1 26.0 - 34.0 pg    MCHC 28.0 (L) 32.0 - 36.0 g/dL    RDW 17.4 (H) 11.5 - 14.5 %    Platelets 285 150 - 450 x10*3/uL    Immature Granulocytes %, Automated 8.5 (H) 0.0 - 0.9 %    Immature Granulocytes Absolute, Automated 1.07 (H) 0.00 - 0.70 x10*3/uL   Manual Differential   Result Value Ref Range    Neutrophils %, Manual 81.0 40.0 - 80.0 %    Bands %, Manual 4.0 0.0 - 5.0 %    Lymphocytes %, Manual 10.0 13.0 - 44.0 %    Monocytes %, Manual 0.0 2.0 - 10.0 %    Eosinophils %, Manual 2.0 0.0 - 6.0 %    Basophils %, Manual 0.0 0.0 - 2.0 %    Metamyelocytes %, Manual 1.0 0.0 - 0.0 %    Myelocytes %, Manual 2.0 0.0 - 0.0 %    Seg Neutrophils Absolute, Manual 10.21 (H) 1.20 - 7.00 x10*3/uL    Bands Absolute, Manual 0.50 0.00 - 0.70 x10*3/uL    Lymphocytes Absolute, Manual 1.26 1.20 - 4.80 x10*3/uL    Monocytes Absolute, Manual 0.00 (L) 0.10 - 1.00 x10*3/uL    Eosinophils Absolute, Manual 0.25 0.00 - 0.70 x10*3/uL    Basophils Absolute, Manual 0.00 0.00 - 0.10 x10*3/uL    Metamyelocytes Absolute, Manual 0.13 0.00 - 0.00 x10*3/uL    Myelocytes Absolute, Manual 0.25 0.00 - 0.00 x10*3/uL    Total Cells Counted 100     Neutrophils Absolute, Manual 10.71 (H) 1.20 - 7.70 x10*3/uL    RBC Morphology See Below     Polychromasia Mild     Basophilic Stippling Present    POCT GLUCOSE   Result Value Ref Range     POCT Glucose 363 (H) 74 - 99 mg/dL      Patient fractional excretion of sodium is 0.2%  Patient fractional excretion of urea is 32.16%  Positive Hemoccult stools  Patient recently received an antibiotic (last 12 hours)       Date/Time Action Medication Dose    03/26/25 0903 New Bag    piperacillin-tazobactam (Zosyn) 3.375 g in dextrose (iso) IV 50 mL 3.375 g    03/26/25 0148 New Bag    piperacillin-tazobactam (Zosyn) 3.375 g in dextrose (iso) IV 50 mL 3.375 g           Plan discussed with interdisciplinary team, continue current and repeat labs in the AM.         I spent a total of 50 minutes on the date of the service which included preparing to see the patient, face-to-face patient care, completing clinical documentation, obtaining and/or reviewing separately obtained history, performing a medically appropriate examination, counseling and educating the patient/family/caregiver, ordering medications, tests, or procedures, communicating with other HCPs (not separately reported), independently interpreting results (not separately reported), communicating results to the patient/family/caregiver, and care coordination (not separately reported).         Physical Exam  General Appearance; asthenic habitus  Mild skin pallor  Poor skin turgor  HEENT; pupils react to light and accommodation  There is no nystagmus or ptosis; extraocular movements are intact  Neck; no adenopathy; no jugular vein distention; no bruit; no thyromegaly  Tracheostomy in place clean without exudation or induration  Lungs; minimal inspiratory coarse crackles at the bases  Heart; regular rate no gallop no rubs or thrills no lifts  Abdomen; active peristalsis no rebound or guarding there is a PEG tube in place  ; no CVA tenderness  Extremities; decreased muscle tone  Neurological; pathological reflexes are absent      Assessment/Plan     Acute kidney injury  Hypernatremia  Anemia of blood loss renal  disease  Transaminitis  Rhabdomyolysis    Plan  D5W to correct hypernatremia  Mucomyst  IV iron and erythropoietin  Continue to hold potential nephrotoxins and hepatotoxins  Continue to monitor renal chemistries and CBC  Matthew Johnson MD

## 2025-03-29 NOTE — PROGRESS NOTES
Bryan Nix is a 64 y.o. male on day 7 of admission presenting with Hemoptysis.      Subjective   Patient fully evaluated on March 24.  Hemoptysis appears resolved.  Still with slightly elevated white count to be monitored.  Continue aggressive antibiotic regimen.  Repeat chest x-ray is pending.       Objective     Last Recorded Vitals  /63 (BP Location: Right arm, Patient Position: Lying)   Pulse 59   Temp 35.8 °C (96.4 °F) (Temporal)   Resp 18   Wt 77.1 kg (170 lb)   SpO2 95%   Intake/Output last 3 Shifts:    Intake/Output Summary (Last 24 hours) at 3/29/2025 1234  Last data filed at 3/29/2025 1124  Gross per 24 hour   Intake 50 ml   Output 1700 ml   Net -1650 ml       Admission Weight  Weight: 77.1 kg (170 lb) (03/22/25 1255)    Daily Weight  03/22/25 : 77.1 kg (170 lb)    Image Results  XR chest 1 view  Narrative: Interpreted By:  Ankur Tubbs,   STUDY:  XR CHEST 1 VIEW; 3/28/2025 3:55 pm      INDICATION:  Signs/Symptoms:Diagnotisc.      COMPARISON:  Chest x-ray 03/24/2025      ACCESSION NUMBER(S):  [id number]      ORDERING CLINICIAN:  RICHARD WELCH      TECHNIQUE:  1 view of the chest was performed.      FINDINGS:  Small left-sided pleural effusions surrounding atelectasis.. No  pneumothorax.  The cardiomediastinal silhouette is stable.  Tracheostomy cannula noted.      Impression: 1. New small left-sided pleural effusions surrounding atelectasis.      Signed by: Ankur Tubbs 3/28/2025 4:15 PM  Dictation workstation:   [id number]      Physical Exam    Relevant Results               Assessment/Plan   This patient currently has cardiac telemetry ordered; if you would like to modify or discontinue the telemetry order, click here to go to the orders activity to modify/discontinue the order.              Assessment & Plan  Hemoptysis    Tracheostomy in place (Multi)    CVA, old, hemiparesis (Multi)    Status post insertion of percutaneous endoscopic gastrostomy (PEG) tube (Multi)    Pneumonia due  to infectious organism    Right sided weakness    Atrial flutter (Multi)    Insulin dependent type 2 diabetes mellitus (Multi)        Zeus Bone MD  Physician  Internal Medicine     H&P      Signed     Date of Service: 3/23/2025 10:28 AM     Signed       Expand All Collapse All    History Of Present Illness  Bryan Nix is a 64-year-old male with past medical history of atrial flutter on Eliquis, hyponatremia, IDDM, CAD, hypertension, chronic wounds, diffuse lymphadenopathy, anemia, history of smoking, history elevated PSA, CVA L hemisphere (2020) with right-sided deficits, Left ICA stenosis (85%) s/p angioplasty and left carotid artery stent (08/25/2023) and recent history of acute pontine stroke 02/27/25 with acute hypoxic respiratory failure s/p tracheostomy on 2/25/2025 and PEG.  Patient presents for bleeding from tracheostomy.  Patient is alert and oriented x 3, however limited verbal communication due to tracheostomy.  Wife is at bedside and assists with history.  She reports that patient was coughing blood out of his tracheostomy.  ER provider felt bleeding was likely superficial around site.  Patient has been on room air at skilled nursing facility.  He was placed on Airvo in the ED mainly to humidify oxygen.  Patient never desaturated per ER report.  Currently he is resting comfortably in the bed, breath sounds slightly rhonchorous, but unlabored.  Reports chills, but no fevers.  He has right-sided weakness and sensory deficits.  Denies headache, vision or hearing changes, chest pains, palpitations, shortness of breath, nausea, vomiting, abdominal pain, diarrhea, or complications of PEG tube site.  Wife reports patient skin became irritated in his groin due to adhesive from a condom cath previously, he has a small decubitus wound that she reports is healing well.  He receives tube feeds continuously.  Wife reports a patient was recently evaluated by speech for p.o. to intake, however he still  remains n.p.o. except for meds and tube feeds through PEG. CODE STATUS reviewed: full code     ER Course: Hemodynamically stable, afebrile, SpO2 to 97% trach mask.  WBC 9.3, hemoglobin 8, hematocrit 29.4, platelets 197.  INR 1.4.  Glucose 223, BUN 36, otherwise CMP is normal.  CT of the chest for PE pending.  CXR unremarkable. Trannexamic acid soak gauze wrapped around trach.      Past medical history: As above  Past surgical history: As above  Social history: Former smoker 37.5 pack years-quit smoking age 63, occasional alcohol use, no illicit drug use.  .  Works in maintenance.  6 children.  Family history: Noncontributory     Past Medical History  Medical History        Past Medical History:   Diagnosis Date    Abnormal finding of blood chemistry, unspecified 05/18/2016     Abnormal blood chemistry    Acute upper respiratory infection, unspecified 12/02/2015     Acute upper respiratory infection    Elevated prostate specific antigen (PSA)       Elevated prostate specific antigen (PSA)    Encounter for screening for malignant neoplasm of colon 01/30/2021     Colon cancer screening    Encounter for screening for malignant neoplasm of prostate 11/30/2020     Prostate cancer screening    Essential (primary) hypertension 07/30/2013     Benign essential hypertension    Other conditions influencing health status 07/30/2013     Diabetes Mellitus Poorly Controlled    Other conditions influencing health status 12/02/2015     History of cough    Personal history of other endocrine, nutritional and metabolic disease 03/18/2021     History of hyperlipidemia    Personal history of other endocrine, nutritional and metabolic disease 03/18/2021     History of diabetes mellitus    Personal history of other specified conditions 05/18/2016     History of chest pain    Personal history of other specified conditions 05/18/2016     History of dyspnea    Personal history of other specified conditions 05/18/2016     History of  dizziness    Personal history of other specified conditions 05/18/2016     History of fatigue            Surgical History  Surgical History         Past Surgical History:   Procedure Laterality Date    CT ANGIO CORONARY ART WITH HEARTFLOW IF SCORE >30%   8/23/2023     CT HEART CORONARY ANGIOGRAM 8/23/2023 [facility] CT    MR HEAD ANGIO WO IV CONTRAST   12/14/2020     MR HEAD ANGIO WO IV CONTRAST 12/14/2020 [facility]    MR NECK ANGIO WO IV CONTRAST   12/14/2020     MR NECK ANGIO WO IV CONTRAST 12/14/2020 [facility]    OTHER SURGICAL HISTORY   12/01/2020     Hand fracture repair    OTHER SURGICAL HISTORY   12/01/2020     Nasal bone fracture repair            Social History  He reports that he has quit smoking. His smoking use included cigarettes. He has never used smokeless tobacco. No history on file for alcohol use and drug use.     Family History  Family History   No family history on file.        Allergies  Patient has no known allergies.     Review of Systems     Physical Exam  Constitutional:       General: He is awake. He is not in acute distress.     Appearance: Normal appearance. He is not toxic-appearing.   HENT:      Head: Atraumatic.      Nose: Nose normal.      Mouth/Throat:      Mouth: Mucous membranes are moist.   Eyes:      Conjunctiva/sclera: Conjunctivae normal.   Cardiovascular:      Rate and Rhythm: Normal rate and regular rhythm.      Pulses: Normal pulses.      Heart sounds: No murmur heard.  Pulmonary:      Effort: No respiratory distress.      Comments: Tracheostomy site midline, no drainage around dressing.  Rhonchorous breath sounds, unlabored respirations, RT bedside suctioning patient with thick blood-tinged sputum  Abdominal:      General: Bowel sounds are normal. There is no distension.      Palpations: Abdomen is soft.      Tenderness: There is no abdominal tenderness. There is no guarding.      Comments: Left upper quadrant PEG site tube site, no drainage or skin  "breakdown around tube site.   Musculoskeletal:         General: No swelling, deformity or signs of injury.      Cervical back: Neck supple.      Right lower leg: No edema.      Left lower leg: No edema.   Skin:     General: Skin is warm and dry.      Capillary Refill: Capillary refill takes less than 2 seconds.      Findings: Wound (sacrum) present. No ecchymosis or erythema.   Neurological:      Mental Status: He is alert and oriented to person, place, and time.      Cranial Nerves: No facial asymmetry.      Sensory: Sensory deficit (RUE, RLE) present.      Motor: Weakness present.      Comments: Right sided upper and lower extremity motor function deficits   Psychiatric:         Mood and Affect: Mood normal.         Behavior: Behavior is cooperative.            Last Recorded Vitals  Blood pressure 134/50, pulse 93, temperature 36.9 °C (98.4 °F), temperature source Temporal, resp. rate 22, height 1.88 m (6' 2\"), weight 77.1 kg (170 lb), SpO2 98%.     Relevant Results              Results for orders placed or performed during the hospital encounter of 03/22/25 (from the past 24 hours)   CBC and Auto Differential   Result Value Ref Range     WBC 9.3 4.4 - 11.3 x10*3/uL     nRBC 0.3 (H) 0.0 - 0.0 /100 WBCs     RBC 3.02 (L) 4.50 - 5.90 x10*6/uL     Hemoglobin 8.0 (L) 13.5 - 17.5 g/dL     Hematocrit 29.4 (L) 41.0 - 52.0 %     MCV 97 80 - 100 fL     MCH 26.5 26.0 - 34.0 pg     MCHC 27.2 (L) 32.0 - 36.0 g/dL     RDW 15.6 (H) 11.5 - 14.5 %     Platelets 197 150 - 450 x10*3/uL     Neutrophils % 77.1 40.0 - 80.0 %     Immature Granulocytes %, Automated 1.5 (H) 0.0 - 0.9 %     Lymphocytes % 13.1 13.0 - 44.0 %     Monocytes % 4.1 2.0 - 10.0 %     Eosinophils % 3.2 0.0 - 6.0 %     Basophils % 1.0 0.0 - 2.0 %     Neutrophils Absolute 7.14 1.20 - 7.70 x10*3/uL     Immature Granulocytes Absolute, Automated 0.14 0.00 - 0.70 x10*3/uL     Lymphocytes Absolute 1.21 1.20 - 4.80 x10*3/uL     Monocytes Absolute 0.38 0.10 - 1.00 x10*3/uL "     Eosinophils Absolute 0.30 0.00 - 0.70 x10*3/uL     Basophils Absolute 0.09 0.00 - 0.10 x10*3/uL   Basic metabolic panel   Result Value Ref Range     Glucose 223 (H) 74 - 99 mg/dL     Sodium 142 136 - 145 mmol/L     Potassium 4.3 3.5 - 5.3 mmol/L     Chloride 103 98 - 107 mmol/L     Bicarbonate 27 21 - 32 mmol/L     Anion Gap 16 10 - 20 mmol/L     Urea Nitrogen 36 (H) 6 - 23 mg/dL     Creatinine 0.81 0.50 - 1.30 mg/dL     eGFR >90 >60 mL/min/1.73m*2     Calcium 9.6 8.6 - 10.3 mg/dL   Protime-INR   Result Value Ref Range     Protime 15.1 (H) 9.8 - 12.4 seconds     INR 1.4 (H) 0.9 - 1.1   aPTT   Result Value Ref Range     aPTT 25 (L) 26 - 36 seconds   Type and Screen   Result Value Ref Range     ABO TYPE B       Rh TYPE POS       ANTIBODY SCREEN NEG     VERIFY ABO/Rh Group Test   Result Value Ref Range     ABO TYPE B       Rh TYPE POS     MRSA Surveillance for Vancomycin De-escalation, PCR     Specimen: Anterior Nares; Swab   Result Value Ref Range     MRSA PCR Not Detected Not Detected   POCT GLUCOSE   Result Value Ref Range     POCT Glucose 180 (H) 74 - 99 mg/dL   POCT GLUCOSE   Result Value Ref Range     POCT Glucose 161 (H) 74 - 99 mg/dL   POCT GLUCOSE   Result Value Ref Range     POCT Glucose 156 (H) 74 - 99 mg/dL   Basic metabolic panel   Result Value Ref Range     Glucose 170 (H) 74 - 99 mg/dL     Sodium 147 (H) 136 - 145 mmol/L     Potassium 4.2 3.5 - 5.3 mmol/L     Chloride 107 98 - 107 mmol/L     Bicarbonate 30 21 - 32 mmol/L     Anion Gap 14 10 - 20 mmol/L     Urea Nitrogen 35 (H) 6 - 23 mg/dL     Creatinine 0.85 0.50 - 1.30 mg/dL     eGFR >90 >60 mL/min/1.73m*2     Calcium 9.4 8.6 - 10.3 mg/dL   CBC   Result Value Ref Range     WBC 10.4 4.4 - 11.3 x10*3/uL     nRBC 0.6 (H) 0.0 - 0.0 /100 WBCs     RBC 2.87 (L) 4.50 - 5.90 x10*6/uL     Hemoglobin 7.8 (L) 13.5 - 17.5 g/dL     Hematocrit 26.6 (L) 41.0 - 52.0 %     MCV 93 80 - 100 fL     MCH 27.2 26.0 - 34.0 pg     MCHC 29.3 (L) 32.0 - 36.0 g/dL     RDW 15.7  (H) 11.5 - 14.5 %     Platelets 205 150 - 450 x10*3/uL   SST TOP   Result Value Ref Range     Extra Tube Hold for add-ons.        CT angio chest for pulmonary embolism     Result Date: 3/22/2025  Interpreted By:  Pamela Zurita, STUDY: CT ANGIO CHEST FOR PULMONARY EMBOLISM;  3/22/2025 4:56 pm   INDICATION: Signs/Symptoms:bloody secretions, eval for PE.     COMPARISON: CT chest without contrast 03/22/2025   ACCESSION NUMBER(S): [id number]   ORDERING CLINICIAN: PAMELA HAHN   TECHNIQUE: Contiguous axial images of the chest were obtained after the intravenous administration of iodinated contrast using angiographic PE protocol. Coronal and sagittal reformatted images were reconstructed from the axial data. MIP images were created on an independent workstation and reviewed.   FINDINGS:   MEDIASTINUM AND LYMPH NODES: Inflammatory fat stranding along the tracheostomy tract without discrete fluid collection. The esophageal wall appears within normal limits. There is redemonstration of diffuse lymphadenopathy in the bilateral axilla, mediastinum, bilateral supraclavicular as described in detail on separate report. Also notable or enlarged bilateral level 4 cervical lymph nodes (right > left).   VESSELS:  Normal caliber thoracic aorta without dissection. Moderate aortic atherosclerosis.  No acute pulmonary embolism.   HEART: Normal size.  Mild coronary artery calcifications. No significant pericardial effusion.   LUNG, AIRWAYS, PLEURA: There is slight increase in debris within the trachea. There has been clearing of secretions in the left upper/lower lobe bronchus. There is diffuse bilateral bronchial thickening slightly increased from prior study. There is peribronchovascular ground-glass opacity in the posterior right upper lobe and superior and basal segments of the right lower lobe. There is slightly worsened atelectasis in the lung bases remaining predominantly subsegmental in nature. There is a 0.8 cm nodule in  the left upper lobe that is stable from 08/19/2023.   OSSEOUS STRUCTURES: No acute osseous abnormality.   CHEST WALL SOFT TISSUES: No discernible acute abnormality.   UPPER ABDOMEN/OTHER: No acute abnormality.        No acute pulmonary embolism.   Peribronchial vascular ground glass opacities in the right upper and lower lobe again concerning for pneumonia given patient's risk factors of tracheostomy and debris in the airways.   Redemonstration of diffuse lymphadenopathy in the lower necks, axilla, and mediastinum. Findings are either diffusely reactive in nature, reflective of lymphoma/leukemia, meter deficiency, or less likely head neck neoplasm is previously postulated. However please correlate with past medical history.   Inflammatory changes along the tracheostomy tract without fluid collection. Correlate for erythema.   Unchanged 0.8 cm nodule in the left upper lobe dating back to 08/19/2023. Recommend 1 year follow up chest CT to ensure at least 2 years of stability.   MACRO: None.   Signed by: Pamela Zurita 3/22/2025 7:17 PM Dictation workstation:   [id number]     CT chest wo IV contrast     Result Date: 3/22/2025  Interpreted By:  Pamela Zurita, STUDY: CT CHEST WO IV CONTRAST;  3/22/2025 3:41 pm   INDICATION: Signs/Symptoms:eval for alveolar hemorrhage.     COMPARISON: CT head/neck 08/19/2023   ACCESSION NUMBER(S): [id number]   ORDERING CLINICIAN: PAMELA HAHN   TECHNIQUE: Contiguous axial images of the chest and upper abdomen were obtained without contrast. Coronal and sagittal reformatted images were reconstructed from the axial data.   FINDINGS:   MEDIASTINUM AND LYMPH NODES: There is fat stranding/edema along the tract of the tracheostomy about discrete fluid collection. The esophageal wall appears within normal limits. There are numerous enlarged bilateral supraclavicular lymph nodes measuring up to 1.4 cm on the left. There are numerous enlarged bilateral axillary lymph nodes measuring up  to 1.1 cm on the right and 1.2 cm on the left. There are numerous prominent to mildly enlarged mediastinal lymph nodes as well. No pneumomediastinum.   VESSELS:  Normal caliber thoracic aorta. Moderate aortic atherosclerosis. The main pulmonary artery is normal in size.   HEART: Normal size.  Mild coronary artery calcifications. No significant pericardial effusion.   LUNG, AIRWAYS, PLEURA: Tracheostomy noted in appropriate position. There is trace mucus in the left mainstem bronchus and at the origin of the left upper lobe and lower lobe bronchi. There are mild peribronchovascular ground-glass opacities in the posterior segment of the right upper lobe, basal segments of the right lower lobe. There is mild dependent bibasilar atelectasis. There is no pleural effusion.   OSSEOUS STRUCTURES: No acute osseous abnormality.   CHEST WALL SOFT TISSUES: No discernible acute abnormality.   UPPER ABDOMEN/OTHER: No acute abnormality.        1. Mild peribronchovascular ground-glass opacities in the posterior right upper lobe and basal segments of the right lower lobe. This appearance and distribution is not typical for alveolar hemorrhage which tends to be centrilobular and bilateral asymmetric in distribution. Findings are most suggestive of pneumonia.   2. Small amount of mucous debris in the left mainstem bronchus and at the origin of the left upper and lower lobe bronchi.   3. Diffuse lymphadenopathy in the bilateral axillary regions, mediastinum and left supraclavicular region particularly notable in the bilateral supraclavicular region. This is progressed in the left supraclavicular region and new elsewhere when compared to 08/19/2023 CTA head/neck. Correlate for any history of known head/neck malignancy or lymphoma.   4. Inflammation/fat stranding adjacent to the tracheostomy tube entry site noted. Correlate for air the met. No evidence of fluid collection.   MACRO: None.   Signed by: Marco A Zurita 3/22/2025 7:10 PM  Dictation workstation:   [id number]     XR chest 1 view     Result Date: 3/22/2025  Interpreted By:  Fermin Stroud, STUDY: XR CHEST 1 VIEW   INDICATION: Signs/Symptoms:hemoptysis.   COMPARISON: August 19, 2023   ACCESSION NUMBER(S): [id number]   ORDERING CLINICIAN: PAMELA HAHN   FINDINGS: No consolidation, effusion, edema, or pneumothorax. Heart size within normal limits.        No evidence of acute intrathoracic abnormality.   Signed by: Fermin Stroud 3/22/2025 2:01 PM Dictation workstation:   [id number]     XR chest 1 view     Result Date: 3/14/2025  * * *Final Report* * * DATE OF EXAM: Mar 14 2025  8:52AM   S5X   5290  -  XR CHEST 1V FRONTAL   / ACCESSION #  [id number] PROCEDURE REASON: new bleeding from trach      * * * * Physician Interpretation * * * *  EXAMINATION:  CHEST RADIOGRAPH (PORTABLE SINGLE VIEW AP) Exam Date/Time:  3/14/2025 8:52 AM Clinical History:  new bleeding from trach Comparison:  03/03/2025 RESULT: LINES, TUBES, AND DEVICES:  Tracheostomy. CARDIOMEDIASTINAL SILHOUETTE:  The cardiac and mediastinal silhouettes appear unremarkable. LUNGS AND PLEURA: No consolidation.  No pleural effusion. No pulmonary vascular congestion. No pneumothorax identified. OTHER:  N/A     IMPRESSION: No acute abnormality identified. : HANY   Transcribe Date/Time: Mar 14 2025  9:07A Dictated by : PAMELA EVANS MD This examination was interpreted and the report reviewed and electronically signed by: PAMELA EVANS MD on Mar 14 2025  9:11AM  EST     US abdomen complete     Result Date: 3/11/2025  ABDOMEN ULTRASOUND-COMPLETE FINDINGS: Abdomen Ultrasound Complete: PROCEDURE: Multiple grayscale mages of the abdomen were obtained. FINDINGS:Multiple real time images demonstrate the liver with increased echogenicity consistent with fatty infiltration. There is no evidence of a discrete mass within the liver. The liver is enlarged, measured at 15.7 cm in size. The gallbladder is seen with no evidence of  cholelithiasis. The gallbladder wall is within normal limits. The common bile duct is within normal limits. The visualized pancreas appears unremarkable. Both kidneys are seen with no evidence of hydronephrosis or a calculus. The spleen has its normal homogeneous echo appearance. There is no free fluid to suggest ascites. The visualized aorta appears unremarkable. The inferior vena cava appears patent. CONCLUSION: Enlarged fatty liver. ELECTRONICALLY SIGNED BY LEONEL LAN M.D. 3/11/2025 5:00:52 PM EDT.     XR chest 1 view     Result Date: 3/3/2025  * * *Final Report* * * DATE OF EXAM: Mar  3 2025  1:40PM   S5X   5290  -  XR CHEST 1V FRONTAL   / ACCESSION #  [id number] PROCEDURE REASON: resp failure      * * * * Physician Interpretation * * * *  EXAMINATION:  CHEST RADIOGRAPH (PORTABLE SINGLE VIEW AP) Exam Date/Time:  3/3/2025 1:40 PM Indication:   resp failure M:  XCP_4 Comparison:  1 day prior RESULT: Lines, tubes, and devices:  Tracheostomy tube remains in situ. Lungs and pleura:  Mild patchy opacities right lower lung zone seen on the prior study are no longer appreciated.  No confluent opacity.   Costophrenic angles are clear.  No pneumothorax. Cardiomediastinal silhouette:  Stable cardiomediastinal silhouette. Other:  -     IMPRESSION: Improved aeration right lower lung zone.  No acute cardiopulmonary finding. : HANY   Transcribe Date/Time: Mar  3 2025  2:49P Dictated by : ISRAEL BENSON MD This examination was interpreted and the report reviewed and electronically signed by: ISRAEL BENSON MD on Mar  3 2025  2:49PM  EST     XR chest 1 view     Result Date: 3/2/2025  * * *Final Report* * * DATE OF EXAM: Mar  2 2025 11:25AM   MMX   5376  -  XR CHEST 1V FRONTAL PORT  / ACCESSION #  [id number] PROCEDURE REASON: Shortness of breath      * * * * Physician Interpretation * * * *  EXAMINATION:  CHEST RADIOGRAPH (PORTABLE SINGLE VIEW AP) Exam Date/Time:  3/2/2025 11:25 AM CLINICAL HISTORY:  Shortness of breath MQ:  XCPR_5 Comparison:  02/20/2025. RESULT: This is a limited examination due to multiple wires and tubing obscuring the lower lungs. Lines, tubes, and devices:  A tracheostomy tube remains in place. Lungs and pleura:  There are increased bronchovascular markings in the right lower lung which may be due to bronchitis or early changes of aspiration pneumonia.  Clinical correlation and follow-up exams are recommended. Cardiomediastinal silhouette:  Stable cardiomediastinal silhouette. Other:  The visualized bony thorax appears unremarkable.     IMPRESSION: Nonspecific parenchymal changes in the right lower lung as described above. A follow-up exam is recommended. : HANY   Transcribe Date/Time: Mar  2 2025 11:53A Dictated by : BJ INIGUEZ MD This examination was interpreted and the report reviewed and electronically signed by: BJ INIGUEZ MD on Mar  2 2025 11:54AM  EST     XR chest 1 view     Result Date: 2/28/2025  * * *Final Report* * * DATE OF EXAM: Feb 28 2025 10:36AM   MMX   5290  -  XR CHEST 1V FRONTAL   / ACCESSION #  [id number] PROCEDURE REASON: Shortness of breath      * * * * Physician Interpretation * * * *  EXAMINATION:  CHEST RADIOGRAPH (SINGLE VIEW AP OR PA) CLINICAL HISTORY: Shortness of breath MQ:  XC1_5 Comparison:  02/22/2025 RESULT: Lines, tubes, and devices:  Tracheostomy tube is noted. Lungs and pleura:  No consolidation. No lung mass. No pleural effusion. Cardiomediastinal silhouette:  Normal cardiomediastinal silhouette. Other:  No acute osseous pathology.     IMPRESSION: No acute radiographic abnormality. : [facility]   Transcribe Date/Time: Feb 28 2025 12:07P Dictated by : DIANNE RAMOS MD This examination was interpreted and the report reviewed and electronically signed by: DIANNE RAMOS MD on Feb 28 2025 12:08PM  EST     XR abdomen 2 views supine and erect or decub     Result Date: 2/24/2025  * * *Final Report* * * DATE OF EXAM: Feb 24 2025   6:03PM   MMX   5289  -  XR ABDOMEN 1V SUPINE  / ACCESSION #  [id number] PROCEDURE REASON: Evaluate tube, line or lead position      * * * * Physician Interpretation * * * *  EXAMINATION:  XR ABDOMEN 1V SUPINE CLINICAL HISTORY:   Evaluate tube, line or lead position Technique:   XR ABDOMEN 1V SUPINE -- NOT APPLICABLE with 1 views on 1 images Comparison: 2/21/2025 RESULT: Feeding tube with distal tip at the pylorus antrum. Moderate colonic stool burden. No dilated bowel. Lung structures are intact.     IMPRESSION: Feeding tube with distal tip at the pylorus antrum. Moderate colonic stool burden. No dilated bowel. : Sh[facility]   Transcribe Date/Time: Feb 24 2025  6:23P Dictated by : PABLO RAMOS MD This examination was interpreted and the report reviewed and electronically signed by: PABLO RAMOS MD on Feb 24 2025  6:24PM  EST     CT head wo IV contrast     Result Date: 2/23/2025  * * *Final Report* * * DATE OF EXAM: Feb 23 2025 12:04PM   [facility]   0504  -  CT BRAIN WO IVCON  / ACCESSION #  [id number] PROCEDURE REASON: Stroke, follow up      * * * * Physician Interpretation * * * *  EXAMINATION: CT BRAIN WO IVCON CLINICAL HISTORY: Stroke, follow up TECHNIQUE:  Serial axial images without IV contrast were obtained from the vertex to the foramen magnum. MQ:  CTBWO_3 CT Radiation dose: Integrated Dose-Length Product (DLP) for this visit =   778  mGy*cm CT Dose Reduction Employed: Iterative recon COMPARISON: MRI brain 02/19/2025 RESULT: Post-operative change: None. Acute change: Evolving acute infarct along the central eli and superior left medulla (2:7-9).  No evidence of hemorrhagic transformation. Hemorrhage: No evidence of acute intracranial hemorrhage. Mass Lesion / Mass Effect: There is no evidence of an intracranial mass or extraaxial fluid collection. No significant mass effect. Chronic change: Stable remote left MCA territory encephalomalacia. Parenchyma: There is no significant volume loss. The brain  parenchyma is otherwise within normal limits for age. Ventricles: The ventricles are within normal limits of size and configuration for age. Paranasal sinuses and skull base: Partial opacification of the left maxillary and sphenoid sinus and multiple bilateral ethmoid air cells.   The remaining visualized paranasal sinuses are grossly clear. The skull base and imaged soft tissues are unremarkable. Localizer images: No additional findings.     IMPRESSION: Evolving acute brainstem infarct.  No evidence of hemorrhagic transformation. Stable remote left MCA territory infarct. : [facility]   Transcribe Date/Time: Feb 23 2025 12:48P Dictated by : FRANKY GUZMAN MD   This examination was interpreted and the report reviewed and electronically signed by: FRANKY GUZMAN MD on Feb 23 2025 12:52PM  EST     XR chest 1 view     Result Date: 2/22/2025  * * *Final Report* * * DATE OF EXAM: Feb 22 2025  6:56AM   [facility]   [id number]  -  XR CHEST 1V FRONTAL   / ACCESSION #  [id number] PROCEDURE REASON: Evaluate tube, line,  or lead position      * * * * Physician Interpretation * * * *  EXAMINATION:  CHEST RADIOGRAPH (SINGLE VIEW AP OR PA) CLINICAL HISTORY: Evaluate tube, line,  or lead position MQ:  XC1_5 Comparison:  02/21/2025. RESULT: Lines, tubes, and devices:  An ET tube and feeding tube remain in place.   A poorly defined right venous catheter cannot be excluded. Lungs and pleura:  There are persistent bilateral lower lungs infiltrates seen on the right more than left suggestive of pneumonia such as aspiration pneumonia.  There is no definite pleural effusion. Cardiomediastinal silhouette:  Stable cardiomediastinal silhouette. Other:  The visualized bony thorax appears unremarkable.     IMPRESSION: Persistent bilateral lower lungs infiltrates as described above. A follow-up exam is recommended. : [facility]   Transcribe Date/Time: Feb 22 2025  8:20A Dictated by : BJ INIGUEZ MD This examination was interpreted and the  report reviewed and electronically signed by: BJ INIGUEZ MD on Feb 22 2025  8:21AM  EST     XR chest 1 view     Result Date: 2/21/2025  * * *Final Report* * * DATE OF EXAM: Feb 21 2025  5:59PM   [facility]   [id number]  -  XR CHEST 1V FRONTAL PORT  / ACCESSION #  [id number] PROCEDURE REASON: Evaluate tube, line,  or lead position      * * * * Physician Interpretation * * * *  EXAMINATION:  CHEST RADIOGRAPH (PORTABLE SINGLE VIEW AP) Exam Date/Time:  2/21/2025 5:59 PM CLINICAL HISTORY: Evaluate tube, line,  or lead position MQ:  XCPR_5 Comparison:  02/21/2025 RESULT: Lines, tubes, and devices: Endotracheal tube in place terminating in enteric tube in place terminating below the field of view. Lungs and pleura: No pneumothorax.  No pleural effusion.  Bilateral mild interstitial opacities.  The right costophrenic angle is not included in the field-of-view. Cardiomediastinal silhouette:  Stable cardiomediastinal silhouette. Other:  .     IMPRESSION: Mild interstitial opacities may be related to pulmonary vascular congestion. : [facility]   Transcribe Date/Time: Feb 21 2025  6:55P Dictated by : IRWIN VALDIVIA MD This examination was interpreted and the report reviewed and electronically signed by: IRWIN VALDIVIA MD on Feb 21 2025  6:57PM  EST     XR abdomen 2 views supine and erect or decub     Result Date: 2/21/2025  * * *Final Report* * * DATE OF EXAM: Feb 21 2025 10:32AM   [facility]   [id number]  -  XR ABDOMEN 1V SUPINE  / ACCESSION #  [id number] PROCEDURE REASON: Evaluate tube, line or lead position      * * * * Physician Interpretation * * * *  Clinical Information: Feeding tube Single view of the abdomen was obtained. Feeding tube tip within the distal stomach. There is a nonspecific, nonobstructive bowel gas pattern. No abnormal abdominal masses are identified.  No pathologic calcifications.   No acute bony abnormalities are seen.     IMPRESSION: Nonspecific, nonobstructive bowel gas pattern. Feeding tube  tip within the distal stomach. : [facility]   Transcribe Date/Time: Feb 21 2025 10:43A Dictated by : PAMELA EVANS MD This examination was interpreted and the report reviewed and electronically signed by: PAMELA EVANS MD on Feb 21 2025 10:44AM  EST     XR chest 1 view     Result Date: 2/21/2025  * * *Final Report* * * DATE OF EXAM: Feb 21 2025 10:32AM   [facility]   [id number]  -  XR CHEST 1V FRONTAL PORT  / ACCESSION #  [id number] PROCEDURE REASON: Shortness of breath      * * * * Physician Interpretation * * * *  EXAMINATION:  CHEST RADIOGRAPH (PORTABLE SINGLE VIEW AP) Exam Date/Time:  2/21/2025 10:32 AM Clinical History:  Shortness of breath Comparison:  02/17/2025 RESULT: LINES, TUBES, AND DEVICES:  Feeding tube tip beyond the inferior extent of the image. CARDIOMEDIASTINAL SILHOUETTE:  The cardiac and mediastinal silhouettes appear unremarkable. LUNGS AND PLEURA: No consolidation.  No pleural effusion. No pulmonary vascular congestion. No pneumothorax identified. OTHER:  N/A     IMPRESSION: No acute abnormality identified. : [facility]   Transcribe Date/Time: Feb 21 2025 10:41A Dictated by : PAMELA EVANS MD This examination was interpreted and the report reviewed and electronically signed by: PAMELA EVANS MD on Feb 21 2025 10:43AM  EST            Assessment/Plan     Assessment & Plan  Hemoptysis     Tracheostomy in place (Multi)     CVA, old, hemiparesis (Multi)     Status post insertion of percutaneous endoscopic gastrostomy (PEG) tube (Multi)     Pneumonia due to infectious organism     Right sided weakness     Atrial flutter (Multi)     Insulin dependent type 2 diabetes mellitus (Multi)        Telemetry monitoring  Hemoglobin stable 8's recently on review of outside labs, monitor for ongoing bleeding.  Daily H&H  Monitor for overt bleeding  Consult pulmonology, appreciate input  Pneumonia suspected for CT findings  Tracheostomy protocol  Expected cover with Zosyn and vancomycin  MRSA swab  Urine  for Legionella antigen and strep pneumonia antigen  Supplemental oxygen as needed  Nebulizers as needed  RT evaluate and treat  Check procalcitonin  Typical 20% zinc oxide to wounds and groin  Continuous tube feeds, continue at 80 mL an hour x 22 hours  Accu-Chek every 6 hours  Hypoglycemia protocol  PT/OT  Anticoagulation and antiplatelet therapy on hold, resume once cleared by pulmonology  Continue patient's home medications for chronic issues as appropriate     Patient fully evaluated on March 23.  Continue to monitor H&H.  Pulmonary consultation obtained.  Continue broad-spectrum antibiotics to rule out infectious process.  Recheck labs in AM.                 Zeus Bone MD                    Melena           Patient fully evaluated  3/25  for    Problem List Items Addressed This Visit       * (Principal) Hemoptysis - Primary    Relevant Orders    Esophagogastroduodenoscopy (EGD) (Completed)    Surgical Pathology Exam    Melena    Relevant Orders    Esophagogastroduodenoscopy (EGD) (Completed)    Surgical Pathology Exam     Other Visit Diagnoses       Anemia, unspecified type        Relevant Orders    Esophagogastroduodenoscopy (EGD) (Completed)    Surgical Pathology Exam    Tracheostomy dependent (Multi)        Relevant Orders    Surgical Pathology Exam          Patient seen resting in bed with head of bed elevated, no s/s or c/o acute difficulties at this time.  Vital signs for last 24 hours Temp:  [35.8 °C (96.4 °F)-36.8 °C (98.2 °F)] 35.8 °C (96.4 °F)  Heart Rate:  [59-77] 59  Resp:  [18-20] 18  BP: (130-161)/(59-72) 136/63  FiO2 (%):  [30 %] 30 %    I/O this shift:  In: -   Out: 1300 [Urine:1300]  Patient still requiring frequent cardiac and SPO2 monitoring. Continue aggressive pulmonary hygiene and oral hygiene. Off loading as tolerated for skin integrity. Medications and labs reviewed-   Results for orders placed or performed during the hospital encounter of 03/22/25 (from the past 24 hours)   POCT  GLUCOSE   Result Value Ref Range    POCT Glucose 260 (H) 74 - 99 mg/dL   POCT GLUCOSE   Result Value Ref Range    POCT Glucose 259 (H) 74 - 99 mg/dL   POCT GLUCOSE   Result Value Ref Range    POCT Glucose 335 (H) 74 - 99 mg/dL   Comprehensive Metabolic Panel   Result Value Ref Range    Glucose 363 (H) 74 - 99 mg/dL    Sodium 146 (H) 136 - 145 mmol/L    Potassium 4.1 3.5 - 5.3 mmol/L    Chloride 109 (H) 98 - 107 mmol/L    Bicarbonate 32 21 - 32 mmol/L    Anion Gap 9 (L) 10 - 20 mmol/L    Urea Nitrogen 27 (H) 6 - 23 mg/dL    Creatinine 0.96 0.50 - 1.30 mg/dL    eGFR 88 >60 mL/min/1.73m*2    Calcium 8.7 8.6 - 10.3 mg/dL    Albumin 3.0 (L) 3.4 - 5.0 g/dL    Alkaline Phosphatase 281 (H) 33 - 136 U/L    Total Protein 6.2 (L) 6.4 - 8.2 g/dL    AST 48 (H) 9 - 39 U/L    Bilirubin, Total 0.5 0.0 - 1.2 mg/dL     (H) 10 - 52 U/L   CBC and Auto Differential   Result Value Ref Range    WBC 12.6 (H) 4.4 - 11.3 x10*3/uL    nRBC 9.3 (H) 0.0 - 0.0 /100 WBCs    RBC 2.73 (L) 4.50 - 5.90 x10*6/uL    Hemoglobin 7.4 (L) 13.5 - 17.5 g/dL    Hematocrit 26.4 (L) 41.0 - 52.0 %    MCV 97 80 - 100 fL    MCH 27.1 26.0 - 34.0 pg    MCHC 28.0 (L) 32.0 - 36.0 g/dL    RDW 17.4 (H) 11.5 - 14.5 %    Platelets 285 150 - 450 x10*3/uL    Immature Granulocytes %, Automated 8.5 (H) 0.0 - 0.9 %    Immature Granulocytes Absolute, Automated 1.07 (H) 0.00 - 0.70 x10*3/uL   Manual Differential   Result Value Ref Range    Neutrophils %, Manual 81.0 40.0 - 80.0 %    Bands %, Manual 4.0 0.0 - 5.0 %    Lymphocytes %, Manual 10.0 13.0 - 44.0 %    Monocytes %, Manual 0.0 2.0 - 10.0 %    Eosinophils %, Manual 2.0 0.0 - 6.0 %    Basophils %, Manual 0.0 0.0 - 2.0 %    Metamyelocytes %, Manual 1.0 0.0 - 0.0 %    Myelocytes %, Manual 2.0 0.0 - 0.0 %    Seg Neutrophils Absolute, Manual 10.21 (H) 1.20 - 7.00 x10*3/uL    Bands Absolute, Manual 0.50 0.00 - 0.70 x10*3/uL    Lymphocytes Absolute, Manual 1.26 1.20 - 4.80 x10*3/uL    Monocytes Absolute, Manual 0.00 (L)  0.10 - 1.00 x10*3/uL    Eosinophils Absolute, Manual 0.25 0.00 - 0.70 x10*3/uL    Basophils Absolute, Manual 0.00 0.00 - 0.10 x10*3/uL    Metamyelocytes Absolute, Manual 0.13 0.00 - 0.00 x10*3/uL    Myelocytes Absolute, Manual 0.25 0.00 - 0.00 x10*3/uL    Total Cells Counted 100     Neutrophils Absolute, Manual 10.71 (H) 1.20 - 7.70 x10*3/uL    RBC Morphology See Below     Polychromasia Mild     Basophilic Stippling Present    POCT GLUCOSE   Result Value Ref Range    POCT Glucose 363 (H) 74 - 99 mg/dL      Patient recently received an antibiotic (last 12 hours)       Date/Time Action Medication Dose    03/25/25 1540 New Bag    piperacillin-tazobactam (Zosyn) 3.375 g in dextrose (iso) IV 50 mL 3.375 g    03/25/25 0949 New Bag    piperacillin-tazobactam (Zosyn) 3.375 g in dextrose (iso) IV 50 mL 3.375 g           Pt fully evaluated 3/25. Hematocrit 26.2 and hemoglobin 7.3. WBC at 14.8. Added cardio and increased fluids and free water flushes to q4. Plan discussed with interdisciplinary team, continue current and repeat labs in the AM.     Discharge planning discussed with patient and care team. Therapy evaluations ordered. [facility]-  , anticipate HHC/SNF at discharge. Patient aware and agreeable to current plan, continue plan as above.     I spent a total of 50 minutes on the date of the service which included preparing to see the patient, face-to-face patient care, completing clinical documentation, obtaining and/or reviewing separately obtained history, performing a medically appropriate examination, counseling and educating the patient/family/caregiver, ordering medications, tests, or procedures, communicating with other HCPs (not separately reported), independently interpreting results (not separately reported), communicating results to the patient/family/caregiver, and care coordination (not separately reported).      Patient fully evaluated  03/26  for    Problem List Items Addressed This Visit       * (Principal)  Hemoptysis - Primary    Relevant Orders    Esophagogastroduodenoscopy (EGD) (Completed)    Surgical Pathology Exam    Melena    Relevant Orders    Esophagogastroduodenoscopy (EGD) (Completed)    Surgical Pathology Exam     Other Visit Diagnoses       Anemia, unspecified type        Relevant Orders    Esophagogastroduodenoscopy (EGD) (Completed)    Surgical Pathology Exam    Tracheostomy dependent (Multi)        Relevant Orders    Surgical Pathology Exam          Patient seen resting in bed with head of bed elevated, no s/s or c/o acute difficulties at this time.  Vital signs for last 24 hours Temp:  [35.8 °C (96.4 °F)-36.8 °C (98.2 °F)] 35.8 °C (96.4 °F)  Heart Rate:  [59-77] 59  Resp:  [18-20] 18  BP: (130-161)/(59-72) 136/63  FiO2 (%):  [30 %] 30 %    I/O this shift:  In: -   Out: 1300 [Urine:1300]  Patient still requiring frequent cardiac and SPO2 monitoring. Continue aggressive pulmonary hygiene and oral hygiene. Off loading as tolerated for skin integrity. Medications and labs reviewed-   Results for orders placed or performed during the hospital encounter of 03/22/25 (from the past 24 hours)   POCT GLUCOSE   Result Value Ref Range    POCT Glucose 260 (H) 74 - 99 mg/dL   POCT GLUCOSE   Result Value Ref Range    POCT Glucose 259 (H) 74 - 99 mg/dL   POCT GLUCOSE   Result Value Ref Range    POCT Glucose 335 (H) 74 - 99 mg/dL   Comprehensive Metabolic Panel   Result Value Ref Range    Glucose 363 (H) 74 - 99 mg/dL    Sodium 146 (H) 136 - 145 mmol/L    Potassium 4.1 3.5 - 5.3 mmol/L    Chloride 109 (H) 98 - 107 mmol/L    Bicarbonate 32 21 - 32 mmol/L    Anion Gap 9 (L) 10 - 20 mmol/L    Urea Nitrogen 27 (H) 6 - 23 mg/dL    Creatinine 0.96 0.50 - 1.30 mg/dL    eGFR 88 >60 mL/min/1.73m*2    Calcium 8.7 8.6 - 10.3 mg/dL    Albumin 3.0 (L) 3.4 - 5.0 g/dL    Alkaline Phosphatase 281 (H) 33 - 136 U/L    Total Protein 6.2 (L) 6.4 - 8.2 g/dL    AST 48 (H) 9 - 39 U/L    Bilirubin, Total 0.5 0.0 - 1.2 mg/dL     (H) 10  - 52 U/L   CBC and Auto Differential   Result Value Ref Range    WBC 12.6 (H) 4.4 - 11.3 x10*3/uL    nRBC 9.3 (H) 0.0 - 0.0 /100 WBCs    RBC 2.73 (L) 4.50 - 5.90 x10*6/uL    Hemoglobin 7.4 (L) 13.5 - 17.5 g/dL    Hematocrit 26.4 (L) 41.0 - 52.0 %    MCV 97 80 - 100 fL    MCH 27.1 26.0 - 34.0 pg    MCHC 28.0 (L) 32.0 - 36.0 g/dL    RDW 17.4 (H) 11.5 - 14.5 %    Platelets 285 150 - 450 x10*3/uL    Immature Granulocytes %, Automated 8.5 (H) 0.0 - 0.9 %    Immature Granulocytes Absolute, Automated 1.07 (H) 0.00 - 0.70 x10*3/uL   Manual Differential   Result Value Ref Range    Neutrophils %, Manual 81.0 40.0 - 80.0 %    Bands %, Manual 4.0 0.0 - 5.0 %    Lymphocytes %, Manual 10.0 13.0 - 44.0 %    Monocytes %, Manual 0.0 2.0 - 10.0 %    Eosinophils %, Manual 2.0 0.0 - 6.0 %    Basophils %, Manual 0.0 0.0 - 2.0 %    Metamyelocytes %, Manual 1.0 0.0 - 0.0 %    Myelocytes %, Manual 2.0 0.0 - 0.0 %    Seg Neutrophils Absolute, Manual 10.21 (H) 1.20 - 7.00 x10*3/uL    Bands Absolute, Manual 0.50 0.00 - 0.70 x10*3/uL    Lymphocytes Absolute, Manual 1.26 1.20 - 4.80 x10*3/uL    Monocytes Absolute, Manual 0.00 (L) 0.10 - 1.00 x10*3/uL    Eosinophils Absolute, Manual 0.25 0.00 - 0.70 x10*3/uL    Basophils Absolute, Manual 0.00 0.00 - 0.10 x10*3/uL    Metamyelocytes Absolute, Manual 0.13 0.00 - 0.00 x10*3/uL    Myelocytes Absolute, Manual 0.25 0.00 - 0.00 x10*3/uL    Total Cells Counted 100     Neutrophils Absolute, Manual 10.71 (H) 1.20 - 7.70 x10*3/uL    RBC Morphology See Below     Polychromasia Mild     Basophilic Stippling Present    POCT GLUCOSE   Result Value Ref Range    POCT Glucose 363 (H) 74 - 99 mg/dL      Patient recently received an antibiotic (last 12 hours)       Date/Time Action Medication Dose    03/26/25 0903 New Bag    piperacillin-tazobactam (Zosyn) 3.375 g in dextrose (iso) IV 50 mL 3.375 g           Plan discussed with interdisciplinary team, patient with hemoccult positive, protonix 40mg IVP BID, GI  consulted, appreciate input. Patient seen by nephrology today with plan for IV iron and erythropoietin, potential nephrotoxins and hepatotoxins, monitor renal chemistries and CBC continue current and repeat labs in the AM.     Discharge planning discussed with patient and care team. Therapy evaluations ordered. Anticipate HHC/SNF at discharge. Patient aware and agreeable to current plan, continue plan as above.     I spent a total of 50 minutes on the date of the service which included preparing to see the patient, face-to-face patient care, completing clinical documentation, obtaining and/or reviewing separately obtained history, performing a medically appropriate examination, counseling and educating the patient/family/caregiver, ordering medications, tests, or procedures, communicating with other HCPs (not separately reported), independently interpreting results (not separately reported), communicating results to the patient/family/caregiver, and care coordination (not separately reported).   ALEXANDER Vivas    Patient fully evaluated 3/27   for    Problem List Items Addressed This Visit       * (Principal) Hemoptysis - Primary    Relevant Orders    Esophagogastroduodenoscopy (EGD) (Completed)    Surgical Pathology Exam    Melena    Relevant Orders    Esophagogastroduodenoscopy (EGD) (Completed)    Surgical Pathology Exam     Other Visit Diagnoses       Anemia, unspecified type        Relevant Orders    Esophagogastroduodenoscopy (EGD) (Completed)    Surgical Pathology Exam    Tracheostomy dependent (Multi)        Relevant Orders    Surgical Pathology Exam          Patient seen resting in bed with head of bed elevated, no s/s or c/o acute difficulties at this time.  Vital signs for last 24 hours Temp:  [35.8 °C (96.4 °F)-36.8 °C (98.2 °F)] 35.8 °C (96.4 °F)  Heart Rate:  [59-77] 59  Resp:  [18-20] 18  BP: (130-161)/(59-72) 136/63  FiO2 (%):  [30 %] 30 %    I/O this shift:  In: -   Out: 1300  [Urine:1300]  Patient still requiring frequent cardiac and SPO2 monitoring. Continue aggressive pulmonary hygiene and oral hygiene. Off loading as tolerated for skin integrity. Medications and labs reviewed-   Results for orders placed or performed during the hospital encounter of 03/22/25 (from the past 24 hours)   POCT GLUCOSE   Result Value Ref Range    POCT Glucose 260 (H) 74 - 99 mg/dL   POCT GLUCOSE   Result Value Ref Range    POCT Glucose 259 (H) 74 - 99 mg/dL   POCT GLUCOSE   Result Value Ref Range    POCT Glucose 335 (H) 74 - 99 mg/dL   Comprehensive Metabolic Panel   Result Value Ref Range    Glucose 363 (H) 74 - 99 mg/dL    Sodium 146 (H) 136 - 145 mmol/L    Potassium 4.1 3.5 - 5.3 mmol/L    Chloride 109 (H) 98 - 107 mmol/L    Bicarbonate 32 21 - 32 mmol/L    Anion Gap 9 (L) 10 - 20 mmol/L    Urea Nitrogen 27 (H) 6 - 23 mg/dL    Creatinine 0.96 0.50 - 1.30 mg/dL    eGFR 88 >60 mL/min/1.73m*2    Calcium 8.7 8.6 - 10.3 mg/dL    Albumin 3.0 (L) 3.4 - 5.0 g/dL    Alkaline Phosphatase 281 (H) 33 - 136 U/L    Total Protein 6.2 (L) 6.4 - 8.2 g/dL    AST 48 (H) 9 - 39 U/L    Bilirubin, Total 0.5 0.0 - 1.2 mg/dL     (H) 10 - 52 U/L   CBC and Auto Differential   Result Value Ref Range    WBC 12.6 (H) 4.4 - 11.3 x10*3/uL    nRBC 9.3 (H) 0.0 - 0.0 /100 WBCs    RBC 2.73 (L) 4.50 - 5.90 x10*6/uL    Hemoglobin 7.4 (L) 13.5 - 17.5 g/dL    Hematocrit 26.4 (L) 41.0 - 52.0 %    MCV 97 80 - 100 fL    MCH 27.1 26.0 - 34.0 pg    MCHC 28.0 (L) 32.0 - 36.0 g/dL    RDW 17.4 (H) 11.5 - 14.5 %    Platelets 285 150 - 450 x10*3/uL    Immature Granulocytes %, Automated 8.5 (H) 0.0 - 0.9 %    Immature Granulocytes Absolute, Automated 1.07 (H) 0.00 - 0.70 x10*3/uL   Manual Differential   Result Value Ref Range    Neutrophils %, Manual 81.0 40.0 - 80.0 %    Bands %, Manual 4.0 0.0 - 5.0 %    Lymphocytes %, Manual 10.0 13.0 - 44.0 %    Monocytes %, Manual 0.0 2.0 - 10.0 %    Eosinophils %, Manual 2.0 0.0 - 6.0 %    Basophils %,  Manual 0.0 0.0 - 2.0 %    Metamyelocytes %, Manual 1.0 0.0 - 0.0 %    Myelocytes %, Manual 2.0 0.0 - 0.0 %    Seg Neutrophils Absolute, Manual 10.21 (H) 1.20 - 7.00 x10*3/uL    Bands Absolute, Manual 0.50 0.00 - 0.70 x10*3/uL    Lymphocytes Absolute, Manual 1.26 1.20 - 4.80 x10*3/uL    Monocytes Absolute, Manual 0.00 (L) 0.10 - 1.00 x10*3/uL    Eosinophils Absolute, Manual 0.25 0.00 - 0.70 x10*3/uL    Basophils Absolute, Manual 0.00 0.00 - 0.10 x10*3/uL    Metamyelocytes Absolute, Manual 0.13 0.00 - 0.00 x10*3/uL    Myelocytes Absolute, Manual 0.25 0.00 - 0.00 x10*3/uL    Total Cells Counted 100     Neutrophils Absolute, Manual 10.71 (H) 1.20 - 7.70 x10*3/uL    RBC Morphology See Below     Polychromasia Mild     Basophilic Stippling Present    POCT GLUCOSE   Result Value Ref Range    POCT Glucose 363 (H) 74 - 99 mg/dL      Patient recently received an antibiotic (last 12 hours)       Date/Time Action Medication Dose    03/27/25 1517 New Bag    piperacillin-tazobactam (Zosyn) 3.375 g in dextrose (iso) IV 50 mL 3.375 g           Patient fully evaluated 3/27. Patient in bed and family in room upon exam. Vitals today include Temp:  [35.1 °C (95.2 °F)-36.6 °C (97.9 °F)] 36.3 °C (97.3 °F), Heart Rate:  [51-67] 51, Resp:  [16-23] 18, BP: ()/(50-65) 96/53, FiO2 (%):  [30 %-50 %] 30 %. Pertinent labs today include WBC 11.9, Hbg 6.8, plts 212. Na 147, K 3.4, Cl 107, HCO3- 34, BUN 36, Cr 1.19. glucose 318. Patient declined blood transfusion, starting Procrit to try to increase counts. Patient underwent EGD today, findings include small hiatal hernia and signs of gastric and duodenal irritation. Continue to monitor.     Plan discussed with interdisciplinary team, continue current and repeat labs in the AM. Discharge planning discussed with patient and care team. Patient aware and agreeable to current plan, continue plan as above.     I spent a total of 50 minutes on the date of the service which included preparing to see  the patient, face-to-face patient care, completing clinical documentation, obtaining and/or reviewing separately obtained history, performing a medically appropriate examination, counseling and educating the patient/family/caregiver, ordering medications, tests, or procedures, communicating with other HCPs (not separately reported), independently interpreting results (not separately reported), communicating results to the patient/family/caregiver, and care coordination (not separately reported).     ALEXANDER Feliciano    Patient fully evaluated  03/28  for    Problem List Items Addressed This Visit       * (Principal) Hemoptysis - Primary    Relevant Orders    Esophagogastroduodenoscopy (EGD) (Completed)    Surgical Pathology Exam    Melena    Relevant Orders    Esophagogastroduodenoscopy (EGD) (Completed)    Surgical Pathology Exam     Other Visit Diagnoses       Anemia, unspecified type        Relevant Orders    Esophagogastroduodenoscopy (EGD) (Completed)    Surgical Pathology Exam    Tracheostomy dependent (Multi)        Relevant Orders    Surgical Pathology Exam          Patient seen resting in bed with head of bed elevated, no s/s or c/o acute difficulties at this time.  Vital signs for last 24 hours Temp:  [35.8 °C (96.4 °F)-36.8 °C (98.2 °F)] 35.8 °C (96.4 °F)  Heart Rate:  [59-77] 59  Resp:  [18-20] 18  BP: (130-161)/(59-72) 136/63  FiO2 (%):  [30 %] 30 %    I/O this shift:  In: -   Out: 1300 [Urine:1300]  Patient still requiring frequent cardiac and SPO2 monitoring. Continue aggressive pulmonary hygiene and oral hygiene. Off loading as tolerated for skin integrity. Medications and labs reviewed-   Results for orders placed or performed during the hospital encounter of 03/22/25 (from the past 24 hours)   POCT GLUCOSE   Result Value Ref Range    POCT Glucose 260 (H) 74 - 99 mg/dL   POCT GLUCOSE   Result Value Ref Range    POCT Glucose 259 (H) 74 - 99 mg/dL   POCT GLUCOSE   Result Value Ref Range    POCT  Glucose 335 (H) 74 - 99 mg/dL   Comprehensive Metabolic Panel   Result Value Ref Range    Glucose 363 (H) 74 - 99 mg/dL    Sodium 146 (H) 136 - 145 mmol/L    Potassium 4.1 3.5 - 5.3 mmol/L    Chloride 109 (H) 98 - 107 mmol/L    Bicarbonate 32 21 - 32 mmol/L    Anion Gap 9 (L) 10 - 20 mmol/L    Urea Nitrogen 27 (H) 6 - 23 mg/dL    Creatinine 0.96 0.50 - 1.30 mg/dL    eGFR 88 >60 mL/min/1.73m*2    Calcium 8.7 8.6 - 10.3 mg/dL    Albumin 3.0 (L) 3.4 - 5.0 g/dL    Alkaline Phosphatase 281 (H) 33 - 136 U/L    Total Protein 6.2 (L) 6.4 - 8.2 g/dL    AST 48 (H) 9 - 39 U/L    Bilirubin, Total 0.5 0.0 - 1.2 mg/dL     (H) 10 - 52 U/L   CBC and Auto Differential   Result Value Ref Range    WBC 12.6 (H) 4.4 - 11.3 x10*3/uL    nRBC 9.3 (H) 0.0 - 0.0 /100 WBCs    RBC 2.73 (L) 4.50 - 5.90 x10*6/uL    Hemoglobin 7.4 (L) 13.5 - 17.5 g/dL    Hematocrit 26.4 (L) 41.0 - 52.0 %    MCV 97 80 - 100 fL    MCH 27.1 26.0 - 34.0 pg    MCHC 28.0 (L) 32.0 - 36.0 g/dL    RDW 17.4 (H) 11.5 - 14.5 %    Platelets 285 150 - 450 x10*3/uL    Immature Granulocytes %, Automated 8.5 (H) 0.0 - 0.9 %    Immature Granulocytes Absolute, Automated 1.07 (H) 0.00 - 0.70 x10*3/uL   Manual Differential   Result Value Ref Range    Neutrophils %, Manual 81.0 40.0 - 80.0 %    Bands %, Manual 4.0 0.0 - 5.0 %    Lymphocytes %, Manual 10.0 13.0 - 44.0 %    Monocytes %, Manual 0.0 2.0 - 10.0 %    Eosinophils %, Manual 2.0 0.0 - 6.0 %    Basophils %, Manual 0.0 0.0 - 2.0 %    Metamyelocytes %, Manual 1.0 0.0 - 0.0 %    Myelocytes %, Manual 2.0 0.0 - 0.0 %    Seg Neutrophils Absolute, Manual 10.21 (H) 1.20 - 7.00 x10*3/uL    Bands Absolute, Manual 0.50 0.00 - 0.70 x10*3/uL    Lymphocytes Absolute, Manual 1.26 1.20 - 4.80 x10*3/uL    Monocytes Absolute, Manual 0.00 (L) 0.10 - 1.00 x10*3/uL    Eosinophils Absolute, Manual 0.25 0.00 - 0.70 x10*3/uL    Basophils Absolute, Manual 0.00 0.00 - 0.10 x10*3/uL    Metamyelocytes Absolute, Manual 0.13 0.00 - 0.00 x10*3/uL     Myelocytes Absolute, Manual 0.25 0.00 - 0.00 x10*3/uL    Total Cells Counted 100     Neutrophils Absolute, Manual 10.71 (H) 1.20 - 7.70 x10*3/uL    RBC Morphology See Below     Polychromasia Mild     Basophilic Stippling Present    POCT GLUCOSE   Result Value Ref Range    POCT Glucose 363 (H) 74 - 99 mg/dL      Patient recently received an antibiotic (last 12 hours)       Date/Time Action Medication Dose    03/28/25 0915 New Bag    piperacillin-tazobactam (Zosyn) 3.375 g in dextrose (iso) IV 50 mL 3.375 g    03/28/25 0239 New Bag    piperacillin-tazobactam (Zosyn) 3.375 g in dextrose (iso) IV 50 mL 3.375 g           Plan discussed with interdisciplinary team, no blood transfusion as patient is [place], seen by nephrology today and epogen increased to aid in H/H, renal chemistries down trending, liver function improving, no further hemoptysis noted. WBC elevated today @ 14.0 from 11.9,  chest xray ordered, will  continue IV antibiotics, monitor for bleeding, will continue current and repeat labs in the AM.     Discharge planning discussed with patient and care team. Therapy evaluations ordered. Anticipate HHC/SNF at discharge. Patient aware and agreeable to current plan, continue plan as above.     I spent a total of 50 minutes on the date of the service which included preparing to see the patient, face-to-face patient care, completing clinical documentation, obtaining and/or reviewing separately obtained history, performing a medically appropriate examination, counseling and educating the patient/family/caregiver, ordering medications, tests, or procedures, communicating with other HCPs (not separately reported), independently interpreting results (not separately reported), communicating results to the patient/family/caregiver, and care coordination (not separately reported).     Patient fully evaluated  03/29  for    Problem List Items Addressed This Visit       * (Principal) Hemoptysis - Primary     Relevant Orders    Esophagogastroduodenoscopy (EGD) (Completed)    Surgical Pathology Exam    Melena    Relevant Orders    Esophagogastroduodenoscopy (EGD) (Completed)    Surgical Pathology Exam     Other Visit Diagnoses       Anemia, unspecified type        Relevant Orders    Esophagogastroduodenoscopy (EGD) (Completed)    Surgical Pathology Exam    Tracheostomy dependent (Multi)        Relevant Orders    Surgical Pathology Exam          Patient seen resting in bed with head of bed elevated, no s/s or c/o acute difficulties at this time.  Vital signs for last 24 hours Temp:  [35.8 °C (96.4 °F)-36.8 °C (98.2 °F)] 35.8 °C (96.4 °F)  Heart Rate:  [59-77] 59  Resp:  [18-20] 18  BP: (130-161)/(59-72) 136/63  FiO2 (%):  [30 %] 30 %    I/O this shift:  In: -   Out: 1300 [Urine:1300]  Patient still requiring frequent cardiac and SPO2 monitoring. Continue aggressive pulmonary hygiene and oral hygiene. Off loading as tolerated for skin integrity. Medications and labs reviewed-   Results for orders placed or performed during the hospital encounter of 03/22/25 (from the past 24 hours)   POCT GLUCOSE   Result Value Ref Range    POCT Glucose 260 (H) 74 - 99 mg/dL   POCT GLUCOSE   Result Value Ref Range    POCT Glucose 259 (H) 74 - 99 mg/dL   POCT GLUCOSE   Result Value Ref Range    POCT Glucose 335 (H) 74 - 99 mg/dL   Comprehensive Metabolic Panel   Result Value Ref Range    Glucose 363 (H) 74 - 99 mg/dL    Sodium 146 (H) 136 - 145 mmol/L    Potassium 4.1 3.5 - 5.3 mmol/L    Chloride 109 (H) 98 - 107 mmol/L    Bicarbonate 32 21 - 32 mmol/L    Anion Gap 9 (L) 10 - 20 mmol/L    Urea Nitrogen 27 (H) 6 - 23 mg/dL    Creatinine 0.96 0.50 - 1.30 mg/dL    eGFR 88 >60 mL/min/1.73m*2    Calcium 8.7 8.6 - 10.3 mg/dL    Albumin 3.0 (L) 3.4 - 5.0 g/dL    Alkaline Phosphatase 281 (H) 33 - 136 U/L    Total Protein 6.2 (L) 6.4 - 8.2 g/dL    AST 48 (H) 9 - 39 U/L    Bilirubin, Total 0.5 0.0 - 1.2 mg/dL     (H) 10 - 52 U/L   CBC and Auto  Differential   Result Value Ref Range    WBC 12.6 (H) 4.4 - 11.3 x10*3/uL    nRBC 9.3 (H) 0.0 - 0.0 /100 WBCs    RBC 2.73 (L) 4.50 - 5.90 x10*6/uL    Hemoglobin 7.4 (L) 13.5 - 17.5 g/dL    Hematocrit 26.4 (L) 41.0 - 52.0 %    MCV 97 80 - 100 fL    MCH 27.1 26.0 - 34.0 pg    MCHC 28.0 (L) 32.0 - 36.0 g/dL    RDW 17.4 (H) 11.5 - 14.5 %    Platelets 285 150 - 450 x10*3/uL    Immature Granulocytes %, Automated 8.5 (H) 0.0 - 0.9 %    Immature Granulocytes Absolute, Automated 1.07 (H) 0.00 - 0.70 x10*3/uL   Manual Differential   Result Value Ref Range    Neutrophils %, Manual 81.0 40.0 - 80.0 %    Bands %, Manual 4.0 0.0 - 5.0 %    Lymphocytes %, Manual 10.0 13.0 - 44.0 %    Monocytes %, Manual 0.0 2.0 - 10.0 %    Eosinophils %, Manual 2.0 0.0 - 6.0 %    Basophils %, Manual 0.0 0.0 - 2.0 %    Metamyelocytes %, Manual 1.0 0.0 - 0.0 %    Myelocytes %, Manual 2.0 0.0 - 0.0 %    Seg Neutrophils Absolute, Manual 10.21 (H) 1.20 - 7.00 x10*3/uL    Bands Absolute, Manual 0.50 0.00 - 0.70 x10*3/uL    Lymphocytes Absolute, Manual 1.26 1.20 - 4.80 x10*3/uL    Monocytes Absolute, Manual 0.00 (L) 0.10 - 1.00 x10*3/uL    Eosinophils Absolute, Manual 0.25 0.00 - 0.70 x10*3/uL    Basophils Absolute, Manual 0.00 0.00 - 0.10 x10*3/uL    Metamyelocytes Absolute, Manual 0.13 0.00 - 0.00 x10*3/uL    Myelocytes Absolute, Manual 0.25 0.00 - 0.00 x10*3/uL    Total Cells Counted 100     Neutrophils Absolute, Manual 10.71 (H) 1.20 - 7.70 x10*3/uL    RBC Morphology See Below     Polychromasia Mild     Basophilic Stippling Present    POCT GLUCOSE   Result Value Ref Range    POCT Glucose 363 (H) 74 - 99 mg/dL      Patient recently received an antibiotic (last 12 hours)       Date/Time Action Medication Dose    03/29/25 0911 New Bag    piperacillin-tazobactam (Zosyn) 3.375 g in dextrose (iso) IV 50 mL 3.375 g    03/29/25 0148 New Bag    piperacillin-tazobactam (Zosyn) 3.375 g in dextrose (iso) IV 50 mL 3.375 g           Plan discussed with  interdisciplinary team, will deescalate medications, IV steroids to oral, will continue IV antibiotics, monitor overnight. Will  continue current and repeat labs in the AM.     Discharge planning discussed with patient and care team. Therapy evaluations ordered. Anticipate SNF at discharge. Patient aware and agreeable to current plan, continue plan as above.     I spent a total of 50 minutes on the date of the service which included preparing to see the patient, face-to-face patient care, completing clinical documentation, obtaining and/or reviewing separately obtained history, performing a medically appropriate examination, counseling and educating the patient/family/caregiver, ordering medications, tests, or procedures, communicating with other HCPs (not separately reported), independently interpreting results (not separately reported), communicating results to the patient/family/caregiver, and care coordination (not separately reported).

## 2025-03-29 NOTE — PROGRESS NOTES
"Bryan Nix is a 64 y.o. male on day 7 of admission presenting with Hemoptysis.    Subjective   Patient is in his bed.  Patient has tracheostomy tube in place.  No fever or chills.       Objective     Physical Exam  Head and face no deformities  Oropharynx normal mucosa  Neck is supple no thyromegaly  Chest is symmetric no crackles  Heart is regular no murmurs  Abdomen is soft and nontender  Skin is intact  Joints are normal  Neurologically patient is moving all 4 limbs.  Last Recorded Vitals  Blood pressure 136/63, pulse 59, temperature 35.8 °C (96.4 °F), resp. rate 18, height 1.88 m (6' 2\"), weight 77.1 kg (170 lb), SpO2 95%.  Intake/Output last 3 Shifts:  I/O last 3 completed shifts:  In: 1716 (22.3 mL/kg) [NG/GT:1566; IV Piggyback:150]  Out: 1700 (22 mL/kg) [Urine:400 (0.1 mL/kg/hr); Stool:1300]  Weight: 77.1 kg                     Assessment/Plan      Chronic respiratory failure tracheostomy tube in place.  Hemoptysis which has resolved.  History of CVA.  Oropharyngeal dysphagia risk for aspiration.    Plan:  Careful suctioning to avoid trauma to the airway.  Titrate oxygen for saturation above 90%.  Tracheostomy tube care.  Aspiration precautions.  DVT prophylaxis.      Georgi Madden MD      "

## 2025-03-29 NOTE — ASSESSMENT & PLAN NOTE
Zeus Bone MD  Physician  Internal Medicine     H&P      Signed     Date of Service: 3/23/2025 10:28 AM     Signed       Expand All Collapse All    History Of Present Illness  Bryan Nix is a 64-year-old male with past medical history of atrial flutter on Eliquis, hyponatremia, IDDM, CAD, hypertension, chronic wounds, diffuse lymphadenopathy, anemia, history of smoking, history elevated PSA, CVA L hemisphere (2020) with right-sided deficits, Left ICA stenosis (85%) s/p angioplasty and left carotid artery stent (08/25/2023) and recent history of acute pontine stroke 02/27/25 with acute hypoxic respiratory failure s/p tracheostomy on 2/25/2025 and PEG.  Patient presents for bleeding from tracheostomy.  Patient is alert and oriented x 3, however limited verbal communication due to tracheostomy.  Wife is at bedside and assists with history.  She reports that patient was coughing blood out of his tracheostomy.  ER provider felt bleeding was likely superficial around site.  Patient has been on room air at skilled nursing facility.  He was placed on Airvo in the ED mainly to humidify oxygen.  Patient never desaturated per ER report.  Currently he is resting comfortably in the bed, breath sounds slightly rhonchorous, but unlabored.  Reports chills, but no fevers.  He has right-sided weakness and sensory deficits.  Denies headache, vision or hearing changes, chest pains, palpitations, shortness of breath, nausea, vomiting, abdominal pain, diarrhea, or complications of PEG tube site.  Wife reports patient skin became irritated in his groin due to adhesive from a condom cath previously, he has a small decubitus wound that she reports is healing well.  He receives tube feeds continuously.  Wife reports a patient was recently evaluated by speech for p.o. to intake, however he still remains n.p.o. except for meds and tube feeds through PEG. CODE STATUS reviewed: full code     ER Course: Hemodynamically stable,  afebrile, SpO2 to 97% trach mask.  WBC 9.3, hemoglobin 8, hematocrit 29.4, platelets 197.  INR 1.4.  Glucose 223, BUN 36, otherwise CMP is normal.  CT of the chest for PE pending.  CXR unremarkable. Trannexamic acid soak gauze wrapped around trach.      Past medical history: As above  Past surgical history: As above  Social history: Former smoker 37.5 pack years-quit smoking age 63, occasional alcohol use, no illicit drug use.  .  Works in maintenance.  6 children.  Family history: Noncontributory     Past Medical History  Medical History        Past Medical History:   Diagnosis Date    Abnormal finding of blood chemistry, unspecified 05/18/2016     Abnormal blood chemistry    Acute upper respiratory infection, unspecified 12/02/2015     Acute upper respiratory infection    Elevated prostate specific antigen (PSA)       Elevated prostate specific antigen (PSA)    Encounter for screening for malignant neoplasm of colon 01/30/2021     Colon cancer screening    Encounter for screening for malignant neoplasm of prostate 11/30/2020     Prostate cancer screening    Essential (primary) hypertension 07/30/2013     Benign essential hypertension    Other conditions influencing health status 07/30/2013     Diabetes Mellitus Poorly Controlled    Other conditions influencing health status 12/02/2015     History of cough    Personal history of other endocrine, nutritional and metabolic disease 03/18/2021     History of hyperlipidemia    Personal history of other endocrine, nutritional and metabolic disease 03/18/2021     History of diabetes mellitus    Personal history of other specified conditions 05/18/2016     History of chest pain    Personal history of other specified conditions 05/18/2016     History of dyspnea    Personal history of other specified conditions 05/18/2016     History of dizziness    Personal history of other specified conditions 05/18/2016     History of fatigue            Surgical History  Surgical  History         Past Surgical History:   Procedure Laterality Date    CT ANGIO CORONARY ART WITH HEARTFLOW IF SCORE >30%   8/23/2023     CT HEART CORONARY ANGIOGRAM 8/23/2023 [facility] CT    MR HEAD ANGIO WO IV CONTRAST   12/14/2020     MR HEAD ANGIO WO IV CONTRAST 12/14/2020 [facility]    MR NECK ANGIO WO IV CONTRAST   12/14/2020     MR NECK ANGIO WO IV CONTRAST 12/14/2020 [facility]    OTHER SURGICAL HISTORY   12/01/2020     Hand fracture repair    OTHER SURGICAL HISTORY   12/01/2020     Nasal bone fracture repair            Social History  He reports that he has quit smoking. His smoking use included cigarettes. He has never used smokeless tobacco. No history on file for alcohol use and drug use.     Family History  Family History   No family history on file.        Allergies  Patient has no known allergies.     Review of Systems     Physical Exam  Constitutional:       General: He is awake. He is not in acute distress.     Appearance: Normal appearance. He is not toxic-appearing.   HENT:      Head: Atraumatic.      Nose: Nose normal.      Mouth/Throat:      Mouth: Mucous membranes are moist.   Eyes:      Conjunctiva/sclera: Conjunctivae normal.   Cardiovascular:      Rate and Rhythm: Normal rate and regular rhythm.      Pulses: Normal pulses.      Heart sounds: No murmur heard.  Pulmonary:      Effort: No respiratory distress.      Comments: Tracheostomy site midline, no drainage around dressing.  Rhonchorous breath sounds, unlabored respirations, RT bedside suctioning patient with thick blood-tinged sputum  Abdominal:      General: Bowel sounds are normal. There is no distension.      Palpations: Abdomen is soft.      Tenderness: There is no abdominal tenderness. There is no guarding.      Comments: Left upper quadrant PEG site tube site, no drainage or skin breakdown around tube site.   Musculoskeletal:         General: No swelling, deformity or signs of injury.      Cervical back: Neck  "supple.      Right lower leg: No edema.      Left lower leg: No edema.   Skin:     General: Skin is warm and dry.      Capillary Refill: Capillary refill takes less than 2 seconds.      Findings: Wound (sacrum) present. No ecchymosis or erythema.   Neurological:      Mental Status: He is alert and oriented to person, place, and time.      Cranial Nerves: No facial asymmetry.      Sensory: Sensory deficit (RUE, RLE) present.      Motor: Weakness present.      Comments: Right sided upper and lower extremity motor function deficits   Psychiatric:         Mood and Affect: Mood normal.         Behavior: Behavior is cooperative.            Last Recorded Vitals  Blood pressure 134/50, pulse 93, temperature 36.9 °C (98.4 °F), temperature source Temporal, resp. rate 22, height 1.88 m (6' 2\"), weight 77.1 kg (170 lb), SpO2 98%.     Relevant Results              Results for orders placed or performed during the hospital encounter of 03/22/25 (from the past 24 hours)   CBC and Auto Differential   Result Value Ref Range     WBC 9.3 4.4 - 11.3 x10*3/uL     nRBC 0.3 (H) 0.0 - 0.0 /100 WBCs     RBC 3.02 (L) 4.50 - 5.90 x10*6/uL     Hemoglobin 8.0 (L) 13.5 - 17.5 g/dL     Hematocrit 29.4 (L) 41.0 - 52.0 %     MCV 97 80 - 100 fL     MCH 26.5 26.0 - 34.0 pg     MCHC 27.2 (L) 32.0 - 36.0 g/dL     RDW 15.6 (H) 11.5 - 14.5 %     Platelets 197 150 - 450 x10*3/uL     Neutrophils % 77.1 40.0 - 80.0 %     Immature Granulocytes %, Automated 1.5 (H) 0.0 - 0.9 %     Lymphocytes % 13.1 13.0 - 44.0 %     Monocytes % 4.1 2.0 - 10.0 %     Eosinophils % 3.2 0.0 - 6.0 %     Basophils % 1.0 0.0 - 2.0 %     Neutrophils Absolute 7.14 1.20 - 7.70 x10*3/uL     Immature Granulocytes Absolute, Automated 0.14 0.00 - 0.70 x10*3/uL     Lymphocytes Absolute 1.21 1.20 - 4.80 x10*3/uL     Monocytes Absolute 0.38 0.10 - 1.00 x10*3/uL     Eosinophils Absolute 0.30 0.00 - 0.70 x10*3/uL     Basophils Absolute 0.09 0.00 - 0.10 x10*3/uL   Basic metabolic panel   Result " Value Ref Range     Glucose 223 (H) 74 - 99 mg/dL     Sodium 142 136 - 145 mmol/L     Potassium 4.3 3.5 - 5.3 mmol/L     Chloride 103 98 - 107 mmol/L     Bicarbonate 27 21 - 32 mmol/L     Anion Gap 16 10 - 20 mmol/L     Urea Nitrogen 36 (H) 6 - 23 mg/dL     Creatinine 0.81 0.50 - 1.30 mg/dL     eGFR >90 >60 mL/min/1.73m*2     Calcium 9.6 8.6 - 10.3 mg/dL   Protime-INR   Result Value Ref Range     Protime 15.1 (H) 9.8 - 12.4 seconds     INR 1.4 (H) 0.9 - 1.1   aPTT   Result Value Ref Range     aPTT 25 (L) 26 - 36 seconds   Type and Screen   Result Value Ref Range     ABO TYPE B       Rh TYPE POS       ANTIBODY SCREEN NEG     VERIFY ABO/Rh Group Test   Result Value Ref Range     ABO TYPE B       Rh TYPE POS     MRSA Surveillance for Vancomycin De-escalation, PCR     Specimen: Anterior Nares; Swab   Result Value Ref Range     MRSA PCR Not Detected Not Detected   POCT GLUCOSE   Result Value Ref Range     POCT Glucose 180 (H) 74 - 99 mg/dL   POCT GLUCOSE   Result Value Ref Range     POCT Glucose 161 (H) 74 - 99 mg/dL   POCT GLUCOSE   Result Value Ref Range     POCT Glucose 156 (H) 74 - 99 mg/dL   Basic metabolic panel   Result Value Ref Range     Glucose 170 (H) 74 - 99 mg/dL     Sodium 147 (H) 136 - 145 mmol/L     Potassium 4.2 3.5 - 5.3 mmol/L     Chloride 107 98 - 107 mmol/L     Bicarbonate 30 21 - 32 mmol/L     Anion Gap 14 10 - 20 mmol/L     Urea Nitrogen 35 (H) 6 - 23 mg/dL     Creatinine 0.85 0.50 - 1.30 mg/dL     eGFR >90 >60 mL/min/1.73m*2     Calcium 9.4 8.6 - 10.3 mg/dL   CBC   Result Value Ref Range     WBC 10.4 4.4 - 11.3 x10*3/uL     nRBC 0.6 (H) 0.0 - 0.0 /100 WBCs     RBC 2.87 (L) 4.50 - 5.90 x10*6/uL     Hemoglobin 7.8 (L) 13.5 - 17.5 g/dL     Hematocrit 26.6 (L) 41.0 - 52.0 %     MCV 93 80 - 100 fL     MCH 27.2 26.0 - 34.0 pg     MCHC 29.3 (L) 32.0 - 36.0 g/dL     RDW 15.7 (H) 11.5 - 14.5 %     Platelets 205 150 - 450 x10*3/uL   SST TOP   Result Value Ref Range     Extra Tube Hold for add-ons.        CT  angio chest for pulmonary embolism     Result Date: 3/22/2025  Interpreted By:  Pamela Zurita, STUDY: CT ANGIO CHEST FOR PULMONARY EMBOLISM;  3/22/2025 4:56 pm   INDICATION: Signs/Symptoms:bloody secretions, eval for PE.     COMPARISON: CT chest without contrast 03/22/2025   ACCESSION NUMBER(S): [id number]   ORDERING CLINICIAN: PAMELA HAHN   TECHNIQUE: Contiguous axial images of the chest were obtained after the intravenous administration of iodinated contrast using angiographic PE protocol. Coronal and sagittal reformatted images were reconstructed from the axial data. MIP images were created on an independent workstation and reviewed.   FINDINGS:   MEDIASTINUM AND LYMPH NODES: Inflammatory fat stranding along the tracheostomy tract without discrete fluid collection. The esophageal wall appears within normal limits. There is redemonstration of diffuse lymphadenopathy in the bilateral axilla, mediastinum, bilateral supraclavicular as described in detail on separate report. Also notable or enlarged bilateral level 4 cervical lymph nodes (right > left).   VESSELS:  Normal caliber thoracic aorta without dissection. Moderate aortic atherosclerosis.  No acute pulmonary embolism.   HEART: Normal size.  Mild coronary artery calcifications. No significant pericardial effusion.   LUNG, AIRWAYS, PLEURA: There is slight increase in debris within the trachea. There has been clearing of secretions in the left upper/lower lobe bronchus. There is diffuse bilateral bronchial thickening slightly increased from prior study. There is peribronchovascular ground-glass opacity in the posterior right upper lobe and superior and basal segments of the right lower lobe. There is slightly worsened atelectasis in the lung bases remaining predominantly subsegmental in nature. There is a 0.8 cm nodule in the left upper lobe that is stable from 08/19/2023.   OSSEOUS STRUCTURES: No acute osseous abnormality.   CHEST WALL SOFT TISSUES: No  discernible acute abnormality.   UPPER ABDOMEN/OTHER: No acute abnormality.        No acute pulmonary embolism.   Peribronchial vascular ground glass opacities in the right upper and lower lobe again concerning for pneumonia given patient's risk factors of tracheostomy and debris in the airways.   Redemonstration of diffuse lymphadenopathy in the lower necks, axilla, and mediastinum. Findings are either diffusely reactive in nature, reflective of lymphoma/leukemia, meter deficiency, or less likely head neck neoplasm is previously postulated. However please correlate with past medical history.   Inflammatory changes along the tracheostomy tract without fluid collection. Correlate for erythema.   Unchanged 0.8 cm nodule in the left upper lobe dating back to 08/19/2023. Recommend 1 year follow up chest CT to ensure at least 2 years of stability.   MACRO: None.   Signed by: Pamela Zurita 3/22/2025 7:17 PM Dictation workstation:   [id number]     CT chest wo IV contrast     Result Date: 3/22/2025  Interpreted By:  Pamela Zurita, STUDY: CT CHEST WO IV CONTRAST;  3/22/2025 3:41 pm   INDICATION: Signs/Symptoms:eval for alveolar hemorrhage.     COMPARISON: CT head/neck 08/19/2023   ACCESSION NUMBER(S): [id number]   ORDERING CLINICIAN: PAMELA HAHN   TECHNIQUE: Contiguous axial images of the chest and upper abdomen were obtained without contrast. Coronal and sagittal reformatted images were reconstructed from the axial data.   FINDINGS:   MEDIASTINUM AND LYMPH NODES: There is fat stranding/edema along the tract of the tracheostomy about discrete fluid collection. The esophageal wall appears within normal limits. There are numerous enlarged bilateral supraclavicular lymph nodes measuring up to 1.4 cm on the left. There are numerous enlarged bilateral axillary lymph nodes measuring up to 1.1 cm on the right and 1.2 cm on the left. There are numerous prominent to mildly enlarged mediastinal lymph nodes as well. No  pneumomediastinum.   VESSELS:  Normal caliber thoracic aorta. Moderate aortic atherosclerosis. The main pulmonary artery is normal in size.   HEART: Normal size.  Mild coronary artery calcifications. No significant pericardial effusion.   LUNG, AIRWAYS, PLEURA: Tracheostomy noted in appropriate position. There is trace mucus in the left mainstem bronchus and at the origin of the left upper lobe and lower lobe bronchi. There are mild peribronchovascular ground-glass opacities in the posterior segment of the right upper lobe, basal segments of the right lower lobe. There is mild dependent bibasilar atelectasis. There is no pleural effusion.   OSSEOUS STRUCTURES: No acute osseous abnormality.   CHEST WALL SOFT TISSUES: No discernible acute abnormality.   UPPER ABDOMEN/OTHER: No acute abnormality.        1. Mild peribronchovascular ground-glass opacities in the posterior right upper lobe and basal segments of the right lower lobe. This appearance and distribution is not typical for alveolar hemorrhage which tends to be centrilobular and bilateral asymmetric in distribution. Findings are most suggestive of pneumonia.   2. Small amount of mucous debris in the left mainstem bronchus and at the origin of the left upper and lower lobe bronchi.   3. Diffuse lymphadenopathy in the bilateral axillary regions, mediastinum and left supraclavicular region particularly notable in the bilateral supraclavicular region. This is progressed in the left supraclavicular region and new elsewhere when compared to 08/19/2023 CTA head/neck. Correlate for any history of known head/neck malignancy or lymphoma.   4. Inflammation/fat stranding adjacent to the tracheostomy tube entry site noted. Correlate for air the met. No evidence of fluid collection.   MACRO: None.   Signed by: Marco A Zurita 3/22/2025 7:10 PM Dictation workstation:   [id number]     XR chest 1 view     Result Date: 3/22/2025  Interpreted By:  Fermin Stroud, STUDY: XR CHEST  1 VIEW   INDICATION: Signs/Symptoms:hemoptysis.   COMPARISON: August 19, 2023   ACCESSION NUMBER(S): [id number]   ORDERING CLINICIAN: PAMELA HAHN   FINDINGS: No consolidation, effusion, edema, or pneumothorax. Heart size within normal limits.        No evidence of acute intrathoracic abnormality.   Signed by: Fermin Stroud 3/22/2025 2:01 PM Dictation workstation:   [id number]     XR chest 1 view     Result Date: 3/14/2025  * * *Final Report* * * DATE OF EXAM: Mar 14 2025  8:52AM   S5X   5290  -  XR CHEST 1V FRONTAL   / ACCESSION #  [id number] PROCEDURE REASON: new bleeding from trach      * * * * Physician Interpretation * * * *  EXAMINATION:  CHEST RADIOGRAPH (PORTABLE SINGLE VIEW AP) Exam Date/Time:  3/14/2025 8:52 AM Clinical History:  new bleeding from trach Comparison:  03/03/2025 RESULT: LINES, TUBES, AND DEVICES:  Tracheostomy. CARDIOMEDIASTINAL SILHOUETTE:  The cardiac and mediastinal silhouettes appear unremarkable. LUNGS AND PLEURA: No consolidation.  No pleural effusion. No pulmonary vascular congestion. No pneumothorax identified. OTHER:  N/A     IMPRESSION: No acute abnormality identified. : [facility]   Transcribe Date/Time: Mar 14 2025  9:07A Dictated by : PAMELA EVANS MD This examination was interpreted and the report reviewed and electronically signed by: PAMELA EVANS MD on Mar 14 2025  9:11AM  EST     US abdomen complete     Result Date: 3/11/2025  ABDOMEN ULTRASOUND-COMPLETE FINDINGS: Abdomen Ultrasound Complete: PROCEDURE: Multiple grayscale mages of the abdomen were obtained. FINDINGS:Multiple real time images demonstrate the liver with increased echogenicity consistent with fatty infiltration. There is no evidence of a discrete mass within the liver. The liver is enlarged, measured at 15.7 cm in size. The gallbladder is seen with no evidence of cholelithiasis. The gallbladder wall is within normal limits. The common bile duct is within normal limits. The visualized pancreas  appears unremarkable. Both kidneys are seen with no evidence of hydronephrosis or a calculus. The spleen has its normal homogeneous echo appearance. There is no free fluid to suggest ascites. The visualized aorta appears unremarkable. The inferior vena cava appears patent. CONCLUSION: Enlarged fatty liver. ELECTRONICALLY SIGNED BY LEONEL LAN M.D. 3/11/2025 5:00:52 PM EDT.     XR chest 1 view     Result Date: 3/3/2025  * * *Final Report* * * DATE OF EXAM: Mar  3 2025  1:40PM   S5X   5290  -  XR CHEST 1V FRONTAL   / ACCESSION #  [id number] PROCEDURE REASON: resp failure      * * * * Physician Interpretation * * * *  EXAMINATION:  CHEST RADIOGRAPH (PORTABLE SINGLE VIEW AP) Exam Date/Time:  3/3/2025 1:40 PM Indication:   resp failure M:  XCP_4 Comparison:  1 day prior RESULT: Lines, tubes, and devices:  Tracheostomy tube remains in situ. Lungs and pleura:  Mild patchy opacities right lower lung zone seen on the prior study are no longer appreciated.  No confluent opacity.   Costophrenic angles are clear.  No pneumothorax. Cardiomediastinal silhouette:  Stable cardiomediastinal silhouette. Other:  -     IMPRESSION: Improved aeration right lower lung zone.  No acute cardiopulmonary finding. : [facility]   Transcribe Date/Time: Mar  3 2025  2:49P Dictated by : ISRAEL BENSON MD This examination was interpreted and the report reviewed and electronically signed by: ISRAEL BENSON MD on Mar  3 2025  2:49PM  EST     XR chest 1 view     Result Date: 3/2/2025  * * *Final Report* * * DATE OF EXAM: Mar  2 2025 11:25AM   MMX   5376  -  XR CHEST 1V FRONTAL PORT  / ACCESSION #  [id number] PROCEDURE REASON: Shortness of breath      * * * * Physician Interpretation * * * *  EXAMINATION:  CHEST RADIOGRAPH (PORTABLE SINGLE VIEW AP) Exam Date/Time:  3/2/2025 11:25 AM CLINICAL HISTORY: Shortness of breath MQ:  XCPR_5 Comparison:  02/20/2025. RESULT: This is a limited examination due to multiple wires and tubing  obscuring the lower lungs. Lines, tubes, and devices:  A tracheostomy tube remains in place. Lungs and pleura:  There are increased bronchovascular markings in the right lower lung which may be due to bronchitis or early changes of aspiration pneumonia.  Clinical correlation and follow-up exams are recommended. Cardiomediastinal silhouette:  Stable cardiomediastinal silhouette. Other:  The visualized bony thorax appears unremarkable.     IMPRESSION: Nonspecific parenchymal changes in the right lower lung as described above. A follow-up exam is recommended. : [facility]   Transcribe Date/Time: Mar  2 2025 11:53A Dictated by : BJ INIGUEZ MD This examination was interpreted and the report reviewed and electronically signed by: BJ INIGUEZ MD on Mar  2 2025 11:54AM  EST     XR chest 1 view     Result Date: 2/28/2025  * * *Final Report* * * DATE OF EXAM: Feb 28 2025 10:36AM   MMX   5290  -  XR CHEST 1V FRONTAL   / ACCESSION #  [id number] PROCEDURE REASON: Shortness of breath      * * * * Physician Interpretation * * * *  EXAMINATION:  CHEST RADIOGRAPH (SINGLE VIEW AP OR PA) CLINICAL HISTORY: Shortness of breath MQ:  XC1_5 Comparison:  02/22/2025 RESULT: Lines, tubes, and devices:  Tracheostomy tube is noted. Lungs and pleura:  No consolidation. No lung mass. No pleural effusion. Cardiomediastinal silhouette:  Normal cardiomediastinal silhouette. Other:  No acute osseous pathology.     IMPRESSION: No acute radiographic abnormality. : [facility]   Transcribe Date/Time: Feb 28 2025 12:07P Dictated by : DIANNE RAMOS MD This examination was interpreted and the report reviewed and electronically signed by: DIANNE RAMOS MD on Feb 28 2025 12:08PM  EST     XR abdomen 2 views supine and erect or decub     Result Date: 2/24/2025  * * *Final Report* * * DATE OF EXAM: Feb 24 2025  6:03PM   MMX   5289  -  XR ABDOMEN 1V SUPINE  / ACCESSION #  [id number] PROCEDURE REASON: Evaluate tube, line or lead position       * * * * Physician Interpretation * * * *  EXAMINATION:  XR ABDOMEN 1V SUPINE CLINICAL HISTORY:   Evaluate tube, line or lead position Technique:   XR ABDOMEN 1V SUPINE -- NOT APPLICABLE with 1 views on 1 images Comparison: 2/21/2025 RESULT: Feeding tube with distal tip at the pylorus antrum. Moderate colonic stool burden. No dilated bowel. Lung structures are intact.     IMPRESSION: Feeding tube with distal tip at the pylorus antrum. Moderate colonic stool burden. No dilated bowel. : [facility]   Transcribe Date/Time: Feb 24 2025  6:23P Dictated by : PABLO RAMOS MD This examination was interpreted and the report reviewed and electronically signed by: PABLO RAMOS MD on Feb 24 2025  6:24PM  EST     CT head wo IV contrast     Result Date: 2/23/2025  * * *Final Report* * * DATE OF EXAM: Feb 23 2025 12:04PM   [facility]   0504  -  CT BRAIN WO IVCON  / ACCESSION #  [id number] PROCEDURE REASON: Stroke, follow up      * * * * Physician Interpretation * * * *  EXAMINATION: CT BRAIN WO IVCON CLINICAL HISTORY: Stroke, follow up TECHNIQUE:  Serial axial images without IV contrast were obtained from the vertex to the foramen magnum. MQ:  CTBWO_3 CT Radiation dose: Integrated Dose-Length Product (DLP) for this visit =   778  mGy*cm CT Dose Reduction Employed: Iterative recon COMPARISON: MRI brain 02/19/2025 RESULT: Post-operative change: None. Acute change: Evolving acute infarct along the central eli and superior left medulla (2:7-9).  No evidence of hemorrhagic transformation. Hemorrhage: No evidence of acute intracranial hemorrhage. Mass Lesion / Mass Effect: There is no evidence of an intracranial mass or extraaxial fluid collection. No significant mass effect. Chronic change: Stable remote left MCA territory encephalomalacia. Parenchyma: There is no significant volume loss. The brain parenchyma is otherwise within normal limits for age. Ventricles: The ventricles are within normal limits of size and configuration  for age. Paranasal sinuses and skull base: Partial opacification of the left maxillary and sphenoid sinus and multiple bilateral ethmoid air cells.   The remaining visualized paranasal sinuses are grossly clear. The skull base and imaged soft tissues are unremarkable. Localizer images: No additional findings.     IMPRESSION: Evolving acute brainstem infarct.  No evidence of hemorrhagic transformation. Stable remote left MCA territory infarct. : [facility]   Transcribe Date/Time: Feb 23 2025 12:48P Dictated by : FRANKY GUZMAN MD   This examination was interpreted and the report reviewed and electronically signed by: FRANKY GUZMAN MD on Feb 23 2025 12:52PM  EST     XR chest 1 view     Result Date: 2/22/2025  * * *Final Report* * * DATE OF EXAM: Feb 22 2025  6:56AM   MMX   [id number]  -  XR CHEST 1V FRONTAL   / ACCESSION #  [id number] PROCEDURE REASON: Evaluate tube, line,  or lead position      * * * * Physician Interpretation * * * *  EXAMINATION:  CHEST RADIOGRAPH (SINGLE VIEW AP OR PA) CLINICAL HISTORY: Evaluate tube, line,  or lead position MQ:  XC1_5 Comparison:  02/21/2025. RESULT: Lines, tubes, and devices:  An ET tube and feeding tube remain in place.   A poorly defined right venous catheter cannot be excluded. Lungs and pleura:  There are persistent bilateral lower lungs infiltrates seen on the right more than left suggestive of pneumonia such as aspiration pneumonia.  There is no definite pleural effusion. Cardiomediastinal silhouette:  Stable cardiomediastinal silhouette. Other:  The visualized bony thorax appears unremarkable.     IMPRESSION: Persistent bilateral lower lungs infiltrates as described above. A follow-up exam is recommended. : [facility]   Transcribe Date/Time: Feb 22 2025  8:20A Dictated by : BJ INIGUEZ MD This examination was interpreted and the report reviewed and electronically signed by: BJ INIGUEZ MD on Feb 22 2025  8:21AM  EST     XR chest 1 view     Result Date:  2/21/2025  * * *Final Report* * * DATE OF EXAM: Feb 21 2025  5:59PM   MMX   [id number]  -  XR CHEST 1V FRONTAL PORT  / ACCESSION #  [id number] PROCEDURE REASON: Evaluate tube, line,  or lead position      * * * * Physician Interpretation * * * *  EXAMINATION:  CHEST RADIOGRAPH (PORTABLE SINGLE VIEW AP) Exam Date/Time:  2/21/2025 5:59 PM CLINICAL HISTORY: Evaluate tube, line,  or lead position MQ:  XCPR_5 Comparison:  02/21/2025 RESULT: Lines, tubes, and devices: Endotracheal tube in place terminating in enteric tube in place terminating below the field of view. Lungs and pleura: No pneumothorax.  No pleural effusion.  Bilateral mild interstitial opacities.  The right costophrenic angle is not included in the field-of-view. Cardiomediastinal silhouette:  Stable cardiomediastinal silhouette. Other:  .     IMPRESSION: Mild interstitial opacities may be related to pulmonary vascular congestion. : [facility]   Transcribe Date/Time: Feb 21 2025  6:55P Dictated by : IRWIN VALDIVIA MD This examination was interpreted and the report reviewed and electronically signed by: IRWIN VALDIVIA MD on Feb 21 2025  6:57PM  EST     XR abdomen 2 views supine and erect or decub     Result Date: 2/21/2025  * * *Final Report* * * DATE OF EXAM: Feb 21 2025 10:32AM   MMX   [id number]  -  XR ABDOMEN 1V SUPINE  / ACCESSION #  [id number] PROCEDURE REASON: Evaluate tube, line or lead position      * * * * Physician Interpretation * * * *  Clinical Information: Feeding tube Single view of the abdomen was obtained. Feeding tube tip within the distal stomach. There is a nonspecific, nonobstructive bowel gas pattern. No abnormal abdominal masses are identified.  No pathologic calcifications.   No acute bony abnormalities are seen.     IMPRESSION: Nonspecific, nonobstructive bowel gas pattern. Feeding tube tip within the distal stomach. : [facility]   Transcribe Date/Time: Feb 21 2025 10:43A Dictated by : PAMELA EVANS MD  This examination was interpreted and the report reviewed and electronically signed by: PAMELA EVANS MD on Feb 21 2025 10:44AM  EST     XR chest 1 view     Result Date: 2/21/2025  * * *Final Report* * * DATE OF EXAM: Feb 21 2025 10:32AM   MMX   [id number]  -  XR CHEST 1V FRONTAL PORT  / ACCESSION #  [id number] PROCEDURE REASON: Shortness of breath      * * * * Physician Interpretation * * * *  EXAMINATION:  CHEST RADIOGRAPH (PORTABLE SINGLE VIEW AP) Exam Date/Time:  2/21/2025 10:32 AM Clinical History:  Shortness of breath Comparison:  02/17/2025 RESULT: LINES, TUBES, AND DEVICES:  Feeding tube tip beyond the inferior extent of the image. CARDIOMEDIASTINAL SILHOUETTE:  The cardiac and mediastinal silhouettes appear unremarkable. LUNGS AND PLEURA: No consolidation.  No pleural effusion. No pulmonary vascular congestion. No pneumothorax identified. OTHER:  N/A     IMPRESSION: No acute abnormality identified. : [facility]   Transcribe Date/Time: Feb 21 2025 10:41A Dictated by : PAMELA EVANS MD This examination was interpreted and the report reviewed and electronically signed by: PAMELA EVANS MD on Feb 21 2025 10:43AM  EST            Assessment/Plan     Assessment & Plan  Hemoptysis     Tracheostomy in place (Multi)     CVA, old, hemiparesis (Multi)     Status post insertion of percutaneous endoscopic gastrostomy (PEG) tube (Multi)     Pneumonia due to infectious organism     Right sided weakness     Atrial flutter (Multi)     Insulin dependent type 2 diabetes mellitus (Multi)        Telemetry monitoring  Hemoglobin stable 8's recently on review of outside labs, monitor for ongoing bleeding.  Daily H&H  Monitor for overt bleeding  Consult pulmonology, appreciate input  Pneumonia suspected for CT findings  Tracheostomy protocol  Expected cover with Zosyn and vancomycin  MRSA swab  Urine for Legionella antigen and strep pneumonia antigen  Supplemental oxygen as needed  Nebulizers as needed  RT evaluate and  treat  Check procalcitonin  Typical 20% zinc oxide to wounds and groin  Continuous tube feeds, continue at 80 mL an hour x 22 hours  Accu-Chek every 6 hours  Hypoglycemia protocol  PT/OT  Anticoagulation and antiplatelet therapy on hold, resume once cleared by pulmonology  Continue patient's home medications for chronic issues as appropriate     Patient fully evaluated on March 23.  Continue to monitor H&H.  Pulmonary consultation obtained.  Continue broad-spectrum antibiotics to rule out infectious process.  Recheck labs in AM.                 Zeus Bone MD

## 2025-03-29 NOTE — CARE PLAN
The patient's goals for the shift include  rest    The clinical goals for the shift include comfort and rest    Problem: Pain - Adult  Goal: Verbalizes/displays adequate comfort level or baseline comfort level  Outcome: Progressing     Problem: Chronic Conditions and Co-morbidities  Goal: Patient's chronic conditions and co-morbidity symptoms are monitored and maintained or improved  Outcome: Progressing  Flowsheets (Taken 3/28/2025 2226)  Care Plan - Patient's Chronic Conditions and Co-Morbidity Symptoms are Monitored and Maintained or Improved:   Monitor and assess patient's chronic conditions and comorbid symptoms for stability, deterioration, or improvement   Update acute care plan with appropriate goals if chronic or comorbid symptoms are exacerbated and prevent overall improvement and discharge   Collaborate with multidisciplinary team to address chronic and comorbid conditions and prevent exacerbation or deterioration     Problem: Skin  Goal: Participates in plan/prevention/treatment measures  Outcome: Progressing  Flowsheets (Taken 3/28/2025 2226)  Participates in plan/prevention/treatment measures: Elevate heels

## 2025-03-29 NOTE — PROGRESS NOTES
[facility] is able to accept pt.  A precert will be needed once pt is medically cleared for DC.  [facility] will continue to provide support and services as needed.

## 2025-03-30 LAB
ALBUMIN SERPL BCP-MCNC: 3 G/DL (ref 3.4–5)
ALP SERPL-CCNC: 276 U/L (ref 33–136)
ALT SERPL W P-5'-P-CCNC: 143 U/L (ref 10–52)
ANION GAP SERPL CALC-SCNC: 11 MMOL/L (ref 10–20)
AST SERPL W P-5'-P-CCNC: 30 U/L (ref 9–39)
BASO STIPL BLD QL SMEAR: PRESENT
BASO STIPL BLD QL SMEAR: PRESENT
BASOPHILS # BLD MANUAL: 0.12 X10*3/UL (ref 0–0.1)
BASOPHILS NFR BLD MANUAL: 1 %
BILIRUB SERPL-MCNC: 0.4 MG/DL (ref 0–1.2)
BUN SERPL-MCNC: 26 MG/DL (ref 6–23)
CALCIUM SERPL-MCNC: 8.8 MG/DL (ref 8.6–10.3)
CHLORIDE SERPL-SCNC: 110 MMOL/L (ref 98–107)
CO2 SERPL-SCNC: 30 MMOL/L (ref 21–32)
CREAT SERPL-MCNC: 0.94 MG/DL (ref 0.5–1.3)
EGFRCR SERPLBLD CKD-EPI 2021: 90 ML/MIN/1.73M*2
EOSINOPHIL # BLD MANUAL: 0.23 X10*3/UL (ref 0–0.7)
EOSINOPHIL NFR BLD MANUAL: 2 %
ERYTHROCYTE [DISTWIDTH] IN BLOOD BY AUTOMATED COUNT: 19.1 % (ref 11.5–14.5)
GLUCOSE BLD MANUAL STRIP-MCNC: 326 MG/DL (ref 74–99)
GLUCOSE BLD MANUAL STRIP-MCNC: 331 MG/DL (ref 74–99)
GLUCOSE BLD MANUAL STRIP-MCNC: 354 MG/DL (ref 74–99)
GLUCOSE BLD MANUAL STRIP-MCNC: 358 MG/DL (ref 74–99)
GLUCOSE SERPL-MCNC: 366 MG/DL (ref 74–99)
HCT VFR BLD AUTO: 27.3 % (ref 41–52)
HGB BLD-MCNC: 7.6 G/DL (ref 13.5–17.5)
IMM GRANULOCYTES # BLD AUTO: 0.97 X10*3/UL (ref 0–0.7)
IMM GRANULOCYTES NFR BLD AUTO: 8.3 % (ref 0–0.9)
LYMPHOCYTES # BLD MANUAL: 1.15 X10*3/UL (ref 1.2–4.8)
LYMPHOCYTES NFR BLD MANUAL: 10 %
MCH RBC QN AUTO: 27.4 PG (ref 26–34)
MCHC RBC AUTO-ENTMCNC: 27.8 G/DL (ref 32–36)
MCV RBC AUTO: 99 FL (ref 80–100)
METAMYELOCYTES # BLD MANUAL: 0.58 X10*3/UL
METAMYELOCYTES NFR BLD MANUAL: 5 %
MONOCYTES # BLD MANUAL: 0.35 X10*3/UL (ref 0.1–1)
MONOCYTES NFR BLD MANUAL: 3 %
NEUTS SEG # BLD MANUAL: 9.09 X10*3/UL (ref 1.2–7)
NEUTS SEG NFR BLD MANUAL: 79 %
NRBC BLD MANUAL-RTO: 2 % (ref 0–0)
NRBC BLD-RTO: 7.4 /100 WBCS (ref 0–0)
PLATELET # BLD AUTO: 293 X10*3/UL (ref 150–450)
POLYCHROMASIA BLD QL SMEAR: ABNORMAL
POLYCHROMASIA BLD QL SMEAR: NORMAL
POTASSIUM SERPL-SCNC: 4.2 MMOL/L (ref 3.5–5.3)
PROT SERPL-MCNC: 6.1 G/DL (ref 6.4–8.2)
RBC # BLD AUTO: 2.77 X10*6/UL (ref 4.5–5.9)
RBC MORPH BLD: ABNORMAL
RBC MORPH BLD: NORMAL
SODIUM SERPL-SCNC: 147 MMOL/L (ref 136–145)
TOTAL CELLS COUNTED BLD: 100
WBC # BLD AUTO: 11.5 X10*3/UL (ref 4.4–11.3)

## 2025-03-30 PROCEDURE — 2500000002 HC RX 250 W HCPCS SELF ADMINISTERED DRUGS (ALT 637 FOR MEDICARE OP, ALT 636 FOR OP/ED): Performed by: INTERNAL MEDICINE

## 2025-03-30 PROCEDURE — 2500000001 HC RX 250 WO HCPCS SELF ADMINISTERED DRUGS (ALT 637 FOR MEDICARE OP): Performed by: NURSE PRACTITIONER

## 2025-03-30 PROCEDURE — 85027 COMPLETE CBC AUTOMATED: CPT | Performed by: INTERNAL MEDICINE

## 2025-03-30 PROCEDURE — 2500000004 HC RX 250 GENERAL PHARMACY W/ HCPCS (ALT 636 FOR OP/ED): Performed by: INTERNAL MEDICINE

## 2025-03-30 PROCEDURE — 84075 ASSAY ALKALINE PHOSPHATASE: CPT | Performed by: INTERNAL MEDICINE

## 2025-03-30 PROCEDURE — 2500000002 HC RX 250 W HCPCS SELF ADMINISTERED DRUGS (ALT 637 FOR MEDICARE OP, ALT 636 FOR OP/ED): Performed by: NURSE PRACTITIONER

## 2025-03-30 PROCEDURE — 6350000001 HC RX 635 EPOETIN >10,000 UNITS: Performed by: INTERNAL MEDICINE

## 2025-03-30 PROCEDURE — 36415 COLL VENOUS BLD VENIPUNCTURE: CPT | Performed by: INTERNAL MEDICINE

## 2025-03-30 PROCEDURE — 94640 AIRWAY INHALATION TREATMENT: CPT

## 2025-03-30 PROCEDURE — 82947 ASSAY GLUCOSE BLOOD QUANT: CPT

## 2025-03-30 PROCEDURE — 85007 BL SMEAR W/DIFF WBC COUNT: CPT | Performed by: INTERNAL MEDICINE

## 2025-03-30 PROCEDURE — 2500000001 HC RX 250 WO HCPCS SELF ADMINISTERED DRUGS (ALT 637 FOR MEDICARE OP): Performed by: INTERNAL MEDICINE

## 2025-03-30 PROCEDURE — 2500000004 HC RX 250 GENERAL PHARMACY W/ HCPCS (ALT 636 FOR OP/ED): Performed by: NURSE PRACTITIONER

## 2025-03-30 PROCEDURE — 2060000001 HC INTERMEDIATE ICU ROOM DAILY

## 2025-03-30 PROCEDURE — 2500000005 HC RX 250 GENERAL PHARMACY W/O HCPCS: Performed by: INTERNAL MEDICINE

## 2025-03-30 PROCEDURE — 2500000004 HC RX 250 GENERAL PHARMACY W/ HCPCS (ALT 636 FOR OP/ED)

## 2025-03-30 RX ADMIN — INSULIN LISPRO 10 UNITS: 100 INJECTION, SOLUTION INTRAVENOUS; SUBCUTANEOUS at 02:41

## 2025-03-30 RX ADMIN — TRAZODONE HYDROCHLORIDE 50 MG: 50 TABLET ORAL at 20:03

## 2025-03-30 RX ADMIN — AMIODARONE HYDROCHLORIDE 200 MG: 200 TABLET ORAL at 20:03

## 2025-03-30 RX ADMIN — SUCRALFATE 1 G: 1 SUSPENSION ORAL at 20:03

## 2025-03-30 RX ADMIN — ACETAMINOPHEN 325 MG: 325 TABLET, FILM COATED ORAL at 20:03

## 2025-03-30 RX ADMIN — Medication 30 L/MIN: at 20:19

## 2025-03-30 RX ADMIN — INSULIN LISPRO 10 UNITS: 100 INJECTION, SOLUTION INTRAVENOUS; SUBCUTANEOUS at 20:03

## 2025-03-30 RX ADMIN — ACETYLCYSTEINE 200 MG: 200 SOLUTION ORAL; RESPIRATORY (INHALATION) at 12:38

## 2025-03-30 RX ADMIN — INSULIN GLARGINE 40 UNITS: 100 INJECTION, SOLUTION SUBCUTANEOUS at 20:03

## 2025-03-30 RX ADMIN — IPRATROPIUM BROMIDE 0.5 MG: 0.5 SOLUTION RESPIRATORY (INHALATION) at 06:59

## 2025-03-30 RX ADMIN — IPRATROPIUM BROMIDE 0.5 MG: 0.5 SOLUTION RESPIRATORY (INHALATION) at 18:43

## 2025-03-30 RX ADMIN — Medication 30 PERCENT: at 08:37

## 2025-03-30 RX ADMIN — ACETYLCYSTEINE 200 MG: 200 SOLUTION ORAL; RESPIRATORY (INHALATION) at 06:59

## 2025-03-30 RX ADMIN — ACETYLCYSTEINE 200 MG: 200 SOLUTION ORAL; RESPIRATORY (INHALATION) at 18:43

## 2025-03-30 RX ADMIN — IRON SUCROSE 100 MG: 20 INJECTION, SOLUTION INTRAVENOUS at 12:41

## 2025-03-30 RX ADMIN — SUCRALFATE 1 G: 1 SUSPENSION ORAL at 14:35

## 2025-03-30 RX ADMIN — PIPERACILLIN SODIUM AND TAZOBACTAM SODIUM 3.38 G: 3; .375 INJECTION, SOLUTION INTRAVENOUS at 14:35

## 2025-03-30 RX ADMIN — METOPROLOL TARTRATE 50 MG: 50 TABLET, FILM COATED ORAL at 10:57

## 2025-03-30 RX ADMIN — METOPROLOL TARTRATE 50 MG: 50 TABLET, FILM COATED ORAL at 20:03

## 2025-03-30 RX ADMIN — PANTOPRAZOLE SODIUM 40 MG: 40 INJECTION, POWDER, FOR SOLUTION INTRAVENOUS at 10:57

## 2025-03-30 RX ADMIN — AMIODARONE HYDROCHLORIDE 200 MG: 200 TABLET ORAL at 10:57

## 2025-03-30 RX ADMIN — PIPERACILLIN SODIUM AND TAZOBACTAM SODIUM 3.38 G: 3; .375 INJECTION, SOLUTION INTRAVENOUS at 02:38

## 2025-03-30 RX ADMIN — ERYTHROPOIETIN 30000 UNITS: 20000 INJECTION, SOLUTION INTRAVENOUS; SUBCUTANEOUS at 10:57

## 2025-03-30 RX ADMIN — INSULIN LISPRO 10 UNITS: 100 INJECTION, SOLUTION INTRAVENOUS; SUBCUTANEOUS at 11:02

## 2025-03-30 RX ADMIN — SUCRALFATE 1 G: 1 SUSPENSION ORAL at 10:57

## 2025-03-30 RX ADMIN — IPRATROPIUM BROMIDE 0.5 MG: 0.5 SOLUTION RESPIRATORY (INHALATION) at 12:38

## 2025-03-30 RX ADMIN — PANTOPRAZOLE SODIUM 40 MG: 40 INJECTION, POWDER, FOR SOLUTION INTRAVENOUS at 20:03

## 2025-03-30 RX ADMIN — INSULIN LISPRO 8 UNITS: 100 INJECTION, SOLUTION INTRAVENOUS; SUBCUTANEOUS at 14:35

## 2025-03-30 RX ADMIN — PIPERACILLIN SODIUM AND TAZOBACTAM SODIUM 3.38 G: 3; .375 INJECTION, SOLUTION INTRAVENOUS at 10:57

## 2025-03-30 RX ADMIN — SUCRALFATE 1 G: 1 SUSPENSION ORAL at 02:38

## 2025-03-30 RX ADMIN — PIPERACILLIN SODIUM AND TAZOBACTAM SODIUM 3.38 G: 3; .375 INJECTION, SOLUTION INTRAVENOUS at 20:04

## 2025-03-30 ASSESSMENT — COGNITIVE AND FUNCTIONAL STATUS - GENERAL
HELP NEEDED FOR BATHING: TOTAL
STANDING UP FROM CHAIR USING ARMS: TOTAL
MOVING FROM LYING ON BACK TO SITTING ON SIDE OF FLAT BED WITH BEDRAILS: TOTAL
MOBILITY SCORE: 6
MOVING TO AND FROM BED TO CHAIR: TOTAL
TURNING FROM BACK TO SIDE WHILE IN FLAT BAD: TOTAL
DRESSING REGULAR UPPER BODY CLOTHING: TOTAL
WALKING IN HOSPITAL ROOM: TOTAL
CLIMB 3 TO 5 STEPS WITH RAILING: TOTAL
DAILY ACTIVITIY SCORE: 6
DRESSING REGULAR LOWER BODY CLOTHING: TOTAL
PERSONAL GROOMING: TOTAL
TOILETING: TOTAL
EATING MEALS: TOTAL

## 2025-03-30 ASSESSMENT — PAIN SCALES - GENERAL: PAINLEVEL_OUTOF10: 0 - NO PAIN

## 2025-03-30 ASSESSMENT — PAIN - FUNCTIONAL ASSESSMENT: PAIN_FUNCTIONAL_ASSESSMENT: 0-10

## 2025-03-30 NOTE — PROGRESS NOTES
Patient fully evaluated    for    Acute kidney injury  Hypernatremia  Anemia of blood loss renal disease  Transaminitis  Rhabdomyolysis  Patient denies any shortness of breath or chest pain  CBC stable on erythropoietin    Problem List Items Addressed This Visit       * (Principal) Hemoptysis - Primary    Relevant Orders    Esophagogastroduodenoscopy (EGD) (Completed)    Surgical Pathology Exam    Melena    Relevant Orders    Esophagogastroduodenoscopy (EGD) (Completed)    Surgical Pathology Exam     Other Visit Diagnoses       Anemia, unspecified type        Relevant Orders    Esophagogastroduodenoscopy (EGD) (Completed)    Surgical Pathology Exam    Tracheostomy dependent (Multi)        Relevant Orders    Surgical Pathology Exam          Patient seen resting in bed  He is asymptomatic denying shortness of breath chest pain or palpitation  Liver transaminases continue to trend down AST today is 30  ALT is 143  Renal chemistries are normal BUN 27 creatinine 0.96  Sodium is 147 corrected for hyperglycemia is 153  Hemoglobin is stable at 7.6        .  Vital signs for last 24 hours Temp:  [35.5 °C (95.9 °F)-37 °C (98.6 °F)] 36.8 °C (98.2 °F)  Heart Rate:  [63-74] 73  Resp:  [17-18] 18  BP: (118-151)/(57-78) 118/57  FiO2 (%):  [29 %-30 %] 30 %     Patient still requiring frequent cardiac and SPO2 monitoring. Continue aggressive pulmonary hygiene and oral hygiene. Off loading as tolerated for skin integrity. Medications and labs reviewed-   Results for orders placed or performed during the hospital encounter of 03/22/25 (from the past 24 hours)   POCT GLUCOSE   Result Value Ref Range    POCT Glucose 342 (H) 74 - 99 mg/dL   POCT GLUCOSE   Result Value Ref Range    POCT Glucose 340 (H) 74 - 99 mg/dL   POCT GLUCOSE   Result Value Ref Range    POCT Glucose 354 (H) 74 - 99 mg/dL   Comprehensive Metabolic Panel   Result Value Ref Range    Glucose 366 (H) 74 - 99 mg/dL    Sodium 147 (H) 136 - 145 mmol/L    Potassium 4.2 3.5 -  5.3 mmol/L    Chloride 110 (H) 98 - 107 mmol/L    Bicarbonate 30 21 - 32 mmol/L    Anion Gap 11 10 - 20 mmol/L    Urea Nitrogen 26 (H) 6 - 23 mg/dL    Creatinine 0.94 0.50 - 1.30 mg/dL    eGFR 90 >60 mL/min/1.73m*2    Calcium 8.8 8.6 - 10.3 mg/dL    Albumin 3.0 (L) 3.4 - 5.0 g/dL    Alkaline Phosphatase 276 (H) 33 - 136 U/L    Total Protein 6.1 (L) 6.4 - 8.2 g/dL    AST 30 9 - 39 U/L    Bilirubin, Total 0.4 0.0 - 1.2 mg/dL     (H) 10 - 52 U/L   CBC and Auto Differential   Result Value Ref Range    WBC 11.5 (H) 4.4 - 11.3 x10*3/uL    nRBC 7.4 (H) 0.0 - 0.0 /100 WBCs    RBC 2.77 (L) 4.50 - 5.90 x10*6/uL    Hemoglobin 7.6 (L) 13.5 - 17.5 g/dL    Hematocrit 27.3 (L) 41.0 - 52.0 %    MCV 99 80 - 100 fL    MCH 27.4 26.0 - 34.0 pg    MCHC 27.8 (L) 32.0 - 36.0 g/dL    RDW 19.1 (H) 11.5 - 14.5 %    Platelets 293 150 - 450 x10*3/uL    Immature Granulocytes %, Automated 8.3 (H) 0.0 - 0.9 %    Immature Granulocytes Absolute, Automated 0.97 (H) 0.00 - 0.70 x10*3/uL   Manual Differential   Result Value Ref Range    Neutrophils %, Manual 79.0 40.0 - 80.0 %    Lymphocytes %, Manual 10.0 13.0 - 44.0 %    Monocytes %, Manual 3.0 2.0 - 10.0 %    Eosinophils %, Manual 2.0 0.0 - 6.0 %    Basophils %, Manual 1.0 0.0 - 2.0 %    Metamyelocytes %, Manual 5.0 0.0 - 0.0 %    Seg Neutrophils Absolute, Manual 9.09 (H) 1.20 - 7.00 x10*3/uL    Lymphocytes Absolute, Manual 1.15 (L) 1.20 - 4.80 x10*3/uL    Monocytes Absolute, Manual 0.35 0.10 - 1.00 x10*3/uL    Eosinophils Absolute, Manual 0.23 0.00 - 0.70 x10*3/uL    Basophils Absolute, Manual 0.12 (H) 0.00 - 0.10 x10*3/uL    Metamyelocytes Absolute, Manual 0.58 0.00 - 0.00 x10*3/uL    Total Cells Counted 100     Manual nRBC per 100 Cells 2.0 (H) 0.0 - 0.0 %    RBC Morphology See Below     Polychromasia Mild     Basophilic Stippling Present    Morphology   Result Value Ref Range    RBC Morphology See Below     Polychromasia Mild     Basophilic Stippling Present    POCT GLUCOSE   Result  Value Ref Range    POCT Glucose 331 (H) 74 - 99 mg/dL      Patient fractional excretion of sodium is 0.2%  Patient fractional excretion of urea is 32.16%  Positive Hemoccult stools  Patient recently received an antibiotic (last 12 hours)       Date/Time Action Medication Dose    03/26/25 0903 New Bag    piperacillin-tazobactam (Zosyn) 3.375 g in dextrose (iso) IV 50 mL 3.375 g    03/26/25 0148 New Bag    piperacillin-tazobactam (Zosyn) 3.375 g in dextrose (iso) IV 50 mL 3.375 g           Plan discussed with interdisciplinary team, continue current and repeat labs in the AM.         I spent a total of 50 minutes on the date of the service which included preparing to see the patient, face-to-face patient care, completing clinical documentation, obtaining and/or reviewing separately obtained history, performing a medically appropriate examination, counseling and educating the patient/family/caregiver, ordering medications, tests, or procedures, communicating with other HCPs (not separately reported), independently interpreting results (not separately reported), communicating results to the patient/family/caregiver, and care coordination (not separately reported).         Physical Exam  General Appearance; asthenic habitus  Mild skin pallor  Poor skin turgor  HEENT; pupils react to light and accommodation  There is no nystagmus or ptosis; extraocular movements are intact  Neck; no adenopathy; no jugular vein distention; no bruit; no thyromegaly  Tracheostomy in place clean without exudation or induration  Lungs; minimal inspiratory coarse crackles at the bases  Heart; regular rate no gallop no rubs or thrills no lifts  Abdomen; active peristalsis no rebound or guarding there is a PEG tube in place  ; no CVA tenderness  Extremities; decreased muscle tone  Neurological; pathological reflexes are absent      Assessment/Plan     Acute kidney injury  Hypernatremia  Anemia of blood loss renal  disease  Transaminitis  Rhabdomyolysis    Plan  IV iron today  Increase water flushes in enteral feedings  Continue to hold potential nephrotoxins and hepatotoxins  Continue to monitor renal chemistries and CBC  Matthew Johnson MD

## 2025-03-30 NOTE — PROGRESS NOTES
Bryan Nix is a 64 y.o. male on day 8 of admission presenting with Hemoptysis.      Subjective   Patient fully evaluated on March 24.  Hemoptysis appears resolved.  Still with slightly elevated white count to be monitored.  Continue aggressive antibiotic regimen.  Repeat chest x-ray is pending.       Objective     Last Recorded Vitals  /57   Pulse 73   Temp 36.8 °C (98.2 °F)   Resp 18   Wt 77.1 kg (170 lb)   SpO2 95%   Intake/Output last 3 Shifts:    Intake/Output Summary (Last 24 hours) at 3/30/2025 1303  Last data filed at 3/30/2025 0600  Gross per 24 hour   Intake 1603.06 ml   Output 2050 ml   Net -446.94 ml       Admission Weight  Weight: 77.1 kg (170 lb) (03/22/25 1255)    Daily Weight  03/22/25 : 77.1 kg (170 lb)    Image Results  XR chest 1 view  Narrative: Interpreted By:  Ankur Tubbs,   STUDY:  XR CHEST 1 VIEW; 3/28/2025 3:55 pm      INDICATION:  Signs/Symptoms:Diagnotisc.      COMPARISON:  Chest x-ray 03/24/2025      ACCESSION NUMBER(S):  [id number]      ORDERING CLINICIAN:  RICHARD WELCH      TECHNIQUE:  1 view of the chest was performed.      FINDINGS:  Small left-sided pleural effusions surrounding atelectasis.. No  pneumothorax.  The cardiomediastinal silhouette is stable.  Tracheostomy cannula noted.      Impression: 1. New small left-sided pleural effusions surrounding atelectasis.      Signed by: Ankur Tubbs 3/28/2025 4:15 PM  Dictation workstation:   [id number]      Physical Exam    Relevant Results               Assessment/Plan   This patient currently has cardiac telemetry ordered; if you would like to modify or discontinue the telemetry order, click here to go to the orders activity to modify/discontinue the order.              Assessment & Plan  Hemoptysis    Tracheostomy in place (Multi)    CVA, old, hemiparesis (Multi)    Status post insertion of percutaneous endoscopic gastrostomy (PEG) tube (Multi)    Pneumonia due to infectious organism    Right sided  weakness    Atrial flutter (Multi)    Insulin dependent type 2 diabetes mellitus (Multi)        Zeus Bone MD  Physician  Internal Medicine     H&P      Signed     Date of Service: 3/23/2025 10:28 AM     Signed       Expand All Collapse All    History Of Present Illness  Bryan Nix is a 64-year-old male with past medical history of atrial flutter on Eliquis, hyponatremia, IDDM, CAD, hypertension, chronic wounds, diffuse lymphadenopathy, anemia, history of smoking, history elevated PSA, CVA L hemisphere (2020) with right-sided deficits, Left ICA stenosis (85%) s/p angioplasty and left carotid artery stent (08/25/2023) and recent history of acute pontine stroke 02/27/25 with acute hypoxic respiratory failure s/p tracheostomy on 2/25/2025 and PEG.  Patient presents for bleeding from tracheostomy.  Patient is alert and oriented x 3, however limited verbal communication due to tracheostomy.  Wife is at bedside and assists with history.  She reports that patient was coughing blood out of his tracheostomy.  ER provider felt bleeding was likely superficial around site.  Patient has been on room air at skilled nursing facility.  He was placed on Airvo in the ED mainly to humidify oxygen.  Patient never desaturated per ER report.  Currently he is resting comfortably in the bed, breath sounds slightly rhonchorous, but unlabored.  Reports chills, but no fevers.  He has right-sided weakness and sensory deficits.  Denies headache, vision or hearing changes, chest pains, palpitations, shortness of breath, nausea, vomiting, abdominal pain, diarrhea, or complications of PEG tube site.  Wife reports patient skin became irritated in his groin due to adhesive from a condom cath previously, he has a small decubitus wound that she reports is healing well.  He receives tube feeds continuously.  Wife reports a patient was recently evaluated by speech for p.o. to intake, however he still remains n.p.o. except for meds and tube  feeds through PEG. CODE STATUS reviewed: full code     ER Course: Hemodynamically stable, afebrile, SpO2 to 97% trach mask.  WBC 9.3, hemoglobin 8, hematocrit 29.4, platelets 197.  INR 1.4.  Glucose 223, BUN 36, otherwise CMP is normal.  CT of the chest for PE pending.  CXR unremarkable. Trannexamic acid soak gauze wrapped around trach.      Past medical history: As above  Past surgical history: As above  Social history: Former smoker 37.5 pack years-quit smoking age 63, occasional alcohol use, no illicit drug use.  .  Works in maintenance.  6 children.  Family history: Noncontributory     Past Medical History  Medical History        Past Medical History:   Diagnosis Date    Abnormal finding of blood chemistry, unspecified 05/18/2016     Abnormal blood chemistry    Acute upper respiratory infection, unspecified 12/02/2015     Acute upper respiratory infection    Elevated prostate specific antigen (PSA)       Elevated prostate specific antigen (PSA)    Encounter for screening for malignant neoplasm of colon 01/30/2021     Colon cancer screening    Encounter for screening for malignant neoplasm of prostate 11/30/2020     Prostate cancer screening    Essential (primary) hypertension 07/30/2013     Benign essential hypertension    Other conditions influencing health status 07/30/2013     Diabetes Mellitus Poorly Controlled    Other conditions influencing health status 12/02/2015     History of cough    Personal history of other endocrine, nutritional and metabolic disease 03/18/2021     History of hyperlipidemia    Personal history of other endocrine, nutritional and metabolic disease 03/18/2021     History of diabetes mellitus    Personal history of other specified conditions 05/18/2016     History of chest pain    Personal history of other specified conditions 05/18/2016     History of dyspnea    Personal history of other specified conditions 05/18/2016     History of dizziness    Personal history of other  specified conditions 05/18/2016     History of fatigue            Surgical History  Surgical History         Past Surgical History:   Procedure Laterality Date    CT ANGIO CORONARY ART WITH HEARTFLOW IF SCORE >30%   8/23/2023     CT HEART CORONARY ANGIOGRAM 8/23/2023 [facility] CT    MR HEAD ANGIO WO IV CONTRAST   12/14/2020     MR HEAD ANGIO WO IV CONTRAST 12/14/2020 [facility]    MR NECK ANGIO WO IV CONTRAST   12/14/2020     MR NECK ANGIO WO IV CONTRAST 12/14/2020 [facility]    OTHER SURGICAL HISTORY   12/01/2020     Hand fracture repair    OTHER SURGICAL HISTORY   12/01/2020     Nasal bone fracture repair            Social History  He reports that he has quit smoking. His smoking use included cigarettes. He has never used smokeless tobacco. No history on file for alcohol use and drug use.     Family History  Family History   No family history on file.        Allergies  Patient has no known allergies.     Review of Systems     Physical Exam  Constitutional:       General: He is awake. He is not in acute distress.     Appearance: Normal appearance. He is not toxic-appearing.   HENT:      Head: Atraumatic.      Nose: Nose normal.      Mouth/Throat:      Mouth: Mucous membranes are moist.   Eyes:      Conjunctiva/sclera: Conjunctivae normal.   Cardiovascular:      Rate and Rhythm: Normal rate and regular rhythm.      Pulses: Normal pulses.      Heart sounds: No murmur heard.  Pulmonary:      Effort: No respiratory distress.      Comments: Tracheostomy site midline, no drainage around dressing.  Rhonchorous breath sounds, unlabored respirations, RT bedside suctioning patient with thick blood-tinged sputum  Abdominal:      General: Bowel sounds are normal. There is no distension.      Palpations: Abdomen is soft.      Tenderness: There is no abdominal tenderness. There is no guarding.      Comments: Left upper quadrant PEG site tube site, no drainage or skin breakdown around tube site.  "  Musculoskeletal:         General: No swelling, deformity or signs of injury.      Cervical back: Neck supple.      Right lower leg: No edema.      Left lower leg: No edema.   Skin:     General: Skin is warm and dry.      Capillary Refill: Capillary refill takes less than 2 seconds.      Findings: Wound (sacrum) present. No ecchymosis or erythema.   Neurological:      Mental Status: He is alert and oriented to person, place, and time.      Cranial Nerves: No facial asymmetry.      Sensory: Sensory deficit (RUE, RLE) present.      Motor: Weakness present.      Comments: Right sided upper and lower extremity motor function deficits   Psychiatric:         Mood and Affect: Mood normal.         Behavior: Behavior is cooperative.            Last Recorded Vitals  Blood pressure 134/50, pulse 93, temperature 36.9 °C (98.4 °F), temperature source Temporal, resp. rate 22, height 1.88 m (6' 2\"), weight 77.1 kg (170 lb), SpO2 98%.     Relevant Results              Results for orders placed or performed during the hospital encounter of 03/22/25 (from the past 24 hours)   CBC and Auto Differential   Result Value Ref Range     WBC 9.3 4.4 - 11.3 x10*3/uL     nRBC 0.3 (H) 0.0 - 0.0 /100 WBCs     RBC 3.02 (L) 4.50 - 5.90 x10*6/uL     Hemoglobin 8.0 (L) 13.5 - 17.5 g/dL     Hematocrit 29.4 (L) 41.0 - 52.0 %     MCV 97 80 - 100 fL     MCH 26.5 26.0 - 34.0 pg     MCHC 27.2 (L) 32.0 - 36.0 g/dL     RDW 15.6 (H) 11.5 - 14.5 %     Platelets 197 150 - 450 x10*3/uL     Neutrophils % 77.1 40.0 - 80.0 %     Immature Granulocytes %, Automated 1.5 (H) 0.0 - 0.9 %     Lymphocytes % 13.1 13.0 - 44.0 %     Monocytes % 4.1 2.0 - 10.0 %     Eosinophils % 3.2 0.0 - 6.0 %     Basophils % 1.0 0.0 - 2.0 %     Neutrophils Absolute 7.14 1.20 - 7.70 x10*3/uL     Immature Granulocytes Absolute, Automated 0.14 0.00 - 0.70 x10*3/uL     Lymphocytes Absolute 1.21 1.20 - 4.80 x10*3/uL     Monocytes Absolute 0.38 0.10 - 1.00 x10*3/uL     Eosinophils Absolute " 0.30 0.00 - 0.70 x10*3/uL     Basophils Absolute 0.09 0.00 - 0.10 x10*3/uL   Basic metabolic panel   Result Value Ref Range     Glucose 223 (H) 74 - 99 mg/dL     Sodium 142 136 - 145 mmol/L     Potassium 4.3 3.5 - 5.3 mmol/L     Chloride 103 98 - 107 mmol/L     Bicarbonate 27 21 - 32 mmol/L     Anion Gap 16 10 - 20 mmol/L     Urea Nitrogen 36 (H) 6 - 23 mg/dL     Creatinine 0.81 0.50 - 1.30 mg/dL     eGFR >90 >60 mL/min/1.73m*2     Calcium 9.6 8.6 - 10.3 mg/dL   Protime-INR   Result Value Ref Range     Protime 15.1 (H) 9.8 - 12.4 seconds     INR 1.4 (H) 0.9 - 1.1   aPTT   Result Value Ref Range     aPTT 25 (L) 26 - 36 seconds   Type and Screen   Result Value Ref Range     ABO TYPE B       Rh TYPE POS       ANTIBODY SCREEN NEG     VERIFY ABO/Rh Group Test   Result Value Ref Range     ABO TYPE B       Rh TYPE POS     MRSA Surveillance for Vancomycin De-escalation, PCR     Specimen: Anterior Nares; Swab   Result Value Ref Range     MRSA PCR Not Detected Not Detected   POCT GLUCOSE   Result Value Ref Range     POCT Glucose 180 (H) 74 - 99 mg/dL   POCT GLUCOSE   Result Value Ref Range     POCT Glucose 161 (H) 74 - 99 mg/dL   POCT GLUCOSE   Result Value Ref Range     POCT Glucose 156 (H) 74 - 99 mg/dL   Basic metabolic panel   Result Value Ref Range     Glucose 170 (H) 74 - 99 mg/dL     Sodium 147 (H) 136 - 145 mmol/L     Potassium 4.2 3.5 - 5.3 mmol/L     Chloride 107 98 - 107 mmol/L     Bicarbonate 30 21 - 32 mmol/L     Anion Gap 14 10 - 20 mmol/L     Urea Nitrogen 35 (H) 6 - 23 mg/dL     Creatinine 0.85 0.50 - 1.30 mg/dL     eGFR >90 >60 mL/min/1.73m*2     Calcium 9.4 8.6 - 10.3 mg/dL   CBC   Result Value Ref Range     WBC 10.4 4.4 - 11.3 x10*3/uL     nRBC 0.6 (H) 0.0 - 0.0 /100 WBCs     RBC 2.87 (L) 4.50 - 5.90 x10*6/uL     Hemoglobin 7.8 (L) 13.5 - 17.5 g/dL     Hematocrit 26.6 (L) 41.0 - 52.0 %     MCV 93 80 - 100 fL     MCH 27.2 26.0 - 34.0 pg     MCHC 29.3 (L) 32.0 - 36.0 g/dL     RDW 15.7 (H) 11.5 - 14.5 %      Platelets 205 150 - 450 x10*3/uL   SST TOP   Result Value Ref Range     Extra Tube Hold for add-ons.        CT angio chest for pulmonary embolism     Result Date: 3/22/2025  Interpreted By:  Pamela Zurita, STUDY: CT ANGIO CHEST FOR PULMONARY EMBOLISM;  3/22/2025 4:56 pm   INDICATION: Signs/Symptoms:bloody secretions, eval for PE.     COMPARISON: CT chest without contrast 03/22/2025   ACCESSION NUMBER(S): [id number]   ORDERING CLINICIAN: PAMELA HAHN   TECHNIQUE: Contiguous axial images of the chest were obtained after the intravenous administration of iodinated contrast using angiographic PE protocol. Coronal and sagittal reformatted images were reconstructed from the axial data. MIP images were created on an independent workstation and reviewed.   FINDINGS:   MEDIASTINUM AND LYMPH NODES: Inflammatory fat stranding along the tracheostomy tract without discrete fluid collection. The esophageal wall appears within normal limits. There is redemonstration of diffuse lymphadenopathy in the bilateral axilla, mediastinum, bilateral supraclavicular as described in detail on separate report. Also notable or enlarged bilateral level 4 cervical lymph nodes (right > left).   VESSELS:  Normal caliber thoracic aorta without dissection. Moderate aortic atherosclerosis.  No acute pulmonary embolism.   HEART: Normal size.  Mild coronary artery calcifications. No significant pericardial effusion.   LUNG, AIRWAYS, PLEURA: There is slight increase in debris within the trachea. There has been clearing of secretions in the left upper/lower lobe bronchus. There is diffuse bilateral bronchial thickening slightly increased from prior study. There is peribronchovascular ground-glass opacity in the posterior right upper lobe and superior and basal segments of the right lower lobe. There is slightly worsened atelectasis in the lung bases remaining predominantly subsegmental in nature. There is a 0.8 cm nodule in the left upper lobe that  is stable from 08/19/2023.   OSSEOUS STRUCTURES: No acute osseous abnormality.   CHEST WALL SOFT TISSUES: No discernible acute abnormality.   UPPER ABDOMEN/OTHER: No acute abnormality.        No acute pulmonary embolism.   Peribronchial vascular ground glass opacities in the right upper and lower lobe again concerning for pneumonia given patient's risk factors of tracheostomy and debris in the airways.   Redemonstration of diffuse lymphadenopathy in the lower necks, axilla, and mediastinum. Findings are either diffusely reactive in nature, reflective of lymphoma/leukemia, meter deficiency, or less likely head neck neoplasm is previously postulated. However please correlate with past medical history.   Inflammatory changes along the tracheostomy tract without fluid collection. Correlate for erythema.   Unchanged 0.8 cm nodule in the left upper lobe dating back to 08/19/2023. Recommend 1 year follow up chest CT to ensure at least 2 years of stability.   MACRO: None.   Signed by: Pamela Zurita 3/22/2025 7:17 PM Dictation workstation:   [id number]     CT chest wo IV contrast     Result Date: 3/22/2025  Interpreted By:  Pamela Zurita, STUDY: CT CHEST WO IV CONTRAST;  3/22/2025 3:41 pm   INDICATION: Signs/Symptoms:eval for alveolar hemorrhage.     COMPARISON: CT head/neck 08/19/2023   ACCESSION NUMBER(S): [id number]   ORDERING CLINICIAN: PAMELA HAHN   TECHNIQUE: Contiguous axial images of the chest and upper abdomen were obtained without contrast. Coronal and sagittal reformatted images were reconstructed from the axial data.   FINDINGS:   MEDIASTINUM AND LYMPH NODES: There is fat stranding/edema along the tract of the tracheostomy about discrete fluid collection. The esophageal wall appears within normal limits. There are numerous enlarged bilateral supraclavicular lymph nodes measuring up to 1.4 cm on the left. There are numerous enlarged bilateral axillary lymph nodes measuring up to 1.1 cm on the right  and 1.2 cm on the left. There are numerous prominent to mildly enlarged mediastinal lymph nodes as well. No pneumomediastinum.   VESSELS:  Normal caliber thoracic aorta. Moderate aortic atherosclerosis. The main pulmonary artery is normal in size.   HEART: Normal size.  Mild coronary artery calcifications. No significant pericardial effusion.   LUNG, AIRWAYS, PLEURA: Tracheostomy noted in appropriate position. There is trace mucus in the left mainstem bronchus and at the origin of the left upper lobe and lower lobe bronchi. There are mild peribronchovascular ground-glass opacities in the posterior segment of the right upper lobe, basal segments of the right lower lobe. There is mild dependent bibasilar atelectasis. There is no pleural effusion.   OSSEOUS STRUCTURES: No acute osseous abnormality.   CHEST WALL SOFT TISSUES: No discernible acute abnormality.   UPPER ABDOMEN/OTHER: No acute abnormality.        1. Mild peribronchovascular ground-glass opacities in the posterior right upper lobe and basal segments of the right lower lobe. This appearance and distribution is not typical for alveolar hemorrhage which tends to be centrilobular and bilateral asymmetric in distribution. Findings are most suggestive of pneumonia.   2. Small amount of mucous debris in the left mainstem bronchus and at the origin of the left upper and lower lobe bronchi.   3. Diffuse lymphadenopathy in the bilateral axillary regions, mediastinum and left supraclavicular region particularly notable in the bilateral supraclavicular region. This is progressed in the left supraclavicular region and new elsewhere when compared to 08/19/2023 CTA head/neck. Correlate for any history of known head/neck malignancy or lymphoma.   4. Inflammation/fat stranding adjacent to the tracheostomy tube entry site noted. Correlate for air the met. No evidence of fluid collection.   MACRO: None.   Signed by: Marco A Zurita 3/22/2025 7:10 PM Dictation workstation:    [id number]     XR chest 1 view     Result Date: 3/22/2025  Interpreted By:  Fermin Stroud, STUDY: XR CHEST 1 VIEW   INDICATION: Signs/Symptoms:hemoptysis.   COMPARISON: August 19, 2023   ACCESSION NUMBER(S): [id number]   ORDERING CLINICIAN: PAMELA HAHN   FINDINGS: No consolidation, effusion, edema, or pneumothorax. Heart size within normal limits.        No evidence of acute intrathoracic abnormality.   Signed by: Fermin Stroud 3/22/2025 2:01 PM Dictation workstation:   [id number]     XR chest 1 view     Result Date: 3/14/2025  * * *Final Report* * * DATE OF EXAM: Mar 14 2025  8:52AM   S5X   5290  -  XR CHEST 1V FRONTAL   / ACCESSION #  [id number] PROCEDURE REASON: new bleeding from trach      * * * * Physician Interpretation * * * *  EXAMINATION:  CHEST RADIOGRAPH (PORTABLE SINGLE VIEW AP) Exam Date/Time:  3/14/2025 8:52 AM Clinical History:  new bleeding from trach Comparison:  03/03/2025 RESULT: LINES, TUBES, AND DEVICES:  Tracheostomy. CARDIOMEDIASTINAL SILHOUETTE:  The cardiac and mediastinal silhouettes appear unremarkable. LUNGS AND PLEURA: No consolidation.  No pleural effusion. No pulmonary vascular congestion. No pneumothorax identified. OTHER:  N/A     IMPRESSION: No acute abnormality identified. : PSCB   Transcribe Date/Time: Mar 14 2025  9:07A Dictated by : PAMELA EVANS MD This examination was interpreted and the report reviewed and electronically signed by: PAMELA EVANS MD on Mar 14 2025  9:11AM  EST     US abdomen complete     Result Date: 3/11/2025  ABDOMEN ULTRASOUND-COMPLETE FINDINGS: Abdomen Ultrasound Complete: PROCEDURE: Multiple grayscale mages of the abdomen were obtained. FINDINGS:Multiple real time images demonstrate the liver with increased echogenicity consistent with fatty infiltration. There is no evidence of a discrete mass within the liver. The liver is enlarged, measured at 15.7 cm in size. The gallbladder is seen with no evidence of cholelithiasis. The  gallbladder wall is within normal limits. The common bile duct is within normal limits. The visualized pancreas appears unremarkable. Both kidneys are seen with no evidence of hydronephrosis or a calculus. The spleen has its normal homogeneous echo appearance. There is no free fluid to suggest ascites. The visualized aorta appears unremarkable. The inferior vena cava appears patent. CONCLUSION: Enlarged fatty liver. ELECTRONICALLY SIGNED BY LEONEL LAN M.D. 3/11/2025 5:00:52 PM EDT.     XR chest 1 view     Result Date: 3/3/2025  * * *Final Report* * * DATE OF EXAM: Mar  3 2025  1:40PM   S5X   5290  -  XR CHEST 1V FRONTAL   / ACCESSION #  [id number] PROCEDURE REASON: resp failure      * * * * Physician Interpretation * * * *  EXAMINATION:  CHEST RADIOGRAPH (PORTABLE SINGLE VIEW AP) Exam Date/Time:  3/3/2025 1:40 PM Indication:   resp failure M:  XCP_4 Comparison:  1 day prior RESULT: Lines, tubes, and devices:  Tracheostomy tube remains in situ. Lungs and pleura:  Mild patchy opacities right lower lung zone seen on the prior study are no longer appreciated.  No confluent opacity.   Costophrenic angles are clear.  No pneumothorax. Cardiomediastinal silhouette:  Stable cardiomediastinal silhouette. Other:  -     IMPRESSION: Improved aeration right lower lung zone.  No acute cardiopulmonary finding. : HANY   Transcribe Date/Time: Mar  3 2025  2:49P Dictated by : ISRAEL BENSON MD This examination was interpreted and the report reviewed and electronically signed by: ISRAEL BENSON MD on Mar  3 2025  2:49PM  EST     XR chest 1 view     Result Date: 3/2/2025  * * *Final Report* * * DATE OF EXAM: Mar  2 2025 11:25AM   MMX   5376  -  XR CHEST 1V FRONTAL PORT  / ACCESSION #  [id number] PROCEDURE REASON: Shortness of breath      * * * * Physician Interpretation * * * *  EXAMINATION:  CHEST RADIOGRAPH (PORTABLE SINGLE VIEW AP) Exam Date/Time:  3/2/2025 11:25 AM CLINICAL HISTORY: Shortness of breath MQ:   XCPR_5 Comparison:  02/20/2025. RESULT: This is a limited examination due to multiple wires and tubing obscuring the lower lungs. Lines, tubes, and devices:  A tracheostomy tube remains in place. Lungs and pleura:  There are increased bronchovascular markings in the right lower lung which may be due to bronchitis or early changes of aspiration pneumonia.  Clinical correlation and follow-up exams are recommended. Cardiomediastinal silhouette:  Stable cardiomediastinal silhouette. Other:  The visualized bony thorax appears unremarkable.     IMPRESSION: Nonspecific parenchymal changes in the right lower lung as described above. A follow-up exam is recommended. : HANY   Transcribe Date/Time: Mar  2 2025 11:53A Dictated by : BJ INIGUEZ MD This examination was interpreted and the report reviewed and electronically signed by: BJ INIGUEZ MD on Mar  2 2025 11:54AM  EST     XR chest 1 view     Result Date: 2/28/2025  * * *Final Report* * * DATE OF EXAM: Feb 28 2025 10:36AM   MMX   5290  -  XR CHEST 1V FRONTAL   / ACCESSION #  [id number] PROCEDURE REASON: Shortness of breath      * * * * Physician Interpretation * * * *  EXAMINATION:  CHEST RADIOGRAPH (SINGLE VIEW AP OR PA) CLINICAL HISTORY: Shortness of breath MQ:  XC1_5 Comparison:  02/22/2025 RESULT: Lines, tubes, and devices:  Tracheostomy tube is noted. Lungs and pleura:  No consolidation. No lung mass. No pleural effusion. Cardiomediastinal silhouette:  Normal cardiomediastinal silhouette. Other:  No acute osseous pathology.     IMPRESSION: No acute radiographic abnormality. : [employer]   Transcribe Date/Time: Feb 28 2025 12:07P Dictated by : DIANNE RAMOS MD This examination was interpreted and the report reviewed and electronically signed by: DIANNE RAMOS MD on Feb 28 2025 12:08PM  EST     XR abdomen 2 views supine and erect or decub     Result Date: 2/24/2025  * * *Final Report* * * DATE OF EXAM: Feb 24 2025  6:03PM   MMX   5289  -   XR ABDOMEN 1V SUPINE  / ACCESSION #  [id number] PROCEDURE REASON: Evaluate tube, line or lead position      * * * * Physician Interpretation * * * *  EXAMINATION:  XR ABDOMEN 1V SUPINE CLINICAL HISTORY:   Evaluate tube, line or lead position Technique:   XR ABDOMEN 1V SUPINE -- NOT APPLICABLE with 1 views on 1 images Comparison: 2/21/2025 RESULT: Feeding tube with distal tip at the pylorus antrum. Moderate colonic stool burden. No dilated bowel. Lung structures are intact.     IMPRESSION: Feeding tube with distal tip at the pylorus antrum. Moderate colonic stool burden. No dilated bowel. : [employer]   Transcribe Date/Time: Feb 24 2025  6:23P Dictated by : PABLO RAMOS MD This examination was interpreted and the report reviewed and electronically signed by: PABLO RAMOS MD on Feb 24 2025  6:24PM  EST     CT head wo IV contrast     Result Date: 2/23/2025  * * *Final Report* * * DATE OF EXAM: Feb 23 2025 12:04PM   [facility]   0504  -  CT BRAIN WO IVCON  / ACCESSION #  [id number] PROCEDURE REASON: Stroke, follow up      * * * * Physician Interpretation * * * *  EXAMINATION: CT BRAIN WO IVCON CLINICAL HISTORY: Stroke, follow up TECHNIQUE:  Serial axial images without IV contrast were obtained from the vertex to the foramen magnum. MQ:  CTBWO_3 CT Radiation dose: Integrated Dose-Length Product (DLP) for this visit =   778  mGy*cm CT Dose Reduction Employed: Iterative recon COMPARISON: MRI brain 02/19/2025 RESULT: Post-operative change: None. Acute change: Evolving acute infarct along the central eli and superior left medulla (2:7-9).  No evidence of hemorrhagic transformation. Hemorrhage: No evidence of acute intracranial hemorrhage. Mass Lesion / Mass Effect: There is no evidence of an intracranial mass or extraaxial fluid collection. No significant mass effect. Chronic change: Stable remote left MCA territory encephalomalacia. Parenchyma: There is no significant volume loss. The brain parenchyma is otherwise  within normal limits for age. Ventricles: The ventricles are within normal limits of size and configuration for age. Paranasal sinuses and skull base: Partial opacification of the left maxillary and sphenoid sinus and multiple bilateral ethmoid air cells.   The remaining visualized paranasal sinuses are grossly clear. The skull base and imaged soft tissues are unremarkable. Localizer images: No additional findings.     IMPRESSION: Evolving acute brainstem infarct.  No evidence of hemorrhagic transformation. Stable remote left MCA territory infarct. : [facility]   Transcribe Date/Time: Feb 23 2025 12:48P Dictated by : FRANKY GUZMAN MD   This examination was interpreted and the report reviewed and electronically signed by: FRANKY GUZMAN MD on Feb 23 2025 12:52PM  EST     XR chest 1 view     Result Date: 2/22/2025  * * *Final Report* * * DATE OF EXAM: Feb 22 2025  6:56AM   [facility]   [id number]  -  XR CHEST 1V FRONTAL   / ACCESSION #  [id number] PROCEDURE REASON: Evaluate tube, line,  or lead position      * * * * Physician Interpretation * * * *  EXAMINATION:  CHEST RADIOGRAPH (SINGLE VIEW AP OR PA) CLINICAL HISTORY: Evaluate tube, line,  or lead position MQ:  XC1_5 Comparison:  02/21/2025. RESULT: Lines, tubes, and devices:  An ET tube and feeding tube remain in place.   A poorly defined right venous catheter cannot be excluded. Lungs and pleura:  There are persistent bilateral lower lungs infiltrates seen on the right more than left suggestive of pneumonia such as aspiration pneumonia.  There is no definite pleural effusion. Cardiomediastinal silhouette:  Stable cardiomediastinal silhouette. Other:  The visualized bony thorax appears unremarkable.     IMPRESSION: Persistent bilateral lower lungs infiltrates as described above. A follow-up exam is recommended. : [facility]   Transcribe Date/Time: Feb 22 2025  8:20A Dictated by : BJ INIGUEZ MD This examination was interpreted and the report reviewed and  electronically signed by: BJ INIGUEZ MD on Feb 22 2025  8:21AM  EST     XR chest 1 view     Result Date: 2/21/2025  * * *Final Report* * * DATE OF EXAM: Feb 21 2025  5:59PM   [facility]   [id number]  -  XR CHEST 1V FRONTAL PORT  / ACCESSION #  [id number] PROCEDURE REASON: Evaluate tube, line,  or lead position      * * * * Physician Interpretation * * * *  EXAMINATION:  CHEST RADIOGRAPH (PORTABLE SINGLE VIEW AP) Exam Date/Time:  2/21/2025 5:59 PM CLINICAL HISTORY: Evaluate tube, line,  or lead position MQ:  XCPR_5 Comparison:  02/21/2025 RESULT: Lines, tubes, and devices: Endotracheal tube in place terminating in enteric tube in place terminating below the field of view. Lungs and pleura: No pneumothorax.  No pleural effusion.  Bilateral mild interstitial opacities.  The right costophrenic angle is not included in the field-of-view. Cardiomediastinal silhouette:  Stable cardiomediastinal silhouette. Other:  .     IMPRESSION: Mild interstitial opacities may be related to pulmonary vascular congestion. : [facility]   Transcribe Date/Time: Feb 21 2025  6:55P Dictated by : IRWIN VALDIVIA MD This examination was interpreted and the report reviewed and electronically signed by: IRWIN VALDIVIA MD on Feb 21 2025  6:57PM  EST     XR abdomen 2 views supine and erect or decub     Result Date: 2/21/2025  * * *Final Report* * * DATE OF EXAM: Feb 21 2025 10:32AM   MMX   [id number]  -  XR ABDOMEN 1V SUPINE  / ACCESSION #  [id number] PROCEDURE REASON: Evaluate tube, line or lead position      * * * * Physician Interpretation * * * *  Clinical Information: Feeding tube Single view of the abdomen was obtained. Feeding tube tip within the distal stomach. There is a nonspecific, nonobstructive bowel gas pattern. No abnormal abdominal masses are identified.  No pathologic calcifications.   No acute bony abnormalities are seen.     IMPRESSION: Nonspecific, nonobstructive bowel gas pattern. Feeding tube tip within the distal  stomach. [facility]   Transcribe Date/Time: Feb 21 2025 10:43A Dictated by : PAMELA EVANS MD This examination was interpreted and the report reviewed and electronically signed by: PAMELA EVANS MD on Feb 21 2025 10:44AM  EST     XR chest 1 view     Result Date: 2/21/2025  * * *Final Report* * * DATE OF EXAM: Feb 21 2025 10:32AM   MMX   [id number]  -  XR CHEST 1V FRONTAL PORT  / ACCESSION #  [id number] PROCEDURE REASON: Shortness of breath      * * * * Physician Interpretation * * * *  EXAMINATION:  CHEST RADIOGRAPH (PORTABLE SINGLE VIEW AP) Exam Date/Time:  2/21/2025 10:32 AM Clinical History:  Shortness of breath Comparison:  02/17/2025 RESULT: LINES, TUBES, AND DEVICES:  Feeding tube tip beyond the inferior extent of the image. CARDIOMEDIASTINAL SILHOUETTE:  The cardiac and mediastinal silhouettes appear unremarkable. LUNGS AND PLEURA: No consolidation.  No pleural effusion. No pulmonary vascular congestion. No pneumothorax identified. OTHER:  N/A     IMPRESSION: No acute abnormality identified. : [facility]   Transcribe Date/Time: Feb 21 2025 10:41A Dictated by : PAMELA EVANS MD This examination was interpreted and the report reviewed and electronically signed by: PAMELA EVANS MD on Feb 21 2025 10:43AM  EST            Assessment/Plan     Assessment & Plan  Hemoptysis     Tracheostomy in place (Multi)     CVA, old, hemiparesis (Multi)     Status post insertion of percutaneous endoscopic gastrostomy (PEG) tube (Multi)     Pneumonia due to infectious organism     Right sided weakness     Atrial flutter (Multi)     Insulin dependent type 2 diabetes mellitus (Multi)        Telemetry monitoring  Hemoglobin stable 8's recently on review of outside labs, monitor for ongoing bleeding.  Daily H&H  Monitor for overt bleeding  Consult pulmonology, appreciate input  Pneumonia suspected for CT findings  Tracheostomy protocol  Expected cover with Zosyn and vancomycin  MRSA swab  Urine for Legionella  antigen and strep pneumonia antigen  Supplemental oxygen as needed  Nebulizers as needed  RT evaluate and treat  Check procalcitonin  Typical 20% zinc oxide to wounds and groin  Continuous tube feeds, continue at 80 mL an hour x 22 hours  Accu-Chek every 6 hours  Hypoglycemia protocol  PT/OT  Anticoagulation and antiplatelet therapy on hold, resume once cleared by pulmonology  Continue patient's home medications for chronic issues as appropriate     Patient fully evaluated on March 23.  Continue to monitor H&H.  Pulmonary consultation obtained.  Continue broad-spectrum antibiotics to rule out infectious process.  Recheck labs in AM.                 Zeus Bone MD                    Melena           Patient fully evaluated  3/25  for    Problem List Items Addressed This Visit       * (Principal) Hemoptysis - Primary    Relevant Orders    Esophagogastroduodenoscopy (EGD) (Completed)    Surgical Pathology Exam    Melena    Relevant Orders    Esophagogastroduodenoscopy (EGD) (Completed)    Surgical Pathology Exam     Other Visit Diagnoses       Anemia, unspecified type        Relevant Orders    Esophagogastroduodenoscopy (EGD) (Completed)    Surgical Pathology Exam    Tracheostomy dependent (Multi)        Relevant Orders    Surgical Pathology Exam          Patient seen resting in bed with head of bed elevated, no s/s or c/o acute difficulties at this time.  Vital signs for last 24 hours Temp:  [35.5 °C (95.9 °F)-37 °C (98.6 °F)] 36.8 °C (98.2 °F)  Heart Rate:  [63-74] 73  Resp:  [17-18] 18  BP: (118-151)/(57-78) 118/57  FiO2 (%):  [28 %-30 %] 28 %    No intake/output data recorded.  Patient still requiring frequent cardiac and SPO2 monitoring. Continue aggressive pulmonary hygiene and oral hygiene. Off loading as tolerated for skin integrity. Medications and labs reviewed-   Results for orders placed or performed during the hospital encounter of 03/22/25 (from the past 24 hours)   POCT GLUCOSE   Result Value Ref  Range    POCT Glucose 342 (H) 74 - 99 mg/dL   POCT GLUCOSE   Result Value Ref Range    POCT Glucose 340 (H) 74 - 99 mg/dL   POCT GLUCOSE   Result Value Ref Range    POCT Glucose 354 (H) 74 - 99 mg/dL   Comprehensive Metabolic Panel   Result Value Ref Range    Glucose 366 (H) 74 - 99 mg/dL    Sodium 147 (H) 136 - 145 mmol/L    Potassium 4.2 3.5 - 5.3 mmol/L    Chloride 110 (H) 98 - 107 mmol/L    Bicarbonate 30 21 - 32 mmol/L    Anion Gap 11 10 - 20 mmol/L    Urea Nitrogen 26 (H) 6 - 23 mg/dL    Creatinine 0.94 0.50 - 1.30 mg/dL    eGFR 90 >60 mL/min/1.73m*2    Calcium 8.8 8.6 - 10.3 mg/dL    Albumin 3.0 (L) 3.4 - 5.0 g/dL    Alkaline Phosphatase 276 (H) 33 - 136 U/L    Total Protein 6.1 (L) 6.4 - 8.2 g/dL    AST 30 9 - 39 U/L    Bilirubin, Total 0.4 0.0 - 1.2 mg/dL     (H) 10 - 52 U/L   CBC and Auto Differential   Result Value Ref Range    WBC 11.5 (H) 4.4 - 11.3 x10*3/uL    nRBC 7.4 (H) 0.0 - 0.0 /100 WBCs    RBC 2.77 (L) 4.50 - 5.90 x10*6/uL    Hemoglobin 7.6 (L) 13.5 - 17.5 g/dL    Hematocrit 27.3 (L) 41.0 - 52.0 %    MCV 99 80 - 100 fL    MCH 27.4 26.0 - 34.0 pg    MCHC 27.8 (L) 32.0 - 36.0 g/dL    RDW 19.1 (H) 11.5 - 14.5 %    Platelets 293 150 - 450 x10*3/uL    Immature Granulocytes %, Automated 8.3 (H) 0.0 - 0.9 %    Immature Granulocytes Absolute, Automated 0.97 (H) 0.00 - 0.70 x10*3/uL   Manual Differential   Result Value Ref Range    Neutrophils %, Manual 79.0 40.0 - 80.0 %    Lymphocytes %, Manual 10.0 13.0 - 44.0 %    Monocytes %, Manual 3.0 2.0 - 10.0 %    Eosinophils %, Manual 2.0 0.0 - 6.0 %    Basophils %, Manual 1.0 0.0 - 2.0 %    Metamyelocytes %, Manual 5.0 0.0 - 0.0 %    Seg Neutrophils Absolute, Manual 9.09 (H) 1.20 - 7.00 x10*3/uL    Lymphocytes Absolute, Manual 1.15 (L) 1.20 - 4.80 x10*3/uL    Monocytes Absolute, Manual 0.35 0.10 - 1.00 x10*3/uL    Eosinophils Absolute, Manual 0.23 0.00 - 0.70 x10*3/uL    Basophils Absolute, Manual 0.12 (H) 0.00 - 0.10 x10*3/uL    Metamyelocytes  Absolute, Manual 0.58 0.00 - 0.00 x10*3/uL    Total Cells Counted 100     Manual nRBC per 100 Cells 2.0 (H) 0.0 - 0.0 %    RBC Morphology See Below     Polychromasia Mild     Basophilic Stippling Present    Morphology   Result Value Ref Range    RBC Morphology See Below     Polychromasia Mild     Basophilic Stippling Present    POCT GLUCOSE   Result Value Ref Range    POCT Glucose 331 (H) 74 - 99 mg/dL      Patient recently received an antibiotic (last 12 hours)       Date/Time Action Medication Dose    03/25/25 1540 New Bag    piperacillin-tazobactam (Zosyn) 3.375 g in dextrose (iso) IV 50 mL 3.375 g    03/25/25 0949 New Bag    piperacillin-tazobactam (Zosyn) 3.375 g in dextrose (iso) IV 50 mL 3.375 g           Pt fully evaluated 3/25. Hematocrit 26.2 and hemoglobin 7.3. WBC at 14.8. Added cardio and increased fluids and free water flushes to q4. Plan discussed with interdisciplinary team, continue current and repeat labs in the AM.     Discharge planning discussed with patient and care team. [facility] ordered[facility] at discharge. Patient aware and agreeable to current plan, continue plan as above.     I spent a total of 50 minutes on the date of the service which included preparing to see the patient, face-to-face patient care, completing clinical documentation, obtaining and/or reviewing separately obtained history, performing a medically appropriate examination, counseling and educating the patient/family/caregiver, ordering medications, tests, or procedures, communicating with other HCPs (not separately reported), independently interpreting results (not separately reported), communicating results to the patient/family/caregiver, and care coordination (not separately reported).      Patient fully evaluated  03/26  for    Problem List Items Addressed This Visit       * (Principal) Hemoptysis - Primary    Relevant Orders    Esophagogastroduodenoscopy (EGD) (Completed)    Surgical  Pathology Exam    Melena    Relevant Orders    Esophagogastroduodenoscopy (EGD) (Completed)    Surgical Pathology Exam     Other Visit Diagnoses       Anemia, unspecified type        Relevant Orders    Esophagogastroduodenoscopy (EGD) (Completed)    Surgical Pathology Exam    Tracheostomy dependent (Multi)        Relevant Orders    Surgical Pathology Exam          Patient seen resting in bed with head of bed elevated, no s/s or c/o acute difficulties at this time.  Vital signs for last 24 hours Temp:  [35.5 °C (95.9 °F)-37 °C (98.6 °F)] 36.8 °C (98.2 °F)  Heart Rate:  [63-74] 73  Resp:  [17-18] 18  BP: (118-151)/(57-78) 118/57  FiO2 (%):  [28 %-30 %] 28 %    No intake/output data recorded.  Patient still requiring frequent cardiac and SPO2 monitoring. Continue aggressive pulmonary hygiene and oral hygiene. Off loading as tolerated for skin integrity. Medications and labs reviewed-   Results for orders placed or performed during the hospital encounter of 03/22/25 (from the past 24 hours)   POCT GLUCOSE   Result Value Ref Range    POCT Glucose 342 (H) 74 - 99 mg/dL   POCT GLUCOSE   Result Value Ref Range    POCT Glucose 340 (H) 74 - 99 mg/dL   POCT GLUCOSE   Result Value Ref Range    POCT Glucose 354 (H) 74 - 99 mg/dL   Comprehensive Metabolic Panel   Result Value Ref Range    Glucose 366 (H) 74 - 99 mg/dL    Sodium 147 (H) 136 - 145 mmol/L    Potassium 4.2 3.5 - 5.3 mmol/L    Chloride 110 (H) 98 - 107 mmol/L    Bicarbonate 30 21 - 32 mmol/L    Anion Gap 11 10 - 20 mmol/L    Urea Nitrogen 26 (H) 6 - 23 mg/dL    Creatinine 0.94 0.50 - 1.30 mg/dL    eGFR 90 >60 mL/min/1.73m*2    Calcium 8.8 8.6 - 10.3 mg/dL    Albumin 3.0 (L) 3.4 - 5.0 g/dL    Alkaline Phosphatase 276 (H) 33 - 136 U/L    Total Protein 6.1 (L) 6.4 - 8.2 g/dL    AST 30 9 - 39 U/L    Bilirubin, Total 0.4 0.0 - 1.2 mg/dL     (H) 10 - 52 U/L   CBC and Auto Differential   Result Value Ref Range    WBC 11.5 (H) 4.4 - 11.3 x10*3/uL    nRBC 7.4 (H) 0.0 -  0.0 /100 WBCs    RBC 2.77 (L) 4.50 - 5.90 x10*6/uL    Hemoglobin 7.6 (L) 13.5 - 17.5 g/dL    Hematocrit 27.3 (L) 41.0 - 52.0 %    MCV 99 80 - 100 fL    MCH 27.4 26.0 - 34.0 pg    MCHC 27.8 (L) 32.0 - 36.0 g/dL    RDW 19.1 (H) 11.5 - 14.5 %    Platelets 293 150 - 450 x10*3/uL    Immature Granulocytes %, Automated 8.3 (H) 0.0 - 0.9 %    Immature Granulocytes Absolute, Automated 0.97 (H) 0.00 - 0.70 x10*3/uL   Manual Differential   Result Value Ref Range    Neutrophils %, Manual 79.0 40.0 - 80.0 %    Lymphocytes %, Manual 10.0 13.0 - 44.0 %    Monocytes %, Manual 3.0 2.0 - 10.0 %    Eosinophils %, Manual 2.0 0.0 - 6.0 %    Basophils %, Manual 1.0 0.0 - 2.0 %    Metamyelocytes %, Manual 5.0 0.0 - 0.0 %    Seg Neutrophils Absolute, Manual 9.09 (H) 1.20 - 7.00 x10*3/uL    Lymphocytes Absolute, Manual 1.15 (L) 1.20 - 4.80 x10*3/uL    Monocytes Absolute, Manual 0.35 0.10 - 1.00 x10*3/uL    Eosinophils Absolute, Manual 0.23 0.00 - 0.70 x10*3/uL    Basophils Absolute, Manual 0.12 (H) 0.00 - 0.10 x10*3/uL    Metamyelocytes Absolute, Manual 0.58 0.00 - 0.00 x10*3/uL    Total Cells Counted 100     Manual nRBC per 100 Cells 2.0 (H) 0.0 - 0.0 %    RBC Morphology See Below     Polychromasia Mild     Basophilic Stippling Present    Morphology   Result Value Ref Range    RBC Morphology See Below     Polychromasia Mild     Basophilic Stippling Present    POCT GLUCOSE   Result Value Ref Range    POCT Glucose 331 (H) 74 - 99 mg/dL      Patient recently received an antibiotic (last 12 hours)       Date/Time Action Medication Dose    03/26/25 0903 New Bag    piperacillin-tazobactam (Zosyn) 3.375 g in dextrose (iso) IV 50 mL 3.375 g           Plan discussed with interdisciplinary team, patient with hemoccult positive, protonix 40mg IVP BID, GI consulted, appreciate input. Patient seen by nephrology today with plan for IV iron and erythropoietin, potential nephrotoxins and hepatotoxins, monitor renal chemistries and CBC continue current and  repeat labs in the AM.     Discharge planning discussed with patient and care team. Therapy evaluations ordered. Anticipate HHC/SNF at discharge. Patient aware and agreeable to current plan, continue plan as above.     I spent a total of 50 minutes on the date of the service which included preparing to see the patient, face-to-face patient care, completing clinical documentation, obtaining and/or reviewing separately obtained history, performing a medically appropriate examination, counseling and educating the patient/family/caregiver, ordering medications, tests, or procedures, communicating with other HCPs (not separately reported), independently interpreting results (not separately reported), communicating results to the patient/family/caregiver, and care coordination (not separately reported).   ALEXANDER Vivas    Patient fully evaluated 3/27   for    Problem List Items Addressed This Visit       * (Principal) Hemoptysis - Primary    Relevant Orders    Esophagogastroduodenoscopy (EGD) (Completed)    Surgical Pathology Exam    Melena    Relevant Orders    Esophagogastroduodenoscopy (EGD) (Completed)    Surgical Pathology Exam     Other Visit Diagnoses       Anemia, unspecified type        Relevant Orders    Esophagogastroduodenoscopy (EGD) (Completed)    Surgical Pathology Exam    Tracheostomy dependent (Multi)        Relevant Orders    Surgical Pathology Exam          Patient seen resting in bed with head of bed elevated, no s/s or c/o acute difficulties at this time.  Vital signs for last 24 hours Temp:  [35.5 °C (95.9 °F)-37 °C (98.6 °F)] 36.8 °C (98.2 °F)  Heart Rate:  [63-74] 73  Resp:  [17-18] 18  BP: (118-151)/(57-78) 118/57  FiO2 (%):  [28 %-30 %] 28 %    No intake/output data recorded.  Patient still requiring frequent cardiac and SPO2 monitoring. Continue aggressive pulmonary hygiene and oral hygiene. Off loading as tolerated for skin integrity. Medications and labs reviewed-   Results for orders  placed or performed during the hospital encounter of 03/22/25 (from the past 24 hours)   POCT GLUCOSE   Result Value Ref Range    POCT Glucose 342 (H) 74 - 99 mg/dL   POCT GLUCOSE   Result Value Ref Range    POCT Glucose 340 (H) 74 - 99 mg/dL   POCT GLUCOSE   Result Value Ref Range    POCT Glucose 354 (H) 74 - 99 mg/dL   Comprehensive Metabolic Panel   Result Value Ref Range    Glucose 366 (H) 74 - 99 mg/dL    Sodium 147 (H) 136 - 145 mmol/L    Potassium 4.2 3.5 - 5.3 mmol/L    Chloride 110 (H) 98 - 107 mmol/L    Bicarbonate 30 21 - 32 mmol/L    Anion Gap 11 10 - 20 mmol/L    Urea Nitrogen 26 (H) 6 - 23 mg/dL    Creatinine 0.94 0.50 - 1.30 mg/dL    eGFR 90 >60 mL/min/1.73m*2    Calcium 8.8 8.6 - 10.3 mg/dL    Albumin 3.0 (L) 3.4 - 5.0 g/dL    Alkaline Phosphatase 276 (H) 33 - 136 U/L    Total Protein 6.1 (L) 6.4 - 8.2 g/dL    AST 30 9 - 39 U/L    Bilirubin, Total 0.4 0.0 - 1.2 mg/dL     (H) 10 - 52 U/L   CBC and Auto Differential   Result Value Ref Range    WBC 11.5 (H) 4.4 - 11.3 x10*3/uL    nRBC 7.4 (H) 0.0 - 0.0 /100 WBCs    RBC 2.77 (L) 4.50 - 5.90 x10*6/uL    Hemoglobin 7.6 (L) 13.5 - 17.5 g/dL    Hematocrit 27.3 (L) 41.0 - 52.0 %    MCV 99 80 - 100 fL    MCH 27.4 26.0 - 34.0 pg    MCHC 27.8 (L) 32.0 - 36.0 g/dL    RDW 19.1 (H) 11.5 - 14.5 %    Platelets 293 150 - 450 x10*3/uL    Immature Granulocytes %, Automated 8.3 (H) 0.0 - 0.9 %    Immature Granulocytes Absolute, Automated 0.97 (H) 0.00 - 0.70 x10*3/uL   Manual Differential   Result Value Ref Range    Neutrophils %, Manual 79.0 40.0 - 80.0 %    Lymphocytes %, Manual 10.0 13.0 - 44.0 %    Monocytes %, Manual 3.0 2.0 - 10.0 %    Eosinophils %, Manual 2.0 0.0 - 6.0 %    Basophils %, Manual 1.0 0.0 - 2.0 %    Metamyelocytes %, Manual 5.0 0.0 - 0.0 %    Seg Neutrophils Absolute, Manual 9.09 (H) 1.20 - 7.00 x10*3/uL    Lymphocytes Absolute, Manual 1.15 (L) 1.20 - 4.80 x10*3/uL    Monocytes Absolute, Manual 0.35 0.10 - 1.00 x10*3/uL    Eosinophils  Absolute, Manual 0.23 0.00 - 0.70 x10*3/uL    Basophils Absolute, Manual 0.12 (H) 0.00 - 0.10 x10*3/uL    Metamyelocytes Absolute, Manual 0.58 0.00 - 0.00 x10*3/uL    Total Cells Counted 100     Manual nRBC per 100 Cells 2.0 (H) 0.0 - 0.0 %    RBC Morphology See Below     Polychromasia Mild     Basophilic Stippling Present    Morphology   Result Value Ref Range    RBC Morphology See Below     Polychromasia Mild     Basophilic Stippling Present    POCT GLUCOSE   Result Value Ref Range    POCT Glucose 331 (H) 74 - 99 mg/dL      Patient recently received an antibiotic (last 12 hours)       Date/Time Action Medication Dose    03/27/25 1517 New Bag    piperacillin-tazobactam (Zosyn) 3.375 g in dextrose (iso) IV 50 mL 3.375 g           Patient fully evaluated 3/27. Patient in bed and family in room upon exam. Vitals today include Temp:  [35.1 °C (95.2 °F)-36.6 °C (97.9 °F)] 36.3 °C (97.3 °F), Heart Rate:  [51-67] 51, Resp:  [16-23] 18, BP: ()/(50-65) 96/53, FiO2 (%):  [30 %-50 %] 30 %. Pertinent labs today include WBC 11.9, Hbg 6.8, plts 212. Na 147, K 3.4, Cl 107, HCO3- 34, BUN 36, Cr 1.19. glucose 318. Patient declined blood transfusion, starting Procrit to try to increase counts. Patient underwent EGD today, findings include small hiatal hernia and signs of gastric and duodenal irritation. Continue to monitor.     Plan discussed with interdisciplinary team, continue current and repeat labs in the AM. Discharge planning discussed with patient and care team. Patient aware and agreeable to current plan, continue plan as above.     I spent a total of 50 minutes on the date of the service which included preparing to see the patient, face-to-face patient care, completing clinical documentation, obtaining and/or reviewing separately obtained history, performing a medically appropriate examination, counseling and educating the patient/family/caregiver, ordering medications, tests, or procedures, communicating with other  HCPs (not separately reported), independently interpreting results (not separately reported), communicating results to the patient/family/caregiver, and care coordination (not separately reported).     ALEXANDER Feliciano    Patient fully evaluated  03/28  for    Problem List Items Addressed This Visit       * (Principal) Hemoptysis - Primary    Relevant Orders    Esophagogastroduodenoscopy (EGD) (Completed)    Surgical Pathology Exam    Melena    Relevant Orders    Esophagogastroduodenoscopy (EGD) (Completed)    Surgical Pathology Exam     Other Visit Diagnoses       Anemia, unspecified type        Relevant Orders    Esophagogastroduodenoscopy (EGD) (Completed)    Surgical Pathology Exam    Tracheostomy dependent (Multi)        Relevant Orders    Surgical Pathology Exam          Patient seen resting in bed with head of bed elevated, no s/s or c/o acute difficulties at this time.  Vital signs for last 24 hours Temp:  [35.5 °C (95.9 °F)-37 °C (98.6 °F)] 36.8 °C (98.2 °F)  Heart Rate:  [63-74] 73  Resp:  [17-18] 18  BP: (118-151)/(57-78) 118/57  FiO2 (%):  [28 %-30 %] 28 %    No intake/output data recorded.  Patient still requiring frequent cardiac and SPO2 monitoring. Continue aggressive pulmonary hygiene and oral hygiene. Off loading as tolerated for skin integrity. Medications and labs reviewed-   Results for orders placed or performed during the hospital encounter of 03/22/25 (from the past 24 hours)   POCT GLUCOSE   Result Value Ref Range    POCT Glucose 342 (H) 74 - 99 mg/dL   POCT GLUCOSE   Result Value Ref Range    POCT Glucose 340 (H) 74 - 99 mg/dL   POCT GLUCOSE   Result Value Ref Range    POCT Glucose 354 (H) 74 - 99 mg/dL   Comprehensive Metabolic Panel   Result Value Ref Range    Glucose 366 (H) 74 - 99 mg/dL    Sodium 147 (H) 136 - 145 mmol/L    Potassium 4.2 3.5 - 5.3 mmol/L    Chloride 110 (H) 98 - 107 mmol/L    Bicarbonate 30 21 - 32 mmol/L    Anion Gap 11 10 - 20 mmol/L    Urea Nitrogen 26 (H) 6 - 23  mg/dL    Creatinine 0.94 0.50 - 1.30 mg/dL    eGFR 90 >60 mL/min/1.73m*2    Calcium 8.8 8.6 - 10.3 mg/dL    Albumin 3.0 (L) 3.4 - 5.0 g/dL    Alkaline Phosphatase 276 (H) 33 - 136 U/L    Total Protein 6.1 (L) 6.4 - 8.2 g/dL    AST 30 9 - 39 U/L    Bilirubin, Total 0.4 0.0 - 1.2 mg/dL     (H) 10 - 52 U/L   CBC and Auto Differential   Result Value Ref Range    WBC 11.5 (H) 4.4 - 11.3 x10*3/uL    nRBC 7.4 (H) 0.0 - 0.0 /100 WBCs    RBC 2.77 (L) 4.50 - 5.90 x10*6/uL    Hemoglobin 7.6 (L) 13.5 - 17.5 g/dL    Hematocrit 27.3 (L) 41.0 - 52.0 %    MCV 99 80 - 100 fL    MCH 27.4 26.0 - 34.0 pg    MCHC 27.8 (L) 32.0 - 36.0 g/dL    RDW 19.1 (H) 11.5 - 14.5 %    Platelets 293 150 - 450 x10*3/uL    Immature Granulocytes %, Automated 8.3 (H) 0.0 - 0.9 %    Immature Granulocytes Absolute, Automated 0.97 (H) 0.00 - 0.70 x10*3/uL   Manual Differential   Result Value Ref Range    Neutrophils %, Manual 79.0 40.0 - 80.0 %    Lymphocytes %, Manual 10.0 13.0 - 44.0 %    Monocytes %, Manual 3.0 2.0 - 10.0 %    Eosinophils %, Manual 2.0 0.0 - 6.0 %    Basophils %, Manual 1.0 0.0 - 2.0 %    Metamyelocytes %, Manual 5.0 0.0 - 0.0 %    Seg Neutrophils Absolute, Manual 9.09 (H) 1.20 - 7.00 x10*3/uL    Lymphocytes Absolute, Manual 1.15 (L) 1.20 - 4.80 x10*3/uL    Monocytes Absolute, Manual 0.35 0.10 - 1.00 x10*3/uL    Eosinophils Absolute, Manual 0.23 0.00 - 0.70 x10*3/uL    Basophils Absolute, Manual 0.12 (H) 0.00 - 0.10 x10*3/uL    Metamyelocytes Absolute, Manual 0.58 0.00 - 0.00 x10*3/uL    Total Cells Counted 100     Manual nRBC per 100 Cells 2.0 (H) 0.0 - 0.0 %    RBC Morphology See Below     Polychromasia Mild     Basophilic Stippling Present    Morphology   Result Value Ref Range    RBC Morphology See Below     Polychromasia Mild     Basophilic Stippling Present    POCT GLUCOSE   Result Value Ref Range    POCT Glucose 331 (H) 74 - 99 mg/dL      Patient recently received an antibiotic (last 12 hours)       Date/Time Action  Medication Dose    03/28/25 0915 New Bag    piperacillin-tazobactam (Zosyn) 3.375 g in dextrose (iso) IV 50 mL 3.375 g    03/28/25 0239 New Bag    piperacillin-tazobactam (Zosyn) 3.375 g in dextrose (iso) IV 50 mL 3.375 g           Plan discussed with interdisciplinary team, no blood transfusion as patient is [employer], seen by nephrology today and epogen increased to aid in H/H, renal chemistries down trending, liver function improving, no further hemoptysis noted. WBC elevated today @ 14.0 from 11.9,  chest xray ordered, will  continue IV antibiotics, monitor for bleeding, will continue current and repeat labs in the AM.     Discharge planning discussed with patient and care team. Therapy evaluations ordered. Anticipate HHC/SNF at discharge. Patient aware and agreeable to current plan, continue plan as above.     I spent a total of 50 minutes on the date of the service which included preparing to see the patient, face-to-face patient care, completing clinical documentation, obtaining and/or reviewing separately obtained history, performing a medically appropriate examination, counseling and educating the patient/family/caregiver, ordering medications, tests, or procedures, communicating with other HCPs (not separately reported), independently interpreting results (not separately reported), communicating results to the patient/family/caregiver, and care coordination (not separately reported).     Patient fully evaluated  03/29  for    Problem List Items Addressed This Visit       * (Principal) Hemoptysis - Primary    Relevant Orders    Esophagogastroduodenoscopy (EGD) (Completed)    Surgical Pathology Exam    Melena    Relevant Orders    Esophagogastroduodenoscopy (EGD) (Completed)    Surgical Pathology Exam     Other Visit Diagnoses       Anemia, unspecified type        Relevant Orders    Esophagogastroduodenoscopy (EGD) (Completed)    Surgical Pathology Exam    Tracheostomy dependent (Multi)         Relevant Orders    Surgical Pathology Exam          Patient seen resting in bed with head of bed elevated, no s/s or c/o acute difficulties at this time.  Vital signs for last 24 hours Temp:  [35.5 °C (95.9 °F)-37 °C (98.6 °F)] 36.8 °C (98.2 °F)  Heart Rate:  [63-74] 73  Resp:  [17-18] 18  BP: (118-151)/(57-78) 118/57  FiO2 (%):  [28 %-30 %] 28 %    No intake/output data recorded.  Patient still requiring frequent cardiac and SPO2 monitoring. Continue aggressive pulmonary hygiene and oral hygiene. Off loading as tolerated for skin integrity. Medications and labs reviewed-   Results for orders placed or performed during the hospital encounter of 03/22/25 (from the past 24 hours)   POCT GLUCOSE   Result Value Ref Range    POCT Glucose 342 (H) 74 - 99 mg/dL   POCT GLUCOSE   Result Value Ref Range    POCT Glucose 340 (H) 74 - 99 mg/dL   POCT GLUCOSE   Result Value Ref Range    POCT Glucose 354 (H) 74 - 99 mg/dL   Comprehensive Metabolic Panel   Result Value Ref Range    Glucose 366 (H) 74 - 99 mg/dL    Sodium 147 (H) 136 - 145 mmol/L    Potassium 4.2 3.5 - 5.3 mmol/L    Chloride 110 (H) 98 - 107 mmol/L    Bicarbonate 30 21 - 32 mmol/L    Anion Gap 11 10 - 20 mmol/L    Urea Nitrogen 26 (H) 6 - 23 mg/dL    Creatinine 0.94 0.50 - 1.30 mg/dL    eGFR 90 >60 mL/min/1.73m*2    Calcium 8.8 8.6 - 10.3 mg/dL    Albumin 3.0 (L) 3.4 - 5.0 g/dL    Alkaline Phosphatase 276 (H) 33 - 136 U/L    Total Protein 6.1 (L) 6.4 - 8.2 g/dL    AST 30 9 - 39 U/L    Bilirubin, Total 0.4 0.0 - 1.2 mg/dL     (H) 10 - 52 U/L   CBC and Auto Differential   Result Value Ref Range    WBC 11.5 (H) 4.4 - 11.3 x10*3/uL    nRBC 7.4 (H) 0.0 - 0.0 /100 WBCs    RBC 2.77 (L) 4.50 - 5.90 x10*6/uL    Hemoglobin 7.6 (L) 13.5 - 17.5 g/dL    Hematocrit 27.3 (L) 41.0 - 52.0 %    MCV 99 80 - 100 fL    MCH 27.4 26.0 - 34.0 pg    MCHC 27.8 (L) 32.0 - 36.0 g/dL    RDW 19.1 (H) 11.5 - 14.5 %    Platelets 293 150 - 450 x10*3/uL    Immature Granulocytes %, Automated  8.3 (H) 0.0 - 0.9 %    Immature Granulocytes Absolute, Automated 0.97 (H) 0.00 - 0.70 x10*3/uL   Manual Differential   Result Value Ref Range    Neutrophils %, Manual 79.0 40.0 - 80.0 %    Lymphocytes %, Manual 10.0 13.0 - 44.0 %    Monocytes %, Manual 3.0 2.0 - 10.0 %    Eosinophils %, Manual 2.0 0.0 - 6.0 %    Basophils %, Manual 1.0 0.0 - 2.0 %    Metamyelocytes %, Manual 5.0 0.0 - 0.0 %    Seg Neutrophils Absolute, Manual 9.09 (H) 1.20 - 7.00 x10*3/uL    Lymphocytes Absolute, Manual 1.15 (L) 1.20 - 4.80 x10*3/uL    Monocytes Absolute, Manual 0.35 0.10 - 1.00 x10*3/uL    Eosinophils Absolute, Manual 0.23 0.00 - 0.70 x10*3/uL    Basophils Absolute, Manual 0.12 (H) 0.00 - 0.10 x10*3/uL    Metamyelocytes Absolute, Manual 0.58 0.00 - 0.00 x10*3/uL    Total Cells Counted 100     Manual nRBC per 100 Cells 2.0 (H) 0.0 - 0.0 %    RBC Morphology See Below     Polychromasia Mild     Basophilic Stippling Present    Morphology   Result Value Ref Range    RBC Morphology See Below     Polychromasia Mild     Basophilic Stippling Present    POCT GLUCOSE   Result Value Ref Range    POCT Glucose 331 (H) 74 - 99 mg/dL      Patient recently received an antibiotic (last 12 hours)       Date/Time Action Medication Dose    03/29/25 0911 New Bag    piperacillin-tazobactam (Zosyn) 3.375 g in dextrose (iso) IV 50 mL 3.375 g    03/29/25 0148 New Bag    piperacillin-tazobactam (Zosyn) 3.375 g in dextrose (iso) IV 50 mL 3.375 g           Plan discussed with interdisciplinary team, will deescalate medications, IV steroids to oral, will continue IV antibiotics, monitor overnight. Will  continue current and repeat labs in the AM.     Discharge planning discussed with patient and care team. Therapy evaluations ordered. Anticipate SNF at discharge. Patient aware and agreeable to current plan, continue plan as above.     I spent a total of 50 minutes on the date of the service which included preparing to see the patient, face-to-face patient care,  completing clinical documentation, obtaining and/or reviewing separately obtained history, performing a medically appropriate examination, counseling and educating the patient/family/caregiver, ordering medications, tests, or procedures, communicating with other HCPs (not separately reported), independently interpreting results (not separately reported), communicating results to the patient/family/caregiver, and care coordination (not separately reported).     Patient fully evaluated  03/30  for    Problem List Items Addressed This Visit       * (Principal) Hemoptysis - Primary    Relevant Orders    Esophagogastroduodenoscopy (EGD) (Completed)    Surgical Pathology Exam    Melena    Relevant Orders    Esophagogastroduodenoscopy (EGD) (Completed)    Surgical Pathology Exam     Other Visit Diagnoses       Anemia, unspecified type        Relevant Orders    Esophagogastroduodenoscopy (EGD) (Completed)    Surgical Pathology Exam    Tracheostomy dependent (Multi)        Relevant Orders    Surgical Pathology Exam          Patient seen resting in bed with head of bed elevated, no s/s or c/o acute difficulties at this time.  Vital signs for last 24 hours Temp:  [35.5 °C (95.9 °F)-37 °C (98.6 °F)] 36.8 °C (98.2 °F)  Heart Rate:  [63-74] 73  Resp:  [17-18] 18  BP: (118-151)/(57-78) 118/57  FiO2 (%):  [28 %-30 %] 28 %    No intake/output data recorded.  Patient still requiring frequent cardiac and SPO2 monitoring. Continue aggressive pulmonary hygiene and oral hygiene. Off loading as tolerated for skin integrity. Medications and labs reviewed-   Results for orders placed or performed during the hospital encounter of 03/22/25 (from the past 24 hours)   POCT GLUCOSE   Result Value Ref Range    POCT Glucose 342 (H) 74 - 99 mg/dL   POCT GLUCOSE   Result Value Ref Range    POCT Glucose 340 (H) 74 - 99 mg/dL   POCT GLUCOSE   Result Value Ref Range    POCT Glucose 354 (H) 74 - 99 mg/dL   Comprehensive Metabolic Panel   Result Value  Ref Range    Glucose 366 (H) 74 - 99 mg/dL    Sodium 147 (H) 136 - 145 mmol/L    Potassium 4.2 3.5 - 5.3 mmol/L    Chloride 110 (H) 98 - 107 mmol/L    Bicarbonate 30 21 - 32 mmol/L    Anion Gap 11 10 - 20 mmol/L    Urea Nitrogen 26 (H) 6 - 23 mg/dL    Creatinine 0.94 0.50 - 1.30 mg/dL    eGFR 90 >60 mL/min/1.73m*2    Calcium 8.8 8.6 - 10.3 mg/dL    Albumin 3.0 (L) 3.4 - 5.0 g/dL    Alkaline Phosphatase 276 (H) 33 - 136 U/L    Total Protein 6.1 (L) 6.4 - 8.2 g/dL    AST 30 9 - 39 U/L    Bilirubin, Total 0.4 0.0 - 1.2 mg/dL     (H) 10 - 52 U/L   CBC and Auto Differential   Result Value Ref Range    WBC 11.5 (H) 4.4 - 11.3 x10*3/uL    nRBC 7.4 (H) 0.0 - 0.0 /100 WBCs    RBC 2.77 (L) 4.50 - 5.90 x10*6/uL    Hemoglobin 7.6 (L) 13.5 - 17.5 g/dL    Hematocrit 27.3 (L) 41.0 - 52.0 %    MCV 99 80 - 100 fL    MCH 27.4 26.0 - 34.0 pg    MCHC 27.8 (L) 32.0 - 36.0 g/dL    RDW 19.1 (H) 11.5 - 14.5 %    Platelets 293 150 - 450 x10*3/uL    Immature Granulocytes %, Automated 8.3 (H) 0.0 - 0.9 %    Immature Granulocytes Absolute, Automated 0.97 (H) 0.00 - 0.70 x10*3/uL   Manual Differential   Result Value Ref Range    Neutrophils %, Manual 79.0 40.0 - 80.0 %    Lymphocytes %, Manual 10.0 13.0 - 44.0 %    Monocytes %, Manual 3.0 2.0 - 10.0 %    Eosinophils %, Manual 2.0 0.0 - 6.0 %    Basophils %, Manual 1.0 0.0 - 2.0 %    Metamyelocytes %, Manual 5.0 0.0 - 0.0 %    Seg Neutrophils Absolute, Manual 9.09 (H) 1.20 - 7.00 x10*3/uL    Lymphocytes Absolute, Manual 1.15 (L) 1.20 - 4.80 x10*3/uL    Monocytes Absolute, Manual 0.35 0.10 - 1.00 x10*3/uL    Eosinophils Absolute, Manual 0.23 0.00 - 0.70 x10*3/uL    Basophils Absolute, Manual 0.12 (H) 0.00 - 0.10 x10*3/uL    Metamyelocytes Absolute, Manual 0.58 0.00 - 0.00 x10*3/uL    Total Cells Counted 100     Manual nRBC per 100 Cells 2.0 (H) 0.0 - 0.0 %    RBC Morphology See Below     Polychromasia Mild     Basophilic Stippling Present    Morphology   Result Value Ref Range    RBC  Morphology See Below     Polychromasia Mild     Basophilic Stippling Present    POCT GLUCOSE   Result Value Ref Range    POCT Glucose 331 (H) 74 - 99 mg/dL      Patient recently received an antibiotic (last 12 hours)       Date/Time Action Medication Dose    03/30/25 1057 New Bag    piperacillin-tazobactam (Zosyn) 3.375 g in dextrose (iso) IV 50 mL 3.375 g    03/30/25 0238 New Bag    piperacillin-tazobactam (Zosyn) 3.375 g in dextrose (iso) IV 50 mL 3.375 g           Plan discussed with interdisciplinary team, seen by nephrology today with plan for 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 Appreciate input and agree with plan. Awaiting therapy evaluations. Will continue current IV antibiotics, continue current plan, and repeat labs in the AM.     Discharge planning discussed with patient and care team. Therapy evaluations ordered. Anticipate [facility] at discharge. Patient aware and agreeable to current plan, continue plan as above.     I spent a total of 50 minutes on the date of the service which included preparing to see the patient, face-to-face patient care, completing clinical documentation, obtaining and/or reviewing separately obtained history, performing a medically appropriate examination, counseling and educating the patient/family/caregiver, ordering medications, tests, or procedures, communicating with other HCPs (not separately reported), independently interpreting results (not separately reported), communicating results to the patient/family/caregiver, and care coordination (not separately reported).

## 2025-03-30 NOTE — ASSESSMENT & PLAN NOTE
Zeus Bone MD  Physician  Internal Medicine     H&P      Signed     Date of Service: 3/23/2025 10:28 AM     Signed       Expand All Collapse All    History Of Present Illness  Bryan Nix is a 64-year-old male with past medical history of atrial flutter on Eliquis, hyponatremia, IDDM, CAD, hypertension, chronic wounds, diffuse lymphadenopathy, anemia, history of smoking, history elevated PSA, CVA L hemisphere (2020) with right-sided deficits, Left ICA stenosis (85%) s/p angioplasty and left carotid artery stent (08/25/2023) and recent history of acute pontine stroke 02/27/25 with acute hypoxic respiratory failure s/p tracheostomy on 2/25/2025 and PEG.  Patient presents for bleeding from tracheostomy.  Patient is alert and oriented x 3, however limited verbal communication due to tracheostomy.  Wife is at bedside and assists with history.  She reports that patient was coughing blood out of his tracheostomy.  ER provider felt bleeding was likely superficial around site.  Patient has been on room air at skilled nursing facility.  He was placed on Airvo in the ED mainly to humidify oxygen.  Patient never desaturated per ER report.  Currently he is resting comfortably in the bed, breath sounds slightly rhonchorous, but unlabored.  Reports chills, but no fevers.  He has right-sided weakness and sensory deficits.  Denies headache, vision or hearing changes, chest pains, palpitations, shortness of breath, nausea, vomiting, abdominal pain, diarrhea, or complications of PEG tube site.  Wife reports patient skin became irritated in his groin due to adhesive from a condom cath previously, he has a small decubitus wound that she reports is healing well.  He receives tube feeds continuously.  Wife reports a patient was recently evaluated by speech for p.o. to intake, however he still remains n.p.o. except for meds and tube feeds through PEG. CODE STATUS reviewed: full code     ER Course: Hemodynamically stable,  afebrile, SpO2 to 97% trach mask.  WBC 9.3, hemoglobin 8, hematocrit 29.4, platelets 197.  INR 1.4.  Glucose 223, BUN 36, otherwise CMP is normal.  CT of the chest for PE pending.  CXR unremarkable. Trannexamic acid soak gauze wrapped around trach.      Past medical history: As above  Past surgical history: As above  Social history: Former smoker 37.5 pack years-quit smoking age 63, occasional alcohol use, no illicit drug use.  .  Works in maintenance.  6 children.  Family history: Noncontributory     Past Medical History  Medical History        Past Medical History:   Diagnosis Date    Abnormal finding of blood chemistry, unspecified 05/18/2016     Abnormal blood chemistry    Acute upper respiratory infection, unspecified 12/02/2015     Acute upper respiratory infection    Elevated prostate specific antigen (PSA)       Elevated prostate specific antigen (PSA)    Encounter for screening for malignant neoplasm of colon 01/30/2021     Colon cancer screening    Encounter for screening for malignant neoplasm of prostate 11/30/2020     Prostate cancer screening    Essential (primary) hypertension 07/30/2013     Benign essential hypertension    Other conditions influencing health status 07/30/2013     Diabetes Mellitus Poorly Controlled    Other conditions influencing health status 12/02/2015     History of cough    Personal history of other endocrine, nutritional and metabolic disease 03/18/2021     History of hyperlipidemia    Personal history of other endocrine, nutritional and metabolic disease 03/18/2021     History of diabetes mellitus    Personal history of other specified conditions 05/18/2016     History of chest pain    Personal history of other specified conditions 05/18/2016     History of dyspnea    Personal history of other specified conditions 05/18/2016     History of dizziness    Personal history of other specified conditions 05/18/2016     History of fatigue            Surgical History  Surgical  History         Past Surgical History:   Procedure Laterality Date    CT ANGIO CORONARY ART WITH HEARTFLOW IF SCORE >30%   8/23/2023     CT HEART CORONARY ANGIOGRAM 8/23/2023 [facility]    MR HEAD ANGIO WO IV CONTRAST   12/14/2020     MR HEAD ANGIO WO IV CONTRAST 12/14/2020 [facility]    MR NECK ANGIO WO IV CONTRAST   12/14/2020     MR NECK ANGIO WO IV CONTRAST 12/14/2020 [facility]    OTHER SURGICAL HISTORY   12/01/2020     Hand fracture repair    OTHER SURGICAL HISTORY   12/01/2020     Nasal bone fracture repair            Social History  He reports that he has quit smoking. His smoking use included cigarettes. He has never used smokeless tobacco. No history on file for alcohol use and drug use.     Family History  Family History   No family history on file.        Allergies  Patient has no known allergies.     Review of Systems     Physical Exam  Constitutional:       General: He is awake. He is not in acute distress.     Appearance: Normal appearance. He is not toxic-appearing.   HENT:      Head: Atraumatic.      Nose: Nose normal.      Mouth/Throat:      Mouth: Mucous membranes are moist.   Eyes:      Conjunctiva/sclera: Conjunctivae normal.   Cardiovascular:      Rate and Rhythm: Normal rate and regular rhythm.      Pulses: Normal pulses.      Heart sounds: No murmur heard.  Pulmonary:      Effort: No respiratory distress.      Comments: Tracheostomy site midline, no drainage around dressing.  Rhonchorous breath sounds, unlabored respirations, RT bedside suctioning patient with thick blood-tinged sputum  Abdominal:      General: Bowel sounds are normal. There is no distension.      Palpations: Abdomen is soft.      Tenderness: There is no abdominal tenderness. There is no guarding.      Comments: Left upper quadrant PEG site tube site, no drainage or skin breakdown around tube site.   Musculoskeletal:         General: No swelling, deformity or signs of injury.      Cervical back: Neck  "supple.      Right lower leg: No edema.      Left lower leg: No edema.   Skin:     General: Skin is warm and dry.      Capillary Refill: Capillary refill takes less than 2 seconds.      Findings: Wound (sacrum) present. No ecchymosis or erythema.   Neurological:      Mental Status: He is alert and oriented to person, place, and time.      Cranial Nerves: No facial asymmetry.      Sensory: Sensory deficit (RUE, RLE) present.      Motor: Weakness present.      Comments: Right sided upper and lower extremity motor function deficits   Psychiatric:         Mood and Affect: Mood normal.         Behavior: Behavior is cooperative.            Last Recorded Vitals  Blood pressure 134/50, pulse 93, temperature 36.9 °C (98.4 °F), temperature source Temporal, resp. rate 22, height 1.88 m (6' 2\"), weight 77.1 kg (170 lb), SpO2 98%.     Relevant Results              Results for orders placed or performed during the hospital encounter of 03/22/25 (from the past 24 hours)   CBC and Auto Differential   Result Value Ref Range     WBC 9.3 4.4 - 11.3 x10*3/uL     nRBC 0.3 (H) 0.0 - 0.0 /100 WBCs     RBC 3.02 (L) 4.50 - 5.90 x10*6/uL     Hemoglobin 8.0 (L) 13.5 - 17.5 g/dL     Hematocrit 29.4 (L) 41.0 - 52.0 %     MCV 97 80 - 100 fL     MCH 26.5 26.0 - 34.0 pg     MCHC 27.2 (L) 32.0 - 36.0 g/dL     RDW 15.6 (H) 11.5 - 14.5 %     Platelets 197 150 - 450 x10*3/uL     Neutrophils % 77.1 40.0 - 80.0 %     Immature Granulocytes %, Automated 1.5 (H) 0.0 - 0.9 %     Lymphocytes % 13.1 13.0 - 44.0 %     Monocytes % 4.1 2.0 - 10.0 %     Eosinophils % 3.2 0.0 - 6.0 %     Basophils % 1.0 0.0 - 2.0 %     Neutrophils Absolute 7.14 1.20 - 7.70 x10*3/uL     Immature Granulocytes Absolute, Automated 0.14 0.00 - 0.70 x10*3/uL     Lymphocytes Absolute 1.21 1.20 - 4.80 x10*3/uL     Monocytes Absolute 0.38 0.10 - 1.00 x10*3/uL     Eosinophils Absolute 0.30 0.00 - 0.70 x10*3/uL     Basophils Absolute 0.09 0.00 - 0.10 x10*3/uL   Basic metabolic panel   Result " Value Ref Range     Glucose 223 (H) 74 - 99 mg/dL     Sodium 142 136 - 145 mmol/L     Potassium 4.3 3.5 - 5.3 mmol/L     Chloride 103 98 - 107 mmol/L     Bicarbonate 27 21 - 32 mmol/L     Anion Gap 16 10 - 20 mmol/L     Urea Nitrogen 36 (H) 6 - 23 mg/dL     Creatinine 0.81 0.50 - 1.30 mg/dL     eGFR >90 >60 mL/min/1.73m*2     Calcium 9.6 8.6 - 10.3 mg/dL   Protime-INR   Result Value Ref Range     Protime 15.1 (H) 9.8 - 12.4 seconds     INR 1.4 (H) 0.9 - 1.1   aPTT   Result Value Ref Range     aPTT 25 (L) 26 - 36 seconds   Type and Screen   Result Value Ref Range     ABO TYPE B       Rh TYPE POS       ANTIBODY SCREEN NEG     VERIFY ABO/Rh Group Test   Result Value Ref Range     ABO TYPE B       Rh TYPE POS     MRSA Surveillance for Vancomycin De-escalation, PCR     Specimen: Anterior Nares; Swab   Result Value Ref Range     MRSA PCR Not Detected Not Detected   POCT GLUCOSE   Result Value Ref Range     POCT Glucose 180 (H) 74 - 99 mg/dL   POCT GLUCOSE   Result Value Ref Range     POCT Glucose 161 (H) 74 - 99 mg/dL   POCT GLUCOSE   Result Value Ref Range     POCT Glucose 156 (H) 74 - 99 mg/dL   Basic metabolic panel   Result Value Ref Range     Glucose 170 (H) 74 - 99 mg/dL     Sodium 147 (H) 136 - 145 mmol/L     Potassium 4.2 3.5 - 5.3 mmol/L     Chloride 107 98 - 107 mmol/L     Bicarbonate 30 21 - 32 mmol/L     Anion Gap 14 10 - 20 mmol/L     Urea Nitrogen 35 (H) 6 - 23 mg/dL     Creatinine 0.85 0.50 - 1.30 mg/dL     eGFR >90 >60 mL/min/1.73m*2     Calcium 9.4 8.6 - 10.3 mg/dL   CBC   Result Value Ref Range     WBC 10.4 4.4 - 11.3 x10*3/uL     nRBC 0.6 (H) 0.0 - 0.0 /100 WBCs     RBC 2.87 (L) 4.50 - 5.90 x10*6/uL     Hemoglobin 7.8 (L) 13.5 - 17.5 g/dL     Hematocrit 26.6 (L) 41.0 - 52.0 %     MCV 93 80 - 100 fL     MCH 27.2 26.0 - 34.0 pg     MCHC 29.3 (L) 32.0 - 36.0 g/dL     RDW 15.7 (H) 11.5 - 14.5 %     Platelets 205 150 - 450 x10*3/uL   SST TOP   Result Value Ref Range     Extra Tube Hold for add-ons.        CT  angio chest for pulmonary embolism     Result Date: 3/22/2025  Interpreted By:  Pamela Zurita, STUDY: CT ANGIO CHEST FOR PULMONARY EMBOLISM;  3/22/2025 4:56 pm   INDICATION: Signs/Symptoms:bloody secretions, eval for PE.     COMPARISON: CT chest without contrast 03/22/2025   ACCESSION NUMBER(S): [id number]   ORDERING CLINICIAN: PAMELA HAHN   TECHNIQUE: Contiguous axial images of the chest were obtained after the intravenous administration of iodinated contrast using angiographic PE protocol. Coronal and sagittal reformatted images were reconstructed from the axial data. MIP images were created on an independent workstation and reviewed.   FINDINGS:   MEDIASTINUM AND LYMPH NODES: Inflammatory fat stranding along the tracheostomy tract without discrete fluid collection. The esophageal wall appears within normal limits. There is redemonstration of diffuse lymphadenopathy in the bilateral axilla, mediastinum, bilateral supraclavicular as described in detail on separate report. Also notable or enlarged bilateral level 4 cervical lymph nodes (right > left).   VESSELS:  Normal caliber thoracic aorta without dissection. Moderate aortic atherosclerosis.  No acute pulmonary embolism.   HEART: Normal size.  Mild coronary artery calcifications. No significant pericardial effusion.   LUNG, AIRWAYS, PLEURA: There is slight increase in debris within the trachea. There has been clearing of secretions in the left upper/lower lobe bronchus. There is diffuse bilateral bronchial thickening slightly increased from prior study. There is peribronchovascular ground-glass opacity in the posterior right upper lobe and superior and basal segments of the right lower lobe. There is slightly worsened atelectasis in the lung bases remaining predominantly subsegmental in nature. There is a 0.8 cm nodule in the left upper lobe that is stable from 08/19/2023.   OSSEOUS STRUCTURES: No acute osseous abnormality.   CHEST WALL SOFT TISSUES: No  discernible acute abnormality.   UPPER ABDOMEN/OTHER: No acute abnormality.        No acute pulmonary embolism.   Peribronchial vascular ground glass opacities in the right upper and lower lobe again concerning for pneumonia given patient's risk factors of tracheostomy and debris in the airways.   Redemonstration of diffuse lymphadenopathy in the lower necks, axilla, and mediastinum. Findings are either diffusely reactive in nature, reflective of lymphoma/leukemia, meter deficiency, or less likely head neck neoplasm is previously postulated. However please correlate with past medical history.   Inflammatory changes along the tracheostomy tract without fluid collection. Correlate for erythema.   Unchanged 0.8 cm nodule in the left upper lobe dating back to 08/19/2023. Recommend 1 year follow up chest CT to ensure at least 2 years of stability.   MACRO: None.   Signed by: Pamela Zurita 3/22/2025 7:17 PM Dictation workstation:   [id number]     CT chest wo IV contrast     Result Date: 3/22/2025  Interpreted By:  Pamela Zurita, STUDY: CT CHEST WO IV CONTRAST;  3/22/2025 3:41 pm   INDICATION: Signs/Symptoms:eval for alveolar hemorrhage.     COMPARISON: CT head/neck 08/19/2023   ACCESSION NUMBER(S): [id number]   ORDERING CLINICIAN: PAMELA HAHN   TECHNIQUE: Contiguous axial images of the chest and upper abdomen were obtained without contrast. Coronal and sagittal reformatted images were reconstructed from the axial data.   FINDINGS:   MEDIASTINUM AND LYMPH NODES: There is fat stranding/edema along the tract of the tracheostomy about discrete fluid collection. The esophageal wall appears within normal limits. There are numerous enlarged bilateral supraclavicular lymph nodes measuring up to 1.4 cm on the left. There are numerous enlarged bilateral axillary lymph nodes measuring up to 1.1 cm on the right and 1.2 cm on the left. There are numerous prominent to mildly enlarged mediastinal lymph nodes as well. No  pneumomediastinum.   VESSELS:  Normal caliber thoracic aorta. Moderate aortic atherosclerosis. The main pulmonary artery is normal in size.   HEART: Normal size.  Mild coronary artery calcifications. No significant pericardial effusion.   LUNG, AIRWAYS, PLEURA: Tracheostomy noted in appropriate position. There is trace mucus in the left mainstem bronchus and at the origin of the left upper lobe and lower lobe bronchi. There are mild peribronchovascular ground-glass opacities in the posterior segment of the right upper lobe, basal segments of the right lower lobe. There is mild dependent bibasilar atelectasis. There is no pleural effusion.   OSSEOUS STRUCTURES: No acute osseous abnormality.   CHEST WALL SOFT TISSUES: No discernible acute abnormality.   UPPER ABDOMEN/OTHER: No acute abnormality.        1. Mild peribronchovascular ground-glass opacities in the posterior right upper lobe and basal segments of the right lower lobe. This appearance and distribution is not typical for alveolar hemorrhage which tends to be centrilobular and bilateral asymmetric in distribution. Findings are most suggestive of pneumonia.   2. Small amount of mucous debris in the left mainstem bronchus and at the origin of the left upper and lower lobe bronchi.   3. Diffuse lymphadenopathy in the bilateral axillary regions, mediastinum and left supraclavicular region particularly notable in the bilateral supraclavicular region. This is progressed in the left supraclavicular region and new elsewhere when compared to 08/19/2023 CTA head/neck. Correlate for any history of known head/neck malignancy or lymphoma.   4. Inflammation/fat stranding adjacent to the tracheostomy tube entry site noted. Correlate for air the met. No evidence of fluid collection.   MACRO: None.   Signed by: Marco A Zurita 3/22/2025 7:10 PM Dictation workstation:   [id number]     XR chest 1 view     Result Date: 3/22/2025  Interpreted By:  Fermin Stroud, STUDY: XR CHEST  1 VIEW   INDICATION: Signs/Symptoms:hemoptysis.   COMPARISON: August 19, 2023   ACCESSION NUMBER(S): [id number]   ORDERING CLINICIAN: PAMELA HAHN   FINDINGS: No consolidation, effusion, edema, or pneumothorax. Heart size within normal limits.        No evidence of acute intrathoracic abnormality.   Signed by: Fermin Stroud 3/22/2025 2:01 PM Dictation workstation:   [id number]     XR chest 1 view     Result Date: 3/14/2025  * * *Final Report* * * DATE OF EXAM: Mar 14 2025  8:52AM   S5X   5290  -  XR CHEST 1V FRONTAL   / ACCESSION #  [id number] PROCEDURE REASON: new bleeding from trach      * * * * Physician Interpretation * * * *  EXAMINATION:  CHEST RADIOGRAPH (PORTABLE SINGLE VIEW AP) Exam Date/Time:  3/14/2025 8:52 AM Clinical History:  new bleeding from trach Comparison:  03/03/2025 RESULT: LINES, TUBES, AND DEVICES:  Tracheostomy. CARDIOMEDIASTINAL SILHOUETTE:  The cardiac and mediastinal silhouettes appear unremarkable. LUNGS AND PLEURA: No consolidation.  No pleural effusion. No pulmonary vascular congestion. No pneumothorax identified. OTHER:  N/A     IMPRESSION: No acute abnormality identified. : PSCB   Transcribe Date/Time: Mar 14 2025  9:07A Dictated by : PAMELA EVANS MD This examination was interpreted and the report reviewed and electronically signed by: PAMELA EVANS MD on Mar 14 2025  9:11AM  EST     US abdomen complete     Result Date: 3/11/2025  ABDOMEN ULTRASOUND-COMPLETE FINDINGS: Abdomen Ultrasound Complete: PROCEDURE: Multiple grayscale mages of the abdomen were obtained. FINDINGS:Multiple real time images demonstrate the liver with increased echogenicity consistent with fatty infiltration. There is no evidence of a discrete mass within the liver. The liver is enlarged, measured at 15.7 cm in size. The gallbladder is seen with no evidence of cholelithiasis. The gallbladder wall is within normal limits. The common bile duct is within normal limits. The visualized pancreas  appears unremarkable. Both kidneys are seen with no evidence of hydronephrosis or a calculus. The spleen has its normal homogeneous echo appearance. There is no free fluid to suggest ascites. The visualized aorta appears unremarkable. The inferior vena cava appears patent. CONCLUSION: Enlarged fatty liver. ELECTRONICALLY SIGNED BY LEONEL LAN M.D. 3/11/2025 5:00:52 PM EDT.     XR chest 1 view     Result Date: 3/3/2025  * * *Final Report* * * DATE OF EXAM: Mar  3 2025  1:40PM   S5X   5290  -  XR CHEST 1V FRONTAL   / ACCESSION #  [id number] PROCEDURE REASON: resp failure      * * * * Physician Interpretation * * * *  EXAMINATION:  CHEST RADIOGRAPH (PORTABLE SINGLE VIEW AP) Exam Date/Time:  3/3/2025 1:40 PM Indication:   resp failure M:  XCP_4 Comparison:  1 day prior RESULT: Lines, tubes, and devices:  Tracheostomy tube remains in situ. Lungs and pleura:  Mild patchy opacities right lower lung zone seen on the prior study are no longer appreciated.  No confluent opacity.   Costophrenic angles are clear.  No pneumothorax. Cardiomediastinal silhouette:  Stable cardiomediastinal silhouette. Other:  -     IMPRESSION: Improved aeration right lower lung zone.  No acute cardiopulmonary finding. : PSCB   Transcribe Date/Time: Mar  3 2025  2:49P Dictated by : ISRAEL BENSON MD This examination was interpreted and the report reviewed and electronically signed by: ISRAEL BENSON MD on Mar  3 2025  2:49PM  EST     XR chest 1 view     Result Date: 3/2/2025  * * *Final Report* * * DATE OF EXAM: Mar  2 2025 11:25AM   MMX   5376  -  XR CHEST 1V FRONTAL PORT  / ACCESSION #  [id number] PROCEDURE REASON: Shortness of breath      * * * * Physician Interpretation * * * *  EXAMINATION:  CHEST RADIOGRAPH (PORTABLE SINGLE VIEW AP) Exam Date/Time:  3/2/2025 11:25 AM CLINICAL HISTORY: Shortness of breath MQ:  XCPR_5 Comparison:  02/20/2025. RESULT: This is a limited examination due to multiple wires and tubing  obscuring the lower lungs. Lines, tubes, and devices:  A tracheostomy tube remains in place. Lungs and pleura:  There are increased bronchovascular markings in the right lower lung which may be due to bronchitis or early changes of aspiration pneumonia.  Clinical correlation and follow-up exams are recommended. Cardiomediastinal silhouette:  Stable cardiomediastinal silhouette. Other:  The visualized bony thorax appears unremarkable.     IMPRESSION: Nonspecific parenchymal changes in the right lower lung as described above. A follow-up exam is recommended. : [facility]   Transcribe Date/Time: Mar  2 2025 11:53A Dictated by : BJ INIGUEZ MD This examination was interpreted and the report reviewed and electronically signed by: BJ INIGUEZ MD on Mar  2 2025 11:54AM  EST     XR chest 1 view     Result Date: 2/28/2025  * * *Final Report* * * DATE OF EXAM: Feb 28 2025 10:36AM   MMX   5290  -  XR CHEST 1V FRONTAL   / ACCESSION #  [id number] PROCEDURE REASON: Shortness of breath      * * * * Physician Interpretation * * * *  EXAMINATION:  CHEST RADIOGRAPH (SINGLE VIEW AP OR PA) CLINICAL HISTORY: Shortness of breath MQ:  XC1_5 Comparison:  02/22/2025 RESULT: Lines, tubes, and devices:  Tracheostomy tube is noted. Lungs and pleura:  No consolidation. No lung mass. No pleural effusion. Cardiomediastinal silhouette:  Normal cardiomediastinal silhouette. Other:  No acute osseous pathology.     IMPRESSION: No acute radiographic abnormality. : [facility]   Transcribe Date/Time: Feb 28 2025 12:07P Dictated by : DIANNE RAMOS MD This examination was interpreted and the report reviewed and electronically signed by: DIANNE RAMOS MD on Feb 28 2025 12:08PM  EST     XR abdomen 2 views supine and erect or decub     Result Date: 2/24/2025  * * *Final Report* * * DATE OF EXAM: Feb 24 2025  6:03PM   MMX   5289  -  XR ABDOMEN 1V SUPINE  / ACCESSION #  [id number] PROCEDURE REASON: Evaluate tube, line or lead position       * * * * Physician Interpretation * * * *  EXAMINATION:  XR ABDOMEN 1V SUPINE CLINICAL HISTORY:   Evaluate tube, line or lead position Technique:   XR ABDOMEN 1V SUPINE -- NOT APPLICABLE with 1 views on 1 images Comparison: 2/21/2025 RESULT: Feeding tube with distal tip at the pylorus antrum. Moderate colonic stool burden. No dilated bowel. Lung structures are intact.     IMPRESSION: Feeding tube with distal tip at the pylorus antrum. Moderate colonic stool burden. No dilated bowel. : PSCB   Transcribe Date/Time: Feb 24 2025  6:23P Dictated by : PABLO RAMOS MD This examination was interpreted and the report reviewed and electronically signed by: PABLO RAMOS MD on Feb 24 2025  6:24PM  EST     CT head wo IV contrast     Result Date: 2/23/2025  * * *Final Report* * * DATE OF EXAM: Feb 23 2025 12:04PM   [facility]   0504  -  CT BRAIN WO IVCON  / ACCESSION #  [id number] PROCEDURE REASON: Stroke, follow up      * * * * Physician Interpretation * * * *  EXAMINATION: CT BRAIN WO IVCON CLINICAL HISTORY: Stroke, follow up TECHNIQUE:  Serial axial images without IV contrast were obtained from the vertex to the foramen magnum. MQ:  CTBWO_3 CT Radiation dose: Integrated Dose-Length Product (DLP) for this visit =   778  mGy*cm CT Dose Reduction Employed: Iterative recon COMPARISON: MRI brain 02/19/2025 RESULT: Post-operative change: None. Acute change: Evolving acute infarct along the central eli and superior left medulla (2:7-9).  No evidence of hemorrhagic transformation. Hemorrhage: No evidence of acute intracranial hemorrhage. Mass Lesion / Mass Effect: There is no evidence of an intracranial mass or extraaxial fluid collection. No significant mass effect. Chronic change: Stable remote left MCA territory encephalomalacia. Parenchyma: There is no significant volume loss. The brain parenchyma is otherwise within normal limits for age. Ventricles: The ventricles are within normal limits of size and configuration  for age. Paranasal sinuses and skull base: Partial opacification of the left maxillary and sphenoid sinus and multiple bilateral ethmoid air cells.   The remaining visualized paranasal sinuses are grossly clear. The skull base and imaged soft tissues are unremarkable. Localizer images: No additional findings.     IMPRESSION: Evolving acute brainstem infarct.  No evidence of hemorrhagic transformation. Stable remote left MCA territory infarct. : [facility]   Transcribe Date/Time: Feb 23 2025 12:48P Dictated by : FRANKY GUZMAN MD   This examination was interpreted and the report reviewed and electronically signed by: FRANKY GUZMAN MD on Feb 23 2025 12:52PM  EST     XR chest 1 view     Result Date: 2/22/2025  * * *Final Report* * * DATE OF EXAM: Feb 22 2025  6:56AM   MMX   [id number]  -  XR CHEST 1V FRONTAL   / ACCESSION #  [id number] PROCEDURE REASON: Evaluate tube, line,  or lead position      * * * * Physician Interpretation * * * *  EXAMINATION:  CHEST RADIOGRAPH (SINGLE VIEW AP OR PA) CLINICAL HISTORY: Evaluate tube, line,  or lead position MQ:  XC1_5 Comparison:  02/21/2025. RESULT: Lines, tubes, and devices:  An ET tube and feeding tube remain in place.   A poorly defined right venous catheter cannot be excluded. Lungs and pleura:  There are persistent bilateral lower lungs infiltrates seen on the right more than left suggestive of pneumonia such as aspiration pneumonia.  There is no definite pleural effusion. Cardiomediastinal silhouette:  Stable cardiomediastinal silhouette. Other:  The visualized bony thorax appears unremarkable.     IMPRESSION: Persistent bilateral lower lungs infiltrates as described above. A follow-up exam is recommended. : [facility]   Transcribe Date/Time: Feb 22 2025  8:20A Dictated by : BJ INIGUEZ MD This examination was interpreted and the report reviewed and electronically signed by: BJ INIGUEZ MD on Feb 22 2025  8:21AM  EST     XR chest 1 view     Result Date:  2/21/2025  * * *Final Report* * * DATE OF EXAM: Feb 21 2025  5:59PM   MMX   5376  -  XR CHEST 1V FRONTAL PORT  / ACCESSION #  [id number] PROCEDURE REASON: Evaluate tube, line,  or lead position      * * * * Physician Interpretation * * * *  EXAMINATION:  CHEST RADIOGRAPH (PORTABLE SINGLE VIEW AP) Exam Date/Time:  2/21/2025 5:59 PM CLINICAL HISTORY: Evaluate tube, line,  or lead position MQ:  XCPR_5 Comparison:  02/21/2025 RESULT: Lines, tubes, and devices: Endotracheal tube in place terminating in enteric tube in place terminating below the field of view. Lungs and pleura: No pneumothorax.  No pleural effusion.  Bilateral mild interstitial opacities.  The right costophrenic angle is not included in the field-of-view. Cardiomediastinal silhouette:  Stable cardiomediastinal silhouette. Other:  .     IMPRESSION: Mild interstitial opacities may be related to pulmonary vascular congestion. : [facility]   Transcribe Date/Time: Feb 21 2025  6:55P Dictated by : IRWIN VALDIVIA MD This examination was interpreted and the report reviewed and electronically signed by: IRWIN VALDIVIA MD on Feb 21 2025  6:57PM  EST     XR abdomen 2 views supine and erect or decub     Result Date: 2/21/2025  * * *Final Report* * * DATE OF EXAM: Feb 21 2025 10:32AM   MMX   [id number]  -  XR ABDOMEN 1V SUPINE  / ACCESSION #  [id number] PROCEDURE REASON: Evaluate tube, line or lead position      * * * * Physician Interpretation * * * *  Clinical Information: Feeding tube Single view of the abdomen was obtained. Feeding tube tip within the distal stomach. There is a nonspecific, nonobstructive bowel gas pattern. No abnormal abdominal masses are identified.  No pathologic calcifications.   No acute bony abnormalities are seen.     IMPRESSION: Nonspecific, nonobstructive bowel gas pattern. Feeding tube tip within the distal stomach. : [facility]   Transcribe Date/Time: Feb 21 2025 10:43A Dictated by : PAMELA EVANS MD  This examination was interpreted and the report reviewed and electronically signed by: PAMELA EVANS MD on Feb 21 2025 10:44AM  EST     XR chest 1 view     Result Date: 2/21/2025  * * *Final Report* * * DATE OF EXAM: Feb 21 2025 10:32AM   MMX   [id number]  -  XR CHEST 1V FRONTAL PORT  / ACCESSION #  [id number] PROCEDURE REASON: Shortness of breath      * * * * Physician Interpretation * * * *  EXAMINATION:  CHEST RADIOGRAPH (PORTABLE SINGLE VIEW AP) Exam Date/Time:  2/21/2025 10:32 AM Clinical History:  Shortness of breath Comparison:  02/17/2025 RESULT: LINES, TUBES, AND DEVICES:  Feeding tube tip beyond the inferior extent of the image. CARDIOMEDIASTINAL SILHOUETTE:  The cardiac and mediastinal silhouettes appear unremarkable. LUNGS AND PLEURA: No consolidation.  No pleural effusion. No pulmonary vascular congestion. No pneumothorax identified. OTHER:  N/A     IMPRESSION: No acute abnormality identified. : [facility]   Transcribe Date/Time: Feb 21 2025 10:41A Dictated by : PAMELA EVANS MD This examination was interpreted and the report reviewed and electronically signed by: PAMELA EVANS MD on Feb 21 2025 10:43AM  EST            Assessment/Plan     Assessment & Plan  Hemoptysis     Tracheostomy in place (Multi)     CVA, old, hemiparesis (Multi)     Status post insertion of percutaneous endoscopic gastrostomy (PEG) tube (Multi)     Pneumonia due to infectious organism     Right sided weakness     Atrial flutter (Multi)     Insulin dependent type 2 diabetes mellitus (Multi)        Telemetry monitoring  Hemoglobin stable 8's recently on review of outside labs, monitor for ongoing bleeding.  Daily H&H  Monitor for overt bleeding  Consult pulmonology, appreciate input  Pneumonia suspected for CT findings  Tracheostomy protocol  Expected cover with Zosyn and vancomycin  MRSA swab  Urine for Legionella antigen and strep pneumonia antigen  Supplemental oxygen as needed  Nebulizers as needed  RT evaluate and  treat  Check procalcitonin  Typical 20% zinc oxide to wounds and groin  Continuous tube feeds, continue at 80 mL an hour x 22 hours  Accu-Chek every 6 hours  Hypoglycemia protocol  PT/OT  Anticoagulation and antiplatelet therapy on hold, resume once cleared by pulmonology  Continue patient's home medications for chronic issues as appropriate     Patient fully evaluated on March 23.  Continue to monitor H&H.  Pulmonary consultation obtained.  Continue broad-spectrum antibiotics to rule out infectious process.  Recheck labs in AM.                 Zeus Bone MD

## 2025-03-31 VITALS
OXYGEN SATURATION: 94 % | BODY MASS INDEX: 21.82 KG/M2 | DIASTOLIC BLOOD PRESSURE: 58 MMHG | RESPIRATION RATE: 18 BRPM | TEMPERATURE: 97.5 F | HEART RATE: 57 BPM | HEIGHT: 74 IN | SYSTOLIC BLOOD PRESSURE: 124 MMHG | WEIGHT: 170 LBS

## 2025-03-31 DIAGNOSIS — K92.1 MELENA: ICD-10-CM

## 2025-03-31 LAB
ALBUMIN SERPL BCP-MCNC: 3.1 G/DL (ref 3.4–5)
ALP SERPL-CCNC: 266 U/L (ref 33–136)
ALT SERPL W P-5'-P-CCNC: 121 U/L (ref 10–52)
ANION GAP SERPL CALC-SCNC: 11 MMOL/L (ref 10–20)
AST SERPL W P-5'-P-CCNC: 35 U/L (ref 9–39)
BASO STIPL BLD QL SMEAR: PRESENT
BASOPHILS # BLD MANUAL: 0.4 X10*3/UL (ref 0–0.1)
BASOPHILS NFR BLD MANUAL: 3 %
BILIRUB SERPL-MCNC: 0.4 MG/DL (ref 0–1.2)
BUN SERPL-MCNC: 26 MG/DL (ref 6–23)
CALCIUM SERPL-MCNC: 8.7 MG/DL (ref 8.6–10.3)
CHLORIDE SERPL-SCNC: 107 MMOL/L (ref 98–107)
CO2 SERPL-SCNC: 30 MMOL/L (ref 21–32)
CREAT SERPL-MCNC: 0.92 MG/DL (ref 0.5–1.3)
EGFRCR SERPLBLD CKD-EPI 2021: >90 ML/MIN/1.73M*2
EOSINOPHIL # BLD MANUAL: 0.13 X10*3/UL (ref 0–0.7)
EOSINOPHIL NFR BLD MANUAL: 1 %
ERYTHROCYTE [DISTWIDTH] IN BLOOD BY AUTOMATED COUNT: 20.8 % (ref 11.5–14.5)
GLUCOSE BLD MANUAL STRIP-MCNC: 290 MG/DL (ref 74–99)
GLUCOSE BLD MANUAL STRIP-MCNC: 300 MG/DL (ref 74–99)
GLUCOSE BLD MANUAL STRIP-MCNC: 313 MG/DL (ref 74–99)
GLUCOSE BLD MANUAL STRIP-MCNC: 320 MG/DL (ref 74–99)
GLUCOSE SERPL-MCNC: 332 MG/DL (ref 74–99)
HCT VFR BLD AUTO: 29.8 % (ref 41–52)
HGB BLD-MCNC: 8 G/DL (ref 13.5–17.5)
HYPOCHROMIA BLD QL SMEAR: ABNORMAL
IMM GRANULOCYTES # BLD AUTO: 1.12 X10*3/UL (ref 0–0.7)
IMM GRANULOCYTES NFR BLD AUTO: 8.4 % (ref 0–0.9)
LYMPHOCYTES # BLD MANUAL: 1.86 X10*3/UL (ref 1.2–4.8)
LYMPHOCYTES NFR BLD MANUAL: 14 %
MCH RBC QN AUTO: 27.3 PG (ref 26–34)
MCHC RBC AUTO-ENTMCNC: 26.8 G/DL (ref 32–36)
MCV RBC AUTO: 102 FL (ref 80–100)
METAMYELOCYTES # BLD MANUAL: 0.4 X10*3/UL
METAMYELOCYTES NFR BLD MANUAL: 3 %
MONOCYTES # BLD MANUAL: 0.13 X10*3/UL (ref 0.1–1)
MONOCYTES NFR BLD MANUAL: 1 %
NEUTS SEG # BLD MANUAL: 10.37 X10*3/UL (ref 1.2–7)
NEUTS SEG NFR BLD MANUAL: 78 %
NRBC BLD-RTO: 7.4 /100 WBCS (ref 0–0)
PLATELET # BLD AUTO: 336 X10*3/UL (ref 150–450)
POLYCHROMASIA BLD QL SMEAR: ABNORMAL
POTASSIUM SERPL-SCNC: 3.9 MMOL/L (ref 3.5–5.3)
PROT SERPL-MCNC: 6.2 G/DL (ref 6.4–8.2)
RBC # BLD AUTO: 2.93 X10*6/UL (ref 4.5–5.9)
RBC MORPH BLD: ABNORMAL
SODIUM SERPL-SCNC: 144 MMOL/L (ref 136–145)
TOTAL CELLS COUNTED BLD: 100
WBC # BLD AUTO: 13.3 X10*3/UL (ref 4.4–11.3)

## 2025-03-31 PROCEDURE — 2500000004 HC RX 250 GENERAL PHARMACY W/ HCPCS (ALT 636 FOR OP/ED): Performed by: INTERNAL MEDICINE

## 2025-03-31 PROCEDURE — 2500000001 HC RX 250 WO HCPCS SELF ADMINISTERED DRUGS (ALT 637 FOR MEDICARE OP): Performed by: NURSE PRACTITIONER

## 2025-03-31 PROCEDURE — 2500000004 HC RX 250 GENERAL PHARMACY W/ HCPCS (ALT 636 FOR OP/ED)

## 2025-03-31 PROCEDURE — 94640 AIRWAY INHALATION TREATMENT: CPT

## 2025-03-31 PROCEDURE — 36415 COLL VENOUS BLD VENIPUNCTURE: CPT | Performed by: INTERNAL MEDICINE

## 2025-03-31 PROCEDURE — 85007 BL SMEAR W/DIFF WBC COUNT: CPT | Performed by: INTERNAL MEDICINE

## 2025-03-31 PROCEDURE — 2500000002 HC RX 250 W HCPCS SELF ADMINISTERED DRUGS (ALT 637 FOR MEDICARE OP, ALT 636 FOR OP/ED): Performed by: INTERNAL MEDICINE

## 2025-03-31 PROCEDURE — 2500000002 HC RX 250 W HCPCS SELF ADMINISTERED DRUGS (ALT 637 FOR MEDICARE OP, ALT 636 FOR OP/ED): Performed by: NURSE PRACTITIONER

## 2025-03-31 PROCEDURE — 2500000001 HC RX 250 WO HCPCS SELF ADMINISTERED DRUGS (ALT 637 FOR MEDICARE OP): Performed by: INTERNAL MEDICINE

## 2025-03-31 PROCEDURE — 2060000001 HC INTERMEDIATE ICU ROOM DAILY

## 2025-03-31 PROCEDURE — 84075 ASSAY ALKALINE PHOSPHATASE: CPT | Performed by: INTERNAL MEDICINE

## 2025-03-31 PROCEDURE — 2500000005 HC RX 250 GENERAL PHARMACY W/O HCPCS: Performed by: INTERNAL MEDICINE

## 2025-03-31 PROCEDURE — 85027 COMPLETE CBC AUTOMATED: CPT | Performed by: INTERNAL MEDICINE

## 2025-03-31 PROCEDURE — 82947 ASSAY GLUCOSE BLOOD QUANT: CPT

## 2025-03-31 PROCEDURE — 2500000004 HC RX 250 GENERAL PHARMACY W/ HCPCS (ALT 636 FOR OP/ED): Performed by: NURSE PRACTITIONER

## 2025-03-31 RX ORDER — IPRATROPIUM BROMIDE 0.5 MG/2.5ML
0.5 SOLUTION RESPIRATORY (INHALATION)
Start: 2025-03-31

## 2025-03-31 RX ORDER — AMIODARONE HYDROCHLORIDE 200 MG/1
200 TABLET ORAL 2 TIMES DAILY
Start: 2025-03-31

## 2025-03-31 RX ORDER — INSULIN GLARGINE 100 [IU]/ML
40 INJECTION, SOLUTION SUBCUTANEOUS NIGHTLY
Start: 2025-03-31 | End: 2025-04-01 | Stop reason: HOSPADM

## 2025-03-31 RX ORDER — ACETYLCYSTEINE 200 MG/ML
1 SOLUTION ORAL; RESPIRATORY (INHALATION)
Status: DISCONTINUED | OUTPATIENT
Start: 2025-03-31 | End: 2025-04-03 | Stop reason: HOSPADM

## 2025-03-31 RX ORDER — ZINC OXIDE 20 G/100G
1 OINTMENT TOPICAL
Start: 2025-03-31

## 2025-03-31 RX ORDER — IPRATROPIUM BROMIDE 0.5 MG/2.5ML
0.5 SOLUTION RESPIRATORY (INHALATION) EVERY 2 HOUR PRN
Qty: 75 ML | Refills: 11 | Status: SHIPPED | OUTPATIENT
Start: 2025-03-31

## 2025-03-31 RX ORDER — IPRATROPIUM BROMIDE 0.5 MG/2.5ML
0.5 SOLUTION RESPIRATORY (INHALATION)
Status: DISCONTINUED | OUTPATIENT
Start: 2025-03-31 | End: 2025-04-03 | Stop reason: HOSPADM

## 2025-03-31 RX ADMIN — PANTOPRAZOLE SODIUM 40 MG: 40 INJECTION, POWDER, FOR SOLUTION INTRAVENOUS at 09:08

## 2025-03-31 RX ADMIN — PIPERACILLIN SODIUM AND TAZOBACTAM SODIUM 3.38 G: 3; .375 INJECTION, SOLUTION INTRAVENOUS at 14:20

## 2025-03-31 RX ADMIN — IPRATROPIUM BROMIDE 0.5 MG: 0.5 SOLUTION RESPIRATORY (INHALATION) at 06:48

## 2025-03-31 RX ADMIN — IPRATROPIUM BROMIDE 0.5 MG: 0.5 SOLUTION RESPIRATORY (INHALATION) at 18:54

## 2025-03-31 RX ADMIN — SUCRALFATE 1 G: 1 SUSPENSION ORAL at 20:49

## 2025-03-31 RX ADMIN — Medication 30 L/MIN: at 22:30

## 2025-03-31 RX ADMIN — AMIODARONE HYDROCHLORIDE 200 MG: 200 TABLET ORAL at 20:49

## 2025-03-31 RX ADMIN — METOPROLOL TARTRATE 50 MG: 50 TABLET, FILM COATED ORAL at 09:08

## 2025-03-31 RX ADMIN — SUCRALFATE 1 G: 1 SUSPENSION ORAL at 14:20

## 2025-03-31 RX ADMIN — INSULIN LISPRO 8 UNITS: 100 INJECTION, SOLUTION INTRAVENOUS; SUBCUTANEOUS at 02:31

## 2025-03-31 RX ADMIN — INSULIN LISPRO 6 UNITS: 100 INJECTION, SOLUTION INTRAVENOUS; SUBCUTANEOUS at 14:20

## 2025-03-31 RX ADMIN — ACETYLCYSTEINE 200 MG: 200 SOLUTION ORAL; RESPIRATORY (INHALATION) at 06:48

## 2025-03-31 RX ADMIN — INSULIN GLARGINE 40 UNITS: 100 INJECTION, SOLUTION SUBCUTANEOUS at 20:48

## 2025-03-31 RX ADMIN — Medication 30 PERCENT: at 18:55

## 2025-03-31 RX ADMIN — ACETYLCYSTEINE 200 MG: 200 SOLUTION ORAL; RESPIRATORY (INHALATION) at 18:54

## 2025-03-31 RX ADMIN — PIPERACILLIN SODIUM AND TAZOBACTAM SODIUM 3.38 G: 3; .375 INJECTION, SOLUTION INTRAVENOUS at 02:31

## 2025-03-31 RX ADMIN — AMIODARONE HYDROCHLORIDE 200 MG: 200 TABLET ORAL at 09:08

## 2025-03-31 RX ADMIN — INSULIN LISPRO 8 UNITS: 100 INJECTION, SOLUTION INTRAVENOUS; SUBCUTANEOUS at 20:48

## 2025-03-31 RX ADMIN — PIPERACILLIN SODIUM AND TAZOBACTAM SODIUM 3.38 G: 3; .375 INJECTION, SOLUTION INTRAVENOUS at 09:08

## 2025-03-31 RX ADMIN — TRAZODONE HYDROCHLORIDE 50 MG: 50 TABLET ORAL at 20:49

## 2025-03-31 RX ADMIN — SUCRALFATE 1 G: 1 SUSPENSION ORAL at 09:07

## 2025-03-31 RX ADMIN — PANTOPRAZOLE SODIUM 40 MG: 40 INJECTION, POWDER, FOR SOLUTION INTRAVENOUS at 20:49

## 2025-03-31 RX ADMIN — INSULIN LISPRO 6 UNITS: 100 INJECTION, SOLUTION INTRAVENOUS; SUBCUTANEOUS at 09:08

## 2025-03-31 RX ADMIN — METOPROLOL TARTRATE 50 MG: 50 TABLET, FILM COATED ORAL at 20:49

## 2025-03-31 RX ADMIN — PIPERACILLIN SODIUM AND TAZOBACTAM SODIUM 3.38 G: 3; .375 INJECTION, SOLUTION INTRAVENOUS at 20:49

## 2025-03-31 RX ADMIN — SUCRALFATE 1 G: 1 SUSPENSION ORAL at 02:31

## 2025-03-31 ASSESSMENT — COGNITIVE AND FUNCTIONAL STATUS - GENERAL
DRESSING REGULAR UPPER BODY CLOTHING: TOTAL
MOVING TO AND FROM BED TO CHAIR: TOTAL
DRESSING REGULAR UPPER BODY CLOTHING: TOTAL
PERSONAL GROOMING: TOTAL
MOVING FROM LYING ON BACK TO SITTING ON SIDE OF FLAT BED WITH BEDRAILS: TOTAL
MOBILITY SCORE: 6
HELP NEEDED FOR BATHING: TOTAL
TOILETING: TOTAL
MOVING TO AND FROM BED TO CHAIR: TOTAL
HELP NEEDED FOR BATHING: TOTAL
PERSONAL GROOMING: TOTAL
EATING MEALS: TOTAL
WALKING IN HOSPITAL ROOM: TOTAL
CLIMB 3 TO 5 STEPS WITH RAILING: TOTAL
DRESSING REGULAR LOWER BODY CLOTHING: TOTAL
TURNING FROM BACK TO SIDE WHILE IN FLAT BAD: TOTAL
STANDING UP FROM CHAIR USING ARMS: TOTAL
MOVING FROM LYING ON BACK TO SITTING ON SIDE OF FLAT BED WITH BEDRAILS: TOTAL
WALKING IN HOSPITAL ROOM: TOTAL
EATING MEALS: TOTAL
TOILETING: TOTAL
DAILY ACTIVITIY SCORE: 6
DRESSING REGULAR LOWER BODY CLOTHING: TOTAL
MOBILITY SCORE: 6
TURNING FROM BACK TO SIDE WHILE IN FLAT BAD: TOTAL
CLIMB 3 TO 5 STEPS WITH RAILING: TOTAL
STANDING UP FROM CHAIR USING ARMS: TOTAL
DAILY ACTIVITIY SCORE: 6

## 2025-03-31 ASSESSMENT — PAIN SCALES - GENERAL
PAINLEVEL_OUTOF10: 0 - NO PAIN

## 2025-03-31 ASSESSMENT — PAIN - FUNCTIONAL ASSESSMENT
PAIN_FUNCTIONAL_ASSESSMENT: 0-10
PAIN_FUNCTIONAL_ASSESSMENT: 0-10

## 2025-03-31 NOTE — ASSESSMENT & PLAN NOTE
Zeus Bone MD  Physician  Internal Medicine     H&P      Signed     Date of Service: 3/23/2025 10:28 AM     Signed       Expand All Collapse All    History Of Present Illness  Bryan Nix is a 64-year-old male with past medical history of atrial flutter on Eliquis, hyponatremia, IDDM, CAD, hypertension, chronic wounds, diffuse lymphadenopathy, anemia, history of smoking, history elevated PSA, CVA L hemisphere (2020) with right-sided deficits, Left ICA stenosis (85%) s/p angioplasty and left carotid artery stent (08/25/2023) and recent history of acute pontine stroke 02/27/25 with acute hypoxic respiratory failure s/p tracheostomy on 2/25/2025 and PEG.  Patient presents for bleeding from tracheostomy.  Patient is alert and oriented x 3, however limited verbal communication due to tracheostomy.  Wife is at bedside and assists with history.  She reports that patient was coughing blood out of his tracheostomy.  ER provider felt bleeding was likely superficial around site.  Patient has been on room air at skilled nursing facility.  He was placed on Airvo in the ED mainly to humidify oxygen.  Patient never desaturated per ER report.  Currently he is resting comfortably in the bed, breath sounds slightly rhonchorous, but unlabored.  Reports chills, but no fevers.  He has right-sided weakness and sensory deficits.  Denies headache, vision or hearing changes, chest pains, palpitations, shortness of breath, nausea, vomiting, abdominal pain, diarrhea, or complications of PEG tube site.  Wife reports patient skin became irritated in his groin due to adhesive from a condom cath previously, he has a small decubitus wound that she reports is healing well.  He receives tube feeds continuously.  Wife reports a patient was recently evaluated by speech for p.o. to intake, however he still remains n.p.o. except for meds and tube feeds through PEG. CODE STATUS reviewed: full code     ER Course: Hemodynamically stable,  afebrile, SpO2 to 97% trach mask.  WBC 9.3, hemoglobin 8, hematocrit 29.4, platelets 197.  INR 1.4.  Glucose 223, BUN 36, otherwise CMP is normal.  CT of the chest for PE pending.  CXR unremarkable. Trannexamic acid soak gauze wrapped around trach.      Past medical history: As above  Past surgical history: As above  Social history: Former smoker 37.5 pack years-quit smoking age 63, occasional alcohol use, no illicit drug use.  .  Works in maintenance.  6 children.  Family history: Noncontributory     Past Medical History  Medical History        Past Medical History:   Diagnosis Date    Abnormal finding of blood chemistry, unspecified 05/18/2016     Abnormal blood chemistry    Acute upper respiratory infection, unspecified 12/02/2015     Acute upper respiratory infection    Elevated prostate specific antigen (PSA)       Elevated prostate specific antigen (PSA)    Encounter for screening for malignant neoplasm of colon 01/30/2021     Colon cancer screening    Encounter for screening for malignant neoplasm of prostate 11/30/2020     Prostate cancer screening    Essential (primary) hypertension 07/30/2013     Benign essential hypertension    Other conditions influencing health status 07/30/2013     Diabetes Mellitus Poorly Controlled    Other conditions influencing health status 12/02/2015     History of cough    Personal history of other endocrine, nutritional and metabolic disease 03/18/2021     History of hyperlipidemia    Personal history of other endocrine, nutritional and metabolic disease 03/18/2021     History of diabetes mellitus    Personal history of other specified conditions 05/18/2016     History of chest pain    Personal history of other specified conditions 05/18/2016     History of dyspnea    Personal history of other specified conditions 05/18/2016     History of dizziness    Personal history of other specified conditions 05/18/2016     History of fatigue            Surgical History  Surgical  History         Past Surgical History:   Procedure Laterality Date    CT ANGIO CORONARY ART WITH HEARTFLOW IF SCORE >30%   8/23/2023     CT HEART CORONARY ANGIOGRAM 8/23/2023 [facility] CT    MR HEAD ANGIO WO IV CONTRAST   12/14/2020     MR HEAD ANGIO WO IV CONTRAST 12/14/2020 [facility]    MR NECK ANGIO WO IV CONTRAST   12/14/2020     MR NECK ANGIO WO IV CONTRAST 12/14/2020 [facility]    OTHER SURGICAL HISTORY   12/01/2020     Hand fracture repair    OTHER SURGICAL HISTORY   12/01/2020     Nasal bone fracture repair            Social History  He reports that he has quit smoking. His smoking use included cigarettes. He has never used smokeless tobacco. No history on file for alcohol use and drug use.     Family History  Family History   No family history on file.        Allergies  Patient has no known allergies.     Review of Systems     Physical Exam  Constitutional:       General: He is awake. He is not in acute distress.     Appearance: Normal appearance. He is not toxic-appearing.   HENT:      Head: Atraumatic.      Nose: Nose normal.      Mouth/Throat:      Mouth: Mucous membranes are moist.   Eyes:      Conjunctiva/sclera: Conjunctivae normal.   Cardiovascular:      Rate and Rhythm: Normal rate and regular rhythm.      Pulses: Normal pulses.      Heart sounds: No murmur heard.  Pulmonary:      Effort: No respiratory distress.      Comments: Tracheostomy site midline, no drainage around dressing.  Rhonchorous breath sounds, unlabored respirations, RT bedside suctioning patient with thick blood-tinged sputum  Abdominal:      General: Bowel sounds are normal. There is no distension.      Palpations: Abdomen is soft.      Tenderness: There is no abdominal tenderness. There is no guarding.      Comments: Left upper quadrant PEG site tube site, no drainage or skin breakdown around tube site.   Musculoskeletal:         General: No swelling, deformity or signs of injury.      Cervical back: Neck  "supple.      Right lower leg: No edema.      Left lower leg: No edema.   Skin:     General: Skin is warm and dry.      Capillary Refill: Capillary refill takes less than 2 seconds.      Findings: Wound (sacrum) present. No ecchymosis or erythema.   Neurological:      Mental Status: He is alert and oriented to person, place, and time.      Cranial Nerves: No facial asymmetry.      Sensory: Sensory deficit (RUE, RLE) present.      Motor: Weakness present.      Comments: Right sided upper and lower extremity motor function deficits   Psychiatric:         Mood and Affect: Mood normal.         Behavior: Behavior is cooperative.            Last Recorded Vitals  Blood pressure 134/50, pulse 93, temperature 36.9 °C (98.4 °F), temperature source Temporal, resp. rate 22, height 1.88 m (6' 2\"), weight 77.1 kg (170 lb), SpO2 98%.     Relevant Results              Results for orders placed or performed during the hospital encounter of 03/22/25 (from the past 24 hours)   CBC and Auto Differential   Result Value Ref Range     WBC 9.3 4.4 - 11.3 x10*3/uL     nRBC 0.3 (H) 0.0 - 0.0 /100 WBCs     RBC 3.02 (L) 4.50 - 5.90 x10*6/uL     Hemoglobin 8.0 (L) 13.5 - 17.5 g/dL     Hematocrit 29.4 (L) 41.0 - 52.0 %     MCV 97 80 - 100 fL     MCH 26.5 26.0 - 34.0 pg     MCHC 27.2 (L) 32.0 - 36.0 g/dL     RDW 15.6 (H) 11.5 - 14.5 %     Platelets 197 150 - 450 x10*3/uL     Neutrophils % 77.1 40.0 - 80.0 %     Immature Granulocytes %, Automated 1.5 (H) 0.0 - 0.9 %     Lymphocytes % 13.1 13.0 - 44.0 %     Monocytes % 4.1 2.0 - 10.0 %     Eosinophils % 3.2 0.0 - 6.0 %     Basophils % 1.0 0.0 - 2.0 %     Neutrophils Absolute 7.14 1.20 - 7.70 x10*3/uL     Immature Granulocytes Absolute, Automated 0.14 0.00 - 0.70 x10*3/uL     Lymphocytes Absolute 1.21 1.20 - 4.80 x10*3/uL     Monocytes Absolute 0.38 0.10 - 1.00 x10*3/uL     Eosinophils Absolute 0.30 0.00 - 0.70 x10*3/uL     Basophils Absolute 0.09 0.00 - 0.10 x10*3/uL   Basic metabolic panel   Result " Value Ref Range     Glucose 223 (H) 74 - 99 mg/dL     Sodium 142 136 - 145 mmol/L     Potassium 4.3 3.5 - 5.3 mmol/L     Chloride 103 98 - 107 mmol/L     Bicarbonate 27 21 - 32 mmol/L     Anion Gap 16 10 - 20 mmol/L     Urea Nitrogen 36 (H) 6 - 23 mg/dL     Creatinine 0.81 0.50 - 1.30 mg/dL     eGFR >90 >60 mL/min/1.73m*2     Calcium 9.6 8.6 - 10.3 mg/dL   Protime-INR   Result Value Ref Range     Protime 15.1 (H) 9.8 - 12.4 seconds     INR 1.4 (H) 0.9 - 1.1   aPTT   Result Value Ref Range     aPTT 25 (L) 26 - 36 seconds   Type and Screen   Result Value Ref Range     ABO TYPE B       Rh TYPE POS       ANTIBODY SCREEN NEG     VERIFY ABO/Rh Group Test   Result Value Ref Range     ABO TYPE B       Rh TYPE POS     MRSA Surveillance for Vancomycin De-escalation, PCR     Specimen: Anterior Nares; Swab   Result Value Ref Range     MRSA PCR Not Detected Not Detected   POCT GLUCOSE   Result Value Ref Range     POCT Glucose 180 (H) 74 - 99 mg/dL   POCT GLUCOSE   Result Value Ref Range     POCT Glucose 161 (H) 74 - 99 mg/dL   POCT GLUCOSE   Result Value Ref Range     POCT Glucose 156 (H) 74 - 99 mg/dL   Basic metabolic panel   Result Value Ref Range     Glucose 170 (H) 74 - 99 mg/dL     Sodium 147 (H) 136 - 145 mmol/L     Potassium 4.2 3.5 - 5.3 mmol/L     Chloride 107 98 - 107 mmol/L     Bicarbonate 30 21 - 32 mmol/L     Anion Gap 14 10 - 20 mmol/L     Urea Nitrogen 35 (H) 6 - 23 mg/dL     Creatinine 0.85 0.50 - 1.30 mg/dL     eGFR >90 >60 mL/min/1.73m*2     Calcium 9.4 8.6 - 10.3 mg/dL   CBC   Result Value Ref Range     WBC 10.4 4.4 - 11.3 x10*3/uL     nRBC 0.6 (H) 0.0 - 0.0 /100 WBCs     RBC 2.87 (L) 4.50 - 5.90 x10*6/uL     Hemoglobin 7.8 (L) 13.5 - 17.5 g/dL     Hematocrit 26.6 (L) 41.0 - 52.0 %     MCV 93 80 - 100 fL     MCH 27.2 26.0 - 34.0 pg     MCHC 29.3 (L) 32.0 - 36.0 g/dL     RDW 15.7 (H) 11.5 - 14.5 %     Platelets 205 150 - 450 x10*3/uL   SST TOP   Result Value Ref Range     Extra Tube Hold for add-ons.        CT  angio chest for pulmonary embolism     Result Date: 3/22/2025  Interpreted By:  Pamela Zurita, STUDY: CT ANGIO CHEST FOR PULMONARY EMBOLISM;  3/22/2025 4:56 pm   INDICATION: Signs/Symptoms:bloody secretions, eval for PE.     COMPARISON: CT chest without contrast 03/22/2025   ACCESSION NUMBER(S): [id number]   ORDERING CLINICIAN: PAMELA HAHN   TECHNIQUE: Contiguous axial images of the chest were obtained after the intravenous administration of iodinated contrast using angiographic PE protocol. Coronal and sagittal reformatted images were reconstructed from the axial data. MIP images were created on an independent workstation and reviewed.   FINDINGS:   MEDIASTINUM AND LYMPH NODES: Inflammatory fat stranding along the tracheostomy tract without discrete fluid collection. The esophageal wall appears within normal limits. There is redemonstration of diffuse lymphadenopathy in the bilateral axilla, mediastinum, bilateral supraclavicular as described in detail on separate report. Also notable or enlarged bilateral level 4 cervical lymph nodes (right > left).   VESSELS:  Normal caliber thoracic aorta without dissection. Moderate aortic atherosclerosis.  No acute pulmonary embolism.   HEART: Normal size.  Mild coronary artery calcifications. No significant pericardial effusion.   LUNG, AIRWAYS, PLEURA: There is slight increase in debris within the trachea. There has been clearing of secretions in the left upper/lower lobe bronchus. There is diffuse bilateral bronchial thickening slightly increased from prior study. There is peribronchovascular ground-glass opacity in the posterior right upper lobe and superior and basal segments of the right lower lobe. There is slightly worsened atelectasis in the lung bases remaining predominantly subsegmental in nature. There is a 0.8 cm nodule in the left upper lobe that is stable from 08/19/2023.   OSSEOUS STRUCTURES: No acute osseous abnormality.   CHEST WALL SOFT TISSUES: No  discernible acute abnormality.   UPPER ABDOMEN/OTHER: No acute abnormality.        No acute pulmonary embolism.   Peribronchial vascular ground glass opacities in the right upper and lower lobe again concerning for pneumonia given patient's risk factors of tracheostomy and debris in the airways.   Redemonstration of diffuse lymphadenopathy in the lower necks, axilla, and mediastinum. Findings are either diffusely reactive in nature, reflective of lymphoma/leukemia, meter deficiency, or less likely head neck neoplasm is previously postulated. However please correlate with past medical history.   Inflammatory changes along the tracheostomy tract without fluid collection. Correlate for erythema.   Unchanged 0.8 cm nodule in the left upper lobe dating back to 08/19/2023. Recommend 1 year follow up chest CT to ensure at least 2 years of stability.   MACRO: None.   Signed by: Pamela Zurita 3/22/2025 7:17 PM Dictation workstation:   [id number]     CT chest wo IV contrast     Result Date: 3/22/2025  Interpreted By:  Pamela Zurita, STUDY: CT CHEST WO IV CONTRAST;  3/22/2025 3:41 pm   INDICATION: Signs/Symptoms:eval for alveolar hemorrhage.     COMPARISON: CT head/neck 08/19/2023   ACCESSION NUMBER(S): [id number]   ORDERING CLINICIAN: PAMELA HAHN   TECHNIQUE: Contiguous axial images of the chest and upper abdomen were obtained without contrast. Coronal and sagittal reformatted images were reconstructed from the axial data.   FINDINGS:   MEDIASTINUM AND LYMPH NODES: There is fat stranding/edema along the tract of the tracheostomy about discrete fluid collection. The esophageal wall appears within normal limits. There are numerous enlarged bilateral supraclavicular lymph nodes measuring up to 1.4 cm on the left. There are numerous enlarged bilateral axillary lymph nodes measuring up to 1.1 cm on the right and 1.2 cm on the left. There are numerous prominent to mildly enlarged mediastinal lymph nodes as well. No  pneumomediastinum.   VESSELS:  Normal caliber thoracic aorta. Moderate aortic atherosclerosis. The main pulmonary artery is normal in size.   HEART: Normal size.  Mild coronary artery calcifications. No significant pericardial effusion.   LUNG, AIRWAYS, PLEURA: Tracheostomy noted in appropriate position. There is trace mucus in the left mainstem bronchus and at the origin of the left upper lobe and lower lobe bronchi. There are mild peribronchovascular ground-glass opacities in the posterior segment of the right upper lobe, basal segments of the right lower lobe. There is mild dependent bibasilar atelectasis. There is no pleural effusion.   OSSEOUS STRUCTURES: No acute osseous abnormality.   CHEST WALL SOFT TISSUES: No discernible acute abnormality.   UPPER ABDOMEN/OTHER: No acute abnormality.        1. Mild peribronchovascular ground-glass opacities in the posterior right upper lobe and basal segments of the right lower lobe. This appearance and distribution is not typical for alveolar hemorrhage which tends to be centrilobular and bilateral asymmetric in distribution. Findings are most suggestive of pneumonia.   2. Small amount of mucous debris in the left mainstem bronchus and at the origin of the left upper and lower lobe bronchi.   3. Diffuse lymphadenopathy in the bilateral axillary regions, mediastinum and left supraclavicular region particularly notable in the bilateral supraclavicular region. This is progressed in the left supraclavicular region and new elsewhere when compared to 08/19/2023 CTA head/neck. Correlate for any history of known head/neck malignancy or lymphoma.   4. Inflammation/fat stranding adjacent to the tracheostomy tube entry site noted. Correlate for air the met. No evidence of fluid collection.   MACRO: None.   Signed by: Marco A Zurita 3/22/2025 7:10 PM Dictation workstation:   [id number]     XR chest 1 view     Result Date: 3/22/2025  Interpreted By:  Fermin Stroud, STUDY: XR CHEST  1 VIEW   INDICATION: Signs/Symptoms:hemoptysis.   COMPARISON: August 19, 2023   ACCESSION NUMBER(S): [id number]   ORDERING CLINICIAN: PAMELA HAHN   FINDINGS: No consolidation, effusion, edema, or pneumothorax. Heart size within normal limits.        No evidence of acute intrathoracic abnormality.   Signed by: Fermin Stroud 3/22/2025 2:01 PM Dictation workstation:   [id number]     XR chest 1 view     Result Date: 3/14/2025  * * *Final Report* * * DATE OF EXAM: Mar 14 2025  8:52AM   S5X   5290  -  XR CHEST 1V FRONTAL   / ACCESSION #  [id number] PROCEDURE REASON: new bleeding from trach      * * * * Physician Interpretation * * * *  EXAMINATION:  CHEST RADIOGRAPH (PORTABLE SINGLE VIEW AP) Exam Date/Time:  3/14/2025 8:52 AM Clinical History:  new bleeding from trach Comparison:  03/03/2025 RESULT: LINES, TUBES, AND DEVICES:  Tracheostomy. CARDIOMEDIASTINAL SILHOUETTE:  The cardiac and mediastinal silhouettes appear unremarkable. LUNGS AND PLEURA: No consolidation.  No pleural effusion. No pulmonary vascular congestion. No pneumothorax identified. OTHER:  N/A     IMPRESSION: No acute abnormality identified. : [facility]   Transcribe Date/Time: Mar 14 2025  9:07A Dictated by : PAMELA EVANS MD This examination was interpreted and the report reviewed and electronically signed by: PAMELA EVANS MD on Mar 14 2025  9:11AM  EST     US abdomen complete     Result Date: 3/11/2025  ABDOMEN ULTRASOUND-COMPLETE FINDINGS: Abdomen Ultrasound Complete: PROCEDURE: Multiple grayscale mages of the abdomen were obtained. FINDINGS:Multiple real time images demonstrate the liver with increased echogenicity consistent with fatty infiltration. There is no evidence of a discrete mass within the liver. The liver is enlarged, measured at 15.7 cm in size. The gallbladder is seen with no evidence of cholelithiasis. The gallbladder wall is within normal limits. The common bile duct is within normal limits. The visualized pancreas  appears unremarkable. Both kidneys are seen with no evidence of hydronephrosis or a calculus. The spleen has its normal homogeneous echo appearance. There is no free fluid to suggest ascites. The visualized aorta appears unremarkable. The inferior vena cava appears patent. CONCLUSION: Enlarged fatty liver. ELECTRONICALLY SIGNED BY LEONEL LAN M.D. 3/11/2025 5:00:52 PM EDT.     XR chest 1 view     Result Date: 3/3/2025  * * *Final Report* * * DATE OF EXAM: Mar  3 2025  1:40PM   S5X   5290  -  XR CHEST 1V FRONTAL   / ACCESSION #  [id number] PROCEDURE REASON: resp failure      * * * * Physician Interpretation * * * *  EXAMINATION:  CHEST RADIOGRAPH (PORTABLE SINGLE VIEW AP) Exam Date/Time:  3/3/2025 1:40 PM Indication:   resp failure M:  XCP_4 Comparison:  1 day prior RESULT: Lines, tubes, and devices:  Tracheostomy tube remains in situ. Lungs and pleura:  Mild patchy opacities right lower lung zone seen on the prior study are no longer appreciated.  No confluent opacity.   Costophrenic angles are clear.  No pneumothorax. Cardiomediastinal silhouette:  Stable cardiomediastinal silhouette. Other:  -     IMPRESSION: Improved aeration right lower lung zone.  No acute cardiopulmonary finding. : [facility]   Transcribe Date/Time: Mar  3 2025  2:49P Dictated by : ISRAEL BENSON MD This examination was interpreted and the report reviewed and electronically signed by: ISRAEL BENSON MD on Mar  3 2025  2:49PM  EST     XR chest 1 view     Result Date: 3/2/2025  * * *Final Report* * * DATE OF EXAM: Mar  2 2025 11:25AM   MMX   5376  -  XR CHEST 1V FRONTAL PORT  / ACCESSION #  [id number] PROCEDURE REASON: Shortness of breath      * * * * Physician Interpretation * * * *  EXAMINATION:  CHEST RADIOGRAPH (PORTABLE SINGLE VIEW AP) Exam Date/Time:  3/2/2025 11:25 AM CLINICAL HISTORY: Shortness of breath MQ:  XCPR_5 Comparison:  02/20/2025. RESULT: This is a limited examination due to multiple wires and tubing  obscuring the lower lungs. Lines, tubes, and devices:  A tracheostomy tube remains in place. Lungs and pleura:  There are increased bronchovascular markings in the right lower lung which may be due to bronchitis or early changes of aspiration pneumonia.  Clinical correlation and follow-up exams are recommended. Cardiomediastinal silhouette:  Stable cardiomediastinal silhouette. Other:  The visualized bony thorax appears unremarkable.     IMPRESSION: Nonspecific parenchymal changes in the right lower lung as described above. A follow-up exam is recommended. : [facility]   Transcribe Date/Time: Mar  2 2025 11:53A Dictated by : BJ INIGUEZ MD This examination was interpreted and the report reviewed and electronically signed by: BJ INIGUEZ MD on Mar  2 2025 11:54AM  EST     XR chest 1 view     Result Date: 2/28/2025  * * *Final Report* * * DATE OF EXAM: Feb 28 2025 10:36AM   MMX   5290  -  XR CHEST 1V FRONTAL   / ACCESSION #  [id number] PROCEDURE REASON: Shortness of breath      * * * * Physician Interpretation * * * *  EXAMINATION:  CHEST RADIOGRAPH (SINGLE VIEW AP OR PA) CLINICAL HISTORY: Shortness of breath MQ:  XC1_5 Comparison:  02/22/2025 RESULT: Lines, tubes, and devices:  Tracheostomy tube is noted. Lungs and pleura:  No consolidation. No lung mass. No pleural effusion. Cardiomediastinal silhouette:  Normal cardiomediastinal silhouette. Other:  No acute osseous pathology.     IMPRESSION: No acute radiographic abnormality. : [facility]   Transcribe Date/Time: Feb 28 2025 12:07P Dictated by : DIANNE RAMOS MD This examination was interpreted and the report reviewed and electronically signed by: DIANNE RAMOS MD on Feb 28 2025 12:08PM  EST     XR abdomen 2 views supine and erect or decub     Result Date: 2/24/2025  * * *Final Report* * * DATE OF EXAM: Feb 24 2025  6:03PM   MMX   5289  -  XR ABDOMEN 1V SUPINE  / ACCESSION #  [id number] PROCEDURE REASON: Evaluate tube, line or lead position       * * * * Physician Interpretation * * * *  EXAMINATION:  XR ABDOMEN 1V SUPINE CLINICAL HISTORY:   Evaluate tube, line or lead position Technique:   XR ABDOMEN 1V SUPINE -- NOT APPLICABLE with 1 views on 1 images Comparison: 2/21/2025 RESULT: Feeding tube with distal tip at the pylorus antrum. Moderate colonic stool burden. No dilated bowel. Lung structures are intact.     IMPRESSION: Feeding tube with distal tip at the pylorus antrum. Moderate colonic stool burden. No dilated bowel. : [facility]   Transcribe Date/Time: Feb 24 2025  6:23P Dictated by : PABLO RAMOS MD This examination was interpreted and the report reviewed and electronically signed by: PABLO RAMOS MD on Feb 24 2025  6:24PM  EST     CT head wo IV contrast     Result Date: 2/23/2025  * * *Final Report* * * DATE OF EXAM: Feb 23 2025 12:04PM   [facility]   0504  -  CT BRAIN WO IVCON  / ACCESSION #  [id number] PROCEDURE REASON: Stroke, follow up      * * * * Physician Interpretation * * * *  EXAMINATION: CT BRAIN WO IVCON CLINICAL HISTORY: Stroke, follow up TECHNIQUE:  Serial axial images without IV contrast were obtained from the vertex to the foramen magnum. MQ:  CTBWO_3 CT Radiation dose: Integrated Dose-Length Product (DLP) for this visit =   778  mGy*cm CT Dose Reduction Employed: Iterative recon COMPARISON: MRI brain 02/19/2025 RESULT: Post-operative change: None. Acute change: Evolving acute infarct along the central eli and superior left medulla (2:7-9).  No evidence of hemorrhagic transformation. Hemorrhage: No evidence of acute intracranial hemorrhage. Mass Lesion / Mass Effect: There is no evidence of an intracranial mass or extraaxial fluid collection. No significant mass effect. Chronic change: Stable remote left MCA territory encephalomalacia. Parenchyma: There is no significant volume loss. The brain parenchyma is otherwise within normal limits for age. Ventricles: The ventricles are within normal limits of size and configuration  for age. Paranasal sinuses and skull base: Partial opacification of the left maxillary and sphenoid sinus and multiple bilateral ethmoid air cells.   The remaining visualized paranasal sinuses are grossly clear. The skull base and imaged soft tissues are unremarkable. Localizer images: No additional findings.     IMPRESSION: Evolving acute brainstem infarct.  No evidence of hemorrhagic transformation. Stable remote left MCA territory infarct. : [facility]   Transcribe Date/Time: Feb 23 2025 12:48P Dictated by : FRANKY GUZMAN MD   This examination was interpreted and the report reviewed and electronically signed by: FRANKY GUZMAN MD on Feb 23 2025 12:52PM  EST     XR chest 1 view     Result Date: 2/22/2025  * * *Final Report* * * DATE OF EXAM: Feb 22 2025  6:56AM   MMX   [id number]  -  XR CHEST 1V FRONTAL   / ACCESSION #  [id number] PROCEDURE REASON: Evaluate tube, line,  or lead position      * * * * Physician Interpretation * * * *  EXAMINATION:  CHEST RADIOGRAPH (SINGLE VIEW AP OR PA) CLINICAL HISTORY: Evaluate tube, line,  or lead position MQ:  XC1_5 Comparison:  02/21/2025. RESULT: Lines, tubes, and devices:  An ET tube and feeding tube remain in place.   A poorly defined right venous catheter cannot be excluded. Lungs and pleura:  There are persistent bilateral lower lungs infiltrates seen on the right more than left suggestive of pneumonia such as aspiration pneumonia.  There is no definite pleural effusion. Cardiomediastinal silhouette:  Stable cardiomediastinal silhouette. Other:  The visualized bony thorax appears unremarkable.     IMPRESSION: Persistent bilateral lower lungs infiltrates as described above. A follow-up exam is recommended. : [facility]   Transcribe Date/Time: Feb 22 2025  8:20A Dictated by : BJ INIGUEZ MD This examination was interpreted and the report reviewed and electronically signed by: BJ INIGUEZ MD on Feb 22 2025  8:21AM  EST     XR chest 1 view     Result Date:  2/21/2025  * * *Final Report* * * DATE OF EXAM: Feb 21 2025  5:59PM   MMX   [id number]  -  XR CHEST 1V FRONTAL PORT  / ACCESSION #  [id number] PROCEDURE REASON: Evaluate tube, line,  or lead position      * * * * Physician Interpretation * * * *  EXAMINATION:  CHEST RADIOGRAPH (PORTABLE SINGLE VIEW AP) Exam Date/Time:  2/21/2025 5:59 PM CLINICAL HISTORY: Evaluate tube, line,  or lead position MQ:  XCPR_5 Comparison:  02/21/2025 RESULT: Lines, tubes, and devices: Endotracheal tube in place terminating in enteric tube in place terminating below the field of view. Lungs and pleura: No pneumothorax.  No pleural effusion.  Bilateral mild interstitial opacities.  The right costophrenic angle is not included in the field-of-view. Cardiomediastinal silhouette:  Stable cardiomediastinal silhouette. Other:  .     IMPRESSION: Mild interstitial opacities may be related to pulmonary vascular congestion. : [facility]   Transcribe Date/Time: Feb 21 2025  6:55P Dictated by : IRWIN VALDIVIA MD This examination was interpreted and the report reviewed and electronically signed by: IRWIN VALDIVIA MD on Feb 21 2025  6:57PM  EST     XR abdomen 2 views supine and erect or decub     Result Date: 2/21/2025  * * *Final Report* * * DATE OF EXAM: Feb 21 2025 10:32AM   MMX   [id number]  -  XR ABDOMEN 1V SUPINE  / ACCESSION #  [id number] PROCEDURE REASON: Evaluate tube, line or lead position      * * * * Physician Interpretation * * * *  Clinical Information: Feeding tube Single view of the abdomen was obtained. Feeding tube tip within the distal stomach. There is a nonspecific, nonobstructive bowel gas pattern. No abnormal abdominal masses are identified.  No pathologic calcifications.   No acute bony abnormalities are seen.     IMPRESSION: Nonspecific, nonobstructive bowel gas pattern. Feeding tube tip within the distal stomach. : [facility]   Transcribe Date/Time: Feb 21 2025 10:43A Dictated by : PAMELA EVANS MD  This examination was interpreted and the report reviewed and electronically signed by: PAMELA EVANS MD on Feb 21 2025 10:44AM  EST     XR chest 1 view     Result Date: 2/21/2025  * * *Final Report* * * DATE OF EXAM: Feb 21 2025 10:32AM   MMX   [id number]  -  XR CHEST 1V FRONTAL PORT  / ACCESSION #  [id number] PROCEDURE REASON: Shortness of breath      * * * * Physician Interpretation * * * *  EXAMINATION:  CHEST RADIOGRAPH (PORTABLE SINGLE VIEW AP) Exam Date/Time:  2/21/2025 10:32 AM Clinical History:  Shortness of breath Comparison:  02/17/2025 RESULT: LINES, TUBES, AND DEVICES:  Feeding tube tip beyond the inferior extent of the image. CARDIOMEDIASTINAL SILHOUETTE:  The cardiac and mediastinal silhouettes appear unremarkable. LUNGS AND PLEURA: No consolidation.  No pleural effusion. No pulmonary vascular congestion. No pneumothorax identified. OTHER:  N/A     IMPRESSION: No acute abnormality identified. : [facility]   Transcribe Date/Time: Feb 21 2025 10:41A Dictated by : PAMELA EVANS MD This examination was interpreted and the report reviewed and electronically signed by: PAMELA EVANS MD on Feb 21 2025 10:43AM  EST            Assessment/Plan     Assessment & Plan  Hemoptysis     Tracheostomy in place (Multi)     CVA, old, hemiparesis (Multi)     Status post insertion of percutaneous endoscopic gastrostomy (PEG) tube (Multi)     Pneumonia due to infectious organism     Right sided weakness     Atrial flutter (Multi)     Insulin dependent type 2 diabetes mellitus (Multi)        Telemetry monitoring  Hemoglobin stable 8's recently on review of outside labs, monitor for ongoing bleeding.  Daily H&H  Monitor for overt bleeding  Consult pulmonology, appreciate input  Pneumonia suspected for CT findings  Tracheostomy protocol  Expected cover with Zosyn and vancomycin  MRSA swab  Urine for Legionella antigen and strep pneumonia antigen  Supplemental oxygen as needed  Nebulizers as needed  RT evaluate and  treat  Check procalcitonin  Typical 20% zinc oxide to wounds and groin  Continuous tube feeds, continue at 80 mL an hour x 22 hours  Accu-Chek every 6 hours  Hypoglycemia protocol  PT/OT  Anticoagulation and antiplatelet therapy on hold, resume once cleared by pulmonology  Continue patient's home medications for chronic issues as appropriate     Patient fully evaluated on March 23.  Continue to monitor H&H.  Pulmonary consultation obtained.  Continue broad-spectrum antibiotics to rule out infectious process.  Recheck labs in AM.                 Zeus Bone MD

## 2025-03-31 NOTE — PROGRESS NOTES
Patient fully evaluated    for    Acute kidney injury  Hypernatremia  Anemia of blood loss renal disease  Transaminitis  Rhabdomyolysis  Patient denies any shortness of breath or chest pain  CBC stable on erythropoietin    Problem List Items Addressed This Visit       * (Principal) Hemoptysis - Primary    Relevant Orders    Esophagogastroduodenoscopy (EGD) (Completed)    Surgical Pathology Exam    Melena    Relevant Medications    insulin glargine (Lantus) 100 unit/mL injection    zinc oxide 20 % ointment    ipratropium (Atrovent) 0.02 % nebulizer solution    ipratropium (Atrovent) 0.02 % nebulizer solution    epoetin nicko (Procrit) 20,000 unit/mL injection (Start on 4/6/2025)    amiodarone (Pacerone) 200 mg tablet    Other Relevant Orders    Esophagogastroduodenoscopy (EGD) (Completed)    Surgical Pathology Exam    CBC    Comprehensive metabolic panel     Other Visit Diagnoses       Anemia, unspecified type        Relevant Orders    Esophagogastroduodenoscopy (EGD) (Completed)    Surgical Pathology Exam    Tracheostomy dependent (Multi)        Relevant Orders    Surgical Pathology Exam          Patient seen resting in bed  He is asymptomatic   Labs reviewed    .  Vital signs for last 24 hours Temp:  [35.9 °C (96.6 °F)-36.6 °C (97.9 °F)] 35.9 °C (96.6 °F)  Heart Rate:  [55-67] 55  Resp:  [18-20] 18  BP: (124-185)/(58-84) 143/67  FiO2 (%):  [28 %-96 %] 28 %     Patient still requiring frequent cardiac and SPO2 monitoring. Continue aggressive pulmonary hygiene and oral hygiene. Off loading as tolerated for skin integrity. Medications and labs reviewed-   Results for orders placed or performed during the hospital encounter of 03/22/25 (from the past 24 hours)   POCT GLUCOSE   Result Value Ref Range    POCT Glucose 358 (H) 74 - 99 mg/dL   POCT GLUCOSE   Result Value Ref Range    POCT Glucose 313 (H) 74 - 99 mg/dL   Comprehensive Metabolic Panel   Result Value Ref Range    Glucose 332 (H) 74 - 99 mg/dL    Sodium 144 136  - 145 mmol/L    Potassium 3.9 3.5 - 5.3 mmol/L    Chloride 107 98 - 107 mmol/L    Bicarbonate 30 21 - 32 mmol/L    Anion Gap 11 10 - 20 mmol/L    Urea Nitrogen 26 (H) 6 - 23 mg/dL    Creatinine 0.92 0.50 - 1.30 mg/dL    eGFR >90 >60 mL/min/1.73m*2    Calcium 8.7 8.6 - 10.3 mg/dL    Albumin 3.1 (L) 3.4 - 5.0 g/dL    Alkaline Phosphatase 266 (H) 33 - 136 U/L    Total Protein 6.2 (L) 6.4 - 8.2 g/dL    AST 35 9 - 39 U/L    Bilirubin, Total 0.4 0.0 - 1.2 mg/dL     (H) 10 - 52 U/L   CBC and Auto Differential   Result Value Ref Range    WBC 13.3 (H) 4.4 - 11.3 x10*3/uL    nRBC 7.4 (H) 0.0 - 0.0 /100 WBCs    RBC 2.93 (L) 4.50 - 5.90 x10*6/uL    Hemoglobin 8.0 (L) 13.5 - 17.5 g/dL    Hematocrit 29.8 (L) 41.0 - 52.0 %     (H) 80 - 100 fL    MCH 27.3 26.0 - 34.0 pg    MCHC 26.8 (L) 32.0 - 36.0 g/dL    RDW 20.8 (H) 11.5 - 14.5 %    Platelets 336 150 - 450 x10*3/uL    Immature Granulocytes %, Automated 8.4 (H) 0.0 - 0.9 %    Immature Granulocytes Absolute, Automated 1.12 (H) 0.00 - 0.70 x10*3/uL   Manual Differential   Result Value Ref Range    Neutrophils %, Manual 78.0 40.0 - 80.0 %    Lymphocytes %, Manual 14.0 13.0 - 44.0 %    Monocytes %, Manual 1.0 2.0 - 10.0 %    Eosinophils %, Manual 1.0 0.0 - 6.0 %    Basophils %, Manual 3.0 0.0 - 2.0 %    Metamyelocytes %, Manual 3.0 0.0 - 0.0 %    Seg Neutrophils Absolute, Manual 10.37 (H) 1.20 - 7.00 x10*3/uL    Lymphocytes Absolute, Manual 1.86 1.20 - 4.80 x10*3/uL    Monocytes Absolute, Manual 0.13 0.10 - 1.00 x10*3/uL    Eosinophils Absolute, Manual 0.13 0.00 - 0.70 x10*3/uL    Basophils Absolute, Manual 0.40 (H) 0.00 - 0.10 x10*3/uL    Metamyelocytes Absolute, Manual 0.40 0.00 - 0.00 x10*3/uL    Total Cells Counted 100     RBC Morphology See Below     Polychromasia Mild     Hypochromia Mild     Basophilic Stippling Present    POCT GLUCOSE   Result Value Ref Range    POCT Glucose 300 (H) 74 - 99 mg/dL   POCT GLUCOSE   Result Value Ref Range    POCT Glucose 290 (H) 74  - 99 mg/dL      Patient fractional excretion of sodium is 0.2%  Patient fractional excretion of urea is 32.16%  Positive Hemoccult stools  Patient recently received an antibiotic (last 12 hours)       Date/Time Action Medication Dose    03/26/25 0903 New Bag    piperacillin-tazobactam (Zosyn) 3.375 g in dextrose (iso) IV 50 mL 3.375 g    03/26/25 0148 New Bag    piperacillin-tazobactam (Zosyn) 3.375 g in dextrose (iso) IV 50 mL 3.375 g           Plan discussed with interdisciplinary team, continue current and repeat labs in the AM.         I spent a total of 50 minutes on the date of the service which included preparing to see the patient, face-to-face patient care, completing clinical documentation, obtaining and/or reviewing separately obtained history, performing a medically appropriate examination, counseling and educating the patient/family/caregiver, ordering medications, tests, or procedures, communicating with other HCPs (not separately reported), independently interpreting results (not separately reported), communicating results to the patient/family/caregiver, and care coordination (not separately reported).         Physical Exam  General Appearance; asthenic habitus  Mild skin pallor  Poor skin turgor  HEENT; pupils react to light and accommodation  There is no nystagmus or ptosis; extraocular movements are intact  Neck; no adenopathy; no jugular vein distention; no bruit; no thyromegaly  Tracheostomy in place clean without exudation or induration  Lungs; minimal inspiratory coarse crackles at the bases  Heart; regular rate no gallop no rubs or thrills no lifts  Abdomen; active peristalsis no rebound or guarding there is a PEG tube in place  ; no CVA tenderness  Extremities; decreased muscle tone  Neurological; pathological reflexes are absent      Assessment/Plan     Acute kidney injury  Hypernatremia  Anemia of blood loss renal disease  Transaminitis  Rhabdomyolysis    Plan    Continue  Erythropoietin/IV Iron at [facility]  Continue to monitor renal chemistries and CBC  Matthew Johnson MD

## 2025-03-31 NOTE — CARE PLAN
The clinical goals for the shift include comfort and rest      Problem: Chronic Conditions and Co-morbidities  Goal: Patient's chronic conditions and co-morbidity symptoms are monitored and maintained or improved  Outcome: Progressing     Problem: Skin  Goal: Decreased wound size/increased tissue granulation at next dressing change  3/31/2025 1657 by Jt Moore RN  Outcome: Progressing  3/31/2025 1657 by Jt Moore RN  Flowsheets (Taken 3/31/2025 1657)  Decreased wound size/increased tissue granulation at next dressing change: Promote sleep for wound healing     Problem: Skin  Goal: Participates in plan/prevention/treatment measures  Outcome: Progressing     Problem: Diabetes  Goal: Maintain electrolyte levels within acceptable range throughout shift  Outcome: Progressing     Problem: Diabetes  Goal: Maintain glucose levels >70mg/dl to <250mg/dl throughout shift  Outcome: Progressing

## 2025-03-31 NOTE — DISCHARGE SUMMARY
Discharge Diagnosis  Hemoptysis    Issues Requiring Follow-Up  Patient fully evaluated  03/31for   Assessment & Plan  Hemoptysis    Tracheostomy in place (Multi)    CVA, old, hemiparesis (Multi)    Status post insertion of percutaneous endoscopic gastrostomy (PEG) tube (Multi)    Pneumonia due to infectious organism    Right sided weakness    Atrial flutter (Multi)    Insulin dependent type 2 diabetes mellitus (Multi)        Zeus Bone MD  Physician  Internal Medicine     H&P      Signed     Date of Service: 3/23/2025 10:28 AM     Signed       Expand All Collapse All    History Of Present Illness  Bryan Nix is a 64-year-old male with past medical history of atrial flutter on Eliquis, hyponatremia, IDDM, CAD, hypertension, chronic wounds, diffuse lymphadenopathy, anemia, history of smoking, history elevated PSA, CVA L hemisphere (2020) with right-sided deficits, Left ICA stenosis (85%) s/p angioplasty and left carotid artery stent (08/25/2023) and recent history of acute pontine stroke 02/27/25 with acute hypoxic respiratory failure s/p tracheostomy on 2/25/2025 and PEG.  Patient presents for bleeding from tracheostomy.  Patient is alert and oriented x 3, however limited verbal communication due to tracheostomy.  Wife is at bedside and assists with history.  She reports that patient was coughing blood out of his tracheostomy.  ER provider felt bleeding was likely superficial around site.  Patient has been on room air at skilled nursing facility.  He was placed on Airvo in the ED mainly to humidify oxygen.  Patient never desaturated per ER report.  Currently he is resting comfortably in the bed, breath sounds slightly rhonchorous, but unlabored.  Reports chills, but no fevers.  He has right-sided weakness and sensory deficits.  Denies headache, vision or hearing changes, chest pains, palpitations, shortness of breath, nausea, vomiting, abdominal pain, diarrhea, or complications of PEG tube site.  Wife  reports patient skin became irritated in his groin due to adhesive from a condom cath previously, he has a small decubitus wound that she reports is healing well.  He receives tube feeds continuously.  Wife reports a patient was recently evaluated by speech for p.o. to intake, however he still remains n.p.o. except for meds and tube feeds through PEG. CODE STATUS reviewed: full code     ER Course: Hemodynamically stable, afebrile, SpO2 to 97% trach mask.  WBC 9.3, hemoglobin 8, hematocrit 29.4, platelets 197.  INR 1.4.  Glucose 223, BUN 36, otherwise CMP is normal.  CT of the chest for PE pending.  CXR unremarkable. Trannexamic acid soak gauze wrapped around trach.      Past medical history: As above  Past surgical history: As above  Social history: Former smoker 37.5 pack years-quit smoking age 63, occasional alcohol use, no illicit drug use.  .  Works in maintenance.  6 children.  Family history: Noncontributory     Past Medical History  Medical History        Past Medical History:   Diagnosis Date    Abnormal finding of blood chemistry, unspecified 05/18/2016     Abnormal blood chemistry    Acute upper respiratory infection, unspecified 12/02/2015     Acute upper respiratory infection    Elevated prostate specific antigen (PSA)       Elevated prostate specific antigen (PSA)    Encounter for screening for malignant neoplasm of colon 01/30/2021     Colon cancer screening    Encounter for screening for malignant neoplasm of prostate 11/30/2020     Prostate cancer screening    Essential (primary) hypertension 07/30/2013     Benign essential hypertension    Other conditions influencing health status 07/30/2013     Diabetes Mellitus Poorly Controlled    Other conditions influencing health status 12/02/2015     History of cough    Personal history of other endocrine, nutritional and metabolic disease 03/18/2021     History of hyperlipidemia    Personal history of other endocrine, nutritional and metabolic disease  03/18/2021     History of diabetes mellitus    Personal history of other specified conditions 05/18/2016     History of chest pain    Personal history of other specified conditions 05/18/2016     History of dyspnea    Personal history of other specified conditions 05/18/2016     History of dizziness    Personal history of other specified conditions 05/18/2016     History of fatigue            Surgical History  Surgical History         Past Surgical History:   Procedure Laterality Date    CT ANGIO CORONARY ART WITH HEARTFLOW IF SCORE >30%   8/23/2023     CT HEART CORONARY ANGIOGRAM 8/23/2023 [facility] CT    MR HEAD ANGIO WO IV CONTRAST   12/14/2020     MR HEAD ANGIO WO IV CONTRAST 12/14/2020 [facility]    MR NECK ANGIO WO IV CONTRAST   12/14/2020     MR NECK ANGIO WO IV CONTRAST 12/14/2020 [facility]    OTHER SURGICAL HISTORY   12/01/2020     Hand fracture repair    OTHER SURGICAL HISTORY   12/01/2020     Nasal bone fracture repair            Social History  He reports that he has quit smoking. His smoking use included cigarettes. He has never used smokeless tobacco. No history on file for alcohol use and drug use.     Family History  Family History   No family history on file.        Allergies  Patient has no known allergies.     Review of Systems     Physical Exam  Constitutional:       General: He is awake. He is not in acute distress.     Appearance: Normal appearance. He is not toxic-appearing.   HENT:      Head: Atraumatic.      Nose: Nose normal.      Mouth/Throat:      Mouth: Mucous membranes are moist.   Eyes:      Conjunctiva/sclera: Conjunctivae normal.   Cardiovascular:      Rate and Rhythm: Normal rate and regular rhythm.      Pulses: Normal pulses.      Heart sounds: No murmur heard.  Pulmonary:      Effort: No respiratory distress.      Comments: Tracheostomy site midline, no drainage around dressing.  Rhonchorous breath sounds, unlabored respirations, RT bedside suctioning patient  "with thick blood-tinged sputum  Abdominal:      General: Bowel sounds are normal. There is no distension.      Palpations: Abdomen is soft.      Tenderness: There is no abdominal tenderness. There is no guarding.      Comments: Left upper quadrant PEG site tube site, no drainage or skin breakdown around tube site.   Musculoskeletal:         General: No swelling, deformity or signs of injury.      Cervical back: Neck supple.      Right lower leg: No edema.      Left lower leg: No edema.   Skin:     General: Skin is warm and dry.      Capillary Refill: Capillary refill takes less than 2 seconds.      Findings: Wound (sacrum) present. No ecchymosis or erythema.   Neurological:      Mental Status: He is alert and oriented to person, place, and time.      Cranial Nerves: No facial asymmetry.      Sensory: Sensory deficit (RUE, RLE) present.      Motor: Weakness present.      Comments: Right sided upper and lower extremity motor function deficits   Psychiatric:         Mood and Affect: Mood normal.         Behavior: Behavior is cooperative.            Last Recorded Vitals  Blood pressure 134/50, pulse 93, temperature 36.9 °C (98.4 °F), temperature source Temporal, resp. rate 22, height 1.88 m (6' 2\"), weight 77.1 kg (170 lb), SpO2 98%.     Relevant Results              Results for orders placed or performed during the hospital encounter of 03/22/25 (from the past 24 hours)   CBC and Auto Differential   Result Value Ref Range     WBC 9.3 4.4 - 11.3 x10*3/uL     nRBC 0.3 (H) 0.0 - 0.0 /100 WBCs     RBC 3.02 (L) 4.50 - 5.90 x10*6/uL     Hemoglobin 8.0 (L) 13.5 - 17.5 g/dL     Hematocrit 29.4 (L) 41.0 - 52.0 %     MCV 97 80 - 100 fL     MCH 26.5 26.0 - 34.0 pg     MCHC 27.2 (L) 32.0 - 36.0 g/dL     RDW 15.6 (H) 11.5 - 14.5 %     Platelets 197 150 - 450 x10*3/uL     Neutrophils % 77.1 40.0 - 80.0 %     Immature Granulocytes %, Automated 1.5 (H) 0.0 - 0.9 %     Lymphocytes % 13.1 13.0 - 44.0 %     Monocytes % 4.1 2.0 - 10.0 % "     Eosinophils % 3.2 0.0 - 6.0 %     Basophils % 1.0 0.0 - 2.0 %     Neutrophils Absolute 7.14 1.20 - 7.70 x10*3/uL     Immature Granulocytes Absolute, Automated 0.14 0.00 - 0.70 x10*3/uL     Lymphocytes Absolute 1.21 1.20 - 4.80 x10*3/uL     Monocytes Absolute 0.38 0.10 - 1.00 x10*3/uL     Eosinophils Absolute 0.30 0.00 - 0.70 x10*3/uL     Basophils Absolute 0.09 0.00 - 0.10 x10*3/uL   Basic metabolic panel   Result Value Ref Range     Glucose 223 (H) 74 - 99 mg/dL     Sodium 142 136 - 145 mmol/L     Potassium 4.3 3.5 - 5.3 mmol/L     Chloride 103 98 - 107 mmol/L     Bicarbonate 27 21 - 32 mmol/L     Anion Gap 16 10 - 20 mmol/L     Urea Nitrogen 36 (H) 6 - 23 mg/dL     Creatinine 0.81 0.50 - 1.30 mg/dL     eGFR >90 >60 mL/min/1.73m*2     Calcium 9.6 8.6 - 10.3 mg/dL   Protime-INR   Result Value Ref Range     Protime 15.1 (H) 9.8 - 12.4 seconds     INR 1.4 (H) 0.9 - 1.1   aPTT   Result Value Ref Range     aPTT 25 (L) 26 - 36 seconds   Type and Screen   Result Value Ref Range     ABO TYPE B       Rh TYPE POS       ANTIBODY SCREEN NEG     VERIFY ABO/Rh Group Test   Result Value Ref Range     ABO TYPE B       Rh TYPE POS     MRSA Surveillance for Vancomycin De-escalation, PCR     Specimen: Anterior Nares; Swab   Result Value Ref Range     MRSA PCR Not Detected Not Detected   POCT GLUCOSE   Result Value Ref Range     POCT Glucose 180 (H) 74 - 99 mg/dL   POCT GLUCOSE   Result Value Ref Range     POCT Glucose 161 (H) 74 - 99 mg/dL   POCT GLUCOSE   Result Value Ref Range     POCT Glucose 156 (H) 74 - 99 mg/dL   Basic metabolic panel   Result Value Ref Range     Glucose 170 (H) 74 - 99 mg/dL     Sodium 147 (H) 136 - 145 mmol/L     Potassium 4.2 3.5 - 5.3 mmol/L     Chloride 107 98 - 107 mmol/L     Bicarbonate 30 21 - 32 mmol/L     Anion Gap 14 10 - 20 mmol/L     Urea Nitrogen 35 (H) 6 - 23 mg/dL     Creatinine 0.85 0.50 - 1.30 mg/dL     eGFR >90 >60 mL/min/1.73m*2     Calcium 9.4 8.6 - 10.3 mg/dL   CBC   Result Value Ref  Range     WBC 10.4 4.4 - 11.3 x10*3/uL     nRBC 0.6 (H) 0.0 - 0.0 /100 WBCs     RBC 2.87 (L) 4.50 - 5.90 x10*6/uL     Hemoglobin 7.8 (L) 13.5 - 17.5 g/dL     Hematocrit 26.6 (L) 41.0 - 52.0 %     MCV 93 80 - 100 fL     MCH 27.2 26.0 - 34.0 pg     MCHC 29.3 (L) 32.0 - 36.0 g/dL     RDW 15.7 (H) 11.5 - 14.5 %     Platelets 205 150 - 450 x10*3/uL   SST TOP   Result Value Ref Range     Extra Tube Hold for add-ons.        CT angio chest for pulmonary embolism     Result Date: 3/22/2025  Interpreted By:  Pamela Zurita, STUDY: CT ANGIO CHEST FOR PULMONARY EMBOLISM;  3/22/2025 4:56 pm   INDICATION: Signs/Symptoms:bloody secretions, eval for PE.     COMPARISON: CT chest without contrast 03/22/2025   ACCESSION NUMBER(S): [id number]   ORDERING CLINICIAN: PAMELA HAHN   TECHNIQUE: Contiguous axial images of the chest were obtained after the intravenous administration of iodinated contrast using angiographic PE protocol. Coronal and sagittal reformatted images were reconstructed from the axial data. MIP images were created on an independent workstation and reviewed.   FINDINGS:   MEDIASTINUM AND LYMPH NODES: Inflammatory fat stranding along the tracheostomy tract without discrete fluid collection. The esophageal wall appears within normal limits. There is redemonstration of diffuse lymphadenopathy in the bilateral axilla, mediastinum, bilateral supraclavicular as described in detail on separate report. Also notable or enlarged bilateral level 4 cervical lymph nodes (right > left).   VESSELS:  Normal caliber thoracic aorta without dissection. Moderate aortic atherosclerosis.  No acute pulmonary embolism.   HEART: Normal size.  Mild coronary artery calcifications. No significant pericardial effusion.   LUNG, AIRWAYS, PLEURA: There is slight increase in debris within the trachea. There has been clearing of secretions in the left upper/lower lobe bronchus. There is diffuse bilateral bronchial thickening slightly increased  from prior study. There is peribronchovascular ground-glass opacity in the posterior right upper lobe and superior and basal segments of the right lower lobe. There is slightly worsened atelectasis in the lung bases remaining predominantly subsegmental in nature. There is a 0.8 cm nodule in the left upper lobe that is stable from 08/19/2023.   OSSEOUS STRUCTURES: No acute osseous abnormality.   CHEST WALL SOFT TISSUES: No discernible acute abnormality.   UPPER ABDOMEN/OTHER: No acute abnormality.        No acute pulmonary embolism.   Peribronchial vascular ground glass opacities in the right upper and lower lobe again concerning for pneumonia given patient's risk factors of tracheostomy and debris in the airways.   Redemonstration of diffuse lymphadenopathy in the lower necks, axilla, and mediastinum. Findings are either diffusely reactive in nature, reflective of lymphoma/leukemia, meter deficiency, or less likely head neck neoplasm is previously postulated. However please correlate with past medical history.   Inflammatory changes along the tracheostomy tract without fluid collection. Correlate for erythema.   Unchanged 0.8 cm nodule in the left upper lobe dating back to 08/19/2023. Recommend 1 year follow up chest CT to ensure at least 2 years of stability.   MACRO: None.   Signed by: Pamela Zurita 3/22/2025 7:17 PM Dictation workstation:   [id number]     CT chest wo IV contrast     Result Date: 3/22/2025  Interpreted By:  Pamela Zurita, STUDY: CT CHEST WO IV CONTRAST;  3/22/2025 3:41 pm   INDICATION: Signs/Symptoms:eval for alveolar hemorrhage.     COMPARISON: CT head/neck 08/19/2023   ACCESSION NUMBER(S): [id number]   ORDERING CLINICIAN: PAMELA HAHN   TECHNIQUE: Contiguous axial images of the chest and upper abdomen were obtained without contrast. Coronal and sagittal reformatted images were reconstructed from the axial data.   FINDINGS:   MEDIASTINUM AND LYMPH NODES: There is fat stranding/edema  along the tract of the tracheostomy about discrete fluid collection. The esophageal wall appears within normal limits. There are numerous enlarged bilateral supraclavicular lymph nodes measuring up to 1.4 cm on the left. There are numerous enlarged bilateral axillary lymph nodes measuring up to 1.1 cm on the right and 1.2 cm on the left. There are numerous prominent to mildly enlarged mediastinal lymph nodes as well. No pneumomediastinum.   VESSELS:  Normal caliber thoracic aorta. Moderate aortic atherosclerosis. The main pulmonary artery is normal in size.   HEART: Normal size.  Mild coronary artery calcifications. No significant pericardial effusion.   LUNG, AIRWAYS, PLEURA: Tracheostomy noted in appropriate position. There is trace mucus in the left mainstem bronchus and at the origin of the left upper lobe and lower lobe bronchi. There are mild peribronchovascular ground-glass opacities in the posterior segment of the right upper lobe, basal segments of the right lower lobe. There is mild dependent bibasilar atelectasis. There is no pleural effusion.   OSSEOUS STRUCTURES: No acute osseous abnormality.   CHEST WALL SOFT TISSUES: No discernible acute abnormality.   UPPER ABDOMEN/OTHER: No acute abnormality.        1. Mild peribronchovascular ground-glass opacities in the posterior right upper lobe and basal segments of the right lower lobe. This appearance and distribution is not typical for alveolar hemorrhage which tends to be centrilobular and bilateral asymmetric in distribution. Findings are most suggestive of pneumonia.   2. Small amount of mucous debris in the left mainstem bronchus and at the origin of the left upper and lower lobe bronchi.   3. Diffuse lymphadenopathy in the bilateral axillary regions, mediastinum and left supraclavicular region particularly notable in the bilateral supraclavicular region. This is progressed in the left supraclavicular region and new elsewhere when compared to 08/19/2023  CTA head/neck. Correlate for any history of known head/neck malignancy or lymphoma.   4. Inflammation/fat stranding adjacent to the tracheostomy tube entry site noted. Correlate for air the met. No evidence of fluid collection.   MACRO: None.   Signed by: Marco A Zurita 3/22/2025 7:10 PM Dictation workstation:   [id number]     XR chest 1 view     Result Date: 3/22/2025  Interpreted By:  Fermin Stroud, STUDY: XR CHEST 1 VIEW   INDICATION: Signs/Symptoms:hemoptysis.   COMPARISON: August 19, 2023   ACCESSION NUMBER(S): [id number]   ORDERING CLINICIAN: MARCO A HAHN   FINDINGS: No consolidation, effusion, edema, or pneumothorax. Heart size within normal limits.        No evidence of acute intrathoracic abnormality.   Signed by: Fermin Stroud 3/22/2025 2:01 PM Dictation workstation:   [id number]     XR chest 1 view     Result Date: 3/14/2025  * * *Final Report* * * DATE OF EXAM: Mar 14 2025  8:52AM   S5X   5290  -  XR CHEST 1V FRONTAL   / ACCESSION #  [id number] PROCEDURE REASON: new bleeding from trach      * * * * Physician Interpretation * * * *  EXAMINATION:  CHEST RADIOGRAPH (PORTABLE SINGLE VIEW AP) Exam Date/Time:  3/14/2025 8:52 AM Clinical History:  new bleeding from trach Comparison:  03/03/2025 RESULT: LINES, TUBES, AND DEVICES:  Tracheostomy. CARDIOMEDIASTINAL SILHOUETTE:  The cardiac and mediastinal silhouettes appear unremarkable. LUNGS AND PLEURA: No consolidation.  No pleural effusion. No pulmonary vascular congestion. No pneumothorax identified. OTHER:  N/A     IMPRESSION: No acute abnormality identified. : [facility]   Transcribe Date/Time: Mar 14 2025  9:07A Dictated by : MARCO A EVANS MD This examination was interpreted and the report reviewed and electronically signed by: MARCO A EVANS MD on Mar 14 2025  9:11AM  EST     US abdomen complete     Result Date: 3/11/2025  ABDOMEN ULTRASOUND-COMPLETE FINDINGS: Abdomen Ultrasound Complete: PROCEDURE: Multiple grayscale mages of the abdomen  were obtained. FINDINGS:Multiple real time images demonstrate the liver with increased echogenicity consistent with fatty infiltration. There is no evidence of a discrete mass within the liver. The liver is enlarged, measured at 15.7 cm in size. The gallbladder is seen with no evidence of cholelithiasis. The gallbladder wall is within normal limits. The common bile duct is within normal limits. The visualized pancreas appears unremarkable. Both kidneys are seen with no evidence of hydronephrosis or a calculus. The spleen has its normal homogeneous echo appearance. There is no free fluid to suggest ascites. The visualized aorta appears unremarkable. The inferior vena cava appears patent. CONCLUSION: Enlarged fatty liver. ELECTRONICALLY SIGNED BY LEONEL LAN M.D. 3/11/2025 5:00:52 PM EDT.     XR chest 1 view     Result Date: 3/3/2025  * * *Final Report* * * DATE OF EXAM: Mar  3 2025  1:40PM   S5X   5290  -  XR CHEST 1V FRONTAL   / ACCESSION #  [id number] PROCEDURE REASON: resp failure      * * * * Physician Interpretation * * * *  EXAMINATION:  CHEST RADIOGRAPH (PORTABLE SINGLE VIEW AP) Exam Date/Time:  3/3/2025 1:40 PM Indication:   resp failure M:  XCP_4 Comparison:  1 day prior RESULT: Lines, tubes, and devices:  Tracheostomy tube remains in situ. Lungs and pleura:  Mild patchy opacities right lower lung zone seen on the prior study are no longer appreciated.  No confluent opacity.   Costophrenic angles are clear.  No pneumothorax. Cardiomediastinal silhouette:  Stable cardiomediastinal silhouette. Other:  -     IMPRESSION: Improved aeration right lower lung zone.  No acute cardiopulmonary finding. : HANY   Transcribe Date/Time: Mar  3 2025  2:49P Dictated by : ISRAEL BENSON MD This examination was interpreted and the report reviewed and electronically signed by: ISRAEL BENSON MD on Mar  3 2025  2:49PM  EST     XR chest 1 view     Result Date: 3/2/2025  * * *Final Report* * * DATE OF  EXAM: Mar  2 2025 11:25AM   MMX   5376  -  XR CHEST 1V FRONTAL PORT  / ACCESSION #  [id number] PROCEDURE REASON: Shortness of breath      * * * * Physician Interpretation * * * *  EXAMINATION:  CHEST RADIOGRAPH (PORTABLE SINGLE VIEW AP) Exam Date/Time:  3/2/2025 11:25 AM CLINICAL HISTORY: Shortness of breath MQ:  XCPR_5 Comparison:  02/20/2025. RESULT: This is a limited examination due to multiple wires and tubing obscuring the lower lungs. Lines, tubes, and devices:  A tracheostomy tube remains in place. Lungs and pleura:  There are increased bronchovascular markings in the right lower lung which may be due to bronchitis or early changes of aspiration pneumonia.  Clinical correlation and follow-up exams are recommended. Cardiomediastinal silhouette:  Stable cardiomediastinal silhouette. Other:  The visualized bony thorax appears unremarkable.     IMPRESSION: Nonspecific parenchymal changes in the right lower lung as described above. A follow-up exam is recommended. : HANY   Transcribe Date/Time: Mar  2 2025 11:53A Dictated by : BJ INIGUEZ MD This examination was interpreted and the report reviewed and electronically signed by: BJ INIGUEZ MD on Mar  2 2025 11:54AM  EST     XR chest 1 view     Result Date: 2/28/2025  * * *Final Report* * * DATE OF EXAM: Feb 28 2025 10:36AM   MMX   5290  -  XR CHEST 1V FRONTAL   / ACCESSION #  [id number] PROCEDURE REASON: Shortness of breath      * * * * Physician Interpretation * * * *  EXAMINATION:  CHEST RADIOGRAPH (SINGLE VIEW AP OR PA) CLINICAL HISTORY: Shortness of breath MQ:  XC1_5 Comparison:  02/22/2025 RESULT: Lines, tubes, and devices:  Tracheostomy tube is noted. Lungs and pleura:  No consolidation. No lung mass. No pleural effusion. Cardiomediastinal silhouette:  Normal cardiomediastinal silhouette. Other:  No acute osseous pathology.     IMPRESSION: No acute radiographic abnormality. : [facility]   Transcribe Date/Time: Feb 28 2025  12:07P Dictated by : DIANNE RAMOS MD This examination was interpreted and the report reviewed and electronically signed by: DIANNE RAMOS MD on Feb 28 2025 12:08PM  EST     XR abdomen 2 views supine and erect or decub     Result Date: 2/24/2025  * * *Final Report* * * DATE OF EXAM: Feb 24 2025  6:03PM   MMX   5289  -  XR ABDOMEN 1V SUPINE  / ACCESSION #  [id number] PROCEDURE REASON: Evaluate tube, line or lead position      * * * * Physician Interpretation * * * *  EXAMINATION:  XR ABDOMEN 1V SUPINE CLINICAL HISTORY:   Evaluate tube, line or lead position Technique:   XR ABDOMEN 1V SUPINE -- NOT APPLICABLE with 1 views on 1 images Comparison: 2/21/2025 RESULT: Feeding tube with distal tip at the pylorus antrum. Moderate colonic stool burden. No dilated bowel. Lung structures are intact.     IMPRESSION: Feeding tube with distal tip at the pylorus antrum. Moderate colonic stool burden. No dilated bowel. : [facility]   Transcribe Date/Time: Feb 24 2025  6:23P Dictated by : PABLO RAMOS MD This examination was interpreted and the report reviewed and electronically signed by: PABLO RAMOS MD on Feb 24 2025  6:24PM  EST     CT head wo IV contrast     Result Date: 2/23/2025  * * *Final Report* * * DATE OF EXAM: Feb 23 2025 12:04PM   [facility]   0504  -  CT BRAIN WO IVCON  / ACCESSION #  [id number] PROCEDURE REASON: Stroke, follow up      * * * * Physician Interpretation * * * *  EXAMINATION: CT BRAIN WO IVCON CLINICAL HISTORY: Stroke, follow up TECHNIQUE:  Serial axial images without IV contrast were obtained from the vertex to the foramen magnum. MQ:  CTBWO_3 CT Radiation dose: Integrated Dose-Length Product (DLP) for this visit =   778  mGy*cm CT Dose Reduction Employed: Iterative recon COMPARISON: MRI brain 02/19/2025 RESULT: Post-operative change: None. Acute change: Evolving acute infarct along the central eli and superior left medulla (2:7-9).  No evidence of hemorrhagic transformation. Hemorrhage: No evidence of  acute intracranial hemorrhage. Mass Lesion / Mass Effect: There is no evidence of an intracranial mass or extraaxial fluid collection. No significant mass effect. Chronic change: Stable remote left MCA territory encephalomalacia. Parenchyma: There is no significant volume loss. The brain parenchyma is otherwise within normal limits for age. Ventricles: The ventricles are within normal limits of size and configuration for age. Paranasal sinuses and skull base: Partial opacification of the left maxillary and sphenoid sinus and multiple bilateral ethmoid air cells.   The remaining visualized paranasal sinuses are grossly clear. The skull base and imaged soft tissues are unremarkable. Localizer images: No additional findings.     IMPRESSION: Evolving acute brainstem infarct.  No evidence of hemorrhagic transformation. Stable remote left MCA territory infarct. : [facility]   Transcribe Date/Time: Feb 23 2025 12:48P Dictated by : FRANKY GUZMAN MD   This examination was interpreted and the report reviewed and electronically signed by: FRANKY GUZMAN MD on Feb 23 2025 12:52PM  EST     XR chest 1 view     Result Date: 2/22/2025  * * *Final Report* * * DATE OF EXAM: Feb 22 2025  6:56AM   MMX   [id number]  -  XR CHEST 1V FRONTAL   / ACCESSION #  [id number] PROCEDURE REASON: Evaluate tube, line,  or lead position      * * * * Physician Interpretation * * * *  EXAMINATION:  CHEST RADIOGRAPH (SINGLE VIEW AP OR PA) CLINICAL HISTORY: Evaluate tube, line,  or lead position MQ:  XC1_5 Comparison:  02/21/2025. RESULT: Lines, tubes, and devices:  An ET tube and feeding tube remain in place.   A poorly defined right venous catheter cannot be excluded. Lungs and pleura:  There are persistent bilateral lower lungs infiltrates seen on the right more than left suggestive of pneumonia such as aspiration pneumonia.  There is no definite pleural effusion. Cardiomediastinal silhouette:  Stable cardiomediastinal silhouette. Other:  The visualized  bony thorax appears unremarkable.     IMPRESSION: Persistent bilateral lower lungs infiltrates as described above. A follow-up exam is recommended. : [facility]   Transcribe Date/Time: Feb 22 2025  8:20A Dictated by : BJ INIGUEZ MD This examination was interpreted and the report reviewed and electronically signed by: BJ INIGUEZ MD on Feb 22 2025  8:21AM  EST     XR chest 1 view     Result Date: 2/21/2025  * * *Final Report* * * DATE OF EXAM: Feb 21 2025  5:59PM   MMX   [id number]  -  XR CHEST 1V FRONTAL PORT  / ACCESSION #  [id number] PROCEDURE REASON: Evaluate tube, line,  or lead position      * * * * Physician Interpretation * * * *  EXAMINATION:  CHEST RADIOGRAPH (PORTABLE SINGLE VIEW AP) Exam Date/Time:  2/21/2025 5:59 PM CLINICAL HISTORY: Evaluate tube, line,  or lead position MQ:  XCPR_5 Comparison:  02/21/2025 RESULT: Lines, tubes, and devices: Endotracheal tube in place terminating in enteric tube in place terminating below the field of view. Lungs and pleura: No pneumothorax.  No pleural effusion.  Bilateral mild interstitial opacities.  The right costophrenic angle is not included in the field-of-view. Cardiomediastinal silhouette:  Stable cardiomediastinal silhouette. Other:  .     IMPRESSION: Mild interstitial opacities may be related to pulmonary vascular congestion. : [facility]   Transcribe Date/Time: Feb 21 2025  6:55P Dictated by : IRWIN VALDIVIA MD This examination was interpreted and the report reviewed and electronically signed by: IRWIN VALDIVIA MD on Feb 21 2025  6:57PM  EST     XR abdomen 2 views supine and erect or decub     Result Date: 2/21/2025  * * *Final Report* * * DATE OF EXAM: Feb 21 2025 10:32AM   MMX   [id number]  -  XR ABDOMEN 1V SUPINE  / ACCESSION #  [id number] PROCEDURE REASON: Evaluate tube, line or lead position      * * * * Physician Interpretation * * * *  Clinical Information: Feeding tube Single view of the abdomen was obtained. Feeding  tube tip within the distal stomach. There is a nonspecific, nonobstructive bowel gas pattern. No abnormal abdominal masses are identified.  No pathologic calcifications.   No acute bony abnormalities are seen.     IMPRESSION: Nonspecific, nonobstructive bowel gas pattern. Feeding tube tip within the distal stomach. : [facility]   Transcribe Date/Time: Feb 21 2025 10:43A Dictated by : PAMELA EVANS MD This examination was interpreted and the report reviewed and electronically signed by: PAMELA EVANS MD on Feb 21 2025 10:44AM  EST     XR chest 1 view     Result Date: 2/21/2025  * * *Final Report* * * DATE OF EXAM: Feb 21 2025 10:32AM   MMX   [id number]  -  XR CHEST 1V FRONTAL PORT  / ACCESSION #  [id number] PROCEDURE REASON: Shortness of breath      * * * * Physician Interpretation * * * *  EXAMINATION:  CHEST RADIOGRAPH (PORTABLE SINGLE VIEW AP) Exam Date/Time:  2/21/2025 10:32 AM Clinical History:  Shortness of breath Comparison:  02/17/2025 RESULT: LINES, TUBES, AND DEVICES:  Feeding tube tip beyond the inferior extent of the image. CARDIOMEDIASTINAL SILHOUETTE:  The cardiac and mediastinal silhouettes appear unremarkable. LUNGS AND PLEURA: No consolidation.  No pleural effusion. No pulmonary vascular congestion. No pneumothorax identified. OTHER:  N/A     IMPRESSION: No acute abnormality identified. 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  Daily H&H  Monitor for overt bleeding  Consult pulmonology, appreciate input  Pneumonia suspected for CT findings  Tracheostomy protocol  Expected cover with Zosyn and vancomycin  MRSA swab  Urine for Legionella antigen and strep pneumonia antigen  Supplemental oxygen as needed  Nebulizers as needed  RT evaluate and treat  Check procalcitonin  Typical 20% zinc oxide to wounds and groin  Continuous tube feeds, continue at 80 mL an hour x 22 hours  Accu-Chek every 6 hours  Hypoglycemia protocol  PT/OT  Anticoagulation and antiplatelet therapy on hold, resume once cleared by pulmonology  Continue patient's home medications for chronic issues as appropriate     Patient fully evaluated on March 23.  Continue to monitor H&H.  Pulmonary consultation obtained.  Continue broad-spectrum antibiotics to rule out infectious process.  Recheck labs in AM.                 Zeus Bone MD                    Nany      Patient with significant clinical improvement noted, patient medically cleared for discharge today. Patient seen resting in bed with head of bed elevated, no s/s or c/o acute difficulties at this time. Vital signs for last 24 hours:  Temp:  [35.9 °C (96.6 °F)-36.6 °C (97.9 °F)] 35.9 °C (96.6 °F)  Heart Rate:  [55-67] 55  Resp:  [18-20] 18  BP: (124-185)/(58-84) 143/67  FiO2 (%):  [28 %-96 %] 28 % Medications and labs reviewed-   Results for orders placed or performed during the hospital encounter of 03/22/25 (from the past 24 hours)   POCT GLUCOSE   Result Value Ref Range    POCT Glucose 358 (H) 74 - 99 mg/dL   POCT GLUCOSE   Result Value Ref Range    POCT Glucose 313 (H) 74 - 99 mg/dL   Comprehensive Metabolic Panel   Result Value Ref Range    Glucose 332 (H) 74 - 99 mg/dL    Sodium 144 136 - 145 mmol/L    Potassium 3.9 3.5 - 5.3 mmol/L    Chloride 107 98 - 107 mmol/L    Bicarbonate 30 21 - 32 mmol/L    Anion Gap  11 10 - 20 mmol/L    Urea Nitrogen 26 (H) 6 - 23 mg/dL    Creatinine 0.92 0.50 - 1.30 mg/dL    eGFR >90 >60 mL/min/1.73m*2    Calcium 8.7 8.6 - 10.3 mg/dL    Albumin 3.1 (L) 3.4 - 5.0 g/dL    Alkaline Phosphatase 266 (H) 33 - 136 U/L    Total Protein 6.2 (L) 6.4 - 8.2 g/dL    AST 35 9 - 39 U/L    Bilirubin, Total 0.4 0.0 - 1.2 mg/dL     (H) 10 - 52 U/L   CBC and Auto Differential   Result Value Ref Range    WBC 13.3 (H) 4.4 - 11.3 x10*3/uL    nRBC 7.4 (H) 0.0 - 0.0 /100 WBCs    RBC 2.93 (L) 4.50 - 5.90 x10*6/uL    Hemoglobin 8.0 (L) 13.5 - 17.5 g/dL    Hematocrit 29.8 (L) 41.0 - 52.0 %     (H) 80 - 100 fL    MCH 27.3 26.0 - 34.0 pg    MCHC 26.8 (L) 32.0 - 36.0 g/dL    RDW 20.8 (H) 11.5 - 14.5 %    Platelets 336 150 - 450 x10*3/uL    Immature Granulocytes %, Automated 8.4 (H) 0.0 - 0.9 %    Immature Granulocytes Absolute, Automated 1.12 (H) 0.00 - 0.70 x10*3/uL   Manual Differential   Result Value Ref Range    Neutrophils %, Manual 78.0 40.0 - 80.0 %    Lymphocytes %, Manual 14.0 13.0 - 44.0 %    Monocytes %, Manual 1.0 2.0 - 10.0 %    Eosinophils %, Manual 1.0 0.0 - 6.0 %    Basophils %, Manual 3.0 0.0 - 2.0 %    Metamyelocytes %, Manual 3.0 0.0 - 0.0 %    Seg Neutrophils Absolute, Manual 10.37 (H) 1.20 - 7.00 x10*3/uL    Lymphocytes Absolute, Manual 1.86 1.20 - 4.80 x10*3/uL    Monocytes Absolute, Manual 0.13 0.10 - 1.00 x10*3/uL    Eosinophils Absolute, Manual 0.13 0.00 - 0.70 x10*3/uL    Basophils Absolute, Manual 0.40 (H) 0.00 - 0.10 x10*3/uL    Metamyelocytes Absolute, Manual 0.40 0.00 - 0.00 x10*3/uL    Total Cells Counted 100     RBC Morphology See Below     Polychromasia Mild     Hypochromia Mild     Basophilic Stippling Present    POCT GLUCOSE   Result Value Ref Range    POCT Glucose 300 (H) 74 - 99 mg/dL   POCT GLUCOSE   Result Value Ref Range    POCT Glucose 290 (H) 74 - 99 mg/dL      Patient recently received an antibiotic (last 12 hours)       Date/Time Action Medication Dose    03/31/25  0908 New Bag    piperacillin-tazobactam (Zosyn) 3.375 g in dextrose (iso) IV 50 mL 3.375 g    03/31/25 0231 New Bag    piperacillin-tazobactam (Zosyn) 3.375 g in dextrose (iso) IV 50 mL 3.375 g           No results found for the last 90 days.       Continue aggressive pulmonary hygiene and oral hygiene. Off loading as tolerated for skin integrity.  Plan discussed with interdisciplinary team, no acute events overnight, patient denies chest pain, worsening SOB at this time. Ok to discharge to [facility], will discontinue plavix, will current and repeat labs in the AM if patient still hospitalized. Patient aware and agreeable to current plan, continue plan as above.     I spent 30 minutes on the date of the service which included preparing to see the patient, face-to-face patient care, completing clinical documentation, obtaining and/or reviewing separately obtained history, performing a medically appropriate examination, counseling and educating the patient/family/caregiver, ordering medications, tests, or procedures, communicating with other HCPs (not separately reported), independently interpreting results (not separately reported), communicating results to the patient/family/caregiver, and care coordination (not separately reported    Discharge Meds     Medication List      START taking these medications     amiodarone 200 mg tablet; Commonly known as: Pacerone; 1 tablet (200 mg)   by g-tube route 2 times a day.   epoetin nicko 20,000 unit/mL injection; Commonly known as: Procrit;   Inject 1.5 mL (30,000 Units) under the skin 1 (one) time per week.; Start   taking on: April 6, 2025   insulin glargine 100 unit/mL injection; Commonly known as: Lantus;   Inject 40 Units under the skin once daily at bedtime. Take as directed per   insulin instructions.; Replaces: Lantus Solostar U-100 Insulin 100 unit/mL   (3 mL) pen   * ipratropium 0.02 % nebulizer solution; Commonly known as: Atrovent;   Take 2.5 mL (0.5 mg) by  nebulization 2 times a day.   * ipratropium 0.02 % nebulizer solution; Commonly known as: Atrovent;   Take 2.5 mL (0.5 mg) by nebulization every 2 hours if needed for wheezing   or shortness of breath.   zinc oxide 20 % ointment; Apply 1 Application topically every 1 hour if   needed for irritation.  * This list has 2 medication(s) that are the same as other medications   prescribed for you. Read the directions carefully, and ask your doctor or   other care provider to review them with you.     CONTINUE taking these medications     acetaminophen 325 mg chewable tablet   acetylcysteine 200 mg/mL (20 %) nebulizer solution; Commonly known as:   Mucomyst   apixaban 5 mg tablet; Commonly known as: Eliquis   cholecalciferol 1,250 mcg (50,000 unit) tablet; Commonly known as:   Vitamin D3   HumaLOG KwikPen Insulin 100 unit/mL pen; Generic drug: insulin lispro   metoprolol tartrate 50 mg tablet; Commonly known as: Lopressor   oxygen gas therapy; Commonly known as: O2   sodium chloride 0.65 % nasal drops; Commonly known as: Ayr   sucralfate 1 gram tablet; Commonly known as: Carafate   traZODone 50 mg tablet; Commonly known as: Desyrel     STOP taking these medications     albuterol 2.5 mg /3 mL (0.083 %) nebulizer solution   atorvastatin 80 mg tablet; Commonly known as: Lipitor   clopidogrel 75 mg tablet; Commonly known as: Plavix   docusate sodium 100 mg capsule; Commonly known as: Colace   empagliflozin 25 mg tablet; Commonly known as: Jardiance   hydrALAZINE 50 mg tablet; Commonly known as: Apresoline   ipratropium-albuteroL 0.5-2.5 mg/3 mL nebulizer solution; Commonly known   as: Duo-Neb   Lantus Solostar U-100 Insulin 100 unit/mL (3 mL) pen; Generic drug:   insulin glargine; Replaced by: insulin glargine 100 unit/mL injection       Test Results Pending At Discharge  Pending Labs       Order Current Status    Surgical Pathology Exam In process            Hospital Course         Zeus Bone MD   Physician  Internal  Medicine     Progress Notes      Signed     Date of Service: 3/30/2025  1:03 PM     Signed       Expand All Collapse All    Bryan Nix is a 64 y.o. male on day 8 of admission presenting with Hemoptysis.           Subjective  Patient fully evaluated on March 24.  Hemoptysis appears resolved.  Still with slightly elevated white count to be monitored.  Continue aggressive antibiotic regimen.  Repeat chest x-ray is pending.              Objective  Last Recorded Vitals  /57   Pulse 73   Temp 36.8 °C (98.2 °F)   Resp 18   Wt 77.1 kg (170 lb)   SpO2 95%   Intake/Output last 3 Shifts:     Intake/Output Summary (Last 24 hours) at 3/30/2025 1303  Last data filed at 3/30/2025 0600      Gross per 24 hour   Intake 1603.06 ml   Output 2050 ml   Net -446.94 ml         Admission Weight  Weight: 77.1 kg (170 lb) (03/22/25 1255)     Daily Weight  03/22/25 : 77.1 kg (170 lb)     Image Results  XR chest 1 view  Narrative: Interpreted By:  Ankur Tubbs,   STUDY:  XR CHEST 1 VIEW; 3/28/2025 3:55 pm      INDICATION:  Signs/Symptoms:Diagnotisc.      COMPARISON:  Chest x-ray 03/24/2025      ACCESSION NUMBER(S):  [id number]      ORDERING CLINICIAN:  RICHARD WELCH      TECHNIQUE:  1 view of the chest was performed.      FINDINGS:  Small left-sided pleural effusions surrounding atelectasis.. No  pneumothorax.  The cardiomediastinal silhouette is stable.  Tracheostomy cannula noted.      Impression: 1. New small left-sided pleural effusions surrounding atelectasis.      Signed by: Ankur Tubbs 3/28/2025 4:15 PM  Dictation workstation:   [id number]        Physical Exam     Relevant Results                       Assessment/Plan  This patient currently has cardiac telemetry ordered; if you would like to modify or discontinue the telemetry order, click hereto go to the orders activity to modify/discontinue the order.                       Assessment & Plan  Hemoptysis     Tracheostomy in place (Multi)     CVA, old, hemiparesis  (Multi)     Status post insertion of percutaneous endoscopic gastrostomy (PEG) tube (Multi)     Pneumonia due to infectious organism     Right sided weakness     Atrial flutter (Multi)     Insulin dependent type 2 diabetes mellitus (Multi)        Zeus Bone MD  Physician  Internal Medicine     H&P      Signed     Date of Service: 3/23/2025 10:28 AM      Signed        Expand All Collapse All    History Of Present Illness  Bryan Nix is a 64-year-old male with past medical history of atrial flutter on Eliquis, hyponatremia, IDDM, CAD, hypertension, chronic wounds, diffuse lymphadenopathy, anemia, history of smoking, history elevated PSA, CVA L hemisphere (2020) with right-sided deficits, Left ICA stenosis (85%) s/p angioplasty and left carotid artery stent (08/25/2023) and recent history of acute pontine stroke 02/27/25 with acute hypoxic respiratory failure s/p tracheostomy on 2/25/2025 and PEG.  Patient presents for bleeding from tracheostomy.  Patient is alert and oriented x 3, however limited verbal communication due to tracheostomy.  Wife is at bedside and assists with history.  She reports that patient was coughing blood out of his tracheostomy.  ER provider felt bleeding was likely superficial around site.  Patient has been on room air at skilled nursing facility.  He was placed on Airvo in the ED mainly to humidify oxygen.  Patient never desaturated per ER report.  Currently he is resting comfortably in the bed, breath sounds slightly rhonchorous, but unlabored.  Reports chills, but no fevers.  He has right-sided weakness and sensory deficits.  Denies headache, vision or hearing changes, chest pains, palpitations, shortness of breath, nausea, vomiting, abdominal pain, diarrhea, or complications of PEG tube site.  Wife reports patient skin became irritated in his groin due to adhesive from a condom cath previously, he has a small decubitus wound that she reports is healing well.  He receives tube  feeds continuously.  Wife reports a patient was recently evaluated by speech for p.o. to intake, however he still remains n.p.o. except for meds and tube feeds through PEG. CODE STATUS reviewed: full code     ER Course: Hemodynamically stable, afebrile, SpO2 to 97% trach mask.  WBC 9.3, hemoglobin 8, hematocrit 29.4, platelets 197.  INR 1.4.  Glucose 223, BUN 36, otherwise CMP is normal.  CT of the chest for PE pending.  CXR unremarkable. Trannexamic acid soak gauze wrapped around trach.      Past medical history: As above  Past surgical history: As above  Social history: Former smoker 37.5 pack years-quit smoking age 63, occasional alcohol use, no illicit drug use.  .  Works in maintenance.  6 children.  Family history: Noncontributory     Past Medical History  Medical History           Past Medical History:   Diagnosis Date    Abnormal finding of blood chemistry, unspecified 05/18/2016     Abnormal blood chemistry    Acute upper respiratory infection, unspecified 12/02/2015     Acute upper respiratory infection    Elevated prostate specific antigen (PSA)       Elevated prostate specific antigen (PSA)    Encounter for screening for malignant neoplasm of colon 01/30/2021     Colon cancer screening    Encounter for screening for malignant neoplasm of prostate 11/30/2020     Prostate cancer screening    Essential (primary) hypertension 07/30/2013     Benign essential hypertension    Other conditions influencing health status 07/30/2013     Diabetes Mellitus Poorly Controlled    Other conditions influencing health status 12/02/2015     History of cough    Personal history of other endocrine, nutritional and metabolic disease 03/18/2021     History of hyperlipidemia    Personal history of other endocrine, nutritional and metabolic disease 03/18/2021     History of diabetes mellitus    Personal history of other specified conditions 05/18/2016     History of chest pain    Personal history of other specified  conditions 05/18/2016     History of dyspnea    Personal history of other specified conditions 05/18/2016     History of dizziness    Personal history of other specified conditions 05/18/2016     History of fatigue            Surgical History  Surgical History             Past Surgical History:   Procedure Laterality Date    CT ANGIO CORONARY ART WITH HEARTFLOW IF SCORE >30%   8/23/2023     CT HEART CORONARY ANGIOGRAM 8/23/2023 [facility] CT    MR HEAD ANGIO WO IV CONTRAST   12/14/2020     MR HEAD ANGIO WO IV CONTRAST 12/14/2020 [facility]    MR NECK ANGIO WO IV CONTRAST   12/14/2020     MR NECK ANGIO WO IV CONTRAST 12/14/2020 [facility]    OTHER SURGICAL HISTORY   12/01/2020     Hand fracture repair    OTHER SURGICAL HISTORY   12/01/2020     Nasal bone fracture repair            Social History  He reports that he has quit smoking. His smoking use included cigarettes. He has never used smokeless tobacco. No history on file for alcohol use and drug use.     Family History  Family History   No family history on file.         Allergies  Patient has no known allergies.     Review of Systems     Physical Exam  Constitutional:       General: He is awake. He is not in acute distress.     Appearance: Normal appearance. He is not toxic-appearing.   HENT:      Head: Atraumatic.      Nose: Nose normal.      Mouth/Throat:      Mouth: Mucous membranes are moist.   Eyes:      Conjunctiva/sclera: Conjunctivae normal.   Cardiovascular:      Rate and Rhythm: Normal rate and regular rhythm.      Pulses: Normal pulses.      Heart sounds: No murmur heard.  Pulmonary:      Effort: No respiratory distress.      Comments: Tracheostomy site midline, no drainage around dressing.  Rhonchorous breath sounds, unlabored respirations, RT bedside suctioning patient with thick blood-tinged sputum  Abdominal:      General: Bowel sounds are normal. There is no distension.      Palpations: Abdomen is soft.      Tenderness: There is  "no abdominal tenderness. There is no guarding.      Comments: Left upper quadrant PEG site tube site, no drainage or skin breakdown around tube site.   Musculoskeletal:         General: No swelling, deformity or signs of injury.      Cervical back: Neck supple.      Right lower leg: No edema.      Left lower leg: No edema.   Skin:     General: Skin is warm and dry.      Capillary Refill: Capillary refill takes less than 2 seconds.      Findings: Wound (sacrum) present. No ecchymosis or erythema.   Neurological:      Mental Status: He is alert and oriented to person, place, and time.      Cranial Nerves: No facial asymmetry.      Sensory: Sensory deficit (RUE, RLE) present.      Motor: Weakness present.      Comments: Right sided upper and lower extremity motor function deficits   Psychiatric:         Mood and Affect: Mood normal.         Behavior: Behavior is cooperative.            Last Recorded Vitals  Blood pressure 134/50, pulse 93, temperature 36.9 °C (98.4 °F), temperature source Temporal, resp. rate 22, height 1.88 m (6' 2\"), weight 77.1 kg (170 lb), SpO2 98%.     Relevant Results                  Results for orders placed or performed during the hospital encounter of 03/22/25 (from the past 24 hours)   CBC and Auto Differential   Result Value Ref Range     WBC 9.3 4.4 - 11.3 x10*3/uL     nRBC 0.3 (H) 0.0 - 0.0 /100 WBCs     RBC 3.02 (L) 4.50 - 5.90 x10*6/uL     Hemoglobin 8.0 (L) 13.5 - 17.5 g/dL     Hematocrit 29.4 (L) 41.0 - 52.0 %     MCV 97 80 - 100 fL     MCH 26.5 26.0 - 34.0 pg     MCHC 27.2 (L) 32.0 - 36.0 g/dL     RDW 15.6 (H) 11.5 - 14.5 %     Platelets 197 150 - 450 x10*3/uL     Neutrophils % 77.1 40.0 - 80.0 %     Immature Granulocytes %, Automated 1.5 (H) 0.0 - 0.9 %     Lymphocytes % 13.1 13.0 - 44.0 %     Monocytes % 4.1 2.0 - 10.0 %     Eosinophils % 3.2 0.0 - 6.0 %     Basophils % 1.0 0.0 - 2.0 %     Neutrophils Absolute 7.14 1.20 - 7.70 x10*3/uL     Immature Granulocytes Absolute, Automated " 0.14 0.00 - 0.70 x10*3/uL     Lymphocytes Absolute 1.21 1.20 - 4.80 x10*3/uL     Monocytes Absolute 0.38 0.10 - 1.00 x10*3/uL     Eosinophils Absolute 0.30 0.00 - 0.70 x10*3/uL     Basophils Absolute 0.09 0.00 - 0.10 x10*3/uL   Basic metabolic panel   Result Value Ref Range     Glucose 223 (H) 74 - 99 mg/dL     Sodium 142 136 - 145 mmol/L     Potassium 4.3 3.5 - 5.3 mmol/L     Chloride 103 98 - 107 mmol/L     Bicarbonate 27 21 - 32 mmol/L     Anion Gap 16 10 - 20 mmol/L     Urea Nitrogen 36 (H) 6 - 23 mg/dL     Creatinine 0.81 0.50 - 1.30 mg/dL     eGFR >90 >60 mL/min/1.73m*2     Calcium 9.6 8.6 - 10.3 mg/dL   Protime-INR   Result Value Ref Range     Protime 15.1 (H) 9.8 - 12.4 seconds     INR 1.4 (H) 0.9 - 1.1   aPTT   Result Value Ref Range     aPTT 25 (L) 26 - 36 seconds   Type and Screen   Result Value Ref Range     ABO TYPE B       Rh TYPE POS       ANTIBODY SCREEN NEG     VERIFY ABO/Rh Group Test   Result Value Ref Range     ABO TYPE B       Rh TYPE POS     MRSA Surveillance for Vancomycin De-escalation, PCR     Specimen: Anterior Nares; Swab   Result Value Ref Range     MRSA PCR Not Detected Not Detected   POCT GLUCOSE   Result Value Ref Range     POCT Glucose 180 (H) 74 - 99 mg/dL   POCT GLUCOSE   Result Value Ref Range     POCT Glucose 161 (H) 74 - 99 mg/dL   POCT GLUCOSE   Result Value Ref Range     POCT Glucose 156 (H) 74 - 99 mg/dL   Basic metabolic panel   Result Value Ref Range     Glucose 170 (H) 74 - 99 mg/dL     Sodium 147 (H) 136 - 145 mmol/L     Potassium 4.2 3.5 - 5.3 mmol/L     Chloride 107 98 - 107 mmol/L     Bicarbonate 30 21 - 32 mmol/L     Anion Gap 14 10 - 20 mmol/L     Urea Nitrogen 35 (H) 6 - 23 mg/dL     Creatinine 0.85 0.50 - 1.30 mg/dL     eGFR >90 >60 mL/min/1.73m*2     Calcium 9.4 8.6 - 10.3 mg/dL   CBC   Result Value Ref Range     WBC 10.4 4.4 - 11.3 x10*3/uL     nRBC 0.6 (H) 0.0 - 0.0 /100 WBCs     RBC 2.87 (L) 4.50 - 5.90 x10*6/uL     Hemoglobin 7.8 (L) 13.5 - 17.5 g/dL      Hematocrit 26.6 (L) 41.0 - 52.0 %     MCV 93 80 - 100 fL     MCH 27.2 26.0 - 34.0 pg     MCHC 29.3 (L) 32.0 - 36.0 g/dL     RDW 15.7 (H) 11.5 - 14.5 %     Platelets 205 150 - 450 x10*3/uL   SST TOP   Result Value Ref Range     Extra Tube Hold for add-ons.        CT angio chest for pulmonary embolism     Result Date: 3/22/2025  Interpreted By:  Pamela Zurita, STUDY: CT ANGIO CHEST FOR PULMONARY EMBOLISM;  3/22/2025 4:56 pm   INDICATION: Signs/Symptoms:bloody secretions, eval for PE.     COMPARISON: CT chest without contrast 03/22/2025   ACCESSION NUMBER(S): [id number]   ORDERING CLINICIAN: PAMELA HAHN   TECHNIQUE: Contiguous axial images of the chest were obtained after the intravenous administration of iodinated contrast using angiographic PE protocol. Coronal and sagittal reformatted images were reconstructed from the axial data. MIP images were created on an independent workstation and reviewed.   FINDINGS:   MEDIASTINUM AND LYMPH NODES: Inflammatory fat stranding along the tracheostomy tract without discrete fluid collection. The esophageal wall appears within normal limits. There is redemonstration of diffuse lymphadenopathy in the bilateral axilla, mediastinum, bilateral supraclavicular as described in detail on separate report. Also notable or enlarged bilateral level 4 cervical lymph nodes (right > left).   VESSELS:  Normal caliber thoracic aorta without dissection. Moderate aortic atherosclerosis.  No acute pulmonary embolism.   HEART: Normal size.  Mild coronary artery calcifications. No significant pericardial effusion.   LUNG, AIRWAYS, PLEURA: There is slight increase in debris within the trachea. There has been clearing of secretions in the left upper/lower lobe bronchus. There is diffuse bilateral bronchial thickening slightly increased from prior study. There is peribronchovascular ground-glass opacity in the posterior right upper lobe and superior and basal segments of the right lower lobe.  There is slightly worsened atelectasis in the lung bases remaining predominantly subsegmental in nature. There is a 0.8 cm nodule in the left upper lobe that is stable from 08/19/2023.   OSSEOUS STRUCTURES: No acute osseous abnormality.   CHEST WALL SOFT TISSUES: No discernible acute abnormality.   UPPER ABDOMEN/OTHER: No acute abnormality.        No acute pulmonary embolism.   Peribronchial vascular ground glass opacities in the right upper and lower lobe again concerning for pneumonia given patient's risk factors of tracheostomy and debris in the airways.   Redemonstration of diffuse lymphadenopathy in the lower necks, axilla, and mediastinum. Findings are either diffusely reactive in nature, reflective of lymphoma/leukemia, meter deficiency, or less likely head neck neoplasm is previously postulated. However please correlate with past medical history.   Inflammatory changes along the tracheostomy tract without fluid collection. Correlate for erythema.   Unchanged 0.8 cm nodule in the left upper lobe dating back to 08/19/2023. Recommend 1 year follow up chest CT to ensure at least 2 years of stability.   MACRO: None.   Signed by: Pamela Zurita 3/22/2025 7:17 PM Dictation workstation:   [id number]     CT chest wo IV contrast     Result Date: 3/22/2025  Interpreted By:  Pamela Zurita, STUDY: CT CHEST WO IV CONTRAST;  3/22/2025 3:41 pm   INDICATION: Signs/Symptoms:eval for alveolar hemorrhage.     COMPARISON: CT head/neck 08/19/2023   ACCESSION NUMBER(S): [id number]   ORDERING CLINICIAN: PAMELA HAHN   TECHNIQUE: Contiguous axial images of the chest and upper abdomen were obtained without contrast. Coronal and sagittal reformatted images were reconstructed from the axial data.   FINDINGS:   MEDIASTINUM AND LYMPH NODES: There is fat stranding/edema along the tract of the tracheostomy about discrete fluid collection. The esophageal wall appears within normal limits. There are numerous enlarged bilateral  supraclavicular lymph nodes measuring up to 1.4 cm on the left. There are numerous enlarged bilateral axillary lymph nodes measuring up to 1.1 cm on the right and 1.2 cm on the left. There are numerous prominent to mildly enlarged mediastinal lymph nodes as well. No pneumomediastinum.   VESSELS:  Normal caliber thoracic aorta. Moderate aortic atherosclerosis. The main pulmonary artery is normal in size.   HEART: Normal size.  Mild coronary artery calcifications. No significant pericardial effusion.   LUNG, AIRWAYS, PLEURA: Tracheostomy noted in appropriate position. There is trace mucus in the left mainstem bronchus and at the origin of the left upper lobe and lower lobe bronchi. There are mild peribronchovascular ground-glass opacities in the posterior segment of the right upper lobe, basal segments of the right lower lobe. There is mild dependent bibasilar atelectasis. There is no pleural effusion.   OSSEOUS STRUCTURES: No acute osseous abnormality.   CHEST WALL SOFT TISSUES: No discernible acute abnormality.   UPPER ABDOMEN/OTHER: No acute abnormality.        1. Mild peribronchovascular ground-glass opacities in the posterior right upper lobe and basal segments of the right lower lobe. This appearance and distribution is not typical for alveolar hemorrhage which tends to be centrilobular and bilateral asymmetric in distribution. Findings are most suggestive of pneumonia.   2. Small amount of mucous debris in the left mainstem bronchus and at the origin of the left upper and lower lobe bronchi.   3. Diffuse lymphadenopathy in the bilateral axillary regions, mediastinum and left supraclavicular region particularly notable in the bilateral supraclavicular region. This is progressed in the left supraclavicular region and new elsewhere when compared to 08/19/2023 CTA head/neck. Correlate for any history of known head/neck malignancy or lymphoma.   4. Inflammation/fat stranding adjacent to the tracheostomy tube entry  site noted. Correlate for air the met. No evidence of fluid collection.   MACRO: None.   Signed by: Marco A Zurita 3/22/2025 7:10 PM Dictation workstation:   [id number]     XR chest 1 view     Result Date: 3/22/2025  Interpreted By:  Fermin Stroud, STUDY: XR CHEST 1 VIEW   INDICATION: Signs/Symptoms:hemoptysis.   COMPARISON: August 19, 2023   ACCESSION NUMBER(S): [id number]   ORDERING CLINICIAN: MARCO A HAHN   FINDINGS: No consolidation, effusion, edema, or pneumothorax. Heart size within normal limits.        No evidence of acute intrathoracic abnormality.   Signed by: Fermin Stroud 3/22/2025 2:01 PM Dictation workstation:   [id number]     XR chest 1 view     Result Date: 3/14/2025  * * *Final Report* * * DATE OF EXAM: Mar 14 2025  8:52AM   S5X   5290  -  XR CHEST 1V FRONTAL   / ACCESSION #  [id number] PROCEDURE REASON: new bleeding from trach      * * * * Physician Interpretation * * * *  EXAMINATION:  CHEST RADIOGRAPH (PORTABLE SINGLE VIEW AP) Exam Date/Time:  3/14/2025 8:52 AM Clinical History:  new bleeding from trach Comparison:  03/03/2025 RESULT: LINES, TUBES, AND DEVICES:  Tracheostomy. CARDIOMEDIASTINAL SILHOUETTE:  The cardiac and mediastinal silhouettes appear unremarkable. LUNGS AND PLEURA: No consolidation.  No pleural effusion. No pulmonary vascular congestion. No pneumothorax identified. OTHER:  N/A     IMPRESSION: No acute abnormality identified. : [facility]   Transcribe Date/Time: Mar 14 2025  9:07A Dictated by : MARCO A EVANS MD This examination was interpreted and the report reviewed and electronically signed by: MARCO A EVANS MD on Mar 14 2025  9:11AM  EST     US abdomen complete     Result Date: 3/11/2025  ABDOMEN ULTRASOUND-COMPLETE FINDINGS: Abdomen Ultrasound Complete: PROCEDURE: Multiple grayscale mages of the abdomen were obtained. FINDINGS:Multiple real time images demonstrate the liver with increased echogenicity consistent with fatty infiltration. There is no evidence  of a discrete mass within the liver. The liver is enlarged, measured at 15.7 cm in size. The gallbladder is seen with no evidence of cholelithiasis. The gallbladder wall is within normal limits. The common bile duct is within normal limits. The visualized pancreas appears unremarkable. Both kidneys are seen with no evidence of hydronephrosis or a calculus. The spleen has its normal homogeneous echo appearance. There is no free fluid to suggest ascites. The visualized aorta appears unremarkable. The inferior vena cava appears patent. CONCLUSION: Enlarged fatty liver. ELECTRONICALLY SIGNED BY LEONEL LAN M.D. 3/11/2025 5:00:52 PM EDT.     XR chest 1 view     Result Date: 3/3/2025  * * *Final Report* * * DATE OF EXAM: Mar  3 2025  1:40PM   S5X   5290  -  XR CHEST 1V FRONTAL   / ACCESSION #  [id number] PROCEDURE REASON: resp failure      * * * * Physician Interpretation * * * *  EXAMINATION:  CHEST RADIOGRAPH (PORTABLE SINGLE VIEW AP) Exam Date/Time:  3/3/2025 1:40 PM Indication:   resp failure M:  XCP_4 Comparison:  1 day prior RESULT: Lines, tubes, and devices:  Tracheostomy tube remains in situ. Lungs and pleura:  Mild patchy opacities right lower lung zone seen on the prior study are no longer appreciated.  No confluent opacity.   Costophrenic angles are clear.  No pneumothorax. Cardiomediastinal silhouette:  Stable cardiomediastinal silhouette. Other:  -     IMPRESSION: Improved aeration right lower lung zone.  No acute cardiopulmonary finding. : [facility]   Transcribe Date/Time: Mar  3 2025  2:49P Dictated by : ISRAEL BENSON MD This examination was interpreted and the report reviewed and electronically signed by: ISRAEL BENSON MD on Mar  3 2025  2:49PM  EST     XR chest 1 view     Result Date: 3/2/2025  * * *Final Report* * * DATE OF EXAM: Mar  2 2025 11:25AM   MMX   5376  -  XR CHEST 1V FRONTAL PORT  / ACCESSION #  [id number] PROCEDURE REASON: Shortness of breath      * * * * Physician  Interpretation * * * *  EXAMINATION:  CHEST RADIOGRAPH (PORTABLE SINGLE VIEW AP) Exam Date/Time:  3/2/2025 11:25 AM CLINICAL HISTORY: Shortness of breath MQ:  XCPR_5 Comparison:  02/20/2025. RESULT: This is a limited examination due to multiple wires and tubing obscuring the lower lungs. Lines, tubes, and devices:  A tracheostomy tube remains in place. Lungs and pleura:  There are increased bronchovascular markings in the right lower lung which may be due to bronchitis or early changes of aspiration pneumonia.  Clinical correlation and follow-up exams are recommended. Cardiomediastinal silhouette:  Stable cardiomediastinal silhouette. Other:  The visualized bony thorax appears unremarkable.     IMPRESSION: Nonspecific parenchymal changes in the right lower lung as described above. A follow-up exam is recommended. : [facility]   Transcribe Date/Time: Mar  2 2025 11:53A Dictated by : BJ INIUGEZ MD This examination was interpreted and the report reviewed and electronically signed by: BJ INIGUEZ MD on Mar  2 2025 11:54AM  EST     XR chest 1 view     Result Date: 2/28/2025  * * *Final Report* * * DATE OF EXAM: Feb 28 2025 10:36AM   MMX   5290  -  XR CHEST 1V FRONTAL   / ACCESSION #  [id number] PROCEDURE REASON: Shortness of breath      * * * * Physician Interpretation * * * *  EXAMINATION:  CHEST RADIOGRAPH (SINGLE VIEW AP OR PA) CLINICAL HISTORY: Shortness of breath MQ:  XC1_5 Comparison:  02/22/2025 RESULT: Lines, tubes, and devices:  Tracheostomy tube is noted. Lungs and pleura:  No consolidation. No lung mass. No pleural effusion. Cardiomediastinal silhouette:  Normal cardiomediastinal silhouette. Other:  No acute osseous pathology.     IMPRESSION: No acute radiographic abnormality. : [facility]   Transcribe Date/Time: Feb 28 2025 12:07P Dictated by : DIANNE RAMOS MD This examination was interpreted and the report reviewed and electronically signed by: DIANNE RAMOS MD on Feb 28 2025 12:08PM   EST     XR abdomen 2 views supine and erect or decub     Result Date: 2/24/2025  * * *Final Report* * * DATE OF EXAM: Feb 24 2025  6:03PM   MMX   5289  -  XR ABDOMEN 1V SUPINE  / ACCESSION #  [id number] PROCEDURE REASON: Evaluate tube, line or lead position      * * * * Physician Interpretation * * * *  EXAMINATION:  XR ABDOMEN 1V SUPINE CLINICAL HISTORY:   Evaluate tube, line or lead position Technique:   XR ABDOMEN 1V SUPINE -- NOT APPLICABLE with 1 views on 1 images Comparison: 2/21/2025 RESULT: Feeding tube with distal tip at the pylorus antrum. Moderate colonic stool burden. No dilated bowel. Lung structures are intact.     IMPRESSION: Feeding tube with distal tip at the pylorus antrum. Moderate colonic stool burden. No dilated bowel. : [facility]   Transcribe Date/Time: Feb 24 2025  6:23P Dictated by : PABLO RAMOS MD This examination was interpreted and the report reviewed and electronically signed by: PABLO RAMOS MD on Feb 24 2025  6:24PM  EST     CT head wo IV contrast     Result Date: 2/23/2025  * * *Final Report* * * DATE OF EXAM: Feb 23 2025 12:04PM   [facility]   0504  -  CT BRAIN WO IVCON  / ACCESSION #  [id number] PROCEDURE REASON: Stroke, follow up      * * * * Physician Interpretation * * * *  EXAMINATION: CT BRAIN WO IVCON CLINICAL HISTORY: Stroke, follow up TECHNIQUE:  Serial axial images without IV contrast were obtained from the vertex to the foramen magnum. MQ:  CTBWO_3 CT Radiation dose: Integrated Dose-Length Product (DLP) for this visit =   778  mGy*cm CT Dose Reduction Employed: Iterative recon COMPARISON: MRI brain 02/19/2025 RESULT: Post-operative change: None. Acute change: Evolving acute infarct along the central eli and superior left medulla (2:7-9).  No evidence of hemorrhagic transformation. Hemorrhage: No evidence of acute intracranial hemorrhage. Mass Lesion / Mass Effect: There is no evidence of an intracranial mass or extraaxial fluid collection. No significant mass  effect. Chronic change: Stable remote left MCA territory encephalomalacia. Parenchyma: There is no significant volume loss. The brain parenchyma is otherwise within normal limits for age. Ventricles: The ventricles are within normal limits of size and configuration for age. Paranasal sinuses and skull base: Partial opacification of the left maxillary and sphenoid sinus and multiple bilateral ethmoid air cells.   The remaining visualized paranasal sinuses are grossly clear. The skull base and imaged soft tissues are unremarkable. Localizer images: No additional findings.     IMPRESSION: Evolving acute brainstem infarct.  No evidence of hemorrhagic transformation. Stable remote left MCA territory infarct. : [facility]   Transcribe Date/Time: Feb 23 2025 12:48P Dictated by : FRANKY GUZMAN MD   This examination was interpreted and the report reviewed and electronically signed by: FRANKY GUZMAN MD on Feb 23 2025 12:52PM  EST     XR chest 1 view     Result Date: 2/22/2025  * * *Final Report* * * DATE OF EXAM: Feb 22 2025  6:56AM   MMX   5290  -  XR CHEST 1V FRONTAL   / ACCESSION #  [id number] PROCEDURE REASON: Evaluate tube, line,  or lead position      * * * * Physician Interpretation * * * *  EXAMINATION:  CHEST RADIOGRAPH (SINGLE VIEW AP OR PA) CLINICAL HISTORY: Evaluate tube, line,  or lead position MQ:  XC1_5 Comparison:  02/21/2025. RESULT: Lines, tubes, and devices:  An ET tube and feeding tube remain in place.   A poorly defined right venous catheter cannot be excluded. Lungs and pleura:  There are persistent bilateral lower lungs infiltrates seen on the right more than left suggestive of pneumonia such as aspiration pneumonia.  There is no definite pleural effusion. Cardiomediastinal silhouette:  Stable cardiomediastinal silhouette. Other:  The visualized bony thorax appears unremarkable.     IMPRESSION: Persistent bilateral lower lungs infiltrates as described above. A follow-up exam is recommended.  : [facility]   Transcribe Date/Time: Feb 22 2025  8:20A Dictated by : BJ INIGUEZ MD This examination was interpreted and the report reviewed and electronically signed by: BJ INIGUEZ MD on Feb 22 2025  8:21AM  EST     XR chest 1 view     Result Date: 2/21/2025  * * *Final Report* * * DATE OF EXAM: Feb 21 2025  5:59PM   MMX   5376  -  XR CHEST 1V FRONTAL PORT  / ACCESSION #  [id number] PROCEDURE REASON: Evaluate tube, line,  or lead position      * * * * Physician Interpretation * * * *  EXAMINATION:  CHEST RADIOGRAPH (PORTABLE SINGLE VIEW AP) Exam Date/Time:  2/21/2025 5:59 PM CLINICAL HISTORY: Evaluate tube, line,  or lead position MQ:  XCPR_5 Comparison:  02/21/2025 RESULT: Lines, tubes, and devices: Endotracheal tube in place terminating in enteric tube in place terminating below the field of view. Lungs and pleura: No pneumothorax.  No pleural effusion.  Bilateral mild interstitial opacities.  The right costophrenic angle is not included in the field-of-view. Cardiomediastinal silhouette:  Stable cardiomediastinal silhouette. Other:  .     IMPRESSION: Mild interstitial opacities may be related to pulmonary vascular congestion. : [facility]   Transcribe Date/Time: Feb 21 2025  6:55P Dictated by : IRWIN VALDIVIA MD This examination was interpreted and the report reviewed and electronically signed by: IRWIN VALDIVIA MD on Feb 21 2025  6:57PM  EST     XR abdomen 2 views supine and erect or decub     Result Date: 2/21/2025  * * *Final Report* * * DATE OF EXAM: Feb 21 2025 10:32AM   MMX   5289  -  XR ABDOMEN 1V SUPINE  / ACCESSION #  [id number] PROCEDURE REASON: Evaluate tube, line or lead position      * * * * Physician Interpretation * * * *  Clinical Information: Feeding tube Single view of the abdomen was obtained. Feeding tube tip within the distal stomach. There is a nonspecific, nonobstructive bowel gas pattern. No abnormal abdominal masses are identified.  No  pathologic calcifications.   No acute bony abnormalities are seen.     IMPRESSION: Nonspecific, nonobstructive bowel gas pattern. Feeding tube tip within the distal stomach. : [facility]   Transcribe Date/Time: Feb 21 2025 10:43A Dictated by : PAMELA EVANS MD This examination was interpreted and the report reviewed and electronically signed by: PAMELA EVANS MD on Feb 21 2025 10:44AM  EST     XR chest 1 view     Result Date: 2/21/2025  * * *Final Report* * * DATE OF EXAM: Feb 21 2025 10:32AM   MMX   5376  -  XR CHEST 1V FRONTAL PORT  / ACCESSION #  [id number] PROCEDURE REASON: Shortness of breath      * * * * Physician Interpretation * * * *  EXAMINATION:  CHEST RADIOGRAPH (PORTABLE SINGLE VIEW AP) Exam Date/Time:  2/21/2025 10:32 AM Clinical History:  Shortness of breath Comparison:  02/17/2025 RESULT: LINES, TUBES, AND DEVICES:  Feeding tube tip beyond the inferior extent of the image. CARDIOMEDIASTINAL SILHOUETTE:  The cardiac and mediastinal silhouettes appear unremarkable. LUNGS AND PLEURA: No consolidation.  No pleural effusion. No pulmonary vascular congestion. No pneumothorax identified. OTHER:  N/A     IMPRESSION: No acute abnormality identified. [facility]   Transcribe Date/Time: Feb 21 2025 10:41A Dictated by : PAMELA EVANS MD This examination was interpreted and the report reviewed and electronically signed by: PAMELA EVANS MD on Feb 21 2025 10:43AM  EST            Assessment/Plan     Assessment & Plan  Hemoptysis     Tracheostomy in place (Multi)     CVA, old, hemiparesis (Multi)     Status post insertion of percutaneous endoscopic gastrostomy (PEG) tube (Multi)     Pneumonia due to infectious organism     Right sided weakness     Atrial flutter (Multi)     Insulin dependent type 2 diabetes mellitus (Multi)        Telemetry monitoring  Hemoglobin stable 8's recently on review of outside labs, monitor for ongoing bleeding.  Daily H&H  Monitor for overt bleeding  Consult  pulmonology, appreciate input  Pneumonia suspected for CT findings  Tracheostomy protocol  Expected cover with Zosyn and vancomycin  MRSA swab  Urine for Legionella antigen and strep pneumonia antigen  Supplemental oxygen as needed  Nebulizers as needed  RT evaluate and treat  Check procalcitonin  Typical 20% zinc oxide to wounds and groin  Continuous tube feeds, continue at 80 mL an hour x 22 hours  Accu-Chek every 6 hours  Hypoglycemia protocol  PT/OT  Anticoagulation and antiplatelet therapy on hold, resume once cleared by pulmonology  Continue patient's home medications for chronic issues as appropriate     Patient fully evaluated on March 23.  Continue to monitor H&H.  Pulmonary consultation obtained.  Continue broad-spectrum antibiotics to rule out infectious process.  Recheck labs in AM.                 Zeus Bone MD                     Melena           Patient fully evaluated  3/25  for    Problem List Items Addressed This Visit         * (Principal) Hemoptysis - Primary     Relevant Orders     Esophagogastroduodenoscopy (EGD) (Completed)     Surgical Pathology Exam     Melena     Relevant Orders     Esophagogastroduodenoscopy (EGD) (Completed)     Surgical Pathology Exam      Other Visit Diagnoses         Anemia, unspecified type         Relevant Orders     Esophagogastroduodenoscopy (EGD) (Completed)     Surgical Pathology Exam     Tracheostomy dependent (Multi)         Relevant Orders     Surgical Pathology Exam             Patient seen resting in bed with head of bed elevated, no s/s or c/o acute difficulties at this time.  Vital signs for last 24 hours Temp:  [35.5 °C (95.9 °F)-37 °C (98.6 °F)] 36.8 °C (98.2 °F)  Heart Rate:  [63-74] 73  Resp:  [17-18] 18  BP: (118-151)/(57-78) 118/57  FiO2 (%):  [28 %-30 %] 28 %    No intake/output data recorded.  Patient still requiring frequent cardiac and SPO2 monitoring. Continue aggressive pulmonary hygiene and oral hygiene. Off loading as tolerated for skin  integrity. Medications and labs reviewed-         Results for orders placed or performed during the hospital encounter of 03/22/25 (from the past 24 hours)   POCT GLUCOSE   Result Value Ref Range     POCT Glucose 342 (H) 74 - 99 mg/dL   POCT GLUCOSE   Result Value Ref Range     POCT Glucose 340 (H) 74 - 99 mg/dL   POCT GLUCOSE   Result Value Ref Range     POCT Glucose 354 (H) 74 - 99 mg/dL   Comprehensive Metabolic Panel   Result Value Ref Range     Glucose 366 (H) 74 - 99 mg/dL     Sodium 147 (H) 136 - 145 mmol/L     Potassium 4.2 3.5 - 5.3 mmol/L     Chloride 110 (H) 98 - 107 mmol/L     Bicarbonate 30 21 - 32 mmol/L     Anion Gap 11 10 - 20 mmol/L     Urea Nitrogen 26 (H) 6 - 23 mg/dL     Creatinine 0.94 0.50 - 1.30 mg/dL     eGFR 90 >60 mL/min/1.73m*2     Calcium 8.8 8.6 - 10.3 mg/dL     Albumin 3.0 (L) 3.4 - 5.0 g/dL     Alkaline Phosphatase 276 (H) 33 - 136 U/L     Total Protein 6.1 (L) 6.4 - 8.2 g/dL     AST 30 9 - 39 U/L     Bilirubin, Total 0.4 0.0 - 1.2 mg/dL      (H) 10 - 52 U/L   CBC and Auto Differential   Result Value Ref Range     WBC 11.5 (H) 4.4 - 11.3 x10*3/uL     nRBC 7.4 (H) 0.0 - 0.0 /100 WBCs     RBC 2.77 (L) 4.50 - 5.90 x10*6/uL     Hemoglobin 7.6 (L) 13.5 - 17.5 g/dL     Hematocrit 27.3 (L) 41.0 - 52.0 %     MCV 99 80 - 100 fL     MCH 27.4 26.0 - 34.0 pg     MCHC 27.8 (L) 32.0 - 36.0 g/dL     RDW 19.1 (H) 11.5 - 14.5 %     Platelets 293 150 - 450 x10*3/uL     Immature Granulocytes %, Automated 8.3 (H) 0.0 - 0.9 %     Immature Granulocytes Absolute, Automated 0.97 (H) 0.00 - 0.70 x10*3/uL   Manual Differential   Result Value Ref Range     Neutrophils %, Manual 79.0 40.0 - 80.0 %     Lymphocytes %, Manual 10.0 13.0 - 44.0 %     Monocytes %, Manual 3.0 2.0 - 10.0 %     Eosinophils %, Manual 2.0 0.0 - 6.0 %     Basophils %, Manual 1.0 0.0 - 2.0 %     Metamyelocytes %, Manual 5.0 0.0 - 0.0 %     Seg Neutrophils Absolute, Manual 9.09 (H) 1.20 - 7.00 x10*3/uL     Lymphocytes Absolute, Manual  1.15 (L) 1.20 - 4.80 x10*3/uL     Monocytes Absolute, Manual 0.35 0.10 - 1.00 x10*3/uL     Eosinophils Absolute, Manual 0.23 0.00 - 0.70 x10*3/uL     Basophils Absolute, Manual 0.12 (H) 0.00 - 0.10 x10*3/uL     Metamyelocytes Absolute, Manual 0.58 0.00 - 0.00 x10*3/uL     Total Cells Counted 100       Manual nRBC per 100 Cells 2.0 (H) 0.0 - 0.0 %     RBC Morphology See Below       Polychromasia Mild       Basophilic Stippling Present     Morphology   Result Value Ref Range     RBC Morphology See Below       Polychromasia Mild       Basophilic Stippling Present     POCT GLUCOSE   Result Value Ref Range     POCT Glucose 331 (H) 74 - 99 mg/dL      Patient recently received an antibiotic (last 12 hours)         Date/Time Action Medication Dose     03/25/25 1540 New Bag    piperacillin-tazobactam (Zosyn) 3.375 g in dextrose (iso) IV 50 mL 3.375 g     03/25/25 0949 New Bag    piperacillin-tazobactam (Zosyn) 3.375 g in dextrose (iso) IV 50 mL 3.375 g             Pt fully evaluated 3/25. Hematocrit 26.2 and hemoglobin 7.3. WBC at 14.8. Added cardio and increased fluids and free water flushes to q4. Plan discussed with interdisciplinary team, continue current and repeat labs in the AM.      Discharge planning discussed with patient and care team. Therapy evaluations ordered. [facility]-  , anticipate HHC/SNF at discharge. Patient aware and agreeable to current plan, continue plan as above.      I spent a total of 50 minutes on the date of the service which included preparing to see the patient, face-to-face patient care, completing clinical documentation, obtaining and/or reviewing separately obtained history, performing a medically appropriate examination, counseling and educating the patient/family/caregiver, ordering medications, tests, or procedures, communicating with other HCPs (not separately reported), independently interpreting results (not separately reported), communicating results to the patient/family/caregiver, and  care coordination (not separately reported).   Patient fully evaluated  03/26  for    Problem List Items Addressed This Visit         * (Principal) Hemoptysis - Primary     Relevant Orders     Esophagogastroduodenoscopy (EGD) (Completed)     Surgical Pathology Exam     Melena     Relevant Orders     Esophagogastroduodenoscopy (EGD) (Completed)     Surgical Pathology Exam      Other Visit Diagnoses         Anemia, unspecified type         Relevant Orders     Esophagogastroduodenoscopy (EGD) (Completed)     Surgical Pathology Exam     Tracheostomy dependent (Multi)         Relevant Orders     Surgical Pathology Exam             Patient seen resting in bed with head of bed elevated, no s/s or c/o acute difficulties at this time.  Vital signs for last 24 hours Temp:  [35.5 °C (95.9 °F)-37 °C (98.6 °F)] 36.8 °C (98.2 °F)  Heart Rate:  [63-74] 73  Resp:  [17-18] 18  BP: (118-151)/(57-78) 118/57  FiO2 (%):  [28 %-30 %] 28 %    No intake/output data recorded.  Patient still requiring frequent cardiac and SPO2 monitoring. Continue aggressive pulmonary hygiene and oral hygiene. Off loading as tolerated for skin integrity. Medications and labs reviewed-         Results for orders placed or performed during the hospital encounter of 03/22/25 (from the past 24 hours)   POCT GLUCOSE   Result Value Ref Range     POCT Glucose 342 (H) 74 - 99 mg/dL   POCT GLUCOSE   Result Value Ref Range     POCT Glucose 340 (H) 74 - 99 mg/dL   POCT GLUCOSE   Result Value Ref Range     POCT Glucose 354 (H) 74 - 99 mg/dL   Comprehensive Metabolic Panel   Result Value Ref Range     Glucose 366 (H) 74 - 99 mg/dL     Sodium 147 (H) 136 - 145 mmol/L     Potassium 4.2 3.5 - 5.3 mmol/L     Chloride 110 (H) 98 - 107 mmol/L     Bicarbonate 30 21 - 32 mmol/L     Anion Gap 11 10 - 20 mmol/L     Urea Nitrogen 26 (H) 6 - 23 mg/dL     Creatinine 0.94 0.50 - 1.30 mg/dL     eGFR 90 >60 mL/min/1.73m*2     Calcium 8.8 8.6 - 10.3 mg/dL     Albumin 3.0 (L) 3.4 - 5.0  g/dL     Alkaline Phosphatase 276 (H) 33 - 136 U/L     Total Protein 6.1 (L) 6.4 - 8.2 g/dL     AST 30 9 - 39 U/L     Bilirubin, Total 0.4 0.0 - 1.2 mg/dL      (H) 10 - 52 U/L   CBC and Auto Differential   Result Value Ref Range     WBC 11.5 (H) 4.4 - 11.3 x10*3/uL     nRBC 7.4 (H) 0.0 - 0.0 /100 WBCs     RBC 2.77 (L) 4.50 - 5.90 x10*6/uL     Hemoglobin 7.6 (L) 13.5 - 17.5 g/dL     Hematocrit 27.3 (L) 41.0 - 52.0 %     MCV 99 80 - 100 fL     MCH 27.4 26.0 - 34.0 pg     MCHC 27.8 (L) 32.0 - 36.0 g/dL     RDW 19.1 (H) 11.5 - 14.5 %     Platelets 293 150 - 450 x10*3/uL     Immature Granulocytes %, Automated 8.3 (H) 0.0 - 0.9 %     Immature Granulocytes Absolute, Automated 0.97 (H) 0.00 - 0.70 x10*3/uL   Manual Differential   Result Value Ref Range     Neutrophils %, Manual 79.0 40.0 - 80.0 %     Lymphocytes %, Manual 10.0 13.0 - 44.0 %     Monocytes %, Manual 3.0 2.0 - 10.0 %     Eosinophils %, Manual 2.0 0.0 - 6.0 %     Basophils %, Manual 1.0 0.0 - 2.0 %     Metamyelocytes %, Manual 5.0 0.0 - 0.0 %     Seg Neutrophils Absolute, Manual 9.09 (H) 1.20 - 7.00 x10*3/uL     Lymphocytes Absolute, Manual 1.15 (L) 1.20 - 4.80 x10*3/uL     Monocytes Absolute, Manual 0.35 0.10 - 1.00 x10*3/uL     Eosinophils Absolute, Manual 0.23 0.00 - 0.70 x10*3/uL     Basophils Absolute, Manual 0.12 (H) 0.00 - 0.10 x10*3/uL     Metamyelocytes Absolute, Manual 0.58 0.00 - 0.00 x10*3/uL     Total Cells Counted 100       Manual nRBC per 100 Cells 2.0 (H) 0.0 - 0.0 %     RBC Morphology See Below       Polychromasia Mild       Basophilic Stippling Present     Morphology   Result Value Ref Range     RBC Morphology See Below       Polychromasia Mild       Basophilic Stippling Present     POCT GLUCOSE   Result Value Ref Range     POCT Glucose 331 (H) 74 - 99 mg/dL      Patient recently received an antibiotic (last 12 hours)         Date/Time Action Medication Dose     03/26/25 0903 New Bag    piperacillin-tazobactam (Zosyn) 3.375 g in  dextrose (iso) IV 50 mL 3.375 g             Plan discussed with interdisciplinary team, patient with hemoccult positive, protonix 40mg IVP BID, GI consulted, appreciate input. Patient seen by nephrology today with plan for IV iron and erythropoietin, potential nephrotoxins and hepatotoxins, monitor renal chemistries and CBC continue current and repeat labs in the AM.      Discharge planning discussed with patient and care team. Therapy evaluations ordered. Anticipate HHC/SNF at discharge. Patient aware and agreeable to current plan, continue plan as above.      I spent a total of 50 minutes on the date of the service which included preparing to see the patient, face-to-face patient care, completing clinical documentation, obtaining and/or reviewing separately obtained history, performing a medically appropriate examination, counseling and educating the patient/family/caregiver, ordering medications, tests, or procedures, communicating with other HCPs (not separately reported), independently interpreting results (not separately reported), communicating results to the patient/family/caregiver, and care coordination (not separately reported).   ALEXANDER Vivas     Patient fully evaluated 3/27   for    Problem List Items Addressed This Visit         * (Principal) Hemoptysis - Primary     Relevant Orders     Esophagogastroduodenoscopy (EGD) (Completed)     Surgical Pathology Exam     Melena     Relevant Orders     Esophagogastroduodenoscopy (EGD) (Completed)     Surgical Pathology Exam      Other Visit Diagnoses         Anemia, unspecified type         Relevant Orders     Esophagogastroduodenoscopy (EGD) (Completed)     Surgical Pathology Exam     Tracheostomy dependent (Multi)         Relevant Orders     Surgical Pathology Exam             Patient seen resting in bed with head of bed elevated, no s/s or c/o acute difficulties at this time.  Vital signs for last 24 hours Temp:  [35.5 °C (95.9 °F)-37 °C (98.6 °F)] 36.8  °C (98.2 °F)  Heart Rate:  [63-74] 73  Resp:  [17-18] 18  BP: (118-151)/(57-78) 118/57  FiO2 (%):  [28 %-30 %] 28 %    No intake/output data recorded.  Patient still requiring frequent cardiac and SPO2 monitoring. Continue aggressive pulmonary hygiene and oral hygiene. Off loading as tolerated for skin integrity. Medications and labs reviewed-         Results for orders placed or performed during the hospital encounter of 03/22/25 (from the past 24 hours)   POCT GLUCOSE   Result Value Ref Range     POCT Glucose 342 (H) 74 - 99 mg/dL   POCT GLUCOSE   Result Value Ref Range     POCT Glucose 340 (H) 74 - 99 mg/dL   POCT GLUCOSE   Result Value Ref Range     POCT Glucose 354 (H) 74 - 99 mg/dL   Comprehensive Metabolic Panel   Result Value Ref Range     Glucose 366 (H) 74 - 99 mg/dL     Sodium 147 (H) 136 - 145 mmol/L     Potassium 4.2 3.5 - 5.3 mmol/L     Chloride 110 (H) 98 - 107 mmol/L     Bicarbonate 30 21 - 32 mmol/L     Anion Gap 11 10 - 20 mmol/L     Urea Nitrogen 26 (H) 6 - 23 mg/dL     Creatinine 0.94 0.50 - 1.30 mg/dL     eGFR 90 >60 mL/min/1.73m*2     Calcium 8.8 8.6 - 10.3 mg/dL     Albumin 3.0 (L) 3.4 - 5.0 g/dL     Alkaline Phosphatase 276 (H) 33 - 136 U/L     Total Protein 6.1 (L) 6.4 - 8.2 g/dL     AST 30 9 - 39 U/L     Bilirubin, Total 0.4 0.0 - 1.2 mg/dL      (H) 10 - 52 U/L   CBC and Auto Differential   Result Value Ref Range     WBC 11.5 (H) 4.4 - 11.3 x10*3/uL     nRBC 7.4 (H) 0.0 - 0.0 /100 WBCs     RBC 2.77 (L) 4.50 - 5.90 x10*6/uL     Hemoglobin 7.6 (L) 13.5 - 17.5 g/dL     Hematocrit 27.3 (L) 41.0 - 52.0 %     MCV 99 80 - 100 fL     MCH 27.4 26.0 - 34.0 pg     MCHC 27.8 (L) 32.0 - 36.0 g/dL     RDW 19.1 (H) 11.5 - 14.5 %     Platelets 293 150 - 450 x10*3/uL     Immature Granulocytes %, Automated 8.3 (H) 0.0 - 0.9 %     Immature Granulocytes Absolute, Automated 0.97 (H) 0.00 - 0.70 x10*3/uL   Manual Differential   Result Value Ref Range     Neutrophils %, Manual 79.0 40.0 - 80.0 %      Lymphocytes %, Manual 10.0 13.0 - 44.0 %     Monocytes %, Manual 3.0 2.0 - 10.0 %     Eosinophils %, Manual 2.0 0.0 - 6.0 %     Basophils %, Manual 1.0 0.0 - 2.0 %     Metamyelocytes %, Manual 5.0 0.0 - 0.0 %     Seg Neutrophils Absolute, Manual 9.09 (H) 1.20 - 7.00 x10*3/uL     Lymphocytes Absolute, Manual 1.15 (L) 1.20 - 4.80 x10*3/uL     Monocytes Absolute, Manual 0.35 0.10 - 1.00 x10*3/uL     Eosinophils Absolute, Manual 0.23 0.00 - 0.70 x10*3/uL     Basophils Absolute, Manual 0.12 (H) 0.00 - 0.10 x10*3/uL     Metamyelocytes Absolute, Manual 0.58 0.00 - 0.00 x10*3/uL     Total Cells Counted 100       Manual nRBC per 100 Cells 2.0 (H) 0.0 - 0.0 %     RBC Morphology See Below       Polychromasia Mild       Basophilic Stippling Present     Morphology   Result Value Ref Range     RBC Morphology See Below       Polychromasia Mild       Basophilic Stippling Present     POCT GLUCOSE   Result Value Ref Range     POCT Glucose 331 (H) 74 - 99 mg/dL      Patient recently received an antibiotic (last 12 hours)         Date/Time Action Medication Dose     03/27/25 1517 New Bag    piperacillin-tazobactam (Zosyn) 3.375 g in dextrose (iso) IV 50 mL 3.375 g             Patient fully evaluated 3/27. Patient in bed and family in room upon exam. Vitals today include Temp:  [35.1 °C (95.2 °F)-36.6 °C (97.9 °F)] 36.3 °C (97.3 °F), Heart Rate:  [51-67] 51, Resp:  [16-23] 18, BP: ()/(50-65) 96/53, FiO2 (%):  [30 %-50 %] 30 %. Pertinent labs today include WBC 11.9, Hbg 6.8, plts 212. Na 147, K 3.4, Cl 107, HCO3- 34, BUN 36, Cr 1.19. glucose 318. Patient declined blood transfusion, starting Procrit to try to increase counts. Patient underwent EGD today, findings include small hiatal hernia and signs of gastric and duodenal irritation. Continue to monitor.      Plan discussed with interdisciplinary team, continue current and repeat labs in the AM. Discharge planning discussed with patient and care team. Patient aware and agreeable  to current plan, continue plan as above.      I spent a total of 50 minutes on the date of the service which included preparing to see the patient, face-to-face patient care, completing clinical documentation, obtaining and/or reviewing separately obtained history, performing a medically appropriate examination, counseling and educating the patient/family/caregiver, ordering medications, tests, or procedures, communicating with other HCPs (not separately reported), independently interpreting results (not separately reported), communicating results to the patient/family/caregiver, and care coordination (not separately reported).      ALEXANDER Feliciano     Patient fully evaluated  03/28  for    Problem List Items Addressed This Visit         * (Principal) Hemoptysis - Primary     Relevant Orders     Esophagogastroduodenoscopy (EGD) (Completed)     Surgical Pathology Exam     Melena     Relevant Orders     Esophagogastroduodenoscopy (EGD) (Completed)     Surgical Pathology Exam      Other Visit Diagnoses         Anemia, unspecified type         Relevant Orders     Esophagogastroduodenoscopy (EGD) (Completed)     Surgical Pathology Exam     Tracheostomy dependent (Multi)         Relevant Orders     Surgical Pathology Exam             Patient seen resting in bed with head of bed elevated, no s/s or c/o acute difficulties at this time.  Vital signs for last 24 hours Temp:  [35.5 °C (95.9 °F)-37 °C (98.6 °F)] 36.8 °C (98.2 °F)  Heart Rate:  [63-74] 73  Resp:  [17-18] 18  BP: (118-151)/(57-78) 118/57  FiO2 (%):  [28 %-30 %] 28 %    No intake/output data recorded.  Patient still requiring frequent cardiac and SPO2 monitoring. Continue aggressive pulmonary hygiene and oral hygiene. Off loading as tolerated for skin integrity. Medications and labs reviewed-         Results for orders placed or performed during the hospital encounter of 03/22/25 (from the past 24 hours)   POCT GLUCOSE   Result Value Ref Range     POCT Glucose  342 (H) 74 - 99 mg/dL   POCT GLUCOSE   Result Value Ref Range     POCT Glucose 340 (H) 74 - 99 mg/dL   POCT GLUCOSE   Result Value Ref Range     POCT Glucose 354 (H) 74 - 99 mg/dL   Comprehensive Metabolic Panel   Result Value Ref Range     Glucose 366 (H) 74 - 99 mg/dL     Sodium 147 (H) 136 - 145 mmol/L     Potassium 4.2 3.5 - 5.3 mmol/L     Chloride 110 (H) 98 - 107 mmol/L     Bicarbonate 30 21 - 32 mmol/L     Anion Gap 11 10 - 20 mmol/L     Urea Nitrogen 26 (H) 6 - 23 mg/dL     Creatinine 0.94 0.50 - 1.30 mg/dL     eGFR 90 >60 mL/min/1.73m*2     Calcium 8.8 8.6 - 10.3 mg/dL     Albumin 3.0 (L) 3.4 - 5.0 g/dL     Alkaline Phosphatase 276 (H) 33 - 136 U/L     Total Protein 6.1 (L) 6.4 - 8.2 g/dL     AST 30 9 - 39 U/L     Bilirubin, Total 0.4 0.0 - 1.2 mg/dL      (H) 10 - 52 U/L   CBC and Auto Differential   Result Value Ref Range     WBC 11.5 (H) 4.4 - 11.3 x10*3/uL     nRBC 7.4 (H) 0.0 - 0.0 /100 WBCs     RBC 2.77 (L) 4.50 - 5.90 x10*6/uL     Hemoglobin 7.6 (L) 13.5 - 17.5 g/dL     Hematocrit 27.3 (L) 41.0 - 52.0 %     MCV 99 80 - 100 fL     MCH 27.4 26.0 - 34.0 pg     MCHC 27.8 (L) 32.0 - 36.0 g/dL     RDW 19.1 (H) 11.5 - 14.5 %     Platelets 293 150 - 450 x10*3/uL     Immature Granulocytes %, Automated 8.3 (H) 0.0 - 0.9 %     Immature Granulocytes Absolute, Automated 0.97 (H) 0.00 - 0.70 x10*3/uL   Manual Differential   Result Value Ref Range     Neutrophils %, Manual 79.0 40.0 - 80.0 %     Lymphocytes %, Manual 10.0 13.0 - 44.0 %     Monocytes %, Manual 3.0 2.0 - 10.0 %     Eosinophils %, Manual 2.0 0.0 - 6.0 %     Basophils %, Manual 1.0 0.0 - 2.0 %     Metamyelocytes %, Manual 5.0 0.0 - 0.0 %     Seg Neutrophils Absolute, Manual 9.09 (H) 1.20 - 7.00 x10*3/uL     Lymphocytes Absolute, Manual 1.15 (L) 1.20 - 4.80 x10*3/uL     Monocytes Absolute, Manual 0.35 0.10 - 1.00 x10*3/uL     Eosinophils Absolute, Manual 0.23 0.00 - 0.70 x10*3/uL     Basophils Absolute, Manual 0.12 (H) 0.00 - 0.10 x10*3/uL      Metamyelocytes Absolute, Manual 0.58 0.00 - 0.00 x10*3/uL     Total Cells Counted 100       Manual nRBC per 100 Cells 2.0 (H) 0.0 - 0.0 %     RBC Morphology See Below       Polychromasia Mild       Basophilic Stippling Present     Morphology   Result Value Ref Range     RBC Morphology See Below       Polychromasia Mild       Basophilic Stippling Present     POCT GLUCOSE   Result Value Ref Range     POCT Glucose 331 (H) 74 - 99 mg/dL      Patient recently received an antibiotic (last 12 hours)         Date/Time Action Medication Dose     03/28/25 0915 New Bag    piperacillin-tazobactam (Zosyn) 3.375 g in dextrose (iso) IV 50 mL 3.375 g     03/28/25 0239 New Bag    piperacillin-tazobactam (Zosyn) 3.375 g in dextrose (iso) IV 50 mL 3.375 g             Plan discussed with interdisciplinary team, no blood transfusion as patient is [employer], seen by nephrology today and epogen increased to aid in H/H, renal chemistries down trending, liver function improving, no further hemoptysis noted. WBC elevated today @ 14.0 from 11.9,  chest xray ordered, will  continue IV antibiotics, monitor for bleeding, will continue current and repeat labs in the AM.      Discharge planning discussed with patient and care team. Therapy evaluations ordered. Anticipate HHC/SNF at discharge. Patient aware and agreeable to current plan, continue plan as above.      I spent a total of 50 minutes on the date of the service which included preparing to see the patient, face-to-face patient care, completing clinical documentation, obtaining and/or reviewing separately obtained history, performing a medically appropriate examination, counseling and educating the patient/family/caregiver, ordering medications, tests, or procedures, communicating with other HCPs (not separately reported), independently interpreting results (not separately reported), communicating results to the patient/family/caregiver, and care coordination (not separately  reported).      Patient fully evaluated  03/29  for    Problem List Items Addressed This Visit         * (Principal) Hemoptysis - Primary     Relevant Orders     Esophagogastroduodenoscopy (EGD) (Completed)     Surgical Pathology Exam     Melena     Relevant Orders     Esophagogastroduodenoscopy (EGD) (Completed)     Surgical Pathology Exam      Other Visit Diagnoses         Anemia, unspecified type         Relevant Orders     Esophagogastroduodenoscopy (EGD) (Completed)     Surgical Pathology Exam     Tracheostomy dependent (Multi)         Relevant Orders     Surgical Pathology Exam             Patient seen resting in bed with head of bed elevated, no s/s or c/o acute difficulties at this time.  Vital signs for last 24 hours Temp:  [35.5 °C (95.9 °F)-37 °C (98.6 °F)] 36.8 °C (98.2 °F)  Heart Rate:  [63-74] 73  Resp:  [17-18] 18  BP: (118-151)/(57-78) 118/57  FiO2 (%):  [28 %-30 %] 28 %    No intake/output data recorded.  Patient still requiring frequent cardiac and SPO2 monitoring. Continue aggressive pulmonary hygiene and oral hygiene. Off loading as tolerated for skin integrity. Medications and labs reviewed-         Results for orders placed or performed during the hospital encounter of 03/22/25 (from the past 24 hours)   POCT GLUCOSE   Result Value Ref Range     POCT Glucose 342 (H) 74 - 99 mg/dL   POCT GLUCOSE   Result Value Ref Range     POCT Glucose 340 (H) 74 - 99 mg/dL   POCT GLUCOSE   Result Value Ref Range     POCT Glucose 354 (H) 74 - 99 mg/dL   Comprehensive Metabolic Panel   Result Value Ref Range     Glucose 366 (H) 74 - 99 mg/dL     Sodium 147 (H) 136 - 145 mmol/L     Potassium 4.2 3.5 - 5.3 mmol/L     Chloride 110 (H) 98 - 107 mmol/L     Bicarbonate 30 21 - 32 mmol/L     Anion Gap 11 10 - 20 mmol/L     Urea Nitrogen 26 (H) 6 - 23 mg/dL     Creatinine 0.94 0.50 - 1.30 mg/dL     eGFR 90 >60 mL/min/1.73m*2     Calcium 8.8 8.6 - 10.3 mg/dL     Albumin 3.0 (L) 3.4 - 5.0 g/dL     Alkaline Phosphatase 276  (H) 33 - 136 U/L     Total Protein 6.1 (L) 6.4 - 8.2 g/dL     AST 30 9 - 39 U/L     Bilirubin, Total 0.4 0.0 - 1.2 mg/dL      (H) 10 - 52 U/L   CBC and Auto Differential   Result Value Ref Range     WBC 11.5 (H) 4.4 - 11.3 x10*3/uL     nRBC 7.4 (H) 0.0 - 0.0 /100 WBCs     RBC 2.77 (L) 4.50 - 5.90 x10*6/uL     Hemoglobin 7.6 (L) 13.5 - 17.5 g/dL     Hematocrit 27.3 (L) 41.0 - 52.0 %     MCV 99 80 - 100 fL     MCH 27.4 26.0 - 34.0 pg     MCHC 27.8 (L) 32.0 - 36.0 g/dL     RDW 19.1 (H) 11.5 - 14.5 %     Platelets 293 150 - 450 x10*3/uL     Immature Granulocytes %, Automated 8.3 (H) 0.0 - 0.9 %     Immature Granulocytes Absolute, Automated 0.97 (H) 0.00 - 0.70 x10*3/uL   Manual Differential   Result Value Ref Range     Neutrophils %, Manual 79.0 40.0 - 80.0 %     Lymphocytes %, Manual 10.0 13.0 - 44.0 %     Monocytes %, Manual 3.0 2.0 - 10.0 %     Eosinophils %, Manual 2.0 0.0 - 6.0 %     Basophils %, Manual 1.0 0.0 - 2.0 %     Metamyelocytes %, Manual 5.0 0.0 - 0.0 %     Seg Neutrophils Absolute, Manual 9.09 (H) 1.20 - 7.00 x10*3/uL     Lymphocytes Absolute, Manual 1.15 (L) 1.20 - 4.80 x10*3/uL     Monocytes Absolute, Manual 0.35 0.10 - 1.00 x10*3/uL     Eosinophils Absolute, Manual 0.23 0.00 - 0.70 x10*3/uL     Basophils Absolute, Manual 0.12 (H) 0.00 - 0.10 x10*3/uL     Metamyelocytes Absolute, Manual 0.58 0.00 - 0.00 x10*3/uL     Total Cells Counted 100       Manual nRBC per 100 Cells 2.0 (H) 0.0 - 0.0 %     RBC Morphology See Below       Polychromasia Mild       Basophilic Stippling Present     Morphology   Result Value Ref Range     RBC Morphology See Below       Polychromasia Mild       Basophilic Stippling Present     POCT GLUCOSE   Result Value Ref Range     POCT Glucose 331 (H) 74 - 99 mg/dL      Patient recently received an antibiotic (last 12 hours)         Date/Time Action Medication Dose     03/29/25 0911 New Bag    piperacillin-tazobactam (Zosyn) 3.375 g in dextrose (iso) IV 50 mL 3.375 g      03/29/25 0148 New Bag    piperacillin-tazobactam (Zosyn) 3.375 g in dextrose (iso) IV 50 mL 3.375 g             Plan discussed with interdisciplinary team, will deescalate medications, IV steroids to oral, will continue IV antibiotics, monitor overnight. Will  continue current and repeat labs in the AM.      Discharge planning discussed with patient and care team. Therapy evaluations ordered. Anticipate SNF at discharge. Patient aware and agreeable to current plan, continue plan as above.      I spent a total of 50 minutes on the date of the service which included preparing to see the patient, face-to-face patient care, completing clinical documentation, obtaining and/or reviewing separately obtained history, performing a medically appropriate examination, counseling and educating the patient/family/caregiver, ordering medications, tests, or procedures, communicating with other HCPs (not separately reported), independently interpreting results (not separately reported), communicating results to the patient/family/caregiver, and care coordination (not separately reported).      Patient fully evaluated  03/30  for    Problem List Items Addressed This Visit         * (Principal) Hemoptysis - Primary     Relevant Orders     Esophagogastroduodenoscopy (EGD) (Completed)     Surgical Pathology Exam     Melena     Relevant Orders     Esophagogastroduodenoscopy (EGD) (Completed)     Surgical Pathology Exam      Other Visit Diagnoses         Anemia, unspecified type         Relevant Orders     Esophagogastroduodenoscopy (EGD) (Completed)     Surgical Pathology Exam     Tracheostomy dependent (Multi)         Relevant Orders     Surgical Pathology Exam             Patient seen resting in bed with head of bed elevated, no s/s or c/o acute difficulties at this time.  Vital signs for last 24 hours Temp:  [35.5 °C (95.9 °F)-37 °C (98.6 °F)] 36.8 °C (98.2 °F)  Heart Rate:  [63-74] 73  Resp:  [17-18] 18  BP: (118-151)/(57-78)  118/57  FiO2 (%):  [28 %-30 %] 28 %    No intake/output data recorded.  Patient still requiring frequent cardiac and SPO2 monitoring. Continue aggressive pulmonary hygiene and oral hygiene. Off loading as tolerated for skin integrity. Medications and labs reviewed-         Results for orders placed or performed during the hospital encounter of 03/22/25 (from the past 24 hours)   POCT GLUCOSE   Result Value Ref Range     POCT Glucose 342 (H) 74 - 99 mg/dL   POCT GLUCOSE   Result Value Ref Range     POCT Glucose 340 (H) 74 - 99 mg/dL   POCT GLUCOSE   Result Value Ref Range     POCT Glucose 354 (H) 74 - 99 mg/dL   Comprehensive Metabolic Panel   Result Value Ref Range     Glucose 366 (H) 74 - 99 mg/dL     Sodium 147 (H) 136 - 145 mmol/L     Potassium 4.2 3.5 - 5.3 mmol/L     Chloride 110 (H) 98 - 107 mmol/L     Bicarbonate 30 21 - 32 mmol/L     Anion Gap 11 10 - 20 mmol/L     Urea Nitrogen 26 (H) 6 - 23 mg/dL     Creatinine 0.94 0.50 - 1.30 mg/dL     eGFR 90 >60 mL/min/1.73m*2     Calcium 8.8 8.6 - 10.3 mg/dL     Albumin 3.0 (L) 3.4 - 5.0 g/dL     Alkaline Phosphatase 276 (H) 33 - 136 U/L     Total Protein 6.1 (L) 6.4 - 8.2 g/dL     AST 30 9 - 39 U/L     Bilirubin, Total 0.4 0.0 - 1.2 mg/dL      (H) 10 - 52 U/L   CBC and Auto Differential   Result Value Ref Range     WBC 11.5 (H) 4.4 - 11.3 x10*3/uL     nRBC 7.4 (H) 0.0 - 0.0 /100 WBCs     RBC 2.77 (L) 4.50 - 5.90 x10*6/uL     Hemoglobin 7.6 (L) 13.5 - 17.5 g/dL     Hematocrit 27.3 (L) 41.0 - 52.0 %     MCV 99 80 - 100 fL     MCH 27.4 26.0 - 34.0 pg     MCHC 27.8 (L) 32.0 - 36.0 g/dL     RDW 19.1 (H) 11.5 - 14.5 %     Platelets 293 150 - 450 x10*3/uL     Immature Granulocytes %, Automated 8.3 (H) 0.0 - 0.9 %     Immature Granulocytes Absolute, Automated 0.97 (H) 0.00 - 0.70 x10*3/uL   Manual Differential   Result Value Ref Range     Neutrophils %, Manual 79.0 40.0 - 80.0 %     Lymphocytes %, Manual 10.0 13.0 - 44.0 %     Monocytes %, Manual 3.0 2.0 - 10.0 %      Eosinophils %, Manual 2.0 0.0 - 6.0 %     Basophils %, Manual 1.0 0.0 - 2.0 %     Metamyelocytes %, Manual 5.0 0.0 - 0.0 %     Seg Neutrophils Absolute, Manual 9.09 (H) 1.20 - 7.00 x10*3/uL     Lymphocytes Absolute, Manual 1.15 (L) 1.20 - 4.80 x10*3/uL     Monocytes Absolute, Manual 0.35 0.10 - 1.00 x10*3/uL     Eosinophils Absolute, Manual 0.23 0.00 - 0.70 x10*3/uL     Basophils Absolute, Manual 0.12 (H) 0.00 - 0.10 x10*3/uL     Metamyelocytes Absolute, Manual 0.58 0.00 - 0.00 x10*3/uL     Total Cells Counted 100       Manual nRBC per 100 Cells 2.0 (H) 0.0 - 0.0 %     RBC Morphology See Below       Polychromasia Mild       Basophilic Stippling Present     Morphology   Result Value Ref Range     RBC Morphology See Below       Polychromasia Mild       Basophilic Stippling Present     POCT GLUCOSE   Result Value Ref Range     POCT Glucose 331 (H) 74 - 99 mg/dL      Patient recently received an antibiotic (last 12 hours)         Date/Time Action Medication Dose     03/30/25 1057 New Bag    piperacillin-tazobactam (Zosyn) 3.375 g in dextrose (iso) IV 50 mL 3.375 g     03/30/25 0238 New Bag    piperacillin-tazobactam (Zosyn) 3.375 g in dextrose (iso) IV 50 mL 3.375 g             Plan discussed with interdisciplinary team, seen by nephrology today with plan for 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 Appreciate input and agree with plan. Awaiting therapy evaluations. Will continue current IV antibiotics, continue current plan, and repeat labs in the AM.      Discharge planning discussed with patient and care team. Therapy evaluations ordered. Anticipate [facility] at discharge. Patient aware and agreeable to current plan, continue plan as above.      I spent a total of 50 minutes on the date of the service which included preparing to see the patient, face-to-face  patient care, completing clinical documentation, obtaining and/or reviewing separately obtained history, performing a medically appropriate examination, counseling and educating the patient/family/caregiver, ordering medications, tests, or procedures, communicating with other HCPs (not separately reported), independently interpreting results (not separately reported), communicating results to the patient/family/caregiver, and care coordination (not separately reported).                 Revision History         Pertinent Physical Exam At Time of Discharge  Physical Exam  no acute events overnight, patient denies chest pain, worsening SOB at this time.  Outpatient Follow-Up  No future appointments.      Alondra Barron

## 2025-03-31 NOTE — CARE PLAN
The patient's goals for the shift include      The clinical goals for the shift include Patient will remain safe throughout the shift      Problem: Skin  Goal: Participates in plan/prevention/treatment measures  Flowsheets (Taken 3/30/2025 2306)  Participates in plan/prevention/treatment measures: Elevate heels

## 2025-03-31 NOTE — PROGRESS NOTES
TCC Note: Contacted PT/OT to ask when the therapy notes would be in and was informed by therapy that pt was evaluated on 3/27/25 and not seen today as far as I know. His tx plan is for only 2x/week. We can see him tomorrow if he is a priority for discharge. Informed Therapy that we need updated therapy notes in order to start precert so if they could please see asap tomorrow so we can move forward with that. Care Transitions to follow. Ann Aj, MSN, RN, TCC.

## 2025-04-01 ENCOUNTER — APPOINTMENT (OUTPATIENT)
Dept: RADIOLOGY | Facility: HOSPITAL | Age: 65
End: 2025-04-01
Payer: COMMERCIAL

## 2025-04-01 LAB
ALBUMIN SERPL BCP-MCNC: 3.1 G/DL (ref 3.4–5)
ALP SERPL-CCNC: 256 U/L (ref 33–136)
ALT SERPL W P-5'-P-CCNC: 98 U/L (ref 10–52)
ANION GAP SERPL CALC-SCNC: 10 MMOL/L (ref 10–20)
AST SERPL W P-5'-P-CCNC: 32 U/L (ref 9–39)
ATRIAL RATE: 63 BPM
BASO STIPL BLD QL SMEAR: PRESENT
BASOPHILS # BLD MANUAL: 0.12 X10*3/UL (ref 0–0.1)
BASOPHILS NFR BLD MANUAL: 1 %
BILIRUB SERPL-MCNC: 0.3 MG/DL (ref 0–1.2)
BUN SERPL-MCNC: 26 MG/DL (ref 6–23)
CALCIUM SERPL-MCNC: 8.8 MG/DL (ref 8.6–10.3)
CHLORIDE SERPL-SCNC: 109 MMOL/L (ref 98–107)
CO2 SERPL-SCNC: 29 MMOL/L (ref 21–32)
CREAT SERPL-MCNC: 0.79 MG/DL (ref 0.5–1.3)
EGFRCR SERPLBLD CKD-EPI 2021: >90 ML/MIN/1.73M*2
EOSINOPHIL # BLD MANUAL: 0 X10*3/UL (ref 0–0.7)
EOSINOPHIL NFR BLD MANUAL: 0 %
ERYTHROCYTE [DISTWIDTH] IN BLOOD BY AUTOMATED COUNT: 22.2 % (ref 11.5–14.5)
GLUCOSE BLD MANUAL STRIP-MCNC: 260 MG/DL (ref 74–99)
GLUCOSE BLD MANUAL STRIP-MCNC: 269 MG/DL (ref 74–99)
GLUCOSE BLD MANUAL STRIP-MCNC: 269 MG/DL (ref 74–99)
GLUCOSE BLD MANUAL STRIP-MCNC: 309 MG/DL (ref 74–99)
GLUCOSE SERPL-MCNC: 298 MG/DL (ref 74–99)
HCT VFR BLD AUTO: 29.4 % (ref 41–52)
HGB BLD-MCNC: 8.1 G/DL (ref 13.5–17.5)
IMM GRANULOCYTES # BLD AUTO: 0.89 X10*3/UL (ref 0–0.7)
IMM GRANULOCYTES NFR BLD AUTO: 6.3 % (ref 0–0.9)
LYMPHOCYTES # BLD MANUAL: 1.09 X10*3/UL (ref 1.2–4.8)
LYMPHOCYTES NFR BLD MANUAL: 9 %
MCH RBC QN AUTO: 27.7 PG (ref 26–34)
MCHC RBC AUTO-ENTMCNC: 27.6 G/DL (ref 32–36)
MCV RBC AUTO: 101 FL (ref 80–100)
METAMYELOCYTES # BLD MANUAL: 0.36 X10*3/UL
METAMYELOCYTES NFR BLD MANUAL: 3 %
MONOCYTES # BLD MANUAL: 0.12 X10*3/UL (ref 0.1–1)
MONOCYTES NFR BLD MANUAL: 1 %
NEUTROPHILS # BLD MANUAL: 10.4 X10*3/UL (ref 1.2–7.7)
NEUTS BAND # BLD MANUAL: 0.36 X10*3/UL (ref 0–0.7)
NEUTS BAND NFR BLD MANUAL: 3 %
NEUTS SEG # BLD MANUAL: 10.04 X10*3/UL (ref 1.2–7)
NEUTS SEG NFR BLD MANUAL: 83 %
NRBC BLD MANUAL-RTO: 17 % (ref 0–0)
NRBC BLD-RTO: 9.2 /100 WBCS (ref 0–0)
P AXIS: 72 DEGREES
P OFFSET: 223 MS
P ONSET: 176 MS
PLATELET # BLD AUTO: 352 X10*3/UL (ref 150–450)
PLATELET CLUMP BLD QL SMEAR: PRESENT
POLYCHROMASIA BLD QL SMEAR: ABNORMAL
POTASSIUM SERPL-SCNC: 4.1 MMOL/L (ref 3.5–5.3)
PR INTERVAL: 106 MS
PROT SERPL-MCNC: 6.2 G/DL (ref 6.4–8.2)
Q ONSET: 229 MS
QRS COUNT: 11 BEATS
QRS DURATION: 84 MS
QT INTERVAL: 452 MS
QTC CALCULATION(BAZETT): 462 MS
QTC FREDERICIA: 459 MS
R AXIS: -6 DEGREES
RBC # BLD AUTO: 2.92 X10*6/UL (ref 4.5–5.9)
RBC MORPH BLD: ABNORMAL
SODIUM SERPL-SCNC: 144 MMOL/L (ref 136–145)
T AXIS: 106 DEGREES
T OFFSET: 455 MS
TOTAL CELLS COUNTED BLD: 100
VENTRICULAR RATE: 63 BPM
WBC # BLD AUTO: 12.1 X10*3/UL (ref 4.4–11.3)

## 2025-04-01 PROCEDURE — 85007 BL SMEAR W/DIFF WBC COUNT: CPT | Performed by: INTERNAL MEDICINE

## 2025-04-01 PROCEDURE — 2500000001 HC RX 250 WO HCPCS SELF ADMINISTERED DRUGS (ALT 637 FOR MEDICARE OP): Performed by: NURSE PRACTITIONER

## 2025-04-01 PROCEDURE — 2500000004 HC RX 250 GENERAL PHARMACY W/ HCPCS (ALT 636 FOR OP/ED)

## 2025-04-01 PROCEDURE — 84075 ASSAY ALKALINE PHOSPHATASE: CPT | Performed by: INTERNAL MEDICINE

## 2025-04-01 PROCEDURE — 71045 X-RAY EXAM CHEST 1 VIEW: CPT

## 2025-04-01 PROCEDURE — 71045 X-RAY EXAM CHEST 1 VIEW: CPT | Performed by: STUDENT IN AN ORGANIZED HEALTH CARE EDUCATION/TRAINING PROGRAM

## 2025-04-01 PROCEDURE — 2500000001 HC RX 250 WO HCPCS SELF ADMINISTERED DRUGS (ALT 637 FOR MEDICARE OP): Performed by: INTERNAL MEDICINE

## 2025-04-01 PROCEDURE — 2500000002 HC RX 250 W HCPCS SELF ADMINISTERED DRUGS (ALT 637 FOR MEDICARE OP, ALT 636 FOR OP/ED): Performed by: INTERNAL MEDICINE

## 2025-04-01 PROCEDURE — 36415 COLL VENOUS BLD VENIPUNCTURE: CPT | Performed by: INTERNAL MEDICINE

## 2025-04-01 PROCEDURE — 82947 ASSAY GLUCOSE BLOOD QUANT: CPT

## 2025-04-01 PROCEDURE — 2500000004 HC RX 250 GENERAL PHARMACY W/ HCPCS (ALT 636 FOR OP/ED): Performed by: INTERNAL MEDICINE

## 2025-04-01 PROCEDURE — 94640 AIRWAY INHALATION TREATMENT: CPT

## 2025-04-01 PROCEDURE — 97110 THERAPEUTIC EXERCISES: CPT | Mod: CO,GO

## 2025-04-01 PROCEDURE — 2500000004 HC RX 250 GENERAL PHARMACY W/ HCPCS (ALT 636 FOR OP/ED): Performed by: NURSE PRACTITIONER

## 2025-04-01 PROCEDURE — 2060000001 HC INTERMEDIATE ICU ROOM DAILY

## 2025-04-01 PROCEDURE — 2500000002 HC RX 250 W HCPCS SELF ADMINISTERED DRUGS (ALT 637 FOR MEDICARE OP, ALT 636 FOR OP/ED): Performed by: NURSE PRACTITIONER

## 2025-04-01 PROCEDURE — 85027 COMPLETE CBC AUTOMATED: CPT | Performed by: INTERNAL MEDICINE

## 2025-04-01 PROCEDURE — 2500000005 HC RX 250 GENERAL PHARMACY W/O HCPCS: Performed by: INTERNAL MEDICINE

## 2025-04-01 PROCEDURE — 97110 THERAPEUTIC EXERCISES: CPT | Mod: GP,CQ

## 2025-04-01 RX ORDER — INSULIN GLARGINE 100 [IU]/ML
50 INJECTION, SOLUTION SUBCUTANEOUS NIGHTLY
Status: DISCONTINUED | OUTPATIENT
Start: 2025-04-01 | End: 2025-04-03 | Stop reason: HOSPADM

## 2025-04-01 RX ORDER — INSULIN GLARGINE 100 [IU]/ML
50 INJECTION, SOLUTION SUBCUTANEOUS NIGHTLY
Start: 2025-04-01

## 2025-04-01 RX ADMIN — SUCRALFATE 1 G: 1 SUSPENSION ORAL at 09:00

## 2025-04-01 RX ADMIN — TRAZODONE HYDROCHLORIDE 50 MG: 50 TABLET ORAL at 21:00

## 2025-04-01 RX ADMIN — PIPERACILLIN SODIUM AND TAZOBACTAM SODIUM 3.38 G: 3; .375 INJECTION, SOLUTION INTRAVENOUS at 09:00

## 2025-04-01 RX ADMIN — INSULIN LISPRO 8 UNITS: 100 INJECTION, SOLUTION INTRAVENOUS; SUBCUTANEOUS at 21:00

## 2025-04-01 RX ADMIN — SUCRALFATE 1 G: 1 SUSPENSION ORAL at 21:00

## 2025-04-01 RX ADMIN — Medication 30 L/MIN: at 07:30

## 2025-04-01 RX ADMIN — INSULIN GLARGINE 50 UNITS: 100 INJECTION, SOLUTION SUBCUTANEOUS at 21:00

## 2025-04-01 RX ADMIN — METOPROLOL TARTRATE 50 MG: 50 TABLET, FILM COATED ORAL at 21:00

## 2025-04-01 RX ADMIN — ACETYLCYSTEINE 200 MG: 200 SOLUTION ORAL; RESPIRATORY (INHALATION) at 19:36

## 2025-04-01 RX ADMIN — PIPERACILLIN SODIUM AND TAZOBACTAM SODIUM 3.38 G: 3; .375 INJECTION, SOLUTION INTRAVENOUS at 21:00

## 2025-04-01 RX ADMIN — ACETYLCYSTEINE 200 MG: 200 SOLUTION ORAL; RESPIRATORY (INHALATION) at 07:30

## 2025-04-01 RX ADMIN — INSULIN LISPRO 6 UNITS: 100 INJECTION, SOLUTION INTRAVENOUS; SUBCUTANEOUS at 02:06

## 2025-04-01 RX ADMIN — PANTOPRAZOLE SODIUM 40 MG: 40 INJECTION, POWDER, FOR SOLUTION INTRAVENOUS at 09:00

## 2025-04-01 RX ADMIN — PANTOPRAZOLE SODIUM 40 MG: 40 INJECTION, POWDER, FOR SOLUTION INTRAVENOUS at 21:00

## 2025-04-01 RX ADMIN — SUCRALFATE 1 G: 1 SUSPENSION ORAL at 15:00

## 2025-04-01 RX ADMIN — PIPERACILLIN SODIUM AND TAZOBACTAM SODIUM 3.38 G: 3; .375 INJECTION, SOLUTION INTRAVENOUS at 02:06

## 2025-04-01 RX ADMIN — Medication 35 PERCENT: at 19:36

## 2025-04-01 RX ADMIN — PIPERACILLIN SODIUM AND TAZOBACTAM SODIUM 3.38 G: 3; .375 INJECTION, SOLUTION INTRAVENOUS at 15:00

## 2025-04-01 RX ADMIN — AMIODARONE HYDROCHLORIDE 200 MG: 200 TABLET ORAL at 21:00

## 2025-04-01 RX ADMIN — SUCRALFATE 1 G: 1 SUSPENSION ORAL at 02:06

## 2025-04-01 RX ADMIN — INSULIN LISPRO 6 UNITS: 100 INJECTION, SOLUTION INTRAVENOUS; SUBCUTANEOUS at 08:58

## 2025-04-01 RX ADMIN — IPRATROPIUM BROMIDE 0.5 MG: 0.5 SOLUTION RESPIRATORY (INHALATION) at 07:30

## 2025-04-01 RX ADMIN — IPRATROPIUM BROMIDE 0.5 MG: 0.5 SOLUTION RESPIRATORY (INHALATION) at 19:36

## 2025-04-01 RX ADMIN — METOPROLOL TARTRATE 50 MG: 50 TABLET, FILM COATED ORAL at 09:00

## 2025-04-01 RX ADMIN — AMIODARONE HYDROCHLORIDE 200 MG: 200 TABLET ORAL at 09:00

## 2025-04-01 ASSESSMENT — COGNITIVE AND FUNCTIONAL STATUS - GENERAL
EATING MEALS: TOTAL
TURNING FROM BACK TO SIDE WHILE IN FLAT BAD: TOTAL
DRESSING REGULAR UPPER BODY CLOTHING: TOTAL
DAILY ACTIVITIY SCORE: 6
MOBILITY SCORE: 6
DRESSING REGULAR LOWER BODY CLOTHING: TOTAL
TURNING FROM BACK TO SIDE WHILE IN FLAT BAD: TOTAL
DRESSING REGULAR LOWER BODY CLOTHING: TOTAL
WALKING IN HOSPITAL ROOM: TOTAL
DRESSING REGULAR UPPER BODY CLOTHING: TOTAL
TOILETING: TOTAL
CLIMB 3 TO 5 STEPS WITH RAILING: TOTAL
DRESSING REGULAR LOWER BODY CLOTHING: TOTAL
MOBILITY SCORE: 6
STANDING UP FROM CHAIR USING ARMS: TOTAL
MOBILITY SCORE: 6
STANDING UP FROM CHAIR USING ARMS: TOTAL
DAILY ACTIVITIY SCORE: 6
WALKING IN HOSPITAL ROOM: TOTAL
WALKING IN HOSPITAL ROOM: TOTAL
TOILETING: TOTAL
MOVING TO AND FROM BED TO CHAIR: TOTAL
MOVING TO AND FROM BED TO CHAIR: TOTAL
TOILETING: TOTAL
MOVING FROM LYING ON BACK TO SITTING ON SIDE OF FLAT BED WITH BEDRAILS: TOTAL
DRESSING REGULAR UPPER BODY CLOTHING: TOTAL
CLIMB 3 TO 5 STEPS WITH RAILING: TOTAL
MOVING FROM LYING ON BACK TO SITTING ON SIDE OF FLAT BED WITH BEDRAILS: TOTAL
HELP NEEDED FOR BATHING: TOTAL
EATING MEALS: TOTAL
MOVING FROM LYING ON BACK TO SITTING ON SIDE OF FLAT BED WITH BEDRAILS: TOTAL
TURNING FROM BACK TO SIDE WHILE IN FLAT BAD: TOTAL
CLIMB 3 TO 5 STEPS WITH RAILING: TOTAL
HELP NEEDED FOR BATHING: TOTAL
PERSONAL GROOMING: TOTAL
HELP NEEDED FOR BATHING: TOTAL
STANDING UP FROM CHAIR USING ARMS: TOTAL
DAILY ACTIVITIY SCORE: 6
MOVING TO AND FROM BED TO CHAIR: TOTAL
EATING MEALS: TOTAL
PERSONAL GROOMING: TOTAL
PERSONAL GROOMING: TOTAL

## 2025-04-01 ASSESSMENT — PAIN SCALES - GENERAL
PAINLEVEL_OUTOF10: 0 - NO PAIN

## 2025-04-01 ASSESSMENT — PAIN - FUNCTIONAL ASSESSMENT
PAIN_FUNCTIONAL_ASSESSMENT: 0-10
PAIN_FUNCTIONAL_ASSESSMENT: 0-10

## 2025-04-01 NOTE — ASSESSMENT & PLAN NOTE
Zeus Bone MD  Physician  Internal Medicine     H&P      Signed     Date of Service: 3/23/2025 10:28 AM     Signed       Expand All Collapse All    History Of Present Illness  Bryan Nix is a 64-year-old male with past medical history of atrial flutter on Eliquis, hyponatremia, IDDM, CAD, hypertension, chronic wounds, diffuse lymphadenopathy, anemia, history of smoking, history elevated PSA, CVA L hemisphere (2020) with right-sided deficits, Left ICA stenosis (85%) s/p angioplasty and left carotid artery stent (08/25/2023) and recent history of acute pontine stroke 02/27/25 with acute hypoxic respiratory failure s/p tracheostomy on 2/25/2025 and PEG.  Patient presents for bleeding from tracheostomy.  Patient is alert and oriented x 3, however limited verbal communication due to tracheostomy.  Wife is at bedside and assists with history.  She reports that patient was coughing blood out of his tracheostomy.  ER provider felt bleeding was likely superficial around site.  Patient has been on room air at skilled nursing facility.  He was placed on Airvo in the ED mainly to humidify oxygen.  Patient never desaturated per ER report.  Currently he is resting comfortably in the bed, breath sounds slightly rhonchorous, but unlabored.  Reports chills, but no fevers.  He has right-sided weakness and sensory deficits.  Denies headache, vision or hearing changes, chest pains, palpitations, shortness of breath, nausea, vomiting, abdominal pain, diarrhea, or complications of PEG tube site.  Wife reports patient skin became irritated in his groin due to adhesive from a condom cath previously, he has a small decubitus wound that she reports is healing well.  He receives tube feeds continuously.  Wife reports a patient was recently evaluated by speech for p.o. to intake, however he still remains n.p.o. except for meds and tube feeds through PEG. CODE STATUS reviewed: full code     ER Course: Hemodynamically stable,  afebrile, SpO2 to 97% trach mask.  WBC 9.3, hemoglobin 8, hematocrit 29.4, platelets 197.  INR 1.4.  Glucose 223, BUN 36, otherwise CMP is normal.  CT of the chest for PE pending.  CXR unremarkable. Trannexamic acid soak gauze wrapped around trach.      Past medical history: As above  Past surgical history: As above  Social history: Former smoker 37.5 pack years-quit smoking age 63, occasional alcohol use, no illicit drug use.  .  Works in maintenance.  6 children.  Family history: Noncontributory     Past Medical History  Medical History        Past Medical History:   Diagnosis Date    Abnormal finding of blood chemistry, unspecified 05/18/2016     Abnormal blood chemistry    Acute upper respiratory infection, unspecified 12/02/2015     Acute upper respiratory infection    Elevated prostate specific antigen (PSA)       Elevated prostate specific antigen (PSA)    Encounter for screening for malignant neoplasm of colon 01/30/2021     Colon cancer screening    Encounter for screening for malignant neoplasm of prostate 11/30/2020     Prostate cancer screening    Essential (primary) hypertension 07/30/2013     Benign essential hypertension    Other conditions influencing health status 07/30/2013     Diabetes Mellitus Poorly Controlled    Other conditions influencing health status 12/02/2015     History of cough    Personal history of other endocrine, nutritional and metabolic disease 03/18/2021     History of hyperlipidemia    Personal history of other endocrine, nutritional and metabolic disease 03/18/2021     History of diabetes mellitus    Personal history of other specified conditions 05/18/2016     History of chest pain    Personal history of other specified conditions 05/18/2016     History of dyspnea    Personal history of other specified conditions 05/18/2016     History of dizziness    Personal history of other specified conditions 05/18/2016     History of fatigue            Surgical History  Surgical  History         Past Surgical History:   Procedure Laterality Date    CT ANGIO CORONARY ART WITH HEARTFLOW IF SCORE >30%   8/23/2023     CT HEART CORONARY ANGIOGRAM 8/23/2023 [facility] CT    MR HEAD ANGIO WO IV CONTRAST   12/14/2020     MR HEAD ANGIO WO IV CONTRAST 12/14/2020 [facility]    MR NECK ANGIO WO IV CONTRAST   12/14/2020     MR NECK ANGIO WO IV CONTRAST 12/14/2020 [facility]    OTHER SURGICAL HISTORY   12/01/2020     Hand fracture repair    OTHER SURGICAL HISTORY   12/01/2020     Nasal bone fracture repair            Social History  He reports that he has quit smoking. His smoking use included cigarettes. He has never used smokeless tobacco. No history on file for alcohol use and drug use.     Family History  Family History   No family history on file.        Allergies  Patient has no known allergies.     Review of Systems     Physical Exam  Constitutional:       General: He is awake. He is not in acute distress.     Appearance: Normal appearance. He is not toxic-appearing.   HENT:      Head: Atraumatic.      Nose: Nose normal.      Mouth/Throat:      Mouth: Mucous membranes are moist.   Eyes:      Conjunctiva/sclera: Conjunctivae normal.   Cardiovascular:      Rate and Rhythm: Normal rate and regular rhythm.      Pulses: Normal pulses.      Heart sounds: No murmur heard.  Pulmonary:      Effort: No respiratory distress.      Comments: Tracheostomy site midline, no drainage around dressing.  Rhonchorous breath sounds, unlabored respirations, RT bedside suctioning patient with thick blood-tinged sputum  Abdominal:      General: Bowel sounds are normal. There is no distension.      Palpations: Abdomen is soft.      Tenderness: There is no abdominal tenderness. There is no guarding.      Comments: Left upper quadrant PEG site tube site, no drainage or skin breakdown around tube site.   Musculoskeletal:         General: No swelling, deformity or signs of injury.      Cervical back: Neck  "supple.      Right lower leg: No edema.      Left lower leg: No edema.   Skin:     General: Skin is warm and dry.      Capillary Refill: Capillary refill takes less than 2 seconds.      Findings: Wound (sacrum) present. No ecchymosis or erythema.   Neurological:      Mental Status: He is alert and oriented to person, place, and time.      Cranial Nerves: No facial asymmetry.      Sensory: Sensory deficit (RUE, RLE) present.      Motor: Weakness present.      Comments: Right sided upper and lower extremity motor function deficits   Psychiatric:         Mood and Affect: Mood normal.         Behavior: Behavior is cooperative.            Last Recorded Vitals  Blood pressure 134/50, pulse 93, temperature 36.9 °C (98.4 °F), temperature source Temporal, resp. rate 22, height 1.88 m (6' 2\"), weight 77.1 kg (170 lb), SpO2 98%.     Relevant Results              Results for orders placed or performed during the hospital encounter of 03/22/25 (from the past 24 hours)   CBC and Auto Differential   Result Value Ref Range     WBC 9.3 4.4 - 11.3 x10*3/uL     nRBC 0.3 (H) 0.0 - 0.0 /100 WBCs     RBC 3.02 (L) 4.50 - 5.90 x10*6/uL     Hemoglobin 8.0 (L) 13.5 - 17.5 g/dL     Hematocrit 29.4 (L) 41.0 - 52.0 %     MCV 97 80 - 100 fL     MCH 26.5 26.0 - 34.0 pg     MCHC 27.2 (L) 32.0 - 36.0 g/dL     RDW 15.6 (H) 11.5 - 14.5 %     Platelets 197 150 - 450 x10*3/uL     Neutrophils % 77.1 40.0 - 80.0 %     Immature Granulocytes %, Automated 1.5 (H) 0.0 - 0.9 %     Lymphocytes % 13.1 13.0 - 44.0 %     Monocytes % 4.1 2.0 - 10.0 %     Eosinophils % 3.2 0.0 - 6.0 %     Basophils % 1.0 0.0 - 2.0 %     Neutrophils Absolute 7.14 1.20 - 7.70 x10*3/uL     Immature Granulocytes Absolute, Automated 0.14 0.00 - 0.70 x10*3/uL     Lymphocytes Absolute 1.21 1.20 - 4.80 x10*3/uL     Monocytes Absolute 0.38 0.10 - 1.00 x10*3/uL     Eosinophils Absolute 0.30 0.00 - 0.70 x10*3/uL     Basophils Absolute 0.09 0.00 - 0.10 x10*3/uL   Basic metabolic panel   Result " Value Ref Range     Glucose 223 (H) 74 - 99 mg/dL     Sodium 142 136 - 145 mmol/L     Potassium 4.3 3.5 - 5.3 mmol/L     Chloride 103 98 - 107 mmol/L     Bicarbonate 27 21 - 32 mmol/L     Anion Gap 16 10 - 20 mmol/L     Urea Nitrogen 36 (H) 6 - 23 mg/dL     Creatinine 0.81 0.50 - 1.30 mg/dL     eGFR >90 >60 mL/min/1.73m*2     Calcium 9.6 8.6 - 10.3 mg/dL   Protime-INR   Result Value Ref Range     Protime 15.1 (H) 9.8 - 12.4 seconds     INR 1.4 (H) 0.9 - 1.1   aPTT   Result Value Ref Range     aPTT 25 (L) 26 - 36 seconds   Type and Screen   Result Value Ref Range     ABO TYPE B       Rh TYPE POS       ANTIBODY SCREEN NEG     VERIFY ABO/Rh Group Test   Result Value Ref Range     ABO TYPE B       Rh TYPE POS     MRSA Surveillance for Vancomycin De-escalation, PCR     Specimen: Anterior Nares; Swab   Result Value Ref Range     MRSA PCR Not Detected Not Detected   POCT GLUCOSE   Result Value Ref Range     POCT Glucose 180 (H) 74 - 99 mg/dL   POCT GLUCOSE   Result Value Ref Range     POCT Glucose 161 (H) 74 - 99 mg/dL   POCT GLUCOSE   Result Value Ref Range     POCT Glucose 156 (H) 74 - 99 mg/dL   Basic metabolic panel   Result Value Ref Range     Glucose 170 (H) 74 - 99 mg/dL     Sodium 147 (H) 136 - 145 mmol/L     Potassium 4.2 3.5 - 5.3 mmol/L     Chloride 107 98 - 107 mmol/L     Bicarbonate 30 21 - 32 mmol/L     Anion Gap 14 10 - 20 mmol/L     Urea Nitrogen 35 (H) 6 - 23 mg/dL     Creatinine 0.85 0.50 - 1.30 mg/dL     eGFR >90 >60 mL/min/1.73m*2     Calcium 9.4 8.6 - 10.3 mg/dL   CBC   Result Value Ref Range     WBC 10.4 4.4 - 11.3 x10*3/uL     nRBC 0.6 (H) 0.0 - 0.0 /100 WBCs     RBC 2.87 (L) 4.50 - 5.90 x10*6/uL     Hemoglobin 7.8 (L) 13.5 - 17.5 g/dL     Hematocrit 26.6 (L) 41.0 - 52.0 %     MCV 93 80 - 100 fL     MCH 27.2 26.0 - 34.0 pg     MCHC 29.3 (L) 32.0 - 36.0 g/dL     RDW 15.7 (H) 11.5 - 14.5 %     Platelets 205 150 - 450 x10*3/uL   SST TOP   Result Value Ref Range     Extra Tube Hold for add-ons.        CT  angio chest for pulmonary embolism     Result Date: 3/22/2025  Interpreted By:  Pamela Zurita, STUDY: CT ANGIO CHEST FOR PULMONARY EMBOLISM;  3/22/2025 4:56 pm   INDICATION: Signs/Symptoms:bloody secretions, eval for PE.     COMPARISON: CT chest without contrast 03/22/2025   ACCESSION NUMBER(S): [id number]   ORDERING CLINICIAN: PAMELA HAHN   TECHNIQUE: Contiguous axial images of the chest were obtained after the intravenous administration of iodinated contrast using angiographic PE protocol. Coronal and sagittal reformatted images were reconstructed from the axial data. MIP images were created on an independent workstation and reviewed.   FINDINGS:   MEDIASTINUM AND LYMPH NODES: Inflammatory fat stranding along the tracheostomy tract without discrete fluid collection. The esophageal wall appears within normal limits. There is redemonstration of diffuse lymphadenopathy in the bilateral axilla, mediastinum, bilateral supraclavicular as described in detail on separate report. Also notable or enlarged bilateral level 4 cervical lymph nodes (right > left).   VESSELS:  Normal caliber thoracic aorta without dissection. Moderate aortic atherosclerosis.  No acute pulmonary embolism.   HEART: Normal size.  Mild coronary artery calcifications. No significant pericardial effusion.   LUNG, AIRWAYS, PLEURA: There is slight increase in debris within the trachea. There has been clearing of secretions in the left upper/lower lobe bronchus. There is diffuse bilateral bronchial thickening slightly increased from prior study. There is peribronchovascular ground-glass opacity in the posterior right upper lobe and superior and basal segments of the right lower lobe. There is slightly worsened atelectasis in the lung bases remaining predominantly subsegmental in nature. There is a 0.8 cm nodule in the left upper lobe that is stable from 08/19/2023.   OSSEOUS STRUCTURES: No acute osseous abnormality.   CHEST WALL SOFT TISSUES: No  discernible acute abnormality.   UPPER ABDOMEN/OTHER: No acute abnormality.        No acute pulmonary embolism.   Peribronchial vascular ground glass opacities in the right upper and lower lobe again concerning for pneumonia given patient's risk factors of tracheostomy and debris in the airways.   Redemonstration of diffuse lymphadenopathy in the lower necks, axilla, and mediastinum. Findings are either diffusely reactive in nature, reflective of lymphoma/leukemia, meter deficiency, or less likely head neck neoplasm is previously postulated. However please correlate with past medical history.   Inflammatory changes along the tracheostomy tract without fluid collection. Correlate for erythema.   Unchanged 0.8 cm nodule in the left upper lobe dating back to 08/19/2023. Recommend 1 year follow up chest CT to ensure at least 2 years of stability.   MACRO: None.   Signed by: Pamela Zurita 3/22/2025 7:17 PM Dictation workstation:   [id number]     CT chest wo IV contrast     Result Date: 3/22/2025  Interpreted By:  Pamela Zurita, STUDY: CT CHEST WO IV CONTRAST;  3/22/2025 3:41 pm   INDICATION: Signs/Symptoms:eval for alveolar hemorrhage.     COMPARISON: CT head/neck 08/19/2023   ACCESSION NUMBER(S): [id number]   ORDERING CLINICIAN: PAMELA HAHN   TECHNIQUE: Contiguous axial images of the chest and upper abdomen were obtained without contrast. Coronal and sagittal reformatted images were reconstructed from the axial data.   FINDINGS:   MEDIASTINUM AND LYMPH NODES: There is fat stranding/edema along the tract of the tracheostomy about discrete fluid collection. The esophageal wall appears within normal limits. There are numerous enlarged bilateral supraclavicular lymph nodes measuring up to 1.4 cm on the left. There are numerous enlarged bilateral axillary lymph nodes measuring up to 1.1 cm on the right and 1.2 cm on the left. There are numerous prominent to mildly enlarged mediastinal lymph nodes as well. No  pneumomediastinum.   VESSELS:  Normal caliber thoracic aorta. Moderate aortic atherosclerosis. The main pulmonary artery is normal in size.   HEART: Normal size.  Mild coronary artery calcifications. No significant pericardial effusion.   LUNG, AIRWAYS, PLEURA: Tracheostomy noted in appropriate position. There is trace mucus in the left mainstem bronchus and at the origin of the left upper lobe and lower lobe bronchi. There are mild peribronchovascular ground-glass opacities in the posterior segment of the right upper lobe, basal segments of the right lower lobe. There is mild dependent bibasilar atelectasis. There is no pleural effusion.   OSSEOUS STRUCTURES: No acute osseous abnormality.   CHEST WALL SOFT TISSUES: No discernible acute abnormality.   UPPER ABDOMEN/OTHER: No acute abnormality.        1. Mild peribronchovascular ground-glass opacities in the posterior right upper lobe and basal segments of the right lower lobe. This appearance and distribution is not typical for alveolar hemorrhage which tends to be centrilobular and bilateral asymmetric in distribution. Findings are most suggestive of pneumonia.   2. Small amount of mucous debris in the left mainstem bronchus and at the origin of the left upper and lower lobe bronchi.   3. Diffuse lymphadenopathy in the bilateral axillary regions, mediastinum and left supraclavicular region particularly notable in the bilateral supraclavicular region. This is progressed in the left supraclavicular region and new elsewhere when compared to 08/19/2023 CTA head/neck. Correlate for any history of known head/neck malignancy or lymphoma.   4. Inflammation/fat stranding adjacent to the tracheostomy tube entry site noted. Correlate for air the met. No evidence of fluid collection.   MACRO: None.   Signed by: Marco A Zurita 3/22/2025 7:10 PM Dictation workstation:   [id number]     XR chest 1 view     Result Date: 3/22/2025  Interpreted By:  Fermin Stroud, STUDY: XR CHEST  1 VIEW   INDICATION: Signs/Symptoms:hemoptysis.   COMPARISON: August 19, 2023   ACCESSION NUMBER(S): [id number]   ORDERING CLINICIAN: PAMELA HAHN   FINDINGS: No consolidation, effusion, edema, or pneumothorax. Heart size within normal limits.        No evidence of acute intrathoracic abnormality.   Signed by: Fermin Stroud 3/22/2025 2:01 PM Dictation workstation:   [id number]     XR chest 1 view     Result Date: 3/14/2025  * * *Final Report* * * DATE OF EXAM: Mar 14 2025  8:52AM   S5X   5290  -  XR CHEST 1V FRONTAL   / ACCESSION #  [id number] PROCEDURE REASON: new bleeding from trach      * * * * Physician Interpretation * * * *  EXAMINATION:  CHEST RADIOGRAPH (PORTABLE SINGLE VIEW AP) Exam Date/Time:  3/14/2025 8:52 AM Clinical History:  new bleeding from trach Comparison:  03/03/2025 RESULT: LINES, TUBES, AND DEVICES:  Tracheostomy. CARDIOMEDIASTINAL SILHOUETTE:  The cardiac and mediastinal silhouettes appear unremarkable. LUNGS AND PLEURA: No consolidation.  No pleural effusion. No pulmonary vascular congestion. No pneumothorax identified. OTHER:  N/A     IMPRESSION: No acute abnormality identified. : [facility]   Transcribe Date/Time: Mar 14 2025  9:07A Dictated by : PAMELA EVANS MD This examination was interpreted and the report reviewed and electronically signed by: PAMELA EVANS MD on Mar 14 2025  9:11AM  EST     US abdomen complete     Result Date: 3/11/2025  ABDOMEN ULTRASOUND-COMPLETE FINDINGS: Abdomen Ultrasound Complete: PROCEDURE: Multiple grayscale mages of the abdomen were obtained. FINDINGS:Multiple real time images demonstrate the liver with increased echogenicity consistent with fatty infiltration. There is no evidence of a discrete mass within the liver. The liver is enlarged, measured at 15.7 cm in size. The gallbladder is seen with no evidence of cholelithiasis. The gallbladder wall is within normal limits. The common bile duct is within normal limits. The visualized pancreas  appears unremarkable. Both kidneys are seen with no evidence of hydronephrosis or a calculus. The spleen has its normal homogeneous echo appearance. There is no free fluid to suggest ascites. The visualized aorta appears unremarkable. The inferior vena cava appears patent. CONCLUSION: Enlarged fatty liver. ELECTRONICALLY SIGNED BY LEONEL LAN M.D. 3/11/2025 5:00:52 PM EDT.     XR chest 1 view     Result Date: 3/3/2025  * * *Final Report* * * DATE OF EXAM: Mar  3 2025  1:40PM   S5X   5290  -  XR CHEST 1V FRONTAL   / ACCESSION #  [id number] PROCEDURE REASON: resp failure      * * * * Physician Interpretation * * * *  EXAMINATION:  CHEST RADIOGRAPH (PORTABLE SINGLE VIEW AP) Exam Date/Time:  3/3/2025 1:40 PM Indication:   resp failure M:  XCP_4 Comparison:  1 day prior RESULT: Lines, tubes, and devices:  Tracheostomy tube remains in situ. Lungs and pleura:  Mild patchy opacities right lower lung zone seen on the prior study are no longer appreciated.  No confluent opacity.   Costophrenic angles are clear.  No pneumothorax. Cardiomediastinal silhouette:  Stable cardiomediastinal silhouette. Other:  -     IMPRESSION: Improved aeration right lower lung zone.  No acute cardiopulmonary finding. : PSCB   Transcribe Date/Time: Mar  3 2025  2:49P Dictated by : ISRAEL BENSON MD This examination was interpreted and the report reviewed and electronically signed by: ISRAEL BENSON MD on Mar  3 2025  2:49PM  EST     XR chest 1 view     Result Date: 3/2/2025  * * *Final Report* * * DATE OF EXAM: Mar  2 2025 11:25AM   MMX   5376  -  XR CHEST 1V FRONTAL PORT  / ACCESSION #  [id number] PROCEDURE REASON: Shortness of breath      * * * * Physician Interpretation * * * *  EXAMINATION:  CHEST RADIOGRAPH (PORTABLE SINGLE VIEW AP) Exam Date/Time:  3/2/2025 11:25 AM CLINICAL HISTORY: Shortness of breath MQ:  XCPR_5 Comparison:  02/20/2025. RESULT: This is a limited examination due to multiple wires and tubing  obscuring the lower lungs. Lines, tubes, and devices:  A tracheostomy tube remains in place. Lungs and pleura:  There are increased bronchovascular markings in the right lower lung which may be due to bronchitis or early changes of aspiration pneumonia.  Clinical correlation and follow-up exams are recommended. Cardiomediastinal silhouette:  Stable cardiomediastinal silhouette. Other:  The visualized bony thorax appears unremarkable.     IMPRESSION: Nonspecific parenchymal changes in the right lower lung as described above. A follow-up exam is recommended. : [facility]   Transcribe Date/Time: Mar  2 2025 11:53A Dictated by : BJ INIGUEZ MD This examination was interpreted and the report reviewed and electronically signed by: BJ INIGUEZ MD on Mar  2 2025 11:54AM  EST     XR chest 1 view     Result Date: 2/28/2025  * * *Final Report* * * DATE OF EXAM: Feb 28 2025 10:36AM   MMX   5290  -  XR CHEST 1V FRONTAL   / ACCESSION #  [id number] PROCEDURE REASON: Shortness of breath      * * * * Physician Interpretation * * * *  EXAMINATION:  CHEST RADIOGRAPH (SINGLE VIEW AP OR PA) CLINICAL HISTORY: Shortness of breath MQ:  XC1_5 Comparison:  02/22/2025 RESULT: Lines, tubes, and devices:  Tracheostomy tube is noted. Lungs and pleura:  No consolidation. No lung mass. No pleural effusion. Cardiomediastinal silhouette:  Normal cardiomediastinal silhouette. Other:  No acute osseous pathology.     IMPRESSION: No acute radiographic abnormality. : [facility]   Transcribe Date/Time: Feb 28 2025 12:07P Dictated by : DIANNE RAMOS MD This examination was interpreted and the report reviewed and electronically signed by: DIANNE RAMOS MD on Feb 28 2025 12:08PM  EST     XR abdomen 2 views supine and erect or decub     Result Date: 2/24/2025  * * *Final Report* * * DATE OF EXAM: Feb 24 2025  6:03PM   MMX   5289  -  XR ABDOMEN 1V SUPINE  / ACCESSION #  [id number] PROCEDURE REASON: Evaluate tube, line or lead position       * * * * Physician Interpretation * * * *  EXAMINATION:  XR ABDOMEN 1V SUPINE CLINICAL HISTORY:   Evaluate tube, line or lead position Technique:   XR ABDOMEN 1V SUPINE -- NOT APPLICABLE with 1 views on 1 images Comparison: 2/21/2025 RESULT: Feeding tube with distal tip at the pylorus antrum. Moderate colonic stool burden. No dilated bowel. Lung structures are intact.     IMPRESSION: Feeding tube with distal tip at the pylorus antrum. Moderate colonic stool burden. No dilated bowel. : [facility]   Transcribe Date/Time: Feb 24 2025  6:23P Dictated by : PABLO RAMOS MD This examination was interpreted and the report reviewed and electronically signed by: PABLO RAMOS MD on Feb 24 2025  6:24PM  EST     CT head wo IV contrast     Result Date: 2/23/2025  * * *Final Report* * * DATE OF EXAM: Feb 23 2025 12:04PM   [facility]   0504  -  CT BRAIN WO IVCON  / ACCESSION #  [id number] PROCEDURE REASON: Stroke, follow up      * * * * Physician Interpretation * * * *  EXAMINATION: CT BRAIN WO IVCON CLINICAL HISTORY: Stroke, follow up TECHNIQUE:  Serial axial images without IV contrast were obtained from the vertex to the foramen magnum. MQ:  CTBWO_3 CT Radiation dose: Integrated Dose-Length Product (DLP) for this visit =   778  mGy*cm CT Dose Reduction Employed: Iterative recon COMPARISON: MRI brain 02/19/2025 RESULT: Post-operative change: None. Acute change: Evolving acute infarct along the central eli and superior left medulla (2:7-9).  No evidence of hemorrhagic transformation. Hemorrhage: No evidence of acute intracranial hemorrhage. Mass Lesion / Mass Effect: There is no evidence of an intracranial mass or extraaxial fluid collection. No significant mass effect. Chronic change: Stable remote left MCA territory encephalomalacia. Parenchyma: There is no significant volume loss. The brain parenchyma is otherwise within normal limits for age. Ventricles: The ventricles are within normal limits of size and configuration  for age. Paranasal sinuses and skull base: Partial opacification of the left maxillary and sphenoid sinus and multiple bilateral ethmoid air cells.   The remaining visualized paranasal sinuses are grossly clear. The skull base and imaged soft tissues are unremarkable. Localizer images: No additional findings.     IMPRESSION: Evolving acute brainstem infarct.  No evidence of hemorrhagic transformation. Stable remote left MCA territory infarct. : [facility]   Transcribe Date/Time: Feb 23 2025 12:48P Dictated by : FRANKY GUZMAN MD   This examination was interpreted and the report reviewed and electronically signed by: FRANKY GUZMAN MD on Feb 23 2025 12:52PM  EST     XR chest 1 view     Result Date: 2/22/2025  * * *Final Report* * * DATE OF EXAM: Feb 22 2025  6:56AM   MMX   [id number]  -  XR CHEST 1V FRONTAL   / ACCESSION #  [id number] PROCEDURE REASON: Evaluate tube, line,  or lead position      * * * * Physician Interpretation * * * *  EXAMINATION:  CHEST RADIOGRAPH (SINGLE VIEW AP OR PA) CLINICAL HISTORY: Evaluate tube, line,  or lead position MQ:  XC1_5 Comparison:  02/21/2025. RESULT: Lines, tubes, and devices:  An ET tube and feeding tube remain in place.   A poorly defined right venous catheter cannot be excluded. Lungs and pleura:  There are persistent bilateral lower lungs infiltrates seen on the right more than left suggestive of pneumonia such as aspiration pneumonia.  There is no definite pleural effusion. Cardiomediastinal silhouette:  Stable cardiomediastinal silhouette. Other:  The visualized bony thorax appears unremarkable.     IMPRESSION: Persistent bilateral lower lungs infiltrates as described above. A follow-up exam is recommended. : [facility]   Transcribe Date/Time: Feb 22 2025  8:20A Dictated by : BJ INIGUEZ MD This examination was interpreted and the report reviewed and electronically signed by: BJ INIGUEZ MD on Feb 22 2025  8:21AM  EST     XR chest 1 view     Result Date:  2/21/2025  * * *Final Report* * * DATE OF EXAM: Feb 21 2025  5:59PM   MMX   [id number]  -  XR CHEST 1V FRONTAL PORT  / ACCESSION #  [id number] PROCEDURE REASON: Evaluate tube, line,  or lead position      * * * * Physician Interpretation * * * *  EXAMINATION:  CHEST RADIOGRAPH (PORTABLE SINGLE VIEW AP) Exam Date/Time:  2/21/2025 5:59 PM CLINICAL HISTORY: Evaluate tube, line,  or lead position MQ:  XCPR_5 Comparison:  02/21/2025 RESULT: Lines, tubes, and devices: Endotracheal tube in place terminating in enteric tube in place terminating below the field of view. Lungs and pleura: No pneumothorax.  No pleural effusion.  Bilateral mild interstitial opacities.  The right costophrenic angle is not included in the field-of-view. Cardiomediastinal silhouette:  Stable cardiomediastinal silhouette. Other:  .     IMPRESSION: Mild interstitial opacities may be related to pulmonary vascular congestion. : [facility]   Transcribe Date/Time: Feb 21 2025  6:55P Dictated by : IRWIN VALDIVIA MD This examination was interpreted and the report reviewed and electronically signed by: IRWIN VALDIVIA MD on Feb 21 2025  6:57PM  EST     XR abdomen 2 views supine and erect or decub     Result Date: 2/21/2025  * * *Final Report* * * DATE OF EXAM: Feb 21 2025 10:32AM   MMX   [id number]  -  XR ABDOMEN 1V SUPINE  / ACCESSION #  [id number] PROCEDURE REASON: Evaluate tube, line or lead position      * * * * Physician Interpretation * * * *  Clinical Information: Feeding tube Single view of the abdomen was obtained. Feeding tube tip within the distal stomach. There is a nonspecific, nonobstructive bowel gas pattern. No abnormal abdominal masses are identified.  No pathologic calcifications.   No acute bony abnormalities are seen.     IMPRESSION: Nonspecific, nonobstructive bowel gas pattern. Feeding tube tip within the distal stomach. : [facility]   Transcribe Date/Time: Feb 21 2025 10:43A Dictated by : PAMELA EVANS MD  This examination was interpreted and the report reviewed and electronically signed by: PAMELA EVANS MD on Feb 21 2025 10:44AM  EST     XR chest 1 view     Result Date: 2/21/2025  * * *Final Report* * * DATE OF EXAM: Feb 21 2025 10:32AM   MMX   [id number]  -  XR CHEST 1V FRONTAL PORT  / ACCESSION #  [id number] PROCEDURE REASON: Shortness of breath      * * * * Physician Interpretation * * * *  EXAMINATION:  CHEST RADIOGRAPH (PORTABLE SINGLE VIEW AP) Exam Date/Time:  2/21/2025 10:32 AM Clinical History:  Shortness of breath Comparison:  02/17/2025 RESULT: LINES, TUBES, AND DEVICES:  Feeding tube tip beyond the inferior extent of the image. CARDIOMEDIASTINAL SILHOUETTE:  The cardiac and mediastinal silhouettes appear unremarkable. LUNGS AND PLEURA: No consolidation.  No pleural effusion. No pulmonary vascular congestion. No pneumothorax identified. OTHER:  N/A     IMPRESSION: No acute abnormality identified. : [facility]   Transcribe Date/Time: Feb 21 2025 10:41A Dictated by : PAMELA EVANS MD This examination was interpreted and the report reviewed and electronically signed by: PAMELA EVANS MD on Feb 21 2025 10:43AM  EST            Assessment/Plan     Assessment & Plan  Hemoptysis     Tracheostomy in place (Multi)     CVA, old, hemiparesis (Multi)     Status post insertion of percutaneous endoscopic gastrostomy (PEG) tube (Multi)     Pneumonia due to infectious organism     Right sided weakness     Atrial flutter (Multi)     Insulin dependent type 2 diabetes mellitus (Multi)        Telemetry monitoring  Hemoglobin stable 8's recently on review of outside labs, monitor for ongoing bleeding.  Daily H&H  Monitor for overt bleeding  Consult pulmonology, appreciate input  Pneumonia suspected for CT findings  Tracheostomy protocol  Expected cover with Zosyn and vancomycin  MRSA swab  Urine for Legionella antigen and strep pneumonia antigen  Supplemental oxygen as needed  Nebulizers as needed  RT evaluate and  treat  Check procalcitonin  Typical 20% zinc oxide to wounds and groin  Continuous tube feeds, continue at 80 mL an hour x 22 hours  Accu-Chek every 6 hours  Hypoglycemia protocol  PT/OT  Anticoagulation and antiplatelet therapy on hold, resume once cleared by pulmonology  Continue patient's home medications for chronic issues as appropriate     Patient fully evaluated on March 23.  Continue to monitor H&H.  Pulmonary consultation obtained.  Continue broad-spectrum antibiotics to rule out infectious process.  Recheck labs in AM.                 Zeus Bone MD

## 2025-04-01 NOTE — PROGRESS NOTES
Patient fully evaluated    for    Acute kidney injury  Hypernatremia  Anemia of blood loss renal disease  Transaminitis  Rhabdomyolysis  Patient denies any shortness of breath or chest pain  CBC stable on erythropoietin    Problem List Items Addressed This Visit       * (Principal) Hemoptysis - Primary    Relevant Orders    Esophagogastroduodenoscopy (EGD) (Completed)    Surgical Pathology Exam    Melena    Relevant Medications    zinc oxide 20 % ointment    ipratropium (Atrovent) 0.02 % nebulizer solution    ipratropium (Atrovent) 0.02 % nebulizer solution    epoetin nicko (Procrit) 20,000 unit/mL injection (Start on 4/6/2025)    amiodarone (Pacerone) 200 mg tablet    insulin glargine (Lantus) 100 unit/mL injection    Other Relevant Orders    Esophagogastroduodenoscopy (EGD) (Completed)    Surgical Pathology Exam    CBC    Comprehensive metabolic panel     Other Visit Diagnoses       Anemia, unspecified type        Relevant Orders    Esophagogastroduodenoscopy (EGD) (Completed)    Surgical Pathology Exam    Tracheostomy dependent (Multi)        Relevant Orders    Surgical Pathology Exam          Patient seen resting in bed  He is asymptomatic   Labs reviewed  Hemoglobin Uptrending  Sodium  to 144  .  Vital signs for last 24 hours Temp:  [36 °C (96.8 °F)-36.9 °C (98.4 °F)] 36.2 °C (97.2 °F)  Heart Rate:  [53-68] 59  Resp:  [17-18] 18  BP: (116-154)/(55-70) 135/63  FiO2 (%):  [28 %-30 %] 28 %     Patient still requiring frequent cardiac and SPO2 monitoring. Continue aggressive pulmonary hygiene and oral hygiene. Off loading as tolerated for skin integrity. Medications and labs reviewed-   Results for orders placed or performed during the hospital encounter of 03/22/25 (from the past 24 hours)   POCT GLUCOSE   Result Value Ref Range    POCT Glucose 320 (H) 74 - 99 mg/dL   POCT GLUCOSE   Result Value Ref Range    POCT Glucose 269 (H) 74 - 99 mg/dL   Comprehensive Metabolic Panel   Result Value Ref Range    Glucose 298  (H) 74 - 99 mg/dL    Sodium 144 136 - 145 mmol/L    Potassium 4.1 3.5 - 5.3 mmol/L    Chloride 109 (H) 98 - 107 mmol/L    Bicarbonate 29 21 - 32 mmol/L    Anion Gap 10 10 - 20 mmol/L    Urea Nitrogen 26 (H) 6 - 23 mg/dL    Creatinine 0.79 0.50 - 1.30 mg/dL    eGFR >90 >60 mL/min/1.73m*2    Calcium 8.8 8.6 - 10.3 mg/dL    Albumin 3.1 (L) 3.4 - 5.0 g/dL    Alkaline Phosphatase 256 (H) 33 - 136 U/L    Total Protein 6.2 (L) 6.4 - 8.2 g/dL    AST 32 9 - 39 U/L    Bilirubin, Total 0.3 0.0 - 1.2 mg/dL    ALT 98 (H) 10 - 52 U/L   CBC and Auto Differential   Result Value Ref Range    WBC 12.1 (H) 4.4 - 11.3 x10*3/uL    nRBC 9.2 (H) 0.0 - 0.0 /100 WBCs    RBC 2.92 (L) 4.50 - 5.90 x10*6/uL    Hemoglobin 8.1 (L) 13.5 - 17.5 g/dL    Hematocrit 29.4 (L) 41.0 - 52.0 %     (H) 80 - 100 fL    MCH 27.7 26.0 - 34.0 pg    MCHC 27.6 (L) 32.0 - 36.0 g/dL    RDW 22.2 (H) 11.5 - 14.5 %    Platelets 352 150 - 450 x10*3/uL    Immature Granulocytes %, Automated 6.3 (H) 0.0 - 0.9 %    Immature Granulocytes Absolute, Automated 0.89 (H) 0.00 - 0.70 x10*3/uL   Manual Differential   Result Value Ref Range    Neutrophils %, Manual 83.0 40.0 - 80.0 %    Bands %, Manual 3.0 0.0 - 5.0 %    Lymphocytes %, Manual 9.0 13.0 - 44.0 %    Monocytes %, Manual 1.0 2.0 - 10.0 %    Eosinophils %, Manual 0.0 0.0 - 6.0 %    Basophils %, Manual 1.0 0.0 - 2.0 %    Metamyelocytes %, Manual 3.0 0.0 - 0.0 %    Seg Neutrophils Absolute, Manual 10.04 (H) 1.20 - 7.00 x10*3/uL    Bands Absolute, Manual 0.36 0.00 - 0.70 x10*3/uL    Lymphocytes Absolute, Manual 1.09 (L) 1.20 - 4.80 x10*3/uL    Monocytes Absolute, Manual 0.12 0.10 - 1.00 x10*3/uL    Eosinophils Absolute, Manual 0.00 0.00 - 0.70 x10*3/uL    Basophils Absolute, Manual 0.12 (H) 0.00 - 0.10 x10*3/uL    Metamyelocytes Absolute, Manual 0.36 0.00 - 0.00 x10*3/uL    Total Cells Counted 100     Neutrophils Absolute, Manual 10.40 (H) 1.20 - 7.70 x10*3/uL    Manual nRBC per 100 Cells 17.0 (H) 0.0 - 0.0 %    RBC  Morphology See Below     Polychromasia Mild     Basophilic Stippling Present     Clumped Platelets Present    POCT GLUCOSE   Result Value Ref Range    POCT Glucose 269 (H) 74 - 99 mg/dL      Patient fractional excretion of sodium is 0.2%  Patient fractional excretion of urea is 32.16%  Positive Hemoccult stools  Patient recently received an antibiotic (last 12 hours)       Date/Time Action Medication Dose    03/26/25 0903 New Bag    piperacillin-tazobactam (Zosyn) 3.375 g in dextrose (iso) IV 50 mL 3.375 g    03/26/25 0148 New Bag    piperacillin-tazobactam (Zosyn) 3.375 g in dextrose (iso) IV 50 mL 3.375 g           Plan discussed with interdisciplinary team, continue current and repeat labs in the AM.         I spent a total of 50 minutes on the date of the service which included preparing to see the patient, face-to-face patient care, completing clinical documentation, obtaining and/or reviewing separately obtained history, performing a medically appropriate examination, counseling and educating the patient/family/caregiver, ordering medications, tests, or procedures, communicating with other HCPs (not separately reported), independently interpreting results (not separately reported), communicating results to the patient/family/caregiver, and care coordination (not separately reported).         Physical Exam  General Appearance; asthenic habitus  Mild skin pallor  Poor skin turgor  HEENT; pupils react to light and accommodation  There is no nystagmus or ptosis; extraocular movements are intact  Neck; no adenopathy; no jugular vein distention; no bruit; no thyromegaly  Tracheostomy in place clean without exudation or induration  Lungs; minimal inspiratory coarse crackles at the bases  Heart; regular rate no gallop no rubs or thrills no lifts  Abdomen; active peristalsis no rebound or guarding there is a PEG tube in place  ; no CVA tenderness  Extremities; decreased muscle tone  Neurological; pathological  reflexes are absent      Assessment/Plan     Acute kidney injury  Hypernatremia  Anemia of blood loss renal disease  Transaminitis  Rhabdomyolysis    Plan    Continue Erythropoietin/IV Iron at SNF  Continue to monitor renal chemistries and CBC  Matthew Johnson MD

## 2025-04-01 NOTE — CARE PLAN
Problem: Pain - Adult  Goal: Verbalizes/displays adequate comfort level or baseline comfort level  Outcome: Progressing     Problem: Discharge Planning  Goal: Discharge to home or other facility with appropriate resources  Outcome: Progressing     Problem: Chronic Conditions and Co-morbidities  Goal: Patient's chronic conditions and co-morbidity symptoms are monitored and maintained or improved  Outcome: Progressing     Problem: Skin  Goal: Decreased wound size/increased tissue granulation at next dressing change  Outcome: Progressing  Flowsheets (Taken 3/31/2025 2135)  Decreased wound size/increased tissue granulation at next dressing change:   Promote sleep for wound healing   Protective dressings over bony prominences  Goal: Participates in plan/prevention/treatment measures  Outcome: Progressing  Flowsheets (Taken 3/31/2025 2135)  Participates in plan/prevention/treatment measures: Elevate heels     Problem: Diabetes  Goal: Maintain glucose levels >70mg/dl to <250mg/dl throughout shift  Outcome: Progressing   The patient's goals for the shift include      The clinical goals for the shift include comfort and rest

## 2025-04-01 NOTE — PROGRESS NOTES
"Bryan Nix is a 64 y.o. male on day 10 of admission presenting with Hemoptysis.    Subjective   Patient is in his bed.  He has tracheostomy tube in place.  Patient requires occasional suctioning.  No further episodes of hemoptysis.       Objective     Physical Exam  Head and face no deformities  Oropharynx normal mucosa  Neck is supple no thyromegaly , tracheostomy tube in place  chest is symmetric no crackles  Heart is regular no murmurs   abdomen is soft and nontender        Last Recorded Vitals  Blood pressure 154/70, pulse 65, temperature 36.3 °C (97.3 °F), resp. rate 18, height 1.88 m (6' 2\"), weight 77.1 kg (170 lb), SpO2 96%.  Intake/Output last 3 Shifts:  I/O last 3 completed shifts:  In: 2976 (38.6 mL/kg) [NG/GT:2676; IV Piggyback:300]  Out: 1300 (16.9 mL/kg) [Urine:1300 (0.5 mL/kg/hr)]  Weight: 77.1 kg                     Assessment/Plan      Chronic respiratory failure tracheostomy tube in place.  Hemoptysis which has resolved.    Plan:  Continue tracheostomy collar ventilation.  Titrate oxygen for saturation between 89 and 95%.  Gentle suctioning.  Avoid trauma of the airway.  Monitor for hemoptysis.  Tracheostomy tube care.      Georgi Madden MD      "

## 2025-04-01 NOTE — CARE PLAN
The patient's goals for the shift include      The clinical goals for the shift include Safety and comfort      Problem: Pain - Adult  Goal: Verbalizes/displays adequate comfort level or baseline comfort level  Outcome: Progressing     Problem: Discharge Planning  Goal: Discharge to home or other facility with appropriate resources  Outcome: Progressing     Problem: Chronic Conditions and Co-morbidities  Goal: Patient's chronic conditions and co-morbidity symptoms are monitored and maintained or improved  Outcome: Progressing     Problem: Skin  Goal: Decreased wound size/increased tissue granulation at next dressing change  Outcome: Progressing  Goal: Participates in plan/prevention/treatment measures  Outcome: Progressing  Flowsheets (Taken 4/1/2025 1033)  Participates in plan/prevention/treatment measures: Elevate heels     Problem: Diabetes  Goal: Achieve decreasing blood glucose levels by end of shift  Outcome: Progressing  Goal: Increase stability of blood glucose readings by end of shift  Outcome: Progressing  Goal: Decrease in ketones present in urine by end of shift  Outcome: Progressing  Goal: Maintain electrolyte levels within acceptable range throughout shift  Outcome: Progressing  Goal: Maintain glucose levels >70mg/dl to <250mg/dl throughout shift  Outcome: Progressing  Goal: No changes in neurological exam by end of shift  Outcome: Progressing  Goal: Learn about and adhere to nutrition recommendations by end of shift  Outcome: Progressing  Goal: Vital signs within normal range for age by end of shift  Outcome: Progressing  Goal: Increase self care and/or family involovement by end of shift  Outcome: Progressing  Goal: Receive DSME education by end of shift  Outcome: Progressing     Problem: Pain  Goal: Takes deep breaths with improved pain control throughout the shift  Outcome: Progressing  Goal: Turns in bed with improved pain control throughout the shift  Outcome: Progressing  Goal: Walks with  improved pain control throughout the shift  Outcome: Progressing  Goal: Performs ADL's with improved pain control throughout shift  Outcome: Progressing  Goal: Participates in PT with improved pain control throughout the shift  Outcome: Progressing  Goal: Free from opioid side effects throughout the shift  Outcome: Progressing  Goal: Free from acute confusion related to pain meds throughout the shift  Outcome: Progressing

## 2025-04-01 NOTE — CONSULTS
"Nutrition Follow Up Assessment:   Nutrition Assessment         Patient is a 64 y.o. male presenting with bleeding from trach      Nutrition History:  Food and Nutrient History: Pt continues on his TF and is tolerating.  His wife is concerned about his glucose readings and has been asking about how we can get them under 200.  I believe they should continue to run as they are and suggested that she ask Dr Bone if he could get his glucose readings under 200.  His fluid was increased to 300 ml every 4 hours and his sodium was 144 today.       Anthropometrics:  Height: 188 cm (6' 2\")   Weight: 77.1 kg (170 lb)   BMI (Calculated): 21.82  IBW/kg (Dietitian Calculated): 86 kg  Percent of IBW: 90 %       Weight History:   Wt Readings from Last 10 Encounters:   03/22/25 77.1 kg (170 lb)   09/29/23 75 kg (165 lb 5 oz)   01/20/22 78.2 kg (172 lb 5 oz)   12/16/21 76.7 kg (169 lb)   03/23/21 75.3 kg (166 lb)   03/18/21 76 kg (167 lb 8 oz)   03/01/21 74.4 kg (164 lb 2 oz)   01/29/21 75 kg (165 lb 5.5 oz)   12/11/20 75.1 kg (165 lb 8 oz)   11/30/20 75.9 kg (167 lb 4 oz)         Weight Change %:       Nutrition Focused Physical Exam Findings:    Subcutaneous Fat Loss:   Defer Subcutaneous Fat Loss Assessment: Defer all  Defer All Reason: Pt is difficulty to move  Muscle Wasting:  Defer Muscle Wasting Assessment: Defer all  Defer All Reason: pt is difficult to move  Edema:  Edema Location: some edema  Physical Findings:  Skin: Positive (coccyx)    Nutrition Significant Labs:  BMP Trend:   Results from last 7 days   Lab Units 03/31/25  0540 03/30/25  0609 03/29/25  0626 03/28/25  0603   GLUCOSE mg/dL 332* 366* 363* 272*   CALCIUM mg/dL 8.7 8.8 8.7 8.5*   SODIUM mmol/L 144 147* 146* 146*   POTASSIUM mmol/L 3.9 4.2 4.1 3.7   CO2 mmol/L 30 30 32 31   CHLORIDE mmol/L 107 110* 109* 108*   BUN mg/dL 26* 26* 27* 25*   CREATININE mg/dL 0.92 0.94 0.96 0.89        Nutrition Specific Medications:  Eliquis; lipitor; plavix; colace; lantus; " lopressor    I/O:   Last BM Date: 03/30/25; Stool Appearance: Liquid (03/31/25 1420)    Dietary Orders (From admission, onward)       Start     Ordered    03/30/25 1201  Enteral feeding with NPO PEG (percutaneous endoscopic gastric); 68; 22 hours; 300; Water; Every 4 hours  Diet effective now        Question Answer Comment   Tube feeding formula: Glucerna 1.5    Feeding route: PEG (percutaneous endoscopic gastric)    Tube feeding continuous rate (mL/hr): 68    Tube feeding cyclic (start / stop time): 22 hours    Tube feeding flush (mL): 300    Flush type: Water    Flush frequency: Every 4 hours        03/30/25 1201    03/22/25 1835  May Not Participate in Room Service  ( ROOM SERVICE MAY NOT PARTICIPATE)  Once        Question:  .  Answer:  Yes    03/22/25 1834                     Estimated Needs:      Method for Estimating Needs: 2585-4435  MSJ     Method for Estimating 24 Hour Protein Needs:   1.1-1.5 gm kg of IBW depending on renal function     Method for Estimating 24 Hour Fluid Needs: 0110-9029    20-30 ml kg of IBW as medically indicated  Patient on Order Fluid Restriction: No        Nutrition Diagnosis        Nutrition Diagnosis  Patient has Nutrition Diagnosis: Yes  Diagnosis Status (1): Active  Nutrition Diagnosis 1: Swallowing difficulty  Related to (1): mech and motor causes  As Evidenced by (1): pt has a trach and peg and is on a TF only, at this time  Additional Nutrition Diagnosis: Diagnosis 2  Diagnosis Status (2): Active  Nutrition Diagnosis 2: Increased nutrient needs  Related to (2): physiological causes  As Evidenced by (2): wound healing needs       Nutrition Interventions/Recommendations        Nutrition Recommendations:  Individualized Nutrition Prescription Provided for : Continue TF of GLucerna 1.5 to 68 ml hr 22 hrs for equivelent to facility TF.  This will provide 2244 noah and 122 gm pro with 1130 ml fluid toward fluid needs.   water flushes are now 300 ml X 6 for a total of 2930 ml  fluid per day or 34 ml kg of IBW.  Monitor labs and toleration of feeding,  Monitor for any needed changes in water flushes. Pt is on a diabetic formula which is high in fiber, which contributes to lower blood sugar    Nutrition Interventions/Goals:   Interventions: Enteral intake  Enteral Intake: Management of composition of enteral nutrition, Management of delivery rate of enteral nutrition, Management of flushing of feeding tube  Goal: toleration of TF at goal rate and a lower sodium  Coordination of Care with Providers: Nursing, Provider      Education Documentation  No documentation found.        N/A    Nutrition Monitoring and Evaluation   Food/Nutrient Related History Monitoring  Monitoring and Evaluation Plan: Enteral and parenteral nutrition intake determination  Enteral and Parenteral Nutrition Intake Determination: Enteral nutrition formula/solution, Enteral nutrition intake - Tolerate TF at goal rate, Enteral nutrition intake - To meet > 75% estimated energy needs, Enteral nutrition intake - Other  Criteria: toleratoin of TF at goal rate and normalinzing of sodium    Anthropometric Measurements  Monitoring and Evaluation Plan: Body weight    Biochemical Data, Medical Tests and Procedures  Monitoring and Evaluation Plan: Electrolyte/renal panel, Glucose/endocrine profile  Criteria: improved sodium and stable BMP  Criteria: glucose within desired range              Time Spent (min): 30 minutes

## 2025-04-01 NOTE — PROGRESS NOTES
04/01/25 1104   Discharge Planning   Home or Post Acute Services Post acute facilities (Rehab/SNF/etc)   Type of Post Acute Facility Services Skilled nursing   Expected Discharge Disposition SNF   Does the patient need discharge transport arranged? Yes   RoundTrip coordination needed? Yes   Has discharge transport been arranged? No     Awaiting updated therapy notes to start precert process for patient to [facility].  Care transitions to follow.    1421pm: Therapies sent to [facility].  Facility tasked with starting precert.  Care transitions to follow.

## 2025-04-01 NOTE — PROGRESS NOTES
Occupational Therapy    OT Treatment    Patient Name: Bryan Nix  MRN: [id number]  Department: [facility]  Room: [address] [place] [address]  Today's Date: 4/1/2025  Time Calculation  Start Time: 1309  Stop Time: 1320  Time Calculation (min): 11 min        Assessment:  End of Session Patient Position: Bed, 2 rail up, Alarm off, not on at start of session     Plan:  Treatment Interventions: ADL retraining, UE strengthening/ROM, Endurance training, Patient/family training, Equipment evaluation/education, Neuromuscular reeducation, Compensatory technique education, UE splinting  OT Frequency: 2 times per week  OT Discharge Recommendations: Moderate intensity level of continued care  OT Recommended Transfer Status: Dependent, Assist of 2  OT - OK to Discharge: Yes (once cleared by medical team)  Treatment Interventions: ADL retraining, UE strengthening/ROM, Endurance training, Patient/family training, Equipment evaluation/education, Neuromuscular reeducation, Compensatory technique education, UE splinting    Subjective   Previous Visit Info:  OT Last Visit  OT Received On: 04/01/25  General:  General  Family/Caregiver Present: Yes (family member present but sleeping in recliner for duration of session)  Prior to Session Communication: Bedside nurse  Patient Position Received: Bed, 2 rail up, Alarm off, not on at start of session  General Comment: pt. agreeable to therapy, able to mouth words  Precautions:  Medical Precautions: Fall precautions, Oxygen therapy device and L/min (trach)  Precautions Comment: trach, peg, fms, iv, O2, NPO        Pain:  Pain Assessment  0-10 (Numeric) Pain Score: 0 - No pain    Objective         Therapy/Activity: Therapeutic Exercise  Therapeutic Exercise Performed: Yes  Therapeutic Exercise Activity 1: pt in long sitting with HOB elevated to 90 degrees with L and R UE positioned on pillow to promote neutral position. pt tolerated PROM to L and R shoulder, elbow, wrist, and hand x10 reps for each set. pt noted  with AROM in L fingers and minimal ROM noted in wrist.      Outcome Measures:[facility] Daily Activity  Putting on and taking off regular lower body clothing: Total  Bathing (including washing, rinsing, drying): Total  Putting on and taking off regular upper body clothing: Total  Toileting, which includes using toilet, bedpan or urinal: Total  Taking care of personal grooming such as brushing teeth: Total  Eating Meals: Total  Daily Activity - Total Score: 6        Education Documentation  Body Mechanics, taught by TERESITA Johnson at 4/1/2025  2:01 PM.  Learner: Patient  Readiness: Acceptance  Method: Explanation  Response: Verbalizes Understanding, Needs Reinforcement    Precautions, taught by TERESITA Johnson at 4/1/2025  2:01 PM.  Learner: Patient  Readiness: Acceptance  Method: Explanation  Response: Verbalizes Understanding, Needs Reinforcement    Home Exercise Program, taught by TERESITA Johnson at 4/1/2025  2:01 PM.  Learner: Patient  Readiness: Acceptance  Method: Explanation  Response: Verbalizes Understanding, Needs Reinforcement    Education Comments  No comments found.               Goals:  Encounter Problems       Encounter Problems (Active)       OT Goals       Pt. will tolerate bue aarom exercises x 10 min. to encourage functional return in prep. for  use in grooming. (Progressing)       Start:  03/27/25    Expected End:  04/10/25            Pt. will tolerate long sitting in bed with max. assist of 2 in prep. for functional transfers/sitting balance. (Progressing)       Start:  03/27/25    Expected End:  04/10/25            Pt. will improve communication skills as able with use of compensatory techniques. (Progressing)       Start:  03/27/25    Expected End:  04/10/25            Pt.'s family will demo. knowledge of bue prom exercises and proper positioning to decrease edema of hands. (Progressing)       Start:  03/27/25    Expected End:  04/10/25            OT Goal 5       Start:   03/27/25    Expected End:  04/10/25

## 2025-04-01 NOTE — PROGRESS NOTES
Physical Therapy    Physical Therapy Treatment    Patient Name: Bryan Nix  MRN: [id number]  Department: [facility]  Room: [address] [place] [address]  Today's Date: 4/1/2025  Time Calculation  Start Time: 0925  Stop Time: 0948  Time Calculation (min): 23 min         Assessment/Plan   PT Assessment  End of Session Patient Position: Bed, 2 rail up, Alarm off, not on at start of session     PT Plan  Treatment/Interventions: Bed mobility, Transfer training, Gait training  PT Plan: Ongoing PT  PT Frequency: 2 times per week  PT Discharge Recommendations: Moderate intensity level of continued care  PT Recommended Transfer Status: Total assist  PT - OK to Discharge: Yes      General Visit Information:   PT  Visit  PT Received On: 04/01/25  General  Family/Caregiver Present: Yes (family member at bedside throughout tx)  Prior to Session Communication: Bedside nurse  Patient Position Received: Bed, 2 rail up, Alarm off, not on at start of session  General Comment:  (pt agreeable to participate in therapy)    Subjective   Precautions:  Precautions  Medical Precautions: Fall precautions, Oxygen therapy device and L/min (trach)  Precautions Comment: tele; peg; fms; external catheter        Objective   Pain:  Pain Assessment  Pain Assessment:  (pt c/o back and buttocks pain.  does not provide number rating at this time; family member reports this being chronic.)    Cognition:  Cognition  Orientation Level: Unable to assess     Treatments:  Therapeutic Exercise  Therapeutic Exercise Performed:  (supine BLE AP, knee flex/ext, hip flex/ext, hip AB/AD, and hip ER/IR x10-12 reps PROM ea.  pt able to demo min AROM L toes only.)    Outcome Measures:  [facility] Basic Mobility  Turning from your back to your side while in a flat bed without using bedrails: Total  Moving from lying on your back to sitting on the side of a flat bed without using bedrails: Total  Moving to and from bed to chair (including a wheelchair): Total  Standing up from a chair using your  arms (e.g. wheelchair or bedside chair): Total  To walk in hospital room: Total  Climbing 3-5 steps with railing: Total  Basic Mobility - Total Score: 6    Education Documentation  Home Exercise Program, taught by Marnie Love PTA at 4/1/2025  2:05 PM.  Learner: Patient  Readiness: Acceptance  Method: Explanation, Demonstration  Response: Demonstrated Understanding    Education Comments  No comments found.        EDUCATION:       Encounter Problems       Encounter Problems (Active)       PT Problem       STG - Pt will perform 2 sets of B LE AAROM in all planes (Progressing)       Start:  03/27/25    Expected End:  04/10/25

## 2025-04-01 NOTE — DISCHARGE SUMMARY
Discharge Diagnosis  Hemoptysis    Issues Requiring Follow-Up  Patient fully evaluated  03/31for   Assessment & Plan  Hemoptysis    Tracheostomy in place (Multi)    CVA, old, hemiparesis (Multi)    Status post insertion of percutaneous endoscopic gastrostomy (PEG) tube (Multi)    Pneumonia due to infectious organism    Right sided weakness    Atrial flutter (Multi)    Insulin dependent type 2 diabetes mellitus (Multi)        Zeus Bone MD  Physician  Internal Medicine     H&P      Signed     Date of Service: 3/23/2025 10:28 AM     Signed       Expand All Collapse All    History Of Present Illness  Bryan Nix is a 64-year-old male with past medical history of atrial flutter on Eliquis, hyponatremia, IDDM, CAD, hypertension, chronic wounds, diffuse lymphadenopathy, anemia, history of smoking, history elevated PSA, CVA L hemisphere (2020) with right-sided deficits, Left ICA stenosis (85%) s/p angioplasty and left carotid artery stent (08/25/2023) and recent history of acute pontine stroke 02/27/25 with acute hypoxic respiratory failure s/p tracheostomy on 2/25/2025 and PEG.  Patient presents for bleeding from tracheostomy.  Patient is alert and oriented x 3, however limited verbal communication due to tracheostomy.  Wife is at bedside and assists with history.  She reports that patient was coughing blood out of his tracheostomy.  ER provider felt bleeding was likely superficial around site.  Patient has been on room air at skilled nursing facility.  He was placed on Airvo in the ED mainly to humidify oxygen.  Patient never desaturated per ER report.  Currently he is resting comfortably in the bed, breath sounds slightly rhonchorous, but unlabored.  Reports chills, but no fevers.  He has right-sided weakness and sensory deficits.  Denies headache, vision or hearing changes, chest pains, palpitations, shortness of breath, nausea, vomiting, abdominal pain, diarrhea, or complications of PEG tube site.  Wife  reports patient skin became irritated in his groin due to adhesive from a condom cath previously, he has a small decubitus wound that she reports is healing well.  He receives tube feeds continuously.  Wife reports a patient was recently evaluated by speech for p.o. to intake, however he still remains n.p.o. except for meds and tube feeds through PEG. CODE STATUS reviewed: full code     ER Course: Hemodynamically stable, afebrile, SpO2 to 97% trach mask.  WBC 9.3, hemoglobin 8, hematocrit 29.4, platelets 197.  INR 1.4.  Glucose 223, BUN 36, otherwise CMP is normal.  CT of the chest for PE pending.  CXR unremarkable. Trannexamic acid soak gauze wrapped around trach.      Past medical history: As above  Past surgical history: As above  Social history: Former smoker 37.5 pack years-quit smoking age 63, occasional alcohol use, no illicit drug use.  .  Works in maintenance.  6 children.  Family history: Noncontributory     Past Medical History  Medical History        Past Medical History:   Diagnosis Date    Abnormal finding of blood chemistry, unspecified 05/18/2016     Abnormal blood chemistry    Acute upper respiratory infection, unspecified 12/02/2015     Acute upper respiratory infection    Elevated prostate specific antigen (PSA)       Elevated prostate specific antigen (PSA)    Encounter for screening for malignant neoplasm of colon 01/30/2021     Colon cancer screening    Encounter for screening for malignant neoplasm of prostate 11/30/2020     Prostate cancer screening    Essential (primary) hypertension 07/30/2013     Benign essential hypertension    Other conditions influencing health status 07/30/2013     Diabetes Mellitus Poorly Controlled    Other conditions influencing health status 12/02/2015     History of cough    Personal history of other endocrine, nutritional and metabolic disease 03/18/2021     History of hyperlipidemia    Personal history of other endocrine, nutritional and metabolic disease  03/18/2021     History of diabetes mellitus    Personal history of other specified conditions 05/18/2016     History of chest pain    Personal history of other specified conditions 05/18/2016     History of dyspnea    Personal history of other specified conditions 05/18/2016     History of dizziness    Personal history of other specified conditions 05/18/2016     History of fatigue            Surgical History  Surgical History         Past Surgical History:   Procedure Laterality Date    CT ANGIO CORONARY ART WITH HEARTFLOW IF SCORE >30%   8/23/2023     CT HEART CORONARY ANGIOGRAM 8/23/2023 [facility]    MR HEAD ANGIO WO IV CONTRAST   12/14/2020     MR HEAD ANGIO WO IV CONTRAST 12/14/2020 [facility]    MR NECK ANGIO WO IV CONTRAST   12/14/2020     MR NECK ANGIO WO IV CONTRAST 12/14/2020 [facility]    OTHER SURGICAL HISTORY   12/01/2020     Hand fracture repair    OTHER SURGICAL HISTORY   12/01/2020     Nasal bone fracture repair            Social History  He reports that he has quit smoking. His smoking use included cigarettes. He has never used smokeless tobacco. No history on file for alcohol use and drug use.     Family History  Family History   No family history on file.        Allergies  Patient has no known allergies.     Review of Systems     Physical Exam  Constitutional:       General: He is awake. He is not in acute distress.     Appearance: Normal appearance. He is not toxic-appearing.   HENT:      Head: Atraumatic.      Nose: Nose normal.      Mouth/Throat:      Mouth: Mucous membranes are moist.   Eyes:      Conjunctiva/sclera: Conjunctivae normal.   Cardiovascular:      Rate and Rhythm: Normal rate and regular rhythm.      Pulses: Normal pulses.      Heart sounds: No murmur heard.  Pulmonary:      Effort: No respiratory distress.      Comments: Tracheostomy site midline, no drainage around dressing.  Rhonchorous breath sounds, unlabored respirations, RT bedside suctioning patient  "with thick blood-tinged sputum  Abdominal:      General: Bowel sounds are normal. There is no distension.      Palpations: Abdomen is soft.      Tenderness: There is no abdominal tenderness. There is no guarding.      Comments: Left upper quadrant PEG site tube site, no drainage or skin breakdown around tube site.   Musculoskeletal:         General: No swelling, deformity or signs of injury.      Cervical back: Neck supple.      Right lower leg: No edema.      Left lower leg: No edema.   Skin:     General: Skin is warm and dry.      Capillary Refill: Capillary refill takes less than 2 seconds.      Findings: Wound (sacrum) present. No ecchymosis or erythema.   Neurological:      Mental Status: He is alert and oriented to person, place, and time.      Cranial Nerves: No facial asymmetry.      Sensory: Sensory deficit (RUE, RLE) present.      Motor: Weakness present.      Comments: Right sided upper and lower extremity motor function deficits   Psychiatric:         Mood and Affect: Mood normal.         Behavior: Behavior is cooperative.            Last Recorded Vitals  Blood pressure 134/50, pulse 93, temperature 36.9 °C (98.4 °F), temperature source Temporal, resp. rate 22, height 1.88 m (6' 2\"), weight 77.1 kg (170 lb), SpO2 98%.     Relevant Results              Results for orders placed or performed during the hospital encounter of 03/22/25 (from the past 24 hours)   CBC and Auto Differential   Result Value Ref Range     WBC 9.3 4.4 - 11.3 x10*3/uL     nRBC 0.3 (H) 0.0 - 0.0 /100 WBCs     RBC 3.02 (L) 4.50 - 5.90 x10*6/uL     Hemoglobin 8.0 (L) 13.5 - 17.5 g/dL     Hematocrit 29.4 (L) 41.0 - 52.0 %     MCV 97 80 - 100 fL     MCH 26.5 26.0 - 34.0 pg     MCHC 27.2 (L) 32.0 - 36.0 g/dL     RDW 15.6 (H) 11.5 - 14.5 %     Platelets 197 150 - 450 x10*3/uL     Neutrophils % 77.1 40.0 - 80.0 %     Immature Granulocytes %, Automated 1.5 (H) 0.0 - 0.9 %     Lymphocytes % 13.1 13.0 - 44.0 %     Monocytes % 4.1 2.0 - 10.0 % "     Eosinophils % 3.2 0.0 - 6.0 %     Basophils % 1.0 0.0 - 2.0 %     Neutrophils Absolute 7.14 1.20 - 7.70 x10*3/uL     Immature Granulocytes Absolute, Automated 0.14 0.00 - 0.70 x10*3/uL     Lymphocytes Absolute 1.21 1.20 - 4.80 x10*3/uL     Monocytes Absolute 0.38 0.10 - 1.00 x10*3/uL     Eosinophils Absolute 0.30 0.00 - 0.70 x10*3/uL     Basophils Absolute 0.09 0.00 - 0.10 x10*3/uL   Basic metabolic panel   Result Value Ref Range     Glucose 223 (H) 74 - 99 mg/dL     Sodium 142 136 - 145 mmol/L     Potassium 4.3 3.5 - 5.3 mmol/L     Chloride 103 98 - 107 mmol/L     Bicarbonate 27 21 - 32 mmol/L     Anion Gap 16 10 - 20 mmol/L     Urea Nitrogen 36 (H) 6 - 23 mg/dL     Creatinine 0.81 0.50 - 1.30 mg/dL     eGFR >90 >60 mL/min/1.73m*2     Calcium 9.6 8.6 - 10.3 mg/dL   Protime-INR   Result Value Ref Range     Protime 15.1 (H) 9.8 - 12.4 seconds     INR 1.4 (H) 0.9 - 1.1   aPTT   Result Value Ref Range     aPTT 25 (L) 26 - 36 seconds   Type and Screen   Result Value Ref Range     ABO TYPE B       Rh TYPE POS       ANTIBODY SCREEN NEG     VERIFY ABO/Rh Group Test   Result Value Ref Range     ABO TYPE B       Rh TYPE POS     MRSA Surveillance for Vancomycin De-escalation, PCR     Specimen: Anterior Nares; Swab   Result Value Ref Range     MRSA PCR Not Detected Not Detected   POCT GLUCOSE   Result Value Ref Range     POCT Glucose 180 (H) 74 - 99 mg/dL   POCT GLUCOSE   Result Value Ref Range     POCT Glucose 161 (H) 74 - 99 mg/dL   POCT GLUCOSE   Result Value Ref Range     POCT Glucose 156 (H) 74 - 99 mg/dL   Basic metabolic panel   Result Value Ref Range     Glucose 170 (H) 74 - 99 mg/dL     Sodium 147 (H) 136 - 145 mmol/L     Potassium 4.2 3.5 - 5.3 mmol/L     Chloride 107 98 - 107 mmol/L     Bicarbonate 30 21 - 32 mmol/L     Anion Gap 14 10 - 20 mmol/L     Urea Nitrogen 35 (H) 6 - 23 mg/dL     Creatinine 0.85 0.50 - 1.30 mg/dL     eGFR >90 >60 mL/min/1.73m*2     Calcium 9.4 8.6 - 10.3 mg/dL   CBC   Result Value Ref  Range     WBC 10.4 4.4 - 11.3 x10*3/uL     nRBC 0.6 (H) 0.0 - 0.0 /100 WBCs     RBC 2.87 (L) 4.50 - 5.90 x10*6/uL     Hemoglobin 7.8 (L) 13.5 - 17.5 g/dL     Hematocrit 26.6 (L) 41.0 - 52.0 %     MCV 93 80 - 100 fL     MCH 27.2 26.0 - 34.0 pg     MCHC 29.3 (L) 32.0 - 36.0 g/dL     RDW 15.7 (H) 11.5 - 14.5 %     Platelets 205 150 - 450 x10*3/uL   SST TOP   Result Value Ref Range     Extra Tube Hold for add-ons.        CT angio chest for pulmonary embolism     Result Date: 3/22/2025  Interpreted By:  Pamela Zurita, STUDY: CT ANGIO CHEST FOR PULMONARY EMBOLISM;  3/22/2025 4:56 pm   INDICATION: Signs/Symptoms:bloody secretions, eval for PE.     COMPARISON: CT chest without contrast 03/22/2025   ACCESSION NUMBER(S): [id number]   ORDERING CLINICIAN: PAMELA HAHN   TECHNIQUE: Contiguous axial images of the chest were obtained after the intravenous administration of iodinated contrast using angiographic PE protocol. Coronal and sagittal reformatted images were reconstructed from the axial data. MIP images were created on an independent workstation and reviewed.   FINDINGS:   MEDIASTINUM AND LYMPH NODES: Inflammatory fat stranding along the tracheostomy tract without discrete fluid collection. The esophageal wall appears within normal limits. There is redemonstration of diffuse lymphadenopathy in the bilateral axilla, mediastinum, bilateral supraclavicular as described in detail on separate report. Also notable or enlarged bilateral level 4 cervical lymph nodes (right > left).   VESSELS:  Normal caliber thoracic aorta without dissection. Moderate aortic atherosclerosis.  No acute pulmonary embolism.   HEART: Normal size.  Mild coronary artery calcifications. No significant pericardial effusion.   LUNG, AIRWAYS, PLEURA: There is slight increase in debris within the trachea. There has been clearing of secretions in the left upper/lower lobe bronchus. There is diffuse bilateral bronchial thickening slightly increased  from prior study. There is peribronchovascular ground-glass opacity in the posterior right upper lobe and superior and basal segments of the right lower lobe. There is slightly worsened atelectasis in the lung bases remaining predominantly subsegmental in nature. There is a 0.8 cm nodule in the left upper lobe that is stable from 08/19/2023.   OSSEOUS STRUCTURES: No acute osseous abnormality.   CHEST WALL SOFT TISSUES: No discernible acute abnormality.   UPPER ABDOMEN/OTHER: No acute abnormality.        No acute pulmonary embolism.   Peribronchial vascular ground glass opacities in the right upper and lower lobe again concerning for pneumonia given patient's risk factors of tracheostomy and debris in the airways.   Redemonstration of diffuse lymphadenopathy in the lower necks, axilla, and mediastinum. Findings are either diffusely reactive in nature, reflective of lymphoma/leukemia, meter deficiency, or less likely head neck neoplasm is previously postulated. However please correlate with past medical history.   Inflammatory changes along the tracheostomy tract without fluid collection. Correlate for erythema.   Unchanged 0.8 cm nodule in the left upper lobe dating back to 08/19/2023. Recommend 1 year follow up chest CT to ensure at least 2 years of stability.   MACRO: None.   Signed by: Pamela Zurita 3/22/2025 7:17 PM Dictation workstation:   [id number]     CT chest wo IV contrast     Result Date: 3/22/2025  Interpreted By:  Pamela Zurita, STUDY: CT CHEST WO IV CONTRAST;  3/22/2025 3:41 pm   INDICATION: Signs/Symptoms:eval for alveolar hemorrhage.     COMPARISON: CT head/neck 08/19/2023   ACCESSION NUMBER(S): [id number]   ORDERING CLINICIAN: PAMELA HAHN   TECHNIQUE: Contiguous axial images of the chest and upper abdomen were obtained without contrast. Coronal and sagittal reformatted images were reconstructed from the axial data.   FINDINGS:   MEDIASTINUM AND LYMPH NODES: There is fat stranding/edema  along the tract of the tracheostomy about discrete fluid collection. The esophageal wall appears within normal limits. There are numerous enlarged bilateral supraclavicular lymph nodes measuring up to 1.4 cm on the left. There are numerous enlarged bilateral axillary lymph nodes measuring up to 1.1 cm on the right and 1.2 cm on the left. There are numerous prominent to mildly enlarged mediastinal lymph nodes as well. No pneumomediastinum.   VESSELS:  Normal caliber thoracic aorta. Moderate aortic atherosclerosis. The main pulmonary artery is normal in size.   HEART: Normal size.  Mild coronary artery calcifications. No significant pericardial effusion.   LUNG, AIRWAYS, PLEURA: Tracheostomy noted in appropriate position. There is trace mucus in the left mainstem bronchus and at the origin of the left upper lobe and lower lobe bronchi. There are mild peribronchovascular ground-glass opacities in the posterior segment of the right upper lobe, basal segments of the right lower lobe. There is mild dependent bibasilar atelectasis. There is no pleural effusion.   OSSEOUS STRUCTURES: No acute osseous abnormality.   CHEST WALL SOFT TISSUES: No discernible acute abnormality.   UPPER ABDOMEN/OTHER: No acute abnormality.        1. Mild peribronchovascular ground-glass opacities in the posterior right upper lobe and basal segments of the right lower lobe. This appearance and distribution is not typical for alveolar hemorrhage which tends to be centrilobular and bilateral asymmetric in distribution. Findings are most suggestive of pneumonia.   2. Small amount of mucous debris in the left mainstem bronchus and at the origin of the left upper and lower lobe bronchi.   3. Diffuse lymphadenopathy in the bilateral axillary regions, mediastinum and left supraclavicular region particularly notable in the bilateral supraclavicular region. This is progressed in the left supraclavicular region and new elsewhere when compared to 08/19/2023  CTA head/neck. Correlate for any history of known head/neck malignancy or lymphoma.   4. Inflammation/fat stranding adjacent to the tracheostomy tube entry site noted. Correlate for air the met. No evidence of fluid collection.   MACRO: None.   Signed by: Marco A Zurita 3/22/2025 7:10 PM Dictation workstation:   [id number]     XR chest 1 view     Result Date: 3/22/2025  Interpreted By:  Fermin Stroud, STUDY: XR CHEST 1 VIEW   INDICATION: Signs/Symptoms:hemoptysis.   COMPARISON: August 19, 2023   ACCESSION NUMBER(S): [id number]   ORDERING CLINICIAN: MARCO A HAHN   FINDINGS: No consolidation, effusion, edema, or pneumothorax. Heart size within normal limits.        No evidence of acute intrathoracic abnormality.   Signed by: Fermin Stroud 3/22/2025 2:01 PM Dictation workstation:   [id number]     XR chest 1 view     Result Date: 3/14/2025  * * *Final Report* * * DATE OF EXAM: Mar 14 2025  8:52AM   S5X   5290  -  XR CHEST 1V FRONTAL   / ACCESSION #  [id number] PROCEDURE REASON: new bleeding from trach      * * * * Physician Interpretation * * * *  EXAMINATION:  CHEST RADIOGRAPH (PORTABLE SINGLE VIEW AP) Exam Date/Time:  3/14/2025 8:52 AM Clinical History:  new bleeding from trach Comparison:  03/03/2025 RESULT: LINES, TUBES, AND DEVICES:  Tracheostomy. CARDIOMEDIASTINAL SILHOUETTE:  The cardiac and mediastinal silhouettes appear unremarkable. LUNGS AND PLEURA: No consolidation.  No pleural effusion. No pulmonary vascular congestion. No pneumothorax identified. OTHER:  N/A     IMPRESSION: No acute abnormality identified. : [facility]   Transcribe Date/Time: Mar 14 2025  9:07A Dictated by : MARCO A EVANS MD This examination was interpreted and the report reviewed and electronically signed by: MARCO A EVANS MD on Mar 14 2025  9:11AM  EST     US abdomen complete     Result Date: 3/11/2025  ABDOMEN ULTRASOUND-COMPLETE FINDINGS: Abdomen Ultrasound Complete: PROCEDURE: Multiple grayscale mages of the abdomen  were obtained. FINDINGS:Multiple real time images demonstrate the liver with increased echogenicity consistent with fatty infiltration. There is no evidence of a discrete mass within the liver. The liver is enlarged, measured at 15.7 cm in size. The gallbladder is seen with no evidence of cholelithiasis. The gallbladder wall is within normal limits. The common bile duct is within normal limits. The visualized pancreas appears unremarkable. Both kidneys are seen with no evidence of hydronephrosis or a calculus. The spleen has its normal homogeneous echo appearance. There is no free fluid to suggest ascites. The visualized aorta appears unremarkable. The inferior vena cava appears patent. CONCLUSION: Enlarged fatty liver. ELECTRONICALLY SIGNED BY LEONEL LAN M.D. 3/11/2025 5:00:52 PM EDT.     XR chest 1 view     Result Date: 3/3/2025  * * *Final Report* * * DATE OF EXAM: Mar  3 2025  1:40PM   S5X   5290  -  XR CHEST 1V FRONTAL   / ACCESSION #  [id number] PROCEDURE REASON: resp failure      * * * * Physician Interpretation * * * *  EXAMINATION:  CHEST RADIOGRAPH (PORTABLE SINGLE VIEW AP) Exam Date/Time:  3/3/2025 1:40 PM Indication:   resp failure M:  XCP_4 Comparison:  1 day prior RESULT: Lines, tubes, and devices:  Tracheostomy tube remains in situ. Lungs and pleura:  Mild patchy opacities right lower lung zone seen on the prior study are no longer appreciated.  No confluent opacity.   Costophrenic angles are clear.  No pneumothorax. Cardiomediastinal silhouette:  Stable cardiomediastinal silhouette. Other:  -     IMPRESSION: Improved aeration right lower lung zone.  No acute cardiopulmonary finding. : HANY   Transcribe Date/Time: Mar  3 2025  2:49P Dictated by : ISRAEL BENSON MD This examination was interpreted and the report reviewed and electronically signed by: ISRAEL BENSON MD on Mar  3 2025  2:49PM  EST     XR chest 1 view     Result Date: 3/2/2025  * * *Final Report* * * DATE OF  EXAM: Mar  2 2025 11:25AM   MMX   5376  -  XR CHEST 1V FRONTAL PORT  / ACCESSION #  [id number] PROCEDURE REASON: Shortness of breath      * * * * Physician Interpretation * * * *  EXAMINATION:  CHEST RADIOGRAPH (PORTABLE SINGLE VIEW AP) Exam Date/Time:  3/2/2025 11:25 AM CLINICAL HISTORY: Shortness of breath MQ:  XCPR_5 Comparison:  02/20/2025. RESULT: This is a limited examination due to multiple wires and tubing obscuring the lower lungs. Lines, tubes, and devices:  A tracheostomy tube remains in place. Lungs and pleura:  There are increased bronchovascular markings in the right lower lung which may be due to bronchitis or early changes of aspiration pneumonia.  Clinical correlation and follow-up exams are recommended. Cardiomediastinal silhouette:  Stable cardiomediastinal silhouette. Other:  The visualized bony thorax appears unremarkable.     IMPRESSION: Nonspecific parenchymal changes in the right lower lung as described above. A follow-up exam is recommended. : HANY   Transcribe Date/Time: Mar  2 2025 11:53A Dictated by : JB INIGUEZ MD This examination was interpreted and the report reviewed and electronically signed by: BJ INIGUEZ MD on Mar  2 2025 11:54AM  EST     XR chest 1 view     Result Date: 2/28/2025  * * *Final Report* * * DATE OF EXAM: Feb 28 2025 10:36AM   MMX   5290  -  XR CHEST 1V FRONTAL   / ACCESSION #  [id number] PROCEDURE REASON: Shortness of breath      * * * * Physician Interpretation * * * *  EXAMINATION:  CHEST RADIOGRAPH (SINGLE VIEW AP OR PA) CLINICAL HISTORY: Shortness of breath MQ:  XC1_5 Comparison:  02/22/2025 RESULT: Lines, tubes, and devices:  Tracheostomy tube is noted. Lungs and pleura:  No consolidation. No lung mass. No pleural effusion. Cardiomediastinal silhouette:  Normal cardiomediastinal silhouette. Other:  No acute osseous pathology.     IMPRESSION: No acute radiographic abnormality. : [facility]   Transcribe Date/Time: Feb 28 2025  12:07P Dictated by : DIANNE RAMOS MD This examination was interpreted and the report reviewed and electronically signed by: DIANNE RAMOS MD on Feb 28 2025 12:08PM  EST     XR abdomen 2 views supine and erect or decub     Result Date: 2/24/2025  * * *Final Report* * * DATE OF EXAM: Feb 24 2025  6:03PM   MMX   5289  -  XR ABDOMEN 1V SUPINE  / ACCESSION #  [id number] PROCEDURE REASON: Evaluate tube, line or lead position      * * * * Physician Interpretation * * * *  EXAMINATION:  XR ABDOMEN 1V SUPINE CLINICAL HISTORY:   Evaluate tube, line or lead position Technique:   XR ABDOMEN 1V SUPINE -- NOT APPLICABLE with 1 views on 1 images Comparison: 2/21/2025 RESULT: Feeding tube with distal tip at the pylorus antrum. Moderate colonic stool burden. No dilated bowel. Lung structures are intact.     IMPRESSION: Feeding tube with distal tip at the pylorus antrum. Moderate colonic stool burden. No dilated bowel. : [facility]   Transcribe Date/Time: Feb 24 2025  6:23P Dictated by : PABLO RAMOS MD This examination was interpreted and the report reviewed and electronically signed by: PABLO RAMOS MD on Feb 24 2025  6:24PM  EST     CT head wo IV contrast     Result Date: 2/23/2025  * * *Final Report* * * DATE OF EXAM: Feb 23 2025 12:04PM   [facility]   0504  -  CT BRAIN WO IVCON  / ACCESSION #  [id number] PROCEDURE REASON: Stroke, follow up      * * * * Physician Interpretation * * * *  EXAMINATION: CT BRAIN WO IVCON CLINICAL HISTORY: Stroke, follow up TECHNIQUE:  Serial axial images without IV contrast were obtained from the vertex to the foramen magnum. MQ:  CTBWO_3 CT Radiation dose: Integrated Dose-Length Product (DLP) for this visit =   778  mGy*cm CT Dose Reduction Employed: Iterative recon COMPARISON: MRI brain 02/19/2025 RESULT: Post-operative change: None. Acute change: Evolving acute infarct along the central eli and superior left medulla (2:7-9).  No evidence of hemorrhagic transformation. Hemorrhage: No evidence of  acute intracranial hemorrhage. Mass Lesion / Mass Effect: There is no evidence of an intracranial mass or extraaxial fluid collection. No significant mass effect. Chronic change: Stable remote left MCA territory encephalomalacia. Parenchyma: There is no significant volume loss. The brain parenchyma is otherwise within normal limits for age. Ventricles: The ventricles are within normal limits of size and configuration for age. Paranasal sinuses and skull base: Partial opacification of the left maxillary and sphenoid sinus and multiple bilateral ethmoid air cells.   The remaining visualized paranasal sinuses are grossly clear. The skull base and imaged soft tissues are unremarkable. Localizer images: No additional findings.     IMPRESSION: Evolving acute brainstem infarct.  No evidence of hemorrhagic transformation. Stable remote left MCA territory infarct. : [facility]   Transcribe Date/Time: Feb 23 2025 12:48P Dictated by : FRANKY GUZMAN MD   This examination was interpreted and the report reviewed and electronically signed by: FRANKY GUZMAN MD on Feb 23 2025 12:52PM  EST     XR chest 1 view     Result Date: 2/22/2025  * * *Final Report* * * DATE OF EXAM: Feb 22 2025  6:56AM   MMX   [id number]  -  XR CHEST 1V FRONTAL   / ACCESSION #  [id number] PROCEDURE REASON: Evaluate tube, line,  or lead position      * * * * Physician Interpretation * * * *  EXAMINATION:  CHEST RADIOGRAPH (SINGLE VIEW AP OR PA) CLINICAL HISTORY: Evaluate tube, line,  or lead position MQ:  XC1_5 Comparison:  02/21/2025. RESULT: Lines, tubes, and devices:  An ET tube and feeding tube remain in place.   A poorly defined right venous catheter cannot be excluded. Lungs and pleura:  There are persistent bilateral lower lungs infiltrates seen on the right more than left suggestive of pneumonia such as aspiration pneumonia.  There is no definite pleural effusion. Cardiomediastinal silhouette:  Stable cardiomediastinal silhouette. Other:  The visualized  bony thorax appears unremarkable.     IMPRESSION: Persistent bilateral lower lungs infiltrates as described above. A follow-up exam is recommended. : [facility]   Transcribe Date/Time: Feb 22 2025  8:20A Dictated by : BJ INIGUEZ MD This examination was interpreted and the report reviewed and electronically signed by: BJ INIGUEZ MD on Feb 22 2025  8:21AM  EST     XR chest 1 view     Result Date: 2/21/2025  * * *Final Report* * * DATE OF EXAM: Feb 21 2025  5:59PM   MMX   [id number]  -  XR CHEST 1V FRONTAL PORT  / ACCESSION #  [id number] PROCEDURE REASON: Evaluate tube, line,  or lead position      * * * * Physician Interpretation * * * *  EXAMINATION:  CHEST RADIOGRAPH (PORTABLE SINGLE VIEW AP) Exam Date/Time:  2/21/2025 5:59 PM CLINICAL HISTORY: Evaluate tube, line,  or lead position MQ:  XCPR_5 Comparison:  02/21/2025 RESULT: Lines, tubes, and devices: Endotracheal tube in place terminating in enteric tube in place terminating below the field of view. Lungs and pleura: No pneumothorax.  No pleural effusion.  Bilateral mild interstitial opacities.  The right costophrenic angle is not included in the field-of-view. Cardiomediastinal silhouette:  Stable cardiomediastinal silhouette. Other:  .     IMPRESSION: Mild interstitial opacities may be related to pulmonary vascular congestion. : [facility]   Transcribe Date/Time: Feb 21 2025  6:55P Dictated by : IRWIN VALDIVIA MD This examination was interpreted and the report reviewed and electronically signed by: IRWIN VALDIVIA MD on Feb 21 2025  6:57PM  EST     XR abdomen 2 views supine and erect or decub     Result Date: 2/21/2025  * * *Final Report* * * DATE OF EXAM: Feb 21 2025 10:32AM   MMX   [id number]  -  XR ABDOMEN 1V SUPINE  / ACCESSION #  [id number] PROCEDURE REASON: Evaluate tube, line or lead position      * * * * Physician Interpretation * * * *  Clinical Information: Feeding tube Single view of the abdomen was obtained. Feeding  tube tip within the distal stomach. There is a nonspecific, nonobstructive bowel gas pattern. No abnormal abdominal masses are identified.  No pathologic calcifications.   No acute bony abnormalities are seen.     IMPRESSION: Nonspecific, nonobstructive bowel gas pattern. Feeding tube tip within the distal stomach. : [facility]   Transcribe Date/Time: Feb 21 2025 10:43A Dictated by : PAMELA EVANS MD This examination was interpreted and the report reviewed and electronically signed by: PAMELA EVANS MD on Feb 21 2025 10:44AM  EST     XR chest 1 view     Result Date: 2/21/2025  * * *Final Report* * * DATE OF EXAM: Feb 21 2025 10:32AM   MMX   [id number]  -  XR CHEST 1V FRONTAL PORT  / ACCESSION #  [id number] PROCEDURE REASON: Shortness of breath      * * * * Physician Interpretation * * * *  EXAMINATION:  CHEST RADIOGRAPH (PORTABLE SINGLE VIEW AP) Exam Date/Time:  2/21/2025 10:32 AM Clinical History:  Shortness of breath Comparison:  02/17/2025 RESULT: LINES, TUBES, AND DEVICES:  Feeding tube tip beyond the inferior extent of the image. CARDIOMEDIASTINAL SILHOUETTE:  The cardiac and mediastinal silhouettes appear unremarkable. LUNGS AND PLEURA: No consolidation.  No pleural effusion. No pulmonary vascular congestion. No pneumothorax identified. OTHER:  N/A     IMPRESSION: No acute abnormality identified. 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  Daily H&H  Monitor for overt bleeding  Consult pulmonology, appreciate input  Pneumonia suspected for CT findings  Tracheostomy protocol  Expected cover with Zosyn and vancomycin  MRSA swab  Urine for Legionella antigen and strep pneumonia antigen  Supplemental oxygen as needed  Nebulizers as needed  RT evaluate and treat  Check procalcitonin  Typical 20% zinc oxide to wounds and groin  Continuous tube feeds, continue at 80 mL an hour x 22 hours  Accu-Chek every 6 hours  Hypoglycemia protocol  PT/OT  Anticoagulation and antiplatelet therapy on hold, resume once cleared by pulmonology  Continue patient's home medications for chronic issues as appropriate     Patient fully evaluated on March 23.  Continue to monitor H&H.  Pulmonary consultation obtained.  Continue broad-spectrum antibiotics to rule out infectious process.  Recheck labs in AM.                 Zeus Bone MD                    Nany      Patient with significant clinical improvement noted, patient medically cleared for discharge today. Patient seen resting in bed with head of bed elevated, no s/s or c/o acute difficulties at this time. Vital signs for last 24 hours:  Temp:  [36 °C (96.8 °F)-36.9 °C (98.4 °F)] 36.3 °C (97.3 °F)  Heart Rate:  [53-68] 65  Resp:  [17-18] 18  BP: (116-154)/(55-70) 154/70  FiO2 (%):  [28 %-30 %] 30 % Medications and labs reviewed-   Results for orders placed or performed during the hospital encounter of 03/22/25 (from the past 24 hours)   POCT GLUCOSE   Result Value Ref Range    POCT Glucose 290 (H) 74 - 99 mg/dL   POCT GLUCOSE   Result Value Ref Range    POCT Glucose 320 (H) 74 - 99 mg/dL   POCT GLUCOSE   Result Value Ref Range    POCT Glucose 269 (H) 74 - 99 mg/dL   Comprehensive Metabolic Panel   Result Value Ref Range    Glucose 298 (H) 74 - 99 mg/dL    Sodium 144 136 - 145 mmol/L    Potassium 4.1 3.5 - 5.3 mmol/L     Chloride 109 (H) 98 - 107 mmol/L    Bicarbonate 29 21 - 32 mmol/L    Anion Gap 10 10 - 20 mmol/L    Urea Nitrogen 26 (H) 6 - 23 mg/dL    Creatinine 0.79 0.50 - 1.30 mg/dL    eGFR >90 >60 mL/min/1.73m*2    Calcium 8.8 8.6 - 10.3 mg/dL    Albumin 3.1 (L) 3.4 - 5.0 g/dL    Alkaline Phosphatase 256 (H) 33 - 136 U/L    Total Protein 6.2 (L) 6.4 - 8.2 g/dL    AST 32 9 - 39 U/L    Bilirubin, Total 0.3 0.0 - 1.2 mg/dL    ALT 98 (H) 10 - 52 U/L   CBC and Auto Differential   Result Value Ref Range    WBC 12.1 (H) 4.4 - 11.3 x10*3/uL    nRBC 9.2 (H) 0.0 - 0.0 /100 WBCs    RBC 2.92 (L) 4.50 - 5.90 x10*6/uL    Hemoglobin 8.1 (L) 13.5 - 17.5 g/dL    Hematocrit 29.4 (L) 41.0 - 52.0 %     (H) 80 - 100 fL    MCH 27.7 26.0 - 34.0 pg    MCHC 27.6 (L) 32.0 - 36.0 g/dL    RDW 22.2 (H) 11.5 - 14.5 %    Platelets 352 150 - 450 x10*3/uL    Immature Granulocytes %, Automated 6.3 (H) 0.0 - 0.9 %    Immature Granulocytes Absolute, Automated 0.89 (H) 0.00 - 0.70 x10*3/uL   Manual Differential   Result Value Ref Range    Neutrophils %, Manual 83.0 40.0 - 80.0 %    Bands %, Manual 3.0 0.0 - 5.0 %    Lymphocytes %, Manual 9.0 13.0 - 44.0 %    Monocytes %, Manual 1.0 2.0 - 10.0 %    Eosinophils %, Manual 0.0 0.0 - 6.0 %    Basophils %, Manual 1.0 0.0 - 2.0 %    Metamyelocytes %, Manual 3.0 0.0 - 0.0 %    Seg Neutrophils Absolute, Manual 10.04 (H) 1.20 - 7.00 x10*3/uL    Bands Absolute, Manual 0.36 0.00 - 0.70 x10*3/uL    Lymphocytes Absolute, Manual 1.09 (L) 1.20 - 4.80 x10*3/uL    Monocytes Absolute, Manual 0.12 0.10 - 1.00 x10*3/uL    Eosinophils Absolute, Manual 0.00 0.00 - 0.70 x10*3/uL    Basophils Absolute, Manual 0.12 (H) 0.00 - 0.10 x10*3/uL    Metamyelocytes Absolute, Manual 0.36 0.00 - 0.00 x10*3/uL    Total Cells Counted 100     Neutrophils Absolute, Manual 10.40 (H) 1.20 - 7.70 x10*3/uL    Manual nRBC per 100 Cells 17.0 (H) 0.0 - 0.0 %    RBC Morphology See Below     Polychromasia Mild     Basophilic Stippling Present      Clumped Platelets Present    POCT GLUCOSE   Result Value Ref Range    POCT Glucose 269 (H) 74 - 99 mg/dL      Patient recently received an antibiotic (last 12 hours)       Date/Time Action Medication Dose    03/31/25 0908 New Bag    piperacillin-tazobactam (Zosyn) 3.375 g in dextrose (iso) IV 50 mL 3.375 g    03/31/25 0231 New Bag    piperacillin-tazobactam (Zosyn) 3.375 g in dextrose (iso) IV 50 mL 3.375 g           No results found for the last 90 days.       Continue aggressive pulmonary hygiene and oral hygiene. Off loading as tolerated for skin integrity.  Plan discussed with interdisciplinary team, no acute events overnight, patient denies chest pain, worsening SOB at this time. Ok to discharge to [facility], will discontinue plavix, will current and repeat labs in the AM if patient still hospitalized. Patient aware and agreeable to current plan, continue plan as above.     I spent 30 minutes on the date of the service which included preparing to see the patient, face-to-face patient care, completing clinical documentation, obtaining and/or reviewing separately obtained history, performing a medically appropriate examination, counseling and educating the patient/family/caregiver, ordering medications, tests, or procedures, communicating with other HCPs (not separately reported), independently interpreting results (not separately reported), communicating results to the patient/family/caregiver, and care coordination (not separately reported    Discharge Meds     Medication List      START taking these medications     amiodarone 200 mg tablet; Commonly known as: Pacerone; 1 tablet (200 mg)   by g-tube route 2 times a day.   epoetin nicko 20,000 unit/mL injection; Commonly known as: Procrit;   Inject 1.5 mL (30,000 Units) under the skin 1 (one) time per week.; Start   taking on: April 6, 2025   insulin glargine 100 unit/mL injection; Commonly known as: Lantus;   Inject 40 Units under the skin once daily at  bedtime. Take as directed per   insulin instructions.; Replaces: Lantus Solostar U-100 Insulin 100 unit/mL   (3 mL) pen   * ipratropium 0.02 % nebulizer solution; Commonly known as: Atrovent;   Take 2.5 mL (0.5 mg) by nebulization 2 times a day.   * ipratropium 0.02 % nebulizer solution; Commonly known as: Atrovent;   Take 2.5 mL (0.5 mg) by nebulization every 2 hours if needed for wheezing   or shortness of breath.   zinc oxide 20 % ointment; Apply 1 Application topically every 1 hour if   needed for irritation.  * This list has 2 medication(s) that are the same as other medications   prescribed for you. Read the directions carefully, and ask your doctor or   other care provider to review them with you.     CONTINUE taking these medications     acetaminophen 325 mg chewable tablet   acetylcysteine 200 mg/mL (20 %) nebulizer solution; Commonly known as:   Mucomyst   apixaban 5 mg tablet; Commonly known as: Eliquis   cholecalciferol 1,250 mcg (50,000 unit) tablet; Commonly known as:   Vitamin D3   HumaLOG KwikPen Insulin 100 unit/mL pen; Generic drug: insulin lispro   metoprolol tartrate 50 mg tablet; Commonly known as: Lopressor   oxygen gas therapy; Commonly known as: O2   sodium chloride 0.65 % nasal drops; Commonly known as: Ayr   sucralfate 1 gram tablet; Commonly known as: Carafate   traZODone 50 mg tablet; Commonly known as: Desyrel     STOP taking these medications     albuterol 2.5 mg /3 mL (0.083 %) nebulizer solution   atorvastatin 80 mg tablet; Commonly known as: Lipitor   clopidogrel 75 mg tablet; Commonly known as: Plavix   docusate sodium 100 mg capsule; Commonly known as: Colace   empagliflozin 25 mg tablet; Commonly known as: Jardiance   hydrALAZINE 50 mg tablet; Commonly known as: Apresoline   ipratropium-albuteroL 0.5-2.5 mg/3 mL nebulizer solution; Commonly known   as: Duo-Neb   Lantus Solostar U-100 Insulin 100 unit/mL (3 mL) pen; Generic drug:   insulin glargine; Replaced by: insulin glargine  100 unit/mL injection       Test Results Pending At Discharge  Pending Labs       Order Current Status    Surgical Pathology Exam In process            Hospital Course         Zeus Welch MD   Physician  Internal Medicine     Progress Notes      Signed     Date of Service: 3/30/2025  1:03 PM     Signed       Expand All Collapse All    Bryan Nix is a 64 y.o. male on day 8 of admission presenting with Hemoptysis.           Subjective  Patient fully evaluated on March 24.  Hemoptysis appears resolved.  Still with slightly elevated white count to be monitored.  Continue aggressive antibiotic regimen.  Repeat chest x-ray is pending.              Objective  Last Recorded Vitals  /57   Pulse 73   Temp 36.8 °C (98.2 °F)   Resp 18   Wt 77.1 kg (170 lb)   SpO2 95%   Intake/Output last 3 Shifts:     Intake/Output Summary (Last 24 hours) at 3/30/2025 1303  Last data filed at 3/30/2025 0600      Gross per 24 hour   Intake 1603.06 ml   Output 2050 ml   Net -446.94 ml         Admission Weight  Weight: 77.1 kg (170 lb) (03/22/25 1255)     Daily Weight  03/22/25 : 77.1 kg (170 lb)     Image Results  XR chest 1 view  Narrative: Interpreted By:  Ankur Tubbs,   STUDY:  XR CHEST 1 VIEW; 3/28/2025 3:55 pm      INDICATION:  Signs/Symptoms:Diagnotisc.      COMPARISON:  Chest x-ray 03/24/2025      ACCESSION NUMBER(S):  [id number]      ORDERING CLINICIAN:  ZEUS WELCH      TECHNIQUE:  1 view of the chest was performed.      FINDINGS:  Small left-sided pleural effusions surrounding atelectasis.. No  pneumothorax.  The cardiomediastinal silhouette is stable.  Tracheostomy cannula noted.      Impression: 1. New small left-sided pleural effusions surrounding atelectasis.      Signed by: Ankur Tubbs 3/28/2025 4:15 PM  Dictation workstation:   [id number]        Physical Exam     Relevant Results                       Assessment/Plan  This patient currently has cardiac telemetry ordered; if you would like to modify or  discontinue the telemetry order, click hereto go to the orders activity to modify/discontinue the order.                       Assessment & Plan  Hemoptysis     Tracheostomy in place (Multi)     CVA, old, hemiparesis (Multi)     Status post insertion of percutaneous endoscopic gastrostomy (PEG) tube (Multi)     Pneumonia due to infectious organism     Right sided weakness     Atrial flutter (Multi)     Insulin dependent type 2 diabetes mellitus (Multi)        Zeus Bone MD  Physician  Internal Medicine     H&P      Signed     Date of Service: 3/23/2025 10:28 AM      Signed        Expand All Collapse All    History Of Present Illness  Bryan Nix is a 64-year-old male with past medical history of atrial flutter on Eliquis, hyponatremia, IDDM, CAD, hypertension, chronic wounds, diffuse lymphadenopathy, anemia, history of smoking, history elevated PSA, CVA L hemisphere (2020) with right-sided deficits, Left ICA stenosis (85%) s/p angioplasty and left carotid artery stent (08/25/2023) and recent history of acute pontine stroke 02/27/25 with acute hypoxic respiratory failure s/p tracheostomy on 2/25/2025 and PEG.  Patient presents for bleeding from tracheostomy.  Patient is alert and oriented x 3, however limited verbal communication due to tracheostomy.  Wife is at bedside and assists with history.  She reports that patient was coughing blood out of his tracheostomy.  ER provider felt bleeding was likely superficial around site.  Patient has been on room air at skilled nursing facility.  He was placed on Airvo in the ED mainly to humidify oxygen.  Patient never desaturated per ER report.  Currently he is resting comfortably in the bed, breath sounds slightly rhonchorous, but unlabored.  Reports chills, but no fevers.  He has right-sided weakness and sensory deficits.  Denies headache, vision or hearing changes, chest pains, palpitations, shortness of breath, nausea, vomiting, abdominal pain, diarrhea, or  complications of PEG tube site.  Wife reports patient skin became irritated in his groin due to adhesive from a condom cath previously, he has a small decubitus wound that she reports is healing well.  He receives tube feeds continuously.  Wife reports a patient was recently evaluated by speech for p.o. to intake, however he still remains n.p.o. except for meds and tube feeds through PEG. CODE STATUS reviewed: full code     ER Course: Hemodynamically stable, afebrile, SpO2 to 97% trach mask.  WBC 9.3, hemoglobin 8, hematocrit 29.4, platelets 197.  INR 1.4.  Glucose 223, BUN 36, otherwise CMP is normal.  CT of the chest for PE pending.  CXR unremarkable. Trannexamic acid soak gauze wrapped around trach.      Past medical history: As above  Past surgical history: As above  Social history: Former smoker 37.5 pack years-quit smoking age 63, occasional alcohol use, no illicit drug use.  .  Works in maintenance.  6 children.  Family history: Noncontributory     Past Medical History  Medical History           Past Medical History:   Diagnosis Date    Abnormal finding of blood chemistry, unspecified 05/18/2016     Abnormal blood chemistry    Acute upper respiratory infection, unspecified 12/02/2015     Acute upper respiratory infection    Elevated prostate specific antigen (PSA)       Elevated prostate specific antigen (PSA)    Encounter for screening for malignant neoplasm of colon 01/30/2021     Colon cancer screening    Encounter for screening for malignant neoplasm of prostate 11/30/2020     Prostate cancer screening    Essential (primary) hypertension 07/30/2013     Benign essential hypertension    Other conditions influencing health status 07/30/2013     Diabetes Mellitus Poorly Controlled    Other conditions influencing health status 12/02/2015     History of cough    Personal history of other endocrine, nutritional and metabolic disease 03/18/2021     History of hyperlipidemia    Personal history of other  endocrine, nutritional and metabolic disease 03/18/2021     History of diabetes mellitus    Personal history of other specified conditions 05/18/2016     History of chest pain    Personal history of other specified conditions 05/18/2016     History of dyspnea    Personal history of other specified conditions 05/18/2016     History of dizziness    Personal history of other specified conditions 05/18/2016     History of fatigue            Surgical History  Surgical History             Past Surgical History:   Procedure Laterality Date    CT ANGIO CORONARY ART WITH HEARTFLOW IF SCORE >30%   8/23/2023     CT HEART CORONARY ANGIOGRAM 8/23/2023 [facility] CT    MR HEAD ANGIO WO IV CONTRAST   12/14/2020     MR HEAD ANGIO WO IV CONTRAST 12/14/2020 [facility]    MR NECK ANGIO WO IV CONTRAST   12/14/2020     MR NECK ANGIO WO IV CONTRAST 12/14/2020 [facility]    OTHER SURGICAL HISTORY   12/01/2020     Hand fracture repair    OTHER SURGICAL HISTORY   12/01/2020     Nasal bone fracture repair            Social History  He reports that he has quit smoking. His smoking use included cigarettes. He has never used smokeless tobacco. No history on file for alcohol use and drug use.     Family History  Family History   No family history on file.         Allergies  Patient has no known allergies.     Review of Systems     Physical Exam  Constitutional:       General: He is awake. He is not in acute distress.     Appearance: Normal appearance. He is not toxic-appearing.   HENT:      Head: Atraumatic.      Nose: Nose normal.      Mouth/Throat:      Mouth: Mucous membranes are moist.   Eyes:      Conjunctiva/sclera: Conjunctivae normal.   Cardiovascular:      Rate and Rhythm: Normal rate and regular rhythm.      Pulses: Normal pulses.      Heart sounds: No murmur heard.  Pulmonary:      Effort: No respiratory distress.      Comments: Tracheostomy site midline, no drainage around dressing.  Rhonchorous breath sounds,  "unlabored respirations, RT bedside suctioning patient with thick blood-tinged sputum  Abdominal:      General: Bowel sounds are normal. There is no distension.      Palpations: Abdomen is soft.      Tenderness: There is no abdominal tenderness. There is no guarding.      Comments: Left upper quadrant PEG site tube site, no drainage or skin breakdown around tube site.   Musculoskeletal:         General: No swelling, deformity or signs of injury.      Cervical back: Neck supple.      Right lower leg: No edema.      Left lower leg: No edema.   Skin:     General: Skin is warm and dry.      Capillary Refill: Capillary refill takes less than 2 seconds.      Findings: Wound (sacrum) present. No ecchymosis or erythema.   Neurological:      Mental Status: He is alert and oriented to person, place, and time.      Cranial Nerves: No facial asymmetry.      Sensory: Sensory deficit (RUE, RLE) present.      Motor: Weakness present.      Comments: Right sided upper and lower extremity motor function deficits   Psychiatric:         Mood and Affect: Mood normal.         Behavior: Behavior is cooperative.            Last Recorded Vitals  Blood pressure 134/50, pulse 93, temperature 36.9 °C (98.4 °F), temperature source Temporal, resp. rate 22, height 1.88 m (6' 2\"), weight 77.1 kg (170 lb), SpO2 98%.     Relevant Results                  Results for orders placed or performed during the hospital encounter of 03/22/25 (from the past 24 hours)   CBC and Auto Differential   Result Value Ref Range     WBC 9.3 4.4 - 11.3 x10*3/uL     nRBC 0.3 (H) 0.0 - 0.0 /100 WBCs     RBC 3.02 (L) 4.50 - 5.90 x10*6/uL     Hemoglobin 8.0 (L) 13.5 - 17.5 g/dL     Hematocrit 29.4 (L) 41.0 - 52.0 %     MCV 97 80 - 100 fL     MCH 26.5 26.0 - 34.0 pg     MCHC 27.2 (L) 32.0 - 36.0 g/dL     RDW 15.6 (H) 11.5 - 14.5 %     Platelets 197 150 - 450 x10*3/uL     Neutrophils % 77.1 40.0 - 80.0 %     Immature Granulocytes %, Automated 1.5 (H) 0.0 - 0.9 %     " Lymphocytes % 13.1 13.0 - 44.0 %     Monocytes % 4.1 2.0 - 10.0 %     Eosinophils % 3.2 0.0 - 6.0 %     Basophils % 1.0 0.0 - 2.0 %     Neutrophils Absolute 7.14 1.20 - 7.70 x10*3/uL     Immature Granulocytes Absolute, Automated 0.14 0.00 - 0.70 x10*3/uL     Lymphocytes Absolute 1.21 1.20 - 4.80 x10*3/uL     Monocytes Absolute 0.38 0.10 - 1.00 x10*3/uL     Eosinophils Absolute 0.30 0.00 - 0.70 x10*3/uL     Basophils Absolute 0.09 0.00 - 0.10 x10*3/uL   Basic metabolic panel   Result Value Ref Range     Glucose 223 (H) 74 - 99 mg/dL     Sodium 142 136 - 145 mmol/L     Potassium 4.3 3.5 - 5.3 mmol/L     Chloride 103 98 - 107 mmol/L     Bicarbonate 27 21 - 32 mmol/L     Anion Gap 16 10 - 20 mmol/L     Urea Nitrogen 36 (H) 6 - 23 mg/dL     Creatinine 0.81 0.50 - 1.30 mg/dL     eGFR >90 >60 mL/min/1.73m*2     Calcium 9.6 8.6 - 10.3 mg/dL   Protime-INR   Result Value Ref Range     Protime 15.1 (H) 9.8 - 12.4 seconds     INR 1.4 (H) 0.9 - 1.1   aPTT   Result Value Ref Range     aPTT 25 (L) 26 - 36 seconds   Type and Screen   Result Value Ref Range     ABO TYPE B       Rh TYPE POS       ANTIBODY SCREEN NEG     VERIFY ABO/Rh Group Test   Result Value Ref Range     ABO TYPE B       Rh TYPE POS     MRSA Surveillance for Vancomycin De-escalation, PCR     Specimen: Anterior Nares; Swab   Result Value Ref Range     MRSA PCR Not Detected Not Detected   POCT GLUCOSE   Result Value Ref Range     POCT Glucose 180 (H) 74 - 99 mg/dL   POCT GLUCOSE   Result Value Ref Range     POCT Glucose 161 (H) 74 - 99 mg/dL   POCT GLUCOSE   Result Value Ref Range     POCT Glucose 156 (H) 74 - 99 mg/dL   Basic metabolic panel   Result Value Ref Range     Glucose 170 (H) 74 - 99 mg/dL     Sodium 147 (H) 136 - 145 mmol/L     Potassium 4.2 3.5 - 5.3 mmol/L     Chloride 107 98 - 107 mmol/L     Bicarbonate 30 21 - 32 mmol/L     Anion Gap 14 10 - 20 mmol/L     Urea Nitrogen 35 (H) 6 - 23 mg/dL     Creatinine 0.85 0.50 - 1.30 mg/dL     eGFR >90 >60  mL/min/1.73m*2     Calcium 9.4 8.6 - 10.3 mg/dL   CBC   Result Value Ref Range     WBC 10.4 4.4 - 11.3 x10*3/uL     nRBC 0.6 (H) 0.0 - 0.0 /100 WBCs     RBC 2.87 (L) 4.50 - 5.90 x10*6/uL     Hemoglobin 7.8 (L) 13.5 - 17.5 g/dL     Hematocrit 26.6 (L) 41.0 - 52.0 %     MCV 93 80 - 100 fL     MCH 27.2 26.0 - 34.0 pg     MCHC 29.3 (L) 32.0 - 36.0 g/dL     RDW 15.7 (H) 11.5 - 14.5 %     Platelets 205 150 - 450 x10*3/uL   SST TOP   Result Value Ref Range     Extra Tube Hold for add-ons.        CT angio chest for pulmonary embolism     Result Date: 3/22/2025  Interpreted By:  Pamela Zurita, STUDY: CT ANGIO CHEST FOR PULMONARY EMBOLISM;  3/22/2025 4:56 pm   INDICATION: Signs/Symptoms:bloody secretions, eval for PE.     COMPARISON: CT chest without contrast 03/22/2025   ACCESSION NUMBER(S): [id number]   ORDERING CLINICIAN: PAMELA HAHN   TECHNIQUE: Contiguous axial images of the chest were obtained after the intravenous administration of iodinated contrast using angiographic PE protocol. Coronal and sagittal reformatted images were reconstructed from the axial data. MIP images were created on an independent workstation and reviewed.   FINDINGS:   MEDIASTINUM AND LYMPH NODES: Inflammatory fat stranding along the tracheostomy tract without discrete fluid collection. The esophageal wall appears within normal limits. There is redemonstration of diffuse lymphadenopathy in the bilateral axilla, mediastinum, bilateral supraclavicular as described in detail on separate report. Also notable or enlarged bilateral level 4 cervical lymph nodes (right > left).   VESSELS:  Normal caliber thoracic aorta without dissection. Moderate aortic atherosclerosis.  No acute pulmonary embolism.   HEART: Normal size.  Mild coronary artery calcifications. No significant pericardial effusion.   LUNG, AIRWAYS, PLEURA: There is slight increase in debris within the trachea. There has been clearing of secretions in the left upper/lower lobe  bronchus. There is diffuse bilateral bronchial thickening slightly increased from prior study. There is peribronchovascular ground-glass opacity in the posterior right upper lobe and superior and basal segments of the right lower lobe. There is slightly worsened atelectasis in the lung bases remaining predominantly subsegmental in nature. There is a 0.8 cm nodule in the left upper lobe that is stable from 08/19/2023.   OSSEOUS STRUCTURES: No acute osseous abnormality.   CHEST WALL SOFT TISSUES: No discernible acute abnormality.   UPPER ABDOMEN/OTHER: No acute abnormality.        No acute pulmonary embolism.   Peribronchial vascular ground glass opacities in the right upper and lower lobe again concerning for pneumonia given patient's risk factors of tracheostomy and debris in the airways.   Redemonstration of diffuse lymphadenopathy in the lower necks, axilla, and mediastinum. Findings are either diffusely reactive in nature, reflective of lymphoma/leukemia, meter deficiency, or less likely head neck neoplasm is previously postulated. However please correlate with past medical history.   Inflammatory changes along the tracheostomy tract without fluid collection. Correlate for erythema.   Unchanged 0.8 cm nodule in the left upper lobe dating back to 08/19/2023. Recommend 1 year follow up chest CT to ensure at least 2 years of stability.   MACRO: None.   Signed by: Pamela Zurita 3/22/2025 7:17 PM Dictation workstation:   [id number]     CT chest wo IV contrast     Result Date: 3/22/2025  Interpreted By:  Pamela Zurita, STUDY: CT CHEST WO IV CONTRAST;  3/22/2025 3:41 pm   INDICATION: Signs/Symptoms:eval for alveolar hemorrhage.     COMPARISON: CT head/neck 08/19/2023   ACCESSION NUMBER(S): [id number]   ORDERING CLINICIAN: PAMELA HAHN   TECHNIQUE: Contiguous axial images of the chest and upper abdomen were obtained without contrast. Coronal and sagittal reformatted images were reconstructed from the axial  data.   FINDINGS:   MEDIASTINUM AND LYMPH NODES: There is fat stranding/edema along the tract of the tracheostomy about discrete fluid collection. The esophageal wall appears within normal limits. There are numerous enlarged bilateral supraclavicular lymph nodes measuring up to 1.4 cm on the left. There are numerous enlarged bilateral axillary lymph nodes measuring up to 1.1 cm on the right and 1.2 cm on the left. There are numerous prominent to mildly enlarged mediastinal lymph nodes as well. No pneumomediastinum.   VESSELS:  Normal caliber thoracic aorta. Moderate aortic atherosclerosis. The main pulmonary artery is normal in size.   HEART: Normal size.  Mild coronary artery calcifications. No significant pericardial effusion.   LUNG, AIRWAYS, PLEURA: Tracheostomy noted in appropriate position. There is trace mucus in the left mainstem bronchus and at the origin of the left upper lobe and lower lobe bronchi. There are mild peribronchovascular ground-glass opacities in the posterior segment of the right upper lobe, basal segments of the right lower lobe. There is mild dependent bibasilar atelectasis. There is no pleural effusion.   OSSEOUS STRUCTURES: No acute osseous abnormality.   CHEST WALL SOFT TISSUES: No discernible acute abnormality.   UPPER ABDOMEN/OTHER: No acute abnormality.        1. Mild peribronchovascular ground-glass opacities in the posterior right upper lobe and basal segments of the right lower lobe. This appearance and distribution is not typical for alveolar hemorrhage which tends to be centrilobular and bilateral asymmetric in distribution. Findings are most suggestive of pneumonia.   2. Small amount of mucous debris in the left mainstem bronchus and at the origin of the left upper and lower lobe bronchi.   3. Diffuse lymphadenopathy in the bilateral axillary regions, mediastinum and left supraclavicular region particularly notable in the bilateral supraclavicular region. This is progressed in  the left supraclavicular region and new elsewhere when compared to 08/19/2023 CTA head/neck. Correlate for any history of known head/neck malignancy or lymphoma.   4. Inflammation/fat stranding adjacent to the tracheostomy tube entry site noted. Correlate for air the met. No evidence of fluid collection.   MACRO: None.   Signed by: Marco A Zurita 3/22/2025 7:10 PM Dictation workstation:   [id number]     XR chest 1 view     Result Date: 3/22/2025  Interpreted By:  Fermin Stroud, STUDY: XR CHEST 1 VIEW   INDICATION: Signs/Symptoms:hemoptysis.   COMPARISON: August 19, 2023   ACCESSION NUMBER(S): [id number]   ORDERING CLINICIAN: MARCO A HAHN   FINDINGS: No consolidation, effusion, edema, or pneumothorax. Heart size within normal limits.        No evidence of acute intrathoracic abnormality.   Signed by: Fermin Stroud 3/22/2025 2:01 PM Dictation workstation:   [id number]     XR chest 1 view     Result Date: 3/14/2025  * * *Final Report* * * DATE OF EXAM: Mar 14 2025  8:52AM   S5X   5290  -  XR CHEST 1V FRONTAL   / ACCESSION #  [id number] PROCEDURE REASON: new bleeding from trach      * * * * Physician Interpretation * * * *  EXAMINATION:  CHEST RADIOGRAPH (PORTABLE SINGLE VIEW AP) Exam Date/Time:  3/14/2025 8:52 AM Clinical History:  new bleeding from trach Comparison:  03/03/2025 RESULT: LINES, TUBES, AND DEVICES:  Tracheostomy. CARDIOMEDIASTINAL SILHOUETTE:  The cardiac and mediastinal silhouettes appear unremarkable. LUNGS AND PLEURA: No consolidation.  No pleural effusion. No pulmonary vascular congestion. No pneumothorax identified. OTHER:  N/A     IMPRESSION: No acute abnormality identified. : [facility]   Transcribe Date/Time: Mar 14 2025  9:07A Dictated by : MARCO A EVANS MD This examination was interpreted and the report reviewed and electronically signed by: MARCO A EVANS MD on Mar 14 2025  9:11AM  EST     US abdomen complete     Result Date: 3/11/2025  ABDOMEN ULTRASOUND-COMPLETE FINDINGS:  Abdomen Ultrasound Complete: PROCEDURE: Multiple grayscale mages of the abdomen were obtained. FINDINGS:Multiple real time images demonstrate the liver with increased echogenicity consistent with fatty infiltration. There is no evidence of a discrete mass within the liver. The liver is enlarged, measured at 15.7 cm in size. The gallbladder is seen with no evidence of cholelithiasis. The gallbladder wall is within normal limits. The common bile duct is within normal limits. The visualized pancreas appears unremarkable. Both kidneys are seen with no evidence of hydronephrosis or a calculus. The spleen has its normal homogeneous echo appearance. There is no free fluid to suggest ascites. The visualized aorta appears unremarkable. The inferior vena cava appears patent. CONCLUSION: Enlarged fatty liver. ELECTRONICALLY SIGNED BY LEONEL LAN M.D. 3/11/2025 5:00:52 PM EDT.     XR chest 1 view     Result Date: 3/3/2025  * * *Final Report* * * DATE OF EXAM: Mar  3 2025  1:40PM   S5X   5290  -  XR CHEST 1V FRONTAL   / ACCESSION #  [id number] PROCEDURE REASON: resp failure      * * * * Physician Interpretation * * * *  EXAMINATION:  CHEST RADIOGRAPH (PORTABLE SINGLE VIEW AP) Exam Date/Time:  3/3/2025 1:40 PM Indication:   resp failure M:  XCP_4 Comparison:  1 day prior RESULT: Lines, tubes, and devices:  Tracheostomy tube remains in situ. Lungs and pleura:  Mild patchy opacities right lower lung zone seen on the prior study are no longer appreciated.  No confluent opacity.   Costophrenic angles are clear.  No pneumothorax. Cardiomediastinal silhouette:  Stable cardiomediastinal silhouette. Other:  -     IMPRESSION: Improved aeration right lower lung zone.  No acute cardiopulmonary finding. : [facility]   Transcribe Date/Time: Mar  3 2025  2:49P Dictated by : ISRAEL BENSON MD This examination was interpreted and the report reviewed and electronically signed by: ISRAEL BENSON MD on Mar  3 2025  2:49PM   EST     XR chest 1 view     Result Date: 3/2/2025  * * *Final Report* * * DATE OF EXAM: Mar  2 2025 11:25AM   MMX   5376  -  XR CHEST 1V FRONTAL PORT  / ACCESSION #  [id number] PROCEDURE REASON: Shortness of breath      * * * * Physician Interpretation * * * *  EXAMINATION:  CHEST RADIOGRAPH (PORTABLE SINGLE VIEW AP) Exam Date/Time:  3/2/2025 11:25 AM CLINICAL HISTORY: Shortness of breath MQ:  XCPR_5 Comparison:  02/20/2025. RESULT: This is a limited examination due to multiple wires and tubing obscuring the lower lungs. Lines, tubes, and devices:  A tracheostomy tube remains in place. Lungs and pleura:  There are increased bronchovascular markings in the right lower lung which may be due to bronchitis or early changes of aspiration pneumonia.  Clinical correlation and follow-up exams are recommended. Cardiomediastinal silhouette:  Stable cardiomediastinal silhouette. Other:  The visualized bony thorax appears unremarkable.     IMPRESSION: Nonspecific parenchymal changes in the right lower lung as described above. A follow-up exam is recommended. : [facility]   Transcribe Date/Time: Mar  2 2025 11:53A Dictated by : BJ INIGUEZ MD This examination was interpreted and the report reviewed and electronically signed by: BJ INIGUEZ MD on Mar  2 2025 11:54AM  EST     XR chest 1 view     Result Date: 2/28/2025  * * *Final Report* * * DATE OF EXAM: Feb 28 2025 10:36AM   MMX   5290  -  XR CHEST 1V FRONTAL   / ACCESSION #  [id number] PROCEDURE REASON: Shortness of breath      * * * * Physician Interpretation * * * *  EXAMINATION:  CHEST RADIOGRAPH (SINGLE VIEW AP OR PA) CLINICAL HISTORY: Shortness of breath MQ:  XC1_5 Comparison:  02/22/2025 RESULT: Lines, tubes, and devices:  Tracheostomy tube is noted. Lungs and pleura:  No consolidation. No lung mass. No pleural effusion. Cardiomediastinal silhouette:  Normal cardiomediastinal silhouette. Other:  No acute osseous pathology.     IMPRESSION: No acute  radiographic abnormality. : [facility]   Transcribe Date/Time: Feb 28 2025 12:07P Dictated by : DIANNE RAMOS MD This examination was interpreted and the report reviewed and electronically signed by: DIANNE RAMOS MD on Feb 28 2025 12:08PM  EST     XR abdomen 2 views supine and erect or decub     Result Date: 2/24/2025  * * *Final Report* * * DATE OF EXAM: Feb 24 2025  6:03PM   MMX   5289  -  XR ABDOMEN 1V SUPINE  / ACCESSION #  [id number] PROCEDURE REASON: Evaluate tube, line or lead position      * * * * Physician Interpretation * * * *  EXAMINATION:  XR ABDOMEN 1V SUPINE CLINICAL HISTORY:   Evaluate tube, line or lead position Technique:   XR ABDOMEN 1V SUPINE -- NOT APPLICABLE with 1 views on 1 images Comparison: 2/21/2025 RESULT: Feeding tube with distal tip at the pylorus antrum. Moderate colonic stool burden. No dilated bowel. Lung structures are intact.     IMPRESSION: Feeding tube with distal tip at the pylorus antrum. Moderate colonic stool burden. No dilated bowel. : [facility]   Transcribe Date/Time: Feb 24 2025  6:23P Dictated by : PABLO RAMOS MD This examination was interpreted and the report reviewed and electronically signed by: PABLO RAMOS MD on Feb 24 2025  6:24PM  EST     CT head wo IV contrast     Result Date: 2/23/2025  * * *Final Report* * * DATE OF EXAM: Feb 23 2025 12:04PM   [facility]   0504  -  CT BRAIN WO IVCON  / ACCESSION #  [id number] PROCEDURE REASON: Stroke, follow up      * * * * Physician Interpretation * * * *  EXAMINATION: CT BRAIN WO IVCON CLINICAL HISTORY: Stroke, follow up TECHNIQUE:  Serial axial images without IV contrast were obtained from the vertex to the foramen magnum. MQ:  CTBWO_3 CT Radiation dose: Integrated Dose-Length Product (DLP) for this visit =   778  mGy*cm CT Dose Reduction Employed: Iterative recon COMPARISON: MRI brain 02/19/2025 RESULT: Post-operative change: None. Acute change: Evolving acute infarct along the central eli and superior left  medulla (2:7-9).  No evidence of hemorrhagic transformation. Hemorrhage: No evidence of acute intracranial hemorrhage. Mass Lesion / Mass Effect: There is no evidence of an intracranial mass or extraaxial fluid collection. No significant mass effect. Chronic change: Stable remote left MCA territory encephalomalacia. Parenchyma: There is no significant volume loss. The brain parenchyma is otherwise within normal limits for age. Ventricles: The ventricles are within normal limits of size and configuration for age. Paranasal sinuses and skull base: Partial opacification of the left maxillary and sphenoid sinus and multiple bilateral ethmoid air cells.   The remaining visualized paranasal sinuses are grossly clear. The skull base and imaged soft tissues are unremarkable. Localizer images: No additional findings.     IMPRESSION: Evolving acute brainstem infarct.  No evidence of hemorrhagic transformation. Stable remote left MCA territory infarct. : [facility]   Transcribe Date/Time: Feb 23 2025 12:48P Dictated by : FRANKY GUZMAN MD   This examination was interpreted and the report reviewed and electronically signed by: FRANKY GUZMAN MD on Feb 23 2025 12:52PM  EST     XR chest 1 view     Result Date: 2/22/2025  * * *Final Report* * * DATE OF EXAM: Feb 22 2025  6:56AM   MMX   5290  -  XR CHEST 1V FRONTAL   / ACCESSION #  [id number] PROCEDURE REASON: Evaluate tube, line,  or lead position      * * * * Physician Interpretation * * * *  EXAMINATION:  CHEST RADIOGRAPH (SINGLE VIEW AP OR PA) CLINICAL HISTORY: Evaluate tube, line,  or lead position MQ:  XC1_5 Comparison:  02/21/2025. RESULT: Lines, tubes, and devices:  An ET tube and feeding tube remain in place.   A poorly defined right venous catheter cannot be excluded. Lungs and pleura:  There are persistent bilateral lower lungs infiltrates seen on the right more than left suggestive of pneumonia such as aspiration pneumonia.  There is no definite pleural effusion.  Cardiomediastinal silhouette:  Stable cardiomediastinal silhouette. Other:  The visualized bony thorax appears unremarkable.     IMPRESSION: Persistent bilateral lower lungs infiltrates as described above. A follow-up exam is recommended. : [facility]   Transcribe Date/Time: Feb 22 2025  8:20A Dictated by : BJ INIGUEZ MD This examination was interpreted and the report reviewed and electronically signed by: BJ INIGUEZ MD on Feb 22 2025  8:21AM  EST     XR chest 1 view     Result Date: 2/21/2025  * * *Final Report* * * DATE OF EXAM: Feb 21 2025  5:59PM   MMX   5376  -  XR CHEST 1V FRONTAL PORT  / ACCESSION #  [id number] PROCEDURE REASON: Evaluate tube, line,  or lead position      * * * * Physician Interpretation * * * *  EXAMINATION:  CHEST RADIOGRAPH (PORTABLE SINGLE VIEW AP) Exam Date/Time:  2/21/2025 5:59 PM CLINICAL HISTORY: Evaluate tube, line,  or lead position MQ:  XCPR_5 Comparison:  02/21/2025 RESULT: Lines, tubes, and devices: Endotracheal tube in place terminating in enteric tube in place terminating below the field of view. Lungs and pleura: No pneumothorax.  No pleural effusion.  Bilateral mild interstitial opacities.  The right costophrenic angle is not included in the field-of-view. Cardiomediastinal silhouette:  Stable cardiomediastinal silhouette. Other:  .     IMPRESSION: Mild interstitial opacities may be related to pulmonary vascular congestion. : [facility]   Transcribe Date/Time: Feb 21 2025  6:55P Dictated by : IRWIN VALDIVIA MD This examination was interpreted and the report reviewed and electronically signed by: IRWIN VALDIVIA MD on Feb 21 2025  6:57PM  EST     XR abdomen 2 views supine and erect or decub     Result Date: 2/21/2025  * * *Final Report* * * DATE OF EXAM: Feb 21 2025 10:32AM   MMX   5289  -  XR ABDOMEN 1V SUPINE  / ACCESSION #  [id number] PROCEDURE REASON: Evaluate tube, line or lead position      * * * * Physician Interpretation *  * * *  Clinical Information: Feeding tube Single view of the abdomen was obtained. Feeding tube tip within the distal stomach. There is a nonspecific, nonobstructive bowel gas pattern. No abnormal abdominal masses are identified.  No pathologic calcifications.   No acute bony abnormalities are seen.     IMPRESSION: Nonspecific, nonobstructive bowel gas pattern. Feeding tube tip within the distal stomach. : [facility]   Transcribe Date/Time: Feb 21 2025 10:43A Dictated by : PAMELA EVANS MD This examination was interpreted and the report reviewed and electronically signed by: PAMELA EVANS MD on Feb 21 2025 10:44AM  EST     XR chest 1 view     Result Date: 2/21/2025  * * *Final Report* * * DATE OF EXAM: Feb 21 2025 10:32AM   MMX   5376  -  XR CHEST 1V FRONTAL PORT  / ACCESSION #  [id number] PROCEDURE REASON: Shortness of breath      * * * * Physician Interpretation * * * *  EXAMINATION:  CHEST RADIOGRAPH (PORTABLE SINGLE VIEW AP) Exam Date/Time:  2/21/2025 10:32 AM Clinical History:  Shortness of breath Comparison:  02/17/2025 RESULT: LINES, TUBES, AND DEVICES:  Feeding tube tip beyond the inferior extent of the image. CARDIOMEDIASTINAL SILHOUETTE:  The cardiac and mediastinal silhouettes appear unremarkable. LUNGS AND PLEURA: No consolidation.  No pleural effusion. No pulmonary vascular congestion. No pneumothorax identified. OTHER:  N/A     IMPRESSION: No acute abnormality identified. : [facility]   Transcribe Date/Time: Feb 21 2025 10:41A Dictated by : PAMELA EVANS MD This examination was interpreted and the report reviewed and electronically signed by: PAMELA EVANS MD on Feb 21 2025 10:43AM  EST            Assessment/Plan     Assessment & Plan  Hemoptysis     Tracheostomy in place (Multi)     CVA, old, hemiparesis (Multi)     Status post insertion of percutaneous endoscopic gastrostomy (PEG) tube (Multi)     Pneumonia due to infectious organism     Right sided weakness     Atrial flutter  (Multi)     Insulin dependent type 2 diabetes mellitus (Multi)        Telemetry monitoring  Hemoglobin stable 8's recently on review of outside labs, monitor for ongoing bleeding.  Daily H&H  Monitor for overt bleeding  Consult pulmonology, appreciate input  Pneumonia suspected for CT findings  Tracheostomy protocol  Expected cover with Zosyn and vancomycin  MRSA swab  Urine for Legionella antigen and strep pneumonia antigen  Supplemental oxygen as needed  Nebulizers as needed  RT evaluate and treat  Check procalcitonin  Typical 20% zinc oxide to wounds and groin  Continuous tube feeds, continue at 80 mL an hour x 22 hours  Accu-Chek every 6 hours  Hypoglycemia protocol  PT/OT  Anticoagulation and antiplatelet therapy on hold, resume once cleared by pulmonology  Continue patient's home medications for chronic issues as appropriate     Patient fully evaluated on March 23.  Continue to monitor H&H.  Pulmonary consultation obtained.  Continue broad-spectrum antibiotics to rule out infectious process.  Recheck labs in AM.                 Zeus Bone MD                     Melena           Patient fully evaluated  3/25  for    Problem List Items Addressed This Visit         * (Principal) Hemoptysis - Primary     Relevant Orders     Esophagogastroduodenoscopy (EGD) (Completed)     Surgical Pathology Exam     Melena     Relevant Orders     Esophagogastroduodenoscopy (EGD) (Completed)     Surgical Pathology Exam      Other Visit Diagnoses         Anemia, unspecified type         Relevant Orders     Esophagogastroduodenoscopy (EGD) (Completed)     Surgical Pathology Exam     Tracheostomy dependent (Multi)         Relevant Orders     Surgical Pathology Exam             Patient seen resting in bed with head of bed elevated, no s/s or c/o acute difficulties at this time.  Vital signs for last 24 hours Temp:  [35.5 °C (95.9 °F)-37 °C (98.6 °F)] 36.8 °C (98.2 °F)  Heart Rate:  [63-74] 73  Resp:  [17-18] 18  BP:  (118-151)/(57-78) 118/57  FiO2 (%):  [28 %-30 %] 28 %    No intake/output data recorded.  Patient still requiring frequent cardiac and SPO2 monitoring. Continue aggressive pulmonary hygiene and oral hygiene. Off loading as tolerated for skin integrity. Medications and labs reviewed-         Results for orders placed or performed during the hospital encounter of 03/22/25 (from the past 24 hours)   POCT GLUCOSE   Result Value Ref Range     POCT Glucose 342 (H) 74 - 99 mg/dL   POCT GLUCOSE   Result Value Ref Range     POCT Glucose 340 (H) 74 - 99 mg/dL   POCT GLUCOSE   Result Value Ref Range     POCT Glucose 354 (H) 74 - 99 mg/dL   Comprehensive Metabolic Panel   Result Value Ref Range     Glucose 366 (H) 74 - 99 mg/dL     Sodium 147 (H) 136 - 145 mmol/L     Potassium 4.2 3.5 - 5.3 mmol/L     Chloride 110 (H) 98 - 107 mmol/L     Bicarbonate 30 21 - 32 mmol/L     Anion Gap 11 10 - 20 mmol/L     Urea Nitrogen 26 (H) 6 - 23 mg/dL     Creatinine 0.94 0.50 - 1.30 mg/dL     eGFR 90 >60 mL/min/1.73m*2     Calcium 8.8 8.6 - 10.3 mg/dL     Albumin 3.0 (L) 3.4 - 5.0 g/dL     Alkaline Phosphatase 276 (H) 33 - 136 U/L     Total Protein 6.1 (L) 6.4 - 8.2 g/dL     AST 30 9 - 39 U/L     Bilirubin, Total 0.4 0.0 - 1.2 mg/dL      (H) 10 - 52 U/L   CBC and Auto Differential   Result Value Ref Range     WBC 11.5 (H) 4.4 - 11.3 x10*3/uL     nRBC 7.4 (H) 0.0 - 0.0 /100 WBCs     RBC 2.77 (L) 4.50 - 5.90 x10*6/uL     Hemoglobin 7.6 (L) 13.5 - 17.5 g/dL     Hematocrit 27.3 (L) 41.0 - 52.0 %     MCV 99 80 - 100 fL     MCH 27.4 26.0 - 34.0 pg     MCHC 27.8 (L) 32.0 - 36.0 g/dL     RDW 19.1 (H) 11.5 - 14.5 %     Platelets 293 150 - 450 x10*3/uL     Immature Granulocytes %, Automated 8.3 (H) 0.0 - 0.9 %     Immature Granulocytes Absolute, Automated 0.97 (H) 0.00 - 0.70 x10*3/uL   Manual Differential   Result Value Ref Range     Neutrophils %, Manual 79.0 40.0 - 80.0 %     Lymphocytes %, Manual 10.0 13.0 - 44.0 %     Monocytes %, Manual 3.0  2.0 - 10.0 %     Eosinophils %, Manual 2.0 0.0 - 6.0 %     Basophils %, Manual 1.0 0.0 - 2.0 %     Metamyelocytes %, Manual 5.0 0.0 - 0.0 %     Seg Neutrophils Absolute, Manual 9.09 (H) 1.20 - 7.00 x10*3/uL     Lymphocytes Absolute, Manual 1.15 (L) 1.20 - 4.80 x10*3/uL     Monocytes Absolute, Manual 0.35 0.10 - 1.00 x10*3/uL     Eosinophils Absolute, Manual 0.23 0.00 - 0.70 x10*3/uL     Basophils Absolute, Manual 0.12 (H) 0.00 - 0.10 x10*3/uL     Metamyelocytes Absolute, Manual 0.58 0.00 - 0.00 x10*3/uL     Total Cells Counted 100       Manual nRBC per 100 Cells 2.0 (H) 0.0 - 0.0 %     RBC Morphology See Below       Polychromasia Mild       Basophilic Stippling Present     Morphology   Result Value Ref Range     RBC Morphology See Below       Polychromasia Mild       Basophilic Stippling Present     POCT GLUCOSE   Result Value Ref Range     POCT Glucose 331 (H) 74 - 99 mg/dL      Patient recently received an antibiotic (last 12 hours)         Date/Time Action Medication Dose     03/25/25 1540 New Bag    piperacillin-tazobactam (Zosyn) 3.375 g in dextrose (iso) IV 50 mL 3.375 g     03/25/25 0949 New Bag    piperacillin-tazobactam (Zosyn) 3.375 g in dextrose (iso) IV 50 mL 3.375 g             Pt fully evaluated 3/25. Hematocrit 26.2 and hemoglobin 7.3. WBC at 14.8. Added cardio and increased fluids and free water flushes to q4. Plan discussed with interdisciplinary team, continue current and repeat labs in the AM.      Discharge planning discussed with patient and care team. Therapy evaluations ordered. [facility]  , anticipate HHC/SNF at discharge. Patient aware and agreeable to current plan, continue plan as above.      I spent a total of 50 minutes on the date of the service which included preparing to see the patient, face-to-face patient care, completing clinical documentation, obtaining and/or reviewing separately obtained history, performing a medically appropriate examination, counseling and educating the  patient/family/caregiver, ordering medications, tests, or procedures, communicating with other HCPs (not separately reported), independently interpreting results (not separately reported), communicating results to the patient/family/caregiver, and care coordination (not separately reported).   Patient fully evaluated  03/26  for    Problem List Items Addressed This Visit         * (Principal) Hemoptysis - Primary     Relevant Orders     Esophagogastroduodenoscopy (EGD) (Completed)     Surgical Pathology Exam     Melena     Relevant Orders     Esophagogastroduodenoscopy (EGD) (Completed)     Surgical Pathology Exam      Other Visit Diagnoses         Anemia, unspecified type         Relevant Orders     Esophagogastroduodenoscopy (EGD) (Completed)     Surgical Pathology Exam     Tracheostomy dependent (Multi)         Relevant Orders     Surgical Pathology Exam             Patient seen resting in bed with head of bed elevated, no s/s or c/o acute difficulties at this time.  Vital signs for last 24 hours Temp:  [35.5 °C (95.9 °F)-37 °C (98.6 °F)] 36.8 °C (98.2 °F)  Heart Rate:  [63-74] 73  Resp:  [17-18] 18  BP: (118-151)/(57-78) 118/57  FiO2 (%):  [28 %-30 %] 28 %    No intake/output data recorded.  Patient still requiring frequent cardiac and SPO2 monitoring. Continue aggressive pulmonary hygiene and oral hygiene. Off loading as tolerated for skin integrity. Medications and labs reviewed-         Results for orders placed or performed during the hospital encounter of 03/22/25 (from the past 24 hours)   POCT GLUCOSE   Result Value Ref Range     POCT Glucose 342 (H) 74 - 99 mg/dL   POCT GLUCOSE   Result Value Ref Range     POCT Glucose 340 (H) 74 - 99 mg/dL   POCT GLUCOSE   Result Value Ref Range     POCT Glucose 354 (H) 74 - 99 mg/dL   Comprehensive Metabolic Panel   Result Value Ref Range     Glucose 366 (H) 74 - 99 mg/dL     Sodium 147 (H) 136 - 145 mmol/L     Potassium 4.2 3.5 - 5.3 mmol/L     Chloride 110 (H) 98 -  107 mmol/L     Bicarbonate 30 21 - 32 mmol/L     Anion Gap 11 10 - 20 mmol/L     Urea Nitrogen 26 (H) 6 - 23 mg/dL     Creatinine 0.94 0.50 - 1.30 mg/dL     eGFR 90 >60 mL/min/1.73m*2     Calcium 8.8 8.6 - 10.3 mg/dL     Albumin 3.0 (L) 3.4 - 5.0 g/dL     Alkaline Phosphatase 276 (H) 33 - 136 U/L     Total Protein 6.1 (L) 6.4 - 8.2 g/dL     AST 30 9 - 39 U/L     Bilirubin, Total 0.4 0.0 - 1.2 mg/dL      (H) 10 - 52 U/L   CBC and Auto Differential   Result Value Ref Range     WBC 11.5 (H) 4.4 - 11.3 x10*3/uL     nRBC 7.4 (H) 0.0 - 0.0 /100 WBCs     RBC 2.77 (L) 4.50 - 5.90 x10*6/uL     Hemoglobin 7.6 (L) 13.5 - 17.5 g/dL     Hematocrit 27.3 (L) 41.0 - 52.0 %     MCV 99 80 - 100 fL     MCH 27.4 26.0 - 34.0 pg     MCHC 27.8 (L) 32.0 - 36.0 g/dL     RDW 19.1 (H) 11.5 - 14.5 %     Platelets 293 150 - 450 x10*3/uL     Immature Granulocytes %, Automated 8.3 (H) 0.0 - 0.9 %     Immature Granulocytes Absolute, Automated 0.97 (H) 0.00 - 0.70 x10*3/uL   Manual Differential   Result Value Ref Range     Neutrophils %, Manual 79.0 40.0 - 80.0 %     Lymphocytes %, Manual 10.0 13.0 - 44.0 %     Monocytes %, Manual 3.0 2.0 - 10.0 %     Eosinophils %, Manual 2.0 0.0 - 6.0 %     Basophils %, Manual 1.0 0.0 - 2.0 %     Metamyelocytes %, Manual 5.0 0.0 - 0.0 %     Seg Neutrophils Absolute, Manual 9.09 (H) 1.20 - 7.00 x10*3/uL     Lymphocytes Absolute, Manual 1.15 (L) 1.20 - 4.80 x10*3/uL     Monocytes Absolute, Manual 0.35 0.10 - 1.00 x10*3/uL     Eosinophils Absolute, Manual 0.23 0.00 - 0.70 x10*3/uL     Basophils Absolute, Manual 0.12 (H) 0.00 - 0.10 x10*3/uL     Metamyelocytes Absolute, Manual 0.58 0.00 - 0.00 x10*3/uL     Total Cells Counted 100       Manual nRBC per 100 Cells 2.0 (H) 0.0 - 0.0 %     RBC Morphology See Below       Polychromasia Mild       Basophilic Stippling Present     Morphology   Result Value Ref Range     RBC Morphology See Below       Polychromasia Mild       Basophilic Stippling Present     POCT  GLUCOSE   Result Value Ref Range     POCT Glucose 331 (H) 74 - 99 mg/dL      Patient recently received an antibiotic (last 12 hours)         Date/Time Action Medication Dose     03/26/25 0903 New Bag    piperacillin-tazobactam (Zosyn) 3.375 g in dextrose (iso) IV 50 mL 3.375 g             Plan discussed with interdisciplinary team, patient with hemoccult positive, protonix 40mg IVP BID, GI consulted, appreciate input. Patient seen by nephrology today with plan for IV iron and erythropoietin, potential nephrotoxins and hepatotoxins, monitor renal chemistries and CBC continue current and repeat labs in the AM.      Discharge planning discussed with patient and care team. Therapy evaluations ordered. Anticipate HHC/SNF at discharge. Patient aware and agreeable to current plan, continue plan as above.      I spent a total of 50 minutes on the date of the service which included preparing to see the patient, face-to-face patient care, completing clinical documentation, obtaining and/or reviewing separately obtained history, performing a medically appropriate examination, counseling and educating the patient/family/caregiver, ordering medications, tests, or procedures, communicating with other HCPs (not separately reported), independently interpreting results (not separately reported), communicating results to the patient/family/caregiver, and care coordination (not separately reported).   ALEXANDER Vivas     Patient fully evaluated 3/27   for    Problem List Items Addressed This Visit         * (Principal) Hemoptysis - Primary     Relevant Orders     Esophagogastroduodenoscopy (EGD) (Completed)     Surgical Pathology Exam     Melena     Relevant Orders     Esophagogastroduodenoscopy (EGD) (Completed)     Surgical Pathology Exam      Other Visit Diagnoses         Anemia, unspecified type         Relevant Orders     Esophagogastroduodenoscopy (EGD) (Completed)     Surgical Pathology Exam     Tracheostomy dependent  (Multi)         Relevant Orders     Surgical Pathology Exam             Patient seen resting in bed with head of bed elevated, no s/s or c/o acute difficulties at this time.  Vital signs for last 24 hours Temp:  [35.5 °C (95.9 °F)-37 °C (98.6 °F)] 36.8 °C (98.2 °F)  Heart Rate:  [63-74] 73  Resp:  [17-18] 18  BP: (118-151)/(57-78) 118/57  FiO2 (%):  [28 %-30 %] 28 %    No intake/output data recorded.  Patient still requiring frequent cardiac and SPO2 monitoring. Continue aggressive pulmonary hygiene and oral hygiene. Off loading as tolerated for skin integrity. Medications and labs reviewed-         Results for orders placed or performed during the hospital encounter of 03/22/25 (from the past 24 hours)   POCT GLUCOSE   Result Value Ref Range     POCT Glucose 342 (H) 74 - 99 mg/dL   POCT GLUCOSE   Result Value Ref Range     POCT Glucose 340 (H) 74 - 99 mg/dL   POCT GLUCOSE   Result Value Ref Range     POCT Glucose 354 (H) 74 - 99 mg/dL   Comprehensive Metabolic Panel   Result Value Ref Range     Glucose 366 (H) 74 - 99 mg/dL     Sodium 147 (H) 136 - 145 mmol/L     Potassium 4.2 3.5 - 5.3 mmol/L     Chloride 110 (H) 98 - 107 mmol/L     Bicarbonate 30 21 - 32 mmol/L     Anion Gap 11 10 - 20 mmol/L     Urea Nitrogen 26 (H) 6 - 23 mg/dL     Creatinine 0.94 0.50 - 1.30 mg/dL     eGFR 90 >60 mL/min/1.73m*2     Calcium 8.8 8.6 - 10.3 mg/dL     Albumin 3.0 (L) 3.4 - 5.0 g/dL     Alkaline Phosphatase 276 (H) 33 - 136 U/L     Total Protein 6.1 (L) 6.4 - 8.2 g/dL     AST 30 9 - 39 U/L     Bilirubin, Total 0.4 0.0 - 1.2 mg/dL      (H) 10 - 52 U/L   CBC and Auto Differential   Result Value Ref Range     WBC 11.5 (H) 4.4 - 11.3 x10*3/uL     nRBC 7.4 (H) 0.0 - 0.0 /100 WBCs     RBC 2.77 (L) 4.50 - 5.90 x10*6/uL     Hemoglobin 7.6 (L) 13.5 - 17.5 g/dL     Hematocrit 27.3 (L) 41.0 - 52.0 %     MCV 99 80 - 100 fL     MCH 27.4 26.0 - 34.0 pg     MCHC 27.8 (L) 32.0 - 36.0 g/dL     RDW 19.1 (H) 11.5 - 14.5 %     Platelets 293 150  - 450 x10*3/uL     Immature Granulocytes %, Automated 8.3 (H) 0.0 - 0.9 %     Immature Granulocytes Absolute, Automated 0.97 (H) 0.00 - 0.70 x10*3/uL   Manual Differential   Result Value Ref Range     Neutrophils %, Manual 79.0 40.0 - 80.0 %     Lymphocytes %, Manual 10.0 13.0 - 44.0 %     Monocytes %, Manual 3.0 2.0 - 10.0 %     Eosinophils %, Manual 2.0 0.0 - 6.0 %     Basophils %, Manual 1.0 0.0 - 2.0 %     Metamyelocytes %, Manual 5.0 0.0 - 0.0 %     Seg Neutrophils Absolute, Manual 9.09 (H) 1.20 - 7.00 x10*3/uL     Lymphocytes Absolute, Manual 1.15 (L) 1.20 - 4.80 x10*3/uL     Monocytes Absolute, Manual 0.35 0.10 - 1.00 x10*3/uL     Eosinophils Absolute, Manual 0.23 0.00 - 0.70 x10*3/uL     Basophils Absolute, Manual 0.12 (H) 0.00 - 0.10 x10*3/uL     Metamyelocytes Absolute, Manual 0.58 0.00 - 0.00 x10*3/uL     Total Cells Counted 100       Manual nRBC per 100 Cells 2.0 (H) 0.0 - 0.0 %     RBC Morphology See Below       Polychromasia Mild       Basophilic Stippling Present     Morphology   Result Value Ref Range     RBC Morphology See Below       Polychromasia Mild       Basophilic Stippling Present     POCT GLUCOSE   Result Value Ref Range     POCT Glucose 331 (H) 74 - 99 mg/dL      Patient recently received an antibiotic (last 12 hours)         Date/Time Action Medication Dose     03/27/25 1517 New Bag    piperacillin-tazobactam (Zosyn) 3.375 g in dextrose (iso) IV 50 mL 3.375 g             Patient fully evaluated 3/27. Patient in bed and family in room upon exam. Vitals today include Temp:  [35.1 °C (95.2 °F)-36.6 °C (97.9 °F)] 36.3 °C (97.3 °F), Heart Rate:  [51-67] 51, Resp:  [16-23] 18, BP: ()/(50-65) 96/53, FiO2 (%):  [30 %-50 %] 30 %. Pertinent labs today include WBC 11.9, Hbg 6.8, plts 212. Na 147, K 3.4, Cl 107, HCO3- 34, BUN 36, Cr 1.19. glucose 318. Patient declined blood transfusion, starting Procrit to try to increase counts. Patient underwent EGD today, findings include small hiatal hernia  and signs of gastric and duodenal irritation. Continue to monitor.      Plan discussed with interdisciplinary team, continue current and repeat labs in the AM. Discharge planning discussed with patient and care team. Patient aware and agreeable to current plan, continue plan as above.      I spent a total of 50 minutes on the date of the service which included preparing to see the patient, face-to-face patient care, completing clinical documentation, obtaining and/or reviewing separately obtained history, performing a medically appropriate examination, counseling and educating the patient/family/caregiver, ordering medications, tests, or procedures, communicating with other HCPs (not separately reported), independently interpreting results (not separately reported), communicating results to the patient/family/caregiver, and care coordination (not separately reported).      ALEXANDER Feliciano     Patient fully evaluated  03/28  for    Problem List Items Addressed This Visit         * (Principal) Hemoptysis - Primary     Relevant Orders     Esophagogastroduodenoscopy (EGD) (Completed)     Surgical Pathology Exam     Melena     Relevant Orders     Esophagogastroduodenoscopy (EGD) (Completed)     Surgical Pathology Exam      Other Visit Diagnoses         Anemia, unspecified type         Relevant Orders     Esophagogastroduodenoscopy (EGD) (Completed)     Surgical Pathology Exam     Tracheostomy dependent (Multi)         Relevant Orders     Surgical Pathology Exam             Patient seen resting in bed with head of bed elevated, no s/s or c/o acute difficulties at this time.  Vital signs for last 24 hours Temp:  [35.5 °C (95.9 °F)-37 °C (98.6 °F)] 36.8 °C (98.2 °F)  Heart Rate:  [63-74] 73  Resp:  [17-18] 18  BP: (118-151)/(57-78) 118/57  FiO2 (%):  [28 %-30 %] 28 %    No intake/output data recorded.  Patient still requiring frequent cardiac and SPO2 monitoring. Continue aggressive pulmonary hygiene and oral hygiene.  Off loading as tolerated for skin integrity. Medications and labs reviewed-         Results for orders placed or performed during the hospital encounter of 03/22/25 (from the past 24 hours)   POCT GLUCOSE   Result Value Ref Range     POCT Glucose 342 (H) 74 - 99 mg/dL   POCT GLUCOSE   Result Value Ref Range     POCT Glucose 340 (H) 74 - 99 mg/dL   POCT GLUCOSE   Result Value Ref Range     POCT Glucose 354 (H) 74 - 99 mg/dL   Comprehensive Metabolic Panel   Result Value Ref Range     Glucose 366 (H) 74 - 99 mg/dL     Sodium 147 (H) 136 - 145 mmol/L     Potassium 4.2 3.5 - 5.3 mmol/L     Chloride 110 (H) 98 - 107 mmol/L     Bicarbonate 30 21 - 32 mmol/L     Anion Gap 11 10 - 20 mmol/L     Urea Nitrogen 26 (H) 6 - 23 mg/dL     Creatinine 0.94 0.50 - 1.30 mg/dL     eGFR 90 >60 mL/min/1.73m*2     Calcium 8.8 8.6 - 10.3 mg/dL     Albumin 3.0 (L) 3.4 - 5.0 g/dL     Alkaline Phosphatase 276 (H) 33 - 136 U/L     Total Protein 6.1 (L) 6.4 - 8.2 g/dL     AST 30 9 - 39 U/L     Bilirubin, Total 0.4 0.0 - 1.2 mg/dL      (H) 10 - 52 U/L   CBC and Auto Differential   Result Value Ref Range     WBC 11.5 (H) 4.4 - 11.3 x10*3/uL     nRBC 7.4 (H) 0.0 - 0.0 /100 WBCs     RBC 2.77 (L) 4.50 - 5.90 x10*6/uL     Hemoglobin 7.6 (L) 13.5 - 17.5 g/dL     Hematocrit 27.3 (L) 41.0 - 52.0 %     MCV 99 80 - 100 fL     MCH 27.4 26.0 - 34.0 pg     MCHC 27.8 (L) 32.0 - 36.0 g/dL     RDW 19.1 (H) 11.5 - 14.5 %     Platelets 293 150 - 450 x10*3/uL     Immature Granulocytes %, Automated 8.3 (H) 0.0 - 0.9 %     Immature Granulocytes Absolute, Automated 0.97 (H) 0.00 - 0.70 x10*3/uL   Manual Differential   Result Value Ref Range     Neutrophils %, Manual 79.0 40.0 - 80.0 %     Lymphocytes %, Manual 10.0 13.0 - 44.0 %     Monocytes %, Manual 3.0 2.0 - 10.0 %     Eosinophils %, Manual 2.0 0.0 - 6.0 %     Basophils %, Manual 1.0 0.0 - 2.0 %     Metamyelocytes %, Manual 5.0 0.0 - 0.0 %     Seg Neutrophils Absolute, Manual 9.09 (H) 1.20 - 7.00 x10*3/uL      Lymphocytes Absolute, Manual 1.15 (L) 1.20 - 4.80 x10*3/uL     Monocytes Absolute, Manual 0.35 0.10 - 1.00 x10*3/uL     Eosinophils Absolute, Manual 0.23 0.00 - 0.70 x10*3/uL     Basophils Absolute, Manual 0.12 (H) 0.00 - 0.10 x10*3/uL     Metamyelocytes Absolute, Manual 0.58 0.00 - 0.00 x10*3/uL     Total Cells Counted 100       Manual nRBC per 100 Cells 2.0 (H) 0.0 - 0.0 %     RBC Morphology See Below       Polychromasia Mild       Basophilic Stippling Present     Morphology   Result Value Ref Range     RBC Morphology See Below       Polychromasia Mild       Basophilic Stippling Present     POCT GLUCOSE   Result Value Ref Range     POCT Glucose 331 (H) 74 - 99 mg/dL      Patient recently received an antibiotic (last 12 hours)         Date/Time Action Medication Dose     03/28/25 0915 New Bag    piperacillin-tazobactam (Zosyn) 3.375 g in dextrose (iso) IV 50 mL 3.375 g     03/28/25 0239 New Bag    piperacillin-tazobactam (Zosyn) 3.375 g in dextrose (iso) IV 50 mL 3.375 g             Plan discussed with interdisciplinary team, no blood transfusion as patient is [facility], seen by nephrology today and epogen increased to aid in H/H, renal chemistries down trending, liver function improving, no further hemoptysis noted. WBC elevated today @ 14.0 from 11.9,  chest xray ordered, will  continue IV antibiotics, monitor for bleeding, will continue current and repeat labs in the AM.      Discharge planning discussed with patient and care team. Therapy evaluations ordered. Anticipate HHC/SNF at discharge. Patient aware and agreeable to current plan, continue plan as above.      I spent a total of 50 minutes on the date of the service which included preparing to see the patient, face-to-face patient care, completing clinical documentation, obtaining and/or reviewing separately obtained history, performing a medically appropriate examination, counseling and educating the patient/family/caregiver, ordering  medications, tests, or procedures, communicating with other HCPs (not separately reported), independently interpreting results (not separately reported), communicating results to the patient/family/caregiver, and care coordination (not separately reported).      Patient fully evaluated  03/29  for    Problem List Items Addressed This Visit         * (Principal) Hemoptysis - Primary     Relevant Orders     Esophagogastroduodenoscopy (EGD) (Completed)     Surgical Pathology Exam     Melena     Relevant Orders     Esophagogastroduodenoscopy (EGD) (Completed)     Surgical Pathology Exam      Other Visit Diagnoses         Anemia, unspecified type         Relevant Orders     Esophagogastroduodenoscopy (EGD) (Completed)     Surgical Pathology Exam     Tracheostomy dependent (Multi)         Relevant Orders     Surgical Pathology Exam             Patient seen resting in bed with head of bed elevated, no s/s or c/o acute difficulties at this time.  Vital signs for last 24 hours Temp:  [35.5 °C (95.9 °F)-37 °C (98.6 °F)] 36.8 °C (98.2 °F)  Heart Rate:  [63-74] 73  Resp:  [17-18] 18  BP: (118-151)/(57-78) 118/57  FiO2 (%):  [28 %-30 %] 28 %    No intake/output data recorded.  Patient still requiring frequent cardiac and SPO2 monitoring. Continue aggressive pulmonary hygiene and oral hygiene. Off loading as tolerated for skin integrity. Medications and labs reviewed-         Results for orders placed or performed during the hospital encounter of 03/22/25 (from the past 24 hours)   POCT GLUCOSE   Result Value Ref Range     POCT Glucose 342 (H) 74 - 99 mg/dL   POCT GLUCOSE   Result Value Ref Range     POCT Glucose 340 (H) 74 - 99 mg/dL   POCT GLUCOSE   Result Value Ref Range     POCT Glucose 354 (H) 74 - 99 mg/dL   Comprehensive Metabolic Panel   Result Value Ref Range     Glucose 366 (H) 74 - 99 mg/dL     Sodium 147 (H) 136 - 145 mmol/L     Potassium 4.2 3.5 - 5.3 mmol/L     Chloride 110 (H) 98 - 107 mmol/L     Bicarbonate 30 21  - 32 mmol/L     Anion Gap 11 10 - 20 mmol/L     Urea Nitrogen 26 (H) 6 - 23 mg/dL     Creatinine 0.94 0.50 - 1.30 mg/dL     eGFR 90 >60 mL/min/1.73m*2     Calcium 8.8 8.6 - 10.3 mg/dL     Albumin 3.0 (L) 3.4 - 5.0 g/dL     Alkaline Phosphatase 276 (H) 33 - 136 U/L     Total Protein 6.1 (L) 6.4 - 8.2 g/dL     AST 30 9 - 39 U/L     Bilirubin, Total 0.4 0.0 - 1.2 mg/dL      (H) 10 - 52 U/L   CBC and Auto Differential   Result Value Ref Range     WBC 11.5 (H) 4.4 - 11.3 x10*3/uL     nRBC 7.4 (H) 0.0 - 0.0 /100 WBCs     RBC 2.77 (L) 4.50 - 5.90 x10*6/uL     Hemoglobin 7.6 (L) 13.5 - 17.5 g/dL     Hematocrit 27.3 (L) 41.0 - 52.0 %     MCV 99 80 - 100 fL     MCH 27.4 26.0 - 34.0 pg     MCHC 27.8 (L) 32.0 - 36.0 g/dL     RDW 19.1 (H) 11.5 - 14.5 %     Platelets 293 150 - 450 x10*3/uL     Immature Granulocytes %, Automated 8.3 (H) 0.0 - 0.9 %     Immature Granulocytes Absolute, Automated 0.97 (H) 0.00 - 0.70 x10*3/uL   Manual Differential   Result Value Ref Range     Neutrophils %, Manual 79.0 40.0 - 80.0 %     Lymphocytes %, Manual 10.0 13.0 - 44.0 %     Monocytes %, Manual 3.0 2.0 - 10.0 %     Eosinophils %, Manual 2.0 0.0 - 6.0 %     Basophils %, Manual 1.0 0.0 - 2.0 %     Metamyelocytes %, Manual 5.0 0.0 - 0.0 %     Seg Neutrophils Absolute, Manual 9.09 (H) 1.20 - 7.00 x10*3/uL     Lymphocytes Absolute, Manual 1.15 (L) 1.20 - 4.80 x10*3/uL     Monocytes Absolute, Manual 0.35 0.10 - 1.00 x10*3/uL     Eosinophils Absolute, Manual 0.23 0.00 - 0.70 x10*3/uL     Basophils Absolute, Manual 0.12 (H) 0.00 - 0.10 x10*3/uL     Metamyelocytes Absolute, Manual 0.58 0.00 - 0.00 x10*3/uL     Total Cells Counted 100       Manual nRBC per 100 Cells 2.0 (H) 0.0 - 0.0 %     RBC Morphology See Below       Polychromasia Mild       Basophilic Stippling Present     Morphology   Result Value Ref Range     RBC Morphology See Below       Polychromasia Mild       Basophilic Stippling Present     POCT GLUCOSE   Result Value Ref Range      POCT Glucose 331 (H) 74 - 99 mg/dL      Patient recently received an antibiotic (last 12 hours)         Date/Time Action Medication Dose     03/29/25 0911 New Bag    piperacillin-tazobactam (Zosyn) 3.375 g in dextrose (iso) IV 50 mL 3.375 g     03/29/25 0148 New Bag    piperacillin-tazobactam (Zosyn) 3.375 g in dextrose (iso) IV 50 mL 3.375 g             Plan discussed with interdisciplinary team, will deescalate medications, IV steroids to oral, will continue IV antibiotics, monitor overnight. Will  continue current and repeat labs in the AM.      Discharge planning discussed with patient and care team. Therapy evaluations ordered. Anticipate SNF at discharge. Patient aware and agreeable to current plan, continue plan as above.      I spent a total of 50 minutes on the date of the service which included preparing to see the patient, face-to-face patient care, completing clinical documentation, obtaining and/or reviewing separately obtained history, performing a medically appropriate examination, counseling and educating the patient/family/caregiver, ordering medications, tests, or procedures, communicating with other HCPs (not separately reported), independently interpreting results (not separately reported), communicating results to the patient/family/caregiver, and care coordination (not separately reported).      Patient fully evaluated  03/30  for    Problem List Items Addressed This Visit         * (Principal) Hemoptysis - Primary     Relevant Orders     Esophagogastroduodenoscopy (EGD) (Completed)     Surgical Pathology Exam     Melena     Relevant Orders     Esophagogastroduodenoscopy (EGD) (Completed)     Surgical Pathology Exam      Other Visit Diagnoses         Anemia, unspecified type         Relevant Orders     Esophagogastroduodenoscopy (EGD) (Completed)     Surgical Pathology Exam     Tracheostomy dependent (Multi)         Relevant Orders     Surgical Pathology Exam             Patient seen resting in  bed with head of bed elevated, no s/s or c/o acute difficulties at this time.  Vital signs for last 24 hours Temp:  [35.5 °C (95.9 °F)-37 °C (98.6 °F)] 36.8 °C (98.2 °F)  Heart Rate:  [63-74] 73  Resp:  [17-18] 18  BP: (118-151)/(57-78) 118/57  FiO2 (%):  [28 %-30 %] 28 %    No intake/output data recorded.  Patient still requiring frequent cardiac and SPO2 monitoring. Continue aggressive pulmonary hygiene and oral hygiene. Off loading as tolerated for skin integrity. Medications and labs reviewed-         Results for orders placed or performed during the hospital encounter of 03/22/25 (from the past 24 hours)   POCT GLUCOSE   Result Value Ref Range     POCT Glucose 342 (H) 74 - 99 mg/dL   POCT GLUCOSE   Result Value Ref Range     POCT Glucose 340 (H) 74 - 99 mg/dL   POCT GLUCOSE   Result Value Ref Range     POCT Glucose 354 (H) 74 - 99 mg/dL   Comprehensive Metabolic Panel   Result Value Ref Range     Glucose 366 (H) 74 - 99 mg/dL     Sodium 147 (H) 136 - 145 mmol/L     Potassium 4.2 3.5 - 5.3 mmol/L     Chloride 110 (H) 98 - 107 mmol/L     Bicarbonate 30 21 - 32 mmol/L     Anion Gap 11 10 - 20 mmol/L     Urea Nitrogen 26 (H) 6 - 23 mg/dL     Creatinine 0.94 0.50 - 1.30 mg/dL     eGFR 90 >60 mL/min/1.73m*2     Calcium 8.8 8.6 - 10.3 mg/dL     Albumin 3.0 (L) 3.4 - 5.0 g/dL     Alkaline Phosphatase 276 (H) 33 - 136 U/L     Total Protein 6.1 (L) 6.4 - 8.2 g/dL     AST 30 9 - 39 U/L     Bilirubin, Total 0.4 0.0 - 1.2 mg/dL      (H) 10 - 52 U/L   CBC and Auto Differential   Result Value Ref Range     WBC 11.5 (H) 4.4 - 11.3 x10*3/uL     nRBC 7.4 (H) 0.0 - 0.0 /100 WBCs     RBC 2.77 (L) 4.50 - 5.90 x10*6/uL     Hemoglobin 7.6 (L) 13.5 - 17.5 g/dL     Hematocrit 27.3 (L) 41.0 - 52.0 %     MCV 99 80 - 100 fL     MCH 27.4 26.0 - 34.0 pg     MCHC 27.8 (L) 32.0 - 36.0 g/dL     RDW 19.1 (H) 11.5 - 14.5 %     Platelets 293 150 - 450 x10*3/uL     Immature Granulocytes %, Automated 8.3 (H) 0.0 - 0.9 %     Immature  Granulocytes Absolute, Automated 0.97 (H) 0.00 - 0.70 x10*3/uL   Manual Differential   Result Value Ref Range     Neutrophils %, Manual 79.0 40.0 - 80.0 %     Lymphocytes %, Manual 10.0 13.0 - 44.0 %     Monocytes %, Manual 3.0 2.0 - 10.0 %     Eosinophils %, Manual 2.0 0.0 - 6.0 %     Basophils %, Manual 1.0 0.0 - 2.0 %     Metamyelocytes %, Manual 5.0 0.0 - 0.0 %     Seg Neutrophils Absolute, Manual 9.09 (H) 1.20 - 7.00 x10*3/uL     Lymphocytes Absolute, Manual 1.15 (L) 1.20 - 4.80 x10*3/uL     Monocytes Absolute, Manual 0.35 0.10 - 1.00 x10*3/uL     Eosinophils Absolute, Manual 0.23 0.00 - 0.70 x10*3/uL     Basophils Absolute, Manual 0.12 (H) 0.00 - 0.10 x10*3/uL     Metamyelocytes Absolute, Manual 0.58 0.00 - 0.00 x10*3/uL     Total Cells Counted 100       Manual nRBC per 100 Cells 2.0 (H) 0.0 - 0.0 %     RBC Morphology See Below       Polychromasia Mild       Basophilic Stippling Present     Morphology   Result Value Ref Range     RBC Morphology See Below       Polychromasia Mild       Basophilic Stippling Present     POCT GLUCOSE   Result Value Ref Range     POCT Glucose 331 (H) 74 - 99 mg/dL      Patient recently received an antibiotic (last 12 hours)         Date/Time Action Medication Dose     03/30/25 1057 New Bag    piperacillin-tazobactam (Zosyn) 3.375 g in dextrose (iso) IV 50 mL 3.375 g     03/30/25 0238 New Bag    piperacillin-tazobactam (Zosyn) 3.375 g in dextrose (iso) IV 50 mL 3.375 g             Plan discussed with interdisciplinary team, seen by nephrology today with plan for 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 Appreciate input and agree with plan. Awaiting therapy evaluations. Will continue current IV antibiotics, continue current plan, and repeat labs in the AM.      Discharge planning discussed with patient and care team. Therapy  evaluations ordered. Anticipate [facility] at discharge. Patient aware and agreeable to current plan, continue plan as above.      I spent a total of 50 minutes on the date of the service which included preparing to see the patient, face-to-face patient care, completing clinical documentation, obtaining and/or reviewing separately obtained history, performing a medically appropriate examination, counseling and educating the patient/family/caregiver, ordering medications, tests, or procedures, communicating with other HCPs (not separately reported), independently interpreting results (not separately reported), communicating results to the patient/family/caregiver, and care coordination (not separately reported).                 Patient fully evaluated  for   Assessment & Plan  Hemoptysis    Tracheostomy in place (Multi)    CVA, old, hemiparesis (Multi)    Status post insertion of percutaneous endoscopic gastrostomy (PEG) tube (Multi)    Pneumonia due to infectious organism    Right sided weakness    Atrial flutter (Multi)    Insulin dependent type 2 diabetes mellitus (Multi)        Zeus Bone MD  Physician  Internal Medicine     H&P      Signed     Date of Service: 3/23/2025 10:28 AM     Signed       Expand All Collapse All    History Of Present Illness  Bryan Nix is a 64-year-old male with past medical history of atrial flutter on Eliquis, hyponatremia, IDDM, CAD, hypertension, chronic wounds, diffuse lymphadenopathy, anemia, history of smoking, history elevated PSA, CVA L hemisphere (2020) with right-sided deficits, Left ICA stenosis (85%) s/p angioplasty and left carotid artery stent (08/25/2023) and recent history of acute pontine stroke 02/27/25 with acute hypoxic respiratory failure s/p tracheostomy on 2/25/2025 and PEG.  Patient presents for bleeding from tracheostomy.  Patient is alert and oriented x 3, however limited verbal communication due to tracheostomy.  Wife is at bedside and assists  with history.  She reports that patient was coughing blood out of his tracheostomy.  ER provider felt bleeding was likely superficial around site.  Patient has been on room air at skilled nursing facility.  He was placed on Airvo in the ED mainly to humidify oxygen.  Patient never desaturated per ER report.  Currently he is resting comfortably in the bed, breath sounds slightly rhonchorous, but unlabored.  Reports chills, but no fevers.  He has right-sided weakness and sensory deficits.  Denies headache, vision or hearing changes, chest pains, palpitations, shortness of breath, nausea, vomiting, abdominal pain, diarrhea, or complications of PEG tube site.  Wife reports patient skin became irritated in his groin due to adhesive from a condom cath previously, he has a small decubitus wound that she reports is healing well.  He receives tube feeds continuously.  Wife reports a patient was recently evaluated by speech for p.o. to intake, however he still remains n.p.o. except for meds and tube feeds through PEG. CODE STATUS reviewed: full code     ER Course: Hemodynamically stable, afebrile, SpO2 to 97% trach mask.  WBC 9.3, hemoglobin 8, hematocrit 29.4, platelets 197.  INR 1.4.  Glucose 223, BUN 36, otherwise CMP is normal.  CT of the chest for PE pending.  CXR unremarkable. Trannexamic acid soak gauze wrapped around trach.      Past medical history: As above  Past surgical history: As above  Social history: Former smoker 37.5 pack years-quit smoking age 63, occasional alcohol use, no illicit drug use.  .  Works in maintenance.  6 children.  Family history: Noncontributory     Past Medical History  Medical History        Past Medical History:   Diagnosis Date    Abnormal finding of blood chemistry, unspecified 05/18/2016     Abnormal blood chemistry    Acute upper respiratory infection, unspecified 12/02/2015     Acute upper respiratory infection    Elevated prostate specific antigen (PSA)       Elevated  prostate specific antigen (PSA)    Encounter for screening for malignant neoplasm of colon 01/30/2021     Colon cancer screening    Encounter for screening for malignant neoplasm of prostate 11/30/2020     Prostate cancer screening    Essential (primary) hypertension 07/30/2013     Benign essential hypertension    Other conditions influencing health status 07/30/2013     Diabetes Mellitus Poorly Controlled    Other conditions influencing health status 12/02/2015     History of cough    Personal history of other endocrine, nutritional and metabolic disease 03/18/2021     History of hyperlipidemia    Personal history of other endocrine, nutritional and metabolic disease 03/18/2021     History of diabetes mellitus    Personal history of other specified conditions 05/18/2016     History of chest pain    Personal history of other specified conditions 05/18/2016     History of dyspnea    Personal history of other specified conditions 05/18/2016     History of dizziness    Personal history of other specified conditions 05/18/2016     History of fatigue            Surgical History  Surgical History         Past Surgical History:   Procedure Laterality Date    CT ANGIO CORONARY ART WITH HEARTFLOW IF SCORE >30%   8/23/2023     CT HEART CORONARY ANGIOGRAM 8/23/2023 [facility] CT    MR HEAD ANGIO WO IV CONTRAST   12/14/2020     MR HEAD ANGIO WO IV CONTRAST 12/14/2020 [facility]    MR NECK ANGIO WO IV CONTRAST   12/14/2020     MR NECK ANGIO WO IV CONTRAST 12/14/2020 [facility]    OTHER SURGICAL HISTORY   12/01/2020     Hand fracture repair    OTHER SURGICAL HISTORY   12/01/2020     Nasal bone fracture repair            Social History  He reports that he has quit smoking. His smoking use included cigarettes. He has never used smokeless tobacco. No history on file for alcohol use and drug use.     Family History  Family History   No family history on file.        Allergies  Patient has no known allergies.    "  Review of Systems     Physical Exam  Constitutional:       General: He is awake. He is not in acute distress.     Appearance: Normal appearance. He is not toxic-appearing.   HENT:      Head: Atraumatic.      Nose: Nose normal.      Mouth/Throat:      Mouth: Mucous membranes are moist.   Eyes:      Conjunctiva/sclera: Conjunctivae normal.   Cardiovascular:      Rate and Rhythm: Normal rate and regular rhythm.      Pulses: Normal pulses.      Heart sounds: No murmur heard.  Pulmonary:      Effort: No respiratory distress.      Comments: Tracheostomy site midline, no drainage around dressing.  Rhonchorous breath sounds, unlabored respirations, RT bedside suctioning patient with thick blood-tinged sputum  Abdominal:      General: Bowel sounds are normal. There is no distension.      Palpations: Abdomen is soft.      Tenderness: There is no abdominal tenderness. There is no guarding.      Comments: Left upper quadrant PEG site tube site, no drainage or skin breakdown around tube site.   Musculoskeletal:         General: No swelling, deformity or signs of injury.      Cervical back: Neck supple.      Right lower leg: No edema.      Left lower leg: No edema.   Skin:     General: Skin is warm and dry.      Capillary Refill: Capillary refill takes less than 2 seconds.      Findings: Wound (sacrum) present. No ecchymosis or erythema.   Neurological:      Mental Status: He is alert and oriented to person, place, and time.      Cranial Nerves: No facial asymmetry.      Sensory: Sensory deficit (RUE, RLE) present.      Motor: Weakness present.      Comments: Right sided upper and lower extremity motor function deficits   Psychiatric:         Mood and Affect: Mood normal.         Behavior: Behavior is cooperative.            Last Recorded Vitals  Blood pressure 134/50, pulse 93, temperature 36.9 °C (98.4 °F), temperature source Temporal, resp. rate 22, height 1.88 m (6' 2\"), weight 77.1 kg (170 lb), SpO2 98%.     Relevant " Results              Results for orders placed or performed during the hospital encounter of 03/22/25 (from the past 24 hours)   CBC and Auto Differential   Result Value Ref Range     WBC 9.3 4.4 - 11.3 x10*3/uL     nRBC 0.3 (H) 0.0 - 0.0 /100 WBCs     RBC 3.02 (L) 4.50 - 5.90 x10*6/uL     Hemoglobin 8.0 (L) 13.5 - 17.5 g/dL     Hematocrit 29.4 (L) 41.0 - 52.0 %     MCV 97 80 - 100 fL     MCH 26.5 26.0 - 34.0 pg     MCHC 27.2 (L) 32.0 - 36.0 g/dL     RDW 15.6 (H) 11.5 - 14.5 %     Platelets 197 150 - 450 x10*3/uL     Neutrophils % 77.1 40.0 - 80.0 %     Immature Granulocytes %, Automated 1.5 (H) 0.0 - 0.9 %     Lymphocytes % 13.1 13.0 - 44.0 %     Monocytes % 4.1 2.0 - 10.0 %     Eosinophils % 3.2 0.0 - 6.0 %     Basophils % 1.0 0.0 - 2.0 %     Neutrophils Absolute 7.14 1.20 - 7.70 x10*3/uL     Immature Granulocytes Absolute, Automated 0.14 0.00 - 0.70 x10*3/uL     Lymphocytes Absolute 1.21 1.20 - 4.80 x10*3/uL     Monocytes Absolute 0.38 0.10 - 1.00 x10*3/uL     Eosinophils Absolute 0.30 0.00 - 0.70 x10*3/uL     Basophils Absolute 0.09 0.00 - 0.10 x10*3/uL   Basic metabolic panel   Result Value Ref Range     Glucose 223 (H) 74 - 99 mg/dL     Sodium 142 136 - 145 mmol/L     Potassium 4.3 3.5 - 5.3 mmol/L     Chloride 103 98 - 107 mmol/L     Bicarbonate 27 21 - 32 mmol/L     Anion Gap 16 10 - 20 mmol/L     Urea Nitrogen 36 (H) 6 - 23 mg/dL     Creatinine 0.81 0.50 - 1.30 mg/dL     eGFR >90 >60 mL/min/1.73m*2     Calcium 9.6 8.6 - 10.3 mg/dL   Protime-INR   Result Value Ref Range     Protime 15.1 (H) 9.8 - 12.4 seconds     INR 1.4 (H) 0.9 - 1.1   aPTT   Result Value Ref Range     aPTT 25 (L) 26 - 36 seconds   Type and Screen   Result Value Ref Range     ABO TYPE B       Rh TYPE POS       ANTIBODY SCREEN NEG     VERIFY ABO/Rh Group Test   Result Value Ref Range     ABO TYPE B       Rh TYPE POS     MRSA Surveillance for Vancomycin De-escalation, PCR     Specimen: Anterior Nares; Swab   Result Value Ref Range     MRSA  PCR Not Detected Not Detected   POCT GLUCOSE   Result Value Ref Range     POCT Glucose 180 (H) 74 - 99 mg/dL   POCT GLUCOSE   Result Value Ref Range     POCT Glucose 161 (H) 74 - 99 mg/dL   POCT GLUCOSE   Result Value Ref Range     POCT Glucose 156 (H) 74 - 99 mg/dL   Basic metabolic panel   Result Value Ref Range     Glucose 170 (H) 74 - 99 mg/dL     Sodium 147 (H) 136 - 145 mmol/L     Potassium 4.2 3.5 - 5.3 mmol/L     Chloride 107 98 - 107 mmol/L     Bicarbonate 30 21 - 32 mmol/L     Anion Gap 14 10 - 20 mmol/L     Urea Nitrogen 35 (H) 6 - 23 mg/dL     Creatinine 0.85 0.50 - 1.30 mg/dL     eGFR >90 >60 mL/min/1.73m*2     Calcium 9.4 8.6 - 10.3 mg/dL   CBC   Result Value Ref Range     WBC 10.4 4.4 - 11.3 x10*3/uL     nRBC 0.6 (H) 0.0 - 0.0 /100 WBCs     RBC 2.87 (L) 4.50 - 5.90 x10*6/uL     Hemoglobin 7.8 (L) 13.5 - 17.5 g/dL     Hematocrit 26.6 (L) 41.0 - 52.0 %     MCV 93 80 - 100 fL     MCH 27.2 26.0 - 34.0 pg     MCHC 29.3 (L) 32.0 - 36.0 g/dL     RDW 15.7 (H) 11.5 - 14.5 %     Platelets 205 150 - 450 x10*3/uL   SST TOP   Result Value Ref Range     Extra Tube Hold for add-ons.        CT angio chest for pulmonary embolism     Result Date: 3/22/2025  Interpreted By:  Pamela Zurita, STUDY: CT ANGIO CHEST FOR PULMONARY EMBOLISM;  3/22/2025 4:56 pm   INDICATION: Signs/Symptoms:bloody secretions, eval for PE.     COMPARISON: CT chest without contrast 03/22/2025   ACCESSION NUMBER(S): [id number]   ORDERING CLINICIAN: PAMELA HAHN   TECHNIQUE: Contiguous axial images of the chest were obtained after the intravenous administration of iodinated contrast using angiographic PE protocol. Coronal and sagittal reformatted images were reconstructed from the axial data. MIP images were created on an independent workstation and reviewed.   FINDINGS:   MEDIASTINUM AND LYMPH NODES: Inflammatory fat stranding along the tracheostomy tract without discrete fluid collection. The esophageal wall appears within normal limits.  There is redemonstration of diffuse lymphadenopathy in the bilateral axilla, mediastinum, bilateral supraclavicular as described in detail on separate report. Also notable or enlarged bilateral level 4 cervical lymph nodes (right > left).   VESSELS:  Normal caliber thoracic aorta without dissection. Moderate aortic atherosclerosis.  No acute pulmonary embolism.   HEART: Normal size.  Mild coronary artery calcifications. No significant pericardial effusion.   LUNG, AIRWAYS, PLEURA: There is slight increase in debris within the trachea. There has been clearing of secretions in the left upper/lower lobe bronchus. There is diffuse bilateral bronchial thickening slightly increased from prior study. There is peribronchovascular ground-glass opacity in the posterior right upper lobe and superior and basal segments of the right lower lobe. There is slightly worsened atelectasis in the lung bases remaining predominantly subsegmental in nature. There is a 0.8 cm nodule in the left upper lobe that is stable from 08/19/2023.   OSSEOUS STRUCTURES: No acute osseous abnormality.   CHEST WALL SOFT TISSUES: No discernible acute abnormality.   UPPER ABDOMEN/OTHER: No acute abnormality.        No acute pulmonary embolism.   Peribronchial vascular ground glass opacities in the right upper and lower lobe again concerning for pneumonia given patient's risk factors of tracheostomy and debris in the airways.   Redemonstration of diffuse lymphadenopathy in the lower necks, axilla, and mediastinum. Findings are either diffusely reactive in nature, reflective of lymphoma/leukemia, meter deficiency, or less likely head neck neoplasm is previously postulated. However please correlate with past medical history.   Inflammatory changes along the tracheostomy tract without fluid collection. Correlate for erythema.   Unchanged 0.8 cm nodule in the left upper lobe dating back to 08/19/2023. Recommend 1 year follow up chest CT to ensure at least 2  years of stability.   MACRO: None.   Signed by: Pamela Zurita 3/22/2025 7:17 PM Dictation workstation:   [id number]     CT chest wo IV contrast     Result Date: 3/22/2025  Interpreted By:  Pamela Zurita, STUDY: CT CHEST WO IV CONTRAST;  3/22/2025 3:41 pm   INDICATION: Signs/Symptoms:eval for alveolar hemorrhage.     COMPARISON: CT head/neck 08/19/2023   ACCESSION NUMBER(S): [id number]   ORDERING CLINICIAN: PAMELA HAHN   TECHNIQUE: Contiguous axial images of the chest and upper abdomen were obtained without contrast. Coronal and sagittal reformatted images were reconstructed from the axial data.   FINDINGS:   MEDIASTINUM AND LYMPH NODES: There is fat stranding/edema along the tract of the tracheostomy about discrete fluid collection. The esophageal wall appears within normal limits. There are numerous enlarged bilateral supraclavicular lymph nodes measuring up to 1.4 cm on the left. There are numerous enlarged bilateral axillary lymph nodes measuring up to 1.1 cm on the right and 1.2 cm on the left. There are numerous prominent to mildly enlarged mediastinal lymph nodes as well. No pneumomediastinum.   VESSELS:  Normal caliber thoracic aorta. Moderate aortic atherosclerosis. The main pulmonary artery is normal in size.   HEART: Normal size.  Mild coronary artery calcifications. No significant pericardial effusion.   LUNG, AIRWAYS, PLEURA: Tracheostomy noted in appropriate position. There is trace mucus in the left mainstem bronchus and at the origin of the left upper lobe and lower lobe bronchi. There are mild peribronchovascular ground-glass opacities in the posterior segment of the right upper lobe, basal segments of the right lower lobe. There is mild dependent bibasilar atelectasis. There is no pleural effusion.   OSSEOUS STRUCTURES: No acute osseous abnormality.   CHEST WALL SOFT TISSUES: No discernible acute abnormality.   UPPER ABDOMEN/OTHER: No acute abnormality.        1. Mild  peribronchovascular ground-glass opacities in the posterior right upper lobe and basal segments of the right lower lobe. This appearance and distribution is not typical for alveolar hemorrhage which tends to be centrilobular and bilateral asymmetric in distribution. Findings are most suggestive of pneumonia.   2. Small amount of mucous debris in the left mainstem bronchus and at the origin of the left upper and lower lobe bronchi.   3. Diffuse lymphadenopathy in the bilateral axillary regions, mediastinum and left supraclavicular region particularly notable in the bilateral supraclavicular region. This is progressed in the left supraclavicular region and new elsewhere when compared to 08/19/2023 CTA head/neck. Correlate for any history of known head/neck malignancy or lymphoma.   4. Inflammation/fat stranding adjacent to the tracheostomy tube entry site noted. Correlate for air the met. No evidence of fluid collection.   MACRO: None.   Signed by: Marco A Zurita 3/22/2025 7:10 PM Dictation workstation:   [id number]     XR chest 1 view     Result Date: 3/22/2025  Interpreted By:  Fermin Stroud, STUDY: XR CHEST 1 VIEW   INDICATION: Signs/Symptoms:hemoptysis.   COMPARISON: August 19, 2023   ACCESSION NUMBER(S): [id number]   ORDERING CLINICIAN: MARCO A HAHN   FINDINGS: No consolidation, effusion, edema, or pneumothorax. Heart size within normal limits.        No evidence of acute intrathoracic abnormality.   Signed by: Fermin Stroud 3/22/2025 2:01 PM Dictation workstation:   [id number]     XR chest 1 view     Result Date: 3/14/2025  * * *Final Report* * * DATE OF EXAM: Mar 14 2025  8:52AM   S5X   [id number]  -  XR CHEST 1V FRONTAL   / ACCESSION #  [id number] PROCEDURE REASON: new bleeding from trach      * * * * Physician Interpretation * * * *  EXAMINATION:  CHEST RADIOGRAPH (PORTABLE SINGLE VIEW AP) Exam Date/Time:  3/14/2025 8:52 AM Clinical History:  new bleeding from trach Comparison:  03/03/2025 RESULT: RASHEL  TUBES, AND DEVICES:  Tracheostomy. CARDIOMEDIASTINAL SILHOUETTE:  The cardiac and mediastinal silhouettes appear unremarkable. LUNGS AND PLEURA: No consolidation.  No pleural effusion. No pulmonary vascular congestion. No pneumothorax identified. OTHER:  N/A     IMPRESSION: No acute abnormality identified. : HANY   Transcribe Date/Time: Mar 14 2025  9:07A Dictated by : PAMELA EVANS MD This examination was interpreted and the report reviewed and electronically signed by: PAMELA EVANS MD on Mar 14 2025  9:11AM  EST     US abdomen complete     Result Date: 3/11/2025  ABDOMEN ULTRASOUND-COMPLETE FINDINGS: Abdomen Ultrasound Complete: PROCEDURE: Multiple grayscale mages of the abdomen were obtained. FINDINGS:Multiple real time images demonstrate the liver with increased echogenicity consistent with fatty infiltration. There is no evidence of a discrete mass within the liver. The liver is enlarged, measured at 15.7 cm in size. The gallbladder is seen with no evidence of cholelithiasis. The gallbladder wall is within normal limits. The common bile duct is within normal limits. The visualized pancreas appears unremarkable. Both kidneys are seen with no evidence of hydronephrosis or a calculus. The spleen has its normal homogeneous echo appearance. There is no free fluid to suggest ascites. The visualized aorta appears unremarkable. The inferior vena cava appears patent. CONCLUSION: Enlarged fatty liver. ELECTRONICALLY SIGNED BY LEONEL LAN M.D. 3/11/2025 5:00:52 PM EDT.     XR chest 1 view     Result Date: 3/3/2025  * * *Final Report* * * DATE OF EXAM: Mar  3 2025  1:40PM   S5X   [id number]  -  XR CHEST 1V FRONTAL   / ACCESSION #  [id number] PROCEDURE REASON: resp failure      * * * * Physician Interpretation * * * *  EXAMINATION:  CHEST RADIOGRAPH (PORTABLE SINGLE VIEW AP) Exam Date/Time:  3/3/2025 1:40 PM Indication:   resp failure M:  XCP_4 Comparison:  1 day prior RESULT: Lines, tubes, and  devices:  Tracheostomy tube remains in situ. Lungs and pleura:  Mild patchy opacities right lower lung zone seen on the prior study are no longer appreciated.  No confluent opacity.   Costophrenic angles are clear.  No pneumothorax. Cardiomediastinal silhouette:  Stable cardiomediastinal silhouette. Other:  -     IMPRESSION: Improved aeration right lower lung zone.  No acute cardiopulmonary finding. : HANY   Transcribe Date/Time: Mar  3 2025  2:49P Dictated by : ISRAEL BENSON MD This examination was interpreted and the report reviewed and electronically signed by: ISRAEL BENSON MD on Mar  3 2025  2:49PM  EST     XR chest 1 view     Result Date: 3/2/2025  * * *Final Report* * * DATE OF EXAM: Mar  2 2025 11:25AM   MMX   5376  -  XR CHEST 1V FRONTAL PORT  / ACCESSION #  [id number] PROCEDURE REASON: Shortness of breath      * * * * Physician Interpretation * * * *  EXAMINATION:  CHEST RADIOGRAPH (PORTABLE SINGLE VIEW AP) Exam Date/Time:  3/2/2025 11:25 AM CLINICAL HISTORY: Shortness of breath MQ:  XCPR_5 Comparison:  02/20/2025. RESULT: This is a limited examination due to multiple wires and tubing obscuring the lower lungs. Lines, tubes, and devices:  A tracheostomy tube remains in place. Lungs and pleura:  There are increased bronchovascular markings in the right lower lung which may be due to bronchitis or early changes of aspiration pneumonia.  Clinical correlation and follow-up exams are recommended. Cardiomediastinal silhouette:  Stable cardiomediastinal silhouette. Other:  The visualized bony thorax appears unremarkable.     IMPRESSION: Nonspecific parenchymal changes in the right lower lung as described above. A follow-up exam is recommended. : [facility]   Transcribe Date/Time: Mar  2 2025 11:53A Dictated by : BJ INIGUEZ MD This examination was interpreted and the report reviewed and electronically signed by: BJ INIGUEZ MD on Mar  2 2025 11:54AM  EST     XR chest 1 view      Result Date: 2/28/2025  * * *Final Report* * * DATE OF EXAM: Feb 28 2025 10:36AM   MMX   5290  -  XR CHEST 1V FRONTAL   / ACCESSION #  [id number] PROCEDURE REASON: Shortness of breath      * * * * Physician Interpretation * * * *  EXAMINATION:  CHEST RADIOGRAPH (SINGLE VIEW AP OR PA) CLINICAL HISTORY: Shortness of breath MQ:  XC1_5 Comparison:  02/22/2025 RESULT: Lines, tubes, and devices:  Tracheostomy tube is noted. Lungs and pleura:  No consolidation. No lung mass. No pleural effusion. Cardiomediastinal silhouette:  Normal cardiomediastinal silhouette. Other:  No acute osseous pathology.     IMPRESSION: No acute radiographic abnormality. : [facility]   Transcribe Date/Time: Feb 28 2025 12:07P Dictated by : DIANNE RAMOS MD This examination was interpreted and the report reviewed and electronically signed by: DIANNE RAMOS MD on Feb 28 2025 12:08PM  EST     XR abdomen 2 views supine and erect or decub     Result Date: 2/24/2025  * * *Final Report* * * DATE OF EXAM: Feb 24 2025  6:03PM   MMX   5289  -  XR ABDOMEN 1V SUPINE  / ACCESSION #  [id number] PROCEDURE REASON: Evaluate tube, line or lead position      * * * * Physician Interpretation * * * *  EXAMINATION:  XR ABDOMEN 1V SUPINE CLINICAL HISTORY:   Evaluate tube, line or lead position Technique:   XR ABDOMEN 1V SUPINE -- NOT APPLICABLE with 1 views on 1 images Comparison: 2/21/2025 RESULT: Feeding tube with distal tip at the pylorus antrum. Moderate colonic stool burden. No dilated bowel. Lung structures are intact.     IMPRESSION: Feeding tube with distal tip at the pylorus antrum. Moderate colonic stool burden. No dilated bowel. : [email or web address]   Transcribe Date/Time: Feb 24 2025  6:23P Dictated by : PABLO RAMOS MD This examination was interpreted and the report reviewed and electronically signed by: PABLO RAMOS MD on Feb 24 2025  6:24PM  EST     CT head wo IV contrast     Result Date: 2/23/2025  * * *Final Report* * * DATE OF EXAM: Feb 23  2025 12:04PM   [facility]   0504  -  CT BRAIN WO IVCON  / ACCESSION #  [id number] PROCEDURE REASON: Stroke, follow up      * * * * Physician Interpretation * * * *  EXAMINATION: CT BRAIN WO IVCON CLINICAL HISTORY: Stroke, follow up TECHNIQUE:  Serial axial images without IV contrast were obtained from the vertex to the foramen magnum. MQ:  CTBWO_3 CT Radiation dose: Integrated Dose-Length Product (DLP) for this visit =   778  mGy*cm CT Dose Reduction Employed: Iterative recon COMPARISON: MRI brain 02/19/2025 RESULT: Post-operative change: None. Acute change: Evolving acute infarct along the central eli and superior left medulla (2:7-9).  No evidence of hemorrhagic transformation. Hemorrhage: No evidence of acute intracranial hemorrhage. Mass Lesion / Mass Effect: There is no evidence of an intracranial mass or extraaxial fluid collection. No significant mass effect. Chronic change: Stable remote left MCA territory encephalomalacia. Parenchyma: There is no significant volume loss. The brain parenchyma is otherwise within normal limits for age. Ventricles: The ventricles are within normal limits of size and configuration for age. Paranasal sinuses and skull base: Partial opacification of the left maxillary and sphenoid sinus and multiple bilateral ethmoid air cells.   The remaining visualized paranasal sinuses are grossly clear. The skull base and imaged soft tissues are unremarkable. Localizer images: No additional findings.     IMPRESSION: Evolving acute brainstem infarct.  No evidence of hemorrhagic transformation. Stable remote left MCA territory infarct. : [facility]   Transcribe Date/Time: Feb 23 2025 12:48P Dictated by : FRANKY GUZMAN MD   This examination was interpreted and the report reviewed and electronically signed by: FRANKY GUZMAN MD on Feb 23 2025 12:52PM  EST     XR chest 1 view     Result Date: 2/22/2025  * * *Final Report* * * DATE OF EXAM: Feb 22 2025  6:56AM   MMX   5290  -  XR CHEST 1V FRONTAL   /  ACCESSION #  [id number] PROCEDURE REASON: Evaluate tube, line,  or lead position      * * * * Physician Interpretation * * * *  EXAMINATION:  CHEST RADIOGRAPH (SINGLE VIEW AP OR PA) CLINICAL HISTORY: Evaluate tube, line,  or lead position MQ:  XC1_5 Comparison:  02/21/2025. RESULT: Lines, tubes, and devices:  An ET tube and feeding tube remain in place.   A poorly defined right venous catheter cannot be excluded. Lungs and pleura:  There are persistent bilateral lower lungs infiltrates seen on the right more than left suggestive of pneumonia such as aspiration pneumonia.  There is no definite pleural effusion. Cardiomediastinal silhouette:  Stable cardiomediastinal silhouette. Other:  The visualized bony thorax appears unremarkable.     IMPRESSION: Persistent bilateral lower lungs infiltrates as described above. A follow-up exam is recommended. : [facility]   Transcribe Date/Time: Feb 22 2025  8:20A Dictated by : BJ INIGUEZ MD This examination was interpreted and the report reviewed and electronically signed by: BJ INIGUEZ MD on Feb 22 2025  8:21AM  EST     XR chest 1 view     Result Date: 2/21/2025  * * *Final Report* * * DATE OF EXAM: Feb 21 2025  5:59PM   MMX   5376  -  XR CHEST 1V FRONTAL PORT  / ACCESSION #  [id number] PROCEDURE REASON: Evaluate tube, line,  or lead position      * * * * Physician Interpretation * * * *  EXAMINATION:  CHEST RADIOGRAPH (PORTABLE SINGLE VIEW AP) Exam Date/Time:  2/21/2025 5:59 PM CLINICAL HISTORY: Evaluate tube, line,  or lead position MQ:  XCPR_5 Comparison:  02/21/2025 RESULT: Lines, tubes, and devices: Endotracheal tube in place terminating in enteric tube in place terminating below the field of view. Lungs and pleura: No pneumothorax.  No pleural effusion.  Bilateral mild interstitial opacities.  The right costophrenic angle is not included in the field-of-view. Cardiomediastinal silhouette:  Stable cardiomediastinal silhouette. Other:  .     IMPRESSION:  Mild interstitial opacities may be related to pulmonary vascular congestion. : [facility]   Transcribe Date/Time: Feb 21 2025  6:55P Dictated by : IRWIN VALDIVIA MD This examination was interpreted and the report reviewed and electronically signed by: IRWIN VALDIVIA MD on Feb 21 2025  6:57PM  EST     XR abdomen 2 views supine and erect or decub     Result Date: 2/21/2025  * * *Final Report* * * DATE OF EXAM: Feb 21 2025 10:32AM   MMX   5289  -  XR ABDOMEN 1V SUPINE  / ACCESSION #  [id number] PROCEDURE REASON: Evaluate tube, line or lead position      * * * * Physician Interpretation * * * *  Clinical Information: Feeding tube Single view of the abdomen was obtained. Feeding tube tip within the distal stomach. There is a nonspecific, nonobstructive bowel gas pattern. No abnormal abdominal masses are identified.  No pathologic calcifications.   No acute bony abnormalities are seen.     IMPRESSION: Nonspecific, nonobstructive bowel gas pattern. Feeding tube tip within the distal stomach. : [facility]   Transcribe Date/Time: Feb 21 2025 10:43A Dictated by : PAMELA EVANS MD This examination was interpreted and the report reviewed and electronically signed by: PAMELA EVANS MD on Feb 21 2025 10:44AM  EST     XR chest 1 view     Result Date: 2/21/2025  * * *Final Report* * * DATE OF EXAM: Feb 21 2025 10:32AM   MMX   5376  -  XR CHEST 1V FRONTAL PORT  / ACCESSION #  [id number] PROCEDURE REASON: Shortness of breath      * * * * Physician Interpretation * * * *  EXAMINATION:  CHEST RADIOGRAPH (PORTABLE SINGLE VIEW AP) Exam Date/Time:  2/21/2025 10:32 AM Clinical History:  Shortness of breath Comparison:  02/17/2025 RESULT: LINES, TUBES, AND DEVICES:  Feeding tube tip beyond the inferior extent of the image. CARDIOMEDIASTINAL SILHOUETTE:  The cardiac and mediastinal silhouettes appear unremarkable. LUNGS AND PLEURA: No consolidation.  No pleural effusion. No pulmonary vascular  congestion. No pneumothorax identified. OTHER:  N/A     IMPRESSION: No acute abnormality identified. : [facility]   Transcribe Date/Time: Feb 21 2025 10:41A Dictated by : PAMELA EVANS MD This examination was interpreted and the report reviewed and electronically signed by: PAMELA EVANS MD on Feb 21 2025 10:43AM  EST            Assessment/Plan     Assessment & Plan  Hemoptysis     Tracheostomy in place (Multi)     CVA, old, hemiparesis (Multi)     Status post insertion of percutaneous endoscopic gastrostomy (PEG) tube (Multi)     Pneumonia due to infectious organism     Right sided weakness     Atrial flutter (Multi)     Insulin dependent type 2 diabetes mellitus (Multi)        Telemetry monitoring  Hemoglobin stable 8's recently on review of outside labs, monitor for ongoing bleeding.  Daily H&H  Monitor for overt bleeding  Consult pulmonology, appreciate input  Pneumonia suspected for CT findings  Tracheostomy protocol  Expected cover with Zosyn and vancomycin  MRSA swab  Urine for Legionella antigen and strep pneumonia antigen  Supplemental oxygen as needed  Nebulizers as needed  RT evaluate and treat  Check procalcitonin  Typical 20% zinc oxide to wounds and groin  Continuous tube feeds, continue at 80 mL an hour x 22 hours  Accu-Chek every 6 hours  Hypoglycemia protocol  PT/OT  Anticoagulation and antiplatelet therapy on hold, resume once cleared by pulmonology  Continue patient's home medications for chronic issues as appropriate     Patient fully evaluated on March 23.  Continue to monitor H&H.  Pulmonary consultation obtained.  Continue broad-spectrum antibiotics to rule out infectious process.  Recheck labs in AM.                 Zeus Bone MD                    [facility]  Patient fully evaluated  for 04/01  Patient with significant clinical improvement noted, patient medically cleared for discharge today. Patient seen resting in bed with head of bed elevated, no s/s or c/o acute  difficulties at this time. Vital signs for last 24 hours:  Temp:  [36 °C (96.8 °F)-36.9 °C (98.4 °F)] 36.3 °C (97.3 °F)  Heart Rate:  [53-68] 65  Resp:  [17-18] 18  BP: (116-154)/(55-70) 154/70  FiO2 (%):  [28 %-30 %] 30 % Medications and labs reviewed-   Results for orders placed or performed during the hospital encounter of 03/22/25 (from the past 24 hours)   POCT GLUCOSE   Result Value Ref Range    POCT Glucose 290 (H) 74 - 99 mg/dL   POCT GLUCOSE   Result Value Ref Range    POCT Glucose 320 (H) 74 - 99 mg/dL   POCT GLUCOSE   Result Value Ref Range    POCT Glucose 269 (H) 74 - 99 mg/dL   Comprehensive Metabolic Panel   Result Value Ref Range    Glucose 298 (H) 74 - 99 mg/dL    Sodium 144 136 - 145 mmol/L    Potassium 4.1 3.5 - 5.3 mmol/L    Chloride 109 (H) 98 - 107 mmol/L    Bicarbonate 29 21 - 32 mmol/L    Anion Gap 10 10 - 20 mmol/L    Urea Nitrogen 26 (H) 6 - 23 mg/dL    Creatinine 0.79 0.50 - 1.30 mg/dL    eGFR >90 >60 mL/min/1.73m*2    Calcium 8.8 8.6 - 10.3 mg/dL    Albumin 3.1 (L) 3.4 - 5.0 g/dL    Alkaline Phosphatase 256 (H) 33 - 136 U/L    Total Protein 6.2 (L) 6.4 - 8.2 g/dL    AST 32 9 - 39 U/L    Bilirubin, Total 0.3 0.0 - 1.2 mg/dL    ALT 98 (H) 10 - 52 U/L   CBC and Auto Differential   Result Value Ref Range    WBC 12.1 (H) 4.4 - 11.3 x10*3/uL    nRBC 9.2 (H) 0.0 - 0.0 /100 WBCs    RBC 2.92 (L) 4.50 - 5.90 x10*6/uL    Hemoglobin 8.1 (L) 13.5 - 17.5 g/dL    Hematocrit 29.4 (L) 41.0 - 52.0 %     (H) 80 - 100 fL    MCH 27.7 26.0 - 34.0 pg    MCHC 27.6 (L) 32.0 - 36.0 g/dL    RDW 22.2 (H) 11.5 - 14.5 %    Platelets 352 150 - 450 x10*3/uL    Immature Granulocytes %, Automated 6.3 (H) 0.0 - 0.9 %    Immature Granulocytes Absolute, Automated 0.89 (H) 0.00 - 0.70 x10*3/uL   Manual Differential   Result Value Ref Range    Neutrophils %, Manual 83.0 40.0 - 80.0 %    Bands %, Manual 3.0 0.0 - 5.0 %    Lymphocytes %, Manual 9.0 13.0 - 44.0 %    Monocytes %, Manual 1.0 2.0 - 10.0 %    Eosinophils %,  Manual 0.0 0.0 - 6.0 %    Basophils %, Manual 1.0 0.0 - 2.0 %    Metamyelocytes %, Manual 3.0 0.0 - 0.0 %    Seg Neutrophils Absolute, Manual 10.04 (H) 1.20 - 7.00 x10*3/uL    Bands Absolute, Manual 0.36 0.00 - 0.70 x10*3/uL    Lymphocytes Absolute, Manual 1.09 (L) 1.20 - 4.80 x10*3/uL    Monocytes Absolute, Manual 0.12 0.10 - 1.00 x10*3/uL    Eosinophils Absolute, Manual 0.00 0.00 - 0.70 x10*3/uL    Basophils Absolute, Manual 0.12 (H) 0.00 - 0.10 x10*3/uL    Metamyelocytes Absolute, Manual 0.36 0.00 - 0.00 x10*3/uL    Total Cells Counted 100     Neutrophils Absolute, Manual 10.40 (H) 1.20 - 7.70 x10*3/uL    Manual nRBC per 100 Cells 17.0 (H) 0.0 - 0.0 %    RBC Morphology See Below     Polychromasia Mild     Basophilic Stippling Present     Clumped Platelets Present    POCT GLUCOSE   Result Value Ref Range    POCT Glucose 269 (H) 74 - 99 mg/dL      Patient recently received an antibiotic (last 12 hours)       Date/Time Action Medication Dose    04/01/25 0900 New Bag    piperacillin-tazobactam (Zosyn) 3.375 g in dextrose (iso) IV 50 mL 3.375 g    04/01/25 0206 New Bag    piperacillin-tazobactam (Zosyn) 3.375 g in dextrose (iso) IV 50 mL 3.375 g           No results found for the last 90 days.       Awaiting updated therapy notes to start precert process for patient to [facility]. Blood sugars elevated at times, will continue lantus at 50 units and sliding scale insulin,Patient medically cleared for discharge today, will continue aggressive pulmonary hygiene and oral hygiene. Off loading as tolerated for skin integrity. Plan discussed with interdisciplinary team, no acute events overnight, , patient denies chest pain, worsening SOB at this time. Ok to discharge, will continue current and repeat labs in the AM if patient still hospitalized. Patient aware and agreeable to current plan, continue plan as above.     I spent 30 minutes on the date of the service which included preparing to see the patient, face-to-face  patient care, completing clinical documentation, obtaining and/or reviewing separately obtained history, performing a medically appropriate examination, counseling and educating the patient/family/caregiver, ordering medications, tests, or procedures, communicating with other HCPs (not separately reported), independently interpreting results (not separately reported), communicating results to the patient/family/caregiver, and care coordination (not separately reported    Pertinent Physical Exam At Time of Discharge  Physical Exam  no acute events overnight, patient denies chest pain, worsening SOB at this time.  Outpatient Follow-Up  No future appointments.      Alondra Barron

## 2025-04-02 ENCOUNTER — APPOINTMENT (OUTPATIENT)
Dept: VASCULAR MEDICINE | Facility: HOSPITAL | Age: 65
End: 2025-04-02
Payer: COMMERCIAL

## 2025-04-02 LAB
ALBUMIN SERPL BCP-MCNC: 2.9 G/DL (ref 3.4–5)
ALP SERPL-CCNC: 227 U/L (ref 33–136)
ALT SERPL W P-5'-P-CCNC: 74 U/L (ref 10–52)
ANION GAP SERPL CALC-SCNC: 9 MMOL/L (ref 10–20)
AST SERPL W P-5'-P-CCNC: 26 U/L (ref 9–39)
BASO STIPL BLD QL SMEAR: PRESENT
BASOPHILS # BLD AUTO: 0.09 X10*3/UL (ref 0–0.1)
BASOPHILS NFR BLD AUTO: 0.8 %
BILIRUB SERPL-MCNC: 0.4 MG/DL (ref 0–1.2)
BUN SERPL-MCNC: 25 MG/DL (ref 6–23)
CALCIUM SERPL-MCNC: 8.8 MG/DL (ref 8.6–10.3)
CHLORIDE SERPL-SCNC: 111 MMOL/L (ref 98–107)
CO2 SERPL-SCNC: 30 MMOL/L (ref 21–32)
CREAT SERPL-MCNC: 0.79 MG/DL (ref 0.5–1.3)
EGFRCR SERPLBLD CKD-EPI 2021: >90 ML/MIN/1.73M*2
EOSINOPHIL # BLD AUTO: 0.15 X10*3/UL (ref 0–0.7)
EOSINOPHIL NFR BLD AUTO: 1.3 %
ERYTHROCYTE [DISTWIDTH] IN BLOOD BY AUTOMATED COUNT: 22.3 % (ref 11.5–14.5)
GLUCOSE BLD MANUAL STRIP-MCNC: 173 MG/DL (ref 74–99)
GLUCOSE BLD MANUAL STRIP-MCNC: 204 MG/DL (ref 74–99)
GLUCOSE BLD MANUAL STRIP-MCNC: 228 MG/DL (ref 74–99)
GLUCOSE BLD MANUAL STRIP-MCNC: 257 MG/DL (ref 74–99)
GLUCOSE SERPL-MCNC: 220 MG/DL (ref 74–99)
HCT VFR BLD AUTO: 32.3 % (ref 41–52)
HGB BLD-MCNC: 8.6 G/DL (ref 13.5–17.5)
HYPOCHROMIA BLD QL SMEAR: NORMAL
IMM GRANULOCYTES # BLD AUTO: 0.66 X10*3/UL (ref 0–0.7)
IMM GRANULOCYTES NFR BLD AUTO: 5.8 % (ref 0–0.9)
LAB AP ASR DISCLAIMER: NORMAL
LABORATORY COMMENT REPORT: NORMAL
LYMPHOCYTES # BLD AUTO: 1.95 X10*3/UL (ref 1.2–4.8)
LYMPHOCYTES NFR BLD AUTO: 17.1 %
MCH RBC QN AUTO: 27.1 PG (ref 26–34)
MCHC RBC AUTO-ENTMCNC: 26.6 G/DL (ref 32–36)
MCV RBC AUTO: 102 FL (ref 80–100)
MONOCYTES # BLD AUTO: 0.44 X10*3/UL (ref 0.1–1)
MONOCYTES NFR BLD AUTO: 3.9 %
NEUTROPHILS # BLD AUTO: 8.13 X10*3/UL (ref 1.2–7.7)
NEUTROPHILS NFR BLD AUTO: 71.1 %
NRBC BLD-RTO: 9.2 /100 WBCS (ref 0–0)
PATH REPORT.FINAL DX SPEC: NORMAL
PATH REPORT.GROSS SPEC: NORMAL
PATH REPORT.RELEVANT HX SPEC: NORMAL
PATH REPORT.TOTAL CANCER: NORMAL
PLATELET # BLD AUTO: 335 X10*3/UL (ref 150–450)
POLYCHROMASIA BLD QL SMEAR: NORMAL
POTASSIUM SERPL-SCNC: 3.8 MMOL/L (ref 3.5–5.3)
PROT SERPL-MCNC: 5.9 G/DL (ref 6.4–8.2)
RBC # BLD AUTO: 3.17 X10*6/UL (ref 4.5–5.9)
RBC MORPH BLD: NORMAL
SODIUM SERPL-SCNC: 146 MMOL/L (ref 136–145)
WBC # BLD AUTO: 11.4 X10*3/UL (ref 4.4–11.3)

## 2025-04-02 PROCEDURE — 2500000005 HC RX 250 GENERAL PHARMACY W/O HCPCS: Performed by: INTERNAL MEDICINE

## 2025-04-02 PROCEDURE — 2500000002 HC RX 250 W HCPCS SELF ADMINISTERED DRUGS (ALT 637 FOR MEDICARE OP, ALT 636 FOR OP/ED): Performed by: NURSE PRACTITIONER

## 2025-04-02 PROCEDURE — 2500000004 HC RX 250 GENERAL PHARMACY W/ HCPCS (ALT 636 FOR OP/ED): Performed by: NURSE PRACTITIONER

## 2025-04-02 PROCEDURE — 2500000002 HC RX 250 W HCPCS SELF ADMINISTERED DRUGS (ALT 637 FOR MEDICARE OP, ALT 636 FOR OP/ED): Performed by: INTERNAL MEDICINE

## 2025-04-02 PROCEDURE — 93970 EXTREMITY STUDY: CPT

## 2025-04-02 PROCEDURE — 2500000001 HC RX 250 WO HCPCS SELF ADMINISTERED DRUGS (ALT 637 FOR MEDICARE OP): Performed by: INTERNAL MEDICINE

## 2025-04-02 PROCEDURE — 2500000001 HC RX 250 WO HCPCS SELF ADMINISTERED DRUGS (ALT 637 FOR MEDICARE OP): Performed by: NURSE PRACTITIONER

## 2025-04-02 PROCEDURE — 94640 AIRWAY INHALATION TREATMENT: CPT

## 2025-04-02 PROCEDURE — 82947 ASSAY GLUCOSE BLOOD QUANT: CPT

## 2025-04-02 PROCEDURE — 36415 COLL VENOUS BLD VENIPUNCTURE: CPT | Performed by: INTERNAL MEDICINE

## 2025-04-02 PROCEDURE — 2060000001 HC INTERMEDIATE ICU ROOM DAILY

## 2025-04-02 PROCEDURE — 2500000004 HC RX 250 GENERAL PHARMACY W/ HCPCS (ALT 636 FOR OP/ED): Performed by: INTERNAL MEDICINE

## 2025-04-02 PROCEDURE — 85025 COMPLETE CBC W/AUTO DIFF WBC: CPT | Performed by: INTERNAL MEDICINE

## 2025-04-02 PROCEDURE — 2500000004 HC RX 250 GENERAL PHARMACY W/ HCPCS (ALT 636 FOR OP/ED)

## 2025-04-02 PROCEDURE — 84075 ASSAY ALKALINE PHOSPHATASE: CPT | Performed by: INTERNAL MEDICINE

## 2025-04-02 PROCEDURE — 93970 EXTREMITY STUDY: CPT | Performed by: INTERNAL MEDICINE

## 2025-04-02 RX ADMIN — INSULIN LISPRO 2 UNITS: 100 INJECTION, SOLUTION INTRAVENOUS; SUBCUTANEOUS at 14:48

## 2025-04-02 RX ADMIN — METOPROLOL TARTRATE 50 MG: 50 TABLET, FILM COATED ORAL at 08:28

## 2025-04-02 RX ADMIN — IPRATROPIUM BROMIDE 0.5 MG: 0.5 SOLUTION RESPIRATORY (INHALATION) at 07:17

## 2025-04-02 RX ADMIN — SUCRALFATE 1 G: 1 SUSPENSION ORAL at 20:51

## 2025-04-02 RX ADMIN — INSULIN GLARGINE 50 UNITS: 100 INJECTION, SOLUTION SUBCUTANEOUS at 20:52

## 2025-04-02 RX ADMIN — PANTOPRAZOLE SODIUM 40 MG: 40 INJECTION, POWDER, FOR SOLUTION INTRAVENOUS at 20:52

## 2025-04-02 RX ADMIN — AMIODARONE HYDROCHLORIDE 200 MG: 200 TABLET ORAL at 08:28

## 2025-04-02 RX ADMIN — SUCRALFATE 1 G: 1 SUSPENSION ORAL at 14:48

## 2025-04-02 RX ADMIN — INSULIN LISPRO 6 UNITS: 100 INJECTION, SOLUTION INTRAVENOUS; SUBCUTANEOUS at 02:38

## 2025-04-02 RX ADMIN — PIPERACILLIN SODIUM AND TAZOBACTAM SODIUM 3.38 G: 3; .375 INJECTION, SOLUTION INTRAVENOUS at 14:48

## 2025-04-02 RX ADMIN — PIPERACILLIN SODIUM AND TAZOBACTAM SODIUM 3.38 G: 3; .375 INJECTION, SOLUTION INTRAVENOUS at 20:51

## 2025-04-02 RX ADMIN — ACETYLCYSTEINE 200 MG: 200 SOLUTION ORAL; RESPIRATORY (INHALATION) at 07:18

## 2025-04-02 RX ADMIN — AMIODARONE HYDROCHLORIDE 200 MG: 200 TABLET ORAL at 20:52

## 2025-04-02 RX ADMIN — IPRATROPIUM BROMIDE 0.5 MG: 0.5 SOLUTION RESPIRATORY (INHALATION) at 18:59

## 2025-04-02 RX ADMIN — PANTOPRAZOLE SODIUM 40 MG: 40 INJECTION, POWDER, FOR SOLUTION INTRAVENOUS at 08:28

## 2025-04-02 RX ADMIN — Medication 30 L/MIN: at 20:40

## 2025-04-02 RX ADMIN — INSULIN LISPRO 4 UNITS: 100 INJECTION, SOLUTION INTRAVENOUS; SUBCUTANEOUS at 20:53

## 2025-04-02 RX ADMIN — METOPROLOL TARTRATE 50 MG: 50 TABLET, FILM COATED ORAL at 20:52

## 2025-04-02 RX ADMIN — ACETYLCYSTEINE 200 MG: 200 SOLUTION ORAL; RESPIRATORY (INHALATION) at 18:59

## 2025-04-02 RX ADMIN — PIPERACILLIN SODIUM AND TAZOBACTAM SODIUM 3.38 G: 3; .375 INJECTION, SOLUTION INTRAVENOUS at 02:38

## 2025-04-02 RX ADMIN — TRAZODONE HYDROCHLORIDE 50 MG: 50 TABLET ORAL at 20:51

## 2025-04-02 RX ADMIN — Medication 30 PERCENT: at 07:17

## 2025-04-02 RX ADMIN — SUCRALFATE 1 G: 1 SUSPENSION ORAL at 02:38

## 2025-04-02 RX ADMIN — PIPERACILLIN SODIUM AND TAZOBACTAM SODIUM 3.38 G: 3; .375 INJECTION, SOLUTION INTRAVENOUS at 08:28

## 2025-04-02 RX ADMIN — INSULIN LISPRO 4 UNITS: 100 INJECTION, SOLUTION INTRAVENOUS; SUBCUTANEOUS at 08:28

## 2025-04-02 RX ADMIN — SUCRALFATE 1 G: 1 SUSPENSION ORAL at 08:28

## 2025-04-02 ASSESSMENT — PAIN - FUNCTIONAL ASSESSMENT
PAIN_FUNCTIONAL_ASSESSMENT: 0-10
PAIN_FUNCTIONAL_ASSESSMENT: 0-10

## 2025-04-02 ASSESSMENT — PAIN SCALES - GENERAL
PAINLEVEL_OUTOF10: 0 - NO PAIN

## 2025-04-02 ASSESSMENT — COGNITIVE AND FUNCTIONAL STATUS - GENERAL
HELP NEEDED FOR BATHING: TOTAL
EATING MEALS: TOTAL
MOVING TO AND FROM BED TO CHAIR: TOTAL
TOILETING: TOTAL
TURNING FROM BACK TO SIDE WHILE IN FLAT BAD: TOTAL
PERSONAL GROOMING: TOTAL
DAILY ACTIVITIY SCORE: 6
DRESSING REGULAR LOWER BODY CLOTHING: TOTAL
MOBILITY SCORE: 6
STANDING UP FROM CHAIR USING ARMS: TOTAL
CLIMB 3 TO 5 STEPS WITH RAILING: TOTAL
MOVING FROM LYING ON BACK TO SITTING ON SIDE OF FLAT BED WITH BEDRAILS: TOTAL
WALKING IN HOSPITAL ROOM: TOTAL
DRESSING REGULAR UPPER BODY CLOTHING: TOTAL

## 2025-04-02 NOTE — ASSESSMENT & PLAN NOTE
Zeus Bone MD  Physician  Internal Medicine     H&P      Signed     Date of Service: 3/23/2025 10:28 AM     Signed       Expand All Collapse All    History Of Present Illness  Bryan Nix is a 64-year-old male with past medical history of atrial flutter on Eliquis, hyponatremia, IDDM, CAD, hypertension, chronic wounds, diffuse lymphadenopathy, anemia, history of smoking, history elevated PSA, CVA L hemisphere (2020) with right-sided deficits, Left ICA stenosis (85%) s/p angioplasty and left carotid artery stent (08/25/2023) and recent history of acute pontine stroke 02/27/25 with acute hypoxic respiratory failure s/p tracheostomy on 2/25/2025 and PEG.  Patient presents for bleeding from tracheostomy.  Patient is alert and oriented x 3, however limited verbal communication due to tracheostomy.  Wife is at bedside and assists with history.  She reports that patient was coughing blood out of his tracheostomy.  ER provider felt bleeding was likely superficial around site.  Patient has been on room air at skilled nursing facility.  He was placed on Airvo in the ED mainly to humidify oxygen.  Patient never desaturated per ER report.  Currently he is resting comfortably in the bed, breath sounds slightly rhonchorous, but unlabored.  Reports chills, but no fevers.  He has right-sided weakness and sensory deficits.  Denies headache, vision or hearing changes, chest pains, palpitations, shortness of breath, nausea, vomiting, abdominal pain, diarrhea, or complications of PEG tube site.  Wife reports patient skin became irritated in his groin due to adhesive from a condom cath previously, he has a small decubitus wound that she reports is healing well.  He receives tube feeds continuously.  Wife reports a patient was recently evaluated by speech for p.o. to intake, however he still remains n.p.o. except for meds and tube feeds through PEG. CODE STATUS reviewed: full code     ER Course: Hemodynamically stable,  afebrile, SpO2 to 97% trach mask.  WBC 9.3, hemoglobin 8, hematocrit 29.4, platelets 197.  INR 1.4.  Glucose 223, BUN 36, otherwise CMP is normal.  CT of the chest for PE pending.  CXR unremarkable. Trannexamic acid soak gauze wrapped around trach.      Past medical history: As above  Past surgical history: As above  Social history: Former smoker 37.5 pack years-quit smoking age 63, occasional alcohol use, no illicit drug use.  .  Works in maintenance.  6 children.  Family history: Noncontributory     Past Medical History  Medical History        Past Medical History:   Diagnosis Date    Abnormal finding of blood chemistry, unspecified 05/18/2016     Abnormal blood chemistry    Acute upper respiratory infection, unspecified 12/02/2015     Acute upper respiratory infection    Elevated prostate specific antigen (PSA)       Elevated prostate specific antigen (PSA)    Encounter for screening for malignant neoplasm of colon 01/30/2021     Colon cancer screening    Encounter for screening for malignant neoplasm of prostate 11/30/2020     Prostate cancer screening    Essential (primary) hypertension 07/30/2013     Benign essential hypertension    Other conditions influencing health status 07/30/2013     Diabetes Mellitus Poorly Controlled    Other conditions influencing health status 12/02/2015     History of cough    Personal history of other endocrine, nutritional and metabolic disease 03/18/2021     History of hyperlipidemia    Personal history of other endocrine, nutritional and metabolic disease 03/18/2021     History of diabetes mellitus    Personal history of other specified conditions 05/18/2016     History of chest pain    Personal history of other specified conditions 05/18/2016     History of dyspnea    Personal history of other specified conditions 05/18/2016     History of dizziness    Personal history of other specified conditions 05/18/2016     History of fatigue            Surgical History  Surgical  History         Past Surgical History:   Procedure Laterality Date    CT ANGIO CORONARY ART WITH HEARTFLOW IF SCORE >30%   8/23/2023     CT HEART CORONARY ANGIOGRAM 8/23/2023 [facility] CT    MR HEAD ANGIO WO IV CONTRAST   12/14/2020     MR HEAD ANGIO WO IV CONTRAST 12/14/2020 [facility]    MR NECK ANGIO WO IV CONTRAST   12/14/2020     MR NECK ANGIO WO IV CONTRAST 12/14/2020 [facility]    OTHER SURGICAL HISTORY   12/01/2020     Hand fracture repair    OTHER SURGICAL HISTORY   12/01/2020     Nasal bone fracture repair            Social History  He reports that he has quit smoking. His smoking use included cigarettes. He has never used smokeless tobacco. No history on file for alcohol use and drug use.     Family History  Family History   No family history on file.        Allergies  Patient has no known allergies.     Review of Systems     Physical Exam  Constitutional:       General: He is awake. He is not in acute distress.     Appearance: Normal appearance. He is not toxic-appearing.   HENT:      Head: Atraumatic.      Nose: Nose normal.      Mouth/Throat:      Mouth: Mucous membranes are moist.   Eyes:      Conjunctiva/sclera: Conjunctivae normal.   Cardiovascular:      Rate and Rhythm: Normal rate and regular rhythm.      Pulses: Normal pulses.      Heart sounds: No murmur heard.  Pulmonary:      Effort: No respiratory distress.      Comments: Tracheostomy site midline, no drainage around dressing.  Rhonchorous breath sounds, unlabored respirations, RT bedside suctioning patient with thick blood-tinged sputum  Abdominal:      General: Bowel sounds are normal. There is no distension.      Palpations: Abdomen is soft.      Tenderness: There is no abdominal tenderness. There is no guarding.      Comments: Left upper quadrant PEG site tube site, no drainage or skin breakdown around tube site.   Musculoskeletal:         General: No swelling, deformity or signs of injury.      Cervical back: Neck  "supple.      Right lower leg: No edema.      Left lower leg: No edema.   Skin:     General: Skin is warm and dry.      Capillary Refill: Capillary refill takes less than 2 seconds.      Findings: Wound (sacrum) present. No ecchymosis or erythema.   Neurological:      Mental Status: He is alert and oriented to person, place, and time.      Cranial Nerves: No facial asymmetry.      Sensory: Sensory deficit (RUE, RLE) present.      Motor: Weakness present.      Comments: Right sided upper and lower extremity motor function deficits   Psychiatric:         Mood and Affect: Mood normal.         Behavior: Behavior is cooperative.            Last Recorded Vitals  Blood pressure 134/50, pulse 93, temperature 36.9 °C (98.4 °F), temperature source Temporal, resp. rate 22, height 1.88 m (6' 2\"), weight 77.1 kg (170 lb), SpO2 98%.     Relevant Results              Results for orders placed or performed during the hospital encounter of 03/22/25 (from the past 24 hours)   CBC and Auto Differential   Result Value Ref Range     WBC 9.3 4.4 - 11.3 x10*3/uL     nRBC 0.3 (H) 0.0 - 0.0 /100 WBCs     RBC 3.02 (L) 4.50 - 5.90 x10*6/uL     Hemoglobin 8.0 (L) 13.5 - 17.5 g/dL     Hematocrit 29.4 (L) 41.0 - 52.0 %     MCV 97 80 - 100 fL     MCH 26.5 26.0 - 34.0 pg     MCHC 27.2 (L) 32.0 - 36.0 g/dL     RDW 15.6 (H) 11.5 - 14.5 %     Platelets 197 150 - 450 x10*3/uL     Neutrophils % 77.1 40.0 - 80.0 %     Immature Granulocytes %, Automated 1.5 (H) 0.0 - 0.9 %     Lymphocytes % 13.1 13.0 - 44.0 %     Monocytes % 4.1 2.0 - 10.0 %     Eosinophils % 3.2 0.0 - 6.0 %     Basophils % 1.0 0.0 - 2.0 %     Neutrophils Absolute 7.14 1.20 - 7.70 x10*3/uL     Immature Granulocytes Absolute, Automated 0.14 0.00 - 0.70 x10*3/uL     Lymphocytes Absolute 1.21 1.20 - 4.80 x10*3/uL     Monocytes Absolute 0.38 0.10 - 1.00 x10*3/uL     Eosinophils Absolute 0.30 0.00 - 0.70 x10*3/uL     Basophils Absolute 0.09 0.00 - 0.10 x10*3/uL   Basic metabolic panel   Result " Value Ref Range     Glucose 223 (H) 74 - 99 mg/dL     Sodium 142 136 - 145 mmol/L     Potassium 4.3 3.5 - 5.3 mmol/L     Chloride 103 98 - 107 mmol/L     Bicarbonate 27 21 - 32 mmol/L     Anion Gap 16 10 - 20 mmol/L     Urea Nitrogen 36 (H) 6 - 23 mg/dL     Creatinine 0.81 0.50 - 1.30 mg/dL     eGFR >90 >60 mL/min/1.73m*2     Calcium 9.6 8.6 - 10.3 mg/dL   Protime-INR   Result Value Ref Range     Protime 15.1 (H) 9.8 - 12.4 seconds     INR 1.4 (H) 0.9 - 1.1   aPTT   Result Value Ref Range     aPTT 25 (L) 26 - 36 seconds   Type and Screen   Result Value Ref Range     ABO TYPE B       Rh TYPE POS       ANTIBODY SCREEN NEG     VERIFY ABO/Rh Group Test   Result Value Ref Range     ABO TYPE B       Rh TYPE POS     MRSA Surveillance for Vancomycin De-escalation, PCR     Specimen: Anterior Nares; Swab   Result Value Ref Range     MRSA PCR Not Detected Not Detected   POCT GLUCOSE   Result Value Ref Range     POCT Glucose 180 (H) 74 - 99 mg/dL   POCT GLUCOSE   Result Value Ref Range     POCT Glucose 161 (H) 74 - 99 mg/dL   POCT GLUCOSE   Result Value Ref Range     POCT Glucose 156 (H) 74 - 99 mg/dL   Basic metabolic panel   Result Value Ref Range     Glucose 170 (H) 74 - 99 mg/dL     Sodium 147 (H) 136 - 145 mmol/L     Potassium 4.2 3.5 - 5.3 mmol/L     Chloride 107 98 - 107 mmol/L     Bicarbonate 30 21 - 32 mmol/L     Anion Gap 14 10 - 20 mmol/L     Urea Nitrogen 35 (H) 6 - 23 mg/dL     Creatinine 0.85 0.50 - 1.30 mg/dL     eGFR >90 >60 mL/min/1.73m*2     Calcium 9.4 8.6 - 10.3 mg/dL   CBC   Result Value Ref Range     WBC 10.4 4.4 - 11.3 x10*3/uL     nRBC 0.6 (H) 0.0 - 0.0 /100 WBCs     RBC 2.87 (L) 4.50 - 5.90 x10*6/uL     Hemoglobin 7.8 (L) 13.5 - 17.5 g/dL     Hematocrit 26.6 (L) 41.0 - 52.0 %     MCV 93 80 - 100 fL     MCH 27.2 26.0 - 34.0 pg     MCHC 29.3 (L) 32.0 - 36.0 g/dL     RDW 15.7 (H) 11.5 - 14.5 %     Platelets 205 150 - 450 x10*3/uL   SST TOP   Result Value Ref Range     Extra Tube Hold for add-ons.        CT  angio chest for pulmonary embolism     Result Date: 3/22/2025  Interpreted By:  Pamela Zurita, STUDY: CT ANGIO CHEST FOR PULMONARY EMBOLISM;  3/22/2025 4:56 pm   INDICATION: Signs/Symptoms:bloody secretions, eval for PE.     COMPARISON: CT chest without contrast 03/22/2025   ACCESSION NUMBER(S): [id number]   ORDERING CLINICIAN: PAMELA HAHN   TECHNIQUE: Contiguous axial images of the chest were obtained after the intravenous administration of iodinated contrast using angiographic PE protocol. Coronal and sagittal reformatted images were reconstructed from the axial data. MIP images were created on an independent workstation and reviewed.   FINDINGS:   MEDIASTINUM AND LYMPH NODES: Inflammatory fat stranding along the tracheostomy tract without discrete fluid collection. The esophageal wall appears within normal limits. There is redemonstration of diffuse lymphadenopathy in the bilateral axilla, mediastinum, bilateral supraclavicular as described in detail on separate report. Also notable or enlarged bilateral level 4 cervical lymph nodes (right > left).   VESSELS:  Normal caliber thoracic aorta without dissection. Moderate aortic atherosclerosis.  No acute pulmonary embolism.   HEART: Normal size.  Mild coronary artery calcifications. No significant pericardial effusion.   LUNG, AIRWAYS, PLEURA: There is slight increase in debris within the trachea. There has been clearing of secretions in the left upper/lower lobe bronchus. There is diffuse bilateral bronchial thickening slightly increased from prior study. There is peribronchovascular ground-glass opacity in the posterior right upper lobe and superior and basal segments of the right lower lobe. There is slightly worsened atelectasis in the lung bases remaining predominantly subsegmental in nature. There is a 0.8 cm nodule in the left upper lobe that is stable from 08/19/2023.   OSSEOUS STRUCTURES: No acute osseous abnormality.   CHEST WALL SOFT TISSUES: No  discernible acute abnormality.   UPPER ABDOMEN/OTHER: No acute abnormality.        No acute pulmonary embolism.   Peribronchial vascular ground glass opacities in the right upper and lower lobe again concerning for pneumonia given patient's risk factors of tracheostomy and debris in the airways.   Redemonstration of diffuse lymphadenopathy in the lower necks, axilla, and mediastinum. Findings are either diffusely reactive in nature, reflective of lymphoma/leukemia, meter deficiency, or less likely head neck neoplasm is previously postulated. However please correlate with past medical history.   Inflammatory changes along the tracheostomy tract without fluid collection. Correlate for erythema.   Unchanged 0.8 cm nodule in the left upper lobe dating back to 08/19/2023. Recommend 1 year follow up chest CT to ensure at least 2 years of stability.   MACRO: None.   Signed by: Pamela Zurita 3/22/2025 7:17 PM Dictation workstation:   [id number]     CT chest wo IV contrast     Result Date: 3/22/2025  Interpreted By:  Pamela Zurita, STUDY: CT CHEST WO IV CONTRAST;  3/22/2025 3:41 pm   INDICATION: Signs/Symptoms:eval for alveolar hemorrhage.     COMPARISON: CT head/neck 08/19/2023   ACCESSION NUMBER(S): [id number]   ORDERING CLINICIAN: PAMELA HAHN   TECHNIQUE: Contiguous axial images of the chest and upper abdomen were obtained without contrast. Coronal and sagittal reformatted images were reconstructed from the axial data.   FINDINGS:   MEDIASTINUM AND LYMPH NODES: There is fat stranding/edema along the tract of the tracheostomy about discrete fluid collection. The esophageal wall appears within normal limits. There are numerous enlarged bilateral supraclavicular lymph nodes measuring up to 1.4 cm on the left. There are numerous enlarged bilateral axillary lymph nodes measuring up to 1.1 cm on the right and 1.2 cm on the left. There are numerous prominent to mildly enlarged mediastinal lymph nodes as well. No  pneumomediastinum.   VESSELS:  Normal caliber thoracic aorta. Moderate aortic atherosclerosis. The main pulmonary artery is normal in size.   HEART: Normal size.  Mild coronary artery calcifications. No significant pericardial effusion.   LUNG, AIRWAYS, PLEURA: Tracheostomy noted in appropriate position. There is trace mucus in the left mainstem bronchus and at the origin of the left upper lobe and lower lobe bronchi. There are mild peribronchovascular ground-glass opacities in the posterior segment of the right upper lobe, basal segments of the right lower lobe. There is mild dependent bibasilar atelectasis. There is no pleural effusion.   OSSEOUS STRUCTURES: No acute osseous abnormality.   CHEST WALL SOFT TISSUES: No discernible acute abnormality.   UPPER ABDOMEN/OTHER: No acute abnormality.        1. Mild peribronchovascular ground-glass opacities in the posterior right upper lobe and basal segments of the right lower lobe. This appearance and distribution is not typical for alveolar hemorrhage which tends to be centrilobular and bilateral asymmetric in distribution. Findings are most suggestive of pneumonia.   2. Small amount of mucous debris in the left mainstem bronchus and at the origin of the left upper and lower lobe bronchi.   3. Diffuse lymphadenopathy in the bilateral axillary regions, mediastinum and left supraclavicular region particularly notable in the bilateral supraclavicular region. This is progressed in the left supraclavicular region and new elsewhere when compared to 08/19/2023 CTA head/neck. Correlate for any history of known head/neck malignancy or lymphoma.   4. Inflammation/fat stranding adjacent to the tracheostomy tube entry site noted. Correlate for air the met. No evidence of fluid collection.   MACRO: None.   Signed by: Marco A Zurita 3/22/2025 7:10 PM Dictation workstation:   [id number]     XR chest 1 view     Result Date: 3/22/2025  Interpreted By:  Fermin Stroud, STUDY: XR CHEST  1 VIEW   INDICATION: Signs/Symptoms:hemoptysis.   COMPARISON: August 19, 2023   ACCESSION NUMBER(S): [id number]   ORDERING CLINICIAN: PAMELA HAHN   FINDINGS: No consolidation, effusion, edema, or pneumothorax. Heart size within normal limits.        No evidence of acute intrathoracic abnormality.   Signed by: Fermin Stroud 3/22/2025 2:01 PM Dictation workstation:   [id number]     XR chest 1 view     Result Date: 3/14/2025  * * *Final Report* * * DATE OF EXAM: Mar 14 2025  8:52AM   S5X   5290  -  XR CHEST 1V FRONTAL   / ACCESSION #  [id number] PROCEDURE REASON: new bleeding from trach      * * * * Physician Interpretation * * * *  EXAMINATION:  CHEST RADIOGRAPH (PORTABLE SINGLE VIEW AP) Exam Date/Time:  3/14/2025 8:52 AM Clinical History:  new bleeding from trach Comparison:  03/03/2025 RESULT: LINES, TUBES, AND DEVICES:  Tracheostomy. CARDIOMEDIASTINAL SILHOUETTE:  The cardiac and mediastinal silhouettes appear unremarkable. LUNGS AND PLEURA: No consolidation.  No pleural effusion. No pulmonary vascular congestion. No pneumothorax identified. OTHER:  N/A     IMPRESSION: No acute abnormality identified. : [facility]   Transcribe Date/Time: Mar 14 2025  9:07A Dictated by : PAMELA EVANS MD This examination was interpreted and the report reviewed and electronically signed by: PAMELA EVANS MD on Mar 14 2025  9:11AM  EST     US abdomen complete     Result Date: 3/11/2025  ABDOMEN ULTRASOUND-COMPLETE FINDINGS: Abdomen Ultrasound Complete: PROCEDURE: Multiple grayscale mages of the abdomen were obtained. FINDINGS:Multiple real time images demonstrate the liver with increased echogenicity consistent with fatty infiltration. There is no evidence of a discrete mass within the liver. The liver is enlarged, measured at 15.7 cm in size. The gallbladder is seen with no evidence of cholelithiasis. The gallbladder wall is within normal limits. The common bile duct is within normal limits. The visualized pancreas  appears unremarkable. Both kidneys are seen with no evidence of hydronephrosis or a calculus. The spleen has its normal homogeneous echo appearance. There is no free fluid to suggest ascites. The visualized aorta appears unremarkable. The inferior vena cava appears patent. CONCLUSION: Enlarged fatty liver. ELECTRONICALLY SIGNED BY LEONEL LAN M.D. 3/11/2025 5:00:52 PM EDT.     XR chest 1 view     Result Date: 3/3/2025  * * *Final Report* * * DATE OF EXAM: Mar  3 2025  1:40PM   S5X   5290  -  XR CHEST 1V FRONTAL   / ACCESSION #  [id number] PROCEDURE REASON: resp failure      * * * * Physician Interpretation * * * *  EXAMINATION:  CHEST RADIOGRAPH (PORTABLE SINGLE VIEW AP) Exam Date/Time:  3/3/2025 1:40 PM Indication:   resp failure M:  XCP_4 Comparison:  1 day prior RESULT: Lines, tubes, and devices:  Tracheostomy tube remains in situ. Lungs and pleura:  Mild patchy opacities right lower lung zone seen on the prior study are no longer appreciated.  No confluent opacity.   Costophrenic angles are clear.  No pneumothorax. Cardiomediastinal silhouette:  Stable cardiomediastinal silhouette. Other:  -     IMPRESSION: Improved aeration right lower lung zone.  No acute cardiopulmonary finding. : PSCB   Transcribe Date/Time: Mar  3 2025  2:49P Dictated by : ISRAEL BENSON MD This examination was interpreted and the report reviewed and electronically signed by: ISRAEL BENSON MD on Mar  3 2025  2:49PM  EST     XR chest 1 view     Result Date: 3/2/2025  * * *Final Report* * * DATE OF EXAM: Mar  2 2025 11:25AM   MMX   5376  -  XR CHEST 1V FRONTAL PORT  / ACCESSION #  [id number] PROCEDURE REASON: Shortness of breath      * * * * Physician Interpretation * * * *  EXAMINATION:  CHEST RADIOGRAPH (PORTABLE SINGLE VIEW AP) Exam Date/Time:  3/2/2025 11:25 AM CLINICAL HISTORY: Shortness of breath MQ:  XCPR_5 Comparison:  02/20/2025. RESULT: This is a limited examination due to multiple wires and tubing  obscuring the lower lungs. Lines, tubes, and devices:  A tracheostomy tube remains in place. Lungs and pleura:  There are increased bronchovascular markings in the right lower lung which may be due to bronchitis or early changes of aspiration pneumonia.  Clinical correlation and follow-up exams are recommended. Cardiomediastinal silhouette:  Stable cardiomediastinal silhouette. Other:  The visualized bony thorax appears unremarkable.     IMPRESSION: Nonspecific parenchymal changes in the right lower lung as described above. A follow-up exam is recommended. : [facility]   Transcribe Date/Time: Mar  2 2025 11:53A Dictated by : BJ INIGUEZ MD This examination was interpreted and the report reviewed and electronically signed by: BJ INIGUEZ MD on Mar  2 2025 11:54AM  EST     XR chest 1 view     Result Date: 2/28/2025  * * *Final Report* * * DATE OF EXAM: Feb 28 2025 10:36AM   MMX   5290  -  XR CHEST 1V FRONTAL   / ACCESSION #  [id number] PROCEDURE REASON: Shortness of breath      * * * * Physician Interpretation * * * *  EXAMINATION:  CHEST RADIOGRAPH (SINGLE VIEW AP OR PA) CLINICAL HISTORY: Shortness of breath MQ:  XC1_5 Comparison:  02/22/2025 RESULT: Lines, tubes, and devices:  Tracheostomy tube is noted. Lungs and pleura:  No consolidation. No lung mass. No pleural effusion. Cardiomediastinal silhouette:  Normal cardiomediastinal silhouette. Other:  No acute osseous pathology.     IMPRESSION: No acute radiographic abnormality. : [facility]   Transcribe Date/Time: Feb 28 2025 12:07P Dictated by : DIANNE RAMOS MD This examination was interpreted and the report reviewed and electronically signed by: DIANNE RAMOS MD on Feb 28 2025 12:08PM  EST     XR abdomen 2 views supine and erect or decub     Result Date: 2/24/2025  * * *Final Report* * * DATE OF EXAM: Feb 24 2025  6:03PM   MMX   5289  -  XR ABDOMEN 1V SUPINE  / ACCESSION #  [id number] PROCEDURE REASON: Evaluate tube, line or lead position       * * * * Physician Interpretation * * * *  EXAMINATION:  XR ABDOMEN 1V SUPINE CLINICAL HISTORY:   Evaluate tube, line or lead position Technique:   XR ABDOMEN 1V SUPINE -- NOT APPLICABLE with 1 views on 1 images Comparison: 2/21/2025 RESULT: Feeding tube with distal tip at the pylorus antrum. Moderate colonic stool burden. No dilated bowel. Lung structures are intact.     IMPRESSION: Feeding tube with distal tip at the pylorus antrum. Moderate colonic stool burden. No dilated bowel. : [facility]   Transcribe Date/Time: Feb 24 2025  6:23P Dictated by : PABLO RAMOS MD This examination was interpreted and the report reviewed and electronically signed by: PABLO RAMOS MD on Feb 24 2025  6:24PM  EST     CT head wo IV contrast     Result Date: 2/23/2025  * * *Final Report* * * DATE OF EXAM: Feb 23 2025 12:04PM   [facility]   0504  -  CT BRAIN WO IVCON  / ACCESSION #  [id number] PROCEDURE REASON: Stroke, follow up      * * * * Physician Interpretation * * * *  EXAMINATION: CT BRAIN WO IVCON CLINICAL HISTORY: Stroke, follow up TECHNIQUE:  Serial axial images without IV contrast were obtained from the vertex to the foramen magnum. MQ:  CTBWO_3 CT Radiation dose: Integrated Dose-Length Product (DLP) for this visit =   778  mGy*cm CT Dose Reduction Employed: Iterative recon COMPARISON: MRI brain 02/19/2025 RESULT: Post-operative change: None. Acute change: Evolving acute infarct along the central eli and superior left medulla (2:7-9).  No evidence of hemorrhagic transformation. Hemorrhage: No evidence of acute intracranial hemorrhage. Mass Lesion / Mass Effect: There is no evidence of an intracranial mass or extraaxial fluid collection. No significant mass effect. Chronic change: Stable remote left MCA territory encephalomalacia. Parenchyma: There is no significant volume loss. The brain parenchyma is otherwise within normal limits for age. Ventricles: The ventricles are within normal limits of size and configuration  for age. Paranasal sinuses and skull base: Partial opacification of the left maxillary and sphenoid sinus and multiple bilateral ethmoid air cells.   The remaining visualized paranasal sinuses are grossly clear. The skull base and imaged soft tissues are unremarkable. Localizer images: No additional findings.     IMPRESSION: Evolving acute brainstem infarct.  No evidence of hemorrhagic transformation. Stable remote left MCA territory infarct. : [facility]   Transcribe Date/Time: Feb 23 2025 12:48P Dictated by : FRANKY GUZMAN MD   This examination was interpreted and the report reviewed and electronically signed by: FRANKY GUZMAN MD on Feb 23 2025 12:52PM  EST     XR chest 1 view     Result Date: 2/22/2025  * * *Final Report* * * DATE OF EXAM: Feb 22 2025  6:56AM   MMX   [id number]  -  XR CHEST 1V FRONTAL   / ACCESSION #  [id number] PROCEDURE REASON: Evaluate tube, line,  or lead position      * * * * Physician Interpretation * * * *  EXAMINATION:  CHEST RADIOGRAPH (SINGLE VIEW AP OR PA) CLINICAL HISTORY: Evaluate tube, line,  or lead position MQ:  XC1_5 Comparison:  02/21/2025. RESULT: Lines, tubes, and devices:  An ET tube and feeding tube remain in place.   A poorly defined right venous catheter cannot be excluded. Lungs and pleura:  There are persistent bilateral lower lungs infiltrates seen on the right more than left suggestive of pneumonia such as aspiration pneumonia.  There is no definite pleural effusion. Cardiomediastinal silhouette:  Stable cardiomediastinal silhouette. Other:  The visualized bony thorax appears unremarkable.     IMPRESSION: Persistent bilateral lower lungs infiltrates as described above. A follow-up exam is recommended. : [facility]   Transcribe Date/Time: Feb 22 2025  8:20A Dictated by : BJ INIGUEZ MD This examination was interpreted and the report reviewed and electronically signed by: BJ INIGUEZ MD on Feb 22 2025  8:21AM  EST     XR chest 1 view     Result Date:  2/21/2025  * * *Final Report* * * DATE OF EXAM: Feb 21 2025  5:59PM   MMX   [id number]  -  XR CHEST 1V FRONTAL PORT  / ACCESSION #  [id number] PROCEDURE REASON: Evaluate tube, line,  or lead position      * * * * Physician Interpretation * * * *  EXAMINATION:  CHEST RADIOGRAPH (PORTABLE SINGLE VIEW AP) Exam Date/Time:  2/21/2025 5:59 PM CLINICAL HISTORY: Evaluate tube, line,  or lead position MQ:  XCPR_5 Comparison:  02/21/2025 RESULT: Lines, tubes, and devices: Endotracheal tube in place terminating in enteric tube in place terminating below the field of view. Lungs and pleura: No pneumothorax.  No pleural effusion.  Bilateral mild interstitial opacities.  The right costophrenic angle is not included in the field-of-view. Cardiomediastinal silhouette:  Stable cardiomediastinal silhouette. Other:  .     IMPRESSION: Mild interstitial opacities may be related to pulmonary vascular congestion. : [facility]   Transcribe Date/Time: Feb 21 2025  6:55P Dictated by : IRWIN VALDIVIA MD This examination was interpreted and the report reviewed and electronically signed by: IRWIN VALDIVIA MD on Feb 21 2025  6:57PM  EST     XR abdomen 2 views supine and erect or decub     Result Date: 2/21/2025  * * *Final Report* * * DATE OF EXAM: Feb 21 2025 10:32AM   MMX   [id number]  -  XR ABDOMEN 1V SUPINE  / ACCESSION #  [id number] PROCEDURE REASON: Evaluate tube, line or lead position      * * * * Physician Interpretation * * * *  Clinical Information: Feeding tube Single view of the abdomen was obtained. Feeding tube tip within the distal stomach. There is a nonspecific, nonobstructive bowel gas pattern. No abnormal abdominal masses are identified.  No pathologic calcifications.   No acute bony abnormalities are seen.     IMPRESSION: Nonspecific, nonobstructive bowel gas pattern. Feeding tube tip within the distal stomach. : [facility]   Transcribe Date/Time: Feb 21 2025 10:43A Dictated by : PAMELA EVANS MD  This examination was interpreted and the report reviewed and electronically signed by: PAMELA EVANS MD on Feb 21 2025 10:44AM  EST     XR chest 1 view     Result Date: 2/21/2025  * * *Final Report* * * DATE OF EXAM: Feb 21 2025 10:32AM   MMX   [id number]  -  XR CHEST 1V FRONTAL PORT  / ACCESSION #  [id number] PROCEDURE REASON: Shortness of breath      * * * * Physician Interpretation * * * *  EXAMINATION:  CHEST RADIOGRAPH (PORTABLE SINGLE VIEW AP) Exam Date/Time:  2/21/2025 10:32 AM Clinical History:  Shortness of breath Comparison:  02/17/2025 RESULT: LINES, TUBES, AND DEVICES:  Feeding tube tip beyond the inferior extent of the image. CARDIOMEDIASTINAL SILHOUETTE:  The cardiac and mediastinal silhouettes appear unremarkable. LUNGS AND PLEURA: No consolidation.  No pleural effusion. No pulmonary vascular congestion. No pneumothorax identified. OTHER:  N/A     IMPRESSION: No acute abnormality identified. : [facility]   Transcribe Date/Time: Feb 21 2025 10:41A Dictated by : PAMELA EVANS MD This examination was interpreted and the report reviewed and electronically signed by: PAMELA EVANS MD on Feb 21 2025 10:43AM  EST            Assessment/Plan     Assessment & Plan  Hemoptysis     Tracheostomy in place (Multi)     CVA, old, hemiparesis (Multi)     Status post insertion of percutaneous endoscopic gastrostomy (PEG) tube (Multi)     Pneumonia due to infectious organism     Right sided weakness     Atrial flutter (Multi)     Insulin dependent type 2 diabetes mellitus (Multi)        Telemetry monitoring  Hemoglobin stable 8's recently on review of outside labs, monitor for ongoing bleeding.  Daily H&H  Monitor for overt bleeding  Consult pulmonology, appreciate input  Pneumonia suspected for CT findings  Tracheostomy protocol  Expected cover with Zosyn and vancomycin  MRSA swab  Urine for Legionella antigen and strep pneumonia antigen  Supplemental oxygen as needed  Nebulizers as needed  RT evaluate and  treat  Check procalcitonin  Typical 20% zinc oxide to wounds and groin  Continuous tube feeds, continue at 80 mL an hour x 22 hours  Accu-Chek every 6 hours  Hypoglycemia protocol  PT/OT  Anticoagulation and antiplatelet therapy on hold, resume once cleared by pulmonology  Continue patient's home medications for chronic issues as appropriate     Patient fully evaluated on March 23.  Continue to monitor H&H.  Pulmonary consultation obtained.  Continue broad-spectrum antibiotics to rule out infectious process.  Recheck labs in AM.                 Zeus Bone MD

## 2025-04-02 NOTE — PROGRESS NOTES
Assessment and Plan  Patient is 64 y.o. male  with the following medical Problems:  Hemoptysis (resolved  Acute on chronic hypoxic respiratory failure requiring heated high flow oxygen.  Aspiration pneumonia.  Chronic tracheostomy  Old CVA.  Chronic PEG tube.  Chronic atrial fibrillation  Type 2 diabetes    Plan of Care:  Continue supplemental oxygen to keep sat 88 to 94% and continue with humidification  Aspiration precaution and head of bed elevation 30 degrees.  Hemoptysis likely related to frequent suctioning.  It has almost resolved.  DVT prophylaxis.  Will consider initiating pharmacological prophylaxis tomorrow.  Bilateral lower extremities ultrasound to evaluate for DVTs  Continue with Atrovent nebulized therapy.      History of Present Illness:   Bryan Nix is a 64-year-old male with past medical history of atrial flutter on Eliquis, hyponatremia, IDDM, CAD, hypertension, chronic wounds, diffuse lymphadenopathy, anemia, history of smoking, history elevated PSA, CVA L hemisphere (2020) with right-sided deficits, Left ICA stenosis (85%) s/p angioplasty and left carotid artery stent (08/25/2023) and recent history of acute pontine stroke 02/27/25 with acute hypoxic respiratory failure s/p tracheostomy on 2/25/2025 and PEG.  Patient presents for bleeding from tracheostomy.  Patient is alert and oriented x 3, however limited verbal communication due to tracheostomy.  Wife is at bedside and assists with history.  She reports that patient was coughing blood out of his tracheostomy.  ER provider felt bleeding was likely superficial around site.  Patient has been on room air at skilled nursing facility.  He was placed on Airvo in the ED mainly to humidify oxygen.  Patient never desaturated per ER report.  Currently he is resting comfortably in the bed, breath sounds slightly rhonchorous, but unlabored.  Reports chills, but no fevers.  He has right-sided weakness and sensory deficits.  Denies headache, vision or  hearing changes, chest pains, palpitations, shortness of breath, nausea, vomiting, abdominal pain, diarrhea, or complications of PEG tube site.  Wife reports patient skin became irritated in his groin due to adhesive from a condom cath previously, he has a small decubitus wound that she reports is healing well.  He receives tube feeds continuously.  Wife reports a patient was recently evaluated by speech for p.o. to intake, however he still remains n.p.o. except for meds and tube feeds through PEG. CODE STATUS reviewed: full code     ER Course: Hemodynamically stable, afebrile, SpO2 to 97% trach mask.  WBC 9.3, hemoglobin 8, hematocrit 29.4, platelets 197.  INR 1.4.  Glucose 223, BUN 36, otherwise CMP is normal.  CT of the chest for PE pending.  CXR unremarkable. Trannexamic acid soak gauze wrapped around trach.      Past medical history: As above  Past surgical history: As above  Social history: Former smoker 37.5 pack years-quit smoking age 63, occasional alcohol use, no illicit drug use.  .  Works in maintenance.  6 children.  Family history: Noncontributory    Daily progress:  April 2, 2025: Patient remains lethargic.  He is currently on heated high flow oxygen 30 L 30% FiO2.  Chest x-ray from April 1, 2025 was personally reviewed and independently interpreted and showed left retrocardiac opacity suggestive of pneumonia.  Patient continues to be on Zosyn.  Eliquis was placed on hold with no evidence of recurrence of hemoptysis.  Patient will be reviewed with primary team.    Past Medical/Surgical History:   Past Medical History:   Diagnosis Date    Abnormal finding of blood chemistry, unspecified 05/18/2016    Abnormal blood chemistry    Acute upper respiratory infection, unspecified 12/02/2015    Acute upper respiratory infection    Elevated prostate specific antigen (PSA)     Elevated prostate specific antigen (PSA)    Encounter for screening for malignant neoplasm of colon 01/30/2021    Colon cancer  screening    Encounter for screening for malignant neoplasm of prostate 11/30/2020    Prostate cancer screening    Essential (primary) hypertension 07/30/2013    Benign essential hypertension    Other conditions influencing health status 07/30/2013    Diabetes Mellitus Poorly Controlled    Other conditions influencing health status 12/02/2015    History of cough    Personal history of other endocrine, nutritional and metabolic disease 03/18/2021    History of hyperlipidemia    Personal history of other endocrine, nutritional and metabolic disease 03/18/2021    History of diabetes mellitus    Personal history of other specified conditions 05/18/2016    History of chest pain    Personal history of other specified conditions 05/18/2016    History of dyspnea    Personal history of other specified conditions 05/18/2016    History of dizziness    Personal history of other specified conditions 05/18/2016    History of fatigue     Past Surgical History:   Procedure Laterality Date    CT ANGIO CORONARY ART WITH HEARTFLOW IF SCORE >30%  8/23/2023    CT HEART CORONARY ANGIOGRAM 8/23/2023 [facility] CT    MR HEAD ANGIO WO IV CONTRAST  12/14/2020    MR HEAD ANGIO WO IV CONTRAST 12/14/2020 [facility]    MR NECK ANGIO WO IV CONTRAST  12/14/2020    MR NECK ANGIO WO IV CONTRAST 12/14/2020 [facility]    OTHER SURGICAL HISTORY  12/01/2020    Hand fracture repair    OTHER SURGICAL HISTORY  12/01/2020    Nasal bone fracture repair       Family History:   No relevant family history has been documented for this patient.    Allergies:     No Known Allergies      Social history:   reports that he has quit smoking. His smoking use included cigarettes. He has never used smokeless tobacco.    Current Medications:   No recently discontinued medications to reconcile    Review of Systems:   Unable to obtain due to medical condition    Physical Exam:   Vital Signs:   Vitals:    04/02/25 1347   BP:    Pulse:    Resp:    Temp:     SpO2: 98%       Input/Output:    Intake/Output Summary (Last 24 hours) at 4/2/2025 1542  Last data filed at 4/2/2025 1100  Gross per 24 hour   Intake 4451 ml   Output 1425 ml   Net 3026 ml       Oxygen requirements:    Ventilator Information:  FiO2 (%):  [30 %-35 %] 30 %          General appearance: Not in pain or distress, in no respiratory distress    HEENT:  +ve tracheostomy  Neck: Supple, no jugular venous distension, lymphadenopathy, thyromegaly or carotid bruits  Chest: Equal normal breath sounds, no wheezing, no crackles and no tenderness over ribs   Cardiovascular: Normal S1, S2, regular rate and rhythm, no murmur, rub or gallop  Abdomen: Normal sounds present, soft, lax with no tenderness, no hepatosplenomegaly, and no masses  Extremities: No edema. Pulses are equally present.   Skin: intact, no rashes   Neurologic: Lethargic and poorly responsive, does not move extremities or follow commands.     Investigations:  Labs, radiological imaging and cardiac work up were personally reviewed          .       STAFF PHYSICIAN NOTE OF PERSONAL INVOLVEMENT IN CARE  As the attending physician, I certify that I personally reviewed the patient's history and personally examined the patient to confirm the physical findings described above, and that I reviewed the relevant imaging studies and available reports.  I also discussed the differential diagnosis and all of the proposed management plans with the patient and individuals accompanying the patient to this visit.  They had the opportunity to ask questions about the proposed management plans and to have those questions answered.

## 2025-04-02 NOTE — CARE PLAN
The patient's goals for the shift include      The clinical goals for the shift include pt to remain HDS/safe/comfortable throughout the shift      Problem: Skin  Goal: Participates in plan/prevention/treatment measures  Flowsheets (Taken 4/1/2025 2124)  Participates in plan/prevention/treatment measures: Elevate heels

## 2025-04-02 NOTE — PROGRESS NOTES
Patient fully evaluated    for    Acute kidney injury  Hypernatremia  Anemia of blood loss renal disease  Transaminitis  Rhabdomyolysis      Patient denies any shortness of breath or chest pain  Hg Improving 8.6 today    Problem List Items Addressed This Visit       * (Principal) Hemoptysis - Primary    Relevant Orders    Esophagogastroduodenoscopy (EGD) (Completed)    Surgical Pathology Exam    Melena    Relevant Medications    zinc oxide 20 % ointment    ipratropium (Atrovent) 0.02 % nebulizer solution    ipratropium (Atrovent) 0.02 % nebulizer solution    epoetin nicko (Procrit) 20,000 unit/mL injection (Start on 4/6/2025)    amiodarone (Pacerone) 200 mg tablet    insulin glargine (Lantus) 100 unit/mL injection    Other Relevant Orders    Esophagogastroduodenoscopy (EGD) (Completed)    Surgical Pathology Exam    CBC    Comprehensive metabolic panel     Other Visit Diagnoses       Anemia, unspecified type        Relevant Orders    Esophagogastroduodenoscopy (EGD) (Completed)    Surgical Pathology Exam    Tracheostomy dependent (Multi)        Relevant Orders    Surgical Pathology Exam          Patient seen resting in bed  He is asymptomatic   Labs reviewed  Hemoglobin Uptrending  Sodium  to 144  .  Vital signs for last 24 hours Temp:  [35.4 °C (95.7 °F)-36.6 °C (97.9 °F)] 35.4 °C (95.7 °F)  Heart Rate:  [61-72] 67  Resp:  [18-20] 20  BP: (144-176)/(65-78) 144/65  FiO2 (%):  [30 %-35 %] 30 %       Results for orders placed or performed during the hospital encounter of 03/22/25 (from the past 24 hours)   POCT GLUCOSE   Result Value Ref Range    POCT Glucose 260 (H) 74 - 99 mg/dL   POCT GLUCOSE   Result Value Ref Range    POCT Glucose 309 (H) 74 - 99 mg/dL   POCT GLUCOSE   Result Value Ref Range    POCT Glucose 257 (H) 74 - 99 mg/dL   Comprehensive Metabolic Panel   Result Value Ref Range    Glucose 220 (H) 74 - 99 mg/dL    Sodium 146 (H) 136 - 145 mmol/L    Potassium 3.8 3.5 - 5.3 mmol/L    Chloride 111 (H) 98 - 107  mmol/L    Bicarbonate 30 21 - 32 mmol/L    Anion Gap 9 (L) 10 - 20 mmol/L    Urea Nitrogen 25 (H) 6 - 23 mg/dL    Creatinine 0.79 0.50 - 1.30 mg/dL    eGFR >90 >60 mL/min/1.73m*2    Calcium 8.8 8.6 - 10.3 mg/dL    Albumin 2.9 (L) 3.4 - 5.0 g/dL    Alkaline Phosphatase 227 (H) 33 - 136 U/L    Total Protein 5.9 (L) 6.4 - 8.2 g/dL    AST 26 9 - 39 U/L    Bilirubin, Total 0.4 0.0 - 1.2 mg/dL    ALT 74 (H) 10 - 52 U/L   CBC and Auto Differential   Result Value Ref Range    WBC 11.4 (H) 4.4 - 11.3 x10*3/uL    nRBC 9.2 (H) 0.0 - 0.0 /100 WBCs    RBC 3.17 (L) 4.50 - 5.90 x10*6/uL    Hemoglobin 8.6 (L) 13.5 - 17.5 g/dL    Hematocrit 32.3 (L) 41.0 - 52.0 %     (H) 80 - 100 fL    MCH 27.1 26.0 - 34.0 pg    MCHC 26.6 (L) 32.0 - 36.0 g/dL    RDW 22.3 (H) 11.5 - 14.5 %    Platelets 335 150 - 450 x10*3/uL    Neutrophils % 71.1 40.0 - 80.0 %    Immature Granulocytes %, Automated 5.8 (H) 0.0 - 0.9 %    Lymphocytes % 17.1 13.0 - 44.0 %    Monocytes % 3.9 2.0 - 10.0 %    Eosinophils % 1.3 0.0 - 6.0 %    Basophils % 0.8 0.0 - 2.0 %    Neutrophils Absolute 8.13 (H) 1.20 - 7.70 x10*3/uL    Immature Granulocytes Absolute, Automated 0.66 0.00 - 0.70 x10*3/uL    Lymphocytes Absolute 1.95 1.20 - 4.80 x10*3/uL    Monocytes Absolute 0.44 0.10 - 1.00 x10*3/uL    Eosinophils Absolute 0.15 0.00 - 0.70 x10*3/uL    Basophils Absolute 0.09 0.00 - 0.10 x10*3/uL   Morphology   Result Value Ref Range    RBC Morphology See Below     Polychromasia Mild     Hypochromia Mild     Basophilic Stippling Present    POCT GLUCOSE   Result Value Ref Range    POCT Glucose 204 (H) 74 - 99 mg/dL   POCT GLUCOSE   Result Value Ref Range    POCT Glucose 173 (H) 74 - 99 mg/dL      Patient fractional excretion of sodium is 0.2%  Patient fractional excretion of urea is 32.16%  Positive Hemoccult stools  Patient recently received an antibiotic (last 12 hours)       Date/Time Action Medication Dose    03/26/25 0903 New Bag    piperacillin-tazobactam (Zosyn) 3.375 g in  dextrose (iso) IV 50 mL 3.375 g    03/26/25 0148 New Bag    piperacillin-tazobactam (Zosyn) 3.375 g in dextrose (iso) IV 50 mL 3.375 g           Plan discussed with interdisciplinary team, continue current and repeat labs in the AM.               Physical Exam  General Appearance; asthenic habitus  Mild skin pallor  Poor skin turgor  HEENT; pupils react to light and accommodation  There is no nystagmus or ptosis; extraocular movements are intact  Neck; no adenopathy; no jugular vein distention; no bruit; no thyromegaly  Tracheostomy in place clean without exudation or induration  Lungs; minimal inspiratory coarse crackles at the bases  Heart; regular rate no gallop no rubs or thrills no lifts  Abdomen; active peristalsis no rebound or guarding there is a PEG tube in place  ; no CVA tenderness  Extremities; decreased muscle tone  Neurological; pathological reflexes are absent      Assessment/Plan     Acute kidney injury  Hypernatremia  Anemia of blood loss renal disease  Transaminitis  Rhabdomyolysis    Plan    Continue Erythropoietin/IV Iron at [facility]  Continue to monitor renal chemistries and CBC  Matthew Johnson MD

## 2025-04-02 NOTE — PROGRESS NOTES
04/02/25 0933   Discharge Planning   Home or Post Acute Services Post acute facilities (Rehab/SNF/etc)   Type of Post Acute Facility Services Skilled nursing   Expected Discharge Disposition SNF   Does the patient need discharge transport arranged? Yes   RoundTrip coordination needed? Yes   Has discharge transport been arranged? No     Received message from [employer] that auth is currently pending at this time.  Auth was started yesterday by facility.  Care transitions to continue to follow for discharge planning.

## 2025-04-02 NOTE — CARE PLAN
The clinical goals for the shift include comfort      Problem: Chronic Conditions and Co-morbidities  Goal: Patient's chronic conditions and co-morbidity symptoms are monitored and maintained or improved  Outcome: Progressing     Problem: Skin  Goal: Participates in plan/prevention/treatment measures  Outcome: Progressing     Problem: Diabetes  Goal: No changes in neurological exam by end of shift  Outcome: Progressing     Problem: Diabetes  Goal: Maintain glucose levels >70mg/dl to <250mg/dl throughout shift  Outcome: Progressing     Problem: Pain  Goal: Takes deep breaths with improved pain control throughout the shift  Outcome: Progressing

## 2025-04-02 NOTE — DISCHARGE SUMMARY
Discharge Diagnosis  Hemoptysis    Issues Requiring Follow-Up  Patient fully evaluated  03/31for   Assessment & Plan  Hemoptysis    Tracheostomy in place (Multi)    CVA, old, hemiparesis (Multi)    Status post insertion of percutaneous endoscopic gastrostomy (PEG) tube (Multi)    Pneumonia due to infectious organism    Right sided weakness    Atrial flutter (Multi)    Insulin dependent type 2 diabetes mellitus (Multi)        Zeus Bone MD  Physician  Internal Medicine     H&P      Signed     Date of Service: 3/23/2025 10:28 AM     Signed       Expand All Collapse All    History Of Present Illness  Bryan Nix is a 64-year-old male with past medical history of atrial flutter on Eliquis, hyponatremia, IDDM, CAD, hypertension, chronic wounds, diffuse lymphadenopathy, anemia, history of smoking, history elevated PSA, CVA L hemisphere (2020) with right-sided deficits, Left ICA stenosis (85%) s/p angioplasty and left carotid artery stent (08/25/2023) and recent history of acute pontine stroke 02/27/25 with acute hypoxic respiratory failure s/p tracheostomy on 2/25/2025 and PEG.  Patient presents for bleeding from tracheostomy.  Patient is alert and oriented x 3, however limited verbal communication due to tracheostomy.  Wife is at bedside and assists with history.  She reports that patient was coughing blood out of his tracheostomy.  ER provider felt bleeding was likely superficial around site.  Patient has been on room air at skilled nursing facility.  He was placed on Airvo in the ED mainly to humidify oxygen.  Patient never desaturated per ER report.  Currently he is resting comfortably in the bed, breath sounds slightly rhonchorous, but unlabored.  Reports chills, but no fevers.  He has right-sided weakness and sensory deficits.  Denies headache, vision or hearing changes, chest pains, palpitations, shortness of breath, nausea, vomiting, abdominal pain, diarrhea, or complications of PEG tube site.  Wife  reports patient skin became irritated in his groin due to adhesive from a condom cath previously, he has a small decubitus wound that she reports is healing well.  He receives tube feeds continuously.  Wife reports a patient was recently evaluated by speech for p.o. to intake, however he still remains n.p.o. except for meds and tube feeds through PEG. CODE STATUS reviewed: full code     ER Course: Hemodynamically stable, afebrile, SpO2 to 97% trach mask.  WBC 9.3, hemoglobin 8, hematocrit 29.4, platelets 197.  INR 1.4.  Glucose 223, BUN 36, otherwise CMP is normal.  CT of the chest for PE pending.  CXR unremarkable. Trannexamic acid soak gauze wrapped around trach.      Past medical history: As above  Past surgical history: As above  Social history: Former smoker 37.5 pack years-quit smoking age 63, occasional alcohol use, no illicit drug use.  .  Works in maintenance.  6 children.  Family history: Noncontributory     Past Medical History  Medical History        Past Medical History:   Diagnosis Date    Abnormal finding of blood chemistry, unspecified 05/18/2016     Abnormal blood chemistry    Acute upper respiratory infection, unspecified 12/02/2015     Acute upper respiratory infection    Elevated prostate specific antigen (PSA)       Elevated prostate specific antigen (PSA)    Encounter for screening for malignant neoplasm of colon 01/30/2021     Colon cancer screening    Encounter for screening for malignant neoplasm of prostate 11/30/2020     Prostate cancer screening    Essential (primary) hypertension 07/30/2013     Benign essential hypertension    Other conditions influencing health status 07/30/2013     Diabetes Mellitus Poorly Controlled    Other conditions influencing health status 12/02/2015     History of cough    Personal history of other endocrine, nutritional and metabolic disease 03/18/2021     History of hyperlipidemia    Personal history of other endocrine, nutritional and metabolic disease  03/18/2021     History of diabetes mellitus    Personal history of other specified conditions 05/18/2016     History of chest pain    Personal history of other specified conditions 05/18/2016     History of dyspnea    Personal history of other specified conditions 05/18/2016     History of dizziness    Personal history of other specified conditions 05/18/2016     History of fatigue            Surgical History  Surgical History         Past Surgical History:   Procedure Laterality Date    CT ANGIO CORONARY ART WITH HEARTFLOW IF SCORE >30%   8/23/2023     CT HEART CORONARY ANGIOGRAM 8/23/2023 [facility] CT    MR HEAD ANGIO WO IV CONTRAST   12/14/2020     MR HEAD ANGIO WO IV CONTRAST 12/14/2020 [facility]    MR NECK ANGIO WO IV CONTRAST   12/14/2020     MR NECK ANGIO WO IV CONTRAST 12/14/2020 [facility]    OTHER SURGICAL HISTORY   12/01/2020     Hand fracture repair    OTHER SURGICAL HISTORY   12/01/2020     Nasal bone fracture repair            Social History  He reports that he has quit smoking. His smoking use included cigarettes. He has never used smokeless tobacco. No history on file for alcohol use and drug use.     Family History  Family History   No family history on file.        Allergies  Patient has no known allergies.     Review of Systems     Physical Exam  Constitutional:       General: He is awake. He is not in acute distress.     Appearance: Normal appearance. He is not toxic-appearing.   HENT:      Head: Atraumatic.      Nose: Nose normal.      Mouth/Throat:      Mouth: Mucous membranes are moist.   Eyes:      Conjunctiva/sclera: Conjunctivae normal.   Cardiovascular:      Rate and Rhythm: Normal rate and regular rhythm.      Pulses: Normal pulses.      Heart sounds: No murmur heard.  Pulmonary:      Effort: No respiratory distress.      Comments: Tracheostomy site midline, no drainage around dressing.  Rhonchorous breath sounds, unlabored respirations, RT bedside suctioning patient  "with thick blood-tinged sputum  Abdominal:      General: Bowel sounds are normal. There is no distension.      Palpations: Abdomen is soft.      Tenderness: There is no abdominal tenderness. There is no guarding.      Comments: Left upper quadrant PEG site tube site, no drainage or skin breakdown around tube site.   Musculoskeletal:         General: No swelling, deformity or signs of injury.      Cervical back: Neck supple.      Right lower leg: No edema.      Left lower leg: No edema.   Skin:     General: Skin is warm and dry.      Capillary Refill: Capillary refill takes less than 2 seconds.      Findings: Wound (sacrum) present. No ecchymosis or erythema.   Neurological:      Mental Status: He is alert and oriented to person, place, and time.      Cranial Nerves: No facial asymmetry.      Sensory: Sensory deficit (RUE, RLE) present.      Motor: Weakness present.      Comments: Right sided upper and lower extremity motor function deficits   Psychiatric:         Mood and Affect: Mood normal.         Behavior: Behavior is cooperative.            Last Recorded Vitals  Blood pressure 134/50, pulse 93, temperature 36.9 °C (98.4 °F), temperature source Temporal, resp. rate 22, height 1.88 m (6' 2\"), weight 77.1 kg (170 lb), SpO2 98%.     Relevant Results              Results for orders placed or performed during the hospital encounter of 03/22/25 (from the past 24 hours)   CBC and Auto Differential   Result Value Ref Range     WBC 9.3 4.4 - 11.3 x10*3/uL     nRBC 0.3 (H) 0.0 - 0.0 /100 WBCs     RBC 3.02 (L) 4.50 - 5.90 x10*6/uL     Hemoglobin 8.0 (L) 13.5 - 17.5 g/dL     Hematocrit 29.4 (L) 41.0 - 52.0 %     MCV 97 80 - 100 fL     MCH 26.5 26.0 - 34.0 pg     MCHC 27.2 (L) 32.0 - 36.0 g/dL     RDW 15.6 (H) 11.5 - 14.5 %     Platelets 197 150 - 450 x10*3/uL     Neutrophils % 77.1 40.0 - 80.0 %     Immature Granulocytes %, Automated 1.5 (H) 0.0 - 0.9 %     Lymphocytes % 13.1 13.0 - 44.0 %     Monocytes % 4.1 2.0 - 10.0 % "     Eosinophils % 3.2 0.0 - 6.0 %     Basophils % 1.0 0.0 - 2.0 %     Neutrophils Absolute 7.14 1.20 - 7.70 x10*3/uL     Immature Granulocytes Absolute, Automated 0.14 0.00 - 0.70 x10*3/uL     Lymphocytes Absolute 1.21 1.20 - 4.80 x10*3/uL     Monocytes Absolute 0.38 0.10 - 1.00 x10*3/uL     Eosinophils Absolute 0.30 0.00 - 0.70 x10*3/uL     Basophils Absolute 0.09 0.00 - 0.10 x10*3/uL   Basic metabolic panel   Result Value Ref Range     Glucose 223 (H) 74 - 99 mg/dL     Sodium 142 136 - 145 mmol/L     Potassium 4.3 3.5 - 5.3 mmol/L     Chloride 103 98 - 107 mmol/L     Bicarbonate 27 21 - 32 mmol/L     Anion Gap 16 10 - 20 mmol/L     Urea Nitrogen 36 (H) 6 - 23 mg/dL     Creatinine 0.81 0.50 - 1.30 mg/dL     eGFR >90 >60 mL/min/1.73m*2     Calcium 9.6 8.6 - 10.3 mg/dL   Protime-INR   Result Value Ref Range     Protime 15.1 (H) 9.8 - 12.4 seconds     INR 1.4 (H) 0.9 - 1.1   aPTT   Result Value Ref Range     aPTT 25 (L) 26 - 36 seconds   Type and Screen   Result Value Ref Range     ABO TYPE B       Rh TYPE POS       ANTIBODY SCREEN NEG     VERIFY ABO/Rh Group Test   Result Value Ref Range     ABO TYPE B       Rh TYPE POS     MRSA Surveillance for Vancomycin De-escalation, PCR     Specimen: Anterior Nares; Swab   Result Value Ref Range     MRSA PCR Not Detected Not Detected   POCT GLUCOSE   Result Value Ref Range     POCT Glucose 180 (H) 74 - 99 mg/dL   POCT GLUCOSE   Result Value Ref Range     POCT Glucose 161 (H) 74 - 99 mg/dL   POCT GLUCOSE   Result Value Ref Range     POCT Glucose 156 (H) 74 - 99 mg/dL   Basic metabolic panel   Result Value Ref Range     Glucose 170 (H) 74 - 99 mg/dL     Sodium 147 (H) 136 - 145 mmol/L     Potassium 4.2 3.5 - 5.3 mmol/L     Chloride 107 98 - 107 mmol/L     Bicarbonate 30 21 - 32 mmol/L     Anion Gap 14 10 - 20 mmol/L     Urea Nitrogen 35 (H) 6 - 23 mg/dL     Creatinine 0.85 0.50 - 1.30 mg/dL     eGFR >90 >60 mL/min/1.73m*2     Calcium 9.4 8.6 - 10.3 mg/dL   CBC   Result Value Ref  Range     WBC 10.4 4.4 - 11.3 x10*3/uL     nRBC 0.6 (H) 0.0 - 0.0 /100 WBCs     RBC 2.87 (L) 4.50 - 5.90 x10*6/uL     Hemoglobin 7.8 (L) 13.5 - 17.5 g/dL     Hematocrit 26.6 (L) 41.0 - 52.0 %     MCV 93 80 - 100 fL     MCH 27.2 26.0 - 34.0 pg     MCHC 29.3 (L) 32.0 - 36.0 g/dL     RDW 15.7 (H) 11.5 - 14.5 %     Platelets 205 150 - 450 x10*3/uL   SST TOP   Result Value Ref Range     Extra Tube Hold for add-ons.        CT angio chest for pulmonary embolism     Result Date: 3/22/2025  Interpreted By:  Pamela Zurita, STUDY: CT ANGIO CHEST FOR PULMONARY EMBOLISM;  3/22/2025 4:56 pm   INDICATION: Signs/Symptoms:bloody secretions, eval for PE.     COMPARISON: CT chest without contrast 03/22/2025   ACCESSION NUMBER(S): [id number]   ORDERING CLINICIAN: PAMELA HAHN   TECHNIQUE: Contiguous axial images of the chest were obtained after the intravenous administration of iodinated contrast using angiographic PE protocol. Coronal and sagittal reformatted images were reconstructed from the axial data. MIP images were created on an independent workstation and reviewed.   FINDINGS:   MEDIASTINUM AND LYMPH NODES: Inflammatory fat stranding along the tracheostomy tract without discrete fluid collection. The esophageal wall appears within normal limits. There is redemonstration of diffuse lymphadenopathy in the bilateral axilla, mediastinum, bilateral supraclavicular as described in detail on separate report. Also notable or enlarged bilateral level 4 cervical lymph nodes (right > left).   VESSELS:  Normal caliber thoracic aorta without dissection. Moderate aortic atherosclerosis.  No acute pulmonary embolism.   HEART: Normal size.  Mild coronary artery calcifications. No significant pericardial effusion.   LUNG, AIRWAYS, PLEURA: There is slight increase in debris within the trachea. There has been clearing of secretions in the left upper/lower lobe bronchus. There is diffuse bilateral bronchial thickening slightly increased  from prior study. There is peribronchovascular ground-glass opacity in the posterior right upper lobe and superior and basal segments of the right lower lobe. There is slightly worsened atelectasis in the lung bases remaining predominantly subsegmental in nature. There is a 0.8 cm nodule in the left upper lobe that is stable from 08/19/2023.   OSSEOUS STRUCTURES: No acute osseous abnormality.   CHEST WALL SOFT TISSUES: No discernible acute abnormality.   UPPER ABDOMEN/OTHER: No acute abnormality.        No acute pulmonary embolism.   Peribronchial vascular ground glass opacities in the right upper and lower lobe again concerning for pneumonia given patient's risk factors of tracheostomy and debris in the airways.   Redemonstration of diffuse lymphadenopathy in the lower necks, axilla, and mediastinum. Findings are either diffusely reactive in nature, reflective of lymphoma/leukemia, meter deficiency, or less likely head neck neoplasm is previously postulated. However please correlate with past medical history.   Inflammatory changes along the tracheostomy tract without fluid collection. Correlate for erythema.   Unchanged 0.8 cm nodule in the left upper lobe dating back to 08/19/2023. Recommend 1 year follow up chest CT to ensure at least 2 years of stability.   MACRO: None.   Signed by: Pamela Zurita 3/22/2025 7:17 PM Dictation workstation:   [id number]     CT chest wo IV contrast     Result Date: 3/22/2025  Interpreted By:  Pamela Zurita, STUDY: CT CHEST WO IV CONTRAST;  3/22/2025 3:41 pm   INDICATION: Signs/Symptoms:eval for alveolar hemorrhage.     COMPARISON: CT head/neck 08/19/2023   ACCESSION NUMBER(S): [id number]   ORDERING CLINICIAN: PAMELA HAHN   TECHNIQUE: Contiguous axial images of the chest and upper abdomen were obtained without contrast. Coronal and sagittal reformatted images were reconstructed from the axial data.   FINDINGS:   MEDIASTINUM AND LYMPH NODES: There is fat stranding/edema  along the tract of the tracheostomy about discrete fluid collection. The esophageal wall appears within normal limits. There are numerous enlarged bilateral supraclavicular lymph nodes measuring up to 1.4 cm on the left. There are numerous enlarged bilateral axillary lymph nodes measuring up to 1.1 cm on the right and 1.2 cm on the left. There are numerous prominent to mildly enlarged mediastinal lymph nodes as well. No pneumomediastinum.   VESSELS:  Normal caliber thoracic aorta. Moderate aortic atherosclerosis. The main pulmonary artery is normal in size.   HEART: Normal size.  Mild coronary artery calcifications. No significant pericardial effusion.   LUNG, AIRWAYS, PLEURA: Tracheostomy noted in appropriate position. There is trace mucus in the left mainstem bronchus and at the origin of the left upper lobe and lower lobe bronchi. There are mild peribronchovascular ground-glass opacities in the posterior segment of the right upper lobe, basal segments of the right lower lobe. There is mild dependent bibasilar atelectasis. There is no pleural effusion.   OSSEOUS STRUCTURES: No acute osseous abnormality.   CHEST WALL SOFT TISSUES: No discernible acute abnormality.   UPPER ABDOMEN/OTHER: No acute abnormality.        1. Mild peribronchovascular ground-glass opacities in the posterior right upper lobe and basal segments of the right lower lobe. This appearance and distribution is not typical for alveolar hemorrhage which tends to be centrilobular and bilateral asymmetric in distribution. Findings are most suggestive of pneumonia.   2. Small amount of mucous debris in the left mainstem bronchus and at the origin of the left upper and lower lobe bronchi.   3. Diffuse lymphadenopathy in the bilateral axillary regions, mediastinum and left supraclavicular region particularly notable in the bilateral supraclavicular region. This is progressed in the left supraclavicular region and new elsewhere when compared to 08/19/2023  CTA head/neck. Correlate for any history of known head/neck malignancy or lymphoma.   4. Inflammation/fat stranding adjacent to the tracheostomy tube entry site noted. Correlate for air the met. No evidence of fluid collection.   MACRO: None.   Signed by: Marco A Zurita 3/22/2025 7:10 PM Dictation workstation:   [id number]     XR chest 1 view     Result Date: 3/22/2025  Interpreted By:  Fermin Stroud, STUDY: XR CHEST 1 VIEW   INDICATION: Signs/Symptoms:hemoptysis.   COMPARISON: August 19, 2023   ACCESSION NUMBER(S): [id number]   ORDERING CLINICIAN: MARCO A HAHN   FINDINGS: No consolidation, effusion, edema, or pneumothorax. Heart size within normal limits.        No evidence of acute intrathoracic abnormality.   Signed by: Fermin Stroud 3/22/2025 2:01 PM Dictation workstation:   [id number]     XR chest 1 view     Result Date: 3/14/2025  * * *Final Report* * * DATE OF EXAM: Mar 14 2025  8:52AM   S5X   5290  -  XR CHEST 1V FRONTAL   / ACCESSION #  [id number] PROCEDURE REASON: new bleeding from trach      * * * * Physician Interpretation * * * *  EXAMINATION:  CHEST RADIOGRAPH (PORTABLE SINGLE VIEW AP) Exam Date/Time:  3/14/2025 8:52 AM Clinical History:  new bleeding from trach Comparison:  03/03/2025 RESULT: LINES, TUBES, AND DEVICES:  Tracheostomy. CARDIOMEDIASTINAL SILHOUETTE:  The cardiac and mediastinal silhouettes appear unremarkable. LUNGS AND PLEURA: No consolidation.  No pleural effusion. No pulmonary vascular congestion. No pneumothorax identified. OTHER:  N/A     IMPRESSION: No acute abnormality identified. : [facility]   Transcribe Date/Time: Mar 14 2025  9:07A Dictated by : MARCO A EVANS MD This examination was interpreted and the report reviewed and electronically signed by: MARCO A EVANS MD on Mar 14 2025  9:11AM  EST     US abdomen complete     Result Date: 3/11/2025  ABDOMEN ULTRASOUND-COMPLETE FINDINGS: Abdomen Ultrasound Complete: PROCEDURE: Multiple grayscale mages of the abdomen  were obtained. FINDINGS:Multiple real time images demonstrate the liver with increased echogenicity consistent with fatty infiltration. There is no evidence of a discrete mass within the liver. The liver is enlarged, measured at 15.7 cm in size. The gallbladder is seen with no evidence of cholelithiasis. The gallbladder wall is within normal limits. The common bile duct is within normal limits. The visualized pancreas appears unremarkable. Both kidneys are seen with no evidence of hydronephrosis or a calculus. The spleen has its normal homogeneous echo appearance. There is no free fluid to suggest ascites. The visualized aorta appears unremarkable. The inferior vena cava appears patent. CONCLUSION: Enlarged fatty liver. ELECTRONICALLY SIGNED BY LEONEL LAN M.D. 3/11/2025 5:00:52 PM EDT.     XR chest 1 view     Result Date: 3/3/2025  * * *Final Report* * * DATE OF EXAM: Mar  3 2025  1:40PM   S5X   5290  -  XR CHEST 1V FRONTAL   / ACCESSION #  [id number] PROCEDURE REASON: resp failure      * * * * Physician Interpretation * * * *  EXAMINATION:  CHEST RADIOGRAPH (PORTABLE SINGLE VIEW AP) Exam Date/Time:  3/3/2025 1:40 PM Indication:   resp failure M:  XCP_4 Comparison:  1 day prior RESULT: Lines, tubes, and devices:  Tracheostomy tube remains in situ. Lungs and pleura:  Mild patchy opacities right lower lung zone seen on the prior study are no longer appreciated.  No confluent opacity.   Costophrenic angles are clear.  No pneumothorax. Cardiomediastinal silhouette:  Stable cardiomediastinal silhouette. Other:  -     IMPRESSION: Improved aeration right lower lung zone.  No acute cardiopulmonary finding. : HANY   Transcribe Date/Time: Mar  3 2025  2:49P Dictated by : ISRAEL BENSON MD This examination was interpreted and the report reviewed and electronically signed by: ISRAEL BENSON MD on Mar  3 2025  2:49PM  EST     XR chest 1 view     Result Date: 3/2/2025  * * *Final Report* * * DATE OF  EXAM: Mar  2 2025 11:25AM   MMX   5376  -  XR CHEST 1V FRONTAL PORT  / ACCESSION #  [id number] PROCEDURE REASON: Shortness of breath      * * * * Physician Interpretation * * * *  EXAMINATION:  CHEST RADIOGRAPH (PORTABLE SINGLE VIEW AP) Exam Date/Time:  3/2/2025 11:25 AM CLINICAL HISTORY: Shortness of breath MQ:  XCPR_5 Comparison:  02/20/2025. RESULT: This is a limited examination due to multiple wires and tubing obscuring the lower lungs. Lines, tubes, and devices:  A tracheostomy tube remains in place. Lungs and pleura:  There are increased bronchovascular markings in the right lower lung which may be due to bronchitis or early changes of aspiration pneumonia.  Clinical correlation and follow-up exams are recommended. Cardiomediastinal silhouette:  Stable cardiomediastinal silhouette. Other:  The visualized bony thorax appears unremarkable.     IMPRESSION: Nonspecific parenchymal changes in the right lower lung as described above. A follow-up exam is recommended. : HANY   Transcribe Date/Time: Mar  2 2025 11:53A Dictated by : BJ INIGUEZ MD This examination was interpreted and the report reviewed and electronically signed by: BJ INIGUEZ MD on Mar  2 2025 11:54AM  EST     XR chest 1 view     Result Date: 2/28/2025  * * *Final Report* * * DATE OF EXAM: Feb 28 2025 10:36AM   MMX   5290  -  XR CHEST 1V FRONTAL   / ACCESSION #  [id number] PROCEDURE REASON: Shortness of breath      * * * * Physician Interpretation * * * *  EXAMINATION:  CHEST RADIOGRAPH (SINGLE VIEW AP OR PA) CLINICAL HISTORY: Shortness of breath MQ:  XC1_5 Comparison:  02/22/2025 RESULT: Lines, tubes, and devices:  Tracheostomy tube is noted. Lungs and pleura:  No consolidation. No lung mass. No pleural effusion. Cardiomediastinal silhouette:  Normal cardiomediastinal silhouette. Other:  No acute osseous pathology.     IMPRESSION: No acute radiographic abnormality. : [facility]   Transcribe Date/Time: Feb 28 2025  12:07P Dictated by : DIANNE RAMOS MD This examination was interpreted and the report reviewed and electronically signed by: DIANNE RAMOS MD on Feb 28 2025 12:08PM  EST     XR abdomen 2 views supine and erect or decub     Result Date: 2/24/2025  * * *Final Report* * * DATE OF EXAM: Feb 24 2025  6:03PM   MMX   5289  -  XR ABDOMEN 1V SUPINE  / ACCESSION #  [id number] PROCEDURE REASON: Evaluate tube, line or lead position      * * * * Physician Interpretation * * * *  EXAMINATION:  XR ABDOMEN 1V SUPINE CLINICAL HISTORY:   Evaluate tube, line or lead position Technique:   XR ABDOMEN 1V SUPINE -- NOT APPLICABLE with 1 views on 1 images Comparison: 2/21/2025 RESULT: Feeding tube with distal tip at the pylorus antrum. Moderate colonic stool burden. No dilated bowel. Lung structures are intact.     IMPRESSION: Feeding tube with distal tip at the pylorus antrum. Moderate colonic stool burden. No dilated bowel. : [facility]   Transcribe Date/Time: Feb 24 2025  6:23P Dictated by : PABLO RAMOS MD This examination was interpreted and the report reviewed and electronically signed by: PABLO RAMOS MD on Feb 24 2025  6:24PM  EST     CT head wo IV contrast     Result Date: 2/23/2025  * * *Final Report* * * DATE OF EXAM: Feb 23 2025 12:04PM   [facility]   0504  -  CT BRAIN WO IVCON  / ACCESSION #  [id number] PROCEDURE REASON: Stroke, follow up      * * * * Physician Interpretation * * * *  EXAMINATION: CT BRAIN WO IVCON CLINICAL HISTORY: Stroke, follow up TECHNIQUE:  Serial axial images without IV contrast were obtained from the vertex to the foramen magnum. MQ:  CTBWO_3 CT Radiation dose: Integrated Dose-Length Product (DLP) for this visit =   778  mGy*cm CT Dose Reduction Employed: Iterative recon COMPARISON: MRI brain 02/19/2025 RESULT: Post-operative change: None. Acute change: Evolving acute infarct along the central eli and superior left medulla (2:7-9).  No evidence of hemorrhagic transformation. Hemorrhage: No evidence of  acute intracranial hemorrhage. Mass Lesion / Mass Effect: There is no evidence of an intracranial mass or extraaxial fluid collection. No significant mass effect. Chronic change: Stable remote left MCA territory encephalomalacia. Parenchyma: There is no significant volume loss. The brain parenchyma is otherwise within normal limits for age. Ventricles: The ventricles are within normal limits of size and configuration for age. Paranasal sinuses and skull base: Partial opacification of the left maxillary and sphenoid sinus and multiple bilateral ethmoid air cells.   The remaining visualized paranasal sinuses are grossly clear. The skull base and imaged soft tissues are unremarkable. Localizer images: No additional findings.     IMPRESSION: Evolving acute brainstem infarct.  No evidence of hemorrhagic transformation. Stable remote left MCA territory infarct. : [facility]   Transcribe Date/Time: Feb 23 2025 12:48P Dictated by : FRANKY GUZMAN MD   This examination was interpreted and the report reviewed and electronically signed by: FRANKY GUZMAN MD on Feb 23 2025 12:52PM  EST     XR chest 1 view     Result Date: 2/22/2025  * * *Final Report* * * DATE OF EXAM: Feb 22 2025  6:56AM   MMX   [id number]  -  XR CHEST 1V FRONTAL   / ACCESSION #  [id number] PROCEDURE REASON: Evaluate tube, line,  or lead position      * * * * Physician Interpretation * * * *  EXAMINATION:  CHEST RADIOGRAPH (SINGLE VIEW AP OR PA) CLINICAL HISTORY: Evaluate tube, line,  or lead position MQ:  XC1_5 Comparison:  02/21/2025. RESULT: Lines, tubes, and devices:  An ET tube and feeding tube remain in place.   A poorly defined right venous catheter cannot be excluded. Lungs and pleura:  There are persistent bilateral lower lungs infiltrates seen on the right more than left suggestive of pneumonia such as aspiration pneumonia.  There is no definite pleural effusion. Cardiomediastinal silhouette:  Stable cardiomediastinal silhouette. Other:  The visualized  bony thorax appears unremarkable.     IMPRESSION: Persistent bilateral lower lungs infiltrates as described above. A follow-up exam is recommended. : [facility]   Transcribe Date/Time: Feb 22 2025  8:20A Dictated by : BJ INIGUEZ MD This examination was interpreted and the report reviewed and electronically signed by: BJ INIGUEZ MD on Feb 22 2025  8:21AM  EST     XR chest 1 view     Result Date: 2/21/2025  * * *Final Report* * * DATE OF EXAM: Feb 21 2025  5:59PM   MMX   [id number]  -  XR CHEST 1V FRONTAL PORT  / ACCESSION #  [id number] PROCEDURE REASON: Evaluate tube, line,  or lead position      * * * * Physician Interpretation * * * *  EXAMINATION:  CHEST RADIOGRAPH (PORTABLE SINGLE VIEW AP) Exam Date/Time:  2/21/2025 5:59 PM CLINICAL HISTORY: Evaluate tube, line,  or lead position MQ:  XCPR_5 Comparison:  02/21/2025 RESULT: Lines, tubes, and devices: Endotracheal tube in place terminating in enteric tube in place terminating below the field of view. Lungs and pleura: No pneumothorax.  No pleural effusion.  Bilateral mild interstitial opacities.  The right costophrenic angle is not included in the field-of-view. Cardiomediastinal silhouette:  Stable cardiomediastinal silhouette. Other:  .     IMPRESSION: Mild interstitial opacities may be related to pulmonary vascular congestion. : [facility]   Transcribe Date/Time: Feb 21 2025  6:55P Dictated by : IRWIN VALDIVIA MD This examination was interpreted and the report reviewed and electronically signed by: IRWIN VALDIVIA MD on Feb 21 2025  6:57PM  EST     XR abdomen 2 views supine and erect or decub     Result Date: 2/21/2025  * * *Final Report* * * DATE OF EXAM: Feb 21 2025 10:32AM   MMX   [id number]  -  XR ABDOMEN 1V SUPINE  / ACCESSION #  [id number] PROCEDURE REASON: Evaluate tube, line or lead position      * * * * Physician Interpretation * * * *  Clinical Information: Feeding tube Single view of the abdomen was obtained. Feeding  tube tip within the distal stomach. There is a nonspecific, nonobstructive bowel gas pattern. No abnormal abdominal masses are identified.  No pathologic calcifications.   No acute bony abnormalities are seen.     IMPRESSION: Nonspecific, nonobstructive bowel gas pattern. Feeding tube tip within the distal stomach. : [facility]   Transcribe Date/Time: Feb 21 2025 10:43A Dictated by : PAMELA EVANS MD This examination was interpreted and the report reviewed and electronically signed by: PAMELA EVANS MD on Feb 21 2025 10:44AM  EST     XR chest 1 view     Result Date: 2/21/2025  * * *Final Report* * * DATE OF EXAM: Feb 21 2025 10:32AM   MMX   [id number]  -  XR CHEST 1V FRONTAL PORT  / ACCESSION #  [id number] PROCEDURE REASON: Shortness of breath      * * * * Physician Interpretation * * * *  EXAMINATION:  CHEST RADIOGRAPH (PORTABLE SINGLE VIEW AP) Exam Date/Time:  2/21/2025 10:32 AM Clinical History:  Shortness of breath Comparison:  02/17/2025 RESULT: LINES, TUBES, AND DEVICES:  Feeding tube tip beyond the inferior extent of the image. CARDIOMEDIASTINAL SILHOUETTE:  The cardiac and mediastinal silhouettes appear unremarkable. LUNGS AND PLEURA: No consolidation.  No pleural effusion. No pulmonary vascular congestion. No pneumothorax identified. OTHER:  N/A     IMPRESSION: No acute abnormality identified. 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  Daily H&H  Monitor for overt bleeding  Consult pulmonology, appreciate input  Pneumonia suspected for CT findings  Tracheostomy protocol  Expected cover with Zosyn and vancomycin  MRSA swab  Urine for Legionella antigen and strep pneumonia antigen  Supplemental oxygen as needed  Nebulizers as needed  RT evaluate and treat  Check procalcitonin  Typical 20% zinc oxide to wounds and groin  Continuous tube feeds, continue at 80 mL an hour x 22 hours  Accu-Chek every 6 hours  Hypoglycemia protocol  PT/OT  Anticoagulation and antiplatelet therapy on hold, resume once cleared by pulmonology  Continue patient's home medications for chronic issues as appropriate     Patient fully evaluated on March 23.  Continue to monitor H&H.  Pulmonary consultation obtained.  Continue broad-spectrum antibiotics to rule out infectious process.  Recheck labs in AM.                 Zeus Bone MD                    Nany      Patient with significant clinical improvement noted, patient medically cleared for discharge today. Patient seen resting in bed with head of bed elevated, no s/s or c/o acute difficulties at this time. Vital signs for last 24 hours:  Temp:  [35.4 °C (95.7 °F)-36.6 °C (97.9 °F)] 35.4 °C (95.7 °F)  Heart Rate:  [61-72] 67  Resp:  [18-20] 20  BP: (150-176)/(67-78) 152/67  FiO2 (%):  [28 %-35 %] 30 % Medications and labs reviewed-   Results for orders placed or performed during the hospital encounter of 03/22/25 (from the past 24 hours)   POCT GLUCOSE   Result Value Ref Range    POCT Glucose 260 (H) 74 - 99 mg/dL   POCT GLUCOSE   Result Value Ref Range    POCT Glucose 309 (H) 74 - 99 mg/dL   POCT GLUCOSE   Result Value Ref Range    POCT Glucose 257 (H) 74 - 99 mg/dL   Comprehensive Metabolic Panel   Result Value Ref Range    Glucose 220 (H) 74 - 99 mg/dL    Sodium 146 (H) 136 - 145 mmol/L    Potassium 3.8 3.5 - 5.3  mmol/L    Chloride 111 (H) 98 - 107 mmol/L    Bicarbonate 30 21 - 32 mmol/L    Anion Gap 9 (L) 10 - 20 mmol/L    Urea Nitrogen 25 (H) 6 - 23 mg/dL    Creatinine 0.79 0.50 - 1.30 mg/dL    eGFR >90 >60 mL/min/1.73m*2    Calcium 8.8 8.6 - 10.3 mg/dL    Albumin 2.9 (L) 3.4 - 5.0 g/dL    Alkaline Phosphatase 227 (H) 33 - 136 U/L    Total Protein 5.9 (L) 6.4 - 8.2 g/dL    AST 26 9 - 39 U/L    Bilirubin, Total 0.4 0.0 - 1.2 mg/dL    ALT 74 (H) 10 - 52 U/L   CBC and Auto Differential   Result Value Ref Range    WBC 11.4 (H) 4.4 - 11.3 x10*3/uL    nRBC 9.2 (H) 0.0 - 0.0 /100 WBCs    RBC 3.17 (L) 4.50 - 5.90 x10*6/uL    Hemoglobin 8.6 (L) 13.5 - 17.5 g/dL    Hematocrit 32.3 (L) 41.0 - 52.0 %     (H) 80 - 100 fL    MCH 27.1 26.0 - 34.0 pg    MCHC 26.6 (L) 32.0 - 36.0 g/dL    RDW 22.3 (H) 11.5 - 14.5 %    Platelets 335 150 - 450 x10*3/uL    Neutrophils % 71.1 40.0 - 80.0 %    Immature Granulocytes %, Automated 5.8 (H) 0.0 - 0.9 %    Lymphocytes % 17.1 13.0 - 44.0 %    Monocytes % 3.9 2.0 - 10.0 %    Eosinophils % 1.3 0.0 - 6.0 %    Basophils % 0.8 0.0 - 2.0 %    Neutrophils Absolute 8.13 (H) 1.20 - 7.70 x10*3/uL    Immature Granulocytes Absolute, Automated 0.66 0.00 - 0.70 x10*3/uL    Lymphocytes Absolute 1.95 1.20 - 4.80 x10*3/uL    Monocytes Absolute 0.44 0.10 - 1.00 x10*3/uL    Eosinophils Absolute 0.15 0.00 - 0.70 x10*3/uL    Basophils Absolute 0.09 0.00 - 0.10 x10*3/uL   Morphology   Result Value Ref Range    RBC Morphology See Below     Polychromasia Mild     Hypochromia Mild     Basophilic Stippling Present    POCT GLUCOSE   Result Value Ref Range    POCT Glucose 204 (H) 74 - 99 mg/dL      Patient recently received an antibiotic (last 12 hours)       Date/Time Action Medication Dose    03/31/25 0908 New Bag    piperacillin-tazobactam (Zosyn) 3.375 g in dextrose (iso) IV 50 mL 3.375 g    03/31/25 0231 New Bag    piperacillin-tazobactam (Zosyn) 3.375 g in dextrose (iso) IV 50 mL 3.375 g           No results found  for the last 90 days.       Continue aggressive pulmonary hygiene and oral hygiene. Off loading as tolerated for skin integrity.  Plan discussed with interdisciplinary team, no acute events overnight, patient denies chest pain, worsening SOB at this time. Ok to discharge to [facility], will discontinue plavix, will current and repeat labs in the AM if patient still hospitalized. Patient aware and agreeable to current plan, continue plan as above.     I spent 30 minutes on the date of the service which included preparing to see the patient, face-to-face patient care, completing clinical documentation, obtaining and/or reviewing separately obtained history, performing a medically appropriate examination, counseling and educating the patient/family/caregiver, ordering medications, tests, or procedures, communicating with other HCPs (not separately reported), independently interpreting results (not separately reported), communicating results to the patient/family/caregiver, and care coordination (not separately reported    Discharge Meds     Medication List      START taking these medications     amiodarone 200 mg tablet; Commonly known as: Pacerone; 1 tablet (200 mg)   by g-tube route 2 times a day.   epoetin nicko 20,000 unit/mL injection; Commonly known as: Procrit;   Inject 1.5 mL (30,000 Units) under the skin 1 (one) time per week.; Start   taking on: April 6, 2025   insulin glargine 100 unit/mL injection; Commonly known as: Lantus;   Inject 50 Units under the skin once daily at bedtime. Take as directed per   insulin instructions.; Replaces: Lantus Solostar U-100 Insulin 100 unit/mL   (3 mL) pen   * ipratropium 0.02 % nebulizer solution; Commonly known as: Atrovent;   Take 2.5 mL (0.5 mg) by nebulization 2 times a day.   * ipratropium 0.02 % nebulizer solution; Commonly known as: Atrovent;   Take 2.5 mL (0.5 mg) by nebulization every 2 hours if needed for wheezing   or shortness of breath.   zinc oxide 20 %  ointment; Apply 1 Application topically every 1 hour if   needed for irritation.  * This list has 2 medication(s) that are the same as other medications   prescribed for you. Read the directions carefully, and ask your doctor or   other care provider to review them with you.     CONTINUE taking these medications     acetaminophen 325 mg chewable tablet   acetylcysteine 200 mg/mL (20 %) nebulizer solution; Commonly known as:   Mucomyst   apixaban 5 mg tablet; Commonly known as: Eliquis   cholecalciferol 1,250 mcg (50,000 unit) tablet; Commonly known as:   Vitamin D3   HumaLOG KwikPen Insulin 100 unit/mL pen; Generic drug: insulin lispro   metoprolol tartrate 50 mg tablet; Commonly known as: Lopressor   oxygen gas therapy; Commonly known as: O2   sodium chloride 0.65 % nasal drops; Commonly known as: Ayr   sucralfate 1 gram tablet; Commonly known as: Carafate   traZODone 50 mg tablet; Commonly known as: Desyrel     STOP taking these medications     albuterol 2.5 mg /3 mL (0.083 %) nebulizer solution   atorvastatin 80 mg tablet; Commonly known as: Lipitor   clopidogrel 75 mg tablet; Commonly known as: Plavix   docusate sodium 100 mg capsule; Commonly known as: Colace   empagliflozin 25 mg tablet; Commonly known as: Jardiance   hydrALAZINE 50 mg tablet; Commonly known as: Apresoline   ipratropium-albuteroL 0.5-2.5 mg/3 mL nebulizer solution; Commonly known   as: Duo-Neb   Lantus Solostar U-100 Insulin 100 unit/mL (3 mL) pen; Generic drug:   insulin glargine; Replaced by: insulin glargine 100 unit/mL injection       Test Results Pending At Discharge  Pending Labs       Order Current Status    Surgical Pathology Exam In process            Hospital Course         Zeus Bone MD   Physician  Internal Medicine     Progress Notes      Signed     Date of Service: 3/30/2025  1:03 PM     Signed       Expand All Collapse All    Bryan Nix is a 64 y.o. male on day 8 of admission presenting with Hemoptysis.            Subjective  Patient fully evaluated on March 24.  Hemoptysis appears resolved.  Still with slightly elevated white count to be monitored.  Continue aggressive antibiotic regimen.  Repeat chest x-ray is pending.              Objective  Last Recorded Vitals  /57   Pulse 73   Temp 36.8 °C (98.2 °F)   Resp 18   Wt 77.1 kg (170 lb)   SpO2 95%   Intake/Output last 3 Shifts:     Intake/Output Summary (Last 24 hours) at 3/30/2025 1303  Last data filed at 3/30/2025 0600      Gross per 24 hour   Intake 1603.06 ml   Output 2050 ml   Net -446.94 ml         Admission Weight  Weight: 77.1 kg (170 lb) (03/22/25 1255)     Daily Weight  03/22/25 : 77.1 kg (170 lb)     Image Results  XR chest 1 view  Narrative: Interpreted By:  Ankur Tubbs,   STUDY:  XR CHEST 1 VIEW; 3/28/2025 3:55 pm      INDICATION:  Signs/Symptoms:Diagnotisc.      COMPARISON:  Chest x-ray 03/24/2025      ACCESSION NUMBER(S):  [id number]      ORDERING CLINICIAN:  RICHARD WELCH      TECHNIQUE:  1 view of the chest was performed.      FINDINGS:  Small left-sided pleural effusions surrounding atelectasis.. No  pneumothorax.  The cardiomediastinal silhouette is stable.  Tracheostomy cannula noted.      Impression: 1. New small left-sided pleural effusions surrounding atelectasis.      Signed by: Ankur Tubbs 3/28/2025 4:15 PM  Dictation workstation:   [id number]        Physical Exam     Relevant Results                       Assessment/Plan  This patient currently has cardiac telemetry ordered; if you would like to modify or discontinue the telemetry order, click hereto go to the orders activity to modify/discontinue the order.                       Assessment & Plan  Hemoptysis     Tracheostomy in place (Multi)     CVA, old, hemiparesis (Multi)     Status post insertion of percutaneous endoscopic gastrostomy (PEG) tube (Multi)     Pneumonia due to infectious organism     Right sided weakness     Atrial flutter (Multi)     Insulin dependent type 2  diabetes mellitus (Multi)        Zeus Bone MD  Physician  Internal Medicine     H&P      Signed     Date of Service: 3/23/2025 10:28 AM      Signed        Expand All Collapse All    History Of Present Illness  Bryan Nix is a 64-year-old male with past medical history of atrial flutter on Eliquis, hyponatremia, IDDM, CAD, hypertension, chronic wounds, diffuse lymphadenopathy, anemia, history of smoking, history elevated PSA, CVA L hemisphere (2020) with right-sided deficits, Left ICA stenosis (85%) s/p angioplasty and left carotid artery stent (08/25/2023) and recent history of acute pontine stroke 02/27/25 with acute hypoxic respiratory failure s/p tracheostomy on 2/25/2025 and PEG.  Patient presents for bleeding from tracheostomy.  Patient is alert and oriented x 3, however limited verbal communication due to tracheostomy.  Wife is at bedside and assists with history.  She reports that patient was coughing blood out of his tracheostomy.  ER provider felt bleeding was likely superficial around site.  Patient has been on room air at skilled nursing facility.  He was placed on Airvo in the ED mainly to humidify oxygen.  Patient never desaturated per ER report.  Currently he is resting comfortably in the bed, breath sounds slightly rhonchorous, but unlabored.  Reports chills, but no fevers.  He has right-sided weakness and sensory deficits.  Denies headache, vision or hearing changes, chest pains, palpitations, shortness of breath, nausea, vomiting, abdominal pain, diarrhea, or complications of PEG tube site.  Wife reports patient skin became irritated in his groin due to adhesive from a condom cath previously, he has a small decubitus wound that she reports is healing well.  He receives tube feeds continuously.  Wife reports a patient was recently evaluated by speech for p.o. to intake, however he still remains n.p.o. except for meds and tube feeds through PEG. CODE STATUS reviewed: full code     ER  Course: Hemodynamically stable, afebrile, SpO2 to 97% trach mask.  WBC 9.3, hemoglobin 8, hematocrit 29.4, platelets 197.  INR 1.4.  Glucose 223, BUN 36, otherwise CMP is normal.  CT of the chest for PE pending.  CXR unremarkable. Trannexamic acid soak gauze wrapped around trach.      Past medical history: As above  Past surgical history: As above  Social history: Former smoker 37.5 pack years-quit smoking age 63, occasional alcohol use, no illicit drug use.  .  Works in maintenance.  6 children.  Family history: Noncontributory     Past Medical History  Medical History           Past Medical History:   Diagnosis Date    Abnormal finding of blood chemistry, unspecified 05/18/2016     Abnormal blood chemistry    Acute upper respiratory infection, unspecified 12/02/2015     Acute upper respiratory infection    Elevated prostate specific antigen (PSA)       Elevated prostate specific antigen (PSA)    Encounter for screening for malignant neoplasm of colon 01/30/2021     Colon cancer screening    Encounter for screening for malignant neoplasm of prostate 11/30/2020     Prostate cancer screening    Essential (primary) hypertension 07/30/2013     Benign essential hypertension    Other conditions influencing health status 07/30/2013     Diabetes Mellitus Poorly Controlled    Other conditions influencing health status 12/02/2015     History of cough    Personal history of other endocrine, nutritional and metabolic disease 03/18/2021     History of hyperlipidemia    Personal history of other endocrine, nutritional and metabolic disease 03/18/2021     History of diabetes mellitus    Personal history of other specified conditions 05/18/2016     History of chest pain    Personal history of other specified conditions 05/18/2016     History of dyspnea    Personal history of other specified conditions 05/18/2016     History of dizziness    Personal history of other specified conditions 05/18/2016     History of fatigue             Surgical History  Surgical History             Past Surgical History:   Procedure Laterality Date    CT ANGIO CORONARY ART WITH HEARTFLOW IF SCORE >30%   8/23/2023     CT HEART CORONARY ANGIOGRAM 8/23/2023 [facility] CT    MR HEAD ANGIO WO IV CONTRAST   12/14/2020     MR HEAD ANGIO WO IV CONTRAST 12/14/2020 [facility]    MR NECK ANGIO WO IV CONTRAST   12/14/2020     MR NECK ANGIO WO IV CONTRAST 12/14/2020 [facility]    OTHER SURGICAL HISTORY   12/01/2020     Hand fracture repair    OTHER SURGICAL HISTORY   12/01/2020     Nasal bone fracture repair            Social History  He reports that he has quit smoking. His smoking use included cigarettes. He has never used smokeless tobacco. No history on file for alcohol use and drug use.     Family History  Family History   No family history on file.         Allergies  Patient has no known allergies.     Review of Systems     Physical Exam  Constitutional:       General: He is awake. He is not in acute distress.     Appearance: Normal appearance. He is not toxic-appearing.   HENT:      Head: Atraumatic.      Nose: Nose normal.      Mouth/Throat:      Mouth: Mucous membranes are moist.   Eyes:      Conjunctiva/sclera: Conjunctivae normal.   Cardiovascular:      Rate and Rhythm: Normal rate and regular rhythm.      Pulses: Normal pulses.      Heart sounds: No murmur heard.  Pulmonary:      Effort: No respiratory distress.      Comments: Tracheostomy site midline, no drainage around dressing.  Rhonchorous breath sounds, unlabored respirations, RT bedside suctioning patient with thick blood-tinged sputum  Abdominal:      General: Bowel sounds are normal. There is no distension.      Palpations: Abdomen is soft.      Tenderness: There is no abdominal tenderness. There is no guarding.      Comments: Left upper quadrant PEG site tube site, no drainage or skin breakdown around tube site.   Musculoskeletal:         General: No swelling, deformity or signs  "of injury.      Cervical back: Neck supple.      Right lower leg: No edema.      Left lower leg: No edema.   Skin:     General: Skin is warm and dry.      Capillary Refill: Capillary refill takes less than 2 seconds.      Findings: Wound (sacrum) present. No ecchymosis or erythema.   Neurological:      Mental Status: He is alert and oriented to person, place, and time.      Cranial Nerves: No facial asymmetry.      Sensory: Sensory deficit (RUE, RLE) present.      Motor: Weakness present.      Comments: Right sided upper and lower extremity motor function deficits   Psychiatric:         Mood and Affect: Mood normal.         Behavior: Behavior is cooperative.            Last Recorded Vitals  Blood pressure 134/50, pulse 93, temperature 36.9 °C (98.4 °F), temperature source Temporal, resp. rate 22, height 1.88 m (6' 2\"), weight 77.1 kg (170 lb), SpO2 98%.     Relevant Results                  Results for orders placed or performed during the hospital encounter of 03/22/25 (from the past 24 hours)   CBC and Auto Differential   Result Value Ref Range     WBC 9.3 4.4 - 11.3 x10*3/uL     nRBC 0.3 (H) 0.0 - 0.0 /100 WBCs     RBC 3.02 (L) 4.50 - 5.90 x10*6/uL     Hemoglobin 8.0 (L) 13.5 - 17.5 g/dL     Hematocrit 29.4 (L) 41.0 - 52.0 %     MCV 97 80 - 100 fL     MCH 26.5 26.0 - 34.0 pg     MCHC 27.2 (L) 32.0 - 36.0 g/dL     RDW 15.6 (H) 11.5 - 14.5 %     Platelets 197 150 - 450 x10*3/uL     Neutrophils % 77.1 40.0 - 80.0 %     Immature Granulocytes %, Automated 1.5 (H) 0.0 - 0.9 %     Lymphocytes % 13.1 13.0 - 44.0 %     Monocytes % 4.1 2.0 - 10.0 %     Eosinophils % 3.2 0.0 - 6.0 %     Basophils % 1.0 0.0 - 2.0 %     Neutrophils Absolute 7.14 1.20 - 7.70 x10*3/uL     Immature Granulocytes Absolute, Automated 0.14 0.00 - 0.70 x10*3/uL     Lymphocytes Absolute 1.21 1.20 - 4.80 x10*3/uL     Monocytes Absolute 0.38 0.10 - 1.00 x10*3/uL     Eosinophils Absolute 0.30 0.00 - 0.70 x10*3/uL     Basophils Absolute 0.09 0.00 - 0.10 " x10*3/uL   Basic metabolic panel   Result Value Ref Range     Glucose 223 (H) 74 - 99 mg/dL     Sodium 142 136 - 145 mmol/L     Potassium 4.3 3.5 - 5.3 mmol/L     Chloride 103 98 - 107 mmol/L     Bicarbonate 27 21 - 32 mmol/L     Anion Gap 16 10 - 20 mmol/L     Urea Nitrogen 36 (H) 6 - 23 mg/dL     Creatinine 0.81 0.50 - 1.30 mg/dL     eGFR >90 >60 mL/min/1.73m*2     Calcium 9.6 8.6 - 10.3 mg/dL   Protime-INR   Result Value Ref Range     Protime 15.1 (H) 9.8 - 12.4 seconds     INR 1.4 (H) 0.9 - 1.1   aPTT   Result Value Ref Range     aPTT 25 (L) 26 - 36 seconds   Type and Screen   Result Value Ref Range     ABO TYPE B       Rh TYPE POS       ANTIBODY SCREEN NEG     VERIFY ABO/Rh Group Test   Result Value Ref Range     ABO TYPE B       Rh TYPE POS     MRSA Surveillance for Vancomycin De-escalation, PCR     Specimen: Anterior Nares; Swab   Result Value Ref Range     MRSA PCR Not Detected Not Detected   POCT GLUCOSE   Result Value Ref Range     POCT Glucose 180 (H) 74 - 99 mg/dL   POCT GLUCOSE   Result Value Ref Range     POCT Glucose 161 (H) 74 - 99 mg/dL   POCT GLUCOSE   Result Value Ref Range     POCT Glucose 156 (H) 74 - 99 mg/dL   Basic metabolic panel   Result Value Ref Range     Glucose 170 (H) 74 - 99 mg/dL     Sodium 147 (H) 136 - 145 mmol/L     Potassium 4.2 3.5 - 5.3 mmol/L     Chloride 107 98 - 107 mmol/L     Bicarbonate 30 21 - 32 mmol/L     Anion Gap 14 10 - 20 mmol/L     Urea Nitrogen 35 (H) 6 - 23 mg/dL     Creatinine 0.85 0.50 - 1.30 mg/dL     eGFR >90 >60 mL/min/1.73m*2     Calcium 9.4 8.6 - 10.3 mg/dL   CBC   Result Value Ref Range     WBC 10.4 4.4 - 11.3 x10*3/uL     nRBC 0.6 (H) 0.0 - 0.0 /100 WBCs     RBC 2.87 (L) 4.50 - 5.90 x10*6/uL     Hemoglobin 7.8 (L) 13.5 - 17.5 g/dL     Hematocrit 26.6 (L) 41.0 - 52.0 %     MCV 93 80 - 100 fL     MCH 27.2 26.0 - 34.0 pg     MCHC 29.3 (L) 32.0 - 36.0 g/dL     RDW 15.7 (H) 11.5 - 14.5 %     Platelets 205 150 - 450 x10*3/uL   SST TOP   Result Value Ref Range      Extra Tube Hold for add-ons.        CT angio chest for pulmonary embolism     Result Date: 3/22/2025  Interpreted By:  Pamela Zurita, STUDY: CT ANGIO CHEST FOR PULMONARY EMBOLISM;  3/22/2025 4:56 pm   INDICATION: Signs/Symptoms:bloody secretions, eval for PE.     COMPARISON: CT chest without contrast 03/22/2025   ACCESSION NUMBER(S): [id number]   ORDERING CLINICIAN: PAMELA HAHN   TECHNIQUE: Contiguous axial images of the chest were obtained after the intravenous administration of iodinated contrast using angiographic PE protocol. Coronal and sagittal reformatted images were reconstructed from the axial data. MIP images were created on an independent workstation and reviewed.   FINDINGS:   MEDIASTINUM AND LYMPH NODES: Inflammatory fat stranding along the tracheostomy tract without discrete fluid collection. The esophageal wall appears within normal limits. There is redemonstration of diffuse lymphadenopathy in the bilateral axilla, mediastinum, bilateral supraclavicular as described in detail on separate report. Also notable or enlarged bilateral level 4 cervical lymph nodes (right > left).   VESSELS:  Normal caliber thoracic aorta without dissection. Moderate aortic atherosclerosis.  No acute pulmonary embolism.   HEART: Normal size.  Mild coronary artery calcifications. No significant pericardial effusion.   LUNG, AIRWAYS, PLEURA: There is slight increase in debris within the trachea. There has been clearing of secretions in the left upper/lower lobe bronchus. There is diffuse bilateral bronchial thickening slightly increased from prior study. There is peribronchovascular ground-glass opacity in the posterior right upper lobe and superior and basal segments of the right lower lobe. There is slightly worsened atelectasis in the lung bases remaining predominantly subsegmental in nature. There is a 0.8 cm nodule in the left upper lobe that is stable from 08/19/2023.   OSSEOUS STRUCTURES: No acute osseous  abnormality.   CHEST WALL SOFT TISSUES: No discernible acute abnormality.   UPPER ABDOMEN/OTHER: No acute abnormality.        No acute pulmonary embolism.   Peribronchial vascular ground glass opacities in the right upper and lower lobe again concerning for pneumonia given patient's risk factors of tracheostomy and debris in the airways.   Redemonstration of diffuse lymphadenopathy in the lower necks, axilla, and mediastinum. Findings are either diffusely reactive in nature, reflective of lymphoma/leukemia, meter deficiency, or less likely head neck neoplasm is previously postulated. However please correlate with past medical history.   Inflammatory changes along the tracheostomy tract without fluid collection. Correlate for erythema.   Unchanged 0.8 cm nodule in the left upper lobe dating back to 08/19/2023. Recommend 1 year follow up chest CT to ensure at least 2 years of stability.   MACRO: None.   Signed by: Pamela Zurita 3/22/2025 7:17 PM Dictation workstation:   [id number]     CT chest wo IV contrast     Result Date: 3/22/2025  Interpreted By:  Pamela Zurita, STUDY: CT CHEST WO IV CONTRAST;  3/22/2025 3:41 pm   INDICATION: Signs/Symptoms:eval for alveolar hemorrhage.     COMPARISON: CT head/neck 08/19/2023   ACCESSION NUMBER(S): [id number]   ORDERING CLINICIAN: PAMELA HAHN   TECHNIQUE: Contiguous axial images of the chest and upper abdomen were obtained without contrast. Coronal and sagittal reformatted images were reconstructed from the axial data.   FINDINGS:   MEDIASTINUM AND LYMPH NODES: There is fat stranding/edema along the tract of the tracheostomy about discrete fluid collection. The esophageal wall appears within normal limits. There are numerous enlarged bilateral supraclavicular lymph nodes measuring up to 1.4 cm on the left. There are numerous enlarged bilateral axillary lymph nodes measuring up to 1.1 cm on the right and 1.2 cm on the left. There are numerous prominent to mildly  enlarged mediastinal lymph nodes as well. No pneumomediastinum.   VESSELS:  Normal caliber thoracic aorta. Moderate aortic atherosclerosis. The main pulmonary artery is normal in size.   HEART: Normal size.  Mild coronary artery calcifications. No significant pericardial effusion.   LUNG, AIRWAYS, PLEURA: Tracheostomy noted in appropriate position. There is trace mucus in the left mainstem bronchus and at the origin of the left upper lobe and lower lobe bronchi. There are mild peribronchovascular ground-glass opacities in the posterior segment of the right upper lobe, basal segments of the right lower lobe. There is mild dependent bibasilar atelectasis. There is no pleural effusion.   OSSEOUS STRUCTURES: No acute osseous abnormality.   CHEST WALL SOFT TISSUES: No discernible acute abnormality.   UPPER ABDOMEN/OTHER: No acute abnormality.        1. Mild peribronchovascular ground-glass opacities in the posterior right upper lobe and basal segments of the right lower lobe. This appearance and distribution is not typical for alveolar hemorrhage which tends to be centrilobular and bilateral asymmetric in distribution. Findings are most suggestive of pneumonia.   2. Small amount of mucous debris in the left mainstem bronchus and at the origin of the left upper and lower lobe bronchi.   3. Diffuse lymphadenopathy in the bilateral axillary regions, mediastinum and left supraclavicular region particularly notable in the bilateral supraclavicular region. This is progressed in the left supraclavicular region and new elsewhere when compared to 08/19/2023 CTA head/neck. Correlate for any history of known head/neck malignancy or lymphoma.   4. Inflammation/fat stranding adjacent to the tracheostomy tube entry site noted. Correlate for air the met. No evidence of fluid collection.   MACRO: None.   Signed by: Marco A Zurita 3/22/2025 7:10 PM Dictation workstation:   [id number]     XR chest 1 view     Result Date:  3/22/2025  Interpreted By:  Fermin Stroud, STUDY: XR CHEST 1 VIEW   INDICATION: Signs/Symptoms:hemoptysis.   COMPARISON: August 19, 2023   ACCESSION NUMBER(S): [id number]   ORDERING CLINICIAN: PAMELA HAHN   FINDINGS: No consolidation, effusion, edema, or pneumothorax. Heart size within normal limits.        No evidence of acute intrathoracic abnormality.   Signed by: Fermin Stroud 3/22/2025 2:01 PM Dictation workstation:   [id number]     XR chest 1 view     Result Date: 3/14/2025  * * *Final Report* * * DATE OF EXAM: Mar 14 2025  8:52AM   S5X   5290  -  XR CHEST 1V FRONTAL   / ACCESSION #  [id number] PROCEDURE REASON: new bleeding from trach      * * * * Physician Interpretation * * * *  EXAMINATION:  CHEST RADIOGRAPH (PORTABLE SINGLE VIEW AP) Exam Date/Time:  3/14/2025 8:52 AM Clinical History:  new bleeding from trach Comparison:  03/03/2025 RESULT: LINES, TUBES, AND DEVICES:  Tracheostomy. CARDIOMEDIASTINAL SILHOUETTE:  The cardiac and mediastinal silhouettes appear unremarkable. LUNGS AND PLEURA: No consolidation.  No pleural effusion. No pulmonary vascular congestion. No pneumothorax identified. OTHER:  N/A     IMPRESSION: No acute abnormality identified. : [facility]   Transcribe Date/Time: Mar 14 2025  9:07A Dictated by : PAMELA EVANS MD This examination was interpreted and the report reviewed and electronically signed by: PAMELA EVANS MD on Mar 14 2025  9:11AM  EST     US abdomen complete     Result Date: 3/11/2025  ABDOMEN ULTRASOUND-COMPLETE FINDINGS: Abdomen Ultrasound Complete: PROCEDURE: Multiple grayscale mages of the abdomen were obtained. FINDINGS:Multiple real time images demonstrate the liver with increased echogenicity consistent with fatty infiltration. There is no evidence of a discrete mass within the liver. The liver is enlarged, measured at 15.7 cm in size. The gallbladder is seen with no evidence of cholelithiasis. The gallbladder wall is within normal limits. The common bile  duct is within normal limits. The visualized pancreas appears unremarkable. Both kidneys are seen with no evidence of hydronephrosis or a calculus. The spleen has its normal homogeneous echo appearance. There is no free fluid to suggest ascites. The visualized aorta appears unremarkable. The inferior vena cava appears patent. CONCLUSION: Enlarged fatty liver. ELECTRONICALLY SIGNED BY LEONEL LAN M.D. 3/11/2025 5:00:52 PM EDT.     XR chest 1 view     Result Date: 3/3/2025  * * *Final Report* * * DATE OF EXAM: Mar  3 2025  1:40PM   S5X   5290  -  XR CHEST 1V FRONTAL   / ACCESSION #  [id number] PROCEDURE REASON: resp failure      * * * * Physician Interpretation * * * *  EXAMINATION:  CHEST RADIOGRAPH (PORTABLE SINGLE VIEW AP) Exam Date/Time:  3/3/2025 1:40 PM Indication:   resp failure M:  XCP_4 Comparison:  1 day prior RESULT: Lines, tubes, and devices:  Tracheostomy tube remains in situ. Lungs and pleura:  Mild patchy opacities right lower lung zone seen on the prior study are no longer appreciated.  No confluent opacity.   Costophrenic angles are clear.  No pneumothorax. Cardiomediastinal silhouette:  Stable cardiomediastinal silhouette. Other:  -     IMPRESSION: Improved aeration right lower lung zone.  No acute cardiopulmonary finding. : [facility]   Transcribe Date/Time: Mar  3 2025  2:49P Dictated by : ISRAEL BENSON MD This examination was interpreted and the report reviewed and electronically signed by: ISRAEL BENSON MD on Mar  3 2025  2:49PM  EST     XR chest 1 view     Result Date: 3/2/2025  * * *Final Report* * * DATE OF EXAM: Mar  2 2025 11:25AM   MMX   5376  -  XR CHEST 1V FRONTAL PORT  / ACCESSION #  [id number] PROCEDURE REASON: Shortness of breath      * * * * Physician Interpretation * * * *  EXAMINATION:  CHEST RADIOGRAPH (PORTABLE SINGLE VIEW AP) Exam Date/Time:  3/2/2025 11:25 AM CLINICAL HISTORY: Shortness of breath MQ:  XCPR_5 Comparison:  02/20/2025. RESULT: This is a  limited examination due to multiple wires and tubing obscuring the lower lungs. Lines, tubes, and devices:  A tracheostomy tube remains in place. Lungs and pleura:  There are increased bronchovascular markings in the right lower lung which may be due to bronchitis or early changes of aspiration pneumonia.  Clinical correlation and follow-up exams are recommended. Cardiomediastinal silhouette:  Stable cardiomediastinal silhouette. Other:  The visualized bony thorax appears unremarkable.     IMPRESSION: Nonspecific parenchymal changes in the right lower lung as described above. A follow-up exam is recommended. : [facility]   Transcribe Date/Time: Mar  2 2025 11:53A Dictated by : BJ INIGUEZ MD This examination was interpreted and the report reviewed and electronically signed by: BJ INIGUEZ MD on Mar  2 2025 11:54AM  EST     XR chest 1 view     Result Date: 2/28/2025  * * *Final Report* * * DATE OF EXAM: Feb 28 2025 10:36AM   MMX   5290  -  XR CHEST 1V FRONTAL   / ACCESSION #  [id number] PROCEDURE REASON: Shortness of breath      * * * * Physician Interpretation * * * *  EXAMINATION:  CHEST RADIOGRAPH (SINGLE VIEW AP OR PA) CLINICAL HISTORY: Shortness of breath MQ:  XC1_5 Comparison:  02/22/2025 RESULT: Lines, tubes, and devices:  Tracheostomy tube is noted. Lungs and pleura:  No consolidation. No lung mass. No pleural effusion. Cardiomediastinal silhouette:  Normal cardiomediastinal silhouette. Other:  No acute osseous pathology.     IMPRESSION: No acute radiographic abnormality. : [facility]   Transcribe Date/Time: Feb 28 2025 12:07P Dictated by : DIANNE RAMOS MD This examination was interpreted and the report reviewed and electronically signed by: DIANNE RAMOS MD on Feb 28 2025 12:08PM  EST     XR abdomen 2 views supine and erect or decub     Result Date: 2/24/2025  * * *Final Report* * * DATE OF EXAM: Feb 24 2025  6:03PM   MMX   5289  -  XR ABDOMEN 1V SUPINE  / ACCESSION #  [id number]  PROCEDURE REASON: Evaluate tube, line or lead position      * * * * Physician Interpretation * * * *  EXAMINATION:  XR ABDOMEN 1V SUPINE CLINICAL HISTORY:   Evaluate tube, line or lead position Technique:   XR ABDOMEN 1V SUPINE -- NOT APPLICABLE with 1 views on 1 images Comparison: 2/21/2025 RESULT: Feeding tube with distal tip at the pylorus antrum. Moderate colonic stool burden. No dilated bowel. Lung structures are intact.     IMPRESSION: Feeding tube with distal tip at the pylorus antrum. Moderate colonic stool burden. No dilated bowel. : [facility]   Transcribe Date/Time: Feb 24 2025  6:23P Dictated by : PABLO RAMOS MD This examination was interpreted and the report reviewed and electronically signed by: PABLO RAMOS MD on Feb 24 2025  6:24PM  EST     CT head wo IV contrast     Result Date: 2/23/2025  * * *Final Report* * * DATE OF EXAM: Feb 23 2025 12:04PM   [facility]   0504  -  CT BRAIN WO IVCON  / ACCESSION #  [id number] PROCEDURE REASON: Stroke, follow up      * * * * Physician Interpretation * * * *  EXAMINATION: CT BRAIN WO IVCON CLINICAL HISTORY: Stroke, follow up TECHNIQUE:  Serial axial images without IV contrast were obtained from the vertex to the foramen magnum. MQ:  CTBWO_3 CT Radiation dose: Integrated Dose-Length Product (DLP) for this visit =   778  mGy*cm CT Dose Reduction Employed: Iterative recon COMPARISON: MRI brain 02/19/2025 RESULT: Post-operative change: None. Acute change: Evolving acute infarct along the central eli and superior left medulla (2:7-9).  No evidence of hemorrhagic transformation. Hemorrhage: No evidence of acute intracranial hemorrhage. Mass Lesion / Mass Effect: There is no evidence of an intracranial mass or extraaxial fluid collection. No significant mass effect. Chronic change: Stable remote left MCA territory encephalomalacia. Parenchyma: There is no significant volume loss. The brain parenchyma is otherwise within normal limits for age. Ventricles: The  ventricles are within normal limits of size and configuration for age. Paranasal sinuses and skull base: Partial opacification of the left maxillary and sphenoid sinus and multiple bilateral ethmoid air cells.   The remaining visualized paranasal sinuses are grossly clear. The skull base and imaged soft tissues are unremarkable. Localizer images: No additional findings.     IMPRESSION: Evolving acute brainstem infarct.  No evidence of hemorrhagic transformation. Stable remote left MCA territory infarct. : [facility]   Transcribe Date/Time: Feb 23 2025 12:48P Dictated by : FRANKY GUZMAN MD   This examination was interpreted and the report reviewed and electronically signed by: FRANKY GUZMAN MD on Feb 23 2025 12:52PM  EST     XR chest 1 view     Result Date: 2/22/2025  * * *Final Report* * * DATE OF EXAM: Feb 22 2025  6:56AM   MMX   5290  -  XR CHEST 1V FRONTAL   / ACCESSION #  [id number] PROCEDURE REASON: Evaluate tube, line,  or lead position      * * * * Physician Interpretation * * * *  EXAMINATION:  CHEST RADIOGRAPH (SINGLE VIEW AP OR PA) CLINICAL HISTORY: Evaluate tube, line,  or lead position MQ:  XC1_5 Comparison:  02/21/2025. RESULT: Lines, tubes, and devices:  An ET tube and feeding tube remain in place.   A poorly defined right venous catheter cannot be excluded. Lungs and pleura:  There are persistent bilateral lower lungs infiltrates seen on the right more than left suggestive of pneumonia such as aspiration pneumonia.  There is no definite pleural effusion. Cardiomediastinal silhouette:  Stable cardiomediastinal silhouette. Other:  The visualized bony thorax appears unremarkable.     IMPRESSION: Persistent bilateral lower lungs infiltrates as described above. A follow-up exam is recommended. : [facility]   Transcribe Date/Time: Feb 22 2025  8:20A Dictated by : BJ INIGUEZ MD This examination was interpreted and the report reviewed and electronically signed by: BJ INIGUEZ MD on Feb  22 2025  8:21AM  EST     XR chest 1 view     Result Date: 2/21/2025  * * *Final Report* * * DATE OF EXAM: Feb 21 2025  5:59PM   MMX   5376  -  XR CHEST 1V FRONTAL PORT  / ACCESSION #  [id number] PROCEDURE REASON: Evaluate tube, line,  or lead position      * * * * Physician Interpretation * * * *  EXAMINATION:  CHEST RADIOGRAPH (PORTABLE SINGLE VIEW AP) Exam Date/Time:  2/21/2025 5:59 PM CLINICAL HISTORY: Evaluate tube, line,  or lead position MQ:  XCPR_5 Comparison:  02/21/2025 RESULT: Lines, tubes, and devices: Endotracheal tube in place terminating in enteric tube in place terminating below the field of view. Lungs and pleura: No pneumothorax.  No pleural effusion.  Bilateral mild interstitial opacities.  The right costophrenic angle is not included in the field-of-view. Cardiomediastinal silhouette:  Stable cardiomediastinal silhouette. Other:  .     IMPRESSION: Mild interstitial opacities may be related to pulmonary vascular congestion. : [facility]   Transcribe Date/Time: Feb 21 2025  6:55P Dictated by : IRWIN VALDIVIA MD This examination was interpreted and the report reviewed and electronically signed by: IRWIN VALDIVIA MD on Feb 21 2025  6:57PM  EST     XR abdomen 2 views supine and erect or decub     Result Date: 2/21/2025  * * *Final Report* * * DATE OF EXAM: Feb 21 2025 10:32AM   MMX   5289  -  XR ABDOMEN 1V SUPINE  / ACCESSION #  [id number] PROCEDURE REASON: Evaluate tube, line or lead position      * * * * Physician Interpretation * * * *  Clinical Information: Feeding tube Single view of the abdomen was obtained. Feeding tube tip within the distal stomach. There is a nonspecific, nonobstructive bowel gas pattern. No abnormal abdominal masses are identified.  No pathologic calcifications.   No acute bony abnormalities are seen.     IMPRESSION: Nonspecific, nonobstructive bowel gas pattern. Feeding tube tip within the distal stomach. : [facility]  Date/Time: Feb 21 2025 10:43A Dictated by : PAMELA EVANS MD This examination was interpreted and the report reviewed and electronically signed by: PAMELA EVANS MD on Feb 21 2025 10:44AM  EST     XR chest 1 view     Result Date: 2/21/2025  * * *Final Report* * * DATE OF EXAM: Feb 21 2025 10:32AM   MMX   5376  -  XR CHEST 1V FRONTAL PORT  / ACCESSION #  [id number] PROCEDURE REASON: Shortness of breath      * * * * Physician Interpretation * * * *  EXAMINATION:  CHEST RADIOGRAPH (PORTABLE SINGLE VIEW AP) Exam Date/Time:  2/21/2025 10:32 AM Clinical History:  Shortness of breath Comparison:  02/17/2025 RESULT: LINES, TUBES, AND DEVICES:  Feeding tube tip beyond the inferior extent of the image. CARDIOMEDIASTINAL SILHOUETTE:  The cardiac and mediastinal silhouettes appear unremarkable. LUNGS AND PLEURA: No consolidation.  No pleural effusion. No pulmonary vascular congestion. No pneumothorax identified. OTHER:  N/A     IMPRESSION: No acute abnormality identified. : [facility]   Transcribe Date/Time: Feb 21 2025 10:41A Dictated by : PAMELA EVANS MD This examination was interpreted and the report reviewed and electronically signed by: PAMELA EVANS MD on Feb 21 2025 10:43AM  EST            Assessment/Plan     Assessment & Plan  Hemoptysis     Tracheostomy in place (Multi)     CVA, old, hemiparesis (Multi)     Status post insertion of percutaneous endoscopic gastrostomy (PEG) tube (Multi)     Pneumonia due to infectious organism     Right sided weakness     Atrial flutter (Multi)     Insulin dependent type 2 diabetes mellitus (Multi)        Telemetry monitoring  Hemoglobin stable 8's recently on review of outside labs, monitor for ongoing bleeding.  Daily H&H  Monitor for overt bleeding  Consult pulmonology, appreciate input  Pneumonia suspected for CT findings  Tracheostomy protocol  Expected cover with Zosyn and vancomycin  MRSA swab  Urine for Legionella antigen and strep pneumonia antigen  Supplemental  oxygen as needed  Nebulizers as needed  RT evaluate and treat  Check procalcitonin  Typical 20% zinc oxide to wounds and groin  Continuous tube feeds, continue at 80 mL an hour x 22 hours  Accu-Chek every 6 hours  Hypoglycemia protocol  PT/OT  Anticoagulation and antiplatelet therapy on hold, resume once cleared by pulmonology  Continue patient's home medications for chronic issues as appropriate     Patient fully evaluated on March 23.  Continue to monitor H&H.  Pulmonary consultation obtained.  Continue broad-spectrum antibiotics to rule out infectious process.  Recheck labs in AM.                 Zeus Bone MD                     Melena           Patient fully evaluated  3/25  for    Problem List Items Addressed This Visit         * (Principal) Hemoptysis - Primary     Relevant Orders     Esophagogastroduodenoscopy (EGD) (Completed)     Surgical Pathology Exam     Melena     Relevant Orders     Esophagogastroduodenoscopy (EGD) (Completed)     Surgical Pathology Exam      Other Visit Diagnoses         Anemia, unspecified type         Relevant Orders     Esophagogastroduodenoscopy (EGD) (Completed)     Surgical Pathology Exam     Tracheostomy dependent (Multi)         Relevant Orders     Surgical Pathology Exam             Patient seen resting in bed with head of bed elevated, no s/s or c/o acute difficulties at this time.  Vital signs for last 24 hours Temp:  [35.5 °C (95.9 °F)-37 °C (98.6 °F)] 36.8 °C (98.2 °F)  Heart Rate:  [63-74] 73  Resp:  [17-18] 18  BP: (118-151)/(57-78) 118/57  FiO2 (%):  [28 %-30 %] 28 %    No intake/output data recorded.  Patient still requiring frequent cardiac and SPO2 monitoring. Continue aggressive pulmonary hygiene and oral hygiene. Off loading as tolerated for skin integrity. Medications and labs reviewed-         Results for orders placed or performed during the hospital encounter of 03/22/25 (from the past 24 hours)   POCT GLUCOSE   Result Value Ref Range     POCT Glucose  342 (H) 74 - 99 mg/dL   POCT GLUCOSE   Result Value Ref Range     POCT Glucose 340 (H) 74 - 99 mg/dL   POCT GLUCOSE   Result Value Ref Range     POCT Glucose 354 (H) 74 - 99 mg/dL   Comprehensive Metabolic Panel   Result Value Ref Range     Glucose 366 (H) 74 - 99 mg/dL     Sodium 147 (H) 136 - 145 mmol/L     Potassium 4.2 3.5 - 5.3 mmol/L     Chloride 110 (H) 98 - 107 mmol/L     Bicarbonate 30 21 - 32 mmol/L     Anion Gap 11 10 - 20 mmol/L     Urea Nitrogen 26 (H) 6 - 23 mg/dL     Creatinine 0.94 0.50 - 1.30 mg/dL     eGFR 90 >60 mL/min/1.73m*2     Calcium 8.8 8.6 - 10.3 mg/dL     Albumin 3.0 (L) 3.4 - 5.0 g/dL     Alkaline Phosphatase 276 (H) 33 - 136 U/L     Total Protein 6.1 (L) 6.4 - 8.2 g/dL     AST 30 9 - 39 U/L     Bilirubin, Total 0.4 0.0 - 1.2 mg/dL      (H) 10 - 52 U/L   CBC and Auto Differential   Result Value Ref Range     WBC 11.5 (H) 4.4 - 11.3 x10*3/uL     nRBC 7.4 (H) 0.0 - 0.0 /100 WBCs     RBC 2.77 (L) 4.50 - 5.90 x10*6/uL     Hemoglobin 7.6 (L) 13.5 - 17.5 g/dL     Hematocrit 27.3 (L) 41.0 - 52.0 %     MCV 99 80 - 100 fL     MCH 27.4 26.0 - 34.0 pg     MCHC 27.8 (L) 32.0 - 36.0 g/dL     RDW 19.1 (H) 11.5 - 14.5 %     Platelets 293 150 - 450 x10*3/uL     Immature Granulocytes %, Automated 8.3 (H) 0.0 - 0.9 %     Immature Granulocytes Absolute, Automated 0.97 (H) 0.00 - 0.70 x10*3/uL   Manual Differential   Result Value Ref Range     Neutrophils %, Manual 79.0 40.0 - 80.0 %     Lymphocytes %, Manual 10.0 13.0 - 44.0 %     Monocytes %, Manual 3.0 2.0 - 10.0 %     Eosinophils %, Manual 2.0 0.0 - 6.0 %     Basophils %, Manual 1.0 0.0 - 2.0 %     Metamyelocytes %, Manual 5.0 0.0 - 0.0 %     Seg Neutrophils Absolute, Manual 9.09 (H) 1.20 - 7.00 x10*3/uL     Lymphocytes Absolute, Manual 1.15 (L) 1.20 - 4.80 x10*3/uL     Monocytes Absolute, Manual 0.35 0.10 - 1.00 x10*3/uL     Eosinophils Absolute, Manual 0.23 0.00 - 0.70 x10*3/uL     Basophils Absolute, Manual 0.12 (H) 0.00 - 0.10 x10*3/uL      Metamyelocytes Absolute, Manual 0.58 0.00 - 0.00 x10*3/uL     Total Cells Counted 100       Manual nRBC per 100 Cells 2.0 (H) 0.0 - 0.0 %     RBC Morphology See Below       Polychromasia Mild       Basophilic Stippling Present     Morphology   Result Value Ref Range     RBC Morphology See Below       Polychromasia Mild       Basophilic Stippling Present     POCT GLUCOSE   Result Value Ref Range     POCT Glucose 331 (H) 74 - 99 mg/dL      Patient recently received an antibiotic (last 12 hours)         Date/Time Action Medication Dose     03/25/25 1540 New Bag    piperacillin-tazobactam (Zosyn) 3.375 g in dextrose (iso) IV 50 mL 3.375 g     03/25/25 0949 New Bag    piperacillin-tazobactam (Zosyn) 3.375 g in dextrose (iso) IV 50 mL 3.375 g             Pt fully evaluated 3/25. Hematocrit 26.2 and hemoglobin 7.3. WBC at 14.8. Added cardio and increased fluids and free water flushes to q4. Plan discussed with interdisciplinary team, continue current and repeat labs in the AM.      Discharge planning discussed with patient and care team. Therapy evaluations ordered. [facility]-  , anticipate HHC/SNF at discharge. Patient aware and agreeable to current plan, continue plan as above.      I spent a total of 50 minutes on the date of the service which included preparing to see the patient, face-to-face patient care, completing clinical documentation, obtaining and/or reviewing separately obtained history, performing a medically appropriate examination, counseling and educating the patient/family/caregiver, ordering medications, tests, or procedures, communicating with other HCPs (not separately reported), independently interpreting results (not separately reported), communicating results to the patient/family/caregiver, and care coordination (not separately reported).   Patient fully evaluated  03/26  for    Problem List Items Addressed This Visit         * (Principal) Hemoptysis - Primary     Relevant Orders      Esophagogastroduodenoscopy (EGD) (Completed)     Surgical Pathology Exam     Melena     Relevant Orders     Esophagogastroduodenoscopy (EGD) (Completed)     Surgical Pathology Exam      Other Visit Diagnoses         Anemia, unspecified type         Relevant Orders     Esophagogastroduodenoscopy (EGD) (Completed)     Surgical Pathology Exam     Tracheostomy dependent (Multi)         Relevant Orders     Surgical Pathology Exam             Patient seen resting in bed with head of bed elevated, no s/s or c/o acute difficulties at this time.  Vital signs for last 24 hours Temp:  [35.5 °C (95.9 °F)-37 °C (98.6 °F)] 36.8 °C (98.2 °F)  Heart Rate:  [63-74] 73  Resp:  [17-18] 18  BP: (118-151)/(57-78) 118/57  FiO2 (%):  [28 %-30 %] 28 %    No intake/output data recorded.  Patient still requiring frequent cardiac and SPO2 monitoring. Continue aggressive pulmonary hygiene and oral hygiene. Off loading as tolerated for skin integrity. Medications and labs reviewed-         Results for orders placed or performed during the hospital encounter of 03/22/25 (from the past 24 hours)   POCT GLUCOSE   Result Value Ref Range     POCT Glucose 342 (H) 74 - 99 mg/dL   POCT GLUCOSE   Result Value Ref Range     POCT Glucose 340 (H) 74 - 99 mg/dL   POCT GLUCOSE   Result Value Ref Range     POCT Glucose 354 (H) 74 - 99 mg/dL   Comprehensive Metabolic Panel   Result Value Ref Range     Glucose 366 (H) 74 - 99 mg/dL     Sodium 147 (H) 136 - 145 mmol/L     Potassium 4.2 3.5 - 5.3 mmol/L     Chloride 110 (H) 98 - 107 mmol/L     Bicarbonate 30 21 - 32 mmol/L     Anion Gap 11 10 - 20 mmol/L     Urea Nitrogen 26 (H) 6 - 23 mg/dL     Creatinine 0.94 0.50 - 1.30 mg/dL     eGFR 90 >60 mL/min/1.73m*2     Calcium 8.8 8.6 - 10.3 mg/dL     Albumin 3.0 (L) 3.4 - 5.0 g/dL     Alkaline Phosphatase 276 (H) 33 - 136 U/L     Total Protein 6.1 (L) 6.4 - 8.2 g/dL     AST 30 9 - 39 U/L     Bilirubin, Total 0.4 0.0 - 1.2 mg/dL      (H) 10 - 52 U/L   CBC and  Auto Differential   Result Value Ref Range     WBC 11.5 (H) 4.4 - 11.3 x10*3/uL     nRBC 7.4 (H) 0.0 - 0.0 /100 WBCs     RBC 2.77 (L) 4.50 - 5.90 x10*6/uL     Hemoglobin 7.6 (L) 13.5 - 17.5 g/dL     Hematocrit 27.3 (L) 41.0 - 52.0 %     MCV 99 80 - 100 fL     MCH 27.4 26.0 - 34.0 pg     MCHC 27.8 (L) 32.0 - 36.0 g/dL     RDW 19.1 (H) 11.5 - 14.5 %     Platelets 293 150 - 450 x10*3/uL     Immature Granulocytes %, Automated 8.3 (H) 0.0 - 0.9 %     Immature Granulocytes Absolute, Automated 0.97 (H) 0.00 - 0.70 x10*3/uL   Manual Differential   Result Value Ref Range     Neutrophils %, Manual 79.0 40.0 - 80.0 %     Lymphocytes %, Manual 10.0 13.0 - 44.0 %     Monocytes %, Manual 3.0 2.0 - 10.0 %     Eosinophils %, Manual 2.0 0.0 - 6.0 %     Basophils %, Manual 1.0 0.0 - 2.0 %     Metamyelocytes %, Manual 5.0 0.0 - 0.0 %     Seg Neutrophils Absolute, Manual 9.09 (H) 1.20 - 7.00 x10*3/uL     Lymphocytes Absolute, Manual 1.15 (L) 1.20 - 4.80 x10*3/uL     Monocytes Absolute, Manual 0.35 0.10 - 1.00 x10*3/uL     Eosinophils Absolute, Manual 0.23 0.00 - 0.70 x10*3/uL     Basophils Absolute, Manual 0.12 (H) 0.00 - 0.10 x10*3/uL     Metamyelocytes Absolute, Manual 0.58 0.00 - 0.00 x10*3/uL     Total Cells Counted 100       Manual nRBC per 100 Cells 2.0 (H) 0.0 - 0.0 %     RBC Morphology See Below       Polychromasia Mild       Basophilic Stippling Present     Morphology   Result Value Ref Range     RBC Morphology See Below       Polychromasia Mild       Basophilic Stippling Present     POCT GLUCOSE   Result Value Ref Range     POCT Glucose 331 (H) 74 - 99 mg/dL      Patient recently received an antibiotic (last 12 hours)         Date/Time Action Medication Dose     03/26/25 0903 New Bag    piperacillin-tazobactam (Zosyn) 3.375 g in dextrose (iso) IV 50 mL 3.375 g             Plan discussed with interdisciplinary team, patient with hemoccult positive, protonix 40mg IVP BID, GI consulted, appreciate input. Patient seen by  nephrology today with plan for IV iron and erythropoietin, potential nephrotoxins and hepatotoxins, monitor renal chemistries and CBC continue current and repeat labs in the AM.      Discharge planning discussed with patient and care team. Therapy evaluations ordered. Anticipate HHC/SNF at discharge. Patient aware and agreeable to current plan, continue plan as above.      I spent a total of 50 minutes on the date of the service which included preparing to see the patient, face-to-face patient care, completing clinical documentation, obtaining and/or reviewing separately obtained history, performing a medically appropriate examination, counseling and educating the patient/family/caregiver, ordering medications, tests, or procedures, communicating with other HCPs (not separately reported), independently interpreting results (not separately reported), communicating results to the patient/family/caregiver, and care coordination (not separately reported).   ALEXANDER Vivas     Patient fully evaluated 3/27   for    Problem List Items Addressed This Visit         * (Principal) Hemoptysis - Primary     Relevant Orders     Esophagogastroduodenoscopy (EGD) (Completed)     Surgical Pathology Exam     Melena     Relevant Orders     Esophagogastroduodenoscopy (EGD) (Completed)     Surgical Pathology Exam      Other Visit Diagnoses         Anemia, unspecified type         Relevant Orders     Esophagogastroduodenoscopy (EGD) (Completed)     Surgical Pathology Exam     Tracheostomy dependent (Multi)         Relevant Orders     Surgical Pathology Exam             Patient seen resting in bed with head of bed elevated, no s/s or c/o acute difficulties at this time.  Vital signs for last 24 hours Temp:  [35.5 °C (95.9 °F)-37 °C (98.6 °F)] 36.8 °C (98.2 °F)  Heart Rate:  [63-74] 73  Resp:  [17-18] 18  BP: (118-151)/(57-78) 118/57  FiO2 (%):  [28 %-30 %] 28 %    No intake/output data recorded.  Patient still requiring frequent  cardiac and SPO2 monitoring. Continue aggressive pulmonary hygiene and oral hygiene. Off loading as tolerated for skin integrity. Medications and labs reviewed-         Results for orders placed or performed during the hospital encounter of 03/22/25 (from the past 24 hours)   POCT GLUCOSE   Result Value Ref Range     POCT Glucose 342 (H) 74 - 99 mg/dL   POCT GLUCOSE   Result Value Ref Range     POCT Glucose 340 (H) 74 - 99 mg/dL   POCT GLUCOSE   Result Value Ref Range     POCT Glucose 354 (H) 74 - 99 mg/dL   Comprehensive Metabolic Panel   Result Value Ref Range     Glucose 366 (H) 74 - 99 mg/dL     Sodium 147 (H) 136 - 145 mmol/L     Potassium 4.2 3.5 - 5.3 mmol/L     Chloride 110 (H) 98 - 107 mmol/L     Bicarbonate 30 21 - 32 mmol/L     Anion Gap 11 10 - 20 mmol/L     Urea Nitrogen 26 (H) 6 - 23 mg/dL     Creatinine 0.94 0.50 - 1.30 mg/dL     eGFR 90 >60 mL/min/1.73m*2     Calcium 8.8 8.6 - 10.3 mg/dL     Albumin 3.0 (L) 3.4 - 5.0 g/dL     Alkaline Phosphatase 276 (H) 33 - 136 U/L     Total Protein 6.1 (L) 6.4 - 8.2 g/dL     AST 30 9 - 39 U/L     Bilirubin, Total 0.4 0.0 - 1.2 mg/dL      (H) 10 - 52 U/L   CBC and Auto Differential   Result Value Ref Range     WBC 11.5 (H) 4.4 - 11.3 x10*3/uL     nRBC 7.4 (H) 0.0 - 0.0 /100 WBCs     RBC 2.77 (L) 4.50 - 5.90 x10*6/uL     Hemoglobin 7.6 (L) 13.5 - 17.5 g/dL     Hematocrit 27.3 (L) 41.0 - 52.0 %     MCV 99 80 - 100 fL     MCH 27.4 26.0 - 34.0 pg     MCHC 27.8 (L) 32.0 - 36.0 g/dL     RDW 19.1 (H) 11.5 - 14.5 %     Platelets 293 150 - 450 x10*3/uL     Immature Granulocytes %, Automated 8.3 (H) 0.0 - 0.9 %     Immature Granulocytes Absolute, Automated 0.97 (H) 0.00 - 0.70 x10*3/uL   Manual Differential   Result Value Ref Range     Neutrophils %, Manual 79.0 40.0 - 80.0 %     Lymphocytes %, Manual 10.0 13.0 - 44.0 %     Monocytes %, Manual 3.0 2.0 - 10.0 %     Eosinophils %, Manual 2.0 0.0 - 6.0 %     Basophils %, Manual 1.0 0.0 - 2.0 %     Metamyelocytes %,  Manual 5.0 0.0 - 0.0 %     Seg Neutrophils Absolute, Manual 9.09 (H) 1.20 - 7.00 x10*3/uL     Lymphocytes Absolute, Manual 1.15 (L) 1.20 - 4.80 x10*3/uL     Monocytes Absolute, Manual 0.35 0.10 - 1.00 x10*3/uL     Eosinophils Absolute, Manual 0.23 0.00 - 0.70 x10*3/uL     Basophils Absolute, Manual 0.12 (H) 0.00 - 0.10 x10*3/uL     Metamyelocytes Absolute, Manual 0.58 0.00 - 0.00 x10*3/uL     Total Cells Counted 100       Manual nRBC per 100 Cells 2.0 (H) 0.0 - 0.0 %     RBC Morphology See Below       Polychromasia Mild       Basophilic Stippling Present     Morphology   Result Value Ref Range     RBC Morphology See Below       Polychromasia Mild       Basophilic Stippling Present     POCT GLUCOSE   Result Value Ref Range     POCT Glucose 331 (H) 74 - 99 mg/dL      Patient recently received an antibiotic (last 12 hours)         Date/Time Action Medication Dose     03/27/25 1517 New Bag    piperacillin-tazobactam (Zosyn) 3.375 g in dextrose (iso) IV 50 mL 3.375 g             Patient fully evaluated 3/27. Patient in bed and family in room upon exam. Vitals today include Temp:  [35.1 °C (95.2 °F)-36.6 °C (97.9 °F)] 36.3 °C (97.3 °F), Heart Rate:  [51-67] 51, Resp:  [16-23] 18, BP: ()/(50-65) 96/53, FiO2 (%):  [30 %-50 %] 30 %. Pertinent labs today include WBC 11.9, Hbg 6.8, plts 212. Na 147, K 3.4, Cl 107, HCO3- 34, BUN 36, Cr 1.19. glucose 318. Patient declined blood transfusion, starting Procrit to try to increase counts. Patient underwent EGD today, findings include small hiatal hernia and signs of gastric and duodenal irritation. Continue to monitor.      Plan discussed with interdisciplinary team, continue current and repeat labs in the AM. Discharge planning discussed with patient and care team. Patient aware and agreeable to current plan, continue plan as above.      I spent a total of 50 minutes on the date of the service which included preparing to see the patient, face-to-face patient care, completing  clinical documentation, obtaining and/or reviewing separately obtained history, performing a medically appropriate examination, counseling and educating the patient/family/caregiver, ordering medications, tests, or procedures, communicating with other HCPs (not separately reported), independently interpreting results (not separately reported), communicating results to the patient/family/caregiver, and care coordination (not separately reported).      ALEXANDER Feliciano     Patient fully evaluated  03/28  for    Problem List Items Addressed This Visit         * (Principal) Hemoptysis - Primary     Relevant Orders     Esophagogastroduodenoscopy (EGD) (Completed)     Surgical Pathology Exam     Melena     Relevant Orders     Esophagogastroduodenoscopy (EGD) (Completed)     Surgical Pathology Exam      Other Visit Diagnoses         Anemia, unspecified type         Relevant Orders     Esophagogastroduodenoscopy (EGD) (Completed)     Surgical Pathology Exam     Tracheostomy dependent (Multi)         Relevant Orders     Surgical Pathology Exam             Patient seen resting in bed with head of bed elevated, no s/s or c/o acute difficulties at this time.  Vital signs for last 24 hours Temp:  [35.5 °C (95.9 °F)-37 °C (98.6 °F)] 36.8 °C (98.2 °F)  Heart Rate:  [63-74] 73  Resp:  [17-18] 18  BP: (118-151)/(57-78) 118/57  FiO2 (%):  [28 %-30 %] 28 %    No intake/output data recorded.  Patient still requiring frequent cardiac and SPO2 monitoring. Continue aggressive pulmonary hygiene and oral hygiene. Off loading as tolerated for skin integrity. Medications and labs reviewed-         Results for orders placed or performed during the hospital encounter of 03/22/25 (from the past 24 hours)   POCT GLUCOSE   Result Value Ref Range     POCT Glucose 342 (H) 74 - 99 mg/dL   POCT GLUCOSE   Result Value Ref Range     POCT Glucose 340 (H) 74 - 99 mg/dL   POCT GLUCOSE   Result Value Ref Range     POCT Glucose 354 (H) 74 - 99 mg/dL    Comprehensive Metabolic Panel   Result Value Ref Range     Glucose 366 (H) 74 - 99 mg/dL     Sodium 147 (H) 136 - 145 mmol/L     Potassium 4.2 3.5 - 5.3 mmol/L     Chloride 110 (H) 98 - 107 mmol/L     Bicarbonate 30 21 - 32 mmol/L     Anion Gap 11 10 - 20 mmol/L     Urea Nitrogen 26 (H) 6 - 23 mg/dL     Creatinine 0.94 0.50 - 1.30 mg/dL     eGFR 90 >60 mL/min/1.73m*2     Calcium 8.8 8.6 - 10.3 mg/dL     Albumin 3.0 (L) 3.4 - 5.0 g/dL     Alkaline Phosphatase 276 (H) 33 - 136 U/L     Total Protein 6.1 (L) 6.4 - 8.2 g/dL     AST 30 9 - 39 U/L     Bilirubin, Total 0.4 0.0 - 1.2 mg/dL      (H) 10 - 52 U/L   CBC and Auto Differential   Result Value Ref Range     WBC 11.5 (H) 4.4 - 11.3 x10*3/uL     nRBC 7.4 (H) 0.0 - 0.0 /100 WBCs     RBC 2.77 (L) 4.50 - 5.90 x10*6/uL     Hemoglobin 7.6 (L) 13.5 - 17.5 g/dL     Hematocrit 27.3 (L) 41.0 - 52.0 %     MCV 99 80 - 100 fL     MCH 27.4 26.0 - 34.0 pg     MCHC 27.8 (L) 32.0 - 36.0 g/dL     RDW 19.1 (H) 11.5 - 14.5 %     Platelets 293 150 - 450 x10*3/uL     Immature Granulocytes %, Automated 8.3 (H) 0.0 - 0.9 %     Immature Granulocytes Absolute, Automated 0.97 (H) 0.00 - 0.70 x10*3/uL   Manual Differential   Result Value Ref Range     Neutrophils %, Manual 79.0 40.0 - 80.0 %     Lymphocytes %, Manual 10.0 13.0 - 44.0 %     Monocytes %, Manual 3.0 2.0 - 10.0 %     Eosinophils %, Manual 2.0 0.0 - 6.0 %     Basophils %, Manual 1.0 0.0 - 2.0 %     Metamyelocytes %, Manual 5.0 0.0 - 0.0 %     Seg Neutrophils Absolute, Manual 9.09 (H) 1.20 - 7.00 x10*3/uL     Lymphocytes Absolute, Manual 1.15 (L) 1.20 - 4.80 x10*3/uL     Monocytes Absolute, Manual 0.35 0.10 - 1.00 x10*3/uL     Eosinophils Absolute, Manual 0.23 0.00 - 0.70 x10*3/uL     Basophils Absolute, Manual 0.12 (H) 0.00 - 0.10 x10*3/uL     Metamyelocytes Absolute, Manual 0.58 0.00 - 0.00 x10*3/uL     Total Cells Counted 100       Manual nRBC per 100 Cells 2.0 (H) 0.0 - 0.0 %     RBC Morphology See Below        Polychromasia Mild       Basophilic Stippling Present     Morphology   Result Value Ref Range     RBC Morphology See Below       Polychromasia Mild       Basophilic Stippling Present     POCT GLUCOSE   Result Value Ref Range     POCT Glucose 331 (H) 74 - 99 mg/dL      Patient recently received an antibiotic (last 12 hours)         Date/Time Action Medication Dose     03/28/25 0915 New Bag    piperacillin-tazobactam (Zosyn) 3.375 g in dextrose (iso) IV 50 mL 3.375 g     03/28/25 0239 New Bag    piperacillin-tazobactam (Zosyn) 3.375 g in dextrose (iso) IV 50 mL 3.375 g             Plan discussed with interdisciplinary team, no blood transfusion as patient is S[employer]ism, seen by nephrology today and epogen increased to aid in H/H, renal chemistries down trending, liver function improving, no further hemoptysis noted. WBC elevated today @ 14.0 from 11.9,  chest xray ordered, will  continue IV antibiotics, monitor for bleeding, will continue current and repeat labs in the AM.      Discharge planning discussed with patient and care team. Therapy evaluations ordered. Anticipate HHC/SNF at discharge. Patient aware and agreeable to current plan, continue plan as above.      I spent a total of 50 minutes on the date of the service which included preparing to see the patient, face-to-face patient care, completing clinical documentation, obtaining and/or reviewing separately obtained history, performing a medically appropriate examination, counseling and educating the patient/family/caregiver, ordering medications, tests, or procedures, communicating with other HCPs (not separately reported), independently interpreting results (not separately reported), communicating results to the patient/family/caregiver, and care coordination (not separately reported).      Patient fully evaluated  03/29  for    Problem List Items Addressed This Visit         * (Principal) Hemoptysis - Primary     Relevant Orders      Auto Differential   Result Value Ref Range     WBC 11.5 (H) 4.4 - 11.3 x10*3/uL     nRBC 7.4 (H) 0.0 - 0.0 /100 WBCs     RBC 2.77 (L) 4.50 - 5.90 x10*6/uL     Hemoglobin 7.6 (L) 13.5 - 17.5 g/dL     Hematocrit 27.3 (L) 41.0 - 52.0 %     MCV 99 80 - 100 fL     MCH 27.4 26.0 - 34.0 pg     MCHC 27.8 (L) 32.0 - 36.0 g/dL     RDW 19.1 (H) 11.5 - 14.5 %     Platelets 293 150 - 450 x10*3/uL     Immature Granulocytes %, Automated 8.3 (H) 0.0 - 0.9 %     Immature Granulocytes Absolute, Automated 0.97 (H) 0.00 - 0.70 x10*3/uL   Manual Differential   Result Value Ref Range     Neutrophils %, Manual 79.0 40.0 - 80.0 %     Lymphocytes %, Manual 10.0 13.0 - 44.0 %     Monocytes %, Manual 3.0 2.0 - 10.0 %     Eosinophils %, Manual 2.0 0.0 - 6.0 %     Basophils %, Manual 1.0 0.0 - 2.0 %     Metamyelocytes %, Manual 5.0 0.0 - 0.0 %     Seg Neutrophils Absolute, Manual 9.09 (H) 1.20 - 7.00 x10*3/uL     Lymphocytes Absolute, Manual 1.15 (L) 1.20 - 4.80 x10*3/uL     Monocytes Absolute, Manual 0.35 0.10 - 1.00 x10*3/uL     Eosinophils Absolute, Manual 0.23 0.00 - 0.70 x10*3/uL     Basophils Absolute, Manual 0.12 (H) 0.00 - 0.10 x10*3/uL     Metamyelocytes Absolute, Manual 0.58 0.00 - 0.00 x10*3/uL     Total Cells Counted 100       Manual nRBC per 100 Cells 2.0 (H) 0.0 - 0.0 %     RBC Morphology See Below       Polychromasia Mild       Basophilic Stippling Present     Morphology   Result Value Ref Range     RBC Morphology See Below       Polychromasia Mild       Basophilic Stippling Present     POCT GLUCOSE   Result Value Ref Range     POCT Glucose 331 (H) 74 - 99 mg/dL      Patient recently received an antibiotic (last 12 hours)         Date/Time Action Medication Dose     03/29/25 0911 New Bag    piperacillin-tazobactam (Zosyn) 3.375 g in dextrose (iso) IV 50 mL 3.375 g     03/29/25 0148 New Bag    piperacillin-tazobactam (Zosyn) 3.375 g in dextrose (iso) IV 50 mL 3.375 g             Plan discussed with interdisciplinary team, will  deescalate medications, IV steroids to oral, will continue IV antibiotics, monitor overnight. Will  continue current and repeat labs in the AM.      Discharge planning discussed with patient and care team. Therapy evaluations ordered. Anticipate SNF at discharge. Patient aware and agreeable to current plan, continue plan as above.      I spent a total of 50 minutes on the date of the service which included preparing to see the patient, face-to-face patient care, completing clinical documentation, obtaining and/or reviewing separately obtained history, performing a medically appropriate examination, counseling and educating the patient/family/caregiver, ordering medications, tests, or procedures, communicating with other HCPs (not separately reported), independently interpreting results (not separately reported), communicating results to the patient/family/caregiver, and care coordination (not separately reported).      Patient fully evaluated  03/30  for    Problem List Items Addressed This Visit         * (Principal) Hemoptysis - Primary     Relevant Orders     Esophagogastroduodenoscopy (EGD) (Completed)     Surgical Pathology Exam     Melena     Relevant Orders     Esophagogastroduodenoscopy (EGD) (Completed)     Surgical Pathology Exam      Other Visit Diagnoses         Anemia, unspecified type         Relevant Orders     Esophagogastroduodenoscopy (EGD) (Completed)     Surgical Pathology Exam     Tracheostomy dependent (Multi)         Relevant Orders     Surgical Pathology Exam             Patient seen resting in bed with head of bed elevated, no s/s or c/o acute difficulties at this time.  Vital signs for last 24 hours Temp:  [35.5 °C (95.9 °F)-37 °C (98.6 °F)] 36.8 °C (98.2 °F)  Heart Rate:  [63-74] 73  Resp:  [17-18] 18  BP: (118-151)/(57-78) 118/57  FiO2 (%):  [28 %-30 %] 28 %    No intake/output data recorded.  Patient still requiring frequent cardiac and SPO2 monitoring. Continue aggressive pulmonary  hygiene and oral hygiene. Off loading as tolerated for skin integrity. Medications and labs reviewed-         Results for orders placed or performed during the hospital encounter of 03/22/25 (from the past 24 hours)   POCT GLUCOSE   Result Value Ref Range     POCT Glucose 342 (H) 74 - 99 mg/dL   POCT GLUCOSE   Result Value Ref Range     POCT Glucose 340 (H) 74 - 99 mg/dL   POCT GLUCOSE   Result Value Ref Range     POCT Glucose 354 (H) 74 - 99 mg/dL   Comprehensive Metabolic Panel   Result Value Ref Range     Glucose 366 (H) 74 - 99 mg/dL     Sodium 147 (H) 136 - 145 mmol/L     Potassium 4.2 3.5 - 5.3 mmol/L     Chloride 110 (H) 98 - 107 mmol/L     Bicarbonate 30 21 - 32 mmol/L     Anion Gap 11 10 - 20 mmol/L     Urea Nitrogen 26 (H) 6 - 23 mg/dL     Creatinine 0.94 0.50 - 1.30 mg/dL     eGFR 90 >60 mL/min/1.73m*2     Calcium 8.8 8.6 - 10.3 mg/dL     Albumin 3.0 (L) 3.4 - 5.0 g/dL     Alkaline Phosphatase 276 (H) 33 - 136 U/L     Total Protein 6.1 (L) 6.4 - 8.2 g/dL     AST 30 9 - 39 U/L     Bilirubin, Total 0.4 0.0 - 1.2 mg/dL      (H) 10 - 52 U/L   CBC and Auto Differential   Result Value Ref Range     WBC 11.5 (H) 4.4 - 11.3 x10*3/uL     nRBC 7.4 (H) 0.0 - 0.0 /100 WBCs     RBC 2.77 (L) 4.50 - 5.90 x10*6/uL     Hemoglobin 7.6 (L) 13.5 - 17.5 g/dL     Hematocrit 27.3 (L) 41.0 - 52.0 %     MCV 99 80 - 100 fL     MCH 27.4 26.0 - 34.0 pg     MCHC 27.8 (L) 32.0 - 36.0 g/dL     RDW 19.1 (H) 11.5 - 14.5 %     Platelets 293 150 - 450 x10*3/uL     Immature Granulocytes %, Automated 8.3 (H) 0.0 - 0.9 %     Immature Granulocytes Absolute, Automated 0.97 (H) 0.00 - 0.70 x10*3/uL   Manual Differential   Result Value Ref Range     Neutrophils %, Manual 79.0 40.0 - 80.0 %     Lymphocytes %, Manual 10.0 13.0 - 44.0 %     Monocytes %, Manual 3.0 2.0 - 10.0 %     Eosinophils %, Manual 2.0 0.0 - 6.0 %     Basophils %, Manual 1.0 0.0 - 2.0 %     Metamyelocytes %, Manual 5.0 0.0 - 0.0 %     Seg Neutrophils Absolute, Manual 9.09  (H) 1.20 - 7.00 x10*3/uL     Lymphocytes Absolute, Manual 1.15 (L) 1.20 - 4.80 x10*3/uL     Monocytes Absolute, Manual 0.35 0.10 - 1.00 x10*3/uL     Eosinophils Absolute, Manual 0.23 0.00 - 0.70 x10*3/uL     Basophils Absolute, Manual 0.12 (H) 0.00 - 0.10 x10*3/uL     Metamyelocytes Absolute, Manual 0.58 0.00 - 0.00 x10*3/uL     Total Cells Counted 100       Manual nRBC per 100 Cells 2.0 (H) 0.0 - 0.0 %     RBC Morphology See Below       Polychromasia Mild       Basophilic Stippling Present     Morphology   Result Value Ref Range     RBC Morphology See Below       Polychromasia Mild       Basophilic Stippling Present     POCT GLUCOSE   Result Value Ref Range     POCT Glucose 331 (H) 74 - 99 mg/dL      Patient recently received an antibiotic (last 12 hours)         Date/Time Action Medication Dose     03/30/25 1057 New Bag    piperacillin-tazobactam (Zosyn) 3.375 g in dextrose (iso) IV 50 mL 3.375 g     03/30/25 0238 New Bag    piperacillin-tazobactam (Zosyn) 3.375 g in dextrose (iso) IV 50 mL 3.375 g             Plan discussed with interdisciplinary team, seen by nephrology today with plan for 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 Appreciate input and agree with plan. Awaiting therapy evaluations. Will continue current IV antibiotics, continue current plan, and repeat labs in the AM.      Discharge planning discussed with patient and care team. Therapy evaluations ordered. Anticipate [facility] at discharge. Patient aware and agreeable to current plan, continue plan as above.      I spent a total of 50 minutes on the date of the service which included preparing to see the patient, face-to-face patient care, completing clinical documentation, obtaining and/or reviewing separately obtained history, performing a medically appropriate examination, counseling  and educating the patient/family/caregiver, ordering medications, tests, or procedures, communicating with other HCPs (not separately reported), independently interpreting results (not separately reported), communicating results to the patient/family/caregiver, and care coordination (not separately reported).                 Patient fully evaluated  for   Assessment & Plan  Hemoptysis    Tracheostomy in place (Multi)    CVA, old, hemiparesis (Multi)    Status post insertion of percutaneous endoscopic gastrostomy (PEG) tube (Multi)    Pneumonia due to infectious organism    Right sided weakness    Atrial flutter (Multi)    Insulin dependent type 2 diabetes mellitus (Multi)        Zeus Bone MD  Physician  Internal Medicine     H&P      Signed     Date of Service: 3/23/2025 10:28 AM     Signed       Expand All Collapse All    History Of Present Illness  Bryan Nix is a 64-year-old male with past medical history of atrial flutter on Eliquis, hyponatremia, IDDM, CAD, hypertension, chronic wounds, diffuse lymphadenopathy, anemia, history of smoking, history elevated PSA, CVA L hemisphere (2020) with right-sided deficits, Left ICA stenosis (85%) s/p angioplasty and left carotid artery stent (08/25/2023) and recent history of acute pontine stroke 02/27/25 with acute hypoxic respiratory failure s/p tracheostomy on 2/25/2025 and PEG.  Patient presents for bleeding from tracheostomy.  Patient is alert and oriented x 3, however limited verbal communication due to tracheostomy.  Wife is at bedside and assists with history.  She reports that patient was coughing blood out of his tracheostomy.  ER provider felt bleeding was likely superficial around site.  Patient has been on room air at skilled nursing facility.  He was placed on Airvo in the ED mainly to humidify oxygen.  Patient never desaturated per ER report.  Currently he is resting comfortably in the bed, breath sounds slightly rhonchorous, but unlabored.   Reports chills, but no fevers.  He has right-sided weakness and sensory deficits.  Denies headache, vision or hearing changes, chest pains, palpitations, shortness of breath, nausea, vomiting, abdominal pain, diarrhea, or complications of PEG tube site.  Wife reports patient skin became irritated in his groin due to adhesive from a condom cath previously, he has a small decubitus wound that she reports is healing well.  He receives tube feeds continuously.  Wife reports a patient was recently evaluated by speech for p.o. to intake, however he still remains n.p.o. except for meds and tube feeds through PEG. CODE STATUS reviewed: full code     ER Course: Hemodynamically stable, afebrile, SpO2 to 97% trach mask.  WBC 9.3, hemoglobin 8, hematocrit 29.4, platelets 197.  INR 1.4.  Glucose 223, BUN 36, otherwise CMP is normal.  CT of the chest for PE pending.  CXR unremarkable. Trannexamic acid soak gauze wrapped around trach.      Past medical history: As above  Past surgical history: As above  Social history: Former smoker 37.5 pack years-quit smoking age 63, occasional alcohol use, no illicit drug use.  .  Works in maintenance.  6 children.  Family history: Noncontributory     Past Medical History  Medical History        Past Medical History:   Diagnosis Date    Abnormal finding of blood chemistry, unspecified 05/18/2016     Abnormal blood chemistry    Acute upper respiratory infection, unspecified 12/02/2015     Acute upper respiratory infection    Elevated prostate specific antigen (PSA)       Elevated prostate specific antigen (PSA)    Encounter for screening for malignant neoplasm of colon 01/30/2021     Colon cancer screening    Encounter for screening for malignant neoplasm of prostate 11/30/2020     Prostate cancer screening    Essential (primary) hypertension 07/30/2013     Benign essential hypertension    Other conditions influencing health status 07/30/2013     Diabetes Mellitus Poorly Controlled     Other conditions influencing health status 12/02/2015     History of cough    Personal history of other endocrine, nutritional and metabolic disease 03/18/2021     History of hyperlipidemia    Personal history of other endocrine, nutritional and metabolic disease 03/18/2021     History of diabetes mellitus    Personal history of other specified conditions 05/18/2016     History of chest pain    Personal history of other specified conditions 05/18/2016     History of dyspnea    Personal history of other specified conditions 05/18/2016     History of dizziness    Personal history of other specified conditions 05/18/2016     History of fatigue            Surgical History  Surgical History         Past Surgical History:   Procedure Laterality Date    CT ANGIO CORONARY ART WITH HEARTFLOW IF SCORE >30%   8/23/2023     CT HEART CORONARY ANGIOGRAM 8/23/2023 [facility] CT    MR HEAD ANGIO WO IV CONTRAST   12/14/2020     MR HEAD ANGIO WO IV CONTRAST 12/14/2020 [facility]    MR NECK ANGIO WO IV CONTRAST   12/14/2020     MR NECK ANGIO WO IV CONTRAST 12/14/2020 [facility]    OTHER SURGICAL HISTORY   12/01/2020     Hand fracture repair    OTHER SURGICAL HISTORY   12/01/2020     Nasal bone fracture repair            Social History  He reports that he has quit smoking. His smoking use included cigarettes. He has never used smokeless tobacco. No history on file for alcohol use and drug use.     Family History  Family History   No family history on file.        Allergies  Patient has no known allergies.     Review of Systems     Physical Exam  Constitutional:       General: He is awake. He is not in acute distress.     Appearance: Normal appearance. He is not toxic-appearing.   HENT:      Head: Atraumatic.      Nose: Nose normal.      Mouth/Throat:      Mouth: Mucous membranes are moist.   Eyes:      Conjunctiva/sclera: Conjunctivae normal.   Cardiovascular:      Rate and Rhythm: Normal rate and regular rhythm.       "Pulses: Normal pulses.      Heart sounds: No murmur heard.  Pulmonary:      Effort: No respiratory distress.      Comments: Tracheostomy site midline, no drainage around dressing.  Rhonchorous breath sounds, unlabored respirations, RT bedside suctioning patient with thick blood-tinged sputum  Abdominal:      General: Bowel sounds are normal. There is no distension.      Palpations: Abdomen is soft.      Tenderness: There is no abdominal tenderness. There is no guarding.      Comments: Left upper quadrant PEG site tube site, no drainage or skin breakdown around tube site.   Musculoskeletal:         General: No swelling, deformity or signs of injury.      Cervical back: Neck supple.      Right lower leg: No edema.      Left lower leg: No edema.   Skin:     General: Skin is warm and dry.      Capillary Refill: Capillary refill takes less than 2 seconds.      Findings: Wound (sacrum) present. No ecchymosis or erythema.   Neurological:      Mental Status: He is alert and oriented to person, place, and time.      Cranial Nerves: No facial asymmetry.      Sensory: Sensory deficit (RUE, RLE) present.      Motor: Weakness present.      Comments: Right sided upper and lower extremity motor function deficits   Psychiatric:         Mood and Affect: Mood normal.         Behavior: Behavior is cooperative.            Last Recorded Vitals  Blood pressure 134/50, pulse 93, temperature 36.9 °C (98.4 °F), temperature source Temporal, resp. rate 22, height 1.88 m (6' 2\"), weight 77.1 kg (170 lb), SpO2 98%.     Relevant Results              Results for orders placed or performed during the hospital encounter of 03/22/25 (from the past 24 hours)   CBC and Auto Differential   Result Value Ref Range     WBC 9.3 4.4 - 11.3 x10*3/uL     nRBC 0.3 (H) 0.0 - 0.0 /100 WBCs     RBC 3.02 (L) 4.50 - 5.90 x10*6/uL     Hemoglobin 8.0 (L) 13.5 - 17.5 g/dL     Hematocrit 29.4 (L) 41.0 - 52.0 %     MCV 97 80 - 100 fL     MCH 26.5 26.0 - 34.0 pg     MCHC " 27.2 (L) 32.0 - 36.0 g/dL     RDW 15.6 (H) 11.5 - 14.5 %     Platelets 197 150 - 450 x10*3/uL     Neutrophils % 77.1 40.0 - 80.0 %     Immature Granulocytes %, Automated 1.5 (H) 0.0 - 0.9 %     Lymphocytes % 13.1 13.0 - 44.0 %     Monocytes % 4.1 2.0 - 10.0 %     Eosinophils % 3.2 0.0 - 6.0 %     Basophils % 1.0 0.0 - 2.0 %     Neutrophils Absolute 7.14 1.20 - 7.70 x10*3/uL     Immature Granulocytes Absolute, Automated 0.14 0.00 - 0.70 x10*3/uL     Lymphocytes Absolute 1.21 1.20 - 4.80 x10*3/uL     Monocytes Absolute 0.38 0.10 - 1.00 x10*3/uL     Eosinophils Absolute 0.30 0.00 - 0.70 x10*3/uL     Basophils Absolute 0.09 0.00 - 0.10 x10*3/uL   Basic metabolic panel   Result Value Ref Range     Glucose 223 (H) 74 - 99 mg/dL     Sodium 142 136 - 145 mmol/L     Potassium 4.3 3.5 - 5.3 mmol/L     Chloride 103 98 - 107 mmol/L     Bicarbonate 27 21 - 32 mmol/L     Anion Gap 16 10 - 20 mmol/L     Urea Nitrogen 36 (H) 6 - 23 mg/dL     Creatinine 0.81 0.50 - 1.30 mg/dL     eGFR >90 >60 mL/min/1.73m*2     Calcium 9.6 8.6 - 10.3 mg/dL   Protime-INR   Result Value Ref Range     Protime 15.1 (H) 9.8 - 12.4 seconds     INR 1.4 (H) 0.9 - 1.1   aPTT   Result Value Ref Range     aPTT 25 (L) 26 - 36 seconds   Type and Screen   Result Value Ref Range     ABO TYPE B       Rh TYPE POS       ANTIBODY SCREEN NEG     VERIFY ABO/Rh Group Test   Result Value Ref Range     ABO TYPE B       Rh TYPE POS     MRSA Surveillance for Vancomycin De-escalation, PCR     Specimen: Anterior Nares; Swab   Result Value Ref Range     MRSA PCR Not Detected Not Detected   POCT GLUCOSE   Result Value Ref Range     POCT Glucose 180 (H) 74 - 99 mg/dL   POCT GLUCOSE   Result Value Ref Range     POCT Glucose 161 (H) 74 - 99 mg/dL   POCT GLUCOSE   Result Value Ref Range     POCT Glucose 156 (H) 74 - 99 mg/dL   Basic metabolic panel   Result Value Ref Range     Glucose 170 (H) 74 - 99 mg/dL     Sodium 147 (H) 136 - 145 mmol/L     Potassium 4.2 3.5 - 5.3 mmol/L      Chloride 107 98 - 107 mmol/L     Bicarbonate 30 21 - 32 mmol/L     Anion Gap 14 10 - 20 mmol/L     Urea Nitrogen 35 (H) 6 - 23 mg/dL     Creatinine 0.85 0.50 - 1.30 mg/dL     eGFR >90 >60 mL/min/1.73m*2     Calcium 9.4 8.6 - 10.3 mg/dL   CBC   Result Value Ref Range     WBC 10.4 4.4 - 11.3 x10*3/uL     nRBC 0.6 (H) 0.0 - 0.0 /100 WBCs     RBC 2.87 (L) 4.50 - 5.90 x10*6/uL     Hemoglobin 7.8 (L) 13.5 - 17.5 g/dL     Hematocrit 26.6 (L) 41.0 - 52.0 %     MCV 93 80 - 100 fL     MCH 27.2 26.0 - 34.0 pg     MCHC 29.3 (L) 32.0 - 36.0 g/dL     RDW 15.7 (H) 11.5 - 14.5 %     Platelets 205 150 - 450 x10*3/uL   SST TOP   Result Value Ref Range     Extra Tube Hold for add-ons.        CT angio chest for pulmonary embolism     Result Date: 3/22/2025  Interpreted By:  Pamela Zurita, STUDY: CT ANGIO CHEST FOR PULMONARY EMBOLISM;  3/22/2025 4:56 pm   INDICATION: Signs/Symptoms:bloody secretions, eval for PE.     COMPARISON: CT chest without contrast 03/22/2025   ACCESSION NUMBER(S): [id number]   ORDERING CLINICIAN: PAMELA HAHN   TECHNIQUE: Contiguous axial images of the chest were obtained after the intravenous administration of iodinated contrast using angiographic PE protocol. Coronal and sagittal reformatted images were reconstructed from the axial data. MIP images were created on an independent workstation and reviewed.   FINDINGS:   MEDIASTINUM AND LYMPH NODES: Inflammatory fat stranding along the tracheostomy tract without discrete fluid collection. The esophageal wall appears within normal limits. There is redemonstration of diffuse lymphadenopathy in the bilateral axilla, mediastinum, bilateral supraclavicular as described in detail on separate report. Also notable or enlarged bilateral level 4 cervical lymph nodes (right > left).   VESSELS:  Normal caliber thoracic aorta without dissection. Moderate aortic atherosclerosis.  No acute pulmonary embolism.   HEART: Normal size.  Mild coronary artery calcifications. No  significant pericardial effusion.   LUNG, AIRWAYS, PLEURA: There is slight increase in debris within the trachea. There has been clearing of secretions in the left upper/lower lobe bronchus. There is diffuse bilateral bronchial thickening slightly increased from prior study. There is peribronchovascular ground-glass opacity in the posterior right upper lobe and superior and basal segments of the right lower lobe. There is slightly worsened atelectasis in the lung bases remaining predominantly subsegmental in nature. There is a 0.8 cm nodule in the left upper lobe that is stable from 08/19/2023.   OSSEOUS STRUCTURES: No acute osseous abnormality.   CHEST WALL SOFT TISSUES: No discernible acute abnormality.   UPPER ABDOMEN/OTHER: No acute abnormality.        No acute pulmonary embolism.   Peribronchial vascular ground glass opacities in the right upper and lower lobe again concerning for pneumonia given patient's risk factors of tracheostomy and debris in the airways.   Redemonstration of diffuse lymphadenopathy in the lower necks, axilla, and mediastinum. Findings are either diffusely reactive in nature, reflective of lymphoma/leukemia, meter deficiency, or less likely head neck neoplasm is previously postulated. However please correlate with past medical history.   Inflammatory changes along the tracheostomy tract without fluid collection. Correlate for erythema.   Unchanged 0.8 cm nodule in the left upper lobe dating back to 08/19/2023. Recommend 1 year follow up chest CT to ensure at least 2 years of stability.   MACRO: None.   Signed by: Pamela Zurita 3/22/2025 7:17 PM Dictation workstation:   [id number]     CT chest wo IV contrast     Result Date: 3/22/2025  Interpreted By:  Pamela Zurita, STUDY: CT CHEST WO IV CONTRAST;  3/22/2025 3:41 pm   INDICATION: Signs/Symptoms:eval for alveolar hemorrhage.     COMPARISON: CT head/neck 08/19/2023   ACCESSION NUMBER(S): [id number]   ORDERING CLINICIAN: PAMELA  HAHN   TECHNIQUE: Contiguous axial images of the chest and upper abdomen were obtained without contrast. Coronal and sagittal reformatted images were reconstructed from the axial data.   FINDINGS:   MEDIASTINUM AND LYMPH NODES: There is fat stranding/edema along the tract of the tracheostomy about discrete fluid collection. The esophageal wall appears within normal limits. There are numerous enlarged bilateral supraclavicular lymph nodes measuring up to 1.4 cm on the left. There are numerous enlarged bilateral axillary lymph nodes measuring up to 1.1 cm on the right and 1.2 cm on the left. There are numerous prominent to mildly enlarged mediastinal lymph nodes as well. No pneumomediastinum.   VESSELS:  Normal caliber thoracic aorta. Moderate aortic atherosclerosis. The main pulmonary artery is normal in size.   HEART: Normal size.  Mild coronary artery calcifications. No significant pericardial effusion.   LUNG, AIRWAYS, PLEURA: Tracheostomy noted in appropriate position. There is trace mucus in the left mainstem bronchus and at the origin of the left upper lobe and lower lobe bronchi. There are mild peribronchovascular ground-glass opacities in the posterior segment of the right upper lobe, basal segments of the right lower lobe. There is mild dependent bibasilar atelectasis. There is no pleural effusion.   OSSEOUS STRUCTURES: No acute osseous abnormality.   CHEST WALL SOFT TISSUES: No discernible acute abnormality.   UPPER ABDOMEN/OTHER: No acute abnormality.        1. Mild peribronchovascular ground-glass opacities in the posterior right upper lobe and basal segments of the right lower lobe. This appearance and distribution is not typical for alveolar hemorrhage which tends to be centrilobular and bilateral asymmetric in distribution. Findings are most suggestive of pneumonia.   2. Small amount of mucous debris in the left mainstem bronchus and at the origin of the left upper and lower lobe bronchi.   3.  Diffuse lymphadenopathy in the bilateral axillary regions, mediastinum and left supraclavicular region particularly notable in the bilateral supraclavicular region. This is progressed in the left supraclavicular region and new elsewhere when compared to 08/19/2023 CTA head/neck. Correlate for any history of known head/neck malignancy or lymphoma.   4. Inflammation/fat stranding adjacent to the tracheostomy tube entry site noted. Correlate for air the met. No evidence of fluid collection.   MACRO: None.   Signed by: Marco A Zurita 3/22/2025 7:10 PM Dictation workstation:   [id number]     XR chest 1 view     Result Date: 3/22/2025  Interpreted By:  Fermin Stroud, STUDY: XR CHEST 1 VIEW   INDICATION: Signs/Symptoms:hemoptysis.   COMPARISON: August 19, 2023   ACCESSION NUMBER(S): [id number]   ORDERING CLINICIAN: MARCO A HAHN   FINDINGS: No consolidation, effusion, edema, or pneumothorax. Heart size within normal limits.        No evidence of acute intrathoracic abnormality.   Signed by: Fermin Stroud 3/22/2025 2:01 PM Dictation workstation:   [id number]     XR chest 1 view     Result Date: 3/14/2025  * * *Final Report* * * DATE OF EXAM: Mar 14 2025  8:52AM   S5X   5290  -  XR CHEST 1V FRONTAL   / ACCESSION #  [id number] PROCEDURE REASON: new bleeding from trach      * * * * Physician Interpretation * * * *  EXAMINATION:  CHEST RADIOGRAPH (PORTABLE SINGLE VIEW AP) Exam Date/Time:  3/14/2025 8:52 AM Clinical History:  new bleeding from trach Comparison:  03/03/2025 RESULT: LINES, TUBES, AND DEVICES:  Tracheostomy. CARDIOMEDIASTINAL SILHOUETTE:  The cardiac and mediastinal silhouettes appear unremarkable. LUNGS AND PLEURA: No consolidation.  No pleural effusion. No pulmonary vascular congestion. No pneumothorax identified. OTHER:  N/A     IMPRESSION: No acute abnormality identified. : [facility]ANY   Transcribe Date/Time: Mar 14 2025  9:07A Dictated by : MARCO A EVANS MD This examination was interpreted and  the report reviewed and electronically signed by: PAMELA EVANS MD on Mar 14 2025  9:11AM  EST     US abdomen complete     Result Date: 3/11/2025  ABDOMEN ULTRASOUND-COMPLETE FINDINGS: Abdomen Ultrasound Complete: PROCEDURE: Multiple grayscale mages of the abdomen were obtained. FINDINGS:Multiple real time images demonstrate the liver with increased echogenicity consistent with fatty infiltration. There is no evidence of a discrete mass within the liver. The liver is enlarged, measured at 15.7 cm in size. The gallbladder is seen with no evidence of cholelithiasis. The gallbladder wall is within normal limits. The common bile duct is within normal limits. The visualized pancreas appears unremarkable. Both kidneys are seen with no evidence of hydronephrosis or a calculus. The spleen has its normal homogeneous echo appearance. There is no free fluid to suggest ascites. The visualized aorta appears unremarkable. The inferior vena cava appears patent. CONCLUSION: Enlarged fatty liver. ELECTRONICALLY SIGNED BY LEONEL LAN M.D. 3/11/2025 5:00:52 PM EDT.     XR chest 1 view     Result Date: 3/3/2025  * * *Final Report* * * DATE OF EXAM: Mar  3 2025  1:40PM   S5X   5290  -  XR CHEST 1V FRONTAL   / ACCESSION #  [id number] PROCEDURE REASON: resp failure      * * * * Physician Interpretation * * * *  EXAMINATION:  CHEST RADIOGRAPH (PORTABLE SINGLE VIEW AP) Exam Date/Time:  3/3/2025 1:40 PM Indication:   resp failure M:  XCP_4 Comparison:  1 day prior RESULT: Lines, tubes, and devices:  Tracheostomy tube remains in situ. Lungs and pleura:  Mild patchy opacities right lower lung zone seen on the prior study are no longer appreciated.  No confluent opacity.   Costophrenic angles are clear.  No pneumothorax. Cardiomediastinal silhouette:  Stable cardiomediastinal silhouette. Other:  -     IMPRESSION: Improved aeration right lower lung zone.  No acute cardiopulmonary finding. : HANY   Transcribe  Date/Time: Mar  3 2025  2:49P Dictated by : ISRAEL BENSON MD This examination was interpreted and the report reviewed and electronically signed by: ISRAEL BENSON MD on Mar  3 2025  2:49PM  EST     XR chest 1 view     Result Date: 3/2/2025  * * *Final Report* * * DATE OF EXAM: Mar  2 2025 11:25AM   MMX   5376  -  XR CHEST 1V FRONTAL PORT  / ACCESSION #  [id number] PROCEDURE REASON: Shortness of breath      * * * * Physician Interpretation * * * *  EXAMINATION:  CHEST RADIOGRAPH (PORTABLE SINGLE VIEW AP) Exam Date/Time:  3/2/2025 11:25 AM CLINICAL HISTORY: Shortness of breath MQ:  XCPR_5 Comparison:  02/20/2025. RESULT: This is a limited examination due to multiple wires and tubing obscuring the lower lungs. Lines, tubes, and devices:  A tracheostomy tube remains in place. Lungs and pleura:  There are increased bronchovascular markings in the right lower lung which may be due to bronchitis or early changes of aspiration pneumonia.  Clinical correlation and follow-up exams are recommended. Cardiomediastinal silhouette:  Stable cardiomediastinal silhouette. Other:  The visualized bony thorax appears unremarkable.     IMPRESSION: Nonspecific parenchymal changes in the right lower lung as described above. A follow-up exam is recommended. : [facility]   Transcribe Date/Time: Mar  2 2025 11:53A Dictated by : BJ INIGUEZ MD This examination was interpreted and the report reviewed and electronically signed by: BJ INIGUEZ MD on Mar  2 2025 11:54AM  EST     XR chest 1 view     Result Date: 2/28/2025  * * *Final Report* * * DATE OF EXAM: Feb 28 2025 10:36AM   MMX   5290  -  XR CHEST 1V FRONTAL   / ACCESSION #  [id number] PROCEDURE REASON: Shortness of breath      * * * * Physician Interpretation * * * *  EXAMINATION:  CHEST RADIOGRAPH (SINGLE VIEW AP OR PA) CLINICAL HISTORY: Shortness of breath MQ:  XC1_5 Comparison:  02/22/2025 RESULT: Lines, tubes, and devices:  Tracheostomy tube is noted. Lungs and pleura:   No consolidation. No lung mass. No pleural effusion. Cardiomediastinal silhouette:  Normal cardiomediastinal silhouette. Other:  No acute osseous pathology.     IMPRESSION: No acute radiographic abnormality. : [facility]   Transcribe Date/Time: Feb 28 2025 12:07P Dictated by : DIANNE RAMOS MD This examination was interpreted and the report reviewed and electronically signed by: DIANNE RAMOS MD on Feb 28 2025 12:08PM  EST     XR abdomen 2 views supine and erect or decub     Result Date: 2/24/2025  * * *Final Report* * * DATE OF EXAM: Feb 24 2025  6:03PM   MMX   5289  -  XR ABDOMEN 1V SUPINE  / ACCESSION #  [id number] PROCEDURE REASON: Evaluate tube, line or lead position      * * * * Physician Interpretation * * * *  EXAMINATION:  XR ABDOMEN 1V SUPINE CLINICAL HISTORY:   Evaluate tube, line or lead position Technique:   XR ABDOMEN 1V SUPINE -- NOT APPLICABLE with 1 views on 1 images Comparison: 2/21/2025 RESULT: Feeding tube with distal tip at the pylorus antrum. Moderate colonic stool burden. No dilated bowel. Lung structures are intact.     IMPRESSION: Feeding tube with distal tip at the pylorus antrum. Moderate colonic stool burden. No dilated bowel. : [facility]   Transcribe Date/Time: Feb 24 2025  6:23P Dictated by : PABLO RAMOS MD This examination was interpreted and the report reviewed and electronically signed by: PABLO RAMOS MD on Feb 24 2025  6:24PM  EST     CT head wo IV contrast     Result Date: 2/23/2025  * * *Final Report* * * DATE OF EXAM: Feb 23 2025 12:04PM   [facility]   0504  -  CT BRAIN WO IVCON  / ACCESSION #  [id number] PROCEDURE REASON: Stroke, follow up      * * * * Physician Interpretation * * * *  EXAMINATION: CT BRAIN WO IVCON CLINICAL HISTORY: Stroke, follow up TECHNIQUE:  Serial axial images without IV contrast were obtained from the vertex to the foramen magnum. MQ:  CTBWO_3 CT Radiation dose: Integrated Dose-Length Product (DLP) for this visit =   778  mGy*cm CT Dose  Reduction Employed: Iterative recon COMPARISON: MRI brain 02/19/2025 RESULT: Post-operative change: None. Acute change: Evolving acute infarct along the central eli and superior left medulla (2:7-9).  No evidence of hemorrhagic transformation. Hemorrhage: No evidence of acute intracranial hemorrhage. Mass Lesion / Mass Effect: There is no evidence of an intracranial mass or extraaxial fluid collection. No significant mass effect. Chronic change: Stable remote left MCA territory encephalomalacia. Parenchyma: There is no significant volume loss. The brain parenchyma is otherwise within normal limits for age. Ventricles: The ventricles are within normal limits of size and configuration for age. Paranasal sinuses and skull base: Partial opacification of the left maxillary and sphenoid sinus and multiple bilateral ethmoid air cells.   The remaining visualized paranasal sinuses are grossly clear. The skull base and imaged soft tissues are unremarkable. Localizer images: No additional findings.     IMPRESSION: Evolving acute brainstem infarct.  No evidence of hemorrhagic transformation. Stable remote left MCA territory infarct. : [facility]   Transcribe Date/Time: Feb 23 2025 12:48P Dictated by : FRANKY GUZMAN MD   This examination was interpreted and the report reviewed and electronically signed by: FRANKY GUZMAN MD on Feb 23 2025 12:52PM  EST     XR chest 1 view     Result Date: 2/22/2025  * * *Final Report* * * DATE OF EXAM: Feb 22 2025  6:56AM   MMX   [id number]  -  XR CHEST 1V FRONTAL   / ACCESSION #  [id number] PROCEDURE REASON: Evaluate tube, line,  or lead position      * * * * Physician Interpretation * * * *  EXAMINATION:  CHEST RADIOGRAPH (SINGLE VIEW AP OR PA) CLINICAL HISTORY: Evaluate tube, line,  or lead position MQ:  XC1_5 Comparison:  02/21/2025. RESULT: Lines, tubes, and devices:  An ET tube and feeding tube remain in place.   A poorly defined right venous catheter cannot be excluded. Lungs and pleura:   There are persistent bilateral lower lungs infiltrates seen on the right more than left suggestive of pneumonia such as aspiration pneumonia.  There is no definite pleural effusion. Cardiomediastinal silhouette:  Stable cardiomediastinal silhouette. Other:  The visualized bony thorax appears unremarkable.     IMPRESSION: Persistent bilateral lower lungs infiltrates as described above. A follow-up exam is recommended. : [facility]   Transcribe Date/Time: Feb 22 2025  8:20A Dictated by : BJ INIGUEZ MD This examination was interpreted and the report reviewed and electronically signed by: BJ INIGUEZ MD on Feb 22 2025  8:21AM  EST     XR chest 1 view     Result Date: 2/21/2025  * * *Final Report* * * DATE OF EXAM: Feb 21 2025  5:59PM   MM[facility]   [id number]  -  XR CHEST 1V FRONTAL PORT  / ACCESSION #  [id number] PROCEDURE REASON: Evaluate tube, line,  or lead position      * * * * Physician Interpretation * * * *  EXAMINATION:  CHEST RADIOGRAPH (PORTABLE SINGLE VIEW AP) Exam Date/Time:  2/21/2025 5:59 PM CLINICAL HISTORY: Evaluate tube, line,  or lead position MQ:  XCPR_5 Comparison:  02/21/2025 RESULT: Lines, tubes, and devices: Endotracheal tube in place terminating in enteric tube in place terminating below the field of view. Lungs and pleura: No pneumothorax.  No pleural effusion.  Bilateral mild interstitial opacities.  The right costophrenic angle is not included in the field-of-view. Cardiomediastinal silhouette:  Stable cardiomediastinal silhouette. Other:  .     IMPRESSION: Mild interstitial opacities may be related to pulmonary vascular congestion. [facility]   Transcribe Date/Time: Feb 21 2025  6:55P Dictated by : IRWIN VALDIVIA MD This examination was interpreted and the report reviewed and electronically signed by: IRWIN VALDIVIA MD on Feb 21 2025  6:57PM  EST     XR abdomen 2 views supine and erect or decub     Result Date: 2/21/2025  * * *Final Report* * * DATE OF  EXAM: Feb 21 2025 10:32AM   MMX   [id number]  -  XR ABDOMEN 1V SUPINE  / ACCESSION #  [id number] PROCEDURE REASON: Evaluate tube, line or lead position      * * * * Physician Interpretation * * * *  Clinical Information: Feeding tube Single view of the abdomen was obtained. Feeding tube tip within the distal stomach. There is a nonspecific, nonobstructive bowel gas pattern. No abnormal abdominal masses are identified.  No pathologic calcifications.   No acute bony abnormalities are seen.     IMPRESSION: Nonspecific, nonobstructive bowel gas pattern. Feeding tube tip within the distal stomach. : [facility]   Transcribe Date/Time: Feb 21 2025 10:43A Dictated by : PAMELA EVANS MD This examination was interpreted and the report reviewed and electronically signed by: PAMELA EVANS MD on Feb 21 2025 10:44AM  EST     XR chest 1 view     Result Date: 2/21/2025  * * *Final Report* * * DATE OF EXAM: Feb 21 2025 10:32AM   MMX   [id number]  -  XR CHEST 1V FRONTAL PORT  / ACCESSION #  [id number] PROCEDURE REASON: Shortness of breath      * * * * Physician Interpretation * * * *  EXAMINATION:  CHEST RADIOGRAPH (PORTABLE SINGLE VIEW AP) Exam Date/Time:  2/21/2025 10:32 AM Clinical History:  Shortness of breath Comparison:  02/17/2025 RESULT: LINES, TUBES, AND DEVICES:  Feeding tube tip beyond the inferior extent of the image. CARDIOMEDIASTINAL SILHOUETTE:  The cardiac and mediastinal silhouettes appear unremarkable. LUNGS AND PLEURA: No consolidation.  No pleural effusion. No pulmonary vascular congestion. No pneumothorax identified. OTHER:  N/A     IMPRESSION: No acute abnormality identified. 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  Daily H&H  Monitor for overt bleeding  Consult pulmonology, appreciate input  Pneumonia suspected for CT findings  Tracheostomy protocol  Expected cover with Zosyn and vancomycin  MRSA swab  Urine for Legionella antigen and strep pneumonia antigen  Supplemental oxygen as needed  Nebulizers as needed  RT evaluate and treat  Check procalcitonin  Typical 20% zinc oxide to wounds and groin  Continuous tube feeds, continue at 80 mL an hour x 22 hours  Accu-Chek every 6 hours  Hypoglycemia protocol  PT/OT  Anticoagulation and antiplatelet therapy on hold, resume once cleared by pulmonology  Continue patient's home medications for chronic issues as appropriate     Patient fully evaluated on March 23.  Continue to monitor H&H.  Pulmonary consultation obtained.  Continue broad-spectrum antibiotics to rule out infectious process.  Recheck labs in AM.                 [facility]  Patient fully evaluated  for 04/01  Patient with significant clinical improvement noted, patient medically cleared for discharge today. Patient seen resting in bed with head of bed elevated, no s/s or c/o acute difficulties at this time. Vital signs for last 24 hours:  Temp:  [35.4 °C (95.7 °F)-36.6 °C (97.9 °F)] 35.4 °C (95.7 °F)  Heart Rate:  [61-72] 67  Resp:  [18-20] 20  BP: (150-176)/(67-78) 152/67  FiO2 (%):  [28 %-35 %] 30 % Medications and labs reviewed-   Results for orders placed or performed during the hospital encounter of 03/22/25 (from the past 24 hours)   POCT GLUCOSE   Result Value Ref Range    POCT Glucose 260 (H) 74 - 99 mg/dL   POCT  GLUCOSE   Result Value Ref Range    POCT Glucose 309 (H) 74 - 99 mg/dL   POCT GLUCOSE   Result Value Ref Range    POCT Glucose 257 (H) 74 - 99 mg/dL   Comprehensive Metabolic Panel   Result Value Ref Range    Glucose 220 (H) 74 - 99 mg/dL    Sodium 146 (H) 136 - 145 mmol/L    Potassium 3.8 3.5 - 5.3 mmol/L    Chloride 111 (H) 98 - 107 mmol/L    Bicarbonate 30 21 - 32 mmol/L    Anion Gap 9 (L) 10 - 20 mmol/L    Urea Nitrogen 25 (H) 6 - 23 mg/dL    Creatinine 0.79 0.50 - 1.30 mg/dL    eGFR >90 >60 mL/min/1.73m*2    Calcium 8.8 8.6 - 10.3 mg/dL    Albumin 2.9 (L) 3.4 - 5.0 g/dL    Alkaline Phosphatase 227 (H) 33 - 136 U/L    Total Protein 5.9 (L) 6.4 - 8.2 g/dL    AST 26 9 - 39 U/L    Bilirubin, Total 0.4 0.0 - 1.2 mg/dL    ALT 74 (H) 10 - 52 U/L   CBC and Auto Differential   Result Value Ref Range    WBC 11.4 (H) 4.4 - 11.3 x10*3/uL    nRBC 9.2 (H) 0.0 - 0.0 /100 WBCs    RBC 3.17 (L) 4.50 - 5.90 x10*6/uL    Hemoglobin 8.6 (L) 13.5 - 17.5 g/dL    Hematocrit 32.3 (L) 41.0 - 52.0 %     (H) 80 - 100 fL    MCH 27.1 26.0 - 34.0 pg    MCHC 26.6 (L) 32.0 - 36.0 g/dL    RDW 22.3 (H) 11.5 - 14.5 %    Platelets 335 150 - 450 x10*3/uL    Neutrophils % 71.1 40.0 - 80.0 %    Immature Granulocytes %, Automated 5.8 (H) 0.0 - 0.9 %    Lymphocytes % 17.1 13.0 - 44.0 %    Monocytes % 3.9 2.0 - 10.0 %    Eosinophils % 1.3 0.0 - 6.0 %    Basophils % 0.8 0.0 - 2.0 %    Neutrophils Absolute 8.13 (H) 1.20 - 7.70 x10*3/uL    Immature Granulocytes Absolute, Automated 0.66 0.00 - 0.70 x10*3/uL    Lymphocytes Absolute 1.95 1.20 - 4.80 x10*3/uL    Monocytes Absolute 0.44 0.10 - 1.00 x10*3/uL    Eosinophils Absolute 0.15 0.00 - 0.70 x10*3/uL    Basophils Absolute 0.09 0.00 - 0.10 x10*3/uL   Morphology   Result Value Ref Range    RBC Morphology See Below     Polychromasia Mild     Hypochromia Mild     Basophilic Stippling Present    POCT GLUCOSE   Result Value Ref Range    POCT Glucose 204 (H) 74 - 99 mg/dL      Patient recently received an  antibiotic (last 12 hours)       Date/Time Action Medication Dose    04/01/25 0900 New Bag    piperacillin-tazobactam (Zosyn) 3.375 g in dextrose (iso) IV 50 mL 3.375 g    04/01/25 0206 New Bag    piperacillin-tazobactam (Zosyn) 3.375 g in dextrose (iso) IV 50 mL 3.375 g           No results found for the last 90 days.       Awaiting updated therapy notes to start precert process for patient to [facility]. Blood sugars elevated at times, will continue lantus at 50 units and sliding scale insulin,Patient medically cleared for discharge today, will continue aggressive pulmonary hygiene and oral hygiene. Off loading as tolerated for skin integrity. Plan discussed with interdisciplinary team, no acute events overnight, , patient denies chest pain, worsening SOB at this time. Ok to discharge, will continue current and repeat labs in the AM if patient still hospitalized. Patient aware and agreeable to current plan, continue plan as above.     I spent 30 minutes on the date of the service which included preparing to see the patient, face-to-face patient care, completing clinical documentation, obtaining and/or reviewing separately obtained history, performing a medically appropriate examination, counseling and educating the patient/family/caregiver, ordering medications, tests, or procedures, communicating with other HCPs (not separately reported), independently interpreting results (not separately reported), communicating results to the patient/family/caregiver, and care coordination (not separately reported    Patient fully evaluated  04/02  for   Assessment & Plan  Hemoptysis    Tracheostomy in place (Multi)    CVA, old, hemiparesis (Multi)    Status post insertion of percutaneous endoscopic gastrostomy (PEG) tube (Multi)    Pneumonia due to infectious organism    Right sided weakness    Atrial flutter (Multi)    Insulin dependent type 2 diabetes mellitus (Multi)        Zeus Bone MD  Physician  Internal  Medicine     H&P      Signed     Date of Service: 3/23/2025 10:28 AM     Signed       Expand All Collapse All    History Of Present Illness  Bryan Nix is a 64-year-old male with past medical history of atrial flutter on Eliquis, hyponatremia, IDDM, CAD, hypertension, chronic wounds, diffuse lymphadenopathy, anemia, history of smoking, history elevated PSA, CVA L hemisphere (2020) with right-sided deficits, Left ICA stenosis (85%) s/p angioplasty and left carotid artery stent (08/25/2023) and recent history of acute pontine stroke 02/27/25 with acute hypoxic respiratory failure s/p tracheostomy on 2/25/2025 and PEG.  Patient presents for bleeding from tracheostomy.  Patient is alert and oriented x 3, however limited verbal communication due to tracheostomy.  Wife is at bedside and assists with history.  She reports that patient was coughing blood out of his tracheostomy.  ER provider felt bleeding was likely superficial around site.  Patient has been on room air at skilled nursing facility.  He was placed on Airvo in the ED mainly to humidify oxygen.  Patient never desaturated per ER report.  Currently he is resting comfortably in the bed, breath sounds slightly rhonchorous, but unlabored.  Reports chills, but no fevers.  He has right-sided weakness and sensory deficits.  Denies headache, vision or hearing changes, chest pains, palpitations, shortness of breath, nausea, vomiting, abdominal pain, diarrhea, or complications of PEG tube site.  Wife reports patient skin became irritated in his groin due to adhesive from a condom cath previously, he has a small decubitus wound that she reports is healing well.  He receives tube feeds continuously.  Wife reports a patient was recently evaluated by speech for p.o. to intake, however he still remains n.p.o. except for meds and tube feeds through PEG. CODE STATUS reviewed: full code     ER Course: Hemodynamically stable, afebrile, SpO2 to 97% trach mask.  WBC 9.3,  hemoglobin 8, hematocrit 29.4, platelets 197.  INR 1.4.  Glucose 223, BUN 36, otherwise CMP is normal.  CT of the chest for PE pending.  CXR unremarkable. Trannexamic acid soak gauze wrapped around trach.      Past medical history: As above  Past surgical history: As above  Social history: Former smoker 37.5 pack years-quit smoking age 63, occasional alcohol use, no illicit drug use.  .  Works in maintenance.  6 children.  Family history: Noncontributory     Past Medical History  Medical History        Past Medical History:   Diagnosis Date    Abnormal finding of blood chemistry, unspecified 05/18/2016     Abnormal blood chemistry    Acute upper respiratory infection, unspecified 12/02/2015     Acute upper respiratory infection    Elevated prostate specific antigen (PSA)       Elevated prostate specific antigen (PSA)    Encounter for screening for malignant neoplasm of colon 01/30/2021     Colon cancer screening    Encounter for screening for malignant neoplasm of prostate 11/30/2020     Prostate cancer screening    Essential (primary) hypertension 07/30/2013     Benign essential hypertension    Other conditions influencing health status 07/30/2013     Diabetes Mellitus Poorly Controlled    Other conditions influencing health status 12/02/2015     History of cough    Personal history of other endocrine, nutritional and metabolic disease 03/18/2021     History of hyperlipidemia    Personal history of other endocrine, nutritional and metabolic disease 03/18/2021     History of diabetes mellitus    Personal history of other specified conditions 05/18/2016     History of chest pain    Personal history of other specified conditions 05/18/2016     History of dyspnea    Personal history of other specified conditions 05/18/2016     History of dizziness    Personal history of other specified conditions 05/18/2016     History of fatigue            Surgical History  Surgical History         Past Surgical History:    Procedure Laterality Date    CT ANGIO CORONARY ART WITH HEARTFLOW IF SCORE >30%   8/23/2023     CT HEART CORONARY ANGIOGRAM 8/23/2023 [facility] CT    MR HEAD ANGIO WO IV CONTRAST   12/14/2020     MR HEAD ANGIO WO IV CONTRAST 12/14/2020 [facility]    MR NECK ANGIO WO IV CONTRAST   12/14/2020     MR NECK ANGIO WO IV CONTRAST 12/14/2020 [facility]    OTHER SURGICAL HISTORY   12/01/2020     Hand fracture repair    OTHER SURGICAL HISTORY   12/01/2020     Nasal bone fracture repair            Social History  He reports that he has quit smoking. His smoking use included cigarettes. He has never used smokeless tobacco. No history on file for alcohol use and drug use.     Family History  Family History   No family history on file.        Allergies  Patient has no known allergies.     Review of Systems     Physical Exam  Constitutional:       General: He is awake. He is not in acute distress.     Appearance: Normal appearance. He is not toxic-appearing.   HENT:      Head: Atraumatic.      Nose: Nose normal.      Mouth/Throat:      Mouth: Mucous membranes are moist.   Eyes:      Conjunctiva/sclera: Conjunctivae normal.   Cardiovascular:      Rate and Rhythm: Normal rate and regular rhythm.      Pulses: Normal pulses.      Heart sounds: No murmur heard.  Pulmonary:      Effort: No respiratory distress.      Comments: Tracheostomy site midline, no drainage around dressing.  Rhonchorous breath sounds, unlabored respirations, RT bedside suctioning patient with thick blood-tinged sputum  Abdominal:      General: Bowel sounds are normal. There is no distension.      Palpations: Abdomen is soft.      Tenderness: There is no abdominal tenderness. There is no guarding.      Comments: Left upper quadrant PEG site tube site, no drainage or skin breakdown around tube site.   Musculoskeletal:         General: No swelling, deformity or signs of injury.      Cervical back: Neck supple.      Right lower leg: No edema.      " Left lower leg: No edema.   Skin:     General: Skin is warm and dry.      Capillary Refill: Capillary refill takes less than 2 seconds.      Findings: Wound (sacrum) present. No ecchymosis or erythema.   Neurological:      Mental Status: He is alert and oriented to person, place, and time.      Cranial Nerves: No facial asymmetry.      Sensory: Sensory deficit (RUE, RLE) present.      Motor: Weakness present.      Comments: Right sided upper and lower extremity motor function deficits   Psychiatric:         Mood and Affect: Mood normal.         Behavior: Behavior is cooperative.            Last Recorded Vitals  Blood pressure 134/50, pulse 93, temperature 36.9 °C (98.4 °F), temperature source Temporal, resp. rate 22, height 1.88 m (6' 2\"), weight 77.1 kg (170 lb), SpO2 98%.     Relevant Results              Results for orders placed or performed during the hospital encounter of 03/22/25 (from the past 24 hours)   CBC and Auto Differential   Result Value Ref Range     WBC 9.3 4.4 - 11.3 x10*3/uL     nRBC 0.3 (H) 0.0 - 0.0 /100 WBCs     RBC 3.02 (L) 4.50 - 5.90 x10*6/uL     Hemoglobin 8.0 (L) 13.5 - 17.5 g/dL     Hematocrit 29.4 (L) 41.0 - 52.0 %     MCV 97 80 - 100 fL     MCH 26.5 26.0 - 34.0 pg     MCHC 27.2 (L) 32.0 - 36.0 g/dL     RDW 15.6 (H) 11.5 - 14.5 %     Platelets 197 150 - 450 x10*3/uL     Neutrophils % 77.1 40.0 - 80.0 %     Immature Granulocytes %, Automated 1.5 (H) 0.0 - 0.9 %     Lymphocytes % 13.1 13.0 - 44.0 %     Monocytes % 4.1 2.0 - 10.0 %     Eosinophils % 3.2 0.0 - 6.0 %     Basophils % 1.0 0.0 - 2.0 %     Neutrophils Absolute 7.14 1.20 - 7.70 x10*3/uL     Immature Granulocytes Absolute, Automated 0.14 0.00 - 0.70 x10*3/uL     Lymphocytes Absolute 1.21 1.20 - 4.80 x10*3/uL     Monocytes Absolute 0.38 0.10 - 1.00 x10*3/uL     Eosinophils Absolute 0.30 0.00 - 0.70 x10*3/uL     Basophils Absolute 0.09 0.00 - 0.10 x10*3/uL   Basic metabolic panel   Result Value Ref Range     Glucose 223 (H) 74 - 99 " mg/dL     Sodium 142 136 - 145 mmol/L     Potassium 4.3 3.5 - 5.3 mmol/L     Chloride 103 98 - 107 mmol/L     Bicarbonate 27 21 - 32 mmol/L     Anion Gap 16 10 - 20 mmol/L     Urea Nitrogen 36 (H) 6 - 23 mg/dL     Creatinine 0.81 0.50 - 1.30 mg/dL     eGFR >90 >60 mL/min/1.73m*2     Calcium 9.6 8.6 - 10.3 mg/dL   Protime-INR   Result Value Ref Range     Protime 15.1 (H) 9.8 - 12.4 seconds     INR 1.4 (H) 0.9 - 1.1   aPTT   Result Value Ref Range     aPTT 25 (L) 26 - 36 seconds   Type and Screen   Result Value Ref Range     ABO TYPE B       Rh TYPE POS       ANTIBODY SCREEN NEG     VERIFY ABO/Rh Group Test   Result Value Ref Range     ABO TYPE B       Rh TYPE POS     MRSA Surveillance for Vancomycin De-escalation, PCR     Specimen: Anterior Nares; Swab   Result Value Ref Range     MRSA PCR Not Detected Not Detected   POCT GLUCOSE   Result Value Ref Range     POCT Glucose 180 (H) 74 - 99 mg/dL   POCT GLUCOSE   Result Value Ref Range     POCT Glucose 161 (H) 74 - 99 mg/dL   POCT GLUCOSE   Result Value Ref Range     POCT Glucose 156 (H) 74 - 99 mg/dL   Basic metabolic panel   Result Value Ref Range     Glucose 170 (H) 74 - 99 mg/dL     Sodium 147 (H) 136 - 145 mmol/L     Potassium 4.2 3.5 - 5.3 mmol/L     Chloride 107 98 - 107 mmol/L     Bicarbonate 30 21 - 32 mmol/L     Anion Gap 14 10 - 20 mmol/L     Urea Nitrogen 35 (H) 6 - 23 mg/dL     Creatinine 0.85 0.50 - 1.30 mg/dL     eGFR >90 >60 mL/min/1.73m*2     Calcium 9.4 8.6 - 10.3 mg/dL   CBC   Result Value Ref Range     WBC 10.4 4.4 - 11.3 x10*3/uL     nRBC 0.6 (H) 0.0 - 0.0 /100 WBCs     RBC 2.87 (L) 4.50 - 5.90 x10*6/uL     Hemoglobin 7.8 (L) 13.5 - 17.5 g/dL     Hematocrit 26.6 (L) 41.0 - 52.0 %     MCV 93 80 - 100 fL     MCH 27.2 26.0 - 34.0 pg     MCHC 29.3 (L) 32.0 - 36.0 g/dL     RDW 15.7 (H) 11.5 - 14.5 %     Platelets 205 150 - 450 x10*3/uL   SST TOP   Result Value Ref Range     Extra Tube Hold for add-ons.        CT angio chest for pulmonary embolism      Result Date: 3/22/2025  Interpreted By:  Pamela Zurita, STUDY: CT ANGIO CHEST FOR PULMONARY EMBOLISM;  3/22/2025 4:56 pm   INDICATION: Signs/Symptoms:bloody secretions, eval for PE.     COMPARISON: CT chest without contrast 03/22/2025   ACCESSION NUMBER(S): [id number]   ORDERING CLINICIAN: PAMELA HAHN   TECHNIQUE: Contiguous axial images of the chest were obtained after the intravenous administration of iodinated contrast using angiographic PE protocol. Coronal and sagittal reformatted images were reconstructed from the axial data. MIP images were created on an independent workstation and reviewed.   FINDINGS:   MEDIASTINUM AND LYMPH NODES: Inflammatory fat stranding along the tracheostomy tract without discrete fluid collection. The esophageal wall appears within normal limits. There is redemonstration of diffuse lymphadenopathy in the bilateral axilla, mediastinum, bilateral supraclavicular as described in detail on separate report. Also notable or enlarged bilateral level 4 cervical lymph nodes (right > left).   VESSELS:  Normal caliber thoracic aorta without dissection. Moderate aortic atherosclerosis.  No acute pulmonary embolism.   HEART: Normal size.  Mild coronary artery calcifications. No significant pericardial effusion.   LUNG, AIRWAYS, PLEURA: There is slight increase in debris within the trachea. There has been clearing of secretions in the left upper/lower lobe bronchus. There is diffuse bilateral bronchial thickening slightly increased from prior study. There is peribronchovascular ground-glass opacity in the posterior right upper lobe and superior and basal segments of the right lower lobe. There is slightly worsened atelectasis in the lung bases remaining predominantly subsegmental in nature. There is a 0.8 cm nodule in the left upper lobe that is stable from 08/19/2023.   OSSEOUS STRUCTURES: No acute osseous abnormality.   CHEST WALL SOFT TISSUES: No discernible acute abnormality.   UPPER  ABDOMEN/OTHER: No acute abnormality.        No acute pulmonary embolism.   Peribronchial vascular ground glass opacities in the right upper and lower lobe again concerning for pneumonia given patient's risk factors of tracheostomy and debris in the airways.   Redemonstration of diffuse lymphadenopathy in the lower necks, axilla, and mediastinum. Findings are either diffusely reactive in nature, reflective of lymphoma/leukemia, meter deficiency, or less likely head neck neoplasm is previously postulated. However please correlate with past medical history.   Inflammatory changes along the tracheostomy tract without fluid collection. Correlate for erythema.   Unchanged 0.8 cm nodule in the left upper lobe dating back to 08/19/2023. Recommend 1 year follow up chest CT to ensure at least 2 years of stability.   MACRO: None.   Signed by: Pamela Zurita 3/22/2025 7:17 PM Dictation workstation:   [id number]     CT chest wo IV contrast     Result Date: 3/22/2025  Interpreted By:  Pamela Zurita, STUDY: CT CHEST WO IV CONTRAST;  3/22/2025 3:41 pm   INDICATION: Signs/Symptoms:eval for alveolar hemorrhage.     COMPARISON: CT head/neck 08/19/2023   ACCESSION NUMBER(S): [id number]   ORDERING CLINICIAN: PAMELA HAHN   TECHNIQUE: Contiguous axial images of the chest and upper abdomen were obtained without contrast. Coronal and sagittal reformatted images were reconstructed from the axial data.   FINDINGS:   MEDIASTINUM AND LYMPH NODES: There is fat stranding/edema along the tract of the tracheostomy about discrete fluid collection. The esophageal wall appears within normal limits. There are numerous enlarged bilateral supraclavicular lymph nodes measuring up to 1.4 cm on the left. There are numerous enlarged bilateral axillary lymph nodes measuring up to 1.1 cm on the right and 1.2 cm on the left. There are numerous prominent to mildly enlarged mediastinal lymph nodes as well. No pneumomediastinum.   VESSELS:  Normal  caliber thoracic aorta. Moderate aortic atherosclerosis. The main pulmonary artery is normal in size.   HEART: Normal size.  Mild coronary artery calcifications. No significant pericardial effusion.   LUNG, AIRWAYS, PLEURA: Tracheostomy noted in appropriate position. There is trace mucus in the left mainstem bronchus and at the origin of the left upper lobe and lower lobe bronchi. There are mild peribronchovascular ground-glass opacities in the posterior segment of the right upper lobe, basal segments of the right lower lobe. There is mild dependent bibasilar atelectasis. There is no pleural effusion.   OSSEOUS STRUCTURES: No acute osseous abnormality.   CHEST WALL SOFT TISSUES: No discernible acute abnormality.   UPPER ABDOMEN/OTHER: No acute abnormality.        1. Mild peribronchovascular ground-glass opacities in the posterior right upper lobe and basal segments of the right lower lobe. This appearance and distribution is not typical for alveolar hemorrhage which tends to be centrilobular and bilateral asymmetric in distribution. Findings are most suggestive of pneumonia.   2. Small amount of mucous debris in the left mainstem bronchus and at the origin of the left upper and lower lobe bronchi.   3. Diffuse lymphadenopathy in the bilateral axillary regions, mediastinum and left supraclavicular region particularly notable in the bilateral supraclavicular region. This is progressed in the left supraclavicular region and new elsewhere when compared to 08/19/2023 CTA head/neck. Correlate for any history of known head/neck malignancy or lymphoma.   4. Inflammation/fat stranding adjacent to the tracheostomy tube entry site noted. Correlate for air the met. No evidence of fluid collection.   MACRO: None.   Signed by: Marco A Zurita 3/22/2025 7:10 PM Dictation workstation:   [id number]     XR chest 1 view     Result Date: 3/22/2025  Interpreted By:  Fermin Stroud, STUDY: XR CHEST 1 VIEW   INDICATION:  Signs/Symptoms:hemoptysis.   COMPARISON: August 19, 2023   ACCESSION NUMBER(S): [id number]   ORDERING CLINICIAN: PAMELA HAHN   FINDINGS: No consolidation, effusion, edema, or pneumothorax. Heart size within normal limits.        No evidence of acute intrathoracic abnormality.   Signed by: Fermin Stroud 3/22/2025 2:01 PM Dictation workstation:   [id number]     XR chest 1 view     Result Date: 3/14/2025  * * *Final Report* * * DATE OF EXAM: Mar 14 2025  8:52AM   S5X   5290  -  XR CHEST 1V FRONTAL   / ACCESSION #  [id number] PROCEDURE REASON: new bleeding from trach      * * * * Physician Interpretation * * * *  EXAMINATION:  CHEST RADIOGRAPH (PORTABLE SINGLE VIEW AP) Exam Date/Time:  3/14/2025 8:52 AM Clinical History:  new bleeding from trach Comparison:  03/03/2025 RESULT: LINES, TUBES, AND DEVICES:  Tracheostomy. CARDIOMEDIASTINAL SILHOUETTE:  The cardiac and mediastinal silhouettes appear unremarkable. LUNGS AND PLEURA: No consolidation.  No pleural effusion. No pulmonary vascular congestion. No pneumothorax identified. OTHER:  N/A     IMPRESSION: No acute abnormality identified. : [facility]   Transcribe Date/Time: Mar 14 2025  9:07A Dictated by : PAMELA EVANS MD This examination was interpreted and the report reviewed and electronically signed by: PAMELA EVANS MD on Mar 14 2025  9:11AM  EST     US abdomen complete     Result Date: 3/11/2025  ABDOMEN ULTRASOUND-COMPLETE FINDINGS: Abdomen Ultrasound Complete: PROCEDURE: Multiple grayscale mages of the abdomen were obtained. FINDINGS:Multiple real time images demonstrate the liver with increased echogenicity consistent with fatty infiltration. There is no evidence of a discrete mass within the liver. The liver is enlarged, measured at 15.7 cm in size. The gallbladder is seen with no evidence of cholelithiasis. The gallbladder wall is within normal limits. The common bile duct is within normal limits. The visualized pancreas appears unremarkable.  Both kidneys are seen with no evidence of hydronephrosis or a calculus. The spleen has its normal homogeneous echo appearance. There is no free fluid to suggest ascites. The visualized aorta appears unremarkable. The inferior vena cava appears patent. CONCLUSION: Enlarged fatty liver. ELECTRONICALLY SIGNED BY LEONEL LAN M.D. 3/11/2025 5:00:52 PM EDT.     XR chest 1 view     Result Date: 3/3/2025  * * *Final Report* * * DATE OF EXAM: Mar  3 2025  1:40PM   S5X   5290  -  XR CHEST 1V FRONTAL   / ACCESSION #  [id number] PROCEDURE REASON: resp failure      * * * * Physician Interpretation * * * *  EXAMINATION:  CHEST RADIOGRAPH (PORTABLE SINGLE VIEW AP) Exam Date/Time:  3/3/2025 1:40 PM Indication:   resp failure M:  XCP_4 Comparison:  1 day prior RESULT: Lines, tubes, and devices:  Tracheostomy tube remains in situ. Lungs and pleura:  Mild patchy opacities right lower lung zone seen on the prior study are no longer appreciated.  No confluent opacity.   Costophrenic angles are clear.  No pneumothorax. Cardiomediastinal silhouette:  Stable cardiomediastinal silhouette. Other:  -     IMPRESSION: Improved aeration right lower lung zone.  No acute cardiopulmonary finding. : [facility]   Transcribe Date/Time: Mar  3 2025  2:49P Dictated by : ISRAEL BENSON MD This examination was interpreted and the report reviewed and electronically signed by: ISRAEL BENSON MD on Mar  3 2025  2:49PM  EST     XR chest 1 view     Result Date: 3/2/2025  * * *Final Report* * * DATE OF EXAM: Mar  2 2025 11:25AM   MMX   5376  -  XR CHEST 1V FRONTAL PORT  / ACCESSION #  [id number] PROCEDURE REASON: Shortness of breath      * * * * Physician Interpretation * * * *  EXAMINATION:  CHEST RADIOGRAPH (PORTABLE SINGLE VIEW AP) Exam Date/Time:  3/2/2025 11:25 AM CLINICAL HISTORY: Shortness of breath MQ:  XCPR_5 Comparison:  02/20/2025. RESULT: This is a limited examination due to multiple wires and tubing obscuring the lower lungs.  Lines, tubes, and devices:  A tracheostomy tube remains in place. Lungs and pleura:  There are increased bronchovascular markings in the right lower lung which may be due to bronchitis or early changes of aspiration pneumonia.  Clinical correlation and follow-up exams are recommended. Cardiomediastinal silhouette:  Stable cardiomediastinal silhouette. Other:  The visualized bony thorax appears unremarkable.     IMPRESSION: Nonspecific parenchymal changes in the right lower lung as described above. A follow-up exam is recommended. : [facility]   Transcribe Date/Time: Mar  2 2025 11:53A Dictated by : BJ INIGUEZ MD This examination was interpreted and the report reviewed and electronically signed by: BJ INIGUZE MD on Mar  2 2025 11:54AM  EST     XR chest 1 view     Result Date: 2/28/2025  * * *Final Report* * * DATE OF EXAM: Feb 28 2025 10:36AM   MMX   5290  -  XR CHEST 1V FRONTAL   / ACCESSION #  [id number] PROCEDURE REASON: Shortness of breath      * * * * Physician Interpretation * * * *  EXAMINATION:  CHEST RADIOGRAPH (SINGLE VIEW AP OR PA) CLINICAL HISTORY: Shortness of breath MQ:  XC1_5 Comparison:  02/22/2025 RESULT: Lines, tubes, and devices:  Tracheostomy tube is noted. Lungs and pleura:  No consolidation. No lung mass. No pleural effusion. Cardiomediastinal silhouette:  Normal cardiomediastinal silhouette. Other:  No acute osseous pathology.     IMPRESSION: No acute radiographic abnormality. : [facility]   Transcribe Date/Time: Feb 28 2025 12:07P Dictated by : DIANNE RAMOS MD This examination was interpreted and the report reviewed and electronically signed by: DIANNE RAMOS MD on Feb 28 2025 12:08PM  EST     XR abdomen 2 views supine and erect or decub     Result Date: 2/24/2025  * * *Final Report* * * DATE OF EXAM: Feb 24 2025  6:03PM   MMX   5289  -  XR ABDOMEN 1V SUPINE  / ACCESSION #  [id number] PROCEDURE REASON: Evaluate tube, line or lead position      * * * * Physician  Interpretation * * * *  EXAMINATION:  XR ABDOMEN 1V SUPINE CLINICAL HISTORY:   Evaluate tube, line or lead position Technique:   XR ABDOMEN 1V SUPINE -- NOT APPLICABLE with 1 views on 1 images Comparison: 2/21/2025 RESULT: Feeding tube with distal tip at the pylorus antrum. Moderate colonic stool burden. No dilated bowel. Lung structures are intact.     IMPRESSION: Feeding tube with distal tip at the pylorus antrum. Moderate colonic stool burden. No dilated bowel. : [facility]   Transcribe Date/Time: Feb 24 2025  6:23P Dictated by : PABLO RAMOS MD This examination was interpreted and the report reviewed and electronically signed by: PABLO RAMOS MD on Feb 24 2025  6:24PM  EST     CT head wo IV contrast     Result Date: 2/23/2025  * * *Final Report* * * DATE OF EXAM: Feb 23 2025 12:04PM   [facility]   0504  -  CT BRAIN WO IVCON  / ACCESSION #  [id number] PROCEDURE REASON: Stroke, follow up      * * * * Physician Interpretation * * * *  EXAMINATION: CT BRAIN WO IVCON CLINICAL HISTORY: Stroke, follow up TECHNIQUE:  Serial axial images without IV contrast were obtained from the vertex to the foramen magnum. MQ:  CTBWO_3 CT Radiation dose: Integrated Dose-Length Product (DLP) for this visit =   778  mGy*cm CT Dose Reduction Employed: Iterative recon COMPARISON: MRI brain 02/19/2025 RESULT: Post-operative change: None. Acute change: Evolving acute infarct along the central eli and superior left medulla (2:7-9).  No evidence of hemorrhagic transformation. Hemorrhage: No evidence of acute intracranial hemorrhage. Mass Lesion / Mass Effect: There is no evidence of an intracranial mass or extraaxial fluid collection. No significant mass effect. Chronic change: Stable remote left MCA territory encephalomalacia. Parenchyma: There is no significant volume loss. The brain parenchyma is otherwise within normal limits for age. Ventricles: The ventricles are within normal limits of size and configuration for age. Paranasal  sinuses and skull base: Partial opacification of the left maxillary and sphenoid sinus and multiple bilateral ethmoid air cells.   The remaining visualized paranasal sinuses are grossly clear. The skull base and imaged soft tissues are unremarkable. Localizer images: No additional findings.     IMPRESSION: Evolving acute brainstem infarct.  No evidence of hemorrhagic transformation. Stable remote left MCA territory infarct. : [facility]   Transcribe Date/Time: Feb 23 2025 12:48P Dictated by : FRANKY GUZMAN MD   This examination was interpreted and the report reviewed and electronically signed by: FRANKY GUZMAN MD on Feb 23 2025 12:52PM  EST     XR chest 1 view     Result Date: 2/22/2025  * * *Final Report* * * DATE OF EXAM: Feb 22 2025  6:56AM   MMX   5290  -  XR CHEST 1V FRONTAL   / ACCESSION #  [id number] PROCEDURE REASON: Evaluate tube, line,  or lead position      * * * * Physician Interpretation * * * *  EXAMINATION:  CHEST RADIOGRAPH (SINGLE VIEW AP OR PA) CLINICAL HISTORY: Evaluate tube, line,  or lead position MQ:  XC1_5 Comparison:  02/21/2025. RESULT: Lines, tubes, and devices:  An ET tube and feeding tube remain in place.   A poorly defined right venous catheter cannot be excluded. Lungs and pleura:  There are persistent bilateral lower lungs infiltrates seen on the right more than left suggestive of pneumonia such as aspiration pneumonia.  There is no definite pleural effusion. Cardiomediastinal silhouette:  Stable cardiomediastinal silhouette. Other:  The visualized bony thorax appears unremarkable.     IMPRESSION: Persistent bilateral lower lungs infiltrates as described above. A follow-up exam is recommended. : [facility]   Transcribe Date/Time: Feb 22 2025  8:20A Dictated by : BJ INIGUEZ MD This examination was interpreted and the report reviewed and electronically signed by: BJ INIGUEZ MD on Feb 22 2025  8:21AM  EST     XR chest 1 view     Result Date: 2/21/2025  * * *Final  Report* * * DATE OF EXAM: Feb 21 2025  5:59PM   MMX   5376  -  XR CHEST 1V FRONTAL PORT  / ACCESSION #  [id number] PROCEDURE REASON: Evaluate tube, line,  or lead position      * * * * Physician Interpretation * * * *  EXAMINATION:  CHEST RADIOGRAPH (PORTABLE SINGLE VIEW AP) Exam Date/Time:  2/21/2025 5:59 PM CLINICAL HISTORY: Evaluate tube, line,  or lead position MQ:  XCPR_5 Comparison:  02/21/2025 RESULT: Lines, tubes, and devices: Endotracheal tube in place terminating in enteric tube in place terminating below the field of view. Lungs and pleura: No pneumothorax.  No pleural effusion.  Bilateral mild interstitial opacities.  The right costophrenic angle is not included in the field-of-view. Cardiomediastinal silhouette:  Stable cardiomediastinal silhouette. Other:  .     IMPRESSION: Mild interstitial opacities may be related to pulmonary vascular congestion. : [facility]   Transcribe Date/Time: Feb 21 2025  6:55P Dictated by : IRWIN VALDIVIA MD This examination was interpreted and the report reviewed and electronically signed by: IRWIN VALDIVIA MD on Feb 21 2025  6:57PM  EST     XR abdomen 2 views supine and erect or decub     Result Date: 2/21/2025  * * *Final Report* * * DATE OF EXAM: Feb 21 2025 10:32AM   MMX   5289  -  XR ABDOMEN 1V SUPINE  / ACCESSION #  [id number] PROCEDURE REASON: Evaluate tube, line or lead position      * * * * Physician Interpretation * * * *  Clinical Information: Feeding tube Single view of the abdomen was obtained. Feeding tube tip within the distal stomach. There is a nonspecific, nonobstructive bowel gas pattern. No abnormal abdominal masses are identified.  No pathologic calcifications.   No acute bony abnormalities are seen.     IMPRESSION: Nonspecific, nonobstructive bowel gas pattern. Feeding tube tip within the distal stomach. : [facility]   Transcribe Date/Time: Feb 21 2025 10:43A Dictated by : PAMELA EVANS MD This examination was  interpreted and the report reviewed and electronically signed by: PAMELA EVANS MD on Feb 21 2025 10:44AM  EST     XR chest 1 view     Result Date: 2/21/2025  * * *Final Report* * * DATE OF EXAM: Feb 21 2025 10:32AM   MMX   5376  -  XR CHEST 1V FRONTAL PORT  / ACCESSION #  [id number] PROCEDURE REASON: Shortness of breath      * * * * Physician Interpretation * * * *  EXAMINATION:  CHEST RADIOGRAPH (PORTABLE SINGLE VIEW AP) Exam Date/Time:  2/21/2025 10:32 AM Clinical History:  Shortness of breath Comparison:  02/17/2025 RESULT: LINES, TUBES, AND DEVICES:  Feeding tube tip beyond the inferior extent of the image. CARDIOMEDIASTINAL SILHOUETTE:  The cardiac and mediastinal silhouettes appear unremarkable. LUNGS AND PLEURA: No consolidation.  No pleural effusion. No pulmonary vascular congestion. No pneumothorax identified. OTHER:  N/A     IMPRESSION: No acute abnormality identified. : [facility]   Transcribe Date/Time: Feb 21 2025 10:41A Dictated by : PAMELA EVANS MD This examination was interpreted and the report reviewed and electronically signed by: PAMELA EVANS MD on Feb 21 2025 10:43AM  EST            Assessment/Plan     Assessment & Plan  Hemoptysis     Tracheostomy in place (Multi)     CVA, old, hemiparesis (Multi)     Status post insertion of percutaneous endoscopic gastrostomy (PEG) tube (Multi)     Pneumonia due to infectious organism     Right sided weakness     Atrial flutter (Multi)     Insulin dependent type 2 diabetes mellitus (Multi)        Telemetry monitoring  Hemoglobin stable 8's recently on review of outside labs, monitor for ongoing bleeding.  Daily H&H  Monitor for overt bleeding  Consult pulmonology, appreciate input  Pneumonia suspected for CT findings  Tracheostomy protocol  Expected cover with Zosyn and vancomycin  MRSA swab  Urine for Legionella antigen and strep pneumonia antigen  Supplemental oxygen as needed  Nebulizers as needed  RT evaluate and treat  Check  procalcitonin  Typical 20% zinc oxide to wounds and groin  Continuous tube feeds, continue at 80 mL an hour x 22 hours  Accu-Chek every 6 hours  Hypoglycemia protocol  PT/OT  Anticoagulation and antiplatelet therapy on hold, resume once cleared by pulmonology  Continue patient's home medications for chronic issues as appropriate     Patient fully evaluated on March 23.  Continue to monitor H&H.  Pulmonary consultation obtained.  Continue broad-spectrum antibiotics to rule out infectious process.  Recheck labs in AM.                 Zeus Bone MD                    [facility]      Patient with significant clinical improvement noted, patient medically cleared for discharge today. Patient seen resting in bed with head of bed elevated, no s/s or c/o acute difficulties at this time. Vital signs for last 24 hours:  Temp:  [35.4 °C (95.7 °F)-36.6 °C (97.9 °F)] 35.4 °C (95.7 °F)  Heart Rate:  [61-72] 67  Resp:  [18-20] 20  BP: (150-176)/(67-78) 152/67  FiO2 (%):  [28 %-35 %] 30 % Medications and labs reviewed-   Results for orders placed or performed during the hospital encounter of 03/22/25 (from the past 24 hours)   POCT GLUCOSE   Result Value Ref Range    POCT Glucose 260 (H) 74 - 99 mg/dL   POCT GLUCOSE   Result Value Ref Range    POCT Glucose 309 (H) 74 - 99 mg/dL   POCT GLUCOSE   Result Value Ref Range    POCT Glucose 257 (H) 74 - 99 mg/dL   Comprehensive Metabolic Panel   Result Value Ref Range    Glucose 220 (H) 74 - 99 mg/dL    Sodium 146 (H) 136 - 145 mmol/L    Potassium 3.8 3.5 - 5.3 mmol/L    Chloride 111 (H) 98 - 107 mmol/L    Bicarbonate 30 21 - 32 mmol/L    Anion Gap 9 (L) 10 - 20 mmol/L    Urea Nitrogen 25 (H) 6 - 23 mg/dL    Creatinine 0.79 0.50 - 1.30 mg/dL    eGFR >90 >60 mL/min/1.73m*2    Calcium 8.8 8.6 - 10.3 mg/dL    Albumin 2.9 (L) 3.4 - 5.0 g/dL    Alkaline Phosphatase 227 (H) 33 - 136 U/L    Total Protein 5.9 (L) 6.4 - 8.2 g/dL    AST 26 9 - 39 U/L    Bilirubin, Total 0.4 0.0 - 1.2 mg/dL    ALT  74 (H) 10 - 52 U/L   CBC and Auto Differential   Result Value Ref Range    WBC 11.4 (H) 4.4 - 11.3 x10*3/uL    nRBC 9.2 (H) 0.0 - 0.0 /100 WBCs    RBC 3.17 (L) 4.50 - 5.90 x10*6/uL    Hemoglobin 8.6 (L) 13.5 - 17.5 g/dL    Hematocrit 32.3 (L) 41.0 - 52.0 %     (H) 80 - 100 fL    MCH 27.1 26.0 - 34.0 pg    MCHC 26.6 (L) 32.0 - 36.0 g/dL    RDW 22.3 (H) 11.5 - 14.5 %    Platelets 335 150 - 450 x10*3/uL    Neutrophils % 71.1 40.0 - 80.0 %    Immature Granulocytes %, Automated 5.8 (H) 0.0 - 0.9 %    Lymphocytes % 17.1 13.0 - 44.0 %    Monocytes % 3.9 2.0 - 10.0 %    Eosinophils % 1.3 0.0 - 6.0 %    Basophils % 0.8 0.0 - 2.0 %    Neutrophils Absolute 8.13 (H) 1.20 - 7.70 x10*3/uL    Immature Granulocytes Absolute, Automated 0.66 0.00 - 0.70 x10*3/uL    Lymphocytes Absolute 1.95 1.20 - 4.80 x10*3/uL    Monocytes Absolute 0.44 0.10 - 1.00 x10*3/uL    Eosinophils Absolute 0.15 0.00 - 0.70 x10*3/uL    Basophils Absolute 0.09 0.00 - 0.10 x10*3/uL   Morphology   Result Value Ref Range    RBC Morphology See Below     Polychromasia Mild     Hypochromia Mild     Basophilic Stippling Present    POCT GLUCOSE   Result Value Ref Range    POCT Glucose 204 (H) 74 - 99 mg/dL      Patient recently received an antibiotic (last 12 hours)       Date/Time Action Medication Dose    04/02/25 0828 New Bag    piperacillin-tazobactam (Zosyn) 3.375 g in dextrose (iso) IV 50 mL 3.375 g    04/02/25 0238 New Bag    piperacillin-tazobactam (Zosyn) 3.375 g in dextrose (iso) IV 50 mL 3.375 g           No results found for the last 90 days.       Patient awaiting auth, pending at this time. Blood sugars improved with lantus increase to 50 units at HS. Chest xray with Stable small left-sided pleural effusions with surrounding atelectasis. Medically cleared for discharge today. Continue aggressive pulmonary hygiene and oral hygiene. Off loading as tolerated for skin integrity.  Plan discussed with interdisciplinary team, medically cleared for  discharge, no acute events overnight, patient denies chest pain, worsening SOB at this time. Ok to discharge, will continue current and repeat labs in the AM if patient still hospitalized. Patient aware and agreeable to current plan, continue plan as above.     I spent 30 minutes on the date of the service which included preparing to see the patient, face-to-face patient care, completing clinical documentation, obtaining and/or reviewing separately obtained history, performing a medically appropriate examination, counseling and educating the patient/family/caregiver, ordering medications, tests, or procedures, communicating with other HCPs (not separately reported), independently interpreting results (not separately reported), communicating results to the patient/family/caregiver, and care coordination (not separately reported  Pertinent Physical Exam At Time of Discharge  Physical Exam  Vitals and nursing note reviewed.       no acute events overnight, patient denies chest pain, worsening SOB at this time.  Outpatient Follow-Up  No future appointments.      Alondra Barron

## 2025-04-03 VITALS
DIASTOLIC BLOOD PRESSURE: 68 MMHG | BODY MASS INDEX: 21.82 KG/M2 | HEIGHT: 74 IN | WEIGHT: 170 LBS | RESPIRATION RATE: 18 BRPM | OXYGEN SATURATION: 98 % | TEMPERATURE: 97.3 F | HEART RATE: 57 BPM | SYSTOLIC BLOOD PRESSURE: 145 MMHG

## 2025-04-03 LAB
ALBUMIN SERPL BCP-MCNC: 3 G/DL (ref 3.4–5)
ALP SERPL-CCNC: 200 U/L (ref 33–136)
ALT SERPL W P-5'-P-CCNC: 59 U/L (ref 10–52)
ANION GAP SERPL CALC-SCNC: 11 MMOL/L (ref 10–20)
AST SERPL W P-5'-P-CCNC: 22 U/L (ref 9–39)
ATRIAL RATE: 72 BPM
BASOPHILS # BLD MANUAL: 0 X10*3/UL (ref 0–0.1)
BASOPHILS NFR BLD MANUAL: 0 %
BILIRUB SERPL-MCNC: 0.3 MG/DL (ref 0–1.2)
BUN SERPL-MCNC: 25 MG/DL (ref 6–23)
CALCIUM SERPL-MCNC: 8.7 MG/DL (ref 8.6–10.3)
CHLORIDE SERPL-SCNC: 111 MMOL/L (ref 98–107)
CO2 SERPL-SCNC: 28 MMOL/L (ref 21–32)
CREAT SERPL-MCNC: 0.79 MG/DL (ref 0.5–1.3)
EGFRCR SERPLBLD CKD-EPI 2021: >90 ML/MIN/1.73M*2
EOSINOPHIL # BLD MANUAL: 0 X10*3/UL (ref 0–0.7)
EOSINOPHIL NFR BLD MANUAL: 0 %
ERYTHROCYTE [DISTWIDTH] IN BLOOD BY AUTOMATED COUNT: 22.5 % (ref 11.5–14.5)
GLUCOSE BLD MANUAL STRIP-MCNC: 223 MG/DL (ref 74–99)
GLUCOSE BLD MANUAL STRIP-MCNC: 227 MG/DL (ref 74–99)
GLUCOSE SERPL-MCNC: 229 MG/DL (ref 74–99)
HCT VFR BLD AUTO: 30.5 % (ref 41–52)
HGB BLD-MCNC: 8.3 G/DL (ref 13.5–17.5)
HYPOCHROMIA BLD QL SMEAR: ABNORMAL
IMM GRANULOCYTES # BLD AUTO: 0.51 X10*3/UL (ref 0–0.7)
IMM GRANULOCYTES NFR BLD AUTO: 5 % (ref 0–0.9)
LYMPHOCYTES # BLD MANUAL: 0.61 X10*3/UL (ref 1.2–4.8)
LYMPHOCYTES NFR BLD MANUAL: 6 %
MCH RBC QN AUTO: 27.4 PG (ref 26–34)
MCHC RBC AUTO-ENTMCNC: 27.2 G/DL (ref 32–36)
MCV RBC AUTO: 101 FL (ref 80–100)
MONOCYTES # BLD MANUAL: 0.31 X10*3/UL (ref 0.1–1)
MONOCYTES NFR BLD MANUAL: 3 %
NEUTROPHILS # BLD MANUAL: 9.28 X10*3/UL (ref 1.2–7.7)
NEUTS BAND # BLD MANUAL: 0.51 X10*3/UL (ref 0–0.7)
NEUTS BAND NFR BLD MANUAL: 5 %
NEUTS SEG # BLD MANUAL: 8.77 X10*3/UL (ref 1.2–7)
NEUTS SEG NFR BLD MANUAL: 86 %
NRBC BLD-RTO: 5.2 /100 WBCS (ref 0–0)
P AXIS: 80 DEGREES
P OFFSET: 219 MS
P ONSET: 168 MS
PLATELET # BLD AUTO: 324 X10*3/UL (ref 150–450)
POLYCHROMASIA BLD QL SMEAR: ABNORMAL
POTASSIUM SERPL-SCNC: 3.9 MMOL/L (ref 3.5–5.3)
PR INTERVAL: 118 MS
PROT SERPL-MCNC: 6 G/DL (ref 6.4–8.2)
Q ONSET: 227 MS
QRS COUNT: 12 BEATS
QRS DURATION: 88 MS
QT INTERVAL: 404 MS
QTC CALCULATION(BAZETT): 442 MS
QTC FREDERICIA: 429 MS
R AXIS: -18 DEGREES
RBC # BLD AUTO: 3.03 X10*6/UL (ref 4.5–5.9)
RBC MORPH BLD: ABNORMAL
SODIUM SERPL-SCNC: 146 MMOL/L (ref 136–145)
T AXIS: 72 DEGREES
T OFFSET: 429 MS
TOTAL CELLS COUNTED BLD: 100
VENTRICULAR RATE: 72 BPM
WBC # BLD AUTO: 10.2 X10*3/UL (ref 4.4–11.3)

## 2025-04-03 PROCEDURE — 94640 AIRWAY INHALATION TREATMENT: CPT

## 2025-04-03 PROCEDURE — 2500000004 HC RX 250 GENERAL PHARMACY W/ HCPCS (ALT 636 FOR OP/ED): Performed by: INTERNAL MEDICINE

## 2025-04-03 PROCEDURE — 2500000002 HC RX 250 W HCPCS SELF ADMINISTERED DRUGS (ALT 637 FOR MEDICARE OP, ALT 636 FOR OP/ED): Performed by: NURSE PRACTITIONER

## 2025-04-03 PROCEDURE — 2500000005 HC RX 250 GENERAL PHARMACY W/O HCPCS: Performed by: INTERNAL MEDICINE

## 2025-04-03 PROCEDURE — 85007 BL SMEAR W/DIFF WBC COUNT: CPT | Performed by: INTERNAL MEDICINE

## 2025-04-03 PROCEDURE — 2500000004 HC RX 250 GENERAL PHARMACY W/ HCPCS (ALT 636 FOR OP/ED)

## 2025-04-03 PROCEDURE — 80053 COMPREHEN METABOLIC PANEL: CPT | Performed by: INTERNAL MEDICINE

## 2025-04-03 PROCEDURE — 85027 COMPLETE CBC AUTOMATED: CPT | Performed by: INTERNAL MEDICINE

## 2025-04-03 PROCEDURE — 2500000002 HC RX 250 W HCPCS SELF ADMINISTERED DRUGS (ALT 637 FOR MEDICARE OP, ALT 636 FOR OP/ED): Performed by: INTERNAL MEDICINE

## 2025-04-03 PROCEDURE — 2500000001 HC RX 250 WO HCPCS SELF ADMINISTERED DRUGS (ALT 637 FOR MEDICARE OP): Performed by: INTERNAL MEDICINE

## 2025-04-03 PROCEDURE — 36415 COLL VENOUS BLD VENIPUNCTURE: CPT | Performed by: INTERNAL MEDICINE

## 2025-04-03 PROCEDURE — 2500000004 HC RX 250 GENERAL PHARMACY W/ HCPCS (ALT 636 FOR OP/ED): Performed by: NURSE PRACTITIONER

## 2025-04-03 PROCEDURE — 82947 ASSAY GLUCOSE BLOOD QUANT: CPT

## 2025-04-03 RX ORDER — HEPARIN SODIUM 5000 [USP'U]/ML
5000 INJECTION, SOLUTION INTRAVENOUS; SUBCUTANEOUS EVERY 8 HOURS
Status: DISCONTINUED | OUTPATIENT
Start: 2025-04-03 | End: 2025-04-03 | Stop reason: HOSPADM

## 2025-04-03 RX ADMIN — SUCRALFATE 1 G: 1 SUSPENSION ORAL at 08:05

## 2025-04-03 RX ADMIN — PIPERACILLIN SODIUM AND TAZOBACTAM SODIUM 3.38 G: 3; .375 INJECTION, SOLUTION INTRAVENOUS at 14:33

## 2025-04-03 RX ADMIN — INSULIN LISPRO 4 UNITS: 100 INJECTION, SOLUTION INTRAVENOUS; SUBCUTANEOUS at 08:05

## 2025-04-03 RX ADMIN — ACETYLCYSTEINE 200 MG: 200 SOLUTION ORAL; RESPIRATORY (INHALATION) at 07:54

## 2025-04-03 RX ADMIN — SUCRALFATE 1 G: 1 SUSPENSION ORAL at 02:03

## 2025-04-03 RX ADMIN — Medication 30 L/MIN: at 07:54

## 2025-04-03 RX ADMIN — PIPERACILLIN SODIUM AND TAZOBACTAM SODIUM 3.38 G: 3; .375 INJECTION, SOLUTION INTRAVENOUS at 01:57

## 2025-04-03 RX ADMIN — HEPARIN SODIUM 5000 UNITS: 5000 INJECTION, SOLUTION INTRAVENOUS; SUBCUTANEOUS at 12:55

## 2025-04-03 RX ADMIN — IPRATROPIUM BROMIDE 0.5 MG: 0.5 SOLUTION RESPIRATORY (INHALATION) at 07:54

## 2025-04-03 RX ADMIN — PIPERACILLIN SODIUM AND TAZOBACTAM SODIUM 3.38 G: 3; .375 INJECTION, SOLUTION INTRAVENOUS at 08:05

## 2025-04-03 RX ADMIN — PANTOPRAZOLE SODIUM 40 MG: 40 INJECTION, POWDER, FOR SOLUTION INTRAVENOUS at 08:05

## 2025-04-03 RX ADMIN — INSULIN LISPRO 4 UNITS: 100 INJECTION, SOLUTION INTRAVENOUS; SUBCUTANEOUS at 02:04

## 2025-04-03 RX ADMIN — INSULIN LISPRO 4 UNITS: 100 INJECTION, SOLUTION INTRAVENOUS; SUBCUTANEOUS at 14:33

## 2025-04-03 RX ADMIN — AMIODARONE HYDROCHLORIDE 200 MG: 200 TABLET ORAL at 08:05

## 2025-04-03 RX ADMIN — METOPROLOL TARTRATE 50 MG: 50 TABLET, FILM COATED ORAL at 08:05

## 2025-04-03 ASSESSMENT — COGNITIVE AND FUNCTIONAL STATUS - GENERAL
MOVING TO AND FROM BED TO CHAIR: TOTAL
DAILY ACTIVITIY SCORE: 6
CLIMB 3 TO 5 STEPS WITH RAILING: TOTAL
WALKING IN HOSPITAL ROOM: TOTAL
MOBILITY SCORE: 6
TOILETING: TOTAL
EATING MEALS: TOTAL
STANDING UP FROM CHAIR USING ARMS: TOTAL
HELP NEEDED FOR BATHING: TOTAL
TURNING FROM BACK TO SIDE WHILE IN FLAT BAD: TOTAL
MOVING FROM LYING ON BACK TO SITTING ON SIDE OF FLAT BED WITH BEDRAILS: TOTAL
DRESSING REGULAR LOWER BODY CLOTHING: TOTAL
PERSONAL GROOMING: TOTAL
DRESSING REGULAR UPPER BODY CLOTHING: TOTAL

## 2025-04-03 ASSESSMENT — PAIN SCALES - GENERAL: PAINLEVEL_OUTOF10: 0 - NO PAIN

## 2025-04-03 NOTE — DISCHARGE SUMMARY
Patient fully evaluated  for   Assessment & Plan  Hemoptysis    Tracheostomy in place (Multi)    CVA, old, hemiparesis (Multi)    Status post insertion of percutaneous endoscopic gastrostomy (PEG) tube (Multi)    Pneumonia due to infectious organism    Right sided weakness    Atrial flutter (Multi)    Insulin dependent type 2 diabetes mellitus (Multi)        Zeus Bone MD  Physician  Internal Medicine     H&P      Signed     Date of Service: 3/23/2025 10:28 AM     Signed       Expand All Collapse All    History Of Present Illness  Bryan Nix is a 64-year-old male with past medical history of atrial flutter on Eliquis, hyponatremia, IDDM, CAD, hypertension, chronic wounds, diffuse lymphadenopathy, anemia, history of smoking, history elevated PSA, CVA L hemisphere (2020) with right-sided deficits, Left ICA stenosis (85%) s/p angioplasty and left carotid artery stent (08/25/2023) and recent history of acute pontine stroke 02/27/25 with acute hypoxic respiratory failure s/p tracheostomy on 2/25/2025 and PEG.  Patient presents for bleeding from tracheostomy.  Patient is alert and oriented x 3, however limited verbal communication due to tracheostomy.  Wife is at bedside and assists with history.  She reports that patient was coughing blood out of his tracheostomy.  ER provider felt bleeding was likely superficial around site.  Patient has been on room air at skilled nursing facility.  He was placed on Airvo in the ED mainly to humidify oxygen.  Patient never desaturated per ER report.  Currently he is resting comfortably in the bed, breath sounds slightly rhonchorous, but unlabored.  Reports chills, but no fevers.  He has right-sided weakness and sensory deficits.  Denies headache, vision or hearing changes, chest pains, palpitations, shortness of breath, nausea, vomiting, abdominal pain, diarrhea, or complications of PEG tube site.  Wife reports patient skin became irritated in his groin due to adhesive from a  condom cath previously, he has a small decubitus wound that she reports is healing well.  He receives tube feeds continuously.  Wife reports a patient was recently evaluated by speech for p.o. to intake, however he still remains n.p.o. except for meds and tube feeds through PEG. CODE STATUS reviewed: full code     ER Course: Hemodynamically stable, afebrile, SpO2 to 97% trach mask.  WBC 9.3, hemoglobin 8, hematocrit 29.4, platelets 197.  INR 1.4.  Glucose 223, BUN 36, otherwise CMP is normal.  CT of the chest for PE pending.  CXR unremarkable. Trannexamic acid soak gauze wrapped around trach.      Past medical history: As above  Past surgical history: As above  Social history: Former smoker 37.5 pack years-quit smoking age 63, occasional alcohol use, no illicit drug use.  .  Works in maintenance.  6 children.  Family history: Noncontributory     Past Medical History  Medical History        Past Medical History:   Diagnosis Date    Abnormal finding of blood chemistry, unspecified 05/18/2016     Abnormal blood chemistry    Acute upper respiratory infection, unspecified 12/02/2015     Acute upper respiratory infection    Elevated prostate specific antigen (PSA)       Elevated prostate specific antigen (PSA)    Encounter for screening for malignant neoplasm of colon 01/30/2021     Colon cancer screening    Encounter for screening for malignant neoplasm of prostate 11/30/2020     Prostate cancer screening    Essential (primary) hypertension 07/30/2013     Benign essential hypertension    Other conditions influencing health status 07/30/2013     Diabetes Mellitus Poorly Controlled    Other conditions influencing health status 12/02/2015     History of cough    Personal history of other endocrine, nutritional and metabolic disease 03/18/2021     History of hyperlipidemia    Personal history of other endocrine, nutritional and metabolic disease 03/18/2021     History of diabetes mellitus    Personal history of other  specified conditions 05/18/2016     History of chest pain    Personal history of other specified conditions 05/18/2016     History of dyspnea    Personal history of other specified conditions 05/18/2016     History of dizziness    Personal history of other specified conditions 05/18/2016     History of fatigue            Surgical History  Surgical History         Past Surgical History:   Procedure Laterality Date    CT ANGIO CORONARY ART WITH HEARTFLOW IF SCORE >30%   8/23/2023     CT HEART CORONARY ANGIOGRAM 8/23/2023 [facility] CT    MR HEAD ANGIO WO IV CONTRAST   12/14/2020     MR HEAD ANGIO WO IV CONTRAST 12/14/2020 [facility]    MR NECK ANGIO WO IV CONTRAST   12/14/2020     MR NECK ANGIO WO IV CONTRAST 12/14/2020 [facility]    OTHER SURGICAL HISTORY   12/01/2020     Hand fracture repair    OTHER SURGICAL HISTORY   12/01/2020     Nasal bone fracture repair            Social History  He reports that he has quit smoking. His smoking use included cigarettes. He has never used smokeless tobacco. No history on file for alcohol use and drug use.     Family History  Family History   No family history on file.        Allergies  Patient has no known allergies.     Review of Systems     Physical Exam  Constitutional:       General: He is awake. He is not in acute distress.     Appearance: Normal appearance. He is not toxic-appearing.   HENT:      Head: Atraumatic.      Nose: Nose normal.      Mouth/Throat:      Mouth: Mucous membranes are moist.   Eyes:      Conjunctiva/sclera: Conjunctivae normal.   Cardiovascular:      Rate and Rhythm: Normal rate and regular rhythm.      Pulses: Normal pulses.      Heart sounds: No murmur heard.  Pulmonary:      Effort: No respiratory distress.      Comments: Tracheostomy site midline, no drainage around dressing.  Rhonchorous breath sounds, unlabored respirations, RT bedside suctioning patient with thick blood-tinged sputum  Abdominal:      General: Bowel sounds are  "normal. There is no distension.      Palpations: Abdomen is soft.      Tenderness: There is no abdominal tenderness. There is no guarding.      Comments: Left upper quadrant PEG site tube site, no drainage or skin breakdown around tube site.   Musculoskeletal:         General: No swelling, deformity or signs of injury.      Cervical back: Neck supple.      Right lower leg: No edema.      Left lower leg: No edema.   Skin:     General: Skin is warm and dry.      Capillary Refill: Capillary refill takes less than 2 seconds.      Findings: Wound (sacrum) present. No ecchymosis or erythema.   Neurological:      Mental Status: He is alert and oriented to person, place, and time.      Cranial Nerves: No facial asymmetry.      Sensory: Sensory deficit (RUE, RLE) present.      Motor: Weakness present.      Comments: Right sided upper and lower extremity motor function deficits   Psychiatric:         Mood and Affect: Mood normal.         Behavior: Behavior is cooperative.            Last Recorded Vitals  Blood pressure 134/50, pulse 93, temperature 36.9 °C (98.4 °F), temperature source Temporal, resp. rate 22, height 1.88 m (6' 2\"), weight 77.1 kg (170 lb), SpO2 98%.     Relevant Results              Results for orders placed or performed during the hospital encounter of 03/22/25 (from the past 24 hours)   CBC and Auto Differential   Result Value Ref Range     WBC 9.3 4.4 - 11.3 x10*3/uL     nRBC 0.3 (H) 0.0 - 0.0 /100 WBCs     RBC 3.02 (L) 4.50 - 5.90 x10*6/uL     Hemoglobin 8.0 (L) 13.5 - 17.5 g/dL     Hematocrit 29.4 (L) 41.0 - 52.0 %     MCV 97 80 - 100 fL     MCH 26.5 26.0 - 34.0 pg     MCHC 27.2 (L) 32.0 - 36.0 g/dL     RDW 15.6 (H) 11.5 - 14.5 %     Platelets 197 150 - 450 x10*3/uL     Neutrophils % 77.1 40.0 - 80.0 %     Immature Granulocytes %, Automated 1.5 (H) 0.0 - 0.9 %     Lymphocytes % 13.1 13.0 - 44.0 %     Monocytes % 4.1 2.0 - 10.0 %     Eosinophils % 3.2 0.0 - 6.0 %     Basophils % 1.0 0.0 - 2.0 %     " Neutrophils Absolute 7.14 1.20 - 7.70 x10*3/uL     Immature Granulocytes Absolute, Automated 0.14 0.00 - 0.70 x10*3/uL     Lymphocytes Absolute 1.21 1.20 - 4.80 x10*3/uL     Monocytes Absolute 0.38 0.10 - 1.00 x10*3/uL     Eosinophils Absolute 0.30 0.00 - 0.70 x10*3/uL     Basophils Absolute 0.09 0.00 - 0.10 x10*3/uL   Basic metabolic panel   Result Value Ref Range     Glucose 223 (H) 74 - 99 mg/dL     Sodium 142 136 - 145 mmol/L     Potassium 4.3 3.5 - 5.3 mmol/L     Chloride 103 98 - 107 mmol/L     Bicarbonate 27 21 - 32 mmol/L     Anion Gap 16 10 - 20 mmol/L     Urea Nitrogen 36 (H) 6 - 23 mg/dL     Creatinine 0.81 0.50 - 1.30 mg/dL     eGFR >90 >60 mL/min/1.73m*2     Calcium 9.6 8.6 - 10.3 mg/dL   Protime-INR   Result Value Ref Range     Protime 15.1 (H) 9.8 - 12.4 seconds     INR 1.4 (H) 0.9 - 1.1   aPTT   Result Value Ref Range     aPTT 25 (L) 26 - 36 seconds   Type and Screen   Result Value Ref Range     ABO TYPE B       Rh TYPE POS       ANTIBODY SCREEN NEG     VERIFY ABO/Rh Group Test   Result Value Ref Range     ABO TYPE B       Rh TYPE POS     MRSA Surveillance for Vancomycin De-escalation, PCR     Specimen: Anterior Nares; Swab   Result Value Ref Range     MRSA PCR Not Detected Not Detected   POCT GLUCOSE   Result Value Ref Range     POCT Glucose 180 (H) 74 - 99 mg/dL   POCT GLUCOSE   Result Value Ref Range     POCT Glucose 161 (H) 74 - 99 mg/dL   POCT GLUCOSE   Result Value Ref Range     POCT Glucose 156 (H) 74 - 99 mg/dL   Basic metabolic panel   Result Value Ref Range     Glucose 170 (H) 74 - 99 mg/dL     Sodium 147 (H) 136 - 145 mmol/L     Potassium 4.2 3.5 - 5.3 mmol/L     Chloride 107 98 - 107 mmol/L     Bicarbonate 30 21 - 32 mmol/L     Anion Gap 14 10 - 20 mmol/L     Urea Nitrogen 35 (H) 6 - 23 mg/dL     Creatinine 0.85 0.50 - 1.30 mg/dL     eGFR >90 >60 mL/min/1.73m*2     Calcium 9.4 8.6 - 10.3 mg/dL   CBC   Result Value Ref Range     WBC 10.4 4.4 - 11.3 x10*3/uL     nRBC 0.6 (H) 0.0 - 0.0 /100  WBCs     RBC 2.87 (L) 4.50 - 5.90 x10*6/uL     Hemoglobin 7.8 (L) 13.5 - 17.5 g/dL     Hematocrit 26.6 (L) 41.0 - 52.0 %     MCV 93 80 - 100 fL     MCH 27.2 26.0 - 34.0 pg     MCHC 29.3 (L) 32.0 - 36.0 g/dL     RDW 15.7 (H) 11.5 - 14.5 %     Platelets 205 150 - 450 x10*3/uL   SST TOP   Result Value Ref Range     Extra Tube Hold for add-ons.        CT angio chest for pulmonary embolism     Result Date: 3/22/2025  Interpreted By:  Pamela Zurita, STUDY: CT ANGIO CHEST FOR PULMONARY EMBOLISM;  3/22/2025 4:56 pm   INDICATION: Signs/Symptoms:bloody secretions, eval for PE.     COMPARISON: CT chest without contrast 03/22/2025   ACCESSION NUMBER(S): [id number]   ORDERING CLINICIAN: PAMELA HAHN   TECHNIQUE: Contiguous axial images of the chest were obtained after the intravenous administration of iodinated contrast using angiographic PE protocol. Coronal and sagittal reformatted images were reconstructed from the axial data. MIP images were created on an independent workstation and reviewed.   FINDINGS:   MEDIASTINUM AND LYMPH NODES: Inflammatory fat stranding along the tracheostomy tract without discrete fluid collection. The esophageal wall appears within normal limits. There is redemonstration of diffuse lymphadenopathy in the bilateral axilla, mediastinum, bilateral supraclavicular as described in detail on separate report. Also notable or enlarged bilateral level 4 cervical lymph nodes (right > left).   VESSELS:  Normal caliber thoracic aorta without dissection. Moderate aortic atherosclerosis.  No acute pulmonary embolism.   HEART: Normal size.  Mild coronary artery calcifications. No significant pericardial effusion.   LUNG, AIRWAYS, PLEURA: There is slight increase in debris within the trachea. There has been clearing of secretions in the left upper/lower lobe bronchus. There is diffuse bilateral bronchial thickening slightly increased from prior study. There is peribronchovascular ground-glass opacity in the  posterior right upper lobe and superior and basal segments of the right lower lobe. There is slightly worsened atelectasis in the lung bases remaining predominantly subsegmental in nature. There is a 0.8 cm nodule in the left upper lobe that is stable from 08/19/2023.   OSSEOUS STRUCTURES: No acute osseous abnormality.   CHEST WALL SOFT TISSUES: No discernible acute abnormality.   UPPER ABDOMEN/OTHER: No acute abnormality.        No acute pulmonary embolism.   Peribronchial vascular ground glass opacities in the right upper and lower lobe again concerning for pneumonia given patient's risk factors of tracheostomy and debris in the airways.   Redemonstration of diffuse lymphadenopathy in the lower necks, axilla, and mediastinum. Findings are either diffusely reactive in nature, reflective of lymphoma/leukemia, meter deficiency, or less likely head neck neoplasm is previously postulated. However please correlate with past medical history.   Inflammatory changes along the tracheostomy tract without fluid collection. Correlate for erythema.   Unchanged 0.8 cm nodule in the left upper lobe dating back to 08/19/2023. Recommend 1 year follow up chest CT to ensure at least 2 years of stability.   MACRO: None.   Signed by: Pamela Zurita 3/22/2025 7:17 PM Dictation workstation:   [id number]     CT chest wo IV contrast     Result Date: 3/22/2025  Interpreted By:  Pamela Zurita, STUDY: CT CHEST WO IV CONTRAST;  3/22/2025 3:41 pm   INDICATION: Signs/Symptoms:eval for alveolar hemorrhage.     COMPARISON: CT head/neck 08/19/2023   ACCESSION NUMBER(S): [id number]   ORDERING CLINICIAN: PAMELA HAHN   TECHNIQUE: Contiguous axial images of the chest and upper abdomen were obtained without contrast. Coronal and sagittal reformatted images were reconstructed from the axial data.   FINDINGS:   MEDIASTINUM AND LYMPH NODES: There is fat stranding/edema along the tract of the tracheostomy about discrete fluid collection. The  esophageal wall appears within normal limits. There are numerous enlarged bilateral supraclavicular lymph nodes measuring up to 1.4 cm on the left. There are numerous enlarged bilateral axillary lymph nodes measuring up to 1.1 cm on the right and 1.2 cm on the left. There are numerous prominent to mildly enlarged mediastinal lymph nodes as well. No pneumomediastinum.   VESSELS:  Normal caliber thoracic aorta. Moderate aortic atherosclerosis. The main pulmonary artery is normal in size.   HEART: Normal size.  Mild coronary artery calcifications. No significant pericardial effusion.   LUNG, AIRWAYS, PLEURA: Tracheostomy noted in appropriate position. There is trace mucus in the left mainstem bronchus and at the origin of the left upper lobe and lower lobe bronchi. There are mild peribronchovascular ground-glass opacities in the posterior segment of the right upper lobe, basal segments of the right lower lobe. There is mild dependent bibasilar atelectasis. There is no pleural effusion.   OSSEOUS STRUCTURES: No acute osseous abnormality.   CHEST WALL SOFT TISSUES: No discernible acute abnormality.   UPPER ABDOMEN/OTHER: No acute abnormality.        1. Mild peribronchovascular ground-glass opacities in the posterior right upper lobe and basal segments of the right lower lobe. This appearance and distribution is not typical for alveolar hemorrhage which tends to be centrilobular and bilateral asymmetric in distribution. Findings are most suggestive of pneumonia.   2. Small amount of mucous debris in the left mainstem bronchus and at the origin of the left upper and lower lobe bronchi.   3. Diffuse lymphadenopathy in the bilateral axillary regions, mediastinum and left supraclavicular region particularly notable in the bilateral supraclavicular region. This is progressed in the left supraclavicular region and new elsewhere when compared to 08/19/2023 CTA head/neck. Correlate for any history of known head/neck malignancy or  lymphoma.   4. Inflammation/fat stranding adjacent to the tracheostomy tube entry site noted. Correlate for air the met. No evidence of fluid collection.   MACRO: None.   Signed by: Marco A Zurita 3/22/2025 7:10 PM Dictation workstation:   [id number]     XR chest 1 view     Result Date: 3/22/2025  Interpreted By:  Fermin Stroud, STUDY: XR CHEST 1 VIEW   INDICATION: Signs/Symptoms:hemoptysis.   COMPARISON: August 19, 2023   ACCESSION NUMBER(S): [id number]   ORDERING CLINICIAN: MARCO A HAHN   FINDINGS: No consolidation, effusion, edema, or pneumothorax. Heart size within normal limits.        No evidence of acute intrathoracic abnormality.   Signed by: Fermin Stroud 3/22/2025 2:01 PM Dictation workstation:   [id number]     XR chest 1 view     Result Date: 3/14/2025  * * *Final Report* * * DATE OF EXAM: Mar 14 2025  8:52AM   S5X   5290  -  XR CHEST 1V FRONTAL   / ACCESSION #  [id number] PROCEDURE REASON: new bleeding from trach      * * * * Physician Interpretation * * * *  EXAMINATION:  CHEST RADIOGRAPH (PORTABLE SINGLE VIEW AP) Exam Date/Time:  3/14/2025 8:52 AM Clinical History:  new bleeding from trach Comparison:  03/03/2025 RESULT: LINES, TUBES, AND DEVICES:  Tracheostomy. CARDIOMEDIASTINAL SILHOUETTE:  The cardiac and mediastinal silhouettes appear unremarkable. LUNGS AND PLEURA: No consolidation.  No pleural effusion. No pulmonary vascular congestion. No pneumothorax identified. OTHER:  N/A     IMPRESSION: No acute abnormality identified. : [facility]   Transcribe Date/Time: Mar 14 2025  9:07A Dictated by : MARCO A EVANS MD This examination was interpreted and the report reviewed and electronically signed by: MARCO A EVANS MD on Mar 14 2025  9:11AM  EST     US abdomen complete     Result Date: 3/11/2025  ABDOMEN ULTRASOUND-COMPLETE FINDINGS: Abdomen Ultrasound Complete: PROCEDURE: Multiple grayscale mages of the abdomen were obtained. FINDINGS:Multiple real time images demonstrate the liver  with increased echogenicity consistent with fatty infiltration. There is no evidence of a discrete mass within the liver. The liver is enlarged, measured at 15.7 cm in size. The gallbladder is seen with no evidence of cholelithiasis. The gallbladder wall is within normal limits. The common bile duct is within normal limits. The visualized pancreas appears unremarkable. Both kidneys are seen with no evidence of hydronephrosis or a calculus. The spleen has its normal homogeneous echo appearance. There is no free fluid to suggest ascites. The visualized aorta appears unremarkable. The inferior vena cava appears patent. CONCLUSION: Enlarged fatty liver. ELECTRONICALLY SIGNED BY LEONEL LAN M.D. 3/11/2025 5:00:52 PM EDT.     XR chest 1 view     Result Date: 3/3/2025  * * *Final Report* * * DATE OF EXAM: Mar  3 2025  1:40PM   S5X   5290  -  XR CHEST 1V FRONTAL   / ACCESSION #  [id number] PROCEDURE REASON: resp failure      * * * * Physician Interpretation * * * *  EXAMINATION:  CHEST RADIOGRAPH (PORTABLE SINGLE VIEW AP) Exam Date/Time:  3/3/2025 1:40 PM Indication:   resp failure M:  XCP_4 Comparison:  1 day prior RESULT: Lines, tubes, and devices:  Tracheostomy tube remains in situ. Lungs and pleura:  Mild patchy opacities right lower lung zone seen on the prior study are no longer appreciated.  No confluent opacity.   Costophrenic angles are clear.  No pneumothorax. Cardiomediastinal silhouette:  Stable cardiomediastinal silhouette. Other:  -     IMPRESSION: Improved aeration right lower lung zone.  No acute cardiopulmonary finding. : HANY   Transcribe Date/Time: Mar  3 2025  2:49P Dictated by : ISRAEL BENSON MD This examination was interpreted and the report reviewed and electronically signed by: ISRAEL BENSON MD on Mar  3 2025  2:49PM  EST     XR chest 1 view     Result Date: 3/2/2025  * * *Final Report* * * DATE OF EXAM: Mar  2 2025 11:25AM   MMX   5376  -  XR CHEST 1V FRONTAL PORT  /  ACCESSION #  [id number] PROCEDURE REASON: Shortness of breath      * * * * Physician Interpretation * * * *  EXAMINATION:  CHEST RADIOGRAPH (PORTABLE SINGLE VIEW AP) Exam Date/Time:  3/2/2025 11:25 AM CLINICAL HISTORY: Shortness of breath MQ:  XCPR_5 Comparison:  02/20/2025. RESULT: This is a limited examination due to multiple wires and tubing obscuring the lower lungs. Lines, tubes, and devices:  A tracheostomy tube remains in place. Lungs and pleura:  There are increased bronchovascular markings in the right lower lung which may be due to bronchitis or early changes of aspiration pneumonia.  Clinical correlation and follow-up exams are recommended. Cardiomediastinal silhouette:  Stable cardiomediastinal silhouette. Other:  The visualized bony thorax appears unremarkable.     IMPRESSION: Nonspecific parenchymal changes in the right lower lung as described above. A follow-up exam is recommended. : HANY   Transcribe Date/Time: Mar  2 2025 11:53A Dictated by : BJ INIGUEZ MD This examination was interpreted and the report reviewed and electronically signed by: BJ INIGUEZ MD on Mar  2 2025 11:54AM  EST     XR chest 1 view     Result Date: 2/28/2025  * * *Final Report* * * DATE OF EXAM: Feb 28 2025 10:36AM   MMX   5290  -  XR CHEST 1V FRONTAL   / ACCESSION #  [id number] PROCEDURE REASON: Shortness of breath      * * * * Physician Interpretation * * * *  EXAMINATION:  CHEST RADIOGRAPH (SINGLE VIEW AP OR PA) CLINICAL HISTORY: Shortness of breath MQ:  XC1_5 Comparison:  02/22/2025 RESULT: Lines, tubes, and devices:  Tracheostomy tube is noted. Lungs and pleura:  No consolidation. No lung mass. No pleural effusion. Cardiomediastinal silhouette:  Normal cardiomediastinal silhouette. Other:  No acute osseous pathology.     IMPRESSION: No acute radiographic abnormality. : [facility]   Transcribe Date/Time: Feb 28 2025 12:07P Dictated by : DIANNE RAMOS MD This examination was interpreted and the  report reviewed and electronically signed by: DIANNE RAMOS MD on Feb 28 2025 12:08PM  EST     XR abdomen 2 views supine and erect or decub     Result Date: 2/24/2025  * * *Final Report* * * DATE OF EXAM: Feb 24 2025  6:03PM   MMX   5289  -  XR ABDOMEN 1V SUPINE  / ACCESSION #  [id number] PROCEDURE REASON: Evaluate tube, line or lead position      * * * * Physician Interpretation * * * *  EXAMINATION:  XR ABDOMEN 1V SUPINE CLINICAL HISTORY:   Evaluate tube, line or lead position Technique:   XR ABDOMEN 1V SUPINE -- NOT APPLICABLE with 1 views on 1 images Comparison: 2/21/2025 RESULT: Feeding tube with distal tip at the pylorus antrum. Moderate colonic stool burden. No dilated bowel. Lung structures are intact.     IMPRESSION: Feeding tube with distal tip at the pylorus antrum. Moderate colonic stool burden. No dilated bowel. : HANY   Transcribe Date/Time: Feb 24 2025  6:23P Dictated by : PABLO RAMOS MD This examination was interpreted and the report reviewed and electronically signed by: PABLO RAMOS MD on Feb 24 2025  6:24PM  EST     CT head wo IV contrast     Result Date: 2/23/2025  * * *Final Report* * * DATE OF EXAM: Feb 23 2025 12:04PM   [facility]   0504  -  CT BRAIN WO IVCON  / ACCESSION #  [id number] PROCEDURE REASON: Stroke, follow up      * * * * Physician Interpretation * * * *  EXAMINATION: CT BRAIN WO IVCON CLINICAL HISTORY: Stroke, follow up TECHNIQUE:  Serial axial images without IV contrast were obtained from the vertex to the foramen magnum. MQ:  CTBWO_3 CT Radiation dose: Integrated Dose-Length Product (DLP) for this visit =   778  mGy*cm CT Dose Reduction Employed: Iterative recon COMPARISON: MRI brain 02/19/2025 RESULT: Post-operative change: None. Acute change: Evolving acute infarct along the central eli and superior left medulla (2:7-9).  No evidence of hemorrhagic transformation. Hemorrhage: No evidence of acute intracranial hemorrhage. Mass Lesion / Mass Effect: There is no  evidence of an intracranial mass or extraaxial fluid collection. No significant mass effect. Chronic change: Stable remote left MCA territory encephalomalacia. Parenchyma: There is no significant volume loss. The brain parenchyma is otherwise within normal limits for age. Ventricles: The ventricles are within normal limits of size and configuration for age. Paranasal sinuses and skull base: Partial opacification of the left maxillary and sphenoid sinus and multiple bilateral ethmoid air cells.   The remaining visualized paranasal sinuses are grossly clear. The skull base and imaged soft tissues are unremarkable. Localizer images: No additional findings.     IMPRESSION: Evolving acute brainstem infarct.  No evidence of hemorrhagic transformation. Stable remote left MCA territory infarct. : [facility]   Transcribe Date/Time: Feb 23 2025 12:48P Dictated by : FRANKY GUZMAN MD   This examination was interpreted and the report reviewed and electronically signed by: FRANKY GUZMAN MD on Feb 23 2025 12:52PM  EST     XR chest 1 view     Result Date: 2/22/2025  * * *Final Report* * * DATE OF EXAM: Feb 22 2025  6:56AM   MMX   [id number]  -  XR CHEST 1V FRONTAL   / ACCESSION #  [id number] PROCEDURE REASON: Evaluate tube, line,  or lead position      * * * * Physician Interpretation * * * *  EXAMINATION:  CHEST RADIOGRAPH (SINGLE VIEW AP OR PA) CLINICAL HISTORY: Evaluate tube, line,  or lead position MQ:  XC1_5 Comparison:  02/21/2025. RESULT: Lines, tubes, and devices:  An ET tube and feeding tube remain in place.   A poorly defined right venous catheter cannot be excluded. Lungs and pleura:  There are persistent bilateral lower lungs infiltrates seen on the right more than left suggestive of pneumonia such as aspiration pneumonia.  There is no definite pleural effusion. Cardiomediastinal silhouette:  Stable cardiomediastinal silhouette. Other:  The visualized bony thorax appears unremarkable.     IMPRESSION: Persistent bilateral  lower lungs infiltrates as described above. A follow-up exam is recommended. : [facility]   Transcribe Date/Time: Feb 22 2025  8:20A Dictated by : BJ INIGUEZ MD This examination was interpreted and the report reviewed and electronically signed by: BJ INIGUEZ MD on Feb 22 2025  8:21AM  EST     XR chest 1 view     Result Date: 2/21/2025  * * *Final Report* * * DATE OF EXAM: Feb 21 2025  5:59PM   MMX   [id number]  -  XR CHEST 1V FRONTAL PORT  / ACCESSION #  [id number] PROCEDURE REASON: Evaluate tube, line,  or lead position      * * * * Physician Interpretation * * * *  EXAMINATION:  CHEST RADIOGRAPH (PORTABLE SINGLE VIEW AP) Exam Date/Time:  2/21/2025 5:59 PM CLINICAL HISTORY: Evaluate tube, line,  or lead position MQ:  XCPR_5 Comparison:  02/21/2025 RESULT: Lines, tubes, and devices: Endotracheal tube in place terminating in enteric tube in place terminating below the field of view. Lungs and pleura: No pneumothorax.  No pleural effusion.  Bilateral mild interstitial opacities.  The right costophrenic angle is not included in the field-of-view. Cardiomediastinal silhouette:  Stable cardiomediastinal silhouette. Other:  .     IMPRESSION: Mild interstitial opacities may be related to pulmonary vascular congestion. : [facility]   Transcribe Date/Time: Feb 21 2025  6:55P Dictated by : IRWIN VALDIVIA MD This examination was interpreted and the report reviewed and electronically signed by: IRWIN VALDIVIA MD on Feb 21 2025  6:57PM  EST     XR abdomen 2 views supine and erect or decub     Result Date: 2/21/2025  * * *Final Report* * * DATE OF EXAM: Feb 21 2025 10:32AM   MMX   [id number]  -  XR ABDOMEN 1V SUPINE  / ACCESSION #  [id number] PROCEDURE REASON: Evaluate tube, line or lead position      * * * * Physician Interpretation * * * *  Clinical Information: Feeding tube Single view of the abdomen was obtained. Feeding tube tip within the distal stomach. There is a nonspecific,  nonobstructive bowel gas pattern. No abnormal abdominal masses are identified.  No pathologic calcifications.   No acute bony abnormalities are seen.     IMPRESSION: Nonspecific, nonobstructive bowel gas pattern. Feeding tube tip within the distal stomach. : [facility]   Transcribe Date/Time: Feb 21 2025 10:43A Dictated by : PAMELA EVANS MD This examination was interpreted and the report reviewed and electronically signed by: PAMELA EVANS MD on Feb 21 2025 10:44AM  EST     XR chest 1 view     Result Date: 2/21/2025  * * *Final Report* * * DATE OF EXAM: Feb 21 2025 10:32AM   MMX   [id number]  -  XR CHEST 1V FRONTAL PORT  / ACCESSION #  [id number] PROCEDURE REASON: Shortness of breath      * * * * Physician Interpretation * * * *  EXAMINATION:  CHEST RADIOGRAPH (PORTABLE SINGLE VIEW AP) Exam Date/Time:  2/21/2025 10:32 AM Clinical History:  Shortness of breath Comparison:  02/17/2025 RESULT: LINES, TUBES, AND DEVICES:  Feeding tube tip beyond the inferior extent of the image. CARDIOMEDIASTINAL SILHOUETTE:  The cardiac and mediastinal silhouettes appear unremarkable. LUNGS AND PLEURA: No consolidation.  No pleural effusion. No pulmonary vascular congestion. No pneumothorax identified. OTHER:  N/A     IMPRESSION: No acute abnormality identified. : [facility]   Transcribe Date/Time: Feb 21 2025 10:41A Dictated by : PAMELA EVANS MD This examination was interpreted and the report reviewed and electronically signed by: PAMELA EVANS MD on Feb 21 2025 10:43AM  EST            Assessment/Plan     Assessment & Plan  Hemoptysis     Tracheostomy in place (Multi)     CVA, old, hemiparesis (Multi)     Status post insertion of percutaneous endoscopic gastrostomy (PEG) tube (Multi)     Pneumonia due to infectious organism     Right sided weakness     Atrial flutter (Multi)     Insulin dependent type 2 diabetes mellitus (Multi)        Telemetry monitoring  Hemoglobin stable 8's recently on review of outside labs,  monitor for ongoing bleeding.  Daily H&H  Monitor for overt bleeding  Consult pulmonology, appreciate input  Pneumonia suspected for CT findings  Tracheostomy protocol  Expected cover with Zosyn and vancomycin  MRSA swab  Urine for Legionella antigen and strep pneumonia antigen  Supplemental oxygen as needed  Nebulizers as needed  RT evaluate and treat  Check procalcitonin  Typical 20% zinc oxide to wounds and groin  Continuous tube feeds, continue at 80 mL an hour x 22 hours  Accu-Chek every 6 hours  Hypoglycemia protocol  PT/OT  Anticoagulation and antiplatelet therapy on hold, resume once cleared by pulmonology  Continue patient's home medications for chronic issues as appropriate     Patient fully evaluated on March 23.  Continue to monitor H&H.  Pulmonary consultation obtained.  Continue broad-spectrum antibiotics to rule out infectious process.  Recheck labs in AM.                 MD Nany Gipson      Patient with significant clinical improvement noted, patient medically cleared for discharge today. Patient seen resting in bed with head of bed elevated, no s/s or c/o acute difficulties at this time. Vital signs for last 24 hours:  Temp:  [35.2 °C (95.4 °F)-36.5 °C (97.7 °F)] 36.3 °C (97.3 °F)  Heart Rate:  [53-71] 57  Resp:  [17-20] 19  BP: (102-180)/(55-81) 145/68  FiO2 (%):  [20 %-35 %] 20 % Medications and labs reviewed-   Results for orders placed or performed during the hospital encounter of 03/22/25 (from the past 24 hours)   POCT GLUCOSE   Result Value Ref Range    POCT Glucose 228 (H) 74 - 99 mg/dL   POCT GLUCOSE   Result Value Ref Range    POCT Glucose 223 (H) 74 - 99 mg/dL   Comprehensive Metabolic Panel   Result Value Ref Range    Glucose 229 (H) 74 - 99 mg/dL    Sodium 146 (H) 136 - 145 mmol/L    Potassium 3.9 3.5 - 5.3 mmol/L    Chloride 111 (H) 98 - 107 mmol/L    Bicarbonate 28 21 - 32 mmol/L    Anion Gap 11 10 - 20 mmol/L    Urea Nitrogen 25 (H) 6 - 23 mg/dL     Creatinine 0.79 0.50 - 1.30 mg/dL    eGFR >90 >60 mL/min/1.73m*2    Calcium 8.7 8.6 - 10.3 mg/dL    Albumin 3.0 (L) 3.4 - 5.0 g/dL    Alkaline Phosphatase 200 (H) 33 - 136 U/L    Total Protein 6.0 (L) 6.4 - 8.2 g/dL    AST 22 9 - 39 U/L    Bilirubin, Total 0.3 0.0 - 1.2 mg/dL    ALT 59 (H) 10 - 52 U/L   CBC and Auto Differential   Result Value Ref Range    WBC 10.2 4.4 - 11.3 x10*3/uL    nRBC 5.2 (H) 0.0 - 0.0 /100 WBCs    RBC 3.03 (L) 4.50 - 5.90 x10*6/uL    Hemoglobin 8.3 (L) 13.5 - 17.5 g/dL    Hematocrit 30.5 (L) 41.0 - 52.0 %     (H) 80 - 100 fL    MCH 27.4 26.0 - 34.0 pg    MCHC 27.2 (L) 32.0 - 36.0 g/dL    RDW 22.5 (H) 11.5 - 14.5 %    Platelets 324 150 - 450 x10*3/uL    Immature Granulocytes %, Automated 5.0 (H) 0.0 - 0.9 %    Immature Granulocytes Absolute, Automated 0.51 0.00 - 0.70 x10*3/uL   Manual Differential   Result Value Ref Range    Neutrophils %, Manual 86.0 40.0 - 80.0 %    Bands %, Manual 5.0 0.0 - 5.0 %    Lymphocytes %, Manual 6.0 13.0 - 44.0 %    Monocytes %, Manual 3.0 2.0 - 10.0 %    Eosinophils %, Manual 0.0 0.0 - 6.0 %    Basophils %, Manual 0.0 0.0 - 2.0 %    Seg Neutrophils Absolute, Manual 8.77 (H) 1.20 - 7.00 x10*3/uL    Bands Absolute, Manual 0.51 0.00 - 0.70 x10*3/uL    Lymphocytes Absolute, Manual 0.61 (L) 1.20 - 4.80 x10*3/uL    Monocytes Absolute, Manual 0.31 0.10 - 1.00 x10*3/uL    Eosinophils Absolute, Manual 0.00 0.00 - 0.70 x10*3/uL    Basophils Absolute, Manual 0.00 0.00 - 0.10 x10*3/uL    Total Cells Counted 100     Neutrophils Absolute, Manual 9.28 (H) 1.20 - 7.70 x10*3/uL    RBC Morphology See Below     Polychromasia Mild     Hypochromia Mild    POCT GLUCOSE   Result Value Ref Range    POCT Glucose 227 (H) 74 - 99 mg/dL      Patient recently received an antibiotic (last 12 hours)       Date/Time Action Medication Dose    04/03/25 1433 New Bag    piperacillin-tazobactam (Zosyn) 3.375 g in dextrose (iso) IV 50 mL 3.375 g    04/03/25 0805 New Bag     piperacillin-tazobactam (Zosyn) 3.375 g in dextrose (iso) IV 50 mL 3.375 g           No results found for the last 90 days.   Full evaluated on 4/3.    Patient ready fro discharge, Vitals today include Temp:  [35.2 °C (95.4 °F)-36.5 °C (97.7 °F)] 36.3 °C (97.3 °F)  Heart Rate:  [53-71] 57  Resp:  [17-20] 19  BP: (102-180)/(55-81) 145/68  FiO2 (%):  [20 %-35 %] 27%, increased to 30-35%.  Medically cleared for discharge to [facility]. Will continue Lantus 50 units.   Continue aggressive pulmonary hygiene and oral hygiene. Off loading as tolerated for skin integrity.  Plan discussed with interdisciplinary team, no acute events overnight, patient denies chest pain, worsening SOB at this time. Ok to discharge, will continue current and repeat labs in the AM if patient still hospitalized. Patient aware and agreeable to current plan, continue plan as above.     I spent 30 minutes on the date of the service which included preparing to see the patient, face-to-face patient care, completing clinical documentation, obtaining and/or reviewing separately obtained history, performing a medically appropriate examination, counseling and educating the patient/family/caregiver, ordering medications, tests, or procedures, communicating with other HCPs (not separately reported), independently interpreting results (not separately reported), communicating results to the patient/family/caregiver, and care coordination (not separately reported  Discharge Diagnosis  Hemoptysis    Issues Requiring Follow-Up      Discharge Meds     Medication List      START taking these medications     amiodarone 200 mg tablet; Commonly known as: Pacerone; 1 tablet (200 mg)   by g-tube route 2 times a day.   epoetin nicko 20,000 unit/mL injection; Commonly known as: Procrit;   Inject 1.5 mL (30,000 Units) under the skin 1 (one) time per week.; Start   taking on: April 6, 2025   insulin glargine 100 unit/mL injection; Commonly known  as: Lantus;   Inject 50 Units under the skin once daily at bedtime. Take as directed per   insulin instructions.; Replaces: Lantus Solostar U-100 Insulin 100 unit/mL   (3 mL) pen   * ipratropium 0.02 % nebulizer solution; Commonly known as: Atrovent;   Take 2.5 mL (0.5 mg) by nebulization 2 times a day.   * ipratropium 0.02 % nebulizer solution; Commonly known as: Atrovent;   Take 2.5 mL (0.5 mg) by nebulization every 2 hours if needed for wheezing   or shortness of breath.   zinc oxide 20 % ointment; Apply 1 Application topically every 1 hour if   needed for irritation.  * This list has 2 medication(s) that are the same as other medications   prescribed for you. Read the directions carefully, and ask your doctor or   other care provider to review them with you.     CONTINUE taking these medications     acetaminophen 325 mg chewable tablet   acetylcysteine 200 mg/mL (20 %) nebulizer solution; Commonly known as:   Mucomyst   apixaban 5 mg tablet; Commonly known as: Eliquis   cholecalciferol 1,250 mcg (50,000 unit) tablet; Commonly known as:   Vitamin D3   HumaLOG KwikPen Insulin 100 unit/mL pen; Generic drug: insulin lispro   metoprolol tartrate 50 mg tablet; Commonly known as: Lopressor   oxygen gas therapy; Commonly known as: O2   sodium chloride 0.65 % nasal drops; Commonly known as: Ayr   sucralfate 1 gram tablet; Commonly known as: Carafate   traZODone 50 mg tablet; Commonly known as: Desyrel     STOP taking these medications     albuterol 2.5 mg /3 mL (0.083 %) nebulizer solution   atorvastatin 80 mg tablet; Commonly known as: Lipitor   clopidogrel 75 mg tablet; Commonly known as: Plavix   docusate sodium 100 mg capsule; Commonly known as: Colace   empagliflozin 25 mg tablet; Commonly known as: Jardiance   hydrALAZINE 50 mg tablet; Commonly known as: Apresoline   ipratropium-albuteroL 0.5-2.5 mg/3 mL nebulizer solution; Commonly known   as: Duo-Neb   Lantus Solostar U-100 Insulin 100 unit/mL (3 mL) pen; Generic  drug:   insulin glargine; Replaced by: insulin glargine 100 unit/mL injection       Test Results Pending At Discharge  Pending Labs       No current pending labs.            Hospital Course       Pertinent Physical Exam At Time of Discharge  Physical Exam    Outpatient Follow-Up  No future appointments.      ANA MARIA GONZALES

## 2025-04-03 NOTE — CARE PLAN
The clinical goals for the shift include comfort and turn appropriately      Problem: Pain - Adult  Goal: Verbalizes/displays adequate comfort level or baseline comfort level  Outcome: Progressing     Problem: Discharge Planning  Goal: Discharge to home or other facility with appropriate resources  Outcome: Progressing     Problem: Chronic Conditions and Co-morbidities  Goal: Patient's chronic conditions and co-morbidity symptoms are monitored and maintained or improved  Outcome: Progressing     Problem: Skin  Goal: Decreased wound size/increased tissue granulation at next dressing change  4/3/2025 1420 by Jt Moore RN  Outcome: Progressing  4/3/2025 1420 by Jt Moore RN  Flowsheets (Taken 4/3/2025 1420)  Decreased wound size/increased tissue granulation at next dressing change: Protective dressings over bony prominences

## 2025-04-03 NOTE — PROGRESS NOTES
Assessment and Plan  Patient is 64 y.o. male  with the following medical Problems:  Hemoptysis (resolved  Acute on chronic hypoxic respiratory failure requiring heated high flow oxygen.  Aspiration pneumonia.  Chronic tracheostomy  Old CVA.  Chronic PEG tube.  Chronic atrial fibrillation  Type 2 diabetes    Plan of Care:  Continue supplemental oxygen to keep sat 88 to 94% and continue with humidification  Patient was discussed with respiratory therapist.  Passy-[place] valve will be trialed.  Aspiration precaution and head of bed elevation 30 degrees.  Hemoptysis likely related to frequent suctioning.  It has almost resolved.  DVT prophylaxis.  Will initiate pharmacological DVT prophylaxis.  Bilateral lower extremities ultrasound to evaluate for DVTs  Continue with Atrovent nebulized therapy.      History of Present Illness:   Bryan Nix is a 64-year-old male with past medical history of atrial flutter on Eliquis, hyponatremia, IDDM, CAD, hypertension, chronic wounds, diffuse lymphadenopathy, anemia, history of smoking, history elevated PSA, CVA L hemisphere (2020) with right-sided deficits, Left ICA stenosis (85%) s/p angioplasty and left carotid artery stent (08/25/2023) and recent history of acute pontine stroke 02/27/25 with acute hypoxic respiratory failure s/p tracheostomy on 2/25/2025 and PEG.  Patient presents for bleeding from tracheostomy.  Patient is alert and oriented x 3, however limited verbal communication due to tracheostomy.  Wife is at bedside and assists with history.  She reports that patient was coughing blood out of his tracheostomy.  ER provider felt bleeding was likely superficial around site.  Patient has been on room air at skilled nursing facility.  He was placed on Airvo in the ED mainly to humidify oxygen.  Patient never desaturated per ER report.  Currently he is resting comfortably in the bed, breath sounds slightly rhonchorous, but unlabored.  Reports chills, but no fevers.  He has  right-sided weakness and sensory deficits.  Denies headache, vision or hearing changes, chest pains, palpitations, shortness of breath, nausea, vomiting, abdominal pain, diarrhea, or complications of PEG tube site.  Wife reports patient skin became irritated in his groin due to adhesive from a condom cath previously, he has a small decubitus wound that she reports is healing well.  He receives tube feeds continuously.  Wife reports a patient was recently evaluated by speech for p.o. to intake, however he still remains n.p.o. except for meds and tube feeds through PEG. CODE STATUS reviewed: full code     ER Course: Hemodynamically stable, afebrile, SpO2 to 97% trach mask.  WBC 9.3, hemoglobin 8, hematocrit 29.4, platelets 197.  INR 1.4.  Glucose 223, BUN 36, otherwise CMP is normal.  CT of the chest for PE pending.  CXR unremarkable. Trannexamic acid soak gauze wrapped around trach.      Past medical history: As above  Past surgical history: As above  Social history: Former smoker 37.5 pack years-quit smoking age 63, occasional alcohol use, no illicit drug use.  .  Works in maintenance.  6 children.  Family history: Noncontributory    Daily progress:  April 2, 2025: Patient remains lethargic.  He is currently on heated high flow oxygen 30 L 30% FiO2.  Chest x-ray from April 1, 2025 was personally reviewed and independently interpreted and showed left retrocardiac opacity suggestive of pneumonia.  Patient continues to be on Zosyn.  Eliquis was placed on hold with no evidence of recurrence of hemoptysis.  Patient will be reviewed with primary team.    April 3, 2025: Patient is more awake and interactive today.  He continues to be on heated high flow oxygen 30 L/min 30% FiO2.  Hemoptysis has resolved.  Patient is Eliquis continues to be held for questionable GI bleeding and hemoptysis.  Patient was discussed with primary physician and he was placed on heparin subcu for DVT prophylaxis.  Case was reviewed with the  respiratory therapist and patient will be initiated on a Passy-[place] valve.  Chest x-ray from April 1, 2025 was personally reviewed and independently interpreted and showed small left-sided pleural effusion.    Past Medical/Surgical History:   Past Medical History:   Diagnosis Date    Abnormal finding of blood chemistry, unspecified 05/18/2016    Abnormal blood chemistry    Acute upper respiratory infection, unspecified 12/02/2015    Acute upper respiratory infection    Elevated prostate specific antigen (PSA)     Elevated prostate specific antigen (PSA)    Encounter for screening for malignant neoplasm of colon 01/30/2021    Colon cancer screening    Encounter for screening for malignant neoplasm of prostate 11/30/2020    Prostate cancer screening    Essential (primary) hypertension 07/30/2013    Benign essential hypertension    Other conditions influencing health status 07/30/2013    Diabetes Mellitus Poorly Controlled    Other conditions influencing health status 12/02/2015    History of cough    Personal history of other endocrine, nutritional and metabolic disease 03/18/2021    History of hyperlipidemia    Personal history of other endocrine, nutritional and metabolic disease 03/18/2021    History of diabetes mellitus    Personal history of other specified conditions 05/18/2016    History of chest pain    Personal history of other specified conditions 05/18/2016    History of dyspnea    Personal history of other specified conditions 05/18/2016    History of dizziness    Personal history of other specified conditions 05/18/2016    History of fatigue     Past Surgical History:   Procedure Laterality Date    CT ANGIO CORONARY ART WITH HEARTFLOW IF SCORE >30%  8/23/2023    CT HEART CORONARY ANGIOGRAM 8/23/2023 [facility] CT    MR HEAD ANGIO WO IV CONTRAST  12/14/2020    MR HEAD ANGIO WO IV CONTRAST 12/14/2020 [facility]    MR NECK ANGIO WO IV CONTRAST  12/14/2020    MR NECK ANGIO WO IV CONTRAST 12/14/2020  [facility]    OTHER SURGICAL HISTORY  12/01/2020    Hand fracture repair    OTHER SURGICAL HISTORY  12/01/2020    Nasal bone fracture repair       Family History:   No relevant family history has been documented for this patient.    Allergies:     No Known Allergies      Social history:   reports that he has quit smoking. His smoking use included cigarettes. He has never used smokeless tobacco.    Current Medications:   No recently discontinued medications to reconcile    Review of Systems:   Unable to obtain due to medical condition    Physical Exam:   Vital Signs:   Vitals:    04/03/25 1127   BP:    Pulse:    Resp:    Temp:    SpO2: 96%       Input/Output:    Intake/Output Summary (Last 24 hours) at 4/3/2025 1159  Last data filed at 4/3/2025 0228  Gross per 24 hour   Intake 650 ml   Output 700 ml   Net -50 ml       Oxygen requirements:    Ventilator Information:  FiO2 (%):  [28 %-35 %] 28 %          General appearance: Not in pain or distress, in no respiratory distress    HEENT:  +ve tracheostomy  Neck: Supple, no jugular venous distension, lymphadenopathy, thyromegaly or carotid bruits  Chest: Equal normal breath sounds, no wheezing, no crackles and no tenderness over ribs   Cardiovascular: Normal S1, S2, regular rate and rhythm, no murmur, rub or gallop  Abdomen: Normal sounds present, soft, lax with no tenderness, no hepatosplenomegaly, and no masses  Extremities: No edema. Pulses are equally present.   Skin: intact, no rashes   Neurologic: Awake and follows command, moves the left side but hemiplegic on the right side, able to speak with a Passy-[place] valve.    Investigations:  Labs, radiological imaging and cardiac work up were personally reviewed          .       STAFF PHYSICIAN NOTE OF PERSONAL INVOLVEMENT IN CARE  As the attending physician, I certify that I personally reviewed the patient's history and personally examined the patient to confirm the physical findings described above, and that I  reviewed the relevant imaging studies and available reports.  I also discussed the differential diagnosis and all of the proposed management plans with the patient and individuals accompanying the patient to this visit.  They had the opportunity to ask questions about the proposed management plans and to have those questions answered.

## 2025-04-03 NOTE — PROGRESS NOTES
Patient fully evaluated    for    Acute kidney injury  Hypernatremia  Anemia of blood loss renal disease  Transaminitis  Rhabdomyolysis    Serum sodium was 146 today  Patient denies any shortness of breath or chest pain  Hg Improving 8.3  Problem List Items Addressed This Visit       * (Principal) Hemoptysis    Relevant Orders    Esophagogastroduodenoscopy (EGD) (Completed)    Surgical Pathology Exam (Completed)    CVA, old, hemiparesis (Multi) - Primary    Relevant Orders    Vascular US lower extremity venous duplex bilateral (Completed)    Melena    Relevant Medications    zinc oxide 20 % ointment    ipratropium (Atrovent) 0.02 % nebulizer solution    ipratropium (Atrovent) 0.02 % nebulizer solution    epoetin nicko (Procrit) 20,000 unit/mL injection (Start on 4/6/2025)    amiodarone (Pacerone) 200 mg tablet    insulin glargine (Lantus) 100 unit/mL injection    Other Relevant Orders    Esophagogastroduodenoscopy (EGD) (Completed)    Surgical Pathology Exam (Completed)    CBC    Comprehensive metabolic panel     Other Visit Diagnoses       Anemia, unspecified type        Relevant Orders    Esophagogastroduodenoscopy (EGD) (Completed)    Surgical Pathology Exam (Completed)    Tracheostomy dependent (Multi)        Relevant Orders    Surgical Pathology Exam (Completed)    Other specified soft tissue disorders              Patient seen resting in bed  He is asymptomatic   Labs reviewed  Hemoglobin Uptrending  Sodium  to 144  .  Vital signs for last 24 hours Temp:  [35.2 °C (95.4 °F)-36.6 °C (97.9 °F)] 36.5 °C (97.7 °F)  Heart Rate:  [53-71] 64  Resp:  [17-20] 17  BP: (102-180)/(55-81) 140/65  FiO2 (%):  [28 %-35 %] 28 %       Results for orders placed or performed during the hospital encounter of 03/22/25 (from the past 24 hours)   POCT GLUCOSE   Result Value Ref Range    POCT Glucose 173 (H) 74 - 99 mg/dL   POCT GLUCOSE   Result Value Ref Range    POCT Glucose 228 (H) 74 - 99 mg/dL   POCT GLUCOSE   Result Value Ref  Range    POCT Glucose 223 (H) 74 - 99 mg/dL   Comprehensive Metabolic Panel   Result Value Ref Range    Glucose 229 (H) 74 - 99 mg/dL    Sodium 146 (H) 136 - 145 mmol/L    Potassium 3.9 3.5 - 5.3 mmol/L    Chloride 111 (H) 98 - 107 mmol/L    Bicarbonate 28 21 - 32 mmol/L    Anion Gap 11 10 - 20 mmol/L    Urea Nitrogen 25 (H) 6 - 23 mg/dL    Creatinine 0.79 0.50 - 1.30 mg/dL    eGFR >90 >60 mL/min/1.73m*2    Calcium 8.7 8.6 - 10.3 mg/dL    Albumin 3.0 (L) 3.4 - 5.0 g/dL    Alkaline Phosphatase 200 (H) 33 - 136 U/L    Total Protein 6.0 (L) 6.4 - 8.2 g/dL    AST 22 9 - 39 U/L    Bilirubin, Total 0.3 0.0 - 1.2 mg/dL    ALT 59 (H) 10 - 52 U/L   CBC and Auto Differential   Result Value Ref Range    WBC 10.2 4.4 - 11.3 x10*3/uL    nRBC 5.2 (H) 0.0 - 0.0 /100 WBCs    RBC 3.03 (L) 4.50 - 5.90 x10*6/uL    Hemoglobin 8.3 (L) 13.5 - 17.5 g/dL    Hematocrit 30.5 (L) 41.0 - 52.0 %     (H) 80 - 100 fL    MCH 27.4 26.0 - 34.0 pg    MCHC 27.2 (L) 32.0 - 36.0 g/dL    RDW 22.5 (H) 11.5 - 14.5 %    Platelets 324 150 - 450 x10*3/uL    Immature Granulocytes %, Automated 5.0 (H) 0.0 - 0.9 %    Immature Granulocytes Absolute, Automated 0.51 0.00 - 0.70 x10*3/uL   Manual Differential   Result Value Ref Range    Neutrophils %, Manual 86.0 40.0 - 80.0 %    Bands %, Manual 5.0 0.0 - 5.0 %    Lymphocytes %, Manual 6.0 13.0 - 44.0 %    Monocytes %, Manual 3.0 2.0 - 10.0 %    Eosinophils %, Manual 0.0 0.0 - 6.0 %    Basophils %, Manual 0.0 0.0 - 2.0 %    Seg Neutrophils Absolute, Manual 8.77 (H) 1.20 - 7.00 x10*3/uL    Bands Absolute, Manual 0.51 0.00 - 0.70 x10*3/uL    Lymphocytes Absolute, Manual 0.61 (L) 1.20 - 4.80 x10*3/uL    Monocytes Absolute, Manual 0.31 0.10 - 1.00 x10*3/uL    Eosinophils Absolute, Manual 0.00 0.00 - 0.70 x10*3/uL    Basophils Absolute, Manual 0.00 0.00 - 0.10 x10*3/uL    Total Cells Counted 100     Neutrophils Absolute, Manual 9.28 (H) 1.20 - 7.70 x10*3/uL    RBC Morphology See Below     Polychromasia Mild      Hypochromia Mild       Patient fractional excretion of sodium is 0.2%  Patient fractional excretion of urea is 32.16%  Positive Hemoccult stools  Patient recently received an antibiotic (last 12 hours)       Date/Time Action Medication Dose    03/26/25 0903 New Bag    piperacillin-tazobactam (Zosyn) 3.375 g in dextrose (iso) IV 50 mL 3.375 g    03/26/25 0148 New Bag    piperacillin-tazobactam (Zosyn) 3.375 g in dextrose (iso) IV 50 mL 3.375 g           Plan discussed with interdisciplinary team, continue current and repeat labs in the AM.               Physical Exam  General Appearance; asthenic habitus  Mild skin pallor  Poor skin turgor  HEENT; pupils react to light and accommodation  There is no nystagmus or ptosis; extraocular movements are intact  Neck; no adenopathy; no jugular vein distention; no bruit; no thyromegaly  Tracheostomy in place clean without exudation or induration  Lungs; minimal inspiratory coarse crackles at the bases  Heart; regular rate no gallop no rubs or thrills no lifts  Abdomen; active peristalsis no rebound or guarding there is a PEG tube in place  ; no CVA tenderness  Extremities; decreased muscle tone  Neurological; pathological reflexes are absent      Assessment/Plan     Acute kidney injury  Hypernatremia  Anemia of blood loss renal disease  Transaminitis  Rhabdomyolysis    Plan    Continue Erythropoietin/IV Iron at [facility]  Continue to monitor renal chemistries and CBC  Matthew Johnson MD

## 2025-04-03 NOTE — PROGRESS NOTES
04/03/25 0956   Discharge Planning   Home or Post Acute Services Post acute facilities (Rehab/SNF/etc)   Type of Post Acute Facility Services Skilled nursing   Expected Discharge Disposition SNF   Does the patient need discharge transport arranged? Yes   RoundTrip coordination needed? Yes   Has discharge transport been arranged? No   Intensity of Service   Intensity of Service 0-30 min     Message received from [facility] that auth is still pending at this time.  Care transitions to follow for discharge planning.      1115am:  Met with patient and wife at bedside to update that auth is still pending for [facility].  Wife stated that they would be open to returning to [facility] and rehab.  No return referral has been placed, will contact [facility] to place return referral.  Care transitions to follow.      1207pm: Received message that precert would be needed for [facility] and they are able to accept.  Discussed with patient's wife at bedside.  Wife stated to stick with the plan for [facility].  [facility] supervisor contacted regarding escalating precert.  Care transitions to follow.      1311pm: Precert obtained.  Patient and family aware.  Will contact [facility] to set up transport to [facility].      1344pm: Transport arranged for 430pm today.  Family made aware at bedside.  Bedside nurse provided with time and number for report.  Care transitions to follow.

## 2025-04-03 NOTE — ASSESSMENT & PLAN NOTE
Zeus Bone MD  Physician  Internal Medicine     H&P      Signed     Date of Service: 3/23/2025 10:28 AM     Signed       Expand All Collapse All    History Of Present Illness  Bryan Nix is a 64-year-old male with past medical history of atrial flutter on Eliquis, hyponatremia, IDDM, CAD, hypertension, chronic wounds, diffuse lymphadenopathy, anemia, history of smoking, history elevated PSA, CVA L hemisphere (2020) with right-sided deficits, Left ICA stenosis (85%) s/p angioplasty and left carotid artery stent (08/25/2023) and recent history of acute pontine stroke 02/27/25 with acute hypoxic respiratory failure s/p tracheostomy on 2/25/2025 and PEG.  Patient presents for bleeding from tracheostomy.  Patient is alert and oriented x 3, however limited verbal communication due to tracheostomy.  Wife is at bedside and assists with history.  She reports that patient was coughing blood out of his tracheostomy.  ER provider felt bleeding was likely superficial around site.  Patient has been on room air at skilled nursing facility.  He was placed on Airvo in the ED mainly to humidify oxygen.  Patient never desaturated per ER report.  Currently he is resting comfortably in the bed, breath sounds slightly rhonchorous, but unlabored.  Reports chills, but no fevers.  He has right-sided weakness and sensory deficits.  Denies headache, vision or hearing changes, chest pains, palpitations, shortness of breath, nausea, vomiting, abdominal pain, diarrhea, or complications of PEG tube site.  Wife reports patient skin became irritated in his groin due to adhesive from a condom cath previously, he has a small decubitus wound that she reports is healing well.  He receives tube feeds continuously.  Wife reports a patient was recently evaluated by speech for p.o. to intake, however he still remains n.p.o. except for meds and tube feeds through PEG. CODE STATUS reviewed: full code     ER Course: Hemodynamically stable,  afebrile, SpO2 to 97% trach mask.  WBC 9.3, hemoglobin 8, hematocrit 29.4, platelets 197.  INR 1.4.  Glucose 223, BUN 36, otherwise CMP is normal.  CT of the chest for PE pending.  CXR unremarkable. Trannexamic acid soak gauze wrapped around trach.      Past medical history: As above  Past surgical history: As above  Social history: Former smoker 37.5 pack years-quit smoking age 63, occasional alcohol use, no illicit drug use.  .  Works in maintenance.  6 children.  Family history: Noncontributory     Past Medical History  Medical History        Past Medical History:   Diagnosis Date    Abnormal finding of blood chemistry, unspecified 05/18/2016     Abnormal blood chemistry    Acute upper respiratory infection, unspecified 12/02/2015     Acute upper respiratory infection    Elevated prostate specific antigen (PSA)       Elevated prostate specific antigen (PSA)    Encounter for screening for malignant neoplasm of colon 01/30/2021     Colon cancer screening    Encounter for screening for malignant neoplasm of prostate 11/30/2020     Prostate cancer screening    Essential (primary) hypertension 07/30/2013     Benign essential hypertension    Other conditions influencing health status 07/30/2013     Diabetes Mellitus Poorly Controlled    Other conditions influencing health status 12/02/2015     History of cough    Personal history of other endocrine, nutritional and metabolic disease 03/18/2021     History of hyperlipidemia    Personal history of other endocrine, nutritional and metabolic disease 03/18/2021     History of diabetes mellitus    Personal history of other specified conditions 05/18/2016     History of chest pain    Personal history of other specified conditions 05/18/2016     History of dyspnea    Personal history of other specified conditions 05/18/2016     History of dizziness    Personal history of other specified conditions 05/18/2016     History of fatigue            Surgical History  Surgical  History         Past Surgical History:   Procedure Laterality Date    CT ANGIO CORONARY ART WITH HEARTFLOW IF SCORE >30%   8/23/2023     CT HEART CORONARY ANGIOGRAM 8/23/2023 [facility] CT    MR HEAD ANGIO WO IV CONTRAST   12/14/2020     MR HEAD ANGIO WO IV CONTRAST 12/14/2020 [facility]    MR NECK ANGIO WO IV CONTRAST   12/14/2020     MR NECK ANGIO WO IV CONTRAST 12/14/2020 [facility]    OTHER SURGICAL HISTORY   12/01/2020     Hand fracture repair    OTHER SURGICAL HISTORY   12/01/2020     Nasal bone fracture repair            Social History  He reports that he has quit smoking. His smoking use included cigarettes. He has never used smokeless tobacco. No history on file for alcohol use and drug use.     Family History  Family History   No family history on file.        Allergies  Patient has no known allergies.     Review of Systems     Physical Exam  Constitutional:       General: He is awake. He is not in acute distress.     Appearance: Normal appearance. He is not toxic-appearing.   HENT:      Head: Atraumatic.      Nose: Nose normal.      Mouth/Throat:      Mouth: Mucous membranes are moist.   Eyes:      Conjunctiva/sclera: Conjunctivae normal.   Cardiovascular:      Rate and Rhythm: Normal rate and regular rhythm.      Pulses: Normal pulses.      Heart sounds: No murmur heard.  Pulmonary:      Effort: No respiratory distress.      Comments: Tracheostomy site midline, no drainage around dressing.  Rhonchorous breath sounds, unlabored respirations, RT bedside suctioning patient with thick blood-tinged sputum  Abdominal:      General: Bowel sounds are normal. There is no distension.      Palpations: Abdomen is soft.      Tenderness: There is no abdominal tenderness. There is no guarding.      Comments: Left upper quadrant PEG site tube site, no drainage or skin breakdown around tube site.   Musculoskeletal:         General: No swelling, deformity or signs of injury.      Cervical back: Neck  "supple.      Right lower leg: No edema.      Left lower leg: No edema.   Skin:     General: Skin is warm and dry.      Capillary Refill: Capillary refill takes less than 2 seconds.      Findings: Wound (sacrum) present. No ecchymosis or erythema.   Neurological:      Mental Status: He is alert and oriented to person, place, and time.      Cranial Nerves: No facial asymmetry.      Sensory: Sensory deficit (RUE, RLE) present.      Motor: Weakness present.      Comments: Right sided upper and lower extremity motor function deficits   Psychiatric:         Mood and Affect: Mood normal.         Behavior: Behavior is cooperative.            Last Recorded Vitals  Blood pressure 134/50, pulse 93, temperature 36.9 °C (98.4 °F), temperature source Temporal, resp. rate 22, height 1.88 m (6' 2\"), weight 77.1 kg (170 lb), SpO2 98%.     Relevant Results              Results for orders placed or performed during the hospital encounter of 03/22/25 (from the past 24 hours)   CBC and Auto Differential   Result Value Ref Range     WBC 9.3 4.4 - 11.3 x10*3/uL     nRBC 0.3 (H) 0.0 - 0.0 /100 WBCs     RBC 3.02 (L) 4.50 - 5.90 x10*6/uL     Hemoglobin 8.0 (L) 13.5 - 17.5 g/dL     Hematocrit 29.4 (L) 41.0 - 52.0 %     MCV 97 80 - 100 fL     MCH 26.5 26.0 - 34.0 pg     MCHC 27.2 (L) 32.0 - 36.0 g/dL     RDW 15.6 (H) 11.5 - 14.5 %     Platelets 197 150 - 450 x10*3/uL     Neutrophils % 77.1 40.0 - 80.0 %     Immature Granulocytes %, Automated 1.5 (H) 0.0 - 0.9 %     Lymphocytes % 13.1 13.0 - 44.0 %     Monocytes % 4.1 2.0 - 10.0 %     Eosinophils % 3.2 0.0 - 6.0 %     Basophils % 1.0 0.0 - 2.0 %     Neutrophils Absolute 7.14 1.20 - 7.70 x10*3/uL     Immature Granulocytes Absolute, Automated 0.14 0.00 - 0.70 x10*3/uL     Lymphocytes Absolute 1.21 1.20 - 4.80 x10*3/uL     Monocytes Absolute 0.38 0.10 - 1.00 x10*3/uL     Eosinophils Absolute 0.30 0.00 - 0.70 x10*3/uL     Basophils Absolute 0.09 0.00 - 0.10 x10*3/uL   Basic metabolic panel   Result " Value Ref Range     Glucose 223 (H) 74 - 99 mg/dL     Sodium 142 136 - 145 mmol/L     Potassium 4.3 3.5 - 5.3 mmol/L     Chloride 103 98 - 107 mmol/L     Bicarbonate 27 21 - 32 mmol/L     Anion Gap 16 10 - 20 mmol/L     Urea Nitrogen 36 (H) 6 - 23 mg/dL     Creatinine 0.81 0.50 - 1.30 mg/dL     eGFR >90 >60 mL/min/1.73m*2     Calcium 9.6 8.6 - 10.3 mg/dL   Protime-INR   Result Value Ref Range     Protime 15.1 (H) 9.8 - 12.4 seconds     INR 1.4 (H) 0.9 - 1.1   aPTT   Result Value Ref Range     aPTT 25 (L) 26 - 36 seconds   Type and Screen   Result Value Ref Range     ABO TYPE B       Rh TYPE POS       ANTIBODY SCREEN NEG     VERIFY ABO/Rh Group Test   Result Value Ref Range     ABO TYPE B       Rh TYPE POS     MRSA Surveillance for Vancomycin De-escalation, PCR     Specimen: Anterior Nares; Swab   Result Value Ref Range     MRSA PCR Not Detected Not Detected   POCT GLUCOSE   Result Value Ref Range     POCT Glucose 180 (H) 74 - 99 mg/dL   POCT GLUCOSE   Result Value Ref Range     POCT Glucose 161 (H) 74 - 99 mg/dL   POCT GLUCOSE   Result Value Ref Range     POCT Glucose 156 (H) 74 - 99 mg/dL   Basic metabolic panel   Result Value Ref Range     Glucose 170 (H) 74 - 99 mg/dL     Sodium 147 (H) 136 - 145 mmol/L     Potassium 4.2 3.5 - 5.3 mmol/L     Chloride 107 98 - 107 mmol/L     Bicarbonate 30 21 - 32 mmol/L     Anion Gap 14 10 - 20 mmol/L     Urea Nitrogen 35 (H) 6 - 23 mg/dL     Creatinine 0.85 0.50 - 1.30 mg/dL     eGFR >90 >60 mL/min/1.73m*2     Calcium 9.4 8.6 - 10.3 mg/dL   CBC   Result Value Ref Range     WBC 10.4 4.4 - 11.3 x10*3/uL     nRBC 0.6 (H) 0.0 - 0.0 /100 WBCs     RBC 2.87 (L) 4.50 - 5.90 x10*6/uL     Hemoglobin 7.8 (L) 13.5 - 17.5 g/dL     Hematocrit 26.6 (L) 41.0 - 52.0 %     MCV 93 80 - 100 fL     MCH 27.2 26.0 - 34.0 pg     MCHC 29.3 (L) 32.0 - 36.0 g/dL     RDW 15.7 (H) 11.5 - 14.5 %     Platelets 205 150 - 450 x10*3/uL   SST TOP   Result Value Ref Range     Extra Tube Hold for add-ons.        CT  angio chest for pulmonary embolism     Result Date: 3/22/2025  Interpreted By:  Pamela Zurita, STUDY: CT ANGIO CHEST FOR PULMONARY EMBOLISM;  3/22/2025 4:56 pm   INDICATION: Signs/Symptoms:bloody secretions, eval for PE.     COMPARISON: CT chest without contrast 03/22/2025   ACCESSION NUMBER(S): [id number]   ORDERING CLINICIAN: PAMELA HAHN   TECHNIQUE: Contiguous axial images of the chest were obtained after the intravenous administration of iodinated contrast using angiographic PE protocol. Coronal and sagittal reformatted images were reconstructed from the axial data. MIP images were created on an independent workstation and reviewed.   FINDINGS:   MEDIASTINUM AND LYMPH NODES: Inflammatory fat stranding along the tracheostomy tract without discrete fluid collection. The esophageal wall appears within normal limits. There is redemonstration of diffuse lymphadenopathy in the bilateral axilla, mediastinum, bilateral supraclavicular as described in detail on separate report. Also notable or enlarged bilateral level 4 cervical lymph nodes (right > left).   VESSELS:  Normal caliber thoracic aorta without dissection. Moderate aortic atherosclerosis.  No acute pulmonary embolism.   HEART: Normal size.  Mild coronary artery calcifications. No significant pericardial effusion.   LUNG, AIRWAYS, PLEURA: There is slight increase in debris within the trachea. There has been clearing of secretions in the left upper/lower lobe bronchus. There is diffuse bilateral bronchial thickening slightly increased from prior study. There is peribronchovascular ground-glass opacity in the posterior right upper lobe and superior and basal segments of the right lower lobe. There is slightly worsened atelectasis in the lung bases remaining predominantly subsegmental in nature. There is a 0.8 cm nodule in the left upper lobe that is stable from 08/19/2023.   OSSEOUS STRUCTURES: No acute osseous abnormality.   CHEST WALL SOFT TISSUES: No  discernible acute abnormality.   UPPER ABDOMEN/OTHER: No acute abnormality.        No acute pulmonary embolism.   Peribronchial vascular ground glass opacities in the right upper and lower lobe again concerning for pneumonia given patient's risk factors of tracheostomy and debris in the airways.   Redemonstration of diffuse lymphadenopathy in the lower necks, axilla, and mediastinum. Findings are either diffusely reactive in nature, reflective of lymphoma/leukemia, meter deficiency, or less likely head neck neoplasm is previously postulated. However please correlate with past medical history.   Inflammatory changes along the tracheostomy tract without fluid collection. Correlate for erythema.   Unchanged 0.8 cm nodule in the left upper lobe dating back to 08/19/2023. Recommend 1 year follow up chest CT to ensure at least 2 years of stability.   MACRO: None.   Signed by: Pamela Zurita 3/22/2025 7:17 PM Dictation workstation:   [id number]     CT chest wo IV contrast     Result Date: 3/22/2025  Interpreted By:  Pamela Zurita, STUDY: CT CHEST WO IV CONTRAST;  3/22/2025 3:41 pm   INDICATION: Signs/Symptoms:eval for alveolar hemorrhage.     COMPARISON: CT head/neck 08/19/2023   ACCESSION NUMBER(S): [id number]   ORDERING CLINICIAN: PAMELA HAHN   TECHNIQUE: Contiguous axial images of the chest and upper abdomen were obtained without contrast. Coronal and sagittal reformatted images were reconstructed from the axial data.   FINDINGS:   MEDIASTINUM AND LYMPH NODES: There is fat stranding/edema along the tract of the tracheostomy about discrete fluid collection. The esophageal wall appears within normal limits. There are numerous enlarged bilateral supraclavicular lymph nodes measuring up to 1.4 cm on the left. There are numerous enlarged bilateral axillary lymph nodes measuring up to 1.1 cm on the right and 1.2 cm on the left. There are numerous prominent to mildly enlarged mediastinal lymph nodes as well. No  pneumomediastinum.   VESSELS:  Normal caliber thoracic aorta. Moderate aortic atherosclerosis. The main pulmonary artery is normal in size.   HEART: Normal size.  Mild coronary artery calcifications. No significant pericardial effusion.   LUNG, AIRWAYS, PLEURA: Tracheostomy noted in appropriate position. There is trace mucus in the left mainstem bronchus and at the origin of the left upper lobe and lower lobe bronchi. There are mild peribronchovascular ground-glass opacities in the posterior segment of the right upper lobe, basal segments of the right lower lobe. There is mild dependent bibasilar atelectasis. There is no pleural effusion.   OSSEOUS STRUCTURES: No acute osseous abnormality.   CHEST WALL SOFT TISSUES: No discernible acute abnormality.   UPPER ABDOMEN/OTHER: No acute abnormality.        1. Mild peribronchovascular ground-glass opacities in the posterior right upper lobe and basal segments of the right lower lobe. This appearance and distribution is not typical for alveolar hemorrhage which tends to be centrilobular and bilateral asymmetric in distribution. Findings are most suggestive of pneumonia.   2. Small amount of mucous debris in the left mainstem bronchus and at the origin of the left upper and lower lobe bronchi.   3. Diffuse lymphadenopathy in the bilateral axillary regions, mediastinum and left supraclavicular region particularly notable in the bilateral supraclavicular region. This is progressed in the left supraclavicular region and new elsewhere when compared to 08/19/2023 CTA head/neck. Correlate for any history of known head/neck malignancy or lymphoma.   4. Inflammation/fat stranding adjacent to the tracheostomy tube entry site noted. Correlate for air the met. No evidence of fluid collection.   MACRO: None.   Signed by: Marco A Zurita 3/22/2025 7:10 PM Dictation workstation:   [id number]     XR chest 1 view     Result Date: 3/22/2025  Interpreted By:  Fermin Stroud, STUDY: XR CHEST  1 VIEW   INDICATION: Signs/Symptoms:hemoptysis.   COMPARISON: August 19, 2023   ACCESSION NUMBER(S): [id number]   ORDERING CLINICIAN: PAMELA HAHN   FINDINGS: No consolidation, effusion, edema, or pneumothorax. Heart size within normal limits.        No evidence of acute intrathoracic abnormality.   Signed by: Fermin Stroud 3/22/2025 2:01 PM Dictation workstation:   [id number]     XR chest 1 view     Result Date: 3/14/2025  * * *Final Report* * * DATE OF EXAM: Mar 14 2025  8:52AM   S5X   5290  -  XR CHEST 1V FRONTAL   / ACCESSION #  [id number] PROCEDURE REASON: new bleeding from trach      * * * * Physician Interpretation * * * *  EXAMINATION:  CHEST RADIOGRAPH (PORTABLE SINGLE VIEW AP) Exam Date/Time:  3/14/2025 8:52 AM Clinical History:  new bleeding from trach Comparison:  03/03/2025 RESULT: LINES, TUBES, AND DEVICES:  Tracheostomy. CARDIOMEDIASTINAL SILHOUETTE:  The cardiac and mediastinal silhouettes appear unremarkable. LUNGS AND PLEURA: No consolidation.  No pleural effusion. No pulmonary vascular congestion. No pneumothorax identified. OTHER:  N/A     IMPRESSION: No acute abnormality identified. : [facility]   Transcribe Date/Time: Mar 14 2025  9:07A Dictated by : PAMELA EVANS MD This examination was interpreted and the report reviewed and electronically signed by: PAMELA EVANS MD on Mar 14 2025  9:11AM  EST     US abdomen complete     Result Date: 3/11/2025  ABDOMEN ULTRASOUND-COMPLETE FINDINGS: Abdomen Ultrasound Complete: PROCEDURE: Multiple grayscale mages of the abdomen were obtained. FINDINGS:Multiple real time images demonstrate the liver with increased echogenicity consistent with fatty infiltration. There is no evidence of a discrete mass within the liver. The liver is enlarged, measured at 15.7 cm in size. The gallbladder is seen with no evidence of cholelithiasis. The gallbladder wall is within normal limits. The common bile duct is within normal limits. The visualized pancreas  appears unremarkable. Both kidneys are seen with no evidence of hydronephrosis or a calculus. The spleen has its normal homogeneous echo appearance. There is no free fluid to suggest ascites. The visualized aorta appears unremarkable. The inferior vena cava appears patent. CONCLUSION: Enlarged fatty liver. ELECTRONICALLY SIGNED BY LEONEL LAN M.D. 3/11/2025 5:00:52 PM EDT.     XR chest 1 view     Result Date: 3/3/2025  * * *Final Report* * * DATE OF EXAM: Mar  3 2025  1:40PM   S5X   5290  -  XR CHEST 1V FRONTAL   / ACCESSION #  [id number] PROCEDURE REASON: resp failure      * * * * Physician Interpretation * * * *  EXAMINATION:  CHEST RADIOGRAPH (PORTABLE SINGLE VIEW AP) Exam Date/Time:  3/3/2025 1:40 PM Indication:   resp failure M:  XCP_4 Comparison:  1 day prior RESULT: Lines, tubes, and devices:  Tracheostomy tube remains in situ. Lungs and pleura:  Mild patchy opacities right lower lung zone seen on the prior study are no longer appreciated.  No confluent opacity.   Costophrenic angles are clear.  No pneumothorax. Cardiomediastinal silhouette:  Stable cardiomediastinal silhouette. Other:  -     IMPRESSION: Improved aeration right lower lung zone.  No acute cardiopulmonary finding. : [facility]   Transcribe Date/Time: Mar  3 2025  2:49P Dictated by : ISRAEL BENSON MD This examination was interpreted and the report reviewed and electronically signed by: ISRAEL BENSON MD on Mar  3 2025  2:49PM  EST     XR chest 1 view     Result Date: 3/2/2025  * * *Final Report* * * DATE OF EXAM: Mar  2 2025 11:25AM   MMX   5376  -  XR CHEST 1V FRONTAL PORT  / ACCESSION #  [id number] PROCEDURE REASON: Shortness of breath      * * * * Physician Interpretation * * * *  EXAMINATION:  CHEST RADIOGRAPH (PORTABLE SINGLE VIEW AP) Exam Date/Time:  3/2/2025 11:25 AM CLINICAL HISTORY: Shortness of breath MQ:  XCPR_5 Comparison:  02/20/2025. RESULT: This is a limited examination due to multiple wires and tubing  obscuring the lower lungs. Lines, tubes, and devices:  A tracheostomy tube remains in place. Lungs and pleura:  There are increased bronchovascular markings in the right lower lung which may be due to bronchitis or early changes of aspiration pneumonia.  Clinical correlation and follow-up exams are recommended. Cardiomediastinal silhouette:  Stable cardiomediastinal silhouette. Other:  The visualized bony thorax appears unremarkable.     IMPRESSION: Nonspecific parenchymal changes in the right lower lung as described above. A follow-up exam is recommended. : [facility]   Transcribe Date/Time: Mar  2 2025 11:53A Dictated by : BJ INIGUEZ MD This examination was interpreted and the report reviewed and electronically signed by: BJ INIGUEZ MD on Mar  2 2025 11:54AM  EST     XR chest 1 view     Result Date: 2/28/2025  * * *Final Report* * * DATE OF EXAM: Feb 28 2025 10:36AM   MMX   5290  -  XR CHEST 1V FRONTAL   / ACCESSION #  [id number] PROCEDURE REASON: Shortness of breath      * * * * Physician Interpretation * * * *  EXAMINATION:  CHEST RADIOGRAPH (SINGLE VIEW AP OR PA) CLINICAL HISTORY: Shortness of breath MQ:  XC1_5 Comparison:  02/22/2025 RESULT: Lines, tubes, and devices:  Tracheostomy tube is noted. Lungs and pleura:  No consolidation. No lung mass. No pleural effusion. Cardiomediastinal silhouette:  Normal cardiomediastinal silhouette. Other:  No acute osseous pathology.     IMPRESSION: No acute radiographic abnormality. : [facility]   Transcribe Date/Time: Feb 28 2025 12:07P Dictated by : DIANNE RAMOS MD This examination was interpreted and the report reviewed and electronically signed by: DIANNE RAMOS MD on Feb 28 2025 12:08PM  EST     XR abdomen 2 views supine and erect or decub     Result Date: 2/24/2025  * * *Final Report* * * DATE OF EXAM: Feb 24 2025  6:03PM   MMX   5289  -  XR ABDOMEN 1V SUPINE  / ACCESSION #  [id number] PROCEDURE REASON: Evaluate tube, line or lead position       * * * * Physician Interpretation * * * *  EXAMINATION:  XR ABDOMEN 1V SUPINE CLINICAL HISTORY:   Evaluate tube, line or lead position Technique:   XR ABDOMEN 1V SUPINE -- NOT APPLICABLE with 1 views on 1 images Comparison: 2/21/2025 RESULT: Feeding tube with distal tip at the pylorus antrum. Moderate colonic stool burden. No dilated bowel. Lung structures are intact.     IMPRESSION: Feeding tube with distal tip at the pylorus antrum. Moderate colonic stool burden. No dilated bowel. : [facility]   Transcribe Date/Time: Feb 24 2025  6:23P Dictated by : PABLO RAMOS MD This examination was interpreted and the report reviewed and electronically signed by: PABLO RAMOS MD on Feb 24 2025  6:24PM  EST     CT head wo IV contrast     Result Date: 2/23/2025  * * *Final Report* * * DATE OF EXAM: Feb 23 2025 12:04PM   [facility]   0504  -  CT BRAIN WO IVCON  / ACCESSION #  [id number] PROCEDURE REASON: Stroke, follow up      * * * * Physician Interpretation * * * *  EXAMINATION: CT BRAIN WO IVCON CLINICAL HISTORY: Stroke, follow up TECHNIQUE:  Serial axial images without IV contrast were obtained from the vertex to the foramen magnum. MQ:  CTBWO_3 CT Radiation dose: Integrated Dose-Length Product (DLP) for this visit =   778  mGy*cm CT Dose Reduction Employed: Iterative recon COMPARISON: MRI brain 02/19/2025 RESULT: Post-operative change: None. Acute change: Evolving acute infarct along the central eli and superior left medulla (2:7-9).  No evidence of hemorrhagic transformation. Hemorrhage: No evidence of acute intracranial hemorrhage. Mass Lesion / Mass Effect: There is no evidence of an intracranial mass or extraaxial fluid collection. No significant mass effect. Chronic change: Stable remote left MCA territory encephalomalacia. Parenchyma: There is no significant volume loss. The brain parenchyma is otherwise within normal limits for age. Ventricles: The ventricles are within normal limits of size and configuration  for age. Paranasal sinuses and skull base: Partial opacification of the left maxillary and sphenoid sinus and multiple bilateral ethmoid air cells.   The remaining visualized paranasal sinuses are grossly clear. The skull base and imaged soft tissues are unremarkable. Localizer images: No additional findings.     IMPRESSION: Evolving acute brainstem infarct.  No evidence of hemorrhagic transformation. Stable remote left MCA territory infarct. : [facility]   Transcribe Date/Time: Feb 23 2025 12:48P Dictated by : FRANKY GUZMAN MD   This examination was interpreted and the report reviewed and electronically signed by: FRANKY GUZMAN MD on Feb 23 2025 12:52PM  EST     XR chest 1 view     Result Date: 2/22/2025  * * *Final Report* * * DATE OF EXAM: Feb 22 2025  6:56AM   MMX   [id number]  -  XR CHEST 1V FRONTAL   / ACCESSION #  [id number] PROCEDURE REASON: Evaluate tube, line,  or lead position      * * * * Physician Interpretation * * * *  EXAMINATION:  CHEST RADIOGRAPH (SINGLE VIEW AP OR PA) CLINICAL HISTORY: Evaluate tube, line,  or lead position MQ:  XC1_5 Comparison:  02/21/2025. RESULT: Lines, tubes, and devices:  An ET tube and feeding tube remain in place.   A poorly defined right venous catheter cannot be excluded. Lungs and pleura:  There are persistent bilateral lower lungs infiltrates seen on the right more than left suggestive of pneumonia such as aspiration pneumonia.  There is no definite pleural effusion. Cardiomediastinal silhouette:  Stable cardiomediastinal silhouette. Other:  The visualized bony thorax appears unremarkable.     IMPRESSION: Persistent bilateral lower lungs infiltrates as described above. A follow-up exam is recommended. : [facility]   Transcribe Date/Time: Feb 22 2025  8:20A Dictated by : BJ INIGUEZ MD This examination was interpreted and the report reviewed and electronically signed by: BJ INIGUEZ MD on Feb 22 2025  8:21AM  EST     XR chest 1 view     Result Date:  2/21/2025  * * *Final Report* * * DATE OF EXAM: Feb 21 2025  5:59PM   MMX   [id number]  -  XR CHEST 1V FRONTAL PORT  / ACCESSION #  [id number] PROCEDURE REASON: Evaluate tube, line,  or lead position      * * * * Physician Interpretation * * * *  EXAMINATION:  CHEST RADIOGRAPH (PORTABLE SINGLE VIEW AP) Exam Date/Time:  2/21/2025 5:59 PM CLINICAL HISTORY: Evaluate tube, line,  or lead position MQ:  XCPR_5 Comparison:  02/21/2025 RESULT: Lines, tubes, and devices: Endotracheal tube in place terminating in enteric tube in place terminating below the field of view. Lungs and pleura: No pneumothorax.  No pleural effusion.  Bilateral mild interstitial opacities.  The right costophrenic angle is not included in the field-of-view. Cardiomediastinal silhouette:  Stable cardiomediastinal silhouette. Other:  .     IMPRESSION: Mild interstitial opacities may be related to pulmonary vascular congestion. : [facility]   Transcribe Date/Time: Feb 21 2025  6:55P Dictated by : IRWIN VALDIVIA MD This examination was interpreted and the report reviewed and electronically signed by: IRWIN VALDIVIA MD on Feb 21 2025  6:57PM  EST     XR abdomen 2 views supine and erect or decub     Result Date: 2/21/2025  * * *Final Report* * * DATE OF EXAM: Feb 21 2025 10:32AM   MMX   [id number]  -  XR ABDOMEN 1V SUPINE  / ACCESSION #  [id number] PROCEDURE REASON: Evaluate tube, line or lead position      * * * * Physician Interpretation * * * *  Clinical Information: Feeding tube Single view of the abdomen was obtained. Feeding tube tip within the distal stomach. There is a nonspecific, nonobstructive bowel gas pattern. No abnormal abdominal masses are identified.  No pathologic calcifications.   No acute bony abnormalities are seen.     IMPRESSION: Nonspecific, nonobstructive bowel gas pattern. Feeding tube tip within the distal stomach. : [facility]   Transcribe Date/Time: Feb 21 2025 10:43A Dictated by : PAMELA EVANS MD  This examination was interpreted and the report reviewed and electronically signed by: PAMELA EVANS MD on Feb 21 2025 10:44AM  EST     XR chest 1 view     Result Date: 2/21/2025  * * *Final Report* * * DATE OF EXAM: Feb 21 2025 10:32AM   MMX   [id number]  -  XR CHEST 1V FRONTAL PORT  / ACCESSION #  [id number] PROCEDURE REASON: Shortness of breath      * * * * Physician Interpretation * * * *  EXAMINATION:  CHEST RADIOGRAPH (PORTABLE SINGLE VIEW AP) Exam Date/Time:  2/21/2025 10:32 AM Clinical History:  Shortness of breath Comparison:  02/17/2025 RESULT: LINES, TUBES, AND DEVICES:  Feeding tube tip beyond the inferior extent of the image. CARDIOMEDIASTINAL SILHOUETTE:  The cardiac and mediastinal silhouettes appear unremarkable. LUNGS AND PLEURA: No consolidation.  No pleural effusion. No pulmonary vascular congestion. No pneumothorax identified. OTHER:  N/A     IMPRESSION: No acute abnormality identified. : [facility]   Transcribe Date/Time: Feb 21 2025 10:41A Dictated by : PAMELA EVANS MD This examination was interpreted and the report reviewed and electronically signed by: PAMELA EVANS MD on Feb 21 2025 10:43AM  EST            Assessment/Plan     Assessment & Plan  Hemoptysis     Tracheostomy in place (Multi)     CVA, old, hemiparesis (Multi)     Status post insertion of percutaneous endoscopic gastrostomy (PEG) tube (Multi)     Pneumonia due to infectious organism     Right sided weakness     Atrial flutter (Multi)     Insulin dependent type 2 diabetes mellitus (Multi)        Telemetry monitoring  Hemoglobin stable 8's recently on review of outside labs, monitor for ongoing bleeding.  Daily H&H  Monitor for overt bleeding  Consult pulmonology, appreciate input  Pneumonia suspected for CT findings  Tracheostomy protocol  Expected cover with Zosyn and vancomycin  MRSA swab  Urine for Legionella antigen and strep pneumonia antigen  Supplemental oxygen as needed  Nebulizers as needed  RT evaluate and  treat  Check procalcitonin  Typical 20% zinc oxide to wounds and groin  Continuous tube feeds, continue at 80 mL an hour x 22 hours  Accu-Chek every 6 hours  Hypoglycemia protocol  PT/OT  Anticoagulation and antiplatelet therapy on hold, resume once cleared by pulmonology  Continue patient's home medications for chronic issues as appropriate     Patient fully evaluated on March 23.  Continue to monitor H&H.  Pulmonary consultation obtained.  Continue broad-spectrum antibiotics to rule out infectious process.  Recheck labs in AM.                 Zeus Bone MD

## 2025-04-07 ENCOUNTER — LAB REQUISITION (OUTPATIENT)
Dept: LAB | Facility: HOSPITAL | Age: 65
End: 2025-04-07
Payer: COMMERCIAL

## 2025-04-07 DIAGNOSIS — R11.10 VOMITING, UNSPECIFIED: ICD-10-CM

## 2025-04-07 DIAGNOSIS — R05.9 COUGH, UNSPECIFIED: ICD-10-CM

## 2025-04-07 LAB
ALBUMIN SERPL BCP-MCNC: 2.9 G/DL (ref 3.4–5)
ALP SERPL-CCNC: 143 U/L (ref 33–136)
ALT SERPL W P-5'-P-CCNC: 40 U/L (ref 10–52)
ANION GAP SERPL CALC-SCNC: 12 MMOL/L (ref 10–20)
AST SERPL W P-5'-P-CCNC: 25 U/L (ref 9–39)
BASOPHILS # BLD AUTO: 0.06 X10*3/UL (ref 0–0.1)
BASOPHILS NFR BLD AUTO: 0.5 %
BILIRUB SERPL-MCNC: 0.5 MG/DL (ref 0–1.2)
BUN SERPL-MCNC: 31 MG/DL (ref 6–23)
CALCIUM SERPL-MCNC: 8.8 MG/DL (ref 8.6–10.3)
CHLORIDE SERPL-SCNC: 106 MMOL/L (ref 98–107)
CO2 SERPL-SCNC: 29 MMOL/L (ref 21–32)
CREAT SERPL-MCNC: 0.79 MG/DL (ref 0.5–1.3)
EGFRCR SERPLBLD CKD-EPI 2021: >90 ML/MIN/1.73M*2
EOSINOPHIL # BLD AUTO: 0.05 X10*3/UL (ref 0–0.7)
EOSINOPHIL NFR BLD AUTO: 0.4 %
ERYTHROCYTE [DISTWIDTH] IN BLOOD BY AUTOMATED COUNT: 21.7 % (ref 11.5–14.5)
GLUCOSE SERPL-MCNC: 110 MG/DL (ref 74–99)
HCT VFR BLD AUTO: 33.6 % (ref 41–52)
HGB BLD-MCNC: 9.3 G/DL (ref 13.5–17.5)
IMM GRANULOCYTES # BLD AUTO: 0.11 X10*3/UL (ref 0–0.7)
IMM GRANULOCYTES NFR BLD AUTO: 0.9 % (ref 0–0.9)
LYMPHOCYTES # BLD AUTO: 0.98 X10*3/UL (ref 1.2–4.8)
LYMPHOCYTES NFR BLD AUTO: 8 %
MCH RBC QN AUTO: 27.2 PG (ref 26–34)
MCHC RBC AUTO-ENTMCNC: 27.7 G/DL (ref 32–36)
MCV RBC AUTO: 98 FL (ref 80–100)
MONOCYTES # BLD AUTO: 0.48 X10*3/UL (ref 0.1–1)
MONOCYTES NFR BLD AUTO: 3.9 %
NEUTROPHILS # BLD AUTO: 10.54 X10*3/UL (ref 1.2–7.7)
NEUTROPHILS NFR BLD AUTO: 86.3 %
NRBC BLD-RTO: 0.6 /100 WBCS (ref 0–0)
PLATELET # BLD AUTO: 358 X10*3/UL (ref 150–450)
POLYCHROMASIA BLD QL SMEAR: NORMAL
POTASSIUM SERPL-SCNC: 4.2 MMOL/L (ref 3.5–5.3)
PROT SERPL-MCNC: 5.5 G/DL (ref 6.4–8.2)
RBC # BLD AUTO: 3.42 X10*6/UL (ref 4.5–5.9)
RBC MORPH BLD: NORMAL
SODIUM SERPL-SCNC: 143 MMOL/L (ref 136–145)
WBC # BLD AUTO: 12.2 X10*3/UL (ref 4.4–11.3)

## 2025-04-07 PROCEDURE — 80053 COMPREHEN METABOLIC PANEL: CPT | Mod: OUT | Performed by: INTERNAL MEDICINE

## 2025-04-07 PROCEDURE — 36415 COLL VENOUS BLD VENIPUNCTURE: CPT | Mod: OUT | Performed by: INTERNAL MEDICINE

## 2025-04-07 PROCEDURE — 85025 COMPLETE CBC W/AUTO DIFF WBC: CPT | Mod: OUT | Performed by: INTERNAL MEDICINE

## 2025-04-08 ENCOUNTER — LAB REQUISITION (OUTPATIENT)
Dept: LAB | Facility: HOSPITAL | Age: 65
End: 2025-04-08
Payer: COMMERCIAL

## 2025-04-08 DIAGNOSIS — R19.7 DIARRHEA, UNSPECIFIED: ICD-10-CM

## 2025-04-08 PROCEDURE — 87493 C DIFF AMPLIFIED PROBE: CPT | Mod: OUT,PARLAB | Performed by: INTERNAL MEDICINE

## 2025-04-09 LAB — C DIF TOX TCDA+TCDB STL QL NAA+PROBE: DETECTED

## 2025-04-14 ENCOUNTER — LAB REQUISITION (OUTPATIENT)
Dept: LAB | Facility: HOSPITAL | Age: 65
End: 2025-04-14
Payer: COMMERCIAL

## 2025-04-14 DIAGNOSIS — J18.8 OTHER PNEUMONIA, UNSPECIFIED ORGANISM: ICD-10-CM

## 2025-04-14 LAB
ANION GAP SERPL CALC-SCNC: 9 MMOL/L (ref 10–20)
BUN SERPL-MCNC: 22 MG/DL (ref 6–23)
CALCIUM SERPL-MCNC: 8.8 MG/DL (ref 8.6–10.3)
CHLORIDE SERPL-SCNC: 100 MMOL/L (ref 98–107)
CO2 SERPL-SCNC: 32 MMOL/L (ref 21–32)
CREAT SERPL-MCNC: 0.7 MG/DL (ref 0.5–1.3)
EGFRCR SERPLBLD CKD-EPI 2021: >90 ML/MIN/1.73M*2
ERYTHROCYTE [DISTWIDTH] IN BLOOD BY AUTOMATED COUNT: 19.4 % (ref 11.5–14.5)
GLUCOSE SERPL-MCNC: 59 MG/DL (ref 74–99)
HCT VFR BLD AUTO: 31.6 % (ref 41–52)
HGB BLD-MCNC: 9.2 G/DL (ref 13.5–17.5)
LAB AP ASR DISCLAIMER: NORMAL
LABORATORY COMMENT REPORT: NORMAL
MCH RBC QN AUTO: 26.4 PG (ref 26–34)
MCHC RBC AUTO-ENTMCNC: 29.1 G/DL (ref 32–36)
MCV RBC AUTO: 91 FL (ref 80–100)
NRBC BLD-RTO: 0.4 /100 WBCS (ref 0–0)
PATH REPORT.ADDENDUM SPEC: NORMAL
PATH REPORT.FINAL DX SPEC: NORMAL
PATH REPORT.GROSS SPEC: NORMAL
PATH REPORT.RELEVANT HX SPEC: NORMAL
PATH REPORT.TOTAL CANCER: NORMAL
PLATELET # BLD AUTO: 318 X10*3/UL (ref 150–450)
POTASSIUM SERPL-SCNC: 4.2 MMOL/L (ref 3.5–5.3)
RBC # BLD AUTO: 3.48 X10*6/UL (ref 4.5–5.9)
SODIUM SERPL-SCNC: 137 MMOL/L (ref 136–145)
WBC # BLD AUTO: 8 X10*3/UL (ref 4.4–11.3)

## 2025-04-14 PROCEDURE — 85027 COMPLETE CBC AUTOMATED: CPT | Mod: OUT | Performed by: INTERNAL MEDICINE

## 2025-04-14 PROCEDURE — 36415 COLL VENOUS BLD VENIPUNCTURE: CPT | Mod: OUT | Performed by: INTERNAL MEDICINE

## 2025-04-14 PROCEDURE — 82374 ASSAY BLOOD CARBON DIOXIDE: CPT | Mod: OUT | Performed by: INTERNAL MEDICINE

## 2025-04-15 ENCOUNTER — TELEPHONE (OUTPATIENT)
Dept: GASTROENTEROLOGY | Facility: CLINIC | Age: 65
End: 2025-04-15
Payer: COMMERCIAL

## 2025-04-15 NOTE — TELEPHONE ENCOUNTER
Per Dr. Carlton, and in order to schedule an office visit, tried calling the patient, but his mailbox was full. Called his first emergency contact and left a message for them to call and schedule an office visit for the patient.

## 2025-04-21 ENCOUNTER — LAB REQUISITION (OUTPATIENT)
Dept: LAB | Facility: HOSPITAL | Age: 65
End: 2025-04-21
Payer: COMMERCIAL

## 2025-04-21 DIAGNOSIS — I10 ESSENTIAL (PRIMARY) HYPERTENSION: ICD-10-CM

## 2025-04-21 DIAGNOSIS — N18.9 CHRONIC KIDNEY DISEASE, UNSPECIFIED: ICD-10-CM

## 2025-04-21 DIAGNOSIS — D64.9 ANEMIA, UNSPECIFIED: ICD-10-CM

## 2025-04-21 DIAGNOSIS — E87.1 HYPO-OSMOLALITY AND HYPONATREMIA: ICD-10-CM

## 2025-04-21 LAB
ANION GAP SERPL CALC-SCNC: 11 MMOL/L (ref 10–20)
BUN SERPL-MCNC: 23 MG/DL (ref 6–23)
CALCIUM SERPL-MCNC: 8.6 MG/DL (ref 8.6–10.3)
CHLORIDE SERPL-SCNC: 99 MMOL/L (ref 98–107)
CO2 SERPL-SCNC: 30 MMOL/L (ref 21–32)
CREAT SERPL-MCNC: 0.75 MG/DL (ref 0.5–1.3)
EGFRCR SERPLBLD CKD-EPI 2021: >90 ML/MIN/1.73M*2
ERYTHROCYTE [DISTWIDTH] IN BLOOD BY AUTOMATED COUNT: 18.8 % (ref 11.5–14.5)
GLUCOSE SERPL-MCNC: 99 MG/DL (ref 74–99)
HCT VFR BLD AUTO: 37.7 % (ref 41–52)
HGB BLD-MCNC: 10.6 G/DL (ref 13.5–17.5)
MCH RBC QN AUTO: 25.7 PG (ref 26–34)
MCHC RBC AUTO-ENTMCNC: 28.1 G/DL (ref 32–36)
MCV RBC AUTO: 92 FL (ref 80–100)
NRBC BLD-RTO: 0 /100 WBCS (ref 0–0)
PLATELET # BLD AUTO: 226 X10*3/UL (ref 150–450)
POTASSIUM SERPL-SCNC: 4 MMOL/L (ref 3.5–5.3)
RBC # BLD AUTO: 4.12 X10*6/UL (ref 4.5–5.9)
SODIUM SERPL-SCNC: 136 MMOL/L (ref 136–145)
WBC # BLD AUTO: 6.7 X10*3/UL (ref 4.4–11.3)

## 2025-04-21 PROCEDURE — 36415 COLL VENOUS BLD VENIPUNCTURE: CPT | Mod: OUT | Performed by: INTERNAL MEDICINE

## 2025-04-21 PROCEDURE — 80048 BASIC METABOLIC PNL TOTAL CA: CPT | Mod: OUT | Performed by: INTERNAL MEDICINE

## 2025-04-21 PROCEDURE — 85027 COMPLETE CBC AUTOMATED: CPT | Mod: OUT | Performed by: INTERNAL MEDICINE

## 2025-04-23 PROCEDURE — 5A0935A ASSISTANCE WITH RESPIRATORY VENTILATION, LESS THAN 24 CONSECUTIVE HOURS, HIGH NASAL FLOW/VELOCITY: ICD-10-PCS | Performed by: INTERNAL MEDICINE

## 2025-04-26 ENCOUNTER — LAB REQUISITION (OUTPATIENT)
Dept: LAB | Facility: HOSPITAL | Age: 65
End: 2025-04-26
Payer: COMMERCIAL

## 2025-04-26 DIAGNOSIS — R19.7 DIARRHEA, UNSPECIFIED: ICD-10-CM

## 2025-04-26 PROCEDURE — 87493 C DIFF AMPLIFIED PROBE: CPT | Mod: OUT | Performed by: INTERNAL MEDICINE

## 2025-04-27 LAB — C DIF TOX TCDA+TCDB STL QL NAA+PROBE: DETECTED

## 2025-04-28 ENCOUNTER — LAB REQUISITION (OUTPATIENT)
Dept: LAB | Facility: HOSPITAL | Age: 65
End: 2025-04-28
Payer: COMMERCIAL

## 2025-04-28 DIAGNOSIS — R09.02 HYPOXEMIA: ICD-10-CM

## 2025-04-28 LAB
ANION GAP SERPL CALC-SCNC: 12 MMOL/L (ref 10–20)
BUN SERPL-MCNC: 21 MG/DL (ref 6–23)
CALCIUM SERPL-MCNC: 8.9 MG/DL (ref 8.6–10.3)
CHLORIDE SERPL-SCNC: 99 MMOL/L (ref 98–107)
CO2 SERPL-SCNC: 29 MMOL/L (ref 21–32)
CREAT SERPL-MCNC: 0.67 MG/DL (ref 0.5–1.3)
EGFRCR SERPLBLD CKD-EPI 2021: >90 ML/MIN/1.73M*2
ERYTHROCYTE [DISTWIDTH] IN BLOOD BY AUTOMATED COUNT: 17.7 % (ref 11.5–14.5)
GLUCOSE SERPL-MCNC: 169 MG/DL (ref 74–99)
HCT VFR BLD AUTO: 46.1 % (ref 41–52)
HGB BLD-MCNC: 13.2 G/DL (ref 13.5–17.5)
MCH RBC QN AUTO: 26.1 PG (ref 26–34)
MCHC RBC AUTO-ENTMCNC: 28.6 G/DL (ref 32–36)
MCV RBC AUTO: 91 FL (ref 80–100)
NRBC BLD-RTO: 0 /100 WBCS (ref 0–0)
PLATELET # BLD AUTO: 265 X10*3/UL (ref 150–450)
POTASSIUM SERPL-SCNC: 4.1 MMOL/L (ref 3.5–5.3)
RBC # BLD AUTO: 5.05 X10*6/UL (ref 4.5–5.9)
SODIUM SERPL-SCNC: 136 MMOL/L (ref 136–145)
WBC # BLD AUTO: 6.7 X10*3/UL (ref 4.4–11.3)

## 2025-04-28 PROCEDURE — 80048 BASIC METABOLIC PNL TOTAL CA: CPT | Mod: OUT | Performed by: INTERNAL MEDICINE

## 2025-04-28 PROCEDURE — 36415 COLL VENOUS BLD VENIPUNCTURE: CPT | Mod: OUT | Performed by: INTERNAL MEDICINE

## 2025-04-28 PROCEDURE — 85027 COMPLETE CBC AUTOMATED: CPT | Mod: OUT | Performed by: INTERNAL MEDICINE

## 2025-05-05 ENCOUNTER — LAB REQUISITION (OUTPATIENT)
Dept: LAB | Facility: HOSPITAL | Age: 65
End: 2025-05-05
Payer: COMMERCIAL

## 2025-05-05 DIAGNOSIS — I63.9 CEREBRAL INFARCTION, UNSPECIFIED: ICD-10-CM

## 2025-05-05 DIAGNOSIS — D64.9 ANEMIA, UNSPECIFIED: ICD-10-CM

## 2025-05-05 DIAGNOSIS — I10 ESSENTIAL (PRIMARY) HYPERTENSION: ICD-10-CM

## 2025-05-05 LAB
ANION GAP SERPL CALC-SCNC: 11 MMOL/L (ref 10–20)
BUN SERPL-MCNC: 20 MG/DL (ref 6–23)
CALCIUM SERPL-MCNC: 9.1 MG/DL (ref 8.6–10.3)
CHLORIDE SERPL-SCNC: 99 MMOL/L (ref 98–107)
CO2 SERPL-SCNC: 30 MMOL/L (ref 21–32)
CREAT SERPL-MCNC: 0.62 MG/DL (ref 0.5–1.3)
EGFRCR SERPLBLD CKD-EPI 2021: >90 ML/MIN/1.73M*2
ERYTHROCYTE [DISTWIDTH] IN BLOOD BY AUTOMATED COUNT: 17.2 % (ref 11.5–14.5)
GLUCOSE SERPL-MCNC: 166 MG/DL (ref 74–99)
HCT VFR BLD AUTO: 46 % (ref 41–52)
HGB BLD-MCNC: 13.9 G/DL (ref 13.5–17.5)
MCH RBC QN AUTO: 26.3 PG (ref 26–34)
MCHC RBC AUTO-ENTMCNC: 30.2 G/DL (ref 32–36)
MCV RBC AUTO: 87 FL (ref 80–100)
NRBC BLD-RTO: 0 /100 WBCS (ref 0–0)
PLATELET # BLD AUTO: 311 X10*3/UL (ref 150–450)
POTASSIUM SERPL-SCNC: 4.3 MMOL/L (ref 3.5–5.3)
RBC # BLD AUTO: 5.28 X10*6/UL (ref 4.5–5.9)
SODIUM SERPL-SCNC: 136 MMOL/L (ref 136–145)
WBC # BLD AUTO: 9.5 X10*3/UL (ref 4.4–11.3)

## 2025-05-05 PROCEDURE — 80048 BASIC METABOLIC PNL TOTAL CA: CPT | Mod: OUT | Performed by: INTERNAL MEDICINE

## 2025-05-05 PROCEDURE — 85027 COMPLETE CBC AUTOMATED: CPT | Mod: OUT | Performed by: INTERNAL MEDICINE

## 2025-05-05 PROCEDURE — 36415 COLL VENOUS BLD VENIPUNCTURE: CPT | Mod: OUT | Performed by: INTERNAL MEDICINE

## 2025-05-12 ENCOUNTER — LAB REQUISITION (OUTPATIENT)
Dept: LAB | Facility: HOSPITAL | Age: 65
End: 2025-05-12
Payer: COMMERCIAL

## 2025-05-12 DIAGNOSIS — R79.9 ABNORMAL FINDING OF BLOOD CHEMISTRY, UNSPECIFIED: ICD-10-CM

## 2025-05-12 LAB
ANION GAP SERPL CALC-SCNC: 13 MMOL/L (ref 10–20)
BUN SERPL-MCNC: 28 MG/DL (ref 6–23)
CALCIUM SERPL-MCNC: 9 MG/DL (ref 8.6–10.3)
CHLORIDE SERPL-SCNC: 99 MMOL/L (ref 98–107)
CO2 SERPL-SCNC: 31 MMOL/L (ref 21–32)
CREAT SERPL-MCNC: 0.8 MG/DL (ref 0.5–1.3)
EGFRCR SERPLBLD CKD-EPI 2021: >90 ML/MIN/1.73M*2
ERYTHROCYTE [DISTWIDTH] IN BLOOD BY AUTOMATED COUNT: 17.2 % (ref 11.5–14.5)
GLUCOSE SERPL-MCNC: 194 MG/DL (ref 74–99)
HCT VFR BLD AUTO: 45 % (ref 41–52)
HGB BLD-MCNC: 13.5 G/DL (ref 13.5–17.5)
MCH RBC QN AUTO: 26.3 PG (ref 26–34)
MCHC RBC AUTO-ENTMCNC: 30 G/DL (ref 32–36)
MCV RBC AUTO: 88 FL (ref 80–100)
NRBC BLD-RTO: 0 /100 WBCS (ref 0–0)
PLATELET # BLD AUTO: 312 X10*3/UL (ref 150–450)
POTASSIUM SERPL-SCNC: 4.6 MMOL/L (ref 3.5–5.3)
RBC # BLD AUTO: 5.14 X10*6/UL (ref 4.5–5.9)
SODIUM SERPL-SCNC: 138 MMOL/L (ref 136–145)
WBC # BLD AUTO: 6.4 X10*3/UL (ref 4.4–11.3)

## 2025-05-12 PROCEDURE — 36415 COLL VENOUS BLD VENIPUNCTURE: CPT | Mod: OUT | Performed by: INTERNAL MEDICINE

## 2025-05-12 PROCEDURE — 85027 COMPLETE CBC AUTOMATED: CPT | Mod: OUT | Performed by: INTERNAL MEDICINE

## 2025-05-12 PROCEDURE — 80048 BASIC METABOLIC PNL TOTAL CA: CPT | Mod: OUT | Performed by: INTERNAL MEDICINE

## 2025-05-19 ENCOUNTER — LAB REQUISITION (OUTPATIENT)
Dept: LAB | Facility: HOSPITAL | Age: 65
End: 2025-05-19
Payer: COMMERCIAL

## 2025-05-19 DIAGNOSIS — I10 ESSENTIAL (PRIMARY) HYPERTENSION: ICD-10-CM

## 2025-05-19 DIAGNOSIS — D64.9 ANEMIA, UNSPECIFIED: ICD-10-CM

## 2025-05-19 LAB
ANION GAP SERPL CALC-SCNC: 12 MMOL/L (ref 10–20)
BUN SERPL-MCNC: 32 MG/DL (ref 6–23)
CALCIUM SERPL-MCNC: 9.1 MG/DL (ref 8.6–10.3)
CHLORIDE SERPL-SCNC: 100 MMOL/L (ref 98–107)
CO2 SERPL-SCNC: 31 MMOL/L (ref 21–32)
CREAT SERPL-MCNC: 0.77 MG/DL (ref 0.5–1.3)
EGFRCR SERPLBLD CKD-EPI 2021: >90 ML/MIN/1.73M*2
ERYTHROCYTE [DISTWIDTH] IN BLOOD BY AUTOMATED COUNT: 17.2 % (ref 11.5–14.5)
GLUCOSE SERPL-MCNC: 179 MG/DL (ref 74–99)
HCT VFR BLD AUTO: 46.4 % (ref 41–52)
HGB BLD-MCNC: 14 G/DL (ref 13.5–17.5)
MCH RBC QN AUTO: 26.1 PG (ref 26–34)
MCHC RBC AUTO-ENTMCNC: 30.2 G/DL (ref 32–36)
MCV RBC AUTO: 86 FL (ref 80–100)
NRBC BLD-RTO: 0 /100 WBCS (ref 0–0)
PLATELET # BLD AUTO: 272 X10*3/UL (ref 150–450)
POTASSIUM SERPL-SCNC: 4.6 MMOL/L (ref 3.5–5.3)
RBC # BLD AUTO: 5.37 X10*6/UL (ref 4.5–5.9)
SODIUM SERPL-SCNC: 138 MMOL/L (ref 136–145)
WBC # BLD AUTO: 8.1 X10*3/UL (ref 4.4–11.3)

## 2025-05-19 PROCEDURE — 85027 COMPLETE CBC AUTOMATED: CPT | Mod: OUT | Performed by: INTERNAL MEDICINE

## 2025-05-19 PROCEDURE — 36415 COLL VENOUS BLD VENIPUNCTURE: CPT | Mod: OUT | Performed by: INTERNAL MEDICINE

## 2025-05-19 PROCEDURE — 80048 BASIC METABOLIC PNL TOTAL CA: CPT | Mod: OUT | Performed by: INTERNAL MEDICINE

## 2025-05-20 ENCOUNTER — HOSPITAL ENCOUNTER (EMERGENCY)
Facility: HOSPITAL | Age: 65
Discharge: NURSING, REHAB OR LONG-TERM CARE | End: 2025-05-20
Attending: STUDENT IN AN ORGANIZED HEALTH CARE EDUCATION/TRAINING PROGRAM
Payer: COMMERCIAL

## 2025-05-20 ENCOUNTER — APPOINTMENT (OUTPATIENT)
Dept: RADIOLOGY | Facility: HOSPITAL | Age: 65
End: 2025-05-20
Payer: COMMERCIAL

## 2025-05-20 ENCOUNTER — APPOINTMENT (OUTPATIENT)
Dept: CARDIOLOGY | Facility: HOSPITAL | Age: 65
End: 2025-05-20
Payer: COMMERCIAL

## 2025-05-20 VITALS
DIASTOLIC BLOOD PRESSURE: 60 MMHG | HEIGHT: 74 IN | HEART RATE: 48 BPM | WEIGHT: 170 LBS | TEMPERATURE: 97.9 F | RESPIRATION RATE: 14 BRPM | OXYGEN SATURATION: 96 % | SYSTOLIC BLOOD PRESSURE: 133 MMHG | BODY MASS INDEX: 21.82 KG/M2

## 2025-05-20 DIAGNOSIS — K29.80 DUODENITIS: Primary | ICD-10-CM

## 2025-05-20 LAB
ALBUMIN SERPL BCP-MCNC: 3.9 G/DL (ref 3.4–5)
ALP SERPL-CCNC: 259 U/L (ref 33–136)
ALT SERPL W P-5'-P-CCNC: 182 U/L (ref 10–52)
ANION GAP SERPL CALC-SCNC: 12 MMOL/L (ref 10–20)
AST SERPL W P-5'-P-CCNC: 45 U/L (ref 9–39)
BASOPHILS # BLD AUTO: 0.01 X10*3/UL (ref 0–0.1)
BASOPHILS NFR BLD AUTO: 0.2 %
BILIRUB DIRECT SERPL-MCNC: 0 MG/DL (ref 0–0.3)
BILIRUB SERPL-MCNC: 0.4 MG/DL (ref 0–1.2)
BUN SERPL-MCNC: 29 MG/DL (ref 6–23)
CALCIUM SERPL-MCNC: 9.6 MG/DL (ref 8.6–10.3)
CARDIAC TROPONIN I PNL SERPL HS: 14 NG/L (ref 0–20)
CARDIAC TROPONIN I PNL SERPL HS: 14 NG/L (ref 0–20)
CHLORIDE SERPL-SCNC: 98 MMOL/L (ref 98–107)
CO2 SERPL-SCNC: 31 MMOL/L (ref 21–32)
CREAT SERPL-MCNC: 0.84 MG/DL (ref 0.5–1.3)
EGFRCR SERPLBLD CKD-EPI 2021: >90 ML/MIN/1.73M*2
EOSINOPHIL # BLD AUTO: 0.14 X10*3/UL (ref 0–0.7)
EOSINOPHIL NFR BLD AUTO: 2.1 %
ERYTHROCYTE [DISTWIDTH] IN BLOOD BY AUTOMATED COUNT: 16.8 % (ref 11.5–14.5)
GLUCOSE SERPL-MCNC: 246 MG/DL (ref 74–99)
HCT VFR BLD AUTO: 49.3 % (ref 41–52)
HGB BLD-MCNC: 15 G/DL (ref 13.5–17.5)
IMM GRANULOCYTES # BLD AUTO: 0.05 X10*3/UL (ref 0–0.7)
IMM GRANULOCYTES NFR BLD AUTO: 0.8 % (ref 0–0.9)
LIPASE SERPL-CCNC: 18 U/L (ref 9–82)
LYMPHOCYTES # BLD AUTO: 1.15 X10*3/UL (ref 1.2–4.8)
LYMPHOCYTES NFR BLD AUTO: 17.4 %
MCH RBC QN AUTO: 25.8 PG (ref 26–34)
MCHC RBC AUTO-ENTMCNC: 30.4 G/DL (ref 32–36)
MCV RBC AUTO: 85 FL (ref 80–100)
MONOCYTES # BLD AUTO: 0.32 X10*3/UL (ref 0.1–1)
MONOCYTES NFR BLD AUTO: 4.8 %
NEUTROPHILS # BLD AUTO: 4.95 X10*3/UL (ref 1.2–7.7)
NEUTROPHILS NFR BLD AUTO: 74.7 %
NRBC BLD-RTO: 0 /100 WBCS (ref 0–0)
PLATELET # BLD AUTO: 277 X10*3/UL (ref 150–450)
POTASSIUM SERPL-SCNC: 4.1 MMOL/L (ref 3.5–5.3)
PROT SERPL-MCNC: 7.3 G/DL (ref 6.4–8.2)
RBC # BLD AUTO: 5.81 X10*6/UL (ref 4.5–5.9)
SODIUM SERPL-SCNC: 137 MMOL/L (ref 136–145)
WBC # BLD AUTO: 6.6 X10*3/UL (ref 4.4–11.3)

## 2025-05-20 PROCEDURE — 71045 X-RAY EXAM CHEST 1 VIEW: CPT

## 2025-05-20 PROCEDURE — 80053 COMPREHEN METABOLIC PANEL: CPT | Performed by: STUDENT IN AN ORGANIZED HEALTH CARE EDUCATION/TRAINING PROGRAM

## 2025-05-20 PROCEDURE — 2550000001 HC RX 255 CONTRASTS: Performed by: STUDENT IN AN ORGANIZED HEALTH CARE EDUCATION/TRAINING PROGRAM

## 2025-05-20 PROCEDURE — 2500000001 HC RX 250 WO HCPCS SELF ADMINISTERED DRUGS (ALT 637 FOR MEDICARE OP): Performed by: STUDENT IN AN ORGANIZED HEALTH CARE EDUCATION/TRAINING PROGRAM

## 2025-05-20 PROCEDURE — 84484 ASSAY OF TROPONIN QUANT: CPT | Performed by: STUDENT IN AN ORGANIZED HEALTH CARE EDUCATION/TRAINING PROGRAM

## 2025-05-20 PROCEDURE — 85025 COMPLETE CBC W/AUTO DIFF WBC: CPT | Performed by: STUDENT IN AN ORGANIZED HEALTH CARE EDUCATION/TRAINING PROGRAM

## 2025-05-20 PROCEDURE — 2500000005 HC RX 250 GENERAL PHARMACY W/O HCPCS: Performed by: STUDENT IN AN ORGANIZED HEALTH CARE EDUCATION/TRAINING PROGRAM

## 2025-05-20 PROCEDURE — 74177 CT ABD & PELVIS W/CONTRAST: CPT | Performed by: STUDENT IN AN ORGANIZED HEALTH CARE EDUCATION/TRAINING PROGRAM

## 2025-05-20 PROCEDURE — 74177 CT ABD & PELVIS W/CONTRAST: CPT

## 2025-05-20 PROCEDURE — 71045 X-RAY EXAM CHEST 1 VIEW: CPT | Performed by: RADIOLOGY

## 2025-05-20 PROCEDURE — 80048 BASIC METABOLIC PNL TOTAL CA: CPT | Performed by: STUDENT IN AN ORGANIZED HEALTH CARE EDUCATION/TRAINING PROGRAM

## 2025-05-20 PROCEDURE — 83690 ASSAY OF LIPASE: CPT | Performed by: STUDENT IN AN ORGANIZED HEALTH CARE EDUCATION/TRAINING PROGRAM

## 2025-05-20 PROCEDURE — 36415 COLL VENOUS BLD VENIPUNCTURE: CPT | Performed by: STUDENT IN AN ORGANIZED HEALTH CARE EDUCATION/TRAINING PROGRAM

## 2025-05-20 PROCEDURE — 93005 ELECTROCARDIOGRAM TRACING: CPT

## 2025-05-20 PROCEDURE — 99285 EMERGENCY DEPT VISIT HI MDM: CPT | Mod: 25 | Performed by: STUDENT IN AN ORGANIZED HEALTH CARE EDUCATION/TRAINING PROGRAM

## 2025-05-20 RX ORDER — PANTOPRAZOLE SODIUM 40 MG/1
40 FOR SUSPENSION ORAL
Qty: 30 PACKET | Refills: 0 | Status: SHIPPED | OUTPATIENT
Start: 2025-05-20 | End: 2025-06-19

## 2025-05-20 RX ORDER — LIDOCAINE HYDROCHLORIDE 20 MG/ML
15 SOLUTION OROPHARYNGEAL ONCE
Status: COMPLETED | OUTPATIENT
Start: 2025-05-20 | End: 2025-05-20

## 2025-05-20 RX ORDER — ALUMINUM HYDROXIDE, MAGNESIUM HYDROXIDE, AND SIMETHICONE 1200; 120; 1200 MG/30ML; MG/30ML; MG/30ML
30 SUSPENSION ORAL ONCE
Status: COMPLETED | OUTPATIENT
Start: 2025-05-20 | End: 2025-05-20

## 2025-05-20 RX ADMIN — ALUMINUM HYDROXIDE, MAGNESIUM HYDROXIDE, AND SIMETHICONE 30 ML: 200; 200; 20 SUSPENSION ORAL at 16:07

## 2025-05-20 RX ADMIN — LIDOCAINE HYDROCHLORIDE 15 ML: 20 SOLUTION ORAL at 16:07

## 2025-05-20 RX ADMIN — IOHEXOL 75 ML: 350 INJECTION, SOLUTION INTRAVENOUS at 13:39

## 2025-05-20 ASSESSMENT — COLUMBIA-SUICIDE SEVERITY RATING SCALE - C-SSRS
1. IN THE PAST MONTH, HAVE YOU WISHED YOU WERE DEAD OR WISHED YOU COULD GO TO SLEEP AND NOT WAKE UP?: NO
2. HAVE YOU ACTUALLY HAD ANY THOUGHTS OF KILLING YOURSELF?: NO
6. HAVE YOU EVER DONE ANYTHING, STARTED TO DO ANYTHING, OR PREPARED TO DO ANYTHING TO END YOUR LIFE?: NO

## 2025-05-20 ASSESSMENT — PAIN SCALES - GENERAL: PAINLEVEL_OUTOF10: 0 - NO PAIN

## 2025-05-20 ASSESSMENT — PAIN - FUNCTIONAL ASSESSMENT: PAIN_FUNCTIONAL_ASSESSMENT: 0-10

## 2025-05-20 NOTE — ED TRIAGE NOTES
Pt presents to the ED via EMS from SNF for chest pain that began this AM. Pt was give metoprolol by SNF staff prior. Pt denies any SOB. A&Ox4. Respirations even & unlabored.

## 2025-05-21 LAB
ATRIAL RATE: 51 BPM
P AXIS: 61 DEGREES
P OFFSET: 191 MS
P ONSET: 142 MS
PR INTERVAL: 134 MS
Q ONSET: 209 MS
QRS COUNT: 9 BEATS
QRS DURATION: 90 MS
QT INTERVAL: 496 MS
QTC CALCULATION(BAZETT): 457 MS
QTC FREDERICIA: 470 MS
R AXIS: -54 DEGREES
T AXIS: 71 DEGREES
T OFFSET: 457 MS
VENTRICULAR RATE: 51 BPM

## 2025-05-21 NOTE — ED PROVIDER NOTES
HPI   Chief Complaint   Patient presents with    Chest Pain       The patient is a 64-year-old male with past medical history as below presenting today for evaluation of chest pain.  Points to his epigastric region.  Has been going on for 1 day.  No fevers nausea vomiting no dysuria no diarrhea.              Patient History   Medical History[1]  Surgical History[2]  Family History[3]  Social History[4]    Physical Exam   ED Triage Vitals [05/20/25 1159]   Temperature Heart Rate Respirations BP   36.6 °C (97.9 °F) 52 19 (!) 193/88      Pulse Ox Temp Source Heart Rate Source Patient Position   98 % Temporal Monitor Lying      BP Location FiO2 (%)     Right arm --       Physical Exam  Vitals and nursing note reviewed.   Constitutional:       Appearance: Normal appearance.   HENT:      Head: Normocephalic and atraumatic.      Right Ear: External ear normal.      Left Ear: External ear normal.      Nose: Nose normal.      Mouth/Throat:      Mouth: Mucous membranes are moist.   Cardiovascular:      Rate and Rhythm: Normal rate and regular rhythm.      Pulses: Normal pulses.      Heart sounds: Normal heart sounds.   Pulmonary:      Effort: Pulmonary effort is normal.      Breath sounds: Normal breath sounds.   Abdominal:      General: Abdomen is flat. There is no distension.      Palpations: Abdomen is soft.      Tenderness: There is no abdominal tenderness. There is no guarding or rebound.   Musculoskeletal:         General: No deformity.   Skin:     General: Skin is warm.   Neurological:      General: No focal deficit present.      Mental Status: He is alert and oriented to person, place, and time. Mental status is at baseline.           ED Course & MDM   ED Course as of 05/21/25 1348   Tue May 20, 2025   1214 My interpretation patient's EKG reveals sinus bradycardia with pulmonary disease heart rate is 51 QTc 457 ms.  Mild J-point elevation in lead V2. [RK]      ED Course User Index  [RK] Nesha Loera MD          Diagnoses as of 05/21/25 1348   Duodenitis                 No data recorded     [place] Coma Scale Score: 15 (05/20/25 1201 : Carrie Reece RN)                           Medical Decision Making  Patient presents to upper abdominal/chest pain.  Troponins x 2 were negative.  CT imaging shows possible duodenitis.  As well as known signs of lymphoma.  This was discussed with the patient and wife.  This time his blood counts are normal he is not appear to be losing blood nor does he have any signs of perforation.  Will start him on Protonix GI follow-up primary care follow-up all questions answered return precautions given    Amount and/or Complexity of Data Reviewed  Independent Historian: spouse  External Data Reviewed: notes.  Labs: ordered.  Radiology: ordered.  ECG/medicine tests: ordered and independent interpretation performed. Decision-making details documented in ED Course.    Risk  Prescription drug management.  Decision regarding hospitalization.        Procedure  Procedures       [1]   Past Medical History:  Diagnosis Date    Abnormal finding of blood chemistry, unspecified 05/18/2016    Abnormal blood chemistry    Acute upper respiratory infection, unspecified 12/02/2015    Acute upper respiratory infection    Elevated prostate specific antigen (PSA)     Elevated prostate specific antigen (PSA)    Encounter for screening for malignant neoplasm of colon 01/30/2021    Colon cancer screening    Encounter for screening for malignant neoplasm of prostate 11/30/2020    Prostate cancer screening    Essential (primary) hypertension 07/30/2013    Benign essential hypertension    Other conditions influencing health status 07/30/2013    Diabetes Mellitus Poorly Controlled    Other conditions influencing health status 12/02/2015    History of cough    Personal history of other endocrine, nutritional and metabolic disease 03/18/2021    History of hyperlipidemia    Personal history of other endocrine, nutritional and  metabolic disease 03/18/2021    History of diabetes mellitus    Personal history of other specified conditions 05/18/2016    History of chest pain    Personal history of other specified conditions 05/18/2016    History of dyspnea    Personal history of other specified conditions 05/18/2016    History of dizziness    Personal history of other specified conditions 05/18/2016    History of fatigue   [2]   Past Surgical History:  Procedure Laterality Date    CT ANGIO CORONARY ART WITH HEARTFLOW IF SCORE >30%  8/23/2023    CT HEART CORONARY ANGIOGRAM 8/23/2023 [facility]    MR HEAD ANGIO WO IV CONTRAST  12/14/2020    MR HEAD ANGIO WO IV CONTRAST 12/14/2020 [facility]    MR NECK ANGIO WO IV CONTRAST  12/14/2020    MR NECK ANGIO WO IV CONTRAST 12/14/2020 [facility]    OTHER SURGICAL HISTORY  12/01/2020    Hand fracture repair    OTHER SURGICAL HISTORY  12/01/2020    Nasal bone fracture repair   [3] No family history on file.  [4]   Social History  Tobacco Use    Smoking status: Former     Types: Cigarettes    Smokeless tobacco: Never   Substance Use Topics    Alcohol use: Not on file    Drug use: Not on file        Derek Fraire MD  05/21/25 8133

## 2025-05-27 ENCOUNTER — LAB REQUISITION (OUTPATIENT)
Dept: LAB | Facility: HOSPITAL | Age: 65
End: 2025-05-27
Payer: MEDICARE

## 2025-05-27 DIAGNOSIS — J18.8 OTHER PNEUMONIA, UNSPECIFIED ORGANISM: ICD-10-CM

## 2025-05-27 LAB
ANION GAP SERPL CALC-SCNC: 15 MMOL/L (ref 10–20)
BUN SERPL-MCNC: 26 MG/DL (ref 6–23)
CALCIUM SERPL-MCNC: 9.3 MG/DL (ref 8.6–10.3)
CHLORIDE SERPL-SCNC: 96 MMOL/L (ref 98–107)
CO2 SERPL-SCNC: 30 MMOL/L (ref 21–32)
CREAT SERPL-MCNC: 0.79 MG/DL (ref 0.5–1.3)
EGFRCR SERPLBLD CKD-EPI 2021: >90 ML/MIN/1.73M*2
ERYTHROCYTE [DISTWIDTH] IN BLOOD BY AUTOMATED COUNT: 17.1 % (ref 11.5–14.5)
GLUCOSE SERPL-MCNC: 203 MG/DL (ref 74–99)
HCT VFR BLD AUTO: 45.5 % (ref 41–52)
HGB BLD-MCNC: 13.8 G/DL (ref 13.5–17.5)
MCH RBC QN AUTO: 25.7 PG (ref 26–34)
MCHC RBC AUTO-ENTMCNC: 30.3 G/DL (ref 32–36)
MCV RBC AUTO: 85 FL (ref 80–100)
NRBC BLD-RTO: 0 /100 WBCS (ref 0–0)
PLATELET # BLD AUTO: 212 X10*3/UL (ref 150–450)
POTASSIUM SERPL-SCNC: 4.3 MMOL/L (ref 3.5–5.3)
RBC # BLD AUTO: 5.36 X10*6/UL (ref 4.5–5.9)
SODIUM SERPL-SCNC: 137 MMOL/L (ref 136–145)
WBC # BLD AUTO: 8.7 X10*3/UL (ref 4.4–11.3)

## 2025-05-27 PROCEDURE — 36415 COLL VENOUS BLD VENIPUNCTURE: CPT | Mod: OUT | Performed by: INTERNAL MEDICINE

## 2025-05-27 PROCEDURE — 85027 COMPLETE CBC AUTOMATED: CPT | Mod: OUT | Performed by: INTERNAL MEDICINE

## 2025-05-27 PROCEDURE — 80048 BASIC METABOLIC PNL TOTAL CA: CPT | Mod: OUT | Performed by: INTERNAL MEDICINE

## 2025-06-02 ENCOUNTER — LAB REQUISITION (OUTPATIENT)
Dept: LAB | Facility: HOSPITAL | Age: 65
End: 2025-06-02
Payer: COMMERCIAL

## 2025-06-02 DIAGNOSIS — R05.9 COUGH, UNSPECIFIED: ICD-10-CM

## 2025-06-02 LAB
ANION GAP SERPL CALC-SCNC: 13 MMOL/L (ref 10–20)
BUN SERPL-MCNC: 28 MG/DL (ref 6–23)
CALCIUM SERPL-MCNC: 9.5 MG/DL (ref 8.6–10.3)
CHLORIDE SERPL-SCNC: 99 MMOL/L (ref 98–107)
CO2 SERPL-SCNC: 31 MMOL/L (ref 21–32)
CREAT SERPL-MCNC: 0.73 MG/DL (ref 0.5–1.3)
EGFRCR SERPLBLD CKD-EPI 2021: >90 ML/MIN/1.73M*2
ERYTHROCYTE [DISTWIDTH] IN BLOOD BY AUTOMATED COUNT: 17.2 % (ref 11.5–14.5)
GLUCOSE SERPL-MCNC: 63 MG/DL (ref 74–99)
HCT VFR BLD AUTO: 45.5 % (ref 41–52)
HGB BLD-MCNC: 14 G/DL (ref 13.5–17.5)
MCH RBC QN AUTO: 26.2 PG (ref 26–34)
MCHC RBC AUTO-ENTMCNC: 30.8 G/DL (ref 32–36)
MCV RBC AUTO: 85 FL (ref 80–100)
NRBC BLD-RTO: 0 /100 WBCS (ref 0–0)
PLATELET # BLD AUTO: 230 X10*3/UL (ref 150–450)
POTASSIUM SERPL-SCNC: 4.5 MMOL/L (ref 3.5–5.3)
RBC # BLD AUTO: 5.34 X10*6/UL (ref 4.5–5.9)
SODIUM SERPL-SCNC: 138 MMOL/L (ref 136–145)
WBC # BLD AUTO: 7.1 X10*3/UL (ref 4.4–11.3)

## 2025-06-02 PROCEDURE — 36415 COLL VENOUS BLD VENIPUNCTURE: CPT | Mod: OUT | Performed by: INTERNAL MEDICINE

## 2025-06-02 PROCEDURE — 85027 COMPLETE CBC AUTOMATED: CPT | Mod: OUT | Performed by: INTERNAL MEDICINE

## 2025-06-02 PROCEDURE — 82374 ASSAY BLOOD CARBON DIOXIDE: CPT | Mod: OUT | Performed by: INTERNAL MEDICINE

## 2025-06-09 ENCOUNTER — LAB REQUISITION (OUTPATIENT)
Dept: LAB | Facility: HOSPITAL | Age: 65
End: 2025-06-09
Payer: MEDICARE

## 2025-06-09 DIAGNOSIS — J96.11 CHRONIC RESPIRATORY FAILURE WITH HYPOXIA: ICD-10-CM

## 2025-06-09 LAB
ANION GAP SERPL CALC-SCNC: 11 MMOL/L (ref 10–20)
BUN SERPL-MCNC: 24 MG/DL (ref 6–23)
CALCIUM SERPL-MCNC: 9.3 MG/DL (ref 8.6–10.3)
CHLORIDE SERPL-SCNC: 101 MMOL/L (ref 98–107)
CO2 SERPL-SCNC: 30 MMOL/L (ref 21–32)
CREAT SERPL-MCNC: 0.77 MG/DL (ref 0.5–1.3)
EGFRCR SERPLBLD CKD-EPI 2021: >90 ML/MIN/1.73M*2
ERYTHROCYTE [DISTWIDTH] IN BLOOD BY AUTOMATED COUNT: 17.3 % (ref 11.5–14.5)
GLUCOSE SERPL-MCNC: 95 MG/DL (ref 74–99)
HCT VFR BLD AUTO: 41.8 % (ref 41–52)
HGB BLD-MCNC: 12.9 G/DL (ref 13.5–17.5)
MCH RBC QN AUTO: 26 PG (ref 26–34)
MCHC RBC AUTO-ENTMCNC: 30.9 G/DL (ref 32–36)
MCV RBC AUTO: 84 FL (ref 80–100)
NRBC BLD-RTO: 0 /100 WBCS (ref 0–0)
PLATELET # BLD AUTO: 262 X10*3/UL (ref 150–450)
POTASSIUM SERPL-SCNC: 4.3 MMOL/L (ref 3.5–5.3)
RBC # BLD AUTO: 4.96 X10*6/UL (ref 4.5–5.9)
SODIUM SERPL-SCNC: 138 MMOL/L (ref 136–145)
WBC # BLD AUTO: 8 X10*3/UL (ref 4.4–11.3)

## 2025-06-09 PROCEDURE — 80048 BASIC METABOLIC PNL TOTAL CA: CPT | Mod: OUT | Performed by: INTERNAL MEDICINE

## 2025-06-09 PROCEDURE — 85027 COMPLETE CBC AUTOMATED: CPT | Mod: OUT | Performed by: INTERNAL MEDICINE

## 2025-06-09 PROCEDURE — 36415 COLL VENOUS BLD VENIPUNCTURE: CPT | Mod: OUT | Performed by: INTERNAL MEDICINE

## 2025-07-03 ENCOUNTER — OFFICE VISIT (OUTPATIENT)
Dept: OTOLARYNGOLOGY | Facility: CLINIC | Age: 65
End: 2025-07-03
Payer: MEDICARE

## 2025-07-03 VITALS
RESPIRATION RATE: 16 BRPM | HEART RATE: 42 BPM | OXYGEN SATURATION: 92 % | SYSTOLIC BLOOD PRESSURE: 106 MMHG | DIASTOLIC BLOOD PRESSURE: 56 MMHG | TEMPERATURE: 97.2 F

## 2025-07-03 DIAGNOSIS — J96.10 CHRONIC RESPIRATORY FAILURE, UNSPECIFIED WHETHER WITH HYPOXIA OR HYPERCAPNIA: ICD-10-CM

## 2025-07-03 DIAGNOSIS — R47.1 DYSARTHRIA: Primary | ICD-10-CM

## 2025-07-03 DIAGNOSIS — R49.0 DYSPHONIA: ICD-10-CM

## 2025-07-03 PROCEDURE — 99213 OFFICE O/P EST LOW 20 MIN: CPT | Mod: 25 | Performed by: STUDENT IN AN ORGANIZED HEALTH CARE EDUCATION/TRAINING PROGRAM

## 2025-07-03 PROCEDURE — 1159F MED LIST DOCD IN RCRD: CPT | Performed by: STUDENT IN AN ORGANIZED HEALTH CARE EDUCATION/TRAINING PROGRAM

## 2025-07-03 PROCEDURE — 3078F DIAST BP <80 MM HG: CPT | Performed by: STUDENT IN AN ORGANIZED HEALTH CARE EDUCATION/TRAINING PROGRAM

## 2025-07-03 PROCEDURE — 3062F POS MACROALBUMINURIA REV: CPT | Performed by: STUDENT IN AN ORGANIZED HEALTH CARE EDUCATION/TRAINING PROGRAM

## 2025-07-03 PROCEDURE — 3074F SYST BP LT 130 MM HG: CPT | Performed by: STUDENT IN AN ORGANIZED HEALTH CARE EDUCATION/TRAINING PROGRAM

## 2025-07-03 PROCEDURE — 31615 TRCHEOBRNCHSC EST TRACHS INC: CPT | Performed by: STUDENT IN AN ORGANIZED HEALTH CARE EDUCATION/TRAINING PROGRAM

## 2025-07-03 PROCEDURE — 1126F AMNT PAIN NOTED NONE PRSNT: CPT | Performed by: STUDENT IN AN ORGANIZED HEALTH CARE EDUCATION/TRAINING PROGRAM

## 2025-07-03 PROCEDURE — 31575 DIAGNOSTIC LARYNGOSCOPY: CPT | Mod: 59 | Performed by: STUDENT IN AN ORGANIZED HEALTH CARE EDUCATION/TRAINING PROGRAM

## 2025-07-03 PROCEDURE — 31575 DIAGNOSTIC LARYNGOSCOPY: CPT | Performed by: STUDENT IN AN ORGANIZED HEALTH CARE EDUCATION/TRAINING PROGRAM

## 2025-07-03 PROCEDURE — 1036F TOBACCO NON-USER: CPT | Performed by: STUDENT IN AN ORGANIZED HEALTH CARE EDUCATION/TRAINING PROGRAM

## 2025-07-03 PROCEDURE — 99203 OFFICE O/P NEW LOW 30 MIN: CPT | Performed by: STUDENT IN AN ORGANIZED HEALTH CARE EDUCATION/TRAINING PROGRAM

## 2025-07-03 PROCEDURE — 1160F RVW MEDS BY RX/DR IN RCRD: CPT | Performed by: STUDENT IN AN ORGANIZED HEALTH CARE EDUCATION/TRAINING PROGRAM

## 2025-07-03 SDOH — ECONOMIC STABILITY: FOOD INSECURITY: WITHIN THE PAST 12 MONTHS, THE FOOD YOU BOUGHT JUST DIDN'T LAST AND YOU DIDN'T HAVE MONEY TO GET MORE.: NEVER TRUE

## 2025-07-03 SDOH — ECONOMIC STABILITY: FOOD INSECURITY: WITHIN THE PAST 12 MONTHS, YOU WORRIED THAT YOUR FOOD WOULD RUN OUT BEFORE YOU GOT MONEY TO BUY MORE.: NEVER TRUE

## 2025-07-03 ASSESSMENT — COLUMBIA-SUICIDE SEVERITY RATING SCALE - C-SSRS
6. HAVE YOU EVER DONE ANYTHING, STARTED TO DO ANYTHING, OR PREPARED TO DO ANYTHING TO END YOUR LIFE?: NO
2. HAVE YOU ACTUALLY HAD ANY THOUGHTS OF KILLING YOURSELF?: NO
1. IN THE PAST MONTH, HAVE YOU WISHED YOU WERE DEAD OR WISHED YOU COULD GO TO SLEEP AND NOT WAKE UP?: NO

## 2025-07-03 ASSESSMENT — PATIENT HEALTH QUESTIONNAIRE - PHQ9
SUM OF ALL RESPONSES TO PHQ9 QUESTIONS 1 AND 2: 0
1. LITTLE INTEREST OR PLEASURE IN DOING THINGS: NOT AT ALL
2. FEELING DOWN, DEPRESSED OR HOPELESS: NOT AT ALL

## 2025-07-03 ASSESSMENT — LIFESTYLE VARIABLES
SKIP TO QUESTIONS 9-10: 1
HOW OFTEN DO YOU HAVE SIX OR MORE DRINKS ON ONE OCCASION: NEVER
HOW MANY STANDARD DRINKS CONTAINING ALCOHOL DO YOU HAVE ON A TYPICAL DAY: PATIENT DOES NOT DRINK
HOW OFTEN DO YOU HAVE A DRINK CONTAINING ALCOHOL: NEVER
AUDIT-C TOTAL SCORE: 0

## 2025-07-03 ASSESSMENT — PAIN SCALES - GENERAL: PAINLEVEL_OUTOF10: 0-NO PAIN

## 2025-07-03 ASSESSMENT — ENCOUNTER SYMPTOMS
OCCASIONAL FEELINGS OF UNSTEADINESS: 0
LOSS OF SENSATION IN FEET: 0
DEPRESSION: 0

## 2025-07-03 NOTE — PROGRESS NOTES
"SUBJECTIVE  Patient ID: Bryan Nix is a 64 y.o. male who presents for New Patient Visit (Trach check).    Here today with his wife and RT from his SNF.  He has some difficulty with communication following CVA; some of his history was obtained from discussions with his family and respiratory therapy.    The patient has a history of several CVA; most recently February 2025. He had a trach placed at [facility] at that time. He wears a PMV most daytime hours. Secretion management is \"okay.\" Can't cough spontaneously on command. He has been getting slowly downsized. Currently with a size 6-0 uncuffed trach. Started getting capped a few days ago and working with speech on voicing. Did have admission at [facility] April 2025 for hemoptysis and bleeding around his trach; I did not see him that admission.    Review of Systems  Complete ROS negative except as noted above or on patient intake form and as above.    OBJECTIVE  Physical Exam  CONSTITUTIONAL: Deconditioned appearing male who appears stated age.  Seen in wheelchair.  PSYCHIATRIC: Alert, appropriate mood and affect.  RESPIRATORY: Normal inspiration and expiration and chest wall expansion; no use of accessory muscles to breathe.  VOICE: Clear speech with mild hoarseness. Slightly weak, breathy voicing. Wet cough.  Dysarthric speech. No stridor nor stertor.  HEAD, FACE, AND SKIN: Symmetric facial feature. No cutaneous masses or lesions were visualized. The parotid and submandibular glands were normal to palpation.  EYES: Pupils were equal in size. Extra-ocular muscle function was intact. No nystagmus was observed. Vision was grossly intact.  EARS: External ears were normally formed with no lesions. The external auditory canals were clear. The tympanic membranes were intact and in the neutral position. No significant retraction pockets nor effusions were appreciated.  NOSE: Nasal dorsum was midline.  ORAL CAVITY: Lips were without lesions. Moist mucous " membranes. No lesions appreciated along the gingiva, oral mucosa, nor tongue.  DENTITION: Fair without obvious infection nor inflammation.  OROPHARYNX: No lesion nor mucosal abnormality. The uvula was normal appearing. The tonsils were 1+.  NECK: Visualization and palpation of the neck revealed no mass lesions, no thyromegaly or thyroid masses. No cutaneous lesions appreciated.  Well seated 6-0 uncuffed trach at the neck.  LYMPHATICS (CERVICAL): There were no palpable lymph nodes in the posterior triangle, submandibular triangle, jugulodigastric region, nor central neck.  NEUROLOGIC: Cranial nerves III, IV, and VI were noted to be intact via extra-ocular muscle movement testing. Cranial nerve V was noted to be intact to soft touch bilaterally. Cranial nerve VII was noted to be intact and symmetric by facial movement. Cranial nerve VIII was tested with normal voice examination and revealed grossly normal hearing. Cranial nerves IX and X noted to be intact by palatal movement. Cranial nerve XI noted to be intact via shoulder shrug.  Cranial nerve XII noted to be intact with active and symmetric tongue movement.     --------------------------------------------------  Procedure: Flexible Laryngoscopy - Diagnostic, Tracheobronchoscopy  Indication: chronic respiratory failure, dysphonia  Informed consent verbally obtained: The risks, benefits, alternatives, and expectations were discussed with the patient, who wished to proceed  Anesthesia: Topical lidocaine/oxymetazoline    Findings: After topical anesthetic was applied the flexible endoscope was advanced into the naris. The nasal cavity, nasopharynx, oropharynx, hypopharynx, and larynx were evaluated and were normal with the following significant findings or exceptions:    - Left NSD, moderate ITH  - No masses/lesions appreciated  - Normal and symmetric TVC motion bilaterally  - Slight glottal gap with adduction  - No significant pharyngeal, supraglottic, nor glottic  edema  - Pooling of clear secretions at the vallecula and piriform sinuses bilaterally; some secretions are also seated in the airway    The laryngoscope was then removed.  The cap was removed from his trach tube and the scope was then passed via his tracheostomy tube into the airway.  Some clear secretions were appreciated within the trachea.  The trach was seated roughly 1.5 to 2 cm over the cam.  No evidence of recent bleeding nor inflammation appreciated.    The scope was then removed.    There were no complications and the patient tolerated the procedure well.  --------------------------------------------------    ASSESSMENT/PLAN  Diagnoses and all orders for this visit:  Chronic respiratory failure, unspecified whether with hypoxia or hypercapnia  Dysphonia  Dysarthria      64 y.o. male presenting for airway evaluation in the setting of chronic respiratory failure and chronic tracheotomy.    Chronic respiratory failure, chronic trach, dysphonia/dysphagia/dysarthria  The patient is status post tracheotomy in February 2025 following CVA.  The patient has a history of multiple CVAs.  He is currently at a skilled nursing facility he was working him to smaller trachs with the potential goal of decannulation.  On my examination the patient has dysarthria and wet cough as well as breathy voicing.    On initial flexible laryngoscopy I appreciated bilateral and relatively symmetric true vocal fold motion.  However, there is evidence of pharyngeal weakness with pooling of secretions about the vallecula and piriform sinuses.  This was somewhat spilling into the airway concerning for aspiration.  On initial tracheobronchoscopy the patient's trach is somewhat low in the airway but otherwise well-seated.  I appreciated no concerning infection nor thick mucus within the airway.    I discussed my findings with the patient, family, and respiratory therapy from this facility.  We discussed that given how well he is breathing  with a capped trach, he could likely be further downsized.  I recommended that prior to decannulation given overnight capped continuous pulse ox study.    However, I am concerned that given the findings on flexible laryngoscopy he is not a very good candidate at this time for decannulation.  I appreciate a fair amount of aspiration of his secretions.  I recommended he continue to work with both physical therapy and speech therapy and general strength as well as speech/swallow.  Written instruction was provided.    He can see me as needed.    This note was created using speech recognition transcription software. Despite proofreading, typographical errors may be present that affect the meaning of the content. Please contact my office with any questions.

## 2025-07-03 NOTE — PATIENT INSTRUCTIONS
At today's visit your upper and lower airways were evaluated.  Within the upper airway I appreciated some difficulty with clearing your normal secretions.  You had pooling of secretions at several levels of your airway including the valleculae and piriform sinuses.  I could appreciate that some of the secretions were going into your airway.  You should continue to work with your speech therapist and physical therapist to improve your strength and swallowing ability.  You may need to keep your trach a little longer to help manage secretions that are going into your airway.  Your lower airway looked quite clear.  I believe that you can continue to downsize your tracheostomy. Consider going to a size 6-0 or 4-0 cuffless [place] for your next trach.  Prior to decannulation (removal of the trach) I would recommend an overnight continuous pulse ox study while the trach is capped.    You can see me as needed.

## 2025-07-18 ENCOUNTER — LAB REQUISITION (OUTPATIENT)
Dept: LAB | Facility: HOSPITAL | Age: 65
End: 2025-07-18
Payer: MEDICARE

## 2025-07-18 DIAGNOSIS — K21.9 GASTRO-ESOPHAGEAL REFLUX DISEASE WITHOUT ESOPHAGITIS: ICD-10-CM

## 2025-07-18 LAB
ANION GAP SERPL CALC-SCNC: 11 MMOL/L (ref 10–20)
BUN SERPL-MCNC: 27 MG/DL (ref 6–23)
CALCIUM SERPL-MCNC: 8.4 MG/DL (ref 8.6–10.3)
CHLORIDE SERPL-SCNC: 97 MMOL/L (ref 98–107)
CO2 SERPL-SCNC: 32 MMOL/L (ref 21–32)
CREAT SERPL-MCNC: 0.78 MG/DL (ref 0.5–1.3)
EGFRCR SERPLBLD CKD-EPI 2021: >90 ML/MIN/1.73M*2
ERYTHROCYTE [DISTWIDTH] IN BLOOD BY AUTOMATED COUNT: 17.8 % (ref 11.5–14.5)
GLUCOSE SERPL-MCNC: 276 MG/DL (ref 74–99)
HCT VFR BLD AUTO: 34.3 % (ref 41–52)
HGB BLD-MCNC: 10.6 G/DL (ref 13.5–17.5)
MCH RBC QN AUTO: 26 PG (ref 26–34)
MCHC RBC AUTO-ENTMCNC: 30.9 G/DL (ref 32–36)
MCV RBC AUTO: 84 FL (ref 80–100)
NRBC BLD-RTO: 0 /100 WBCS (ref 0–0)
PLATELET # BLD AUTO: 288 X10*3/UL (ref 150–450)
POTASSIUM SERPL-SCNC: 4.5 MMOL/L (ref 3.5–5.3)
RBC # BLD AUTO: 4.07 X10*6/UL (ref 4.5–5.9)
SODIUM SERPL-SCNC: 135 MMOL/L (ref 136–145)
WBC # BLD AUTO: 8.8 X10*3/UL (ref 4.4–11.3)

## 2025-07-18 PROCEDURE — 82374 ASSAY BLOOD CARBON DIOXIDE: CPT | Mod: OUT | Performed by: INTERNAL MEDICINE

## 2025-07-18 PROCEDURE — 36415 COLL VENOUS BLD VENIPUNCTURE: CPT | Mod: OUT | Performed by: INTERNAL MEDICINE

## 2025-07-18 PROCEDURE — 85027 COMPLETE CBC AUTOMATED: CPT | Mod: OUT | Performed by: INTERNAL MEDICINE

## 2025-07-26 ENCOUNTER — HOSPITAL ENCOUNTER (OUTPATIENT)
Facility: HOSPITAL | Age: 65
Setting detail: OBSERVATION
DRG: 394 | End: 2025-07-26
Attending: EMERGENCY MEDICINE | Admitting: INTERNAL MEDICINE
Payer: MEDICARE

## 2025-07-26 ENCOUNTER — APPOINTMENT (OUTPATIENT)
Dept: RADIOLOGY | Facility: HOSPITAL | Age: 65
DRG: 394 | End: 2025-07-26
Payer: MEDICARE

## 2025-07-26 DIAGNOSIS — K92.1 MELENA: ICD-10-CM

## 2025-07-26 DIAGNOSIS — E11.9 INSULIN DEPENDENT TYPE 2 DIABETES MELLITUS (MULTI): ICD-10-CM

## 2025-07-26 DIAGNOSIS — I69.359 CVA, OLD, HEMIPARESIS (MULTI): ICD-10-CM

## 2025-07-26 DIAGNOSIS — Z79.4 INSULIN DEPENDENT TYPE 2 DIABETES MELLITUS (MULTI): ICD-10-CM

## 2025-07-26 DIAGNOSIS — K94.23 PEG TUBE MALFUNCTION (MULTI): Primary | ICD-10-CM

## 2025-07-26 DIAGNOSIS — R04.2 HEMOPTYSIS: ICD-10-CM

## 2025-07-26 PROCEDURE — 93005 ELECTROCARDIOGRAM TRACING: CPT

## 2025-07-26 PROCEDURE — 99285 EMERGENCY DEPT VISIT HI MDM: CPT | Performed by: EMERGENCY MEDICINE

## 2025-07-27 ENCOUNTER — APPOINTMENT (OUTPATIENT)
Dept: CARDIOLOGY | Facility: HOSPITAL | Age: 65
DRG: 394 | End: 2025-07-27
Payer: MEDICARE

## 2025-07-27 ENCOUNTER — APPOINTMENT (OUTPATIENT)
Dept: RADIOLOGY | Facility: HOSPITAL | Age: 65
DRG: 394 | End: 2025-07-27
Payer: MEDICARE

## 2025-07-27 VITALS
HEART RATE: 56 BPM | BODY MASS INDEX: 21.82 KG/M2 | OXYGEN SATURATION: 99 % | TEMPERATURE: 98.1 F | RESPIRATION RATE: 16 BRPM | WEIGHT: 170 LBS | DIASTOLIC BLOOD PRESSURE: 71 MMHG | SYSTOLIC BLOOD PRESSURE: 143 MMHG | HEIGHT: 74 IN

## 2025-07-27 PROBLEM — K94.23 PEG TUBE MALFUNCTION (MULTI): Status: ACTIVE | Noted: 2025-07-27

## 2025-07-27 PROBLEM — R74.8 ELEVATED ALKALINE PHOSPHATASE LEVEL: Status: ACTIVE | Noted: 2025-07-27

## 2025-07-27 PROBLEM — R10.9 ABDOMINAL PAIN: Status: ACTIVE | Noted: 2025-07-27

## 2025-07-27 LAB
ABO GROUP (TYPE) IN BLOOD: NORMAL
ALBUMIN SERPL BCP-MCNC: 3.5 G/DL (ref 3.4–5)
ALP SERPL-CCNC: 157 U/L (ref 33–136)
ALT SERPL W P-5'-P-CCNC: 36 U/L (ref 10–52)
ANION GAP SERPL CALC-SCNC: 13 MMOL/L (ref 10–20)
ANION GAP SERPL CALC-SCNC: 14 MMOL/L (ref 10–20)
ANTIBODY SCREEN: NORMAL
AST SERPL W P-5'-P-CCNC: 20 U/L (ref 9–39)
BASOPHILS # BLD AUTO: 0.09 X10*3/UL (ref 0–0.1)
BASOPHILS NFR BLD AUTO: 1.1 %
BILIRUB SERPL-MCNC: 0.5 MG/DL (ref 0–1.2)
BUN SERPL-MCNC: 20 MG/DL (ref 6–23)
BUN SERPL-MCNC: 22 MG/DL (ref 6–23)
CALCIUM SERPL-MCNC: 9.5 MG/DL (ref 8.6–10.3)
CALCIUM SERPL-MCNC: 9.6 MG/DL (ref 8.6–10.3)
CHLORIDE SERPL-SCNC: 100 MMOL/L (ref 98–107)
CHLORIDE SERPL-SCNC: 103 MMOL/L (ref 98–107)
CO2 SERPL-SCNC: 28 MMOL/L (ref 21–32)
CO2 SERPL-SCNC: 30 MMOL/L (ref 21–32)
CREAT SERPL-MCNC: 0.73 MG/DL (ref 0.5–1.3)
CREAT SERPL-MCNC: 0.77 MG/DL (ref 0.5–1.3)
EGFRCR SERPLBLD CKD-EPI 2021: >90 ML/MIN/1.73M*2
EGFRCR SERPLBLD CKD-EPI 2021: >90 ML/MIN/1.73M*2
EOSINOPHIL # BLD AUTO: 0.21 X10*3/UL (ref 0–0.7)
EOSINOPHIL NFR BLD AUTO: 2.7 %
ERYTHROCYTE [DISTWIDTH] IN BLOOD BY AUTOMATED COUNT: 17.4 % (ref 11.5–14.5)
ERYTHROCYTE [DISTWIDTH] IN BLOOD BY AUTOMATED COUNT: 17.4 % (ref 11.5–14.5)
GLUCOSE BLD MANUAL STRIP-MCNC: 111 MG/DL (ref 74–99)
GLUCOSE BLD MANUAL STRIP-MCNC: 43 MG/DL (ref 74–99)
GLUCOSE BLD MANUAL STRIP-MCNC: 66 MG/DL (ref 74–99)
GLUCOSE BLD MANUAL STRIP-MCNC: 71 MG/DL (ref 74–99)
GLUCOSE SERPL-MCNC: 105 MG/DL (ref 74–99)
GLUCOSE SERPL-MCNC: 41 MG/DL (ref 74–99)
HCT VFR BLD AUTO: 36.6 % (ref 41–52)
HCT VFR BLD AUTO: 37.8 % (ref 41–52)
HGB BLD-MCNC: 11.3 G/DL (ref 13.5–17.5)
HGB BLD-MCNC: 11.4 G/DL (ref 13.5–17.5)
HOLD SPECIMEN: NORMAL
IMM GRANULOCYTES # BLD AUTO: 0.15 X10*3/UL (ref 0–0.7)
IMM GRANULOCYTES NFR BLD AUTO: 1.9 % (ref 0–0.9)
LACTATE SERPL-SCNC: 1 MMOL/L (ref 0.4–2)
LIPASE SERPL-CCNC: 4 U/L (ref 9–82)
LYMPHOCYTES # BLD AUTO: 1.47 X10*3/UL (ref 1.2–4.8)
LYMPHOCYTES NFR BLD AUTO: 18.7 %
MCH RBC QN AUTO: 25.5 PG (ref 26–34)
MCH RBC QN AUTO: 25.9 PG (ref 26–34)
MCHC RBC AUTO-ENTMCNC: 29.9 G/DL (ref 32–36)
MCHC RBC AUTO-ENTMCNC: 31.1 G/DL (ref 32–36)
MCV RBC AUTO: 83 FL (ref 80–100)
MCV RBC AUTO: 85 FL (ref 80–100)
MONOCYTES # BLD AUTO: 0.28 X10*3/UL (ref 0.1–1)
MONOCYTES NFR BLD AUTO: 3.6 %
NEUTROPHILS # BLD AUTO: 5.67 X10*3/UL (ref 1.2–7.7)
NEUTROPHILS NFR BLD AUTO: 72 %
NRBC BLD-RTO: 0 /100 WBCS (ref 0–0)
NRBC BLD-RTO: 0 /100 WBCS (ref 0–0)
PLATELET # BLD AUTO: 176 X10*3/UL (ref 150–450)
PLATELET # BLD AUTO: 202 X10*3/UL (ref 150–450)
POTASSIUM SERPL-SCNC: 3.4 MMOL/L (ref 3.5–5.3)
POTASSIUM SERPL-SCNC: 4.1 MMOL/L (ref 3.5–5.3)
PROT SERPL-MCNC: 6.8 G/DL (ref 6.4–8.2)
RBC # BLD AUTO: 4.4 X10*6/UL (ref 4.5–5.9)
RBC # BLD AUTO: 4.44 X10*6/UL (ref 4.5–5.9)
RH FACTOR (ANTIGEN D): NORMAL
SODIUM SERPL-SCNC: 137 MMOL/L (ref 136–145)
SODIUM SERPL-SCNC: 144 MMOL/L (ref 136–145)
WBC # BLD AUTO: 6.1 X10*3/UL (ref 4.4–11.3)
WBC # BLD AUTO: 7.9 X10*3/UL (ref 4.4–11.3)

## 2025-07-27 PROCEDURE — 71045 X-RAY EXAM CHEST 1 VIEW: CPT | Performed by: RADIOLOGY

## 2025-07-27 PROCEDURE — 87077 CULTURE AEROBIC IDENTIFY: CPT | Mod: PARLAB

## 2025-07-27 PROCEDURE — 36415 COLL VENOUS BLD VENIPUNCTURE: CPT | Performed by: EMERGENCY MEDICINE

## 2025-07-27 PROCEDURE — 2500000001 HC RX 250 WO HCPCS SELF ADMINISTERED DRUGS (ALT 637 FOR MEDICARE OP)

## 2025-07-27 PROCEDURE — G0378 HOSPITAL OBSERVATION PER HR: HCPCS

## 2025-07-27 PROCEDURE — 2500000001 HC RX 250 WO HCPCS SELF ADMINISTERED DRUGS (ALT 637 FOR MEDICARE OP): Performed by: NURSE PRACTITIONER

## 2025-07-27 PROCEDURE — 83605 ASSAY OF LACTIC ACID: CPT | Performed by: EMERGENCY MEDICINE

## 2025-07-27 PROCEDURE — 71045 X-RAY EXAM CHEST 1 VIEW: CPT

## 2025-07-27 PROCEDURE — 83690 ASSAY OF LIPASE: CPT

## 2025-07-27 PROCEDURE — 80053 COMPREHEN METABOLIC PANEL: CPT | Performed by: EMERGENCY MEDICINE

## 2025-07-27 PROCEDURE — 82374 ASSAY BLOOD CARBON DIOXIDE: CPT

## 2025-07-27 PROCEDURE — 2500000005 HC RX 250 GENERAL PHARMACY W/O HCPCS

## 2025-07-27 PROCEDURE — 82947 ASSAY GLUCOSE BLOOD QUANT: CPT

## 2025-07-27 PROCEDURE — 2500000004 HC RX 250 GENERAL PHARMACY W/ HCPCS (ALT 636 FOR OP/ED): Performed by: INTERNAL MEDICINE

## 2025-07-27 PROCEDURE — 99221 1ST HOSP IP/OBS SF/LOW 40: CPT

## 2025-07-27 PROCEDURE — 96374 THER/PROPH/DIAG INJ IV PUSH: CPT

## 2025-07-27 PROCEDURE — 2500000002 HC RX 250 W HCPCS SELF ADMINISTERED DRUGS (ALT 637 FOR MEDICARE OP, ALT 636 FOR OP/ED): Performed by: NURSE PRACTITIONER

## 2025-07-27 PROCEDURE — 2500000004 HC RX 250 GENERAL PHARMACY W/ HCPCS (ALT 636 FOR OP/ED)

## 2025-07-27 PROCEDURE — 86850 RBC ANTIBODY SCREEN: CPT

## 2025-07-27 PROCEDURE — 74177 CT ABD & PELVIS W/CONTRAST: CPT | Performed by: STUDENT IN AN ORGANIZED HEALTH CARE EDUCATION/TRAINING PROGRAM

## 2025-07-27 PROCEDURE — 85025 COMPLETE CBC W/AUTO DIFF WBC: CPT | Performed by: EMERGENCY MEDICINE

## 2025-07-27 PROCEDURE — 99222 1ST HOSP IP/OBS MODERATE 55: CPT | Performed by: PHYSICIAN ASSISTANT

## 2025-07-27 PROCEDURE — 74177 CT ABD & PELVIS W/CONTRAST: CPT

## 2025-07-27 PROCEDURE — 99222 1ST HOSP IP/OBS MODERATE 55: CPT | Performed by: SURGERY

## 2025-07-27 PROCEDURE — 36415 COLL VENOUS BLD VENIPUNCTURE: CPT

## 2025-07-27 PROCEDURE — 85027 COMPLETE CBC AUTOMATED: CPT

## 2025-07-27 PROCEDURE — 2550000001 HC RX 255 CONTRASTS: Performed by: EMERGENCY MEDICINE

## 2025-07-27 RX ORDER — CLONIDINE HYDROCHLORIDE 0.1 MG/1
0.1 TABLET ORAL EVERY 8 HOURS PRN
Status: DISCONTINUED | OUTPATIENT
Start: 2025-07-27 | End: 2025-07-30 | Stop reason: HOSPADM

## 2025-07-27 RX ORDER — ONDANSETRON HYDROCHLORIDE 2 MG/ML
4 INJECTION, SOLUTION INTRAVENOUS EVERY 6 HOURS PRN
Status: DISCONTINUED | OUTPATIENT
Start: 2025-07-27 | End: 2025-07-30 | Stop reason: HOSPADM

## 2025-07-27 RX ORDER — FAMOTIDINE 10 MG/ML
20 INJECTION, SOLUTION INTRAVENOUS EVERY 12 HOURS SCHEDULED
Status: DISCONTINUED | OUTPATIENT
Start: 2025-07-27 | End: 2025-07-30 | Stop reason: HOSPADM

## 2025-07-27 RX ORDER — HYDRALAZINE HYDROCHLORIDE 20 MG/ML
10 INJECTION INTRAMUSCULAR; INTRAVENOUS EVERY 6 HOURS PRN
Status: DISCONTINUED | OUTPATIENT
Start: 2025-07-27 | End: 2025-07-30 | Stop reason: HOSPADM

## 2025-07-27 RX ORDER — DIAPER,BRIEF,INFANT-TODD,DISP
EACH MISCELLANEOUS 2 TIMES DAILY
COMMUNITY

## 2025-07-27 RX ORDER — CLONIDINE HYDROCHLORIDE 0.1 MG/1
0.1 TABLET ORAL EVERY 8 HOURS PRN
COMMUNITY

## 2025-07-27 RX ORDER — INSULIN GLARGINE 100 [IU]/ML
50 INJECTION, SOLUTION SUBCUTANEOUS NIGHTLY
Status: DISCONTINUED | OUTPATIENT
Start: 2025-07-27 | End: 2025-07-30 | Stop reason: HOSPADM

## 2025-07-27 RX ORDER — TRAZODONE HYDROCHLORIDE 50 MG/1
50 TABLET ORAL NIGHTLY
Status: DISCONTINUED | OUTPATIENT
Start: 2025-07-27 | End: 2025-07-30 | Stop reason: HOSPADM

## 2025-07-27 RX ORDER — TRAMADOL HYDROCHLORIDE 50 MG/1
50 TABLET, FILM COATED ORAL EVERY 8 HOURS PRN
COMMUNITY

## 2025-07-27 RX ORDER — ACETAMINOPHEN 325 MG/1
650 TABLET ORAL EVERY 4 HOURS PRN
Status: DISCONTINUED | OUTPATIENT
Start: 2025-07-27 | End: 2025-07-30 | Stop reason: HOSPADM

## 2025-07-27 RX ORDER — INSULIN LISPRO 100 [IU]/ML
0-10 INJECTION, SOLUTION INTRAVENOUS; SUBCUTANEOUS
Status: DISCONTINUED | OUTPATIENT
Start: 2025-07-27 | End: 2025-07-30 | Stop reason: HOSPADM

## 2025-07-27 RX ORDER — HYDRALAZINE HYDROCHLORIDE 10 MG/1
10 TABLET, FILM COATED ORAL EVERY 12 HOURS
Status: DISCONTINUED | OUTPATIENT
Start: 2025-07-27 | End: 2025-07-30

## 2025-07-27 RX ORDER — DEXTROSE 50 % IN WATER (D50W) INTRAVENOUS SYRINGE
12.5
Status: DISCONTINUED | OUTPATIENT
Start: 2025-07-27 | End: 2025-07-30 | Stop reason: HOSPADM

## 2025-07-27 RX ORDER — DEXTROSE 50 % IN WATER (D50W) INTRAVENOUS SYRINGE
25
Status: DISCONTINUED | OUTPATIENT
Start: 2025-07-27 | End: 2025-07-30 | Stop reason: HOSPADM

## 2025-07-27 RX ORDER — HYDRALAZINE HYDROCHLORIDE 10 MG/1
10 TABLET, FILM COATED ORAL EVERY 12 HOURS
COMMUNITY
End: 2025-07-30 | Stop reason: HOSPADM

## 2025-07-27 RX ORDER — DEXTROSE MONOHYDRATE AND SODIUM CHLORIDE 5; .9 G/100ML; G/100ML
50 INJECTION, SOLUTION INTRAVENOUS CONTINUOUS
Status: ACTIVE | OUTPATIENT
Start: 2025-07-27 | End: 2025-07-28

## 2025-07-27 RX ORDER — METOPROLOL TARTRATE 50 MG/1
50 TABLET ORAL 3 TIMES DAILY
Status: DISCONTINUED | OUTPATIENT
Start: 2025-07-27 | End: 2025-07-30

## 2025-07-27 RX ORDER — TRAMADOL HYDROCHLORIDE 50 MG/1
50 TABLET, FILM COATED ORAL EVERY 8 HOURS PRN
Status: DISCONTINUED | OUTPATIENT
Start: 2025-07-27 | End: 2025-07-30 | Stop reason: HOSPADM

## 2025-07-27 RX ORDER — SODIUM CHLORIDE 9 MG/ML
100 INJECTION, SOLUTION INTRAVENOUS CONTINUOUS
Status: DISCONTINUED | OUTPATIENT
Start: 2025-07-27 | End: 2025-07-27

## 2025-07-27 RX ORDER — AMIODARONE HYDROCHLORIDE 200 MG/1
200 TABLET ORAL 2 TIMES DAILY
Status: DISCONTINUED | OUTPATIENT
Start: 2025-07-27 | End: 2025-07-30 | Stop reason: HOSPADM

## 2025-07-27 RX ORDER — METOPROLOL TARTRATE 1 MG/ML
5 INJECTION, SOLUTION INTRAVENOUS EVERY 6 HOURS PRN
Status: DISCONTINUED | OUTPATIENT
Start: 2025-07-27 | End: 2025-07-30 | Stop reason: HOSPADM

## 2025-07-27 RX ORDER — ACETAMINOPHEN 500 MG
5 TABLET ORAL NIGHTLY PRN
Status: DISCONTINUED | OUTPATIENT
Start: 2025-07-27 | End: 2025-07-30 | Stop reason: HOSPADM

## 2025-07-27 RX ORDER — ESOMEPRAZOLE MAGNESIUM 40 MG/1
10 GRANULE, DELAYED RELEASE ORAL
Status: DISCONTINUED | OUTPATIENT
Start: 2025-07-28 | End: 2025-07-30 | Stop reason: HOSPADM

## 2025-07-27 RX ORDER — SUCRALFATE 1 G/1
1 TABLET ORAL
Status: DISCONTINUED | OUTPATIENT
Start: 2025-07-27 | End: 2025-07-30 | Stop reason: HOSPADM

## 2025-07-27 RX ORDER — ACETAMINOPHEN 650 MG/1
650 SUPPOSITORY RECTAL EVERY 4 HOURS PRN
Status: DISCONTINUED | OUTPATIENT
Start: 2025-07-27 | End: 2025-07-30 | Stop reason: HOSPADM

## 2025-07-27 RX ORDER — ACETAMINOPHEN 160 MG/5ML
650 SOLUTION ORAL EVERY 4 HOURS PRN
Status: DISCONTINUED | OUTPATIENT
Start: 2025-07-27 | End: 2025-07-30 | Stop reason: HOSPADM

## 2025-07-27 RX ADMIN — Medication 5 MG: at 22:11

## 2025-07-27 RX ADMIN — DEXTROSE MONOHYDRATE 12.5 G: 25 INJECTION, SOLUTION INTRAVENOUS at 06:43

## 2025-07-27 RX ADMIN — AMIODARONE HYDROCHLORIDE 200 MG: 200 TABLET ORAL at 21:51

## 2025-07-27 RX ADMIN — HYDRALAZINE HYDROCHLORIDE 10 MG: 20 INJECTION INTRAMUSCULAR; INTRAVENOUS at 16:16

## 2025-07-27 RX ADMIN — SUCRALFATE 1 G: 1 TABLET ORAL at 16:16

## 2025-07-27 RX ADMIN — DEXTROSE AND SODIUM CHLORIDE 50 ML/HR: 5; .9 INJECTION, SOLUTION INTRAVENOUS at 14:32

## 2025-07-27 RX ADMIN — FAMOTIDINE 20 MG: 10 INJECTION, SOLUTION INTRAVENOUS at 03:14

## 2025-07-27 RX ADMIN — SODIUM CHLORIDE 100 ML/HR: 0.9 INJECTION, SOLUTION INTRAVENOUS at 13:44

## 2025-07-27 RX ADMIN — METOPROLOL TARTRATE 5 MG: 5 INJECTION INTRAVENOUS at 16:15

## 2025-07-27 RX ADMIN — SODIUM CHLORIDE 100 ML/HR: 0.9 INJECTION, SOLUTION INTRAVENOUS at 03:14

## 2025-07-27 RX ADMIN — HYDRALAZINE HYDROCHLORIDE 10 MG: 10 TABLET, FILM COATED ORAL at 23:25

## 2025-07-27 RX ADMIN — DEXTROSE MONOHYDRATE 12.5 G: 25 INJECTION, SOLUTION INTRAVENOUS at 16:57

## 2025-07-27 RX ADMIN — IOHEXOL 79 ML: 350 INJECTION, SOLUTION INTRAVENOUS at 01:15

## 2025-07-27 RX ADMIN — FAMOTIDINE 20 MG: 10 INJECTION, SOLUTION INTRAVENOUS at 21:52

## 2025-07-27 RX ADMIN — TRAZODONE HYDROCHLORIDE 50 MG: 50 TABLET ORAL at 21:51

## 2025-07-27 RX ADMIN — FAMOTIDINE 20 MG: 10 INJECTION, SOLUTION INTRAVENOUS at 09:57

## 2025-07-27 SDOH — ECONOMIC STABILITY: FOOD INSECURITY: HOW HARD IS IT FOR YOU TO PAY FOR THE VERY BASICS LIKE FOOD, HOUSING, MEDICAL CARE, AND HEATING?: NOT VERY HARD

## 2025-07-27 SDOH — HEALTH STABILITY: MENTAL HEALTH: HOW OFTEN DO YOU HAVE SIX OR MORE DRINKS ON ONE OCCASION?: NEVER

## 2025-07-27 SDOH — SOCIAL STABILITY: SOCIAL INSECURITY: WITHIN THE LAST YEAR, HAVE YOU BEEN HUMILIATED OR EMOTIONALLY ABUSED IN OTHER WAYS BY YOUR PARTNER OR EX-PARTNER?: NO

## 2025-07-27 SDOH — HEALTH STABILITY: MENTAL HEALTH: HOW MANY DRINKS CONTAINING ALCOHOL DO YOU HAVE ON A TYPICAL DAY WHEN YOU ARE DRINKING?: PATIENT DOES NOT DRINK

## 2025-07-27 SDOH — ECONOMIC STABILITY: HOUSING INSECURITY: AT ANY TIME IN THE PAST 12 MONTHS, WERE YOU HOMELESS OR LIVING IN A SHELTER (INCLUDING NOW)?: NO

## 2025-07-27 SDOH — HEALTH STABILITY: MENTAL HEALTH
DO YOU FEEL STRESS - TENSE, RESTLESS, NERVOUS, OR ANXIOUS, OR UNABLE TO SLEEP AT NIGHT BECAUSE YOUR MIND IS TROUBLED ALL THE TIME - THESE DAYS?: ONLY A LITTLE

## 2025-07-27 SDOH — ECONOMIC STABILITY: HOUSING INSECURITY: IN THE LAST 12 MONTHS, WAS THERE A TIME WHEN YOU WERE NOT ABLE TO PAY THE MORTGAGE OR RENT ON TIME?: NO

## 2025-07-27 SDOH — SOCIAL STABILITY: SOCIAL NETWORK
DO YOU BELONG TO ANY CLUBS OR ORGANIZATIONS SUCH AS CHURCH GROUPS, UNIONS, FRATERNAL OR ATHLETIC GROUPS, OR SCHOOL GROUPS?: NO

## 2025-07-27 SDOH — SOCIAL STABILITY: SOCIAL NETWORK: HOW OFTEN DO YOU ATTEND CHURCH OR RELIGIOUS SERVICES?: NEVER

## 2025-07-27 SDOH — ECONOMIC STABILITY: FOOD INSECURITY: WITHIN THE PAST 12 MONTHS, THE FOOD YOU BOUGHT JUST DIDN'T LAST AND YOU DIDN'T HAVE MONEY TO GET MORE.: NEVER TRUE

## 2025-07-27 SDOH — SOCIAL STABILITY: SOCIAL INSECURITY: WITHIN THE LAST YEAR, HAVE YOU BEEN AFRAID OF YOUR PARTNER OR EX-PARTNER?: NO

## 2025-07-27 SDOH — ECONOMIC STABILITY: FOOD INSECURITY: WITHIN THE PAST 12 MONTHS, YOU WORRIED THAT YOUR FOOD WOULD RUN OUT BEFORE YOU GOT THE MONEY TO BUY MORE.: NEVER TRUE

## 2025-07-27 SDOH — SOCIAL STABILITY: SOCIAL INSECURITY: ARE YOU MARRIED, WIDOWED, DIVORCED, SEPARATED, NEVER MARRIED, OR LIVING WITH A PARTNER?: MARRIED

## 2025-07-27 SDOH — HEALTH STABILITY: MENTAL HEALTH: HOW OFTEN DO YOU HAVE A DRINK CONTAINING ALCOHOL?: NEVER

## 2025-07-27 SDOH — SOCIAL STABILITY: SOCIAL INSECURITY: HAVE YOU HAD ANY THOUGHTS OF HARMING ANYONE ELSE?: NO

## 2025-07-27 SDOH — SOCIAL STABILITY: SOCIAL INSECURITY: ABUSE: ADULT

## 2025-07-27 SDOH — SOCIAL STABILITY: SOCIAL NETWORK: HOW OFTEN DO YOU ATTEND MEETINGS OF THE CLUBS OR ORGANIZATIONS YOU BELONG TO?: NEVER

## 2025-07-27 SDOH — HEALTH STABILITY: PHYSICAL HEALTH: ON AVERAGE, HOW MANY DAYS PER WEEK DO YOU ENGAGE IN MODERATE TO STRENUOUS EXERCISE (LIKE A BRISK WALK)?: 2 DAYS

## 2025-07-27 SDOH — SOCIAL STABILITY: SOCIAL NETWORK: IN A TYPICAL WEEK, HOW MANY TIMES DO YOU TALK ON THE PHONE WITH FAMILY, FRIENDS, OR NEIGHBORS?: TWICE A WEEK

## 2025-07-27 SDOH — ECONOMIC STABILITY: INCOME INSECURITY: IN THE PAST 12 MONTHS HAS THE ELECTRIC, GAS, OIL, OR WATER COMPANY THREATENED TO SHUT OFF SERVICES IN YOUR HOME?: NO

## 2025-07-27 SDOH — ECONOMIC STABILITY: TRANSPORTATION INSECURITY: IN THE PAST 12 MONTHS, HAS LACK OF TRANSPORTATION KEPT YOU FROM MEDICAL APPOINTMENTS OR FROM GETTING MEDICATIONS?: NO

## 2025-07-27 SDOH — ECONOMIC STABILITY: HOUSING INSECURITY: IN THE PAST 12 MONTHS, HOW MANY TIMES HAVE YOU MOVED WHERE YOU WERE LIVING?: 0

## 2025-07-27 SDOH — SOCIAL STABILITY: SOCIAL INSECURITY: DOES ANYONE TRY TO KEEP YOU FROM HAVING/CONTACTING OTHER FRIENDS OR DOING THINGS OUTSIDE YOUR HOME?: NO

## 2025-07-27 SDOH — SOCIAL STABILITY: SOCIAL INSECURITY: HAS ANYONE EVER THREATENED TO HURT YOUR FAMILY OR YOUR PETS?: NO

## 2025-07-27 SDOH — SOCIAL STABILITY: SOCIAL NETWORK: HOW OFTEN DO YOU GET TOGETHER WITH FRIENDS OR RELATIVES?: TWICE A WEEK

## 2025-07-27 SDOH — SOCIAL STABILITY: SOCIAL INSECURITY: ARE THERE ANY APPARENT SIGNS OF INJURIES/BEHAVIORS THAT COULD BE RELATED TO ABUSE/NEGLECT?: NO

## 2025-07-27 SDOH — HEALTH STABILITY: PHYSICAL HEALTH
HOW OFTEN DO YOU NEED TO HAVE SOMEONE HELP YOU WHEN YOU READ INSTRUCTIONS, PAMPHLETS, OR OTHER WRITTEN MATERIAL FROM YOUR DOCTOR OR PHARMACY?: OFTEN

## 2025-07-27 SDOH — HEALTH STABILITY: MENTAL HEALTH: EXPERIENCED ANY OF THE FOLLOWING LIFE EVENTS: OTHER (COMMENT)

## 2025-07-27 SDOH — HEALTH STABILITY: PHYSICAL HEALTH: ON AVERAGE, HOW MANY MINUTES DO YOU ENGAGE IN EXERCISE AT THIS LEVEL?: 30 MIN

## 2025-07-27 SDOH — SOCIAL STABILITY: SOCIAL INSECURITY: POSSIBLE ABUSE REPORTED TO:: OTHER (COMMENT)

## 2025-07-27 SDOH — SOCIAL STABILITY: SOCIAL INSECURITY: DO YOU FEEL UNSAFE GOING BACK TO THE PLACE WHERE YOU ARE LIVING?: NO

## 2025-07-27 SDOH — SOCIAL STABILITY: SOCIAL INSECURITY: HAVE YOU HAD THOUGHTS OF HARMING ANYONE ELSE?: NO

## 2025-07-27 SDOH — SOCIAL STABILITY: SOCIAL INSECURITY: ARE YOU OR HAVE YOU BEEN THREATENED OR ABUSED PHYSICALLY, EMOTIONALLY, OR SEXUALLY BY ANYONE?: NO

## 2025-07-27 SDOH — SOCIAL STABILITY: SOCIAL INSECURITY: WERE YOU ABLE TO COMPLETE ALL THE BEHAVIORAL HEALTH SCREENINGS?: YES

## 2025-07-27 SDOH — SOCIAL STABILITY: SOCIAL INSECURITY: DO YOU FEEL ANYONE HAS EXPLOITED OR TAKEN ADVANTAGE OF YOU FINANCIALLY OR OF YOUR PERSONAL PROPERTY?: NO

## 2025-07-27 ASSESSMENT — ACTIVITIES OF DAILY LIVING (ADL)
TOILETING: DEPENDENT
BATHING: DEPENDENT
LACK_OF_TRANSPORTATION: NO
LACK_OF_TRANSPORTATION: NO
HEARING - RIGHT EAR: FUNCTIONAL
DRESSING YOURSELF: DEPENDENT
FEEDING YOURSELF: DEPENDENT
WALKS IN HOME: DEPENDENT
GROOMING: DEPENDENT
JUDGMENT_ADEQUATE_SAFELY_COMPLETE_DAILY_ACTIVITIES: YES
ADEQUATE_TO_COMPLETE_ADL: YES
PATIENT'S MEMORY ADEQUATE TO SAFELY COMPLETE DAILY ACTIVITIES?: YES
HEARING - LEFT EAR: FUNCTIONAL

## 2025-07-27 ASSESSMENT — ENCOUNTER SYMPTOMS
JOINT SWELLING: 0
MYALGIAS: 0
DIZZINESS: 0
WEAKNESS: 0
FEVER: 0
HEMATURIA: 0
DYSURIA: 0
SORE THROAT: 0
SHORTNESS OF BREATH: 0
COUGH: 0
WHEEZING: 0
CONFUSION: 0
TROUBLE SWALLOWING: 0
HEADACHES: 0
APPETITE CHANGE: 0
NUMBNESS: 0
CHILLS: 0
UNEXPECTED WEIGHT CHANGE: 0
EYE PAIN: 0
ARTHRALGIAS: 0

## 2025-07-27 ASSESSMENT — COGNITIVE AND FUNCTIONAL STATUS - GENERAL
MOVING TO AND FROM BED TO CHAIR: TOTAL
DRESSING REGULAR LOWER BODY CLOTHING: TOTAL
WALKING IN HOSPITAL ROOM: TOTAL
DRESSING REGULAR UPPER BODY CLOTHING: TOTAL
MOVING FROM LYING ON BACK TO SITTING ON SIDE OF FLAT BED WITH BEDRAILS: TOTAL
EATING MEALS: A LOT
CLIMB 3 TO 5 STEPS WITH RAILING: TOTAL
MOBILITY SCORE: 6
DAILY ACTIVITIY SCORE: 7
HELP NEEDED FOR BATHING: TOTAL
PERSONAL GROOMING: TOTAL
STANDING UP FROM CHAIR USING ARMS: TOTAL
TOILETING: TOTAL
PATIENT BASELINE BEDBOUND: YES
TURNING FROM BACK TO SIDE WHILE IN FLAT BAD: TOTAL

## 2025-07-27 ASSESSMENT — LIFESTYLE VARIABLES
HOW OFTEN DO YOU HAVE 6 OR MORE DRINKS ON ONE OCCASION: NEVER
AUDIT-C TOTAL SCORE: 0
SKIP TO QUESTIONS 9-10: 1
AUDIT-C TOTAL SCORE: 0
SKIP TO QUESTIONS 9-10: 1
HOW MANY STANDARD DRINKS CONTAINING ALCOHOL DO YOU HAVE ON A TYPICAL DAY: PATIENT DOES NOT DRINK
SUBSTANCE_ABUSE_PAST_12_MONTHS: NO
AUDIT-C TOTAL SCORE: 0
PRESCIPTION_ABUSE_PAST_12_MONTHS: NO
HOW OFTEN DO YOU HAVE A DRINK CONTAINING ALCOHOL: NEVER

## 2025-07-27 ASSESSMENT — PAIN - FUNCTIONAL ASSESSMENT
PAIN_FUNCTIONAL_ASSESSMENT: 0-10
PAIN_FUNCTIONAL_ASSESSMENT: 0-10

## 2025-07-27 ASSESSMENT — PATIENT HEALTH QUESTIONNAIRE - PHQ9
SUM OF ALL RESPONSES TO PHQ9 QUESTIONS 1 & 2: 0
2. FEELING DOWN, DEPRESSED OR HOPELESS: NOT AT ALL
1. LITTLE INTEREST OR PLEASURE IN DOING THINGS: NOT AT ALL

## 2025-07-27 ASSESSMENT — PAIN SCALES - GENERAL
PAINLEVEL_OUTOF10: 0 - NO PAIN

## 2025-07-27 NOTE — CONSULTS
"Reason For Consult  Displaced PEG tube    History Of Present Illness  Bryan Nix is a 64 y.o. male [facility] resident who was sent to the emergency room last night due to a displaced PEG tube.  The patient had pain at the PEG tube site with flushing for the past 4 days.  The tube was placed in February 2025 following a CVA and had functioned well previously.  The patient reportedly is tolerating a clear liquid diet.  The patient had a CT scan performed early this morning which showed the gastrostomy tube to be dislodged with the tip within the subcutaneous fat.  Gastroenterology service was consulted for evaluation of the gastrostomy tube and Dr. Corey requested surgery consultation this afternoon to remove the gastrostomy tube to \"limit complications after it is removed\".    Past medical history:  CVA in 2020 and in February 2025  Trach/PEG placement in February 2025  Atrial flutter on Eliquis  Diabetes mellitus  Coronary artery disease  Hypertension     Past Medical History  He has a past medical history of Abnormal finding of blood chemistry, unspecified (05/18/2016), Acute upper respiratory infection, unspecified (12/02/2015), Diabetes (Multi), Elevated prostate specific antigen (PSA), Encounter for screening for malignant neoplasm of colon (01/30/2021), Encounter for screening for malignant neoplasm of prostate (11/30/2020), Essential (primary) hypertension (07/30/2013), Other conditions influencing health status (07/30/2013), Other conditions influencing health status (12/02/2015), Personal history of other endocrine, nutritional and metabolic disease (03/18/2021), Personal history of other endocrine, nutritional and metabolic disease (03/18/2021), Personal history of other specified conditions (05/18/2016), Personal history of other specified conditions (05/18/2016), Personal history of other specified conditions (05/18/2016), Personal history of other specified conditions (05/18/2016), and Stroke " "(Multi).    Surgical History  He has a past surgical history that includes Other surgical history (12/01/2020); Other surgical history (12/01/2020); MR angio head wo IV contrast (12/14/2020); MR angio neck wo IV contrast (12/14/2020); and CT angio coronary art with heartflow if score >30% (8/23/2023).     Social History  He reports that he has quit smoking. His smoking use included cigarettes. He has never used smokeless tobacco. He reports that he does not drink alcohol and does not use drugs.    Family History  Family History[1]     Allergies  Patient has no known allergies.    Review of Systems  As above     Physical Exam  Constitutional: Well-developed, well-nourished, awake and alert, no acute distress  Skin: Warm and dry, no lesions, no rashes, no jaundice  HEENT: Normocephalic, atraumatic, EOMI, no scleral icterus, eyes have no redness or swelling or discharge, external inspection of ears and nose is normal  Neck: Tracheostomy tube in place  Cardiac: Regular rate and rhythm, no murmur  Chest: Patent airway, clear to auscultation, normal breath sounds with good chest expansion, no wheezes or rales or rhonchi noted, thorax symmetric  Abdomen: Nondistended, left upper quadrant gastrostomy tube in place, positive bowel sounds, soft, nontender except at gastrostomy site, no mass.  4 cm area of induration surrounding PEG tube with mild tenderness.  No purulent drainage.  No cellulitis.  Rectal: Not performed  Extremities: No injury, no lower extremity edema   Neurological: Awake and alert  Psychological: Appropriate mood and behavior     Last Recorded Vitals  Blood pressure 180/90, pulse 61, temperature 35.7 °C (96.3 °F), temperature source Temporal, resp. rate 18, height 1.88 m (6' 2.02\"), weight 77.1 kg (170 lb), SpO2 98%.    Relevant Results  Labs: WBC 6.1, hemoglobin 11.4, platelet 176  Potassium 3.4, other electrolytes normal, BUN/creatinine normal, glucose 41.  Repeat glucose 111    I reviewed the CT " abdomen/pelvis report and images from this morning.  IMPRESSION:  1.  Dislodged percutaneous gastrostomy tube located within the left  ventral abdominal wall fat. There is a fluid-filled sinus tract  remaining within the left ventral abdominal wall and left upper  rectus muscle just superficial to the peritoneum and gastric body.  Angelito Rodgers discussed the significance and urgency of this critical  finding by EPIC secure chat with  ELBERT DIEHL on 7/27/2025 at 1:31  am.  (**-RCF-**) Findings:  See findings.    2. Mild bronchial wall thickening visualized in the lung bases which  may represent bronchitis. Prostatomegaly.      Assessment/Plan   64-year-old male with dislodged gastrostomy tube with the tip located within the subcutaneous fat.  Gastrostomy tube removed without difficulty with the mushroom tip intact.  A small amount of old tube feeding mixed with blood drained from the wound.  I attempted to gently insert a 20 [place] catheter into the tract, but the catheter would not pass through the abdominal wall.  Dressing placed.  Patient tolerated well.  Determination of whether the patient requires replacement of the gastrostomy tube per internal medicine service.      Leander Vogt MD         [1]   Family History  Problem Relation Name Age of Onset    Stroke Other      Hypertension Other      Diabetes Other

## 2025-07-27 NOTE — NURSING NOTE
Manual /90.  Reaching out to MD for orders.  
Patient's BS 66.  Patient alert with family at bedside.  PRN Dextrose 12.5 administered per order.  
Received call at 06;30 From [facility]. Critical result --BS 41. Informed pts assigned nurse. PCNA checked BS on unit. 43. New orders obtained for D50.. BS increased to 111 after treatment.  
contact guard

## 2025-07-27 NOTE — CONSULTS
Department of Internal Medicine  Gastroenterology  Consult note      Reason for Consult: dislodged peg tube    Chief Complaint: PEG tube pain    History Obtained from: chart review and patient reports    History of Present Illness      The patient is a 64 y.o. male with signficant past medical history of Aflutter (on Eliquis), hyponatremia, IDDM, CAD, hypertension, chronic wounds, diffuse lymphadenopathy, anemia, tobacco use, elevated PSA, CVA LMCA (8/23 and 12/20) with right-sided deficits and an acute nonhemorrhagic brainstem infarct (2/27/25), left ICA stenosis (85%) s/p angioplasty and left carotid artery stent (08/25/2023), and recent history of acute pontine stroke 02/27/25 with acute hypoxic respiratory failure s/p tracheostomy on 2/25/2025 and PEG who presents for pain at his PEG tube.    Patient denies abdominal pain, nausea, vomiting, dysphagia, odynophagia, GERD, constipation, diarrhea, melena, or hematochezia. They normally have daily bowel movements a day of normal caliber and color.    He states he does use his PEG tube as well as eating by mouth.    He denies use of tobacco, alcohol, or recreational drugs.  He denies any changes in his medications herbs or supplements.      Allergies  Patient has no known allergies.    Current Medication  Current Medications[1]    Past Medical History  Active Ambulatory Problems     Diagnosis Date Noted    Hemoptysis 03/22/2025    Tracheostomy in place (Multi) 03/22/2025    CVA, old, hemiparesis (Multi) 03/22/2025    Status post insertion of percutaneous endoscopic gastrostomy (PEG) tube (Multi) 03/22/2025    Pneumonia due to infectious organism 03/22/2025    Right sided weakness 03/22/2025    Atrial flutter (Multi) 03/22/2025    Insulin dependent type 2 diabetes mellitus (Multi) 03/22/2025    Melena 03/26/2025     Resolved Ambulatory Problems     Diagnosis Date Noted    No Resolved Ambulatory Problems     Past Medical History:   Diagnosis Date    Abnormal finding of  blood chemistry, unspecified 05/18/2016    Acute upper respiratory infection, unspecified 12/02/2015    Diabetes (Multi)     Elevated prostate specific antigen (PSA)     Encounter for screening for malignant neoplasm of colon 01/30/2021    Encounter for screening for malignant neoplasm of prostate 11/30/2020    Essential (primary) hypertension 07/30/2013    Other conditions influencing health status 07/30/2013    Other conditions influencing health status 12/02/2015    Personal history of other endocrine, nutritional and metabolic disease 03/18/2021    Personal history of other endocrine, nutritional and metabolic disease 03/18/2021    Personal history of other specified conditions 05/18/2016    Personal history of other specified conditions 05/18/2016    Personal history of other specified conditions 05/18/2016    Personal history of other specified conditions 05/18/2016    Stroke (Multi)        Past Surgical History  Surgical History[2]    Family History  Family History[3]  No family history of stomach or colon cancer    Social History  TOBACCO:  reports that he has quit smoking. His smoking use included cigarettes. He has never used smokeless tobacco.  ETOH:  reports no history of alcohol use.  DRUGS:  reports no history of drug use.  MARITAL STATUS:   OCCUPATION:    Review of Systems  Review of Systems   Constitutional:  Negative for appetite change, chills, fever and unexpected weight change.   HENT:  Negative for mouth sores, sore throat and trouble swallowing.    Eyes:  Negative for pain and visual disturbance.   Respiratory:  Negative for cough, shortness of breath and wheezing.    Cardiovascular:  Negative for chest pain.   Genitourinary:  Negative for decreased urine volume, dysuria and hematuria.   Musculoskeletal:  Negative for arthralgias, joint swelling and myalgias.   Skin:  Negative for pallor and rash.   Neurological:  Negative for dizziness, weakness, numbness and headaches.  "  Psychiatric/Behavioral:  Negative for confusion.        PHYSICAL EXAM  VS: BP (!) 197/88 (BP Location: Right arm, Patient Position: Lying) Comment: nurse aware  Pulse 61   Temp 35.7 °C (96.3 °F) (Temporal)   Resp 18   Ht 1.88 m (6' 2.02\")   Wt 77.1 kg (170 lb)   SpO2 99%   BMI 21.82 kg/m²  Body mass index is 21.82 kg/m².  Physical Exam  Constitutional:       General: He is not in acute distress.     Appearance: Normal appearance.   HENT:      Head: Normocephalic and atraumatic.      Mouth/Throat:      Mouth: Mucous membranes are moist.      Pharynx: Oropharynx is clear.      Comments: trach    Eyes:      General: No scleral icterus.     Extraocular Movements: Extraocular movements intact.      Conjunctiva/sclera: Conjunctivae normal.      Pupils: Pupils are equal, round, and reactive to light.       Cardiovascular:      Rate and Rhythm: Normal rate and regular rhythm.      Heart sounds: No murmur heard.  Pulmonary:      Effort: Pulmonary effort is normal.      Breath sounds: No wheezing or rhonchi.   Abdominal:      General: Bowel sounds are normal. There is no distension.      Palpations: Abdomen is soft. There is no mass.      Tenderness: There is no abdominal tenderness.      Hernia: No hernia is present.      Comments: PEG with granular tissue protruding from PEG site     Musculoskeletal:         General: No swelling or deformity.     Skin:     General: Skin is warm.      Coloration: Skin is not jaundiced.     Neurological:      General: No focal deficit present.      Mental Status: He is alert and oriented to person, place, and time.     Psychiatric:         Mood and Affect: Mood normal.          DATA  Recent blood work and relevant radiology and endoscopic studies were reviewed and discussed with the patient   Results from last 7 days   Lab Units 07/27/25  0538   WBC AUTO x10*3/uL 6.1   RBC AUTO x10*6/uL 4.40*   HEMOGLOBIN g/dL 11.4*   HEMATOCRIT % 36.6*   MCV fL 83   MCHC g/dL 31.1*   RDW % 17.4* "   PLATELETS AUTO x10*3/uL 176       Results from last 72 hours   Lab Units 07/27/25  0538 07/27/25  0001   SODIUM mmol/L 144 137   POTASSIUM mmol/L 3.4* 4.1   CHLORIDE mmol/L 103 100   CO2 mmol/L 30 28   BUN mg/dL 20 22   CREATININE mg/dL 0.77 0.73   CALCIUM mg/dL 9.6 9.5   PROTEIN TOTAL g/dL  --  6.8   BILIRUBIN TOTAL mg/dL  --  0.5   ALK PHOS U/L  --  157*   AST U/L  --  20   ALT U/L  --  36             Results from last 72 hours   Lab Units 07/27/25  0538   LIPASE U/L 4*       RADIOLOGY REVIEW  CT abdomen pelvis W Contrast 7/27/25  IMPRESSION:  1.  Dislodged percutaneous gastrostomy tube located within the left  ventral abdominal wall fat. There is a fluid-filled sinus tract  remaining within the left ventral abdominal wall and left upper  rectus muscle just superficial to the peritoneum and gastric body.  Angelito Rodgers discussed the significance and urgency of this critical    ENDOSCOPIC REVIEW  EGD by Dr Carlton 7/27/25  Small hiatal hernia  Moderate erythematous mucosa in the body of the stomach and antrum; performed cold forceps biopsy to rule out H. pylori  Moderate erythematous mucosa in the duodenal bulb and 1st part of the duodenum    EGD by Dr Abbott 2/25/25   - Normal hypopharynx.   - Normal esophagus.   - Acute gastritis, characterized by erosions and aphthous ulcerations.   - Adherent clot over a duodenal ulcer with adherent clot.   - An endoscopically removable PEG placement was successfully completed.   - No specimens collected.     IMPRESSION/RECOMMENDATIONS  The patient is a 64 y.o. male with signficant past medical history of Aflutter (on Eliquis), hyponatremia, IDDM, CAD, hypertension, chronic wounds, diffuse lymphadenopathy, anemia, tobacco use, elevated PSA, CVA LMCA (8/23 and 12/20) with right-sided deficits and an acute nonhemorrhagic brainstem infarct (2/27/25), left ICA stenosis (85%) s/p angioplasty and left carotid artery stent (08/25/2023), and recent history of acute pontine stroke  02/27/25 with acute hypoxic respiratory failure s/p tracheostomy on 2/25/2025 and PEG who presents for pain at his PEG tube.    Pain at PEG site patient eating and taking pills by mouth, originally placed 2/25/25  Chronic anticoagulation eliquis    PLAN  - Will ask surgery to review PEG to limit complications after it is removed    Discussed with Dr Corey GI to follow      (Electronically signed byAlfreda Gutierrez PA-C on 7/27/2025 at 10:21 AM)    Inpatient consult to Gastroenterology  Consult performed by: Alfreda Gutierrez PA-C  Consult ordered by: HERNESTO Villeda               [1]   Current Facility-Administered Medications:     acetaminophen (Tylenol) tablet 650 mg, 650 mg, oral, q4h PRN **OR** acetaminophen (Tylenol) oral liquid 650 mg, 650 mg, nasogastric tube, q4h PRN **OR** acetaminophen (Tylenol) suppository 650 mg, 650 mg, rectal, q4h PRN, HERNESTO Villeda    dextrose 50 % injection 12.5 g, 12.5 g, intravenous, q15 min PRN, JOSEPH Villeda-CNP, 12.5 g at 07/27/25 0643    dextrose 50 % injection 25 g, 25 g, intravenous, q15 min PRN, HERNESTO Villeda    famotidine PF (Pepcid) injection 20 mg, 20 mg, intravenous, q12h LIBAN, HERNESTO Villeda, 20 mg at 07/27/25 0957    glucagon (Glucagen) injection 1 mg, 1 mg, intramuscular, q15 min PRN, HERNESTO Villeda    glucagon (Glucagen) injection 1 mg, 1 mg, intramuscular, q15 min PRN, HERNESTO Villeda    insulin lispro injection 0-10 Units, 0-10 Units, subcutaneous, TID AC, HERNESTO Villeda    ondansetron (Zofran) injection 4 mg, 4 mg, intravenous, q6h PRN, HERNESTO Villeda    sodium chloride 0.9% infusion, 100 mL/hr, intravenous, Continuous, HERNESTO Villeda, Last Rate: 100 mL/hr at 07/27/25 0314, 100 mL/hr at 07/27/25 0314  [2]   Past Surgical History:  Procedure Laterality Date    CT ANGIO CORONARY ART WITH HEARTFLOW IF SCORE >30%  8/23/2023    CT HEART CORONARY ANGIOGRAM 8/23/2023 [facility] CT    MR HEAD  ANGIO WO IV CONTRAST  12/14/2020    MR HEAD ANGIO WO IV CONTRAST 12/14/2020 [facility]    MR NECK ANGIO WO IV CONTRAST  12/14/2020    MR NECK ANGIO WO IV CONTRAST 12/14/2020 [facility]    OTHER SURGICAL HISTORY  12/01/2020    Hand fracture repair    OTHER SURGICAL HISTORY  12/01/2020    Nasal bone fracture repair   [3]   Family History  Problem Relation Name Age of Onset    Stroke Other      Hypertension Other      Diabetes Other

## 2025-07-27 NOTE — H&P
History Of Present Illness  Bryan Nix is a 64-year-old male who presents to the ED from SNF with possible infected PEG tube.  Patient has a past medical history of atrial flutter on Eliquis, hyponatremia, IDDM, CAD, hypertension, chronic wounds, diffuse lymphadenopathy, anemia, history of smoking, history elevated PSA, CVA L hemisphere (2020) with right-sided deficits, Left ICA stenosis (85%) s/p angioplasty and left carotid artery stent (08/25/2023) and recent history of acute pontine stroke 02/27/25 with acute hypoxic respiratory failure s/p tracheostomy on 2/25/2025 and PEG.  Patient arrives to ED today after having abdominal pain around PEG tube site.  Information gathered from ED notes and patient.  Wife is no longer present to provide information.  Per ED note PEG tube was placed in February and has been functioning properly until this past week when he began having pain at the site and 3 days of pain when the tube was flushed.  The site also started having discharge around it and is now swollen with erythema.  Patient denies any fevers, shortness of breath, chest pain, nausea, vomiting, diarrhea, or any known loss of blood.       ED Course      Hemodynamaically Stable.    Vitals: Temp. 96.5, HR 55, Resp.  16, /72, Pulse ox 99% RA       Labs: Glucose 105, Na+ 137, K+ 4.1, Bun 22, Creat.  0.73, GFR >90, Ca 9.5, Albumin 3.5, Alk Phos. 157, ALT 36, AST 20, Lactate 1.0, WBC 7.9,  Hgb.  11.3 Hct.  37.8,      Medications: N/A  Imaging: interpreted by radiologist.  CT abdomen pelvis:   Dislodged percutaneous gastrostomy tube located within the left ventral abdominal wall fat.  There is a fluid-filled sinus tract remaining within the left ventral abdominal wall and left upper rectus muscle just superficial to the peritoneum and gastric body.  Mild bronchial wall thickening visualized in the lung bases which may represent bronchitis.  EKG: Not available for my review.   Interpreted by ED provider.  Sinus  bradycardia with a rate of 49, left axis deviation      Past Medical History  Medical History[1]    Surgical History  Surgical History[2]     Social History  He reports that he has quit smoking. His smoking use included cigarettes. He has never used smokeless tobacco. He reports that he does not drink alcohol and does not use drugs.  Currently resides at skilled nursing facility.  Patient uses an electric wheelchair for ambulation.    Family History  Family History[3]     Allergies  Patient has no known allergies.    Review of Systems    10 point ROS systems completed and is negative except for what is stated in HPI.      Physical Exam  Constitutional:       General: He is awake. He is not in acute distress.     Appearance: Normal appearance. He is underweight. He is diaphoretic. He is not toxic-appearing.   HENT:      Head: Normocephalic and atraumatic.      Nose: Nose normal. No rhinorrhea.      Mouth/Throat:      Mouth: Mucous membranes are moist.     Eyes:      General:         Right eye: No discharge.         Left eye: No discharge.      Conjunctiva/sclera: Conjunctivae normal.      Pupils: Pupils are equal, round, and reactive to light.     Neck:      Trachea: Tracheostomy present.     Cardiovascular:      Rate and Rhythm: Regular rhythm. Bradycardia present.      Pulses: Normal pulses.   Pulmonary:      Effort: Pulmonary effort is normal. No respiratory distress.      Breath sounds: Examination of the right-upper field reveals rhonchi. Examination of the left-upper field reveals rhonchi. Examination of the right-middle field reveals rhonchi. Examination of the left-middle field reveals rhonchi. Rhonchi present. No wheezing.   Abdominal:      General: Bowel sounds are increased. There is no distension.      Palpations: Abdomen is soft.      Tenderness: There is no abdominal tenderness. There is no guarding.      Comments: Gastrostomy tube with edema and tissue exposed. Sanguinous drainage. Tender to palpation  "at site.      Musculoskeletal:         General: Normal range of motion.      Cervical back: Normal range of motion and neck supple.      Right lower leg: Edema present.      Left lower leg: Edema present.      Comments: Right arm flaccid. Decreased movement of bilateral lower extremities. Left upper extremity movement grossly intact.    Feet:      Comments: Heel protector boots in place bilateral heels.     Skin:     General: Skin is warm.      Comments: Sacral wound     Neurological:      General: No focal deficit present.      Mental Status: He is alert and oriented to person, place, and time. Mental status is at baseline.      Motor: Weakness present.     Psychiatric:         Mood and Affect: Mood normal.         Behavior: Behavior normal. Behavior is cooperative.          Last Recorded Vitals  Blood pressure (!) 184/77, pulse 54, temperature 36.5 °C (97.7 °F), temperature source Temporal, resp. rate 16, height 1.88 m (6' 2\"), weight 77.1 kg (170 lb), SpO2 98%.    Relevant Results  Results for orders placed or performed during the hospital encounter of 07/26/25 (from the past 24 hours)   CBC and Auto Differential   Result Value Ref Range    WBC 7.9 4.4 - 11.3 x10*3/uL    nRBC 0.0 0.0 - 0.0 /100 WBCs    RBC 4.44 (L) 4.50 - 5.90 x10*6/uL    Hemoglobin 11.3 (L) 13.5 - 17.5 g/dL    Hematocrit 37.8 (L) 41.0 - 52.0 %    MCV 85 80 - 100 fL    MCH 25.5 (L) 26.0 - 34.0 pg    MCHC 29.9 (L) 32.0 - 36.0 g/dL    RDW 17.4 (H) 11.5 - 14.5 %    Platelets 202 150 - 450 x10*3/uL    Neutrophils % 72.0 40.0 - 80.0 %    Immature Granulocytes %, Automated 1.9 (H) 0.0 - 0.9 %    Lymphocytes % 18.7 13.0 - 44.0 %    Monocytes % 3.6 2.0 - 10.0 %    Eosinophils % 2.7 0.0 - 6.0 %    Basophils % 1.1 0.0 - 2.0 %    Neutrophils Absolute 5.67 1.20 - 7.70 x10*3/uL    Immature Granulocytes Absolute, Automated 0.15 0.00 - 0.70 x10*3/uL    Lymphocytes Absolute 1.47 1.20 - 4.80 x10*3/uL    Monocytes Absolute 0.28 0.10 - 1.00 x10*3/uL    Eosinophils " Absolute 0.21 0.00 - 0.70 x10*3/uL    Basophils Absolute 0.09 0.00 - 0.10 x10*3/uL   Comprehensive metabolic panel   Result Value Ref Range    Glucose 105 (H) 74 - 99 mg/dL    Sodium 137 136 - 145 mmol/L    Potassium 4.1 3.5 - 5.3 mmol/L    Chloride 100 98 - 107 mmol/L    Bicarbonate 28 21 - 32 mmol/L    Anion Gap 13 10 - 20 mmol/L    Urea Nitrogen 22 6 - 23 mg/dL    Creatinine 0.73 0.50 - 1.30 mg/dL    eGFR >90 >60 mL/min/1.73m*2    Calcium 9.5 8.6 - 10.3 mg/dL    Albumin 3.5 3.4 - 5.0 g/dL    Alkaline Phosphatase 157 (H) 33 - 136 U/L    Total Protein 6.8 6.4 - 8.2 g/dL    AST 20 9 - 39 U/L    Bilirubin, Total 0.5 0.0 - 1.2 mg/dL    ALT 36 10 - 52 U/L   Lactate   Result Value Ref Range    Lactate 1.0 0.4 - 2.0 mmol/L     CT abdomen pelvis w IV contrast  Result Date: 7/27/2025  Interpreted By:  Angelito Rodgers, STUDY: CT ABDOMEN PELVIS W IV CONTRAST;  7/27/2025 1:13 am   INDICATION: Signs/Symptoms:Peg tub with pain and discharge.   COMPARISON: CT abdomen pelvis 05/20/2025.   ACCESSION NUMBER(S): [id number]   ORDERING CLINICIAN: ELBERT DIEHL   TECHNIQUE: CT of the abdomen and pelvis was performed after administration of intravenous contrast. Standard contiguous axial images were obtained through the abdomen and pelvis. Coronal and sagittal reconstructions were performed.   FINDINGS: LOWER CHEST: Bronchial wall thickening is visualized in the lung bases suggestive of bronchitis. Mild bibasilar linear scarring/atelectasis similar to prior. BONES: No acute osseous abnormality. ABDOMINAL WALL: There is dislodged percutaneous gastrostomy tube terminating within the ventral superficial abdominal wall and there is fluid-filled sinus tract within the left upper rectus muscle just superficial to the peritoneum and gastric body. LYMPH NODES: No pathologically enlarged lymph nodes in the abdomen or pelvis.   ABDOMEN:   LIVER: Normal size and contour. No focal hepatic lesions. BILE DUCTS: Normal caliber. GALLBLADDER: Sludge  versus excreted contrast within the gallbladder. No gallbladder wall thickening or distention. PANCREAS: No evidence of pancreatic duct dilatation or peripancreatic edema. SPLEEN: Within normal limits. ADRENALS: Within normal limits. KIDNEYS and URETERS: No evidence of hydronephrosis or nephrolithiasis. Normal size and enhancement pattern.     VESSELS: No abdominal aortic aneurysm. Mesenteric vessels appear patent.   RETROPERITONEUM: No evidence of retroperitoneal hematoma.   PELVIS:   REPRODUCTIVE ORGANS: Prostate is enlarged measuring 5.0 cm transversely. BLADDER: No gross abnormality.   BOWEL: There has been interval dislodgement of the percutaneous gastrostomy tube which now is located within the superficial ventral abdominal wall. There is fluid-filled sinus tract within the ventral abdominal wall superficial to the gastric body, axial image 51 of 168. Stomach is incompletely distended limiting assessment for wall thickening. No bowel dilatation or obstruction. Normal appendix. There is oral contrast noted within the: There is no evidence of bowel wall thickening or acute inflammatory change. PERITONEUM: No ascites or free air, no fluid collection.       1.  Dislodged percutaneous gastrostomy tube located within the left ventral abdominal wall fat. There is a fluid-filled sinus tract remaining within the left ventral abdominal wall and left upper rectus muscle just superficial to the peritoneum and gastric body. Angelito Rodgers discussed the significance and urgency of this critical finding by EPIC secure chat with  ELBERT DIEHL on 7/27/2025 at 1:31 am.  (**-RCF-**) Findings:  See findings.     2. Mild bronchial wall thickening visualized in the lung bases which may represent bronchitis. Prostatomegaly.     Signed by: Angelito Rodgers 7/27/2025 1:31 AM Dictation workstation:   [id number]      Assessment & Plan  PEG tube malfunction (Multi)    Abdominal pain    Elevated alkaline phosphatase level    Patient arrived  to ED today with possible infected PEG tube from skilled nursing facility.  Was found on CT abdomen pelvis that the tube was a dislodged percutaneous gastrostomy tube located within the left ventral abdominal wall fat.  There was also a fluid-filled sinus tract remaining within the left ventral abdominal wall and left upper rectus muscle just superficial to the peritoneum and gastric body.  Patient did not have any other complaints.  It was reported in the ED that patient requested to not have the tube replaced because he is taking food by mouth.  Patient reported to me that he is eating puréed food.  GI consult placed.  Appreciate recs.     Patient admitted to Dr. Tj Schulz for further medical management.      # Dislodged G-tube  # Fluid-filled sinus tract left ventral abdominal wall  # Elevated alk phos  # Abdominal pain  -GI consult  -Dietary consult when G-tube replaced for tube feeding recommendation  -As needed analgesia  -As needed antiemetic  - 0.9 normal saline at 100 mL/HR  -PEG tube care  -PEG tube site wound culture   -PT/OT/SW   -NPO diet for possible a.m. procedure  -admitted to observation   -q4 vitals    -morning labs CBC, BMP, type and screen     Chronic Conditions   # Diabetes  -Hold home glycemic medication   -SSI with hypoglycemic protocol   -POCT   # Anemia  # Tracheostomy with chronic respiratory failure  - Trach care  -continuous puls-ox  # CVA x 2, left ICA stenosis, left carotid stent, CAD  #Atrial flutter  #Chronic wound  - Wound care  #Lymphadenopathy    Continue home medications as ordered when nursing completes home med rec.          Full Code      #DVT Prophylaxis   Scd's as tolerated   DVT Prophylaxis   On Eliquis     Thorough record review performed of previous labs and notes from prior encounters.              JOSEPH Villeda-CNP         [1]   Past Medical History:  Diagnosis Date    Abnormal finding of blood chemistry, unspecified 05/18/2016    Abnormal blood chemistry     Acute upper respiratory infection, unspecified 12/02/2015    Acute upper respiratory infection    Diabetes (Multi)     Elevated prostate specific antigen (PSA)     Elevated prostate specific antigen (PSA)    Encounter for screening for malignant neoplasm of colon 01/30/2021    Colon cancer screening    Encounter for screening for malignant neoplasm of prostate 11/30/2020    Prostate cancer screening    Essential (primary) hypertension 07/30/2013    Benign essential hypertension    Other conditions influencing health status 07/30/2013    Diabetes Mellitus Poorly Controlled    Other conditions influencing health status 12/02/2015    History of cough    Personal history of other endocrine, nutritional and metabolic disease 03/18/2021    History of hyperlipidemia    Personal history of other endocrine, nutritional and metabolic disease 03/18/2021    History of diabetes mellitus    Personal history of other specified conditions 05/18/2016    History of chest pain    Personal history of other specified conditions 05/18/2016    History of dyspnea    Personal history of other specified conditions 05/18/2016    History of dizziness    Personal history of other specified conditions 05/18/2016    History of fatigue    Stroke (Multi)    [2]   Past Surgical History:  Procedure Laterality Date    CT ANGIO CORONARY ART WITH HEARTFLOW IF SCORE >30%  8/23/2023    CT HEART CORONARY ANGIOGRAM 8/23/2023 [facility] CT    MR HEAD ANGIO WO IV CONTRAST  12/14/2020    MR HEAD ANGIO WO IV CONTRAST 12/14/2020 [facility]    MR NECK ANGIO WO IV CONTRAST  12/14/2020    MR NECK ANGIO WO IV CONTRAST 12/14/2020 [facility]    OTHER SURGICAL HISTORY  12/01/2020    Hand fracture repair    OTHER SURGICAL HISTORY  12/01/2020    Nasal bone fracture repair   [3]   Family History  Problem Relation Name Age of Onset    Stroke Other      Hypertension Other      Diabetes Other

## 2025-07-27 NOTE — CONSULTS
Reason For Consult  PEG dislodgement with sinus track    History Of Present Illness  Bryan Nix is a 64 y.o. male with history of a flutter (on Eliquis), CAD, HTN, DM 1, left ICA stenosis s/p angioplasty and left carotid artery stent (in 2023), CVA in 2020 with right sided defects, acute pontine stroke in 2/2025 with acute hypoxic respiratory failure s/p tracheostomy and PEG placement at [facility] who presented to [facility] from SNF due to pain at PEG site.  Per chart review, patient noted pain with feeds and flushing of PEG over past three to four days. This was associated with leaking around PEG as well. Patient reported tolerating liquid diet by mouth; states he is currently working with SLP at his facility.    CT A/P performed in ED noted PEG tube dislodgement with associated sinus tract. GI initially consulted and requested surgery input due to concern for developing fistula.      Past Medical History  He has a past medical history of Abnormal finding of blood chemistry, unspecified (05/18/2016), Acute upper respiratory infection, unspecified (12/02/2015), Diabetes (Multi), Elevated prostate specific antigen (PSA), Encounter for screening for malignant neoplasm of colon (01/30/2021), Encounter for screening for malignant neoplasm of prostate (11/30/2020), Essential (primary) hypertension (07/30/2013), Other conditions influencing health status (07/30/2013), Other conditions influencing health status (12/02/2015), Personal history of other endocrine, nutritional and metabolic disease (03/18/2021), Personal history of other endocrine, nutritional and metabolic disease (03/18/2021), Personal history of other specified conditions (05/18/2016), Personal history of other specified conditions (05/18/2016), Personal history of other specified conditions (05/18/2016), Personal history of other specified conditions (05/18/2016), and Stroke (Multi).    Surgical History  He has a past surgical history that includes Other  "surgical history (12/01/2020); Other surgical history (12/01/2020); MR angio head wo IV contrast (12/14/2020); MR angio neck wo IV contrast (12/14/2020); and CT angio coronary art with heartflow if score >30% (8/23/2023).     Social History  He reports that he has quit smoking. His smoking use included cigarettes. He has never used smokeless tobacco. He reports that he does not drink alcohol and does not use drugs.    Family History  Family History[1]     Allergies  Patient has no known allergies.    Review of Systems  10 point review of systems negative with pertinent positives noted in HPI.     Physical Exam  Physical Exam  Vitals reviewed.   Constitutional:       General: He is not in acute distress.     Comments: Chronically ill appearing male, trach in place.    Neck:      Trachea: Tracheostomy present.     Cardiovascular:      Rate and Rhythm: Normal rate.   Pulmonary:      Effort: Pulmonary effort is normal. No respiratory distress.   Abdominal:      General: Bowel sounds are normal. There is no distension.      Palpations: Abdomen is soft.      Tenderness: There is abdominal tenderness (surrounding PEG site in LUQ).      Comments: PEG removed with granulation tissue noted around site. DCD secured with paper tape.      Musculoskeletal:      Right lower leg: No edema.      Left lower leg: No edema.     Skin:     General: Skin is warm and dry.     Neurological:      Mental Status: He is alert.     Psychiatric:         Behavior: Behavior is cooperative.            Last Recorded Vitals  Blood pressure 180/90, pulse 61, temperature 35.7 °C (96.3 °F), temperature source Temporal, resp. rate 18, height 1.88 m (6' 2.02\"), weight 77.1 kg (170 lb), SpO2 98%.    Relevant Results  Results for orders placed or performed during the hospital encounter of 07/26/25 (from the past 24 hours)   CBC and Auto Differential   Result Value Ref Range    WBC 7.9 4.4 - 11.3 x10*3/uL    nRBC 0.0 0.0 - 0.0 /100 WBCs    RBC 4.44 (L) 4.50 - " 5.90 x10*6/uL    Hemoglobin 11.3 (L) 13.5 - 17.5 g/dL    Hematocrit 37.8 (L) 41.0 - 52.0 %    MCV 85 80 - 100 fL    MCH 25.5 (L) 26.0 - 34.0 pg    MCHC 29.9 (L) 32.0 - 36.0 g/dL    RDW 17.4 (H) 11.5 - 14.5 %    Platelets 202 150 - 450 x10*3/uL    Neutrophils % 72.0 40.0 - 80.0 %    Immature Granulocytes %, Automated 1.9 (H) 0.0 - 0.9 %    Lymphocytes % 18.7 13.0 - 44.0 %    Monocytes % 3.6 2.0 - 10.0 %    Eosinophils % 2.7 0.0 - 6.0 %    Basophils % 1.1 0.0 - 2.0 %    Neutrophils Absolute 5.67 1.20 - 7.70 x10*3/uL    Immature Granulocytes Absolute, Automated 0.15 0.00 - 0.70 x10*3/uL    Lymphocytes Absolute 1.47 1.20 - 4.80 x10*3/uL    Monocytes Absolute 0.28 0.10 - 1.00 x10*3/uL    Eosinophils Absolute 0.21 0.00 - 0.70 x10*3/uL    Basophils Absolute 0.09 0.00 - 0.10 x10*3/uL   Comprehensive metabolic panel   Result Value Ref Range    Glucose 105 (H) 74 - 99 mg/dL    Sodium 137 136 - 145 mmol/L    Potassium 4.1 3.5 - 5.3 mmol/L    Chloride 100 98 - 107 mmol/L    Bicarbonate 28 21 - 32 mmol/L    Anion Gap 13 10 - 20 mmol/L    Urea Nitrogen 22 6 - 23 mg/dL    Creatinine 0.73 0.50 - 1.30 mg/dL    eGFR >90 >60 mL/min/1.73m*2    Calcium 9.5 8.6 - 10.3 mg/dL    Albumin 3.5 3.4 - 5.0 g/dL    Alkaline Phosphatase 157 (H) 33 - 136 U/L    Total Protein 6.8 6.4 - 8.2 g/dL    AST 20 9 - 39 U/L    Bilirubin, Total 0.5 0.0 - 1.2 mg/dL    ALT 36 10 - 52 U/L   Lactate   Result Value Ref Range    Lactate 1.0 0.4 - 2.0 mmol/L   CBC   Result Value Ref Range    WBC 6.1 4.4 - 11.3 x10*3/uL    nRBC 0.0 0.0 - 0.0 /100 WBCs    RBC 4.40 (L) 4.50 - 5.90 x10*6/uL    Hemoglobin 11.4 (L) 13.5 - 17.5 g/dL    Hematocrit 36.6 (L) 41.0 - 52.0 %    MCV 83 80 - 100 fL    MCH 25.9 (L) 26.0 - 34.0 pg    MCHC 31.1 (L) 32.0 - 36.0 g/dL    RDW 17.4 (H) 11.5 - 14.5 %    Platelets 176 150 - 450 x10*3/uL   Basic metabolic panel   Result Value Ref Range    Glucose 41 (LL) 74 - 99 mg/dL    Sodium 144 136 - 145 mmol/L    Potassium 3.4 (L) 3.5 - 5.3 mmol/L     Chloride 103 98 - 107 mmol/L    Bicarbonate 30 21 - 32 mmol/L    Anion Gap 14 10 - 20 mmol/L    Urea Nitrogen 20 6 - 23 mg/dL    Creatinine 0.77 0.50 - 1.30 mg/dL    eGFR >90 >60 mL/min/1.73m*2    Calcium 9.6 8.6 - 10.3 mg/dL   Type And Screen   Result Value Ref Range    ABO TYPE B     Rh TYPE POS     ANTIBODY SCREEN NEG    Lipase   Result Value Ref Range    Lipase 4 (L) 9 - 82 U/L   SST TOP   Result Value Ref Range    Extra Tube Hold for add-ons.    POCT GLUCOSE   Result Value Ref Range    POCT Glucose 43 (L) 74 - 99 mg/dL   POCT GLUCOSE   Result Value Ref Range    POCT Glucose 111 (H) 74 - 99 mg/dL     XR chest 1 view  Result Date: 7/27/2025  Interpreted By:  Mikala Estevez, STUDY: XR CHEST 1 VIEW; ;  7/27/2025 5:05 am   INDICATION: Signs/Symptoms:trach, rhonchi.     COMPARISON: 05/20/2025   ACCESSION NUMBER(S): [id number]   ORDERING CLINICIAN: RENE SCHULZ   TECHNIQUE: Single portable AP view chest   FINDINGS: Cardiomediastinal silhouette is within normal limits. Tracheostomy tube is in place. Lung fields are hyperinflated. No airspace consolidation. There is a subtle asymmetric right basilar infiltrate component which may be related to overlapping shadows. Tracheostomy tube in place.       1. Subtle asymmetric increased interstitial prominence of the right lung base may reflect overlapping shadows with focal infiltrate not excluded.     MACRO: None   Signed by: Mikala Estevez 7/27/2025 8:21 AM Dictation workstation:   [id number]    CT abdomen pelvis w IV contrast  Result Date: 7/27/2025  Interpreted By:  Angelito Rodgers, STUDY: CT ABDOMEN PELVIS W IV CONTRAST;  7/27/2025 1:13 am   INDICATION: Signs/Symptoms:Peg tub with pain and discharge.   COMPARISON: CT abdomen pelvis 05/20/2025.   ACCESSION NUMBER(S): [id number]   ORDERING CLINICIAN: ELBERT DIEHL   TECHNIQUE: CT of the abdomen and pelvis was performed after administration of intravenous contrast. Standard contiguous axial images were obtained through  the abdomen and pelvis. Coronal and sagittal reconstructions were performed.   FINDINGS: LOWER CHEST: Bronchial wall thickening is visualized in the lung bases suggestive of bronchitis. Mild bibasilar linear scarring/atelectasis similar to prior. BONES: No acute osseous abnormality. ABDOMINAL WALL: There is dislodged percutaneous gastrostomy tube terminating within the ventral superficial abdominal wall and there is fluid-filled sinus tract within the left upper rectus muscle just superficial to the peritoneum and gastric body. LYMPH NODES: No pathologically enlarged lymph nodes in the abdomen or pelvis.   ABDOMEN:   LIVER: Normal size and contour. No focal hepatic lesions. BILE DUCTS: Normal caliber. GALLBLADDER: Sludge versus excreted contrast within the gallbladder. No gallbladder wall thickening or distention. PANCREAS: No evidence of pancreatic duct dilatation or peripancreatic edema. SPLEEN: Within normal limits. ADRENALS: Within normal limits. KIDNEYS and URETERS: No evidence of hydronephrosis or nephrolithiasis. Normal size and enhancement pattern.     VESSELS: No abdominal aortic aneurysm. Mesenteric vessels appear patent.   RETROPERITONEUM: No evidence of retroperitoneal hematoma.   PELVIS:   REPRODUCTIVE ORGANS: Prostate is enlarged measuring 5.0 cm transversely. BLADDER: No gross abnormality.   BOWEL: There has been interval dislodgement of the percutaneous gastrostomy tube which now is located within the superficial ventral abdominal wall. There is fluid-filled sinus tract within the ventral abdominal wall superficial to the gastric body, axial image 51 of 168. Stomach is incompletely distended limiting assessment for wall thickening. No bowel dilatation or obstruction. Normal appendix. There is oral contrast noted within the: There is no evidence of bowel wall thickening or acute inflammatory change. PERITONEUM: No ascites or free air, no fluid collection.       1.  Dislodged percutaneous gastrostomy  tube located within the left ventral abdominal wall fat. There is a fluid-filled sinus tract remaining within the left ventral abdominal wall and left upper rectus muscle just superficial to the peritoneum and gastric body. Angelito Rodgers discussed the significance and urgency of this critical finding by EPIC secure chat with  ELBERT DIEHL on 7/27/2025 at 1:31 am.  (**-RCF-**) Findings:  See findings.     2. Mild bronchial wall thickening visualized in the lung bases which may represent bronchitis. Prostatomegaly.     Signed by: Angelito Rodgers 7/27/2025 1:31 AM Dictation workstation:   [id number]       Assessment/Plan   Bryan Nix is a 64 y.o. male with history of a flutter (on Eliquis), CAD, HTN, DM 1, left ICA stenosis s/p angioplasty and left carotid artery stent (in 2023), CVA in 2020 with right sided defects, acute pontine stroke in 2/2025 with acute hypoxic respiratory failure s/p tracheostomy and PEG placement at [facility] who presented to [facility] from SNF due to pain at PEG site. CT A/P demonstrated dislodged PEG,  GI initially consulted and requested surgery input due to concern for developing fistula.     Impression  - Dislodged PEG; removed at bedside today    Recommendation  - PEG removed at bedside; attempted replacement with 20F catheter failed  - Wound covered with 4x4 and secured with paper tape; replace as needed   - Defer need for replacement PEG to primary team   - Remainder of care per primary team    Patient seen and discussed with attending surgeon, Dr. Vogt.     Lulu Knight PA-C         [1]   Family History  Problem Relation Name Age of Onset    Stroke Other      Hypertension Other      Diabetes Other

## 2025-07-27 NOTE — CARE PLAN
Med rec completed.  It was noted patient's can safely swallow meds and apple//pudding will continue to hold scheduled insulin as patient went hypoglycemic last night and is still without her tube feeds.  Sliding scale is in place for any fluctuations in blood sugar.  Attending to follow.

## 2025-07-27 NOTE — CARE PLAN
The patient's goals for the shift include none stated/needs met    The clinical goals for the shift include comfort      Problem: Pain - Adult  Goal: Verbalizes/displays adequate comfort level or baseline comfort level  Outcome: Progressing     Problem: Safety - Adult  Goal: Free from fall injury  Outcome: Progressing     Problem: Discharge Planning  Goal: Discharge to home or other facility with appropriate resources  Outcome: Progressing     Problem: Chronic Conditions and Co-morbidities  Goal: Patient's chronic conditions and co-morbidity symptoms are monitored and maintained or improved  Outcome: Progressing     Problem: Nutrition  Goal: Nutrient intake appropriate for maintaining nutritional needs  Outcome: Progressing     Problem: Skin  Goal: Decreased wound size/increased tissue granulation at next dressing change  7/27/2025 1750 by Brooke Everett RN  Outcome: Progressing  7/27/2025 1749 by Brooke Everett RN  Flowsheets (Taken 7/27/2025 1749)  Decreased wound size/increased tissue granulation at next dressing change: Promote sleep for wound healing  Goal: Participates in plan/prevention/treatment measures  7/27/2025 1750 by Brooke Everett RN  Outcome: Progressing  7/27/2025 1749 by Brooke Everett RN  Flowsheets (Taken 7/27/2025 1749)  Participates in plan/prevention/treatment measures: Elevate heels  Goal: Prevent/manage excess moisture  7/27/2025 1750 by Brooke Everett RN  Outcome: Progressing  7/27/2025 1749 by Brooke Everett RN  Flowsheets (Taken 7/27/2025 1749)  Prevent/manage excess moisture:   Moisturize dry skin   Cleanse incontinence/protect with barrier cream  Goal: Prevent/minimize sheer/friction injuries  7/27/2025 1750 by Brooke Everett RN  Outcome: Progressing  7/27/2025 1749 by Brooke Everett RN  Flowsheets (Taken 7/27/2025 1749)  Prevent/minimize sheer/friction injuries:   Use pull sheet   HOB 30 degrees or less  Goal: Promote/optimize nutrition  7/27/2025 1750 by Brooke Everett RN  Outcome:  Progressing  7/27/2025 1749 by Brooke Everett RN  Flowsheets (Taken 7/27/2025 1749)  Promote/optimize nutrition:   Monitor/record intake including meals   Offer water/supplements/favorite foods  Goal: Promote skin healing  7/27/2025 1750 by Brooke Everett RN  Outcome: Progressing  7/27/2025 1749 by Brooke Everett RN  Flowsheets (Taken 7/27/2025 1749)  Promote skin healing:   Assess skin/pad under line(s)/device(s)   Rotate device position/do not position patient on device     Problem: Diabetes  Goal: Achieve decreasing blood glucose levels by end of shift  Outcome: Progressing  Goal: Increase stability of blood glucose readings by end of shift  Outcome: Progressing  Goal: Decrease in ketones present in urine by end of shift  Outcome: Progressing  Goal: Maintain electrolyte levels within acceptable range throughout shift  Outcome: Progressing  Goal: Maintain glucose levels >70mg/dl to <250mg/dl throughout shift  Outcome: Progressing  Goal: No changes in neurological exam by end of shift  Outcome: Progressing  Goal: Learn about and adhere to nutrition recommendations by end of shift  Outcome: Progressing  Goal: Vital signs within normal range for age by end of shift  Outcome: Progressing  Goal: Increase self care and/or family involovement by end of shift  Outcome: Progressing  Goal: Receive DSME education by end of shift  Outcome: Progressing

## 2025-07-27 NOTE — ED PROVIDER NOTES
"Limitations to History: None     HPI:      Bryan Nix is a 64 y.o. who presents to the ED with abdominal pain around his PEG tube site.  Patient's PEG tube was placed in February of this past year and has had no real issues since.  His wife says it has been a week, he says it has been 3 days of pain with flushing the tube and pain and discharge around it.  His wife is not worried about swelling around the area for little while she is concerned that the nursing home was not concerned enough about it.  Today they were worried about the redness and pain around the site along with the pain with flushing the site itself.  No fevers, the patient does eat by mouth but they are unsure whether the tube can come out or not yet.    Additional History Obtained from: Patient's wife is at the bedside    ------------------------------------------------------------------------------------------------------------------------------------------    VS: /72 (BP Location: Right arm, Patient Position: Lying)   Pulse 55   Temp 36.5 °C (97.7 °F) (Temporal)   Resp 18   Ht 1.88 m (6' 2\")   Wt 77.1 kg (170 lb)   SpO2 99%   BMI 21.83 kg/m²     Physical Exam:  Gen: Alert, NAD  Head/Neck: Trach in place with a speaking valve in  Eyes: EOMI, PERRL, anicteric sclerae, noninjected conjunctivae  Mouth:  MMM, no OP lesions noted  Heart: RRR no MRG  Lungs: CTA b/l no RRW, no increased work of breathing  Abdomen: soft, tender to patient around the PEG tube site where there is a lot of granulation tissue and a significant granuloma, some induration around the site itself versus extensive scar tissue  Musculoskeletal: no joint swelling noted  Extremities: WWP, no c/c/e, cap refill <2sec  Neurologic: Alert, symmetrical facies, difficulty speaking secondary to trach placement, and some aphasia present, hemiparesis on the " left      ------------------------------------------------------------------------------------------------------------------------------------------    Medical Decision Making: Patient presents with pain around his PEG tube site and with flushing.  CT scan shows the PEG tube is in the abdominal wall with a fistulous track or sinus tract to the peritoneum.  No signs or symptoms concerning for sepsis at this time.  Spoke with GI who asked to leave the tube in place until they can see him in the morning.  Patient admitted and his family was updated.    Of note the patient's family wants to talk about not replacing the PEG tube given he is taking things by mouth at the nursing home.  I told them that I would document this to pass this along    ED Course as of 07/27/25 0235   Sat Jul 26, 2025   6276 EKG interpreted by me at 11:52 PM shows sinus bradycardia with a rate of 49, left axis deviation with a rate of ST elevation but I do not see any on my read of the EKG [RG]      ED Course User Index  [RG] Jose Miller MD         Diagnoses as of 07/27/25 0235   PEG tube malfunction (Multi)       Medications - No data to display    Chronic Medical Conditions Significantly Affecting Care: CVA, trach and PEG dependence    External Records Reviewed: I reviewed recent and relevant outside records including: Discharge summary from March 3, 2025    Discussion of Management with Other Providers:   I discussed the patient/results with: Admitting team     Jose Miller MD  07/27/25 0236

## 2025-07-27 NOTE — PROGRESS NOTES
Physical Therapy                 Therapy Communication Note    Patient Name: Bryan Nix  MRN: [id number]  Department: [facility] 7  Room: 720720-A  Today's Date: 7/27/2025     Discipline: Physical Therapy    Missed Visit:   Yes    Missed Visit Reason:  PT orders received. Spoke to patient and introduced self and role. Pt currently declining PT due to abdominal pain and agreeable to attempt tomorrow. Will continue to follow.     Missed Time: Attempt

## 2025-07-27 NOTE — PROGRESS NOTES
Occupational Therapy                 Therapy Communication Note    Patient Name: Bryan Nix  MRN: [id number]  Department: [facility] 7  Room: 720/720-A  Today's Date: 7/27/2025     Discipline: Occupational Therapy    Missed Visit Reason:  OT orders received. Spoke to patient and introduced self and role. Pt currently declining OT due to abdominal pain and agreeable to attempt tomorrow. Will continue to follow.     Missed Time: Attempt

## 2025-07-28 ENCOUNTER — APPOINTMENT (OUTPATIENT)
Dept: CARDIOLOGY | Facility: HOSPITAL | Age: 65
DRG: 394 | End: 2025-07-28
Payer: MEDICARE

## 2025-07-28 LAB
ANION GAP SERPL CALC-SCNC: 14 MMOL/L (ref 10–20)
BUN SERPL-MCNC: 14 MG/DL (ref 6–23)
CALCIUM SERPL-MCNC: 9.2 MG/DL (ref 8.6–10.3)
CHLORIDE SERPL-SCNC: 104 MMOL/L (ref 98–107)
CO2 SERPL-SCNC: 29 MMOL/L (ref 21–32)
CREAT SERPL-MCNC: 0.73 MG/DL (ref 0.5–1.3)
EGFRCR SERPLBLD CKD-EPI 2021: >90 ML/MIN/1.73M*2
ERYTHROCYTE [DISTWIDTH] IN BLOOD BY AUTOMATED COUNT: 17.1 % (ref 11.5–14.5)
GLUCOSE BLD MANUAL STRIP-MCNC: 132 MG/DL (ref 74–99)
GLUCOSE BLD MANUAL STRIP-MCNC: 186 MG/DL (ref 74–99)
GLUCOSE BLD MANUAL STRIP-MCNC: 55 MG/DL (ref 74–99)
GLUCOSE BLD MANUAL STRIP-MCNC: 72 MG/DL (ref 74–99)
GLUCOSE BLD MANUAL STRIP-MCNC: 74 MG/DL (ref 74–99)
GLUCOSE BLD MANUAL STRIP-MCNC: 78 MG/DL (ref 74–99)
GLUCOSE BLD MANUAL STRIP-MCNC: 78 MG/DL (ref 74–99)
GLUCOSE SERPL-MCNC: 55 MG/DL (ref 74–99)
HCT VFR BLD AUTO: 36.7 % (ref 41–52)
HGB BLD-MCNC: 11 G/DL (ref 13.5–17.5)
HOLD SPECIMEN: NORMAL
MCH RBC QN AUTO: 24.9 PG (ref 26–34)
MCHC RBC AUTO-ENTMCNC: 30 G/DL (ref 32–36)
MCV RBC AUTO: 83 FL (ref 80–100)
NRBC BLD-RTO: 0 /100 WBCS (ref 0–0)
PLATELET # BLD AUTO: 181 X10*3/UL (ref 150–450)
POTASSIUM SERPL-SCNC: 3.5 MMOL/L (ref 3.5–5.3)
RBC # BLD AUTO: 4.41 X10*6/UL (ref 4.5–5.9)
SODIUM SERPL-SCNC: 143 MMOL/L (ref 136–145)
WBC # BLD AUTO: 6.8 X10*3/UL (ref 4.4–11.3)

## 2025-07-28 PROCEDURE — 93010 ELECTROCARDIOGRAM REPORT: CPT | Performed by: INTERNAL MEDICINE

## 2025-07-28 PROCEDURE — 1100000001 HC PRIVATE ROOM DAILY

## 2025-07-28 PROCEDURE — 93005 ELECTROCARDIOGRAM TRACING: CPT

## 2025-07-28 PROCEDURE — 2500000001 HC RX 250 WO HCPCS SELF ADMINISTERED DRUGS (ALT 637 FOR MEDICARE OP): Performed by: NURSE PRACTITIONER

## 2025-07-28 PROCEDURE — 2500000002 HC RX 250 W HCPCS SELF ADMINISTERED DRUGS (ALT 637 FOR MEDICARE OP, ALT 636 FOR OP/ED): Performed by: NURSE PRACTITIONER

## 2025-07-28 PROCEDURE — 99232 SBSQ HOSP IP/OBS MODERATE 35: CPT | Performed by: NURSE PRACTITIONER

## 2025-07-28 PROCEDURE — 36415 COLL VENOUS BLD VENIPUNCTURE: CPT | Performed by: INTERNAL MEDICINE

## 2025-07-28 PROCEDURE — 99232 SBSQ HOSP IP/OBS MODERATE 35: CPT | Performed by: SURGERY

## 2025-07-28 PROCEDURE — 80048 BASIC METABOLIC PNL TOTAL CA: CPT | Performed by: INTERNAL MEDICINE

## 2025-07-28 PROCEDURE — 97165 OT EVAL LOW COMPLEX 30 MIN: CPT | Mod: GO

## 2025-07-28 PROCEDURE — 82947 ASSAY GLUCOSE BLOOD QUANT: CPT

## 2025-07-28 PROCEDURE — 85027 COMPLETE CBC AUTOMATED: CPT | Performed by: INTERNAL MEDICINE

## 2025-07-28 PROCEDURE — 2500000004 HC RX 250 GENERAL PHARMACY W/ HCPCS (ALT 636 FOR OP/ED)

## 2025-07-28 PROCEDURE — 2500000001 HC RX 250 WO HCPCS SELF ADMINISTERED DRUGS (ALT 637 FOR MEDICARE OP)

## 2025-07-28 PROCEDURE — 99232 SBSQ HOSP IP/OBS MODERATE 35: CPT

## 2025-07-28 PROCEDURE — 97161 PT EVAL LOW COMPLEX 20 MIN: CPT | Mod: GP

## 2025-07-28 PROCEDURE — 2500000004 HC RX 250 GENERAL PHARMACY W/ HCPCS (ALT 636 FOR OP/ED): Performed by: INTERNAL MEDICINE

## 2025-07-28 RX ORDER — DEXTROSE MONOHYDRATE AND SODIUM CHLORIDE 5; .9 G/100ML; G/100ML
50 INJECTION, SOLUTION INTRAVENOUS CONTINUOUS
Status: ACTIVE | OUTPATIENT
Start: 2025-07-28 | End: 2025-07-29

## 2025-07-28 RX ADMIN — HYDRALAZINE HYDROCHLORIDE 10 MG: 10 TABLET, FILM COATED ORAL at 10:36

## 2025-07-28 RX ADMIN — METOPROLOL TARTRATE 50 MG: 50 TABLET, FILM COATED ORAL at 10:36

## 2025-07-28 RX ADMIN — SUCRALFATE 1 G: 1 TABLET ORAL at 17:12

## 2025-07-28 RX ADMIN — AMIODARONE HYDROCHLORIDE 200 MG: 200 TABLET ORAL at 10:36

## 2025-07-28 RX ADMIN — TRAZODONE HYDROCHLORIDE 50 MG: 50 TABLET ORAL at 21:40

## 2025-07-28 RX ADMIN — FAMOTIDINE 20 MG: 10 INJECTION, SOLUTION INTRAVENOUS at 21:39

## 2025-07-28 RX ADMIN — Medication 5 MG: at 21:40

## 2025-07-28 RX ADMIN — FAMOTIDINE 20 MG: 10 INJECTION, SOLUTION INTRAVENOUS at 10:37

## 2025-07-28 RX ADMIN — TRAMADOL HYDROCHLORIDE 50 MG: 50 TABLET, COATED ORAL at 21:40

## 2025-07-28 RX ADMIN — DEXTROSE AND SODIUM CHLORIDE 50 ML/HR: 5; .9 INJECTION, SOLUTION INTRAVENOUS at 17:13

## 2025-07-28 RX ADMIN — AMIODARONE HYDROCHLORIDE 200 MG: 200 TABLET ORAL at 21:39

## 2025-07-28 RX ADMIN — HYDRALAZINE HYDROCHLORIDE 10 MG: 10 TABLET, FILM COATED ORAL at 21:40

## 2025-07-28 RX ADMIN — DEXTROSE AND SODIUM CHLORIDE 50 ML/HR: 5; .9 INJECTION, SOLUTION INTRAVENOUS at 05:19

## 2025-07-28 RX ADMIN — SUCRALFATE 1 G: 1 TABLET ORAL at 10:36

## 2025-07-28 ASSESSMENT — COGNITIVE AND FUNCTIONAL STATUS - GENERAL
EATING MEALS: TOTAL
WALKING IN HOSPITAL ROOM: TOTAL
MOVING TO AND FROM BED TO CHAIR: TOTAL
MOVING FROM LYING ON BACK TO SITTING ON SIDE OF FLAT BED WITH BEDRAILS: TOTAL
DRESSING REGULAR LOWER BODY CLOTHING: TOTAL
DRESSING REGULAR LOWER BODY CLOTHING: TOTAL
TURNING FROM BACK TO SIDE WHILE IN FLAT BAD: TOTAL
EATING MEALS: TOTAL
DAILY ACTIVITIY SCORE: 6
TURNING FROM BACK TO SIDE WHILE IN FLAT BAD: TOTAL
STANDING UP FROM CHAIR USING ARMS: TOTAL
HELP NEEDED FOR BATHING: TOTAL
DRESSING REGULAR UPPER BODY CLOTHING: TOTAL
MOBILITY SCORE: 7
DAILY ACTIVITIY SCORE: 7
HELP NEEDED FOR BATHING: TOTAL
TOILETING: TOTAL
MOVING TO AND FROM BED TO CHAIR: TOTAL
WALKING IN HOSPITAL ROOM: TOTAL
DRESSING REGULAR LOWER BODY CLOTHING: TOTAL
CLIMB 3 TO 5 STEPS WITH RAILING: TOTAL
MOVING TO AND FROM BED TO CHAIR: TOTAL
TURNING FROM BACK TO SIDE WHILE IN FLAT BAD: TOTAL
STANDING UP FROM CHAIR USING ARMS: TOTAL
DAILY ACTIVITIY SCORE: 6
PERSONAL GROOMING: TOTAL
PERSONAL GROOMING: TOTAL
TOILETING: TOTAL
CLIMB 3 TO 5 STEPS WITH RAILING: TOTAL
HELP NEEDED FOR BATHING: TOTAL
WALKING IN HOSPITAL ROOM: TOTAL
MOVING FROM LYING ON BACK TO SITTING ON SIDE OF FLAT BED WITH BEDRAILS: TOTAL
TOILETING: TOTAL
EATING MEALS: A LOT
CLIMB 3 TO 5 STEPS WITH RAILING: TOTAL
DRESSING REGULAR UPPER BODY CLOTHING: TOTAL
MOBILITY SCORE: 6
STANDING UP FROM CHAIR USING ARMS: TOTAL
MOBILITY SCORE: 6
MOVING FROM LYING ON BACK TO SITTING ON SIDE OF FLAT BED WITH BEDRAILS: A LOT
PERSONAL GROOMING: TOTAL
DRESSING REGULAR UPPER BODY CLOTHING: TOTAL

## 2025-07-28 ASSESSMENT — PAIN - FUNCTIONAL ASSESSMENT: PAIN_FUNCTIONAL_ASSESSMENT: 0-10

## 2025-07-28 ASSESSMENT — PAIN SCALES - GENERAL
PAINLEVEL_OUTOF10: 0 - NO PAIN
PAINLEVEL_OUTOF10: 0 - NO PAIN

## 2025-07-28 NOTE — H&P
History Of Present Illness  Bryan Nix is a 64-year-old male who presents to the ED from SNF with possible infected PEG tube.  Patient has a past medical history of atrial flutter on Eliquis, hyponatremia, IDDM, CAD, hypertension, chronic wounds, diffuse lymphadenopathy, anemia, history of smoking, history elevated PSA, CVA L hemisphere (2020) with right-sided deficits, Left ICA stenosis (85%) s/p angioplasty and left carotid artery stent (08/25/2023) and recent history of acute pontine stroke 02/27/25 with acute hypoxic respiratory failure s/p tracheostomy on 2/25/2025 and PEG.  Patient arrives to ED today after having abdominal pain around PEG tube site.  Information gathered from ED notes and patient.  Wife is no longer present to provide information.  Per ED note PEG tube was placed in February and has been functioning properly until this past week when he began having pain at the site and 3 days of pain when the tube was flushed.  The site also started having discharge around it and is now swollen with erythema.  Patient denies any fevers, shortness of breath, chest pain, nausea, vomiting, diarrhea, or any known loss of blood.       ED Course      Hemodynamaically Stable.    Vitals: Temp. 96.5, HR 55, Resp.  16, /72, Pulse ox 99% RA       Labs: Glucose 105, Na+ 137, K+ 4.1, Bun 22, Creat.  0.73, GFR >90, Ca 9.5, Albumin 3.5, Alk Phos. 157, ALT 36, AST 20, Lactate 1.0, WBC 7.9,  Hgb.  11.3 Hct.  37.8,      Medications: N/A  Imaging: interpreted by radiologist.  CT abdomen pelvis:   Dislodged percutaneous gastrostomy tube located within the left ventral abdominal wall fat.  There is a fluid-filled sinus tract remaining within the left ventral abdominal wall and left upper rectus muscle just superficial to the peritoneum and gastric body.  Mild bronchial wall thickening visualized in the lung bases which may represent bronchitis.  EKG: Not available for my review.   Interpreted by ED provider.  Sinus  bradycardia with a rate of 49, left axis deviation      Past Medical History  Medical History[1]    Surgical History  Surgical History[2]     Social History  He reports that he has quit smoking. His smoking use included cigarettes. He has never used smokeless tobacco. He reports that he does not drink alcohol and does not use drugs.  Currently resides at skilled nursing facility.  Patient uses an electric wheelchair for ambulation.    Family History  Family History[3]     Allergies  Patient has no known allergies.    Review of Systems    10 point ROS systems completed and is negative except for what is stated in HPI.      Physical Exam  Constitutional:       General: He is awake. He is not in acute distress.     Appearance: Normal appearance. He is underweight. He is diaphoretic. He is not toxic-appearing.   HENT:      Head: Normocephalic and atraumatic.      Nose: Nose normal. No rhinorrhea.      Mouth/Throat:      Mouth: Mucous membranes are moist.     Eyes:      General:         Right eye: No discharge.         Left eye: No discharge.      Conjunctiva/sclera: Conjunctivae normal.      Pupils: Pupils are equal, round, and reactive to light.     Neck:      Trachea: Tracheostomy present.     Cardiovascular:      Rate and Rhythm: Regular rhythm. Bradycardia present.      Pulses: Normal pulses.   Pulmonary:      Effort: Pulmonary effort is normal. No respiratory distress.      Breath sounds: Examination of the right-upper field reveals rhonchi. Examination of the left-upper field reveals rhonchi. Examination of the right-middle field reveals rhonchi. Examination of the left-middle field reveals rhonchi. Rhonchi present. No wheezing.   Abdominal:      General: Bowel sounds are increased. There is no distension.      Palpations: Abdomen is soft.      Tenderness: There is no abdominal tenderness. There is no guarding.      Comments: Gastrostomy tube with edema and tissue exposed. Sanguinous drainage. Tender to palpation  "at site.      Musculoskeletal:         General: Normal range of motion.      Cervical back: Normal range of motion and neck supple.      Right lower leg: Edema present.      Left lower leg: Edema present.      Comments: Right arm flaccid. Decreased movement of bilateral lower extremities. Left upper extremity movement grossly intact.    Feet:      Comments: Heel protector boots in place bilateral heels.     Skin:     General: Skin is warm.      Comments: Sacral wound     Neurological:      General: No focal deficit present.      Mental Status: He is alert and oriented to person, place, and time. Mental status is at baseline.      Motor: Weakness present.     Psychiatric:         Mood and Affect: Mood normal.         Behavior: Behavior normal. Behavior is cooperative.          Last Recorded Vitals  Blood pressure 143/71, pulse 56, temperature 36.7 °C (98.1 °F), temperature source Temporal, resp. rate 16, height 1.88 m (6' 2.02\"), weight 77.1 kg (170 lb), SpO2 99%.    Relevant Results  Results for orders placed or performed during the hospital encounter of 07/26/25 (from the past 24 hours)   CBC and Auto Differential   Result Value Ref Range    WBC 7.9 4.4 - 11.3 x10*3/uL    nRBC 0.0 0.0 - 0.0 /100 WBCs    RBC 4.44 (L) 4.50 - 5.90 x10*6/uL    Hemoglobin 11.3 (L) 13.5 - 17.5 g/dL    Hematocrit 37.8 (L) 41.0 - 52.0 %    MCV 85 80 - 100 fL    MCH 25.5 (L) 26.0 - 34.0 pg    MCHC 29.9 (L) 32.0 - 36.0 g/dL    RDW 17.4 (H) 11.5 - 14.5 %    Platelets 202 150 - 450 x10*3/uL    Neutrophils % 72.0 40.0 - 80.0 %    Immature Granulocytes %, Automated 1.9 (H) 0.0 - 0.9 %    Lymphocytes % 18.7 13.0 - 44.0 %    Monocytes % 3.6 2.0 - 10.0 %    Eosinophils % 2.7 0.0 - 6.0 %    Basophils % 1.1 0.0 - 2.0 %    Neutrophils Absolute 5.67 1.20 - 7.70 x10*3/uL    Immature Granulocytes Absolute, Automated 0.15 0.00 - 0.70 x10*3/uL    Lymphocytes Absolute 1.47 1.20 - 4.80 x10*3/uL    Monocytes Absolute 0.28 0.10 - 1.00 x10*3/uL    Eosinophils " Absolute 0.21 0.00 - 0.70 x10*3/uL    Basophils Absolute 0.09 0.00 - 0.10 x10*3/uL   Comprehensive metabolic panel   Result Value Ref Range    Glucose 105 (H) 74 - 99 mg/dL    Sodium 137 136 - 145 mmol/L    Potassium 4.1 3.5 - 5.3 mmol/L    Chloride 100 98 - 107 mmol/L    Bicarbonate 28 21 - 32 mmol/L    Anion Gap 13 10 - 20 mmol/L    Urea Nitrogen 22 6 - 23 mg/dL    Creatinine 0.73 0.50 - 1.30 mg/dL    eGFR >90 >60 mL/min/1.73m*2    Calcium 9.5 8.6 - 10.3 mg/dL    Albumin 3.5 3.4 - 5.0 g/dL    Alkaline Phosphatase 157 (H) 33 - 136 U/L    Total Protein 6.8 6.4 - 8.2 g/dL    AST 20 9 - 39 U/L    Bilirubin, Total 0.5 0.0 - 1.2 mg/dL    ALT 36 10 - 52 U/L   Lactate   Result Value Ref Range    Lactate 1.0 0.4 - 2.0 mmol/L   CBC   Result Value Ref Range    WBC 6.1 4.4 - 11.3 x10*3/uL    nRBC 0.0 0.0 - 0.0 /100 WBCs    RBC 4.40 (L) 4.50 - 5.90 x10*6/uL    Hemoglobin 11.4 (L) 13.5 - 17.5 g/dL    Hematocrit 36.6 (L) 41.0 - 52.0 %    MCV 83 80 - 100 fL    MCH 25.9 (L) 26.0 - 34.0 pg    MCHC 31.1 (L) 32.0 - 36.0 g/dL    RDW 17.4 (H) 11.5 - 14.5 %    Platelets 176 150 - 450 x10*3/uL   Basic metabolic panel   Result Value Ref Range    Glucose 41 (LL) 74 - 99 mg/dL    Sodium 144 136 - 145 mmol/L    Potassium 3.4 (L) 3.5 - 5.3 mmol/L    Chloride 103 98 - 107 mmol/L    Bicarbonate 30 21 - 32 mmol/L    Anion Gap 14 10 - 20 mmol/L    Urea Nitrogen 20 6 - 23 mg/dL    Creatinine 0.77 0.50 - 1.30 mg/dL    eGFR >90 >60 mL/min/1.73m*2    Calcium 9.6 8.6 - 10.3 mg/dL   Type And Screen   Result Value Ref Range    ABO TYPE B     Rh TYPE POS     ANTIBODY SCREEN NEG    Lipase   Result Value Ref Range    Lipase 4 (L) 9 - 82 U/L   SST TOP   Result Value Ref Range    Extra Tube Hold for add-ons.    POCT GLUCOSE   Result Value Ref Range    POCT Glucose 43 (L) 74 - 99 mg/dL   POCT GLUCOSE   Result Value Ref Range    POCT Glucose 111 (H) 74 - 99 mg/dL   POCT GLUCOSE   Result Value Ref Range    POCT Glucose 66 (L) 74 - 99 mg/dL   POCT GLUCOSE    Result Value Ref Range    POCT Glucose 71 (L) 74 - 99 mg/dL     XR chest 1 view  Result Date: 7/27/2025  Interpreted By:  Mikala Estevez, STUDY: XR CHEST 1 VIEW; ;  7/27/2025 5:05 am   INDICATION: Signs/Symptoms:trach, rhonchi.     COMPARISON: 05/20/2025   ACCESSION NUMBER(S): [id number]   ORDERING CLINICIAN: RENE SCHULZ   TECHNIQUE: Single portable AP view chest   FINDINGS: Cardiomediastinal silhouette is within normal limits. Tracheostomy tube is in place. Lung fields are hyperinflated. No airspace consolidation. There is a subtle asymmetric right basilar infiltrate component which may be related to overlapping shadows. Tracheostomy tube in place.       1. Subtle asymmetric increased interstitial prominence of the right lung base may reflect overlapping shadows with focal infiltrate not excluded.     MACRO: None   Signed by: Mikala Estevez 7/27/2025 8:21 AM Dictation workstation:   [id number]    CT abdomen pelvis w IV contrast  Result Date: 7/27/2025  Interpreted By:  Angelito Rodgers, STUDY: CT ABDOMEN PELVIS W IV CONTRAST;  7/27/2025 1:13 am   INDICATION: Signs/Symptoms:Peg tub with pain and discharge.   COMPARISON: CT abdomen pelvis 05/20/2025.   ACCESSION NUMBER(S): [id number]   ORDERING CLINICIAN: ELBERT DIEHL   TECHNIQUE: CT of the abdomen and pelvis was performed after administration of intravenous contrast. Standard contiguous axial images were obtained through the abdomen and pelvis. Coronal and sagittal reconstructions were performed.   FINDINGS: LOWER CHEST: Bronchial wall thickening is visualized in the lung bases suggestive of bronchitis. Mild bibasilar linear scarring/atelectasis similar to prior. BONES: No acute osseous abnormality. ABDOMINAL WALL: There is dislodged percutaneous gastrostomy tube terminating within the ventral superficial abdominal wall and there is fluid-filled sinus tract within the left upper rectus muscle just superficial to the peritoneum and gastric body. LYMPH NODES:  No pathologically enlarged lymph nodes in the abdomen or pelvis.   ABDOMEN:   LIVER: Normal size and contour. No focal hepatic lesions. BILE DUCTS: Normal caliber. GALLBLADDER: Sludge versus excreted contrast within the gallbladder. No gallbladder wall thickening or distention. PANCREAS: No evidence of pancreatic duct dilatation or peripancreatic edema. SPLEEN: Within normal limits. ADRENALS: Within normal limits. KIDNEYS and URETERS: No evidence of hydronephrosis or nephrolithiasis. Normal size and enhancement pattern.     VESSELS: No abdominal aortic aneurysm. Mesenteric vessels appear patent.   RETROPERITONEUM: No evidence of retroperitoneal hematoma.   PELVIS:   REPRODUCTIVE ORGANS: Prostate is enlarged measuring 5.0 cm transversely. BLADDER: No gross abnormality.   BOWEL: There has been interval dislodgement of the percutaneous gastrostomy tube which now is located within the superficial ventral abdominal wall. There is fluid-filled sinus tract within the ventral abdominal wall superficial to the gastric body, axial image 51 of 168. Stomach is incompletely distended limiting assessment for wall thickening. No bowel dilatation or obstruction. Normal appendix. There is oral contrast noted within the: There is no evidence of bowel wall thickening or acute inflammatory change. PERITONEUM: No ascites or free air, no fluid collection.       1.  Dislodged percutaneous gastrostomy tube located within the left ventral abdominal wall fat. There is a fluid-filled sinus tract remaining within the left ventral abdominal wall and left upper rectus muscle just superficial to the peritoneum and gastric body. Angelito Rodgers discussed the significance and urgency of this critical finding by EPIC secure chat with  ELBERT DIEHL on 7/27/2025 at 1:31 am.  (**-RCF-**) Findings:  See findings.     2. Mild bronchial wall thickening visualized in the lung bases which may represent bronchitis. Prostatomegaly.     Signed by: Angelito Rodgers  7/27/2025 1:31 AM Dictation workstation:   [id number]      Assessment & Plan  PEG tube malfunction (Multi)    Abdominal pain    Elevated alkaline phosphatase level    Patient arrived to ED today with possible infected PEG tube from skilled nursing facility.  Was found on CT abdomen pelvis that the tube was a dislodged percutaneous gastrostomy tube located within the left ventral abdominal wall fat.  There was also a fluid-filled sinus tract remaining within the left ventral abdominal wall and left upper rectus muscle just superficial to the peritoneum and gastric body.  Patient did not have any other complaints.  It was reported in the ED that patient requested to not have the tube replaced because he is taking food by mouth.  Patient reported to me that he is eating puréed food.  GI consult placed.  Appreciate recs.     Patient admitted to Dr. Tj Schulz for further medical management.      # Dislodged G-tube  # Fluid-filled sinus tract left ventral abdominal wall  # Elevated alk phos  # Abdominal pain  -GI consult  -Dietary consult when G-tube replaced for tube feeding recommendation  -As needed analgesia  -As needed antiemetic  - 0.9 normal saline at 100 mL/HR  -PEG tube care  -PEG tube site wound culture   -PT/OT/SW   -NPO diet for possible a.m. procedure  -admitted to observation   -q4 vitals    -morning labs CBC, BMP, type and screen     Chronic Conditions   # Diabetes  -Hold home glycemic medication   -SSI with hypoglycemic protocol   -POCT   # Anemia  # Tracheostomy with chronic respiratory failure  - Trach care  -continuous puls-ox  # CVA x 2, left ICA stenosis, left carotid stent, CAD  #Atrial flutter  #Chronic wound  - Wound care  #Lymphadenopathy    Continue home medications as ordered when nursing completes home med rec.          Full Code      #DVT Prophylaxis   Scd's as tolerated   DVT Prophylaxis   On Eliquis     Thorough record review performed of previous labs and notes from prior  encounters.              Tj Schulz DO           [1]   Past Medical History:  Diagnosis Date    Abnormal finding of blood chemistry, unspecified 05/18/2016    Abnormal blood chemistry    Acute upper respiratory infection, unspecified 12/02/2015    Acute upper respiratory infection    Diabetes (Multi)     Elevated prostate specific antigen (PSA)     Elevated prostate specific antigen (PSA)    Encounter for screening for malignant neoplasm of colon 01/30/2021    Colon cancer screening    Encounter for screening for malignant neoplasm of prostate 11/30/2020    Prostate cancer screening    Essential (primary) hypertension 07/30/2013    Benign essential hypertension    Other conditions influencing health status 07/30/2013    Diabetes Mellitus Poorly Controlled    Other conditions influencing health status 12/02/2015    History of cough    Personal history of other endocrine, nutritional and metabolic disease 03/18/2021    History of hyperlipidemia    Personal history of other endocrine, nutritional and metabolic disease 03/18/2021    History of diabetes mellitus    Personal history of other specified conditions 05/18/2016    History of chest pain    Personal history of other specified conditions 05/18/2016    History of dyspnea    Personal history of other specified conditions 05/18/2016    History of dizziness    Personal history of other specified conditions 05/18/2016    History of fatigue    Stroke (Multi)    [2]   Past Surgical History:  Procedure Laterality Date    CT ANGIO CORONARY ART WITH HEARTFLOW IF SCORE >30%  8/23/2023    CT HEART CORONARY ANGIOGRAM 8/23/2023 [facility] CT    MR HEAD ANGIO WO IV CONTRAST  12/14/2020    MR HEAD ANGIO WO IV CONTRAST 12/14/2020 [facility]    MR NECK ANGIO WO IV CONTRAST  12/14/2020    MR NECK ANGIO WO IV CONTRAST 12/14/2020 [facility]    OTHER SURGICAL HISTORY  12/01/2020    Hand fracture repair    OTHER SURGICAL HISTORY  12/01/2020    Nasal bone  fracture repair   [3]   Family History  Problem Relation Name Age of Onset    Stroke Other      Hypertension Other      Diabetes Other

## 2025-07-28 NOTE — CARE PLAN
The patient's goals for the shift include pain control and comfort    The clinical goals for the shift include pt will be safe and comftorable      Problem: Pain - Adult  Goal: Verbalizes/displays adequate comfort level or baseline comfort level  Outcome: Progressing     Problem: Safety - Adult  Goal: Free from fall injury  Outcome: Progressing     Problem: Discharge Planning  Goal: Discharge to home or other facility with appropriate resources  Outcome: Progressing     Problem: Chronic Conditions and Co-morbidities  Goal: Patient's chronic conditions and co-morbidity symptoms are monitored and maintained or improved  Outcome: Progressing     Problem: Nutrition  Goal: Nutrient intake appropriate for maintaining nutritional needs  Outcome: Progressing     Problem: Skin  Goal: Decreased wound size/increased tissue granulation at next dressing change  Outcome: Progressing  Goal: Participates in plan/prevention/treatment measures  Outcome: Progressing  Goal: Prevent/manage excess moisture  Outcome: Progressing  Goal: Prevent/minimize sheer/friction injuries  Outcome: Progressing  Goal: Promote/optimize nutrition  Outcome: Progressing  Goal: Promote skin healing  Outcome: Progressing     Problem: Diabetes  Goal: Achieve decreasing blood glucose levels by end of shift  Outcome: Progressing  Goal: Increase stability of blood glucose readings by end of shift  Outcome: Progressing  Goal: Decrease in ketones present in urine by end of shift  Outcome: Progressing  Goal: Maintain electrolyte levels within acceptable range throughout shift  Outcome: Progressing  Goal: Maintain glucose levels >70mg/dl to <250mg/dl throughout shift  Outcome: Progressing  Goal: No changes in neurological exam by end of shift  Outcome: Progressing  Goal: Learn about and adhere to nutrition recommendations by end of shift  Outcome: Progressing  Goal: Vital signs within normal range for age by end of shift  Outcome: Progressing  Goal: Increase self  care and/or family involovement by end of shift  Outcome: Progressing  Goal: Receive DSME education by end of shift  Outcome: Progressing

## 2025-07-28 NOTE — CARE PLAN
The patient's goals for the shift include pain control and comfort    The clinical goals for the shift include maintain safety    Over the shift, the patient will be monitored by vital signs

## 2025-07-28 NOTE — PROGRESS NOTES
"Bryan Nix is a 64 y.o. male on day 0 of admission presenting with PEG tube malfunction (Multi).    Subjective   7/28/2025 seen and examined at the bedside today.  Denies any abdominal or epigastric pain, nausea or vomiting.  States no more abdominal discomfort since his PEG was removed.  Abdomen soft, nontender, bowel sounds present.  Small surgical dressing over old PEG site with minimal amount of old drainage.  Reports being able to eat sufficient amount of solid food.  No overt bleeding.  1 BM later in the day.  Spouse at the bedside       Objective     The note was created using voice recognition transcription software. Despite proofreading, unintentional typographical errors may be present. Please contact the GI office with any questions or concerns.     Current Medications: reviewed    A 10 point review of system is negative except for what is mentioned in the HPI    Vital Signs: Reviewed    Physical Exam:  General: no apparent distress, pleasant and cooperative  Skin:  Warm and dry, no jaundice  HEENT: No scleral icterus, no conjunctival pallor, normocephalic, atraumatic, mucous membranes moist.  Capped trach  Neck:  atraumatic, trachea midline, no JVD  Chest:  decreased air entry to auscultation bilaterally. No wheezes, rales, or rhonchi  CV:  Regular rate and rhythm.  Positive S1/S2  Abdomen: no distension, +BS, soft, non-tender to palpation, no rebound tenderness, no guarding, no rigidity, no discernible ascites.  Small surgical dressing over old PEG site with minimal amount of old dried drainage  Extremities: no lower extremity edema, Chronic pigmentary changes, no cyanosis  Neurological:  A&Ox3 , no asterixis  Psychiatric: cooperative     Investigations:  Labs, radiological imaging and cardiac work up were reviewed    Last Recorded Vitals  Blood pressure 154/67, pulse 50, temperature 36.9 °C (98.4 °F), temperature source Temporal, resp. rate 18, height 1.88 m (6' 2.02\"), weight 77.1 kg (170 lb), " SpO2 92%.  Intake/Output last 3 Shifts:  I/O last 3 completed shifts:  In: 1299.2 (16.8 mL/kg) [I.V.:1299.2 (16.8 mL/kg)]  Out: 1750 (22.7 mL/kg) [Urine:1750 (0.6 mL/kg/hr)]  Weight: 77.1 kg     Relevant Results        Scheduled medications  Scheduled Medications[1]  Continuous medications  Continuous Medications[2]  PRN medications  PRN Medications[3]    Results for orders placed or performed during the hospital encounter of 07/26/25 (from the past 24 hours)   POCT GLUCOSE   Result Value Ref Range    POCT Glucose 71 (L) 74 - 99 mg/dL   CBC   Result Value Ref Range    WBC 6.8 4.4 - 11.3 x10*3/uL    nRBC 0.0 0.0 - 0.0 /100 WBCs    RBC 4.41 (L) 4.50 - 5.90 x10*6/uL    Hemoglobin 11.0 (L) 13.5 - 17.5 g/dL    Hematocrit 36.7 (L) 41.0 - 52.0 %    MCV 83 80 - 100 fL    MCH 24.9 (L) 26.0 - 34.0 pg    MCHC 30.0 (L) 32.0 - 36.0 g/dL    RDW 17.1 (H) 11.5 - 14.5 %    Platelets 181 150 - 450 x10*3/uL   Basic Metabolic Panel   Result Value Ref Range    Glucose 55 (LL) 74 - 99 mg/dL    Sodium 143 136 - 145 mmol/L    Potassium 3.5 3.5 - 5.3 mmol/L    Chloride 104 98 - 107 mmol/L    Bicarbonate 29 21 - 32 mmol/L    Anion Gap 14 10 - 20 mmol/L    Urea Nitrogen 14 6 - 23 mg/dL    Creatinine 0.73 0.50 - 1.30 mg/dL    eGFR >90 >60 mL/min/1.73m*2    Calcium 9.2 8.6 - 10.3 mg/dL   POCT GLUCOSE   Result Value Ref Range    POCT Glucose 55 (L) 74 - 99 mg/dL   POCT GLUCOSE   Result Value Ref Range    POCT Glucose 74 74 - 99 mg/dL   POCT GLUCOSE   Result Value Ref Range    POCT Glucose 78 74 - 99 mg/dL   POCT GLUCOSE   Result Value Ref Range    POCT Glucose 72 (L) 74 - 99 mg/dL   POCT GLUCOSE   Result Value Ref Range    POCT Glucose 78 74 - 99 mg/dL   POCT GLUCOSE   Result Value Ref Range    POCT Glucose 132 (H) 74 - 99 mg/dL   POCT GLUCOSE   Result Value Ref Range    POCT Glucose 186 (H) 74 - 99 mg/dL         * Cannot find OR log *  Last relevant procedure:           Assessment & Plan  PEG tube malfunction (Multi)    Abdominal  pain    Elevated alkaline phosphatase level    Bryan Bass  is a 64 y.o. male with signficant past medical history of Aflutter (on Eliquis), hyponatremia, IDDM, CAD, hypertension, chronic wounds, diffuse lymphadenopathy, anemia, tobacco use, elevated PSA, CVA LMCA (8/23 and 12/20) with right-sided deficits and an acute nonhemorrhagic brainstem infarct (2/27/25), left ICA stenosis (85%) s/p angioplasty and left carotid artery stent (08/25/2023), and recent history of acute pontine stroke 02/27/25 with acute hypoxic respiratory failure s/p tracheostomy on 2/25/2025 and PEG who presents for pain at his PEG tube.    CT abdomen pelvis W Contrast 7/27/25  IMPRESSION:  1.  Dislodged percutaneous gastrostomy tube located within the left  ventral abdominal wall fat. There is a fluid-filled sinus tract  remaining within the left ventral abdominal wall and left upper  rectus muscle just superficial to the peritoneum and gastric body.  Angelito Rodgers discussed the significance and urgency of this critical     ENDOSCOPIC REVIEW  EGD by Dr Carlton 7/27/25  Small hiatal hernia  Moderate erythematous mucosa in the body of the stomach and antrum; performed cold forceps biopsy to rule out H. pylori  Moderate erythematous mucosa in the duodenal bulb and 1st part of the duodenum     EGD by Dr Abbott 2/25/25   - Normal hypopharynx.   - Normal esophagus.   - Acute gastritis, characterized by erosions and aphthous ulcerations.   - Adherent clot over a duodenal ulcer with adherent clot.   - An endoscopically removable PEG placement was successfully completed.   - No specimens collected.      Pain at PEG site patient eating and taking pills by mouth, originally placed 2/25/25  Chronic anticoagulation eliquis     PLAN  PEG tube removed by surgical team at the bedside yesterday.  Patient reports abdominal pain already significantly improved/nearly gone.  Patient is in a good spirit, reports being able to eat solid foods.  Reports that he  would not want PEG replacement, is supported by his spouse at the bedside  On examination abdomen soft, nontender, bowel sounds present.  Small surgical dressing over old PEG site, surrounding area clean, dry, no redness, swelling or discharge noted    Reminding care as per primary medicine, surgical team    Discussed with Dr Leora ERICKSON to sign off at this time  Please do not hesitate to reconsult/reach out with questions should any arise at later time        I spent 25 minutes in the professional and overall care of this patient.      Randee Greenwood, APRN-CNP           [1] amiodarone, 200 mg, g-tube, BID  [Held by provider] apixaban, 5 mg, g-tube, BID  [Held by provider] esomeprazole, 10 mg, oral, Daily before breakfast  famotidine, 20 mg, intravenous, q12h LIBAN  hydrALAZINE, 10 mg, g-tube, q12h  [Held by provider] insulin glargine, 50 Units, subcutaneous, Nightly  insulin lispro, 0-10 Units, subcutaneous, TID AC  metoprolol tartrate, 50 mg, g-tube, TID  sucralfate, 1 g, oral, BID AC  traZODone, 50 mg, g-tube, Nightly  [2] D5 % and 0.9 % sodium chloride, 50 mL/hr, Last Rate: 50 mL/hr (07/28/25 1713)  [3] PRN medications: acetaminophen **OR** acetaminophen **OR** acetaminophen, cloNIDine, dextrose, dextrose, glucagon, glucagon, hydrALAZINE, melatonin, metoprolol, ondansetron, oxygen, traMADol

## 2025-07-28 NOTE — PROGRESS NOTES
Physical Therapy    Physical Therapy Evaluation    Patient Name: Bryan Nix  MRN: [id number]  Today's Date: 7/28/2025   Time Calculation  Start Time: 0907  Stop Time: 0919  Time Calculation (min): 12 min  720/720-A    Assessment/Plan   PT Assessment  PT Assessment Results: Decreased strength, Decreased range of motion, Decreased endurance, Impaired balance, Decreased mobility  Rehab Prognosis: Fair  Barriers to Discharge Home: Caregiver assistance (return to ECF)  Caregiver Assistance: Caregiver assistance needed per identified barriers - however, level of patient's required assistance exceeds assistance available at home  Evaluation/Treatment Tolerance: Patient limited by fatigue  Strengths: Ability to acquire knowledge, Rehab experience  Barriers to Participation: Comorbidities  End of Session Communication: Bedside nurse  Assessment Comment: Pt admitted 7/26 with PEG tube malfunction and had it removed on 7/27. Pt is close to baseline LOF and demos decreased strength, endurance and balance. Pt active with therapy at SNF and recommend to continue moderate intensity therapy once returns.  End of Session Patient Position: Bed, 2 rail up, Bed, 3 rail up, Alarm off, not on at start of session (spouse present, call light in reach, all needs met)  IP OR SWING BED PT PLAN  Inpatient or Swing Bed: Inpatient  PT Plan  Treatment/Interventions: Bed mobility, Balance training, Strengthening, Endurance training, Range of motion, Therapeutic exercise, Therapeutic activity, Home exercise program  PT Plan: Ongoing PT  PT Frequency: 2 times per week (during this acute inpatient hospitilization)  PT Discharge Recommendations: Moderate intensity level of continued care (Based on current functional status and rehab potential, patient is anticipated to tolerate and benefit from 5 or more days per week of skilled rehabilitative therapy after discharge from this acute inpatient hospitilization.)  PT - OK to Discharge: Yes (once  medically cleared)    Subjective     Current Problem:  1. PEG tube malfunction (Multi)          Problem List[1]    General Visit Information:  General  Reason for Referral: PT eval and tx; ADLs. dx; Dislodged G-tube, Fluid-filled sinus tract left ventral abdominal wall , Elevated alk phos, Abdominal pain. CT abdomen: dislodged percutaneous gastrostomy tube. 7/27 removed PEG tube  Referred By: Zeus  Past Medical History Relevant to Rehab: 64-year-old male who presents to the ED from SNF with possible infected PEG tube.  Patient has a past medical history of atrial flutter on Eliquis, hyponatremia, IDDM, CAD, hypertension, chronic wounds, diffuse lymphadenopathy, anemia, history of smoking, history elevated PSA, CVA L hemisphere (2020) with right-sided deficits, Left ICA stenosis (85%) s/p angioplasty and left carotid artery stent (08/25/2023) and recent history of acute pontine stroke 02/27/25 with acute hypoxic respiratory failure s/p tracheostomy on 2/25/2025 and PEG.  Patient arrives to ED today after having abdominal pain around PEG tube site.  Information gathered from ED notes and patient.  Wife is no longer present to provide information.  Per ED note PEG tube was placed in February and has been functioning properly until this past week when he began having pain at the site and 3 days of pain when the tube was flushed.  The site also started having discharge around it and is now swollen with erythema.  Patient denies any fevers, shortness of breath, chest pain, nausea, vomiting, diarrhea, or any known loss of blood.  Family/Caregiver Present: Yes (spouse present, supportive)  Co-Treatment: OT  Co-Treatment Reason: for safety and to maximize therapeutic performance  Prior to Session Communication: Bedside nurse  Patient Position Received: Bed, 3 rail up, Alarm off, not on at start of session  General Comment: patient pleasant and cooperative to participate    Home Living:  Home Living  Type of Home:  (patient  has been at SNF since February 2025 when had CVA; prior to this patient was independent at home, living with spouse. Currently, patient is dependent with transfer with use of nithya and can use electric w/c ind, dependent for ADLs.)  Home Living Comments: Pt reports he works with therapy at nursing facility with sitting on EOB    Prior Level of Function:  Prior Function Per Pt/Caregiver Report  Level of Ware: Needs assistance with functional transfers, Needs assistance with homemaking, Needs assistance with ADLs    Precautions:  Precautions  Medical Precautions: Fall precautions (NPO, IV, purewick, prafo boots, trach, OOB with assist)     Objective     Pain:  Pain Assessment  Pain Assessment: 0-10  0-10 (Numeric) Pain Score:  (Reports mild abdominal pain that has improved since yesterday)    Cognition:  Cognition  Overall Cognitive Status: Within Functional Limits (difficult to communicate with trach; able to understand most of the time)    General Assessments:  General Observation  General Observation: trach intact and external male catheter   Activity Tolerance  Endurance: Decreased tolerance for upright activites  Sensation  Light Touch: No apparent deficits  Strength  Strength Comments: RLE 1/5; LLE 2+/5     Functional Assessments:  Bed Mobility  Bed Mobility:  (completed supine <-->sit with dependent assistance x 2; able to sit EOB with MAX A x 1 for ~3 Min.)  Transfers  Transfer:  (unable/unsafe to attempt this date)     Extremity/Trunk Assessments:  RLE   RLE : Exceptions to WFL  LLE   LLE : Exceptions to WFL    Outcome Measures:  [facility] Basic Mobility  Turning from your back to your side while in a flat bed without using bedrails: A lot  Moving from lying on your back to sitting on the side of a flat bed without using bedrails: Total  Moving to and from bed to chair (including a wheelchair): Total  Standing up from a chair using your arms (e.g. wheelchair or bedside chair): Total  To walk in hospital  room: Total  Climbing 3-5 steps with railing: Total  Basic Mobility - Total Score: 7     Goals:  Encounter Problems       Encounter Problems (Active)       PT Problem       PT Goal 1 STG - Pt will transition supine <> sitting with Max Ax2  (Progressing)       Start:  07/28/25    Expected End:  08/11/25            PT Goal 2 STG - Pt will maintain static sitting balance with ModA x1 to decrease risk of falls  (Progressing)       Start:  07/28/25    Expected End:  08/11/25            PT Goal 3 STG - Pt will perform a B LE ther ex program of 2-3 sets of 10  (Progressing)       Start:  07/28/25    Expected End:  08/11/25                 Education Documentation  Home Exercise Program, taught by Vicky Samaniego PT at 7/28/2025  3:53 PM.  Learner: Patient  Readiness: Acceptance  Method: Explanation  Response: Verbalizes Understanding, Needs Reinforcement    Precautions, taught by Vicky Samaniego PT at 7/28/2025  3:53 PM.  Learner: Patient  Readiness: Acceptance  Method: Explanation  Response: Verbalizes Understanding, Needs Reinforcement    Mobility Training, taught by Vicky Samaniego PT at 7/28/2025  3:53 PM.  Learner: Patient  Readiness: Acceptance  Method: Explanation  Response: Verbalizes Understanding, Needs Reinforcement    Education Comments  No comments found.              [1]   Patient Active Problem List  Diagnosis    Hemoptysis    Tracheostomy in place (Multi)    CVA, old, hemiparesis (Multi)    Status post insertion of percutaneous endoscopic gastrostomy (PEG) tube (Multi)    Pneumonia due to infectious organism    Right sided weakness    Atrial flutter (Multi)    Insulin dependent type 2 diabetes mellitus (Multi)    Melena    PEG tube malfunction (Multi)    Abdominal pain    Elevated alkaline phosphatase level

## 2025-07-28 NOTE — PROGRESS NOTES
"Bryan Nix is a 64 y.o. male on day 0 of admission presenting with PEG tube malfunction (Multi).    Subjective   Patient resting comfortably with wife at bedside. Patient states pain is much better since his tube was removed. Both patient and wife are adamant that does not want another PEG tube placed. Per wife, patient was eating soft foods at the nursing home including soups and mashed potatoes.        Objective     Physical Exam  Vitals reviewed.   Constitutional:       General: He is not in acute distress.     Comments: Chronically ill and debilitated male with wife at bedside   Neck:      Trachea: Tracheostomy (clean, clear of secretions) present.     Cardiovascular:      Rate and Rhythm: Normal rate.   Pulmonary:      Effort: Pulmonary effort is normal. No respiratory distress.      Comments: Tracheostomy, on RA  Abdominal:      General: Abdomen is flat. Bowel sounds are normal.      Palpations: Abdomen is soft.      Tenderness: There is abdominal tenderness (mild, surrounding prior PEG site).      Comments: PEG wound with granulation tissue. Some old tube feed/purulent drainage expressed from wound. Covered with DCD.      Musculoskeletal:      Right lower leg: No edema.      Left lower leg: No edema.      Comments: Heel protectors on b/l feet     Skin:     General: Skin is warm and dry.     Neurological:      Mental Status: He is alert and oriented to person, place, and time.     Psychiatric:         Behavior: Behavior is cooperative.         Last Recorded Vitals  Blood pressure 178/81, pulse 60, temperature 36.9 °C (98.4 °F), temperature source Temporal, resp. rate 16, height 1.88 m (6' 2.02\"), weight 77.1 kg (170 lb), SpO2 97%.  Intake/Output last 3 Shifts:  I/O last 3 completed shifts:  In: 1299.2 (16.8 mL/kg) [I.V.:1299.2 (16.8 mL/kg)]  Out: 1100 (14.3 mL/kg) [Urine:1100 (0.4 mL/kg/hr)]  Weight: 77.1 kg     Relevant Results  Results for orders placed or performed during the hospital encounter of " 07/26/25 (from the past 24 hours)   POCT GLUCOSE   Result Value Ref Range    POCT Glucose 66 (L) 74 - 99 mg/dL   POCT GLUCOSE   Result Value Ref Range    POCT Glucose 71 (L) 74 - 99 mg/dL   CBC   Result Value Ref Range    WBC 6.8 4.4 - 11.3 x10*3/uL    nRBC 0.0 0.0 - 0.0 /100 WBCs    RBC 4.41 (L) 4.50 - 5.90 x10*6/uL    Hemoglobin 11.0 (L) 13.5 - 17.5 g/dL    Hematocrit 36.7 (L) 41.0 - 52.0 %    MCV 83 80 - 100 fL    MCH 24.9 (L) 26.0 - 34.0 pg    MCHC 30.0 (L) 32.0 - 36.0 g/dL    RDW 17.1 (H) 11.5 - 14.5 %    Platelets 181 150 - 450 x10*3/uL   Basic Metabolic Panel   Result Value Ref Range    Glucose 55 (LL) 74 - 99 mg/dL    Sodium 143 136 - 145 mmol/L    Potassium 3.5 3.5 - 5.3 mmol/L    Chloride 104 98 - 107 mmol/L    Bicarbonate 29 21 - 32 mmol/L    Anion Gap 14 10 - 20 mmol/L    Urea Nitrogen 14 6 - 23 mg/dL    Creatinine 0.73 0.50 - 1.30 mg/dL    eGFR >90 >60 mL/min/1.73m*2    Calcium 9.2 8.6 - 10.3 mg/dL   POCT GLUCOSE   Result Value Ref Range    POCT Glucose 55 (L) 74 - 99 mg/dL   POCT GLUCOSE   Result Value Ref Range    POCT Glucose 74 74 - 99 mg/dL   POCT GLUCOSE   Result Value Ref Range    POCT Glucose 78 74 - 99 mg/dL     XR chest 1 view  Result Date: 7/27/2025  Interpreted By:  Mikala Estevez, STUDY: XR CHEST 1 VIEW; ;  7/27/2025 5:05 am   INDICATION: Signs/Symptoms:trach, rhonchi.     COMPARISON: 05/20/2025   ACCESSION NUMBER(S): [id number]   ORDERING CLINICIAN: RENE SCHULZ   TECHNIQUE: Single portable AP view chest   FINDINGS: Cardiomediastinal silhouette is within normal limits. Tracheostomy tube is in place. Lung fields are hyperinflated. No airspace consolidation. There is a subtle asymmetric right basilar infiltrate component which may be related to overlapping shadows. Tracheostomy tube in place.       1. Subtle asymmetric increased interstitial prominence of the right lung base may reflect overlapping shadows with focal infiltrate not excluded.     MACRO: None   Signed by: Mikala Estevez  7/27/2025 8:21 AM Dictation workstation:   [id number]    CT abdomen pelvis w IV contrast  Result Date: 7/27/2025  Interpreted By:  Angelito Rodgers, STUDY: CT ABDOMEN PELVIS W IV CONTRAST;  7/27/2025 1:13 am   INDICATION: Signs/Symptoms:Peg tub with pain and discharge.   COMPARISON: CT abdomen pelvis 05/20/2025.   ACCESSION NUMBER(S): [id number]   ORDERING CLINICIAN: ELBERT DIEHL   TECHNIQUE: CT of the abdomen and pelvis was performed after administration of intravenous contrast. Standard contiguous axial images were obtained through the abdomen and pelvis. Coronal and sagittal reconstructions were performed.   FINDINGS: LOWER CHEST: Bronchial wall thickening is visualized in the lung bases suggestive of bronchitis. Mild bibasilar linear scarring/atelectasis similar to prior. BONES: No acute osseous abnormality. ABDOMINAL WALL: There is dislodged percutaneous gastrostomy tube terminating within the ventral superficial abdominal wall and there is fluid-filled sinus tract within the left upper rectus muscle just superficial to the peritoneum and gastric body. LYMPH NODES: No pathologically enlarged lymph nodes in the abdomen or pelvis.   ABDOMEN:   LIVER: Normal size and contour. No focal hepatic lesions. BILE DUCTS: Normal caliber. GALLBLADDER: Sludge versus excreted contrast within the gallbladder. No gallbladder wall thickening or distention. PANCREAS: No evidence of pancreatic duct dilatation or peripancreatic edema. SPLEEN: Within normal limits. ADRENALS: Within normal limits. KIDNEYS and URETERS: No evidence of hydronephrosis or nephrolithiasis. Normal size and enhancement pattern.     VESSELS: No abdominal aortic aneurysm. Mesenteric vessels appear patent.   RETROPERITONEUM: No evidence of retroperitoneal hematoma.   PELVIS:   REPRODUCTIVE ORGANS: Prostate is enlarged measuring 5.0 cm transversely. BLADDER: No gross abnormality.   BOWEL: There has been interval dislodgement of the percutaneous gastrostomy  tube which now is located within the superficial ventral abdominal wall. There is fluid-filled sinus tract within the ventral abdominal wall superficial to the gastric body, axial image 51 of 168. Stomach is incompletely distended limiting assessment for wall thickening. No bowel dilatation or obstruction. Normal appendix. There is oral contrast noted within the: There is no evidence of bowel wall thickening or acute inflammatory change. PERITONEUM: No ascites or free air, no fluid collection.       1.  Dislodged percutaneous gastrostomy tube located within the left ventral abdominal wall fat. There is a fluid-filled sinus tract remaining within the left ventral abdominal wall and left upper rectus muscle just superficial to the peritoneum and gastric body. Angelito Rodgers discussed the significance and urgency of this critical finding by EPIC secure chat with  ELBERT DIEHL on 7/27/2025 at 1:31 am.  (**-RCF-**) Findings:  See findings.     2. Mild bronchial wall thickening visualized in the lung bases which may represent bronchitis. Prostatomegaly.     Signed by: Angelito Rodgers 7/27/2025 1:31 AM Dictation workstation:   [id number]          Assessment & Plan  PEG tube malfunction (Multi)    Abdominal pain    Elevated alkaline phosphatase level    Bryan Nix is a 64 y.o. male with history of a flutter (on Eliquis), CAD, HTN, DM 1, left ICA stenosis s/p angioplasty and left carotid artery stent (in 2023), CVA in 2020 with right sided defects, acute pontine stroke in 2/2025 with acute hypoxic respiratory failure s/p tracheostomy and PEG placement at [facility] who presented to [facility] from SNF due to pain at PEG site. CT A/P demonstrated dislodged PEG,  GI initially consulted and requested surgery input due to concern for developing fistula.      Impression  - Dislodged PEG; removed at bedside today     Recommendation  - PEG removed at bedside; attempted replacement with 20F catheter failed  - Wound covered with 4x4 and  secured with paper tape; replace daily and as needed   - Defer need for replacement PEG to primary team; OK for diet from surgical standpoint    > patient would benefit from SLP evaluation for diet recommendations   - Remainder of care per primary team     Patient seen and discussed with attending surgeon, Dr. Vogt.       Lulu Knight PA-C

## 2025-07-28 NOTE — CARE PLAN
Notified by nursing that patient's HR is 45, with /59. Patient is asymptomatic. RN reports patient received morning medications, including amiodarone and metoprolol this AM around 10:40AM. Of note, patient's HR has been in the 50's-60's the entirety of his admission. Will hold next dose of metoprolol and order and EKG at this time. Cardiology consult obtained. Advised RN to call a rapid if HR should dip any lower. Attending to follow.

## 2025-07-28 NOTE — PROGRESS NOTES
Occupational Therapy    Evaluation    Patient Name: Bryan Nix  MRN: [id number]  Department: [facility]  Room: [address] [place] [address]  Today's Date: 7/28/2025  Time Calculation  Start Time: 0906  Stop Time: 0919  Time Calculation (min): 13 min        Assessment:  End of Session Communication: Bedside nurse  End of Session Patient Position: Bed, 2 rail up, Bed, 3 rail up, Alarm off, not on at start of session (spouse present, call light in reach, all needs met)  OT Assessment Results: Decreased ADL status, Decreased upper extremity strength, Decreased endurance, Decreased functional mobility, Decreased fine motor control, Decreased gross motor control, Non-functional right upper extremity  Strengths: Living arrangement secure, Support and attitude of living partners  Barriers to Participation: Comorbidities  Plan:  Treatment Interventions: ADL retraining, Functional transfer training, UE strengthening/ROM, Endurance training, Equipment evaluation/education, Neuromuscular reeducation, Fine motor coordination activities, Compensatory technique education  OT Frequency: 2 times per week (during this acute inpatient hospitalization)  OT Discharge Recommendations: Moderate intensity level of continued care (· Based on current functional status and rehab potential, patient is anticipated to tolerate and benefit from 5 or more days per week of skilled rehabilitative therapy after discharge from this acute inpatient hospitalization.)  OT - OK to Discharge: Yes (once medically appropriate to next level of care)  Treatment Interventions: ADL retraining, Functional transfer training, UE strengthening/ROM, Endurance training, Equipment evaluation/education, Neuromuscular reeducation, Fine motor coordination activities, Compensatory technique education    Subjective   Current Problem:  1. PEG tube malfunction (Multi)          OT Visit Info:  OT Received On: 07/28/25  General:  General  Reason for Referral: OT eval and tx; ADLs. dx; Dislodged  G-tube, Fluid-filled sinus tract left ventral abdominal wall , Elevated alk phos, Abdominal pain. CT abdomen: dislodged percutaneous gastrostomy tube. 7/27 removed PEG tube  Referred By: Zeus  Past Medical History Relevant to Rehab: 64-year-old male who presents to the ED from SNF with possible infected PEG tube.  Patient has a past medical history of atrial flutter on Eliquis, hyponatremia, IDDM, CAD, hypertension, chronic wounds, diffuse lymphadenopathy, anemia, history of smoking, history elevated PSA, CVA L hemisphere (2020) with right-sided deficits, Left ICA stenosis (85%) s/p angioplasty and left carotid artery stent (08/25/2023) and recent history of acute pontine stroke 02/27/25 with acute hypoxic respiratory failure s/p tracheostomy on 2/25/2025 and PEG.  Patient arrives to ED today after having abdominal pain around PEG tube site.  Information gathered from ED notes and patient.  Wife is no longer present to provide information.  Per ED note PEG tube was placed in February and has been functioning properly until this past week when he began having pain at the site and 3 days of pain when the tube was flushed.  The site also started having discharge around it and is now swollen with erythema.  Patient denies any fevers, shortness of breath, chest pain, nausea, vomiting, diarrhea, or any known loss of blood.  Family/Caregiver Present: Yes (spouse present, supportive)  Co-Treatment: PT  Co-Treatment Reason: for safety and to maximize therapeutic performance  Prior to Session Communication: Bedside nurse  Patient Position Received: Bed, 3 rail up, Alarm off, not on at start of session  General Comment: patient pleasant and cooperative to participate  Precautions:  Medical Precautions: Fall precautions (NPO, IV, purewick, prafo boots, trach, OOB with assist)            Pain:  Pain Assessment  Pain Assessment:  (did not report pain)    Objective   Cognition:  Overall Cognitive Status: Within Functional  Limits           Home Living:  Type of Home:  (patient has been at SNF since February 2025 when had CVA; prior to this patient was independent at home, living with spouse. Currently, patient is dependent with mobility with w/c, use of nithya, dependent for ADLs.)  Home Living Comments: reports has been working with therapy sitting on side of bed       ADL:  ADL Comments: anticipate dependent x 2 for ADLs at bed level based on performance with eval this date and current level of function at [facility]  Activity Tolerance:  Endurance: Decreased tolerance for upright activites  Bed Mobility/Transfers: Bed Mobility  Bed Mobility:  (completed supine <-->sit with dependent assistance x 2; able to sit EOB with MAX A x 1)    Transfers  Transfer:  (unable/unsafe to attempt this date)      Sensation:  Light Touch: No apparent deficits  Strength:  Strength Comments: RUE 1/5 and LUE 2+/5 grossly as seen through performance with eval this date and ROM/MMT this date      Outcome Measures:[facility] Daily Activity  Putting on and taking off regular lower body clothing: Total  Bathing (including washing, rinsing, drying): Total  Putting on and taking off regular upper body clothing: Total  Toileting, which includes using toilet, bedpan or urinal: Total  Taking care of personal grooming such as brushing teeth: Total  Eating Meals: Total  Daily Activity - Total Score: 6        Education Documentation  Body Mechanics, taught by Geovanna Monreal OT at 7/28/2025 11:47 AM.  Learner: Patient  Readiness: Acceptance  Method: Explanation  Response: Verbalizes Understanding, Needs Reinforcement    Precautions, taught by Geovanna Monreal OT at 7/28/2025 11:47 AM.  Learner: Patient  Readiness: Acceptance  Method: Explanation  Response: Verbalizes Understanding, Needs Reinforcement    ADL Training, taught by Geovanna Monreal OT at 7/28/2025 11:47 AM.  Learner: Patient  Readiness: Acceptance  Method: Explanation  Response: Verbalizes Understanding, Needs  Reinforcement    Education Comments  No comments found.        OP EDUCATION:       Goals:  Encounter Problems       Encounter Problems (Active)       OT Goals       STG- patient will complete UB dressing with MOD A with use of ae/ad/dme prn  (Progressing)       Start:  07/28/25    Expected End:  08/11/25            STG- patient will complete grooming with MOD A with use of ae/ad/dme prn  (Progressing)       Start:  07/28/25    Expected End:  08/11/25            STG- patient will complete bed mobility with MOD A x 2 with use of ae/ad/dme prn  (Progressing)       Start:  07/28/25    Expected End:  08/11/25            STG- patient will tolerate sitting EOB for 5 mins with MOD A with use if ae/ad/dme prn in preparation for ADLs and functional transfers (Progressing)       Start:  07/28/25    Expected End:  08/11/25

## 2025-07-28 NOTE — PROGRESS NOTES
"Bryan Nix is a 64 y.o. male on day 0 of admission presenting with PEG tube malfunction (Multi).    Subjective          Objective     Physical Exam    Last Recorded Vitals  Blood pressure 154/67, pulse 50, temperature 36.9 °C (98.4 °F), temperature source Temporal, resp. rate 18, height 1.88 m (6' 2.02\"), weight 77.1 kg (170 lb), SpO2 92%.  Intake/Output last 3 Shifts:  I/O last 3 completed shifts:  In: 1299.2 (16.8 mL/kg) [I.V.:1299.2 (16.8 mL/kg)]  Out: 1750 (22.7 mL/kg) [Urine:1750 (0.6 mL/kg/hr)]  Weight: 77.1 kg     Relevant Results  {If you would like to pull in Medications, type .meds           For prior to admission medications, type .ptamed  :99}  {If you would like to pull in Lab results for the last 24 hours, type .[id number] :99}  Imaging  XR chest 1 view  Result Date: 7/27/2025  1. Subtle asymmetric increased interstitial prominence of the right lung base may reflect overlapping shadows with focal infiltrate not excluded.     MACRO: None   Signed by: Mikala Estevez 7/27/2025 8:21 AM Dictation workstation:   [id number]    CT abdomen pelvis w IV contrast  Result Date: 7/27/2025  1.  Dislodged percutaneous gastrostomy tube located within the left ventral abdominal wall fat. There is a fluid-filled sinus tract remaining within the left ventral abdominal wall and left upper rectus muscle just superficial to the peritoneum and gastric body. Angelito Rodgers discussed the significance and urgency of this critical finding by EPIC secure chat with  ELBERT DIEHL on 7/27/2025 at 1:31 am.  (**-RCF-**) Findings:  See findings.     2. Mild bronchial wall thickening visualized in the lung bases which may represent bronchitis. Prostatomegaly.     Signed by: Angelito Rodgers 7/27/2025 1:31 AM Dictation workstation:   [id number]      Cardiology, Vascular, and Other Imaging  No other imaging results found for the past 7 days    * Cannot find OR log *  Last relevant procedure:   {Link to Stroke Scoring tools - Link " :99}                       Assessment & Plan  PEG tube malfunction (Multi)    Abdominal pain    Elevated alkaline phosphatase level    ***  {This patient does not have an ACP note on file for this encounter, please fill one out - Advance Care Planning Activity :99}  I spent *** minutes in the professional and overall care of this patient.      Randee Greenwood, APRN-CNP

## 2025-07-29 LAB
ANION GAP SERPL CALC-SCNC: 12 MMOL/L (ref 10–20)
ATRIAL RATE: 46 BPM
BUN SERPL-MCNC: 13 MG/DL (ref 6–23)
CALCIUM SERPL-MCNC: 8.6 MG/DL (ref 8.6–10.3)
CHLORIDE SERPL-SCNC: 106 MMOL/L (ref 98–107)
CO2 SERPL-SCNC: 26 MMOL/L (ref 21–32)
CREAT SERPL-MCNC: 0.74 MG/DL (ref 0.5–1.3)
EGFRCR SERPLBLD CKD-EPI 2021: >90 ML/MIN/1.73M*2
ERYTHROCYTE [DISTWIDTH] IN BLOOD BY AUTOMATED COUNT: 17.2 % (ref 11.5–14.5)
GLUCOSE BLD MANUAL STRIP-MCNC: 113 MG/DL (ref 74–99)
GLUCOSE BLD MANUAL STRIP-MCNC: 165 MG/DL (ref 74–99)
GLUCOSE BLD MANUAL STRIP-MCNC: 173 MG/DL (ref 74–99)
GLUCOSE BLD MANUAL STRIP-MCNC: 200 MG/DL (ref 74–99)
GLUCOSE SERPL-MCNC: 115 MG/DL (ref 74–99)
HCT VFR BLD AUTO: 33.7 % (ref 41–52)
HGB BLD-MCNC: 10.6 G/DL (ref 13.5–17.5)
MCH RBC QN AUTO: 26 PG (ref 26–34)
MCHC RBC AUTO-ENTMCNC: 31.5 G/DL (ref 32–36)
MCV RBC AUTO: 83 FL (ref 80–100)
NRBC BLD-RTO: 0 /100 WBCS (ref 0–0)
P AXIS: 59 DEGREES
P OFFSET: 209 MS
P ONSET: 158 MS
PLATELET # BLD AUTO: 181 X10*3/UL (ref 150–450)
POTASSIUM SERPL-SCNC: 3.7 MMOL/L (ref 3.5–5.3)
PR INTERVAL: 138 MS
Q ONSET: 227 MS
QRS COUNT: 8 BEATS
QRS DURATION: 96 MS
QT INTERVAL: 514 MS
QTC CALCULATION(BAZETT): 449 MS
QTC FREDERICIA: 470 MS
R AXIS: -36 DEGREES
RBC # BLD AUTO: 4.08 X10*6/UL (ref 4.5–5.9)
SODIUM SERPL-SCNC: 140 MMOL/L (ref 136–145)
T AXIS: 100 DEGREES
T OFFSET: 484 MS
VENTRICULAR RATE: 46 BPM
WBC # BLD AUTO: 7.5 X10*3/UL (ref 4.4–11.3)

## 2025-07-29 PROCEDURE — 2500000002 HC RX 250 W HCPCS SELF ADMINISTERED DRUGS (ALT 637 FOR MEDICARE OP, ALT 636 FOR OP/ED): Performed by: NURSE PRACTITIONER

## 2025-07-29 PROCEDURE — 2500000004 HC RX 250 GENERAL PHARMACY W/ HCPCS (ALT 636 FOR OP/ED): Performed by: INTERNAL MEDICINE

## 2025-07-29 PROCEDURE — 1100000001 HC PRIVATE ROOM DAILY

## 2025-07-29 PROCEDURE — 99231 SBSQ HOSP IP/OBS SF/LOW 25: CPT | Performed by: SURGERY

## 2025-07-29 PROCEDURE — 36415 COLL VENOUS BLD VENIPUNCTURE: CPT | Performed by: INTERNAL MEDICINE

## 2025-07-29 PROCEDURE — 82947 ASSAY GLUCOSE BLOOD QUANT: CPT

## 2025-07-29 PROCEDURE — 2500000004 HC RX 250 GENERAL PHARMACY W/ HCPCS (ALT 636 FOR OP/ED)

## 2025-07-29 PROCEDURE — 80048 BASIC METABOLIC PNL TOTAL CA: CPT | Performed by: INTERNAL MEDICINE

## 2025-07-29 PROCEDURE — 92610 EVALUATE SWALLOWING FUNCTION: CPT | Mod: GN

## 2025-07-29 PROCEDURE — 85027 COMPLETE CBC AUTOMATED: CPT | Performed by: INTERNAL MEDICINE

## 2025-07-29 PROCEDURE — 92526 ORAL FUNCTION THERAPY: CPT | Mod: GN

## 2025-07-29 PROCEDURE — 99232 SBSQ HOSP IP/OBS MODERATE 35: CPT

## 2025-07-29 PROCEDURE — 2500000001 HC RX 250 WO HCPCS SELF ADMINISTERED DRUGS (ALT 637 FOR MEDICARE OP): Performed by: NURSE PRACTITIONER

## 2025-07-29 PROCEDURE — 2500000001 HC RX 250 WO HCPCS SELF ADMINISTERED DRUGS (ALT 637 FOR MEDICARE OP)

## 2025-07-29 PROCEDURE — 2500000002 HC RX 250 W HCPCS SELF ADMINISTERED DRUGS (ALT 637 FOR MEDICARE OP, ALT 636 FOR OP/ED)

## 2025-07-29 RX ADMIN — DEXTROSE AND SODIUM CHLORIDE 50 ML/HR: 5; .9 INJECTION, SOLUTION INTRAVENOUS at 01:17

## 2025-07-29 RX ADMIN — INSULIN LISPRO 2 UNITS: 100 INJECTION, SOLUTION INTRAVENOUS; SUBCUTANEOUS at 17:40

## 2025-07-29 RX ADMIN — TRAZODONE HYDROCHLORIDE 50 MG: 50 TABLET ORAL at 21:21

## 2025-07-29 RX ADMIN — AMIODARONE HYDROCHLORIDE 200 MG: 200 TABLET ORAL at 08:32

## 2025-07-29 RX ADMIN — FAMOTIDINE 20 MG: 10 INJECTION, SOLUTION INTRAVENOUS at 21:21

## 2025-07-29 RX ADMIN — INSULIN LISPRO 2 UNITS: 100 INJECTION, SOLUTION INTRAVENOUS; SUBCUTANEOUS at 11:35

## 2025-07-29 RX ADMIN — HYDRALAZINE HYDROCHLORIDE 10 MG: 10 TABLET, FILM COATED ORAL at 21:21

## 2025-07-29 RX ADMIN — METOPROLOL TARTRATE 50 MG: 50 TABLET, FILM COATED ORAL at 15:30

## 2025-07-29 RX ADMIN — HYDRALAZINE HYDROCHLORIDE 10 MG: 10 TABLET, FILM COATED ORAL at 08:32

## 2025-07-29 RX ADMIN — Medication 5 MG: at 21:21

## 2025-07-29 RX ADMIN — SUCRALFATE 1 G: 1 TABLET ORAL at 15:30

## 2025-07-29 RX ADMIN — SUCRALFATE 1 G: 1 TABLET ORAL at 06:59

## 2025-07-29 RX ADMIN — FAMOTIDINE 20 MG: 10 INJECTION, SOLUTION INTRAVENOUS at 08:46

## 2025-07-29 ASSESSMENT — PAIN SCALES - GENERAL
PAINLEVEL_OUTOF10: 0 - NO PAIN

## 2025-07-29 ASSESSMENT — PAIN - FUNCTIONAL ASSESSMENT: PAIN_FUNCTIONAL_ASSESSMENT: 0-10

## 2025-07-29 NOTE — PROGRESS NOTES
Speech-Language Pathology    SLP Adult Inpatient Speech-Language Pathology Clinical Swallow Evaluation    Patient Name: Bryan Nix  MRN: [id number]  Today's Date: 7/29/2025   Time Calculation  Start Time: 1005  Stop Time: 1025  Time Calculation (min): 20 min         Current Problem:   1. PEG tube malfunction (Multi)          Peg tube removed with decannulation possible plan while in acute     Recommendations:  Additional Recommendations: Dysphagia treatment, ENT (or Pulmonlogy for trach removal)  Solid Diet Recommendations : Pureed/extremely thick  (IDDSI Level 4)  Liquid Diet Recommendations: Thin (IDDSI Level 0)  Compensatory Swallowing Strategies: Single sips, Small bites/sips, Eat/feed slowly, Add moisture to solids, Upright 90 degrees as possible for all oral intake, Remain upright for 20-30 minutes after meals, Full supervision with meals  Medication Administration Recommendations: Crushed, With Pureed      Assessment: Baseline Moderate Oropharyngeal dysphagia requiring a Modified diet of Purees/thins per recent MBS completed last Thursday at the [facility] 7/24/25. Capped trach # 5 Shiley in place with possible plan for decannulation while in acute care.   Today , peg gone with pt fed and self fed thins by cup sips x8 and purees by tsp x6 with no s/sx of aspiration exhibited.   OME completed and reveals reduced oral motor coordination and right mild lingual weakness  and weak vocal volume at times c/b strained vocal quality . Full natural dentition present.     Pt on purees/thins with pt tolerating the current diet.   SLP at the [facility] was completing Mechanical Soft PO trials with inconsistent success.   No textured trials given this date d/t pt safely tolerating purees and generally fatigued this date.     SLP to follow while in house to ensure ongoing diet tolerance as possible decannulation plan.    Prognosis: Good  Treatment Tolerance: Patient limited by fatigue  Medical Staff Made Aware: Yes  Strengths:  Motivation, Family/Caregiver Support (wife Greta in the room)  Barriers: Comorbidities      Plan:  Inpatient/Swing Bed or Outpatient: Inpatient  Treatment/Interventions: Diet recommendations, Assess diet tolerance, Patient/family education, Respiratory muscle strength training  SLP Plan: Skilled SLP  SLP Frequency: 2x per week  Duration: 1 week  SLP Discharge Recommendations: Skilled nursing facility placement  Diet Recommendations: Solid  Solid Consistency: Pureed/extremely thick (IDDSI Level 4)  Liquid Consistency: Thin (IDDSI Level 0)  Discussed POC: Physician, Nursing, Caregiver/family, Patient  Discussed Risks/Benefits: Yes, Patient, Caregiver/Family, Nursing, Physician  Patient/Caregiver Agreeable: Yes    Goals ( Established 7/29/25) :  Pt will tolerate the prescribed diet (purees/thins ) with adequate oral phase and no s/s of aspiration.    Pt/family will indicate understanding of dysphagia education: risk for aspiration, recommendations, and POC with > 80% accuracy via teach back method.  Pt will complete 10 reps of laryngeal/pharyngeal  strengthening exercises x3 during session with good effort and efficiency given min cues for improved swallow safety and efficiency.             Subjective Alert, Ox2, voice weak --pt fatigued this date   Current Problem:  Dysphagia since 2/18/25 requiring a peg tube tube with PO- an oral diet initiated  last weak at his ECF per  MBS.     SLP Visit Info:  SLP Received On: 07/29/25    General Visit Information:  Patient Class: Inpatient  Living Environment: Nursing home (skilled/long-term)  Date of Onset: 07/26/25  Date of Order: 07/28/25  BaseLine Diet: Puree/thins per recemt MBS completed 7/24  Current Diet : Puree/thins      H & P ;    64-year-old male who presents to the ED 7/26 from SNF with possible infected PEG tube.  Patient has a past medical history of atrial flutter on Eliquis, hyponatremia, IDDM, CAD, hypertension, chronic wounds, diffuse lymphadenopathy, anemia,  history of smoking, history elevated PSA, CVA L hemisphere (2020) with right-sided deficits, Left ICA stenosis (85%) s/p angioplasty and left carotid artery stent (08/25/2023) and recent history of acute pontine stroke 02/27/25 with acute hypoxic respiratory failure s/p tracheostomy on 2/25/2025 and PEG.  Patient arrives to ED today after having abdominal pain around PEG tube site.  Information gathered from ED notes and patient.  Wife is no longer present to provide information.  Per ED note PEG tube was placed in February and has been functioning properly until this past week when he began having pain at the site and 3 days of pain when the tube was flushed.  The site also started having discharge around it and is now swollen with erythema.  Patient denies any fevers, shortness of breath, chest pain, nausea, vomiting, diarrhea, or any known loss of blood.                Vital Signs:     Trach -capped , O2 Saturation 96 %     Objective       XR CHEST 1 VIEW; ; 7/27/2025   FINDINGS:  Cardiomediastinal silhouette is within normal limits. Tracheostomy  tube is in place. Lung fields are hyperinflated. No airspace  consolidation. There is a subtle asymmetric right basilar infiltrate  component which may be related to overlapping shadows. Tracheostomy  tube in place.      IMPRESSION:  1. Subtle asymmetric increased interstitial prominence of the right  lung base may reflect overlapping shadows with focal infiltrate not  excluded.      Baseline Assessment:  Respiratory Status: Other (Comment) (Capped Trach # 5 Shiley)  Behavior/Cognition: Alert, Cooperative, Pleasant mood, Other (comment) (Apraxia/Aphasia)  Baseline Vocal Quality: Dysphonic, Weak  Volitional Cough: Weak  Volitional Swallow: Delayed      Pain:  Pain Assessment  Pain Assessment: 0-10  0-10 (Numeric) Pain Score: 0 - No pain    Oral/Motor Assessment:  Dentition: Adequate/Natural  Oral Motor: Impaired Function  Intelligibility: Intelligibility  reduced      Consistencies Trialed:  Consistencies Trialed: Yes  Consistencies Trialed: Thin (IDDSI Level 0) - Cup, Pureed/extremely thick (IDDSI Level 4)    Soft textured PO trials deferred this date 2/2 pt's voice weak with pt generally weak and fatigued this date.     Clinical Observations:  Patient Positioning: Upright in Bed  Was The 3 oz Swallow Protocol Completed: No, Other (Comment) (d/t baseline dysphagia)    Tolerating the current puree/thin liquid diet .    Treatment Provided Today: ST provided extensive education and training to pt/pt family regarding anatomy/physiology of the current  swallow function, risk factors of aspiration/aspiration pna & how to mitigate factors, new oral diet modifications, and the use of compensatory swallow strategies to promote pt safety upon PO intake including small bite/sips and NO STRAWS . In addition, ST provided instruction/training on oropharyngeal exercises (re: effortful swallow per bolus ).       Inpatient:  Education provided as it relates to POC and progress.  Individual(s) Educated: Patient, mpt's wife, Greta , Annmarie Beal LPN   Verbal Education : yes  Patient/Caregiver Demonstrated Understanding: yes  Plan of Care Discussed and Agreed Upon: yes          Consultations/Referrals/Coordination of Services: SLP at the next level of care .

## 2025-07-29 NOTE — CARE PLAN
The patient's goals for the shift include pain control and comfort    The clinical goals for the shift include comfort      Problem: Pain - Adult  Goal: Verbalizes/displays adequate comfort level or baseline comfort level  Outcome: Progressing     Problem: Safety - Adult  Goal: Free from fall injury  Outcome: Progressing     Problem: Discharge Planning  Goal: Discharge to home or other facility with appropriate resources  Outcome: Progressing     Problem: Chronic Conditions and Co-morbidities  Goal: Patient's chronic conditions and co-morbidity symptoms are monitored and maintained or improved  Outcome: Progressing     Problem: Nutrition  Goal: Nutrient intake appropriate for maintaining nutritional needs  Outcome: Progressing     Problem: Skin  Goal: Decreased wound size/increased tissue granulation at next dressing change  7/29/2025 0026 by Brooke Everett RN  Outcome: Progressing  7/29/2025 0024 by Brooke Everett RN  Flowsheets (Taken 7/28/2025 2300)  Decreased wound size/increased tissue granulation at next dressing change: Promote sleep for wound healing  Goal: Participates in plan/prevention/treatment measures  7/29/2025 0026 by Brooke Everett RN  Outcome: Progressing  7/29/2025 0024 by Brooke Everett RN  Flowsheets (Taken 7/29/2025 0024)  Participates in plan/prevention/treatment measures: Elevate heels  Goal: Prevent/manage excess moisture  7/29/2025 0026 by Brooke Everett RN  Outcome: Progressing  7/29/2025 0024 by Brooke Evertet RN  Flowsheets (Taken 7/29/2025 0024)  Prevent/manage excess moisture:   Moisturize dry skin   Cleanse incontinence/protect with barrier cream  Goal: Prevent/minimize sheer/friction injuries  7/29/2025 0026 by Brooke Everett RN  Outcome: Progressing  7/29/2025 0024 by Brooke Everett RN  Flowsheets (Taken 7/29/2025 0024)  Prevent/minimize sheer/friction injuries: Use pull sheet  Goal: Promote/optimize nutrition  7/29/2025 0026 by Brooke Everett RN  Outcome: Progressing  7/29/2025  0024 by Brooke Everett RN  Flowsheets (Taken 7/29/2025 0024)  Promote/optimize nutrition:   Monitor/record intake including meals   Offer water/supplements/favorite foods   Assist with feeding  Goal: Promote skin healing  7/29/2025 0026 by Brooke Everett RN  Outcome: Progressing  7/29/2025 0024 by Brooke Everett RN  Flowsheets (Taken 7/29/2025 0024)  Promote skin healing: Assess skin/pad under line(s)/device(s)     Problem: Diabetes  Goal: Achieve decreasing blood glucose levels by end of shift  Outcome: Progressing  Goal: Increase stability of blood glucose readings by end of shift  Outcome: Progressing  Goal: Decrease in ketones present in urine by end of shift  Outcome: Progressing  Goal: Maintain electrolyte levels within acceptable range throughout shift  Outcome: Progressing  Goal: Maintain glucose levels >70mg/dl to <250mg/dl throughout shift  Outcome: Progressing  Goal: No changes in neurological exam by end of shift  Outcome: Progressing  Goal: Learn about and adhere to nutrition recommendations by end of shift  Outcome: Progressing  Goal: Vital signs within normal range for age by end of shift  Outcome: Progressing  Goal: Increase self care and/or family involovement by end of shift  Outcome: Progressing  Goal: Receive DSME education by end of shift  Outcome: Progressing

## 2025-07-29 NOTE — CARE PLAN
The patient's goals for the shift include pain control and comfort    The clinical goals for the shift include comfort    Problem: Pain - Adult  Goal: Verbalizes/displays adequate comfort level or baseline comfort level  Outcome: Progressing     Problem: Safety - Adult  Goal: Free from fall injury  Outcome: Progressing     Problem: Discharge Planning  Goal: Discharge to home or other facility with appropriate resources  Outcome: Progressing     Problem: Chronic Conditions and Co-morbidities  Goal: Patient's chronic conditions and co-morbidity symptoms are monitored and maintained or improved  Outcome: Progressing     Problem: Nutrition  Goal: Nutrient intake appropriate for maintaining nutritional needs  Outcome: Progressing     Problem: Skin  Goal: Decreased wound size/increased tissue granulation at next dressing change  Outcome: Progressing  Goal: Participates in plan/prevention/treatment measures  Outcome: Progressing  Goal: Prevent/manage excess moisture  Outcome: Progressing  Goal: Prevent/minimize sheer/friction injuries  Outcome: Progressing  Goal: Promote/optimize nutrition  Outcome: Progressing  Goal: Promote skin healing  Outcome: Progressing     Problem: Diabetes  Goal: Achieve decreasing blood glucose levels by end of shift  Outcome: Progressing  Goal: Increase stability of blood glucose readings by end of shift  Outcome: Progressing  Goal: Decrease in ketones present in urine by end of shift  Outcome: Progressing  Goal: Maintain electrolyte levels within acceptable range throughout shift  Outcome: Progressing  Goal: Maintain glucose levels >70mg/dl to <250mg/dl throughout shift  Outcome: Progressing  Goal: No changes in neurological exam by end of shift  Outcome: Progressing  Goal: Learn about and adhere to nutrition recommendations by end of shift  Outcome: Progressing  Goal: Vital signs within normal range for age by end of shift  Outcome: Progressing  Goal: Increase self care and/or family  involovement by end of shift  Outcome: Progressing  Goal: Receive DSME education by end of shift  Outcome: Progressing

## 2025-07-29 NOTE — CARE PLAN
The patient's goals for the shift include pain control and comfort    The clinical goals for the shift include comfort      Problem: Pain - Adult  Goal: Verbalizes/displays adequate comfort level or baseline comfort level  Outcome: Progressing     Problem: Safety - Adult  Goal: Free from fall injury  Outcome: Progressing     Problem: Discharge Planning  Goal: Discharge to home or other facility with appropriate resources  Outcome: Progressing     Problem: Chronic Conditions and Co-morbidities  Goal: Patient's chronic conditions and co-morbidity symptoms are monitored and maintained or improved  Outcome: Progressing     Problem: Nutrition  Goal: Nutrient intake appropriate for maintaining nutritional needs  Outcome: Progressing     Problem: Skin  Goal: Decreased wound size/increased tissue granulation at next dressing change  7/29/2025 0944 by Angela Morrow LPN  Flowsheets (Taken 7/29/2025 0944)  Decreased wound size/increased tissue granulation at next dressing change: Promote sleep for wound healing  7/29/2025 0944 by Angela Morrow LPN  Outcome: Progressing  Flowsheets (Taken 7/29/2025 0944)  Decreased wound size/increased tissue granulation at next dressing change: Promote sleep for wound healing  Goal: Participates in plan/prevention/treatment measures  7/29/2025 0944 by Angela Morrow LPN  Flowsheets (Taken 7/29/2025 0944)  Participates in plan/prevention/treatment measures: Discuss with provider PT/OT consult  7/29/2025 0944 by Angela Morrow LPN  Outcome: Progressing  Flowsheets (Taken 7/29/2025 0944)  Participates in plan/prevention/treatment measures: Discuss with provider PT/OT consult  Goal: Prevent/manage excess moisture  7/29/2025 0944 by Angela Morrow LPN  Flowsheets (Taken 7/29/2025 0944)  Prevent/manage excess moisture: Monitor for/manage infection if present  7/29/2025 0944 by Angela Morrow LPN  Outcome: Progressing  Flowsheets (Taken 7/29/2025  0944)  Prevent/manage excess moisture: Monitor for/manage infection if present  Goal: Prevent/minimize sheer/friction injuries  7/29/2025 0944 by Angela Morrow LPN  Flowsheets (Taken 7/29/2025 0944)  Prevent/minimize sheer/friction injuries: Increase activity/out of bed for meals  7/29/2025 0944 by Angela Morrow LPN  Outcome: Progressing  Flowsheets (Taken 7/29/2025 0944)  Prevent/minimize sheer/friction injuries: Increase activity/out of bed for meals  Goal: Promote/optimize nutrition  7/29/2025 0944 by Angela Morrow LPN  Flowsheets (Taken 7/29/2025 0944)  Promote/optimize nutrition: Monitor/record intake including meals  7/29/2025 0944 by Angela Morrow LPN  Outcome: Progressing  Flowsheets (Taken 7/29/2025 0944)  Promote/optimize nutrition: Monitor/record intake including meals  Goal: Promote skin healing  7/29/2025 0944 by Angela Morrow LPN  Flowsheets (Taken 7/29/2025 0944)  Promote skin healing: Protective dressings over bony prominences  7/29/2025 0944 by Angela Morrow LPN  Outcome: Progressing  Flowsheets (Taken 7/29/2025 0944)  Promote skin healing: Protective dressings over bony prominences     Problem: Diabetes  Goal: Achieve decreasing blood glucose levels by end of shift  Outcome: Progressing  Goal: Increase stability of blood glucose readings by end of shift  Outcome: Progressing  Goal: Decrease in ketones present in urine by end of shift  Outcome: Progressing  Goal: Maintain electrolyte levels within acceptable range throughout shift  Outcome: Progressing  Goal: Maintain glucose levels >70mg/dl to <250mg/dl throughout shift  Outcome: Progressing  Goal: No changes in neurological exam by end of shift  Outcome: Progressing  Goal: Learn about and adhere to nutrition recommendations by end of shift  Outcome: Progressing  Goal: Vital signs within normal range for age by end of shift  Outcome: Progressing  Goal: Increase self care and/or family involovement by  end of shift  Outcome: Progressing  Goal: Receive DSME education by end of shift  Outcome: Progressing

## 2025-07-29 NOTE — PROGRESS NOTES
Bryan Nix is a 64 y.o. male on day 0 of admission presenting with PEG tube malfunction (Multi).      Subjective   PEG tube pulled.  Patient seen and examined at bedside with wife present.  Patient has had his trach capped for weeks to months.  No upcoming surgeries planned.  Care discussed with pulmonology.       Objective     Last Recorded Vitals  /67 (BP Location: Right arm, Patient Position: Lying)   Pulse 50   Temp 36.9 °C (98.4 °F) (Temporal)   Resp 18   Wt 77.1 kg (170 lb)   SpO2 92%   Intake/Output last 3 Shifts:    Intake/Output Summary (Last 24 hours) at 7/28/2025 2247  Last data filed at 7/28/2025 1713  Gross per 24 hour   Intake 0 ml   Output 650 ml   Net -650 ml       Admission Weight  Weight: 77.1 kg (170 lb) (07/26/25 2301)    Daily Weight  07/27/25 : 77.1 kg (170 lb)    Image Results  XR chest 1 view  Narrative: Interpreted By:  Mikala Estevez,   STUDY:  XR CHEST 1 VIEW; ;  7/27/2025 5:05 am      INDICATION:  Signs/Symptoms:trach, rhonchi.          COMPARISON:  05/20/2025      ACCESSION NUMBER(S):  [id number]      ORDERING CLINICIAN:  RENE SCHULZ      TECHNIQUE:  Single portable AP view chest      FINDINGS:  Cardiomediastinal silhouette is within normal limits. Tracheostomy  tube is in place. Lung fields are hyperinflated. No airspace  consolidation. There is a subtle asymmetric right basilar infiltrate  component which may be related to overlapping shadows. Tracheostomy  tube in place.      Impression: 1. Subtle asymmetric increased interstitial prominence of the right  lung base may reflect overlapping shadows with focal infiltrate not  excluded.          MACRO:  None      Signed by: Mikala Estevez 7/27/2025 8:21 AM  Dictation workstation:   [id number]  CT abdomen pelvis w IV contrast  Narrative: Interpreted By:  Angelito Rodgers,   STUDY:  CT ABDOMEN PELVIS W IV CONTRAST;  7/27/2025 1:13 am      INDICATION:  Signs/Symptoms:Peg tub with pain and discharge.      COMPARISON:  CT  abdomen pelvis 05/20/2025.      ACCESSION NUMBER(S):  [id number]      ORDERING CLINICIAN:  ELBERT DIEHL      TECHNIQUE:  CT of the abdomen and pelvis was performed after administration of  intravenous contrast. Standard contiguous axial images were obtained  through the abdomen and pelvis. Coronal and sagittal reconstructions  were performed.      FINDINGS:  LOWER CHEST: Bronchial wall thickening is visualized in the lung  bases suggestive of bronchitis. Mild bibasilar linear  scarring/atelectasis similar to prior. BONES: No acute osseous  abnormality. ABDOMINAL WALL: There is dislodged percutaneous  gastrostomy tube terminating within the ventral superficial abdominal  wall and there is fluid-filled sinus tract within the left upper  rectus muscle just superficial to the peritoneum and gastric body.  LYMPH NODES: No pathologically enlarged lymph nodes in the abdomen or  pelvis.      ABDOMEN:      LIVER: Normal size and contour. No focal hepatic lesions.  BILE DUCTS: Normal caliber.  GALLBLADDER: Sludge versus excreted contrast within the gallbladder.  No gallbladder wall thickening or distention. PANCREAS: No evidence  of pancreatic duct dilatation or peripancreatic edema. SPLEEN: Within  normal limits. ADRENALS: Within normal limits.  KIDNEYS and URETERS: No evidence of hydronephrosis or  nephrolithiasis. Normal size and enhancement pattern.          VESSELS: No abdominal aortic aneurysm. Mesenteric vessels appear  patent.      RETROPERITONEUM: No evidence of retroperitoneal hematoma.      PELVIS:      REPRODUCTIVE ORGANS: Prostate is enlarged measuring 5.0 cm  transversely. BLADDER: No gross abnormality.      BOWEL: There has been interval dislodgement of the percutaneous  gastrostomy tube which now is located within the superficial ventral  abdominal wall. There is fluid-filled sinus tract within the ventral  abdominal wall superficial to the gastric body, axial image 51 of  168. Stomach is incompletely  distended limiting assessment for wall  thickening. No bowel dilatation or obstruction. Normal appendix.  There is oral contrast noted within the: There is no evidence of  bowel wall thickening or acute inflammatory change. PERITONEUM: No  ascites or free air, no fluid collection.      Impression: 1.  Dislodged percutaneous gastrostomy tube located within the left  ventral abdominal wall fat. There is a fluid-filled sinus tract  remaining within the left ventral abdominal wall and left upper  rectus muscle just superficial to the peritoneum and gastric body.  Angelito Rodgers discussed the significance and urgency of this critical  finding by EPIC secure chat with  ELBERT DIEHL on 7/27/2025 at 1:31  am.  (**-RCF-**) Findings:  See findings.          2. Mild bronchial wall thickening visualized in the lung bases which  may represent bronchitis. Prostatomegaly.          Signed by: Angelito Rodgers 7/27/2025 1:31 AM  Dictation workstation:   [id number]      Physical Exam  Constitutional:       General: He is awake. He is not in acute distress.     Appearance: Normal appearance. He is underweight. He is diaphoretic. He is not toxic-appearing.   HENT:      Head: Normocephalic and atraumatic.      Nose: Nose normal. No rhinorrhea.      Mouth/Throat:      Mouth: Mucous membranes are moist.      Eyes:      General:         Right eye: No discharge.         Left eye: No discharge.      Conjunctiva/sclera: Conjunctivae normal.      Pupils: Pupils are equal, round, and reactive to light.      Neck:      Trachea: Tracheostomy present.      Cardiovascular:      Rate and Rhythm: Regular rhythm. Bradycardia present.      Pulses: Normal pulses.   Pulmonary:      Effort: Pulmonary effort is normal. No respiratory distress.      Breath sounds: Examination of the right-upper field reveals rhonchi. Examination of the left-upper field reveals rhonchi. Examination of the right-middle field reveals rhonchi. Examination of the left-middle field  reveals rhonchi. Rhonchi present. No wheezing.   Abdominal:      General: Bowel sounds are increased. There is no distension.      Palpations: Abdomen is soft.      Tenderness: There is no abdominal tenderness. There is no guarding.      Comments: Gastrostomy tube with edema and tissue exposed. Sanguinous drainage. Tender to palpation at site.       Musculoskeletal:         General: Normal range of motion.      Cervical back: Normal range of motion and neck supple.      Right lower leg: Edema present.      Left lower leg: Edema present.      Comments: Right arm flaccid. Decreased movement of bilateral lower extremities. Left upper extremity movement grossly intact.    Feet:      Comments: Heel protector boots in place bilateral heels.      Skin:     General: Skin is warm.      Comments: Sacral wound      Neurological:      General: No focal deficit present.      Mental Status: He is alert and oriented to person, place, and time. Mental status is at baseline.      Motor: Weakness present.      Psychiatric:         Mood and Affect: Mood normal.         Behavior: Behavior normal. Behavior is cooperative.        Relevant Results                            Assessment & Plan  PEG tube malfunction (Multi)    Abdominal pain    Elevated alkaline phosphatase level    Patient arrived to ED today with possible infected PEG tube from skilled nursing facility.  Was found on CT abdomen pelvis that the tube was a dislodged percutaneous gastrostomy tube located within the left ventral abdominal wall fat.  There was also a fluid-filled sinus tract remaining within the left ventral abdominal wall and left upper rectus muscle just superficial to the peritoneum and gastric body.  Patient did not have any other complaints.  It was reported in the ED that patient requested to not have the tube replaced because he is taking food by mouth.  Patient reported to me that he is eating puréed food.  GI consult placed.  Appreciate recs.      Patient admitted to Dr. Tj Schulz for further medical management.      # Dislodged G-tube  # Fluid-filled sinus tract left ventral abdominal wall  # Elevated alk phos  # Abdominal pain  -GI consult  -Dietary consult when G-tube replaced for tube feeding recommendation  -As needed analgesia  -As needed antiemetic  - 0.9 normal saline at 100 mL/HR  -PEG tube care  -PEG tube site wound culture   -PT/OT/SW   -NPO diet for possible a.m. procedure  -admitted to observation   -q4 vitals    -morning labs CBC, BMP, type and screen     Chronic Conditions   # Diabetes  -Hold home glycemic medication   -SSI with hypoglycemic protocol   -POCT   # Anemia  # Tracheostomy with chronic respiratory failure  - Trach care  -continuous puls-ox  # CVA x 2, left ICA stenosis, left carotid stent, CAD  #Atrial flutter  #Chronic wound  - Wound care  #Lymphadenopathy     Continue home medications as ordered when nursing completes home med rec.       Full Code      #DVT Prophylaxis   Scd's as tolerated   DVT Prophylaxis   On Eliquis     7/28: PEG tube pulled.  Patient had passed an MBS through SNF last week.  He has been approved for diet by speech therapy.  Patient may not need his PEG tube replaced, will reassess tomorrow.  Additionally, he will be evaluated for potential decannulation by pulmonology tomorrow as well.  Patient has been bradycardic although asymptomatic, cardiology consulted.         Tj Schulz, DO

## 2025-07-29 NOTE — CONSULTS
Consults    Cardiac impression:  Asymptomatic sinus bradycardia  History of paroxysmal atrial flutter, anticoagulated with Eliquis on amiodarone  Coronary disease details unknown  Carotid artery disease with angioplasty of the left  Hypertension    Medical issues  CVA with right-sided deficits  Chronic PEG and trach    Plan:  Sinus bradycardia: ECG does not show any significant block.  Patient is completely asymptomatic.  At this point we will discontinue his metoprolol which was given 50 mg p.o. 3 times daily.  Continue amiodarone 20 mg daily to help maintain sinus rhythm.  Resume anticoagulation once okay from the surgical standpoint.     Otherwise conservative management cardiac standpoint    Please call with any further question concerns    History Of Present Illness:    Bryan Nix is a 64 y.o. male presenting with complaints of malfunctioning PEG tube.  Currently being seen by the surgical service.  Cardiology was consulted for asymptomatic sinus bradycardia.     Most of history obtained by patient's family at the bedside.  And he is able to nod his head yes or no and he denies all complaints.     Last Recorded Vitals:  Vitals:    07/28/25 1933 07/29/25 0442 07/29/25 0709 07/29/25 0719   BP:  135/61  167/74   BP Location:  Right arm  Right arm   Patient Position:  Lying  Lying   Pulse:  56  53   Resp:  18  16   Temp:  36.2 °C (97.2 °F)  36 °C (96.8 °F)   TempSrc:  Temporal  Temporal   SpO2: 92% 98% 98% 96%   Weight:       Height:           Last Labs:  CBC - 7/29/2025:  6:15 AM  7.5 10.6 181    33.7      CMP - 7/29/2025:  6:15 AM  8.6 6.8 20 --- 0.5   2.7 3.5 36 157      PTT - 3/22/2025:  1:54 PM  1.4   15.1 25        Last I/O:  I/O last 3 completed shifts:  In: 403.3 (5.2 mL/kg) [I.V.:403.3 (5.2 mL/kg)]  Out: 1250 (16.2 mL/kg) [Urine:1250 (0.5 mL/kg/hr)]  Weight: 77.1 kg     Past Cardiology Tests (Last 3 Years):  EKG:  ECG 12 lead 07/28/2025 (Preliminary)      ECG 12 lead 07/26/2025 (Preliminary)      ECG  12 lead 05/20/2025      Electrocardiogram, 12-lead PRN ACS symptoms 03/26/2025      Electrocardiogram, 12-lead PRN ACS symptoms 03/25/2025      Electrocardiogram, 12-lead PRN ACS symptoms 03/25/2025      Electrocardiogram, 12-lead PRN ACS symptoms 03/25/2025      ECG 12 lead 03/22/2025          Past Medical History:  He has a past medical history of Abnormal finding of blood chemistry, unspecified (05/18/2016), Acute upper respiratory infection, unspecified (12/02/2015), Diabetes (Multi), Elevated prostate specific antigen (PSA), Encounter for screening for malignant neoplasm of colon (01/30/2021), Encounter for screening for malignant neoplasm of prostate (11/30/2020), Essential (primary) hypertension (07/30/2013), Other conditions influencing health status (07/30/2013), Other conditions influencing health status (12/02/2015), Personal history of other endocrine, nutritional and metabolic disease (03/18/2021), Personal history of other endocrine, nutritional and metabolic disease (03/18/2021), Personal history of other specified conditions (05/18/2016), Personal history of other specified conditions (05/18/2016), Personal history of other specified conditions (05/18/2016), Personal history of other specified conditions (05/18/2016), and Stroke (Multi).    Past Surgical History:  He has a past surgical history that includes Other surgical history (12/01/2020); Other surgical history (12/01/2020); MR angio head wo IV contrast (12/14/2020); MR angio neck wo IV contrast (12/14/2020); and CT angio coronary art with heartflow if score >30% (8/23/2023).      Social History:  He reports that he has quit smoking. His smoking use included cigarettes. He has never used smokeless tobacco. He reports that he does not drink alcohol and does not use drugs.    Family History:  Family History[1]     Allergies:  Patient has no known allergies.    Inpatient Medications:  Scheduled Medications[2]  PRN Medications[3]  Continuous  Medications[4]  Outpatient Medications:  Current Outpatient Medications   Medication Instructions    acetaminophen 325 mg, Every 6 hours PRN    acetylcysteine (Mucomyst) 200 mg/mL (20 %) nebulizer solution 1 mL, 3 times daily RT    amiodarone (PACERONE) 200 mg, g-tube, 2 times daily    apixaban (ELIQUIS) 5 mg, 2 times daily    cholecalciferol (VITAMIN D-3) 50,000 Units, g-tube, Weekly, On Friday    cloNIDine (CATAPRES) 0.1 mg, j-tube, Every 8 hours PRN, For systolic pressure greater than 170    epoetin nicko (PROCRIT) 30,000 Units, subcutaneous, Once Weekly    hydrALAZINE (APRESOLINE) 10 mg, j-tube, Every 12 hours    hydrocortisone 0.5 % cream Topical, 2 times daily, Apply to left arm for 10 days    insulin glargine (LANTUS) 50 Units, subcutaneous, Nightly, Take as directed per insulin instructions.    insulin lispro (HUMALOG KWIKPEN INSULIN) 0-16 Units, 4 times daily    ipratropium (ATROVENT) 0.5 mg, nebulization, 2 times daily RT    ipratropium (ATROVENT) 0.5 mg, nebulization, Every 2 hour PRN    metoprolol tartrate (LOPRESSOR) 50 mg, 3 times daily    oxygen (O2) gas therapy 2 each, As needed    pantoprazole (PROTONIX) 40 mg, oral, Daily before breakfast    sodium chloride (Ayr) 0.65 % nasal drops 2 sprays, As needed    sucralfate (CARAFATE) 1 g, 2 times daily    traMADol (ULTRAM) 50 mg, g-tube, Every 8 hours PRN    traZODone (DESYREL) 50 mg, Nightly    zinc oxide 20 % ointment 1 Application, Topical, Every 1 hour PRN       Physical Exam:  Alert awake no acute distress heart sounds were regular bradycardic lungs are relatively clear the abdomen is soft no lower extremity edema           Code Status:  Full Code    I spent 40 minutes in the professional and overall care of this patient.        Yash Leiva MD       [1]   Family History  Problem Relation Name Age of Onset    Stroke Other      Hypertension Other      Diabetes Other     [2]   Scheduled medications   Medication Dose Route Frequency    amiodarone  200  mg g-tube BID    [Held by provider] apixaban  5 mg g-tube BID    [Held by provider] esomeprazole  10 mg oral Daily before breakfast    famotidine  20 mg intravenous q12h LIBAN    hydrALAZINE  10 mg g-tube q12h    [Held by provider] insulin glargine  50 Units subcutaneous Nightly    insulin lispro  0-10 Units subcutaneous TID AC    metoprolol tartrate  50 mg g-tube TID    sucralfate  1 g oral BID AC    traZODone  50 mg g-tube Nightly   [3]   PRN medications   Medication    acetaminophen    Or    acetaminophen    Or    acetaminophen    cloNIDine    dextrose    dextrose    glucagon    glucagon    hydrALAZINE    melatonin    metoprolol    ondansetron    oxygen    traMADol   [4]   Continuous Medications   Medication Dose Last Rate    D5 % and 0.9 % sodium chloride  50 mL/hr 50 mL/hr (07/29/25 0117)

## 2025-07-29 NOTE — CONSULTS
Consults    Reason For Consult  Tracheostomy tube decannulation       History Of Present Illness  Bryan Nix is a 64-year-old male with a complex medical history including atrial flutter on Eliquis, insulin-dependent diabetes, CAD, hypertension, chronic wounds, CVA with right-sided deficits, and recent pontine stroke with tracheostomy and PEG placement in February 2025. He presents from SNF with 3 days of abdominal pain at the PEG tube site, associated with discomfort during flushing and new onset of erythema, swelling, and discharge. He denies fever, chest pain, dyspnea, GI symptoms, or bleeding.     ED Course:  Patient arrived hemodynamically stable  Labs were largely unremarkable with normal electrolytes, renal function, and lactate. CBC showed Hgb 11.3 and WBC 7.9.   CT abdomen/pelvis revealed a dislodged PEG tube within the left ventral abdominal wall fat with a fluid-filled sinus tract extending to the peritoneum and gastric body; mild bronchial wall thickening noted, possibly bronchitis.   EKG showed sinus bradycardia with a rate of 49 and left axis deviation per ED interpretation.   GI and surgery consulted due to concern for fistula formation.    Past Medical History  He has a past medical history of Abnormal finding of blood chemistry, unspecified (05/18/2016), Acute upper respiratory infection, unspecified (12/02/2015), Diabetes (Multi), Elevated prostate specific antigen (PSA), Encounter for screening for malignant neoplasm of colon (01/30/2021), Encounter for screening for malignant neoplasm of prostate (11/30/2020), Essential (primary) hypertension (07/30/2013), Other conditions influencing health status (07/30/2013), Other conditions influencing health status (12/02/2015), Personal history of other endocrine, nutritional and metabolic disease (03/18/2021), Personal history of other endocrine, nutritional and metabolic disease (03/18/2021), Personal history of other specified conditions  (05/18/2016), Personal history of other specified conditions (05/18/2016), Personal history of other specified conditions (05/18/2016), Personal history of other specified conditions (05/18/2016), and Stroke (Multi).    Surgical History  He has a past surgical history that includes Other surgical history (12/01/2020); Other surgical history (12/01/2020); MR angio head wo IV contrast (12/14/2020); MR angio neck wo IV contrast (12/14/2020); and CT angio coronary art with heartflow if score >30% (8/23/2023).     Social History  He reports that he has quit smoking. His smoking use included cigarettes. He has never used smokeless tobacco. He reports that he does not drink alcohol and does not use drugs.    Family History  Family History[1]     Allergies  Patient has no known allergies.    Review of Systems  10 point ROS systems completed and is negative except for what is stated in HPI.      Physical Exam    General: Patient is awake, in no acute distress, normal appearing, alert and oriented x 3  Neck: Tracheostomy tube noted, tube capped, no redness, no swelling about the area no secretion noted.  Cardiovascular: NHS, bradycardia, no additional heart sounds, no murmur  Pulmonary: Respiratory effort is normal, normal breathing sounds, no wheezing  Abdomen: Soft, nontender, nondistended, PEG site in dressing  Extremities: No pitting edema  Neurology: Right hemiparesis noted, normal motor and sensory of left side.        Assessment/Plan     Bryan Nix is a 64-year-old male with multiple comorbidities presenting from SNF with 3 days of PEG site pain, erythema, swelling, and discharge. CT showed a dislodged PEG with sinus tract formation pulmonology were consulted for tracheostomy decannulation. We are currently managing him as follows:    #Tracheostomy decannulation:  Patient had tracheostomy placed in February following a stroke with right-sided deficits. He has been residing in a SNF and reports good tolerance of  oral diet and ability to swallow without difficulty. Tracheostomy has been capped most of the time, including during sleep, without increased secretions or respiratory distress.  At the time of our encounter patient had his tracheostomy tube capped and was able to maintain saturation on room air.  From a pulmonary standpoint he meets criteria for decannulation  Plan  Proceed with decannulation and monitor closely for signs of respiratory compromise, we contacted respiratory therapist to decannulate patient and made nurse aware.       Rest of management per primary team    Case discussed with attending physician    ---------------------------------  Maykel Pavon MD  [id number]           [1]   Family History  Problem Relation Name Age of Onset    Stroke Other      Hypertension Other      Diabetes Other

## 2025-07-29 NOTE — CONSULTS
"Nutrition Initial Assessment:   Nutrition Assessment    Reason for Assessment: Admission nursing screening, Initiate and manage tube feeding (MST=2 for unsure of weight loss/wound)    Patient is a 64 y.o. male presenting with PEG tube malfunction    PmHx: DM, CAD, HTN, CVA 2020, a flutter, chronic wounds, diffuse lymphadenopathy, anemia, tracheostomy, acute pontine stroke 2/2025    Nutrition History:  Energy Intake: Fair 50-75 %, Good > 75 %  Pain affecting nutrition status: N/A  Food and Nutrient History: Pt laying in bed at time of visit today. Wife in room visiting.  Wife provided most of history as pt with tracheostomy.  Per review of chart, PEG became dislodged PTA and subsequently was removed at bedside  7/27.  Pt recently underwent MBSS at [facility] and passed with Pureed Thin. Pt and wife do not want PEG replaced since pt is able to eat and has a decent appetite.  Pt was agreeable to ONS with all meals to support nutritional needs and wound healing.  Vitamin/Herbal Supplement Use: none noted       Anthropometrics:  Height: 188 cm (6' 2.02\")   Weight: 77.1 kg (169 lb 15.6 oz)   BMI (Calculated): 21.81  IBW/kg (Dietitian Calculated): 86.3 kg  Percent of IBW: 89 %      Weight History:     Weight Change %:  Weight History / % Weight Change: 5/20/25 77.1kg, 3/2225 77.1kg, 9/2023 75kg  Significant Weight Loss: No    Nutrition Focused Physical Exam Findings:    Subcutaneous Fat Loss:   Orbital Fat Pads: Well nourished (slightly bulging fat pads)  Buccal Fat Pads: Well nourished (full, rounded cheeks)  Triceps: Mild-Moderate (less than ample fat tissue) (mild)  Ribs: Defer  Muscle Wasting:  Temporalis: Well nourished (well-defined muscle)  Pectoralis (Clavicular Region): Mild-Moderate (some protrusion of clavicle)  Deltoid/Trapezius: Mild-Moderate (slight protrusion of acromion process)  Interosseous: Defer  Trapezius/Infraspinatus/Supraspinatus (Scapular Region): Defer  Quadriceps: Defer  Gastrocnemius: " Defer  Edema:  Edema: +3 moderate  Edema Location: generalized  Physical Findings:  Skin: Positive (coccyx)  Mouth Findings: Dysphagia    Nutrition Significant Labs:  CBC Trend:   Results from last 7 days   Lab Units 07/29/25  0615 07/28/25  0604 07/27/25  0538 07/27/25  0001   WBC AUTO x10*3/uL 7.5 6.8 6.1 7.9   RBC AUTO x10*6/uL 4.08* 4.41* 4.40* 4.44*   HEMOGLOBIN g/dL 10.6* 11.0* 11.4* 11.3*   HEMATOCRIT % 33.7* 36.7* 36.6* 37.8*   MCV fL 83 83 83 85   PLATELETS AUTO x10*3/uL 181 181 176 202    , BMP Trend:   Results from last 7 days   Lab Units 07/29/25  0615 07/28/25  0604 07/27/25  0538 07/27/25  0001   GLUCOSE mg/dL 115* 55* 41* 105*   CALCIUM mg/dL 8.6 9.2 9.6 9.5   SODIUM mmol/L 140 143 144 137   POTASSIUM mmol/L 3.7 3.5 3.4* 4.1   CO2 mmol/L 26 29 30 28   CHLORIDE mmol/L 106 104 103 100   BUN mg/dL 13 14 20 22   CREATININE mg/dL 0.74 0.73 0.77 0.73    , A1C:  Lab Results   Component Value Date    HGBA1C 8.6 (H) 03/05/2025   , BG POCT trend:   Results from last 7 days   Lab Units 07/29/25  1048 07/29/25  0605 07/28/25  1941 07/28/25  1637 07/28/25  1238   POCT GLUCOSE mg/dL 200* 113* 186* 132* 78        Nutrition Specific Medications:  Reviewed     I/O:    ;      Dietary Orders (From admission, onward)       Start     Ordered    07/29/25 1004  Oral nutritional supplements  Until discontinued        Comments: chocolate   Question Answer Comment   Deliver with Breakfast    Deliver with Lunch    Deliver with Dinner    Select supplement: Glucerna Shake        07/29/25 1003    07/28/25 1453  Adult diet Regular; Pureed 4; Thin 0  Diet effective now        Question Answer Comment   Diet type Regular    Texture Pureed 4    Fluid consistency Thin 0        07/28/25 1452    07/27/25 0535  May Participate in Room Service With Assistance  ( ROOM SERVICE MAY PARTICIPATE WITH ASSISTANCE)  Once        Question:  .  Answer:  Yes    07/27/25 0534                     Estimated Needs:      Method for Estimating Needs:  2155-2330kcals (25-27kcals/kg IBW)     Method for Estimating 24 Hour Protein Needs: 86-103g (1-1.2g/kg IBW)     Method for Estimating 24 Hour Fluid Needs: 1 mL/kcal or as per MD  Patient on Order Fluid Restriction: No        Nutrition Diagnosis   Malnutrition Diagnosis  Patient has Malnutrition Diagnosis: No    Nutrition Diagnosis  Patient has Nutrition Diagnosis: Yes  Diagnosis Status (1): Active  Nutrition Diagnosis 1: Swallowing difficulty  Related to (1): dysphagia  As Evidenced by (1): texture modified diet  Additional Nutrition Diagnosis: Diagnosis 2  Diagnosis Status (2): Active  Nutrition Diagnosis 2: Increased nutrient needs  Related to (2): physiological causes increasing nutrient needs  As Evidenced by (2): wound healing needs       Nutrition Interventions/Recommendations   Nutrition prescription for oral nutrition    Nutrition Recommendations:  Individualized Nutrition Prescription Provided for : Pureed diet with thin liquids; Glucerna shakes TID    Nutrition Interventions/Goals:   Meals and Snacks: Texture-modified diet  Goal: consume 3 meals per day  Medical Food Supplement: Commercial beverage medical food supplement therapy  Goal: consume Glucerna shakes TID (for an additional 220 kcals, 10 gm protein each)    Education Documentation  No diet related questions at this time      Nutrition Monitoring and Evaluation   Intake / Amount of food: Consumes at least 75% or more of meals/snacks/supplements, Meets > 75% estimated energy needs    Body Weight: Body weight - Maintain stable weight, Body weight - Promote weight restoration    Electrolyte and Renal Panel: Electrolytes within normal limits  Glucose/Endocrine Profile: Glucose within normal limits ( mg/dL)    Skin Finding: Impaired wound healing - Improved wound healing, Impaired wound healing - Skin to heal  Criteria: tolerate LRD    Goal Status: New goal(s) identified    Time Spent (min): 45 minutes

## 2025-07-29 NOTE — PROGRESS NOTES
07/29/25 1211   Discharge Planning   Living Arrangements Other (Comment)  ([facility])   Support Systems Spouse/significant other;Children   Assistance Needed ADL's   Type of Residence Nursing home/residential care   Do you have animals or pets at home? No   Who is requesting discharge planning? Provider   Home or Post Acute Services Post acute facilities (Rehab/SNF/etc)   Type of Post Acute Facility Services Long term care   Expected Discharge Disposition Inter   Does the patient need discharge transport arranged? Yes   [facility] coordination needed? Yes   Has discharge transport been arranged? No   Intensity of Service   Intensity of Service 0-30 min     Met with wife at bedside. Introduced self and role as care coordinator. Demographics verified. Patient PCP is [facility]. Patient insurance is Medicare and Medicaid. Patient is a resident at [facility] since March. Patient uses a power wheelchair. Patient needs assistance with ADL's. Patient plan is return to [facility]. Referral sent to [facility]. Care coordinators will follow for discharge planning.       7/29 130pm  Patient is a LTC bedhold at [facility].

## 2025-07-29 NOTE — PROGRESS NOTES
"Bryan Nix is a 64 y.o. male on day 1 of admission presenting with PEG tube malfunction (Multi).    Subjective   No acute events overnight. Patient slept well last night. States abdominal pain has resolved. Still mild tenderness with palpation to area of old PEG tube.        Objective     Physical Exam  Vitals reviewed.   Constitutional:       General: He is not in acute distress.     Comments: Chronically ill and debilitated male with wife at bedside   Neck:      Trachea: Tracheostomy (clean, clear of secretions) present.     Cardiovascular:      Rate and Rhythm: Normal rate.   Pulmonary:      Effort: Pulmonary effort is normal. No respiratory distress.      Comments: Tracheostomy, on RA  Abdominal:      General: Abdomen is flat. Bowel sounds are normal.      Palpations: Abdomen is soft.      Tenderness: There is abdominal tenderness (mild, surrounding prior PEG site).      Comments: PEG wound with granulation tissue. Some old tube feed/purulent drainage expressed from wound. Surrounding area of induration improving. Covered with DCD.      Musculoskeletal:      Right lower leg: No edema.      Left lower leg: No edema.      Comments: Heel protectors on b/l feet     Skin:     General: Skin is warm and dry.     Neurological:      Mental Status: He is alert and oriented to person, place, and time.     Psychiatric:         Behavior: Behavior is cooperative.         Last Recorded Vitals  Blood pressure 167/74, pulse 53, temperature 36 °C (96.8 °F), temperature source Temporal, resp. rate 16, height 1.88 m (6' 2.02\"), weight 77.1 kg (170 lb), SpO2 96%.  Intake/Output last 3 Shifts:  I/O last 3 completed shifts:  In: 403.3 (5.2 mL/kg) [I.V.:403.3 (5.2 mL/kg)]  Out: 1250 (16.2 mL/kg) [Urine:1250 (0.5 mL/kg/hr)]  Weight: 77.1 kg     Relevant Results  Results for orders placed or performed during the hospital encounter of 07/26/25 (from the past 24 hours)   POCT GLUCOSE   Result Value Ref Range    POCT Glucose 72 (L) 74 - " 99 mg/dL   POCT GLUCOSE   Result Value Ref Range    POCT Glucose 78 74 - 99 mg/dL   ECG 12 lead   Result Value Ref Range    Ventricular Rate 46 BPM    Atrial Rate 46 BPM    DC Interval 138 ms    QRS Duration 96 ms    QT Interval 514 ms    QTC Calculation(Bazett) 449 ms    P Axis 59 degrees    R Axis -36 degrees    T Axis 100 degrees    QRS Count 8 beats    Q Onset 227 ms    P Onset 158 ms    P Offset 209 ms    T Offset 484 ms    QTC Fredericia 470 ms   POCT GLUCOSE   Result Value Ref Range    POCT Glucose 132 (H) 74 - 99 mg/dL   POCT GLUCOSE   Result Value Ref Range    POCT Glucose 186 (H) 74 - 99 mg/dL   POCT GLUCOSE   Result Value Ref Range    POCT Glucose 113 (H) 74 - 99 mg/dL   CBC   Result Value Ref Range    WBC 7.5 4.4 - 11.3 x10*3/uL    nRBC 0.0 0.0 - 0.0 /100 WBCs    RBC 4.08 (L) 4.50 - 5.90 x10*6/uL    Hemoglobin 10.6 (L) 13.5 - 17.5 g/dL    Hematocrit 33.7 (L) 41.0 - 52.0 %    MCV 83 80 - 100 fL    MCH 26.0 26.0 - 34.0 pg    MCHC 31.5 (L) 32.0 - 36.0 g/dL    RDW 17.2 (H) 11.5 - 14.5 %    Platelets 181 150 - 450 x10*3/uL   Basic Metabolic Panel   Result Value Ref Range    Glucose 115 (H) 74 - 99 mg/dL    Sodium 140 136 - 145 mmol/L    Potassium 3.7 3.5 - 5.3 mmol/L    Chloride 106 98 - 107 mmol/L    Bicarbonate 26 21 - 32 mmol/L    Anion Gap 12 10 - 20 mmol/L    Urea Nitrogen 13 6 - 23 mg/dL    Creatinine 0.74 0.50 - 1.30 mg/dL    eGFR >90 >60 mL/min/1.73m*2    Calcium 8.6 8.6 - 10.3 mg/dL     ECG 12 lead  Result Date: 7/29/2025  Sinus bradycardia Left axis deviation Abnormal QRS-T angle, consider primary T wave abnormality Abnormal ECG When compared with ECG of 26-JUL-2025 23:48, PREVIOUS ECG IS PRESENT          Assessment & Plan  PEG tube malfunction (Multi)    Abdominal pain    Elevated alkaline phosphatase level    Bryan Nix is a 64 y.o. male with history of a flutter (on Eliquis), CAD, HTN, DM 1, left ICA stenosis s/p angioplasty and left carotid artery stent (in 2023), CVA in 2020 with right  sided defects, acute pontine stroke in 2/2025 with acute hypoxic respiratory failure s/p tracheostomy and PEG placement at [facility] who presented to [facility] from SNF due to pain at PEG site. CT A/P demonstrated dislodged PEG,  GI initially consulted and requested surgery input due to concern for developing fistula.      Impression  - Dislodged PEG; removed at bedside today     Recommendation  - PEG removed at bedside; attempted replacement with 20F catheter failed  - Wound covered with 4x4 and secured with paper tape; replace daily and as needed   - Defer need for replacement PEG to primary team; OK for diet from surgical standpoint    > currently tolerating pureed diet  - Remainder of care per primary team     Patient will be seen and discussed with attending surgeon, Dr. Vogt.       Lulu Knight PA-C

## 2025-07-30 VITALS
OXYGEN SATURATION: 94 % | RESPIRATION RATE: 18 BRPM | WEIGHT: 169.97 LBS | HEIGHT: 74 IN | BODY MASS INDEX: 21.81 KG/M2 | SYSTOLIC BLOOD PRESSURE: 194 MMHG | HEART RATE: 59 BPM | DIASTOLIC BLOOD PRESSURE: 88 MMHG | TEMPERATURE: 97 F

## 2025-07-30 LAB
ANION GAP SERPL CALC-SCNC: 11 MMOL/L (ref 10–20)
ATRIAL RATE: 49 BPM
B-LACTAMASE ORGANISM ISLT: POSITIVE
BACTERIA SPEC CULT: ABNORMAL
BUN SERPL-MCNC: 13 MG/DL (ref 6–23)
CALCIUM SERPL-MCNC: 9.1 MG/DL (ref 8.6–10.3)
CHLORIDE SERPL-SCNC: 104 MMOL/L (ref 98–107)
CO2 SERPL-SCNC: 28 MMOL/L (ref 21–32)
CREAT SERPL-MCNC: 0.76 MG/DL (ref 0.5–1.3)
EGFRCR SERPLBLD CKD-EPI 2021: >90 ML/MIN/1.73M*2
ERYTHROCYTE [DISTWIDTH] IN BLOOD BY AUTOMATED COUNT: 16.7 % (ref 11.5–14.5)
GLUCOSE BLD MANUAL STRIP-MCNC: 121 MG/DL (ref 74–99)
GLUCOSE BLD MANUAL STRIP-MCNC: 138 MG/DL (ref 74–99)
GLUCOSE BLD MANUAL STRIP-MCNC: 168 MG/DL (ref 74–99)
GLUCOSE BLD MANUAL STRIP-MCNC: 189 MG/DL (ref 74–99)
GLUCOSE SERPL-MCNC: 119 MG/DL (ref 74–99)
GRAM STN SPEC: ABNORMAL
GRAM STN SPEC: ABNORMAL
HCT VFR BLD AUTO: 39.2 % (ref 41–52)
HGB BLD-MCNC: 12 G/DL (ref 13.5–17.5)
MCH RBC QN AUTO: 25.5 PG (ref 26–34)
MCHC RBC AUTO-ENTMCNC: 30.6 G/DL (ref 32–36)
MCV RBC AUTO: 83 FL (ref 80–100)
NRBC BLD-RTO: 0 /100 WBCS (ref 0–0)
P AXIS: 73 DEGREES
PLATELET # BLD AUTO: 187 X10*3/UL (ref 150–450)
POTASSIUM SERPL-SCNC: 3.7 MMOL/L (ref 3.5–5.3)
PR INTERVAL: 145 MS
Q ONSET: 251 MS
QRS COUNT: 7 BEATS
QRS DURATION: 96 MS
QT INTERVAL: 510 MS
QTC CALCULATION(BAZETT): 461 MS
QTC FREDERICIA: 476 MS
R AXIS: -37 DEGREES
RBC # BLD AUTO: 4.7 X10*6/UL (ref 4.5–5.9)
SODIUM SERPL-SCNC: 139 MMOL/L (ref 136–145)
T AXIS: 83 DEGREES
T OFFSET: 506 MS
VENTRICULAR RATE: 49 BPM
WBC # BLD AUTO: 7.4 X10*3/UL (ref 4.4–11.3)

## 2025-07-30 PROCEDURE — 36415 COLL VENOUS BLD VENIPUNCTURE: CPT | Performed by: INTERNAL MEDICINE

## 2025-07-30 PROCEDURE — 2500000002 HC RX 250 W HCPCS SELF ADMINISTERED DRUGS (ALT 637 FOR MEDICARE OP, ALT 636 FOR OP/ED)

## 2025-07-30 PROCEDURE — 2500000004 HC RX 250 GENERAL PHARMACY W/ HCPCS (ALT 636 FOR OP/ED)

## 2025-07-30 PROCEDURE — 80048 BASIC METABOLIC PNL TOTAL CA: CPT | Performed by: INTERNAL MEDICINE

## 2025-07-30 PROCEDURE — 82947 ASSAY GLUCOSE BLOOD QUANT: CPT

## 2025-07-30 PROCEDURE — 2500000001 HC RX 250 WO HCPCS SELF ADMINISTERED DRUGS (ALT 637 FOR MEDICARE OP): Performed by: INTERNAL MEDICINE

## 2025-07-30 PROCEDURE — 85027 COMPLETE CBC AUTOMATED: CPT | Performed by: INTERNAL MEDICINE

## 2025-07-30 PROCEDURE — 2500000001 HC RX 250 WO HCPCS SELF ADMINISTERED DRUGS (ALT 637 FOR MEDICARE OP): Performed by: NURSE PRACTITIONER

## 2025-07-30 PROCEDURE — 2500000002 HC RX 250 W HCPCS SELF ADMINISTERED DRUGS (ALT 637 FOR MEDICARE OP, ALT 636 FOR OP/ED): Performed by: NURSE PRACTITIONER

## 2025-07-30 RX ORDER — INSULIN LISPRO 100 [IU]/ML
0-10 INJECTION, SOLUTION INTRAVENOUS; SUBCUTANEOUS
Start: 2025-07-30

## 2025-07-30 RX ORDER — AMLODIPINE BESYLATE 5 MG/1
5 TABLET ORAL DAILY
Start: 2025-07-31

## 2025-07-30 RX ORDER — HYDRALAZINE HYDROCHLORIDE 25 MG/1
25 TABLET, FILM COATED ORAL 3 TIMES DAILY
Status: DISCONTINUED | OUTPATIENT
Start: 2025-07-30 | End: 2025-07-30 | Stop reason: HOSPADM

## 2025-07-30 RX ORDER — AMLODIPINE BESYLATE 5 MG/1
5 TABLET ORAL DAILY
Status: DISCONTINUED | OUTPATIENT
Start: 2025-07-30 | End: 2025-07-30 | Stop reason: HOSPADM

## 2025-07-30 RX ORDER — SUCRALFATE 1 G/1
1 TABLET ORAL
Start: 2025-07-30

## 2025-07-30 RX ORDER — HYDRALAZINE HYDROCHLORIDE 25 MG/1
25 TABLET, FILM COATED ORAL 3 TIMES DAILY
Start: 2025-07-30

## 2025-07-30 RX ORDER — ACETAMINOPHEN 325 MG/1
650 TABLET ORAL EVERY 4 HOURS PRN
Qty: 30 TABLET | Refills: 0 | Status: SHIPPED | OUTPATIENT
Start: 2025-07-30

## 2025-07-30 RX ADMIN — FAMOTIDINE 20 MG: 10 INJECTION, SOLUTION INTRAVENOUS at 10:44

## 2025-07-30 RX ADMIN — AMLODIPINE BESYLATE 5 MG: 5 TABLET ORAL at 11:36

## 2025-07-30 RX ADMIN — INSULIN LISPRO 2 UNITS: 100 INJECTION, SOLUTION INTRAVENOUS; SUBCUTANEOUS at 17:01

## 2025-07-30 RX ADMIN — ESOMEPRAZOLE MAGNESIUM 10 MG: 40 FOR SUSPENSION ORAL at 06:41

## 2025-07-30 RX ADMIN — APIXABAN 5 MG: 5 TABLET, FILM COATED ORAL at 10:24

## 2025-07-30 RX ADMIN — AMIODARONE HYDROCHLORIDE 200 MG: 200 TABLET ORAL at 10:44

## 2025-07-30 RX ADMIN — HYDRALAZINE HYDROCHLORIDE 25 MG: 25 TABLET ORAL at 15:02

## 2025-07-30 RX ADMIN — SUCRALFATE 1 G: 1 TABLET ORAL at 06:41

## 2025-07-30 RX ADMIN — SUCRALFATE 1 G: 1 TABLET ORAL at 15:02

## 2025-07-30 RX ADMIN — HYDRALAZINE HYDROCHLORIDE 10 MG: 10 TABLET, FILM COATED ORAL at 10:24

## 2025-07-30 ASSESSMENT — PAIN SCALES - GENERAL: PAINLEVEL_OUTOF10: 0 - NO PAIN

## 2025-07-30 NOTE — PROGRESS NOTES
Patient has written discharge orders. Patient accepted back to [facility]. Transport arranged for 7pm. Nurse and spouse notified.

## 2025-07-30 NOTE — DISCHARGE SUMMARY
Discharge Diagnosis  PEG tube malfunction (Multi)           Issues Requiring Follow-Up  HTN    Discharge Meds     Medication List      START taking these medications     amLODIPine 5 mg tablet; Commonly known as: Norvasc; Take 1 tablet (5 mg)   by mouth once daily.; Start taking on: July 31, 2025     CHANGE how you take these medications     * acetaminophen 325 mg chewable tablet; What changed: Another medication   with the same name was added. Make sure you understand how and when to   take each.   * acetaminophen 325 mg tablet; Commonly known as: Tylenol; Take 2   tablets (650 mg) by mouth every 4 hours if needed for mild pain (1 - 3).;   What changed: You were already taking a medication with the same name, and   this prescription was added. Make sure you understand how and when to take   each.   hydrALAZINE 25 mg tablet; Commonly known as: Apresoline; Take 1 tablet   (25 mg) by g-tube 3 times a day.; What changed: medication strength, how   much to take, how to take this, when to take this   insulin lispro 100 unit/mL injection; Inject 0-10 Units under the skin 3   times a day before meals. Take as directed per insulin instructions.  0   unit(s) if Blood glucose is between   2 unit(s) if Blood glucose is   between 151-200  4 unit(s) if Blood glucose is between 201-250  6 unit(s)   if Bloodglucose is between 251-300  8 unit(s) if Blood glucose is between   301-350  10 unit(s) if Blood glucose is between 351-400; What changed: how   much to take, when to take this, additional instructions   sucralfate 1 gram tablet; Commonly known as: Carafate; Take 1 tablet (1   g) by mouth 2 times a day before meals.; What changed: how to take this,   when to take this  * This list has 2 medication(s) that are the same as other medications   prescribed for you. Read the directions carefully, and ask your doctor or   other care provider to review them with you.     CONTINUE taking these medications     amiodarone 200 mg  tablet; Commonly known as: Pacerone; 1 tablet (200 mg)   by g-tube route 2 times a day.   apixaban 5 mg tablet; Commonly known as: Eliquis   cholecalciferol 1.25 mg (50,000 units) tablet; Commonly known as:   Vitamin D-3   cloNIDine 0.1 mg tablet; Commonly known as: Catapres   hydrocortisone 0.5 % cream   pantoprazole 40 mg packet; Commonly known as: ProtoNix; Take 1 packet   (40 mg) by mouth once daily in the morning. Take before meals.   sodium chloride 0.65 % nasal drops; Commonly known as: Ayr   traMADol 50 mg tablet; Commonly known as: Ultram   traZODone 50 mg tablet; Commonly known as: Desyrel     STOP taking these medications     acetylcysteine 200 mg/mL (20 %) nebulizer solution; Commonly known as:   Mucomyst   epoetin nicko 20,000 unit/mL injection; Commonly known as: Procrit   insulin glargine 100 unit/mL injection; Commonly known as: Lantus   ipratropium 0.02 % nebulizer solution; Commonly known as: Atrovent   metoprolol tartrate 50 mg tablet; Commonly known as: Lopressor   oxygen gas therapy; Commonly known as: O2   zinc oxide 20 % ointment       Test Results Pending At Discharge  Pending Labs       No current pending labs.            Hospital Course     Patient arrived to ED today with possible infected PEG tube from skilled nursing facility.  Was found on CT abdomen pelvis that the tube was a dislodged percutaneous gastrostomy tube located within the left ventral abdominal wall fat.  There was also a fluid-filled sinus tract remaining within the left ventral abdominal wall and left upper rectus muscle just superficial to the peritoneum and gastric body.  Patient did not have any other complaints.  It was reported in the ED that patient requested to not have the tube replaced because he is taking food by mouth.  Patient reported to me that he is eating puréed food.  GI consult placed.  Appreciate recs.     Patient admitted to Dr. Tj Schulz for further medical management.      # Dislodged  G-tube  # Fluid-filled sinus tract left ventral abdominal wall  # Elevated alk phos  # Abdominal pain  -GI consult  -Dietary consult when G-tube replaced for tube feeding recommendation  -As needed analgesia  -As needed antiemetic  - 0.9 normal saline at 100 mL/HR  -PEG tube care  -PEG tube site wound culture   -PT/OT/SW   -NPO diet for possible a.m. procedure  -admitted to observation   -q4 vitals    -morning labs CBC, BMP, type and screen     Chronic Conditions   # Diabetes  -Hold home glycemic medication   -SSI with hypoglycemic protocol   -POCT   # Anemia  # Tracheostomy with chronic respiratory failure  - Trach care  -continuous puls-ox  # CVA x 2, left ICA stenosis, left carotid stent, CAD  #Atrial flutter  #Chronic wound  - Wound care  #Lymphadenopathy     Continue home medications as ordered when nursing completes home med rec.       Full Code      #DVT Prophylaxis   Scd's as tolerated   DVT Prophylaxis   On Eliquis     7/28: PEG tube pulled.  Patient had passed an MBS through SNF last week.  He has been approved for diet by speech therapy.  Patient may not need his PEG tube replaced, will reassess tomorrow.  Additionally, he will be evaluated for potential decannulation by pulmonology tomorrow as well.  Patient has been bradycardic although asymptomatic, cardiology consulted.     7/29: Discontinue metoprolol but continue amiodarone per cardiology recs.  Will resume anticoagulation tomorrow.  Patient to be decannulated today.  Will monitor overnight and plan to discharge back to ECF tomorrow.     7/30: No events overnight.  Patient seen and examined at bedside.  He was decannulated yesterday.  He denies any shortness of breath or breathing difficulties.  He does not have any significant secretions.  No chest pain.  He is able to swallow without difficulties.  Hydralazine increased and patient started on amlodipine for hypertension.  He was discharged back to ECF without a PEG tube or trach.    Pertinent  Physical Exam At Time of Discharge  Physical Exam  Constitutional:       General: He is awake. He is not in acute distress.     Appearance: Normal appearance. He is underweight. He is diaphoretic. He is not toxic-appearing.   HENT:      Head: Normocephalic and atraumatic.      Nose: Nose normal. No rhinorrhea.      Mouth/Throat:      Mouth: Mucous membranes are moist.      Eyes:      General:         Right eye: No discharge.         Left eye: No discharge.      Conjunctiva/sclera: Conjunctivae normal.      Pupils: Pupils are equal, round, and reactive to light.      Neck:      Trachea: Tracheostomy present.      Cardiovascular:      Rate and Rhythm: Regular rhythm. Bradycardia present.      Pulses: Normal pulses.   Pulmonary:      Effort: Pulmonary effort is normal. No respiratory distress.      Breath sounds: Examination of the right-upper field reveals rhonchi. Examination of the left-upper field reveals rhonchi. Examination of the right-middle field reveals rhonchi. Examination of the left-middle field reveals rhonchi. Rhonchi present. No wheezing.   Abdominal:      General: Bowel sounds are increased. There is no distension.      Palpations: Abdomen is soft.      Tenderness: There is no abdominal tenderness. There is no guarding.      Comments: Gastrostomy tube with edema and tissue exposed. Sanguinous drainage. Tender to palpation at site.       Musculoskeletal:         General: Normal range of motion.      Cervical back: Normal range of motion and neck supple.      Right lower leg: Edema present.      Left lower leg: Edema present.      Comments: Right arm flaccid. Decreased movement of bilateral lower extremities. Left upper extremity movement grossly intact.    Feet:      Comments: Heel protector boots in place bilateral heels.      Skin:     General: Skin is warm.      Comments: Sacral wound      Neurological:      General: No focal deficit present.      Mental Status: He is alert and oriented to person,  place, and time. Mental status is at baseline.      Motor: Weakness present.      Psychiatric:         Mood and Affect: Mood normal.         Behavior: Behavior normal. Behavior is cooperative.       Outpatient Follow-Up  No future appointments.      Tj Schulz, DO

## 2025-07-30 NOTE — PROGRESS NOTES
Bryan Nix is a 64 y.o. male on day 1 of admission presenting with PEG tube malfunction (Multi).      Subjective   No events overnight.  Patient seen and examined at bedside with wife present.  He has no new complaints.  Awaiting decannulation.       Objective     Last Recorded Vitals  /79 (BP Location: Right arm, Patient Position: Lying)   Pulse 52   Temp 36.2 °C (97.2 °F) (Temporal)   Resp 18   Wt 77.1 kg (169 lb 15.6 oz)   SpO2 96%   Intake/Output last 3 Shifts:    Intake/Output Summary (Last 24 hours) at 7/29/2025 2152  Last data filed at 7/29/2025 2118  Gross per 24 hour   Intake 403.33 ml   Output 1050 ml   Net -646.67 ml       Admission Weight  Weight: 77.1 kg (170 lb) (07/26/25 2301)    Daily Weight  07/29/25 : 77.1 kg (169 lb 15.6 oz)    Image Results  ECG 12 lead  Sinus bradycardia  Left axis deviation  Abnormal QRS-T angle, consider primary T wave abnormality  Abnormal ECG  When compared with ECG of 26-JUL-2025 23:48,  PREVIOUS ECG IS PRESENT  Confirmed by Laith Lucero ([id number]) on 7/29/2025 4:34:42 PM  ECG 12 lead  Sinus bradycardia  Left axis deviation  Abnormal R-wave progression, early transition  ST elevation suggests acute pericarditis      Physical Exam  Constitutional:       General: He is awake. He is not in acute distress.     Appearance: Normal appearance. He is underweight. He is diaphoretic. He is not toxic-appearing.   HENT:      Head: Normocephalic and atraumatic.      Nose: Nose normal. No rhinorrhea.      Mouth/Throat:      Mouth: Mucous membranes are moist.      Eyes:      General:         Right eye: No discharge.         Left eye: No discharge.      Conjunctiva/sclera: Conjunctivae normal.      Pupils: Pupils are equal, round, and reactive to light.      Neck:      Trachea: Tracheostomy present.      Cardiovascular:      Rate and Rhythm: Regular rhythm. Bradycardia present.      Pulses: Normal pulses.   Pulmonary:      Effort: Pulmonary effort is normal. No respiratory  distress.      Breath sounds: Examination of the right-upper field reveals rhonchi. Examination of the left-upper field reveals rhonchi. Examination of the right-middle field reveals rhonchi. Examination of the left-middle field reveals rhonchi. Rhonchi present. No wheezing.   Abdominal:      General: Bowel sounds are increased. There is no distension.      Palpations: Abdomen is soft.      Tenderness: There is no abdominal tenderness. There is no guarding.      Comments: Gastrostomy tube with edema and tissue exposed. Sanguinous drainage. Tender to palpation at site.       Musculoskeletal:         General: Normal range of motion.      Cervical back: Normal range of motion and neck supple.      Right lower leg: Edema present.      Left lower leg: Edema present.      Comments: Right arm flaccid. Decreased movement of bilateral lower extremities. Left upper extremity movement grossly intact.    Feet:      Comments: Heel protector boots in place bilateral heels.      Skin:     General: Skin is warm.      Comments: Sacral wound      Neurological:      General: No focal deficit present.      Mental Status: He is alert and oriented to person, place, and time. Mental status is at baseline.      Motor: Weakness present.      Psychiatric:         Mood and Affect: Mood normal.         Behavior: Behavior normal. Behavior is cooperative.        Relevant Results                            Assessment & Plan  PEG tube malfunction (Multi)    Abdominal pain    Elevated alkaline phosphatase level    Patient arrived to ED today with possible infected PEG tube from skilled nursing facility.  Was found on CT abdomen pelvis that the tube was a dislodged percutaneous gastrostomy tube located within the left ventral abdominal wall fat.  There was also a fluid-filled sinus tract remaining within the left ventral abdominal wall and left upper rectus muscle just superficial to the peritoneum and gastric body.  Patient did not have any other  complaints.  It was reported in the ED that patient requested to not have the tube replaced because he is taking food by mouth.  Patient reported to me that he is eating puréed food.  GI consult placed.  Appreciate recs.     Patient admitted to Dr. Tj Schulz for further medical management.      # Dislodged G-tube  # Fluid-filled sinus tract left ventral abdominal wall  # Elevated alk phos  # Abdominal pain  -GI consult  -Dietary consult when G-tube replaced for tube feeding recommendation  -As needed analgesia  -As needed antiemetic  - 0.9 normal saline at 100 mL/HR  -PEG tube care  -PEG tube site wound culture   -PT/OT/SW   -NPO diet for possible a.m. procedure  -admitted to observation   -q4 vitals    -morning labs CBC, BMP, type and screen     Chronic Conditions   # Diabetes  -Hold home glycemic medication   -SSI with hypoglycemic protocol   -POCT   # Anemia  # Tracheostomy with chronic respiratory failure  - Trach care  -continuous puls-ox  # CVA x 2, left ICA stenosis, left carotid stent, CAD  #Atrial flutter  #Chronic wound  - Wound care  #Lymphadenopathy     Continue home medications as ordered when nursing completes home med rec.       Full Code      #DVT Prophylaxis   Scd's as tolerated   DVT Prophylaxis   On Eliquis     7/28: PEG tube pulled.  Patient had passed an MBS through SNF last week.  He has been approved for diet by speech therapy.  Patient may not need his PEG tube replaced, will reassess tomorrow.  Additionally, he will be evaluated for potential decannulation by pulmonology tomorrow as well.  Patient has been bradycardic although asymptomatic, cardiology consulted.    7/29: Discontinue metoprolol but continue amiodarone per cardiology recs.  Will resume anticoagulation tomorrow.  Patient to be decannulated today.  Will monitor overnight and plan to discharge back to [facility] tomorrow.           Tj Schulz, DO

## 2025-07-30 NOTE — CARE PLAN
The patient's goals for the shift include comfort    The clinical goals for the shift include monitor trach removal site and respiratory status    Problem: Pain - Adult  Goal: Verbalizes/displays adequate comfort level or baseline comfort level  Outcome: Progressing     Problem: Safety - Adult  Goal: Free from fall injury  Outcome: Progressing     Problem: Discharge Planning  Goal: Discharge to home or other facility with appropriate resources  Outcome: Progressing     Problem: Chronic Conditions and Co-morbidities  Goal: Patient's chronic conditions and co-morbidity symptoms are monitored and maintained or improved  Outcome: Progressing     Problem: Nutrition  Goal: Nutrient intake appropriate for maintaining nutritional needs  Outcome: Progressing     Problem: Skin  Goal: Decreased wound size/increased tissue granulation at next dressing change  7/29/2025 2351 by Brooke Everett RN  Outcome: Progressing  7/29/2025 2350 by Brooke Everett RN  Flowsheets (Taken 7/29/2025 2350)  Decreased wound size/increased tissue granulation at next dressing change: Promote sleep for wound healing  Goal: Participates in plan/prevention/treatment measures  7/29/2025 2351 by Brooke Everett RN  Outcome: Progressing  7/29/2025 2350 by Brooke Everett RN  Flowsheets (Taken 7/29/2025 2350)  Participates in plan/prevention/treatment measures: Elevate heels  Goal: Prevent/manage excess moisture  7/29/2025 2351 by Brooke Everett RN  Outcome: Progressing  7/29/2025 2350 by Brooke Everett RN  Flowsheets (Taken 7/29/2025 2350)  Prevent/manage excess moisture:   Moisturize dry skin   Cleanse incontinence/protect with barrier cream  Goal: Prevent/minimize sheer/friction injuries  7/29/2025 2351 by Brooke Everett RN  Outcome: Progressing  7/29/2025 2350 by Brooke Everett RN  Flowsheets (Taken 7/29/2025 2350)  Prevent/minimize sheer/friction injuries: Use pull sheet  Goal: Promote/optimize nutrition  7/29/2025 2351 by Brooke Everett RN  Outcome:  Progressing  7/29/2025 2350 by Brooke Everett RN  Flowsheets (Taken 7/29/2025 2350)  Promote/optimize nutrition:   Monitor/record intake including meals   Offer water/supplements/favorite foods  Goal: Promote skin healing  7/29/2025 2351 by Brooke Everett RN  Outcome: Progressing  7/29/2025 2350 by Brooke Everett RN  Flowsheets (Taken 7/29/2025 2350)  Promote skin healing: Assess skin/pad under line(s)/device(s)     Problem: Diabetes  Goal: Achieve decreasing blood glucose levels by end of shift  Outcome: Progressing  Goal: Increase stability of blood glucose readings by end of shift  Outcome: Progressing  Goal: Decrease in ketones present in urine by end of shift  Outcome: Progressing  Goal: Maintain electrolyte levels within acceptable range throughout shift  Outcome: Progressing  Goal: Maintain glucose levels >70mg/dl to <250mg/dl throughout shift  Outcome: Progressing  Goal: No changes in neurological exam by end of shift  Outcome: Progressing  Goal: Learn about and adhere to nutrition recommendations by end of shift  Outcome: Progressing  Goal: Vital signs within normal range for age by end of shift  Outcome: Progressing  Goal: Increase self care and/or family involovement by end of shift  Outcome: Progressing  Goal: Receive DSME education by end of shift  Outcome: Progressing

## 2025-07-30 NOTE — CARE PLAN
The patient's goals for the shift include pain control and comfort    The clinical goals for the shift include respiratory status/trach site    Problem: Pain - Adult  Goal: Verbalizes/displays adequate comfort level or baseline comfort level  Outcome: Progressing     Problem: Safety - Adult  Goal: Free from fall injury  Outcome: Progressing     Problem: Discharge Planning  Goal: Discharge to home or other facility with appropriate resources  Outcome: Progressing     Problem: Chronic Conditions and Co-morbidities  Goal: Patient's chronic conditions and co-morbidity symptoms are monitored and maintained or improved  Outcome: Progressing     Problem: Nutrition  Goal: Nutrient intake appropriate for maintaining nutritional needs  Outcome: Progressing     Problem: Skin  Goal: Decreased wound size/increased tissue granulation at next dressing change  Outcome: Progressing  Goal: Participates in plan/prevention/treatment measures  Outcome: Progressing  Goal: Prevent/manage excess moisture  Outcome: Progressing  Goal: Prevent/minimize sheer/friction injuries  Outcome: Progressing  Goal: Promote/optimize nutrition  Outcome: Progressing  Goal: Promote skin healing  Outcome: Progressing     Problem: Diabetes  Goal: Achieve decreasing blood glucose levels by end of shift  Outcome: Progressing  Goal: Increase stability of blood glucose readings by end of shift  Outcome: Progressing  Goal: Decrease in ketones present in urine by end of shift  Outcome: Progressing  Goal: Maintain electrolyte levels within acceptable range throughout shift  Outcome: Progressing  Goal: Maintain glucose levels >70mg/dl to <250mg/dl throughout shift  Outcome: Progressing  Goal: No changes in neurological exam by end of shift  Outcome: Progressing  Goal: Learn about and adhere to nutrition recommendations by end of shift  Outcome: Progressing  Goal: Vital signs within normal range for age by end of shift  Outcome: Progressing  Goal: Increase self care  and/or family involovement by end of shift  Outcome: Progressing  Goal: Receive DSME education by end of shift  Outcome: Progressing

## 2025-08-01 ENCOUNTER — LAB REQUISITION (OUTPATIENT)
Dept: LAB | Facility: HOSPITAL | Age: 65
End: 2025-08-01
Payer: MEDICARE

## 2025-08-01 DIAGNOSIS — I48.91 UNSPECIFIED ATRIAL FIBRILLATION (MULTI): ICD-10-CM

## 2025-08-01 LAB
ANION GAP SERPL CALC-SCNC: 11 MMOL/L (ref 10–20)
BUN SERPL-MCNC: 12 MG/DL (ref 6–23)
CALCIUM SERPL-MCNC: 9.3 MG/DL (ref 8.6–10.3)
CHLORIDE SERPL-SCNC: 102 MMOL/L (ref 98–107)
CO2 SERPL-SCNC: 30 MMOL/L (ref 21–32)
CREAT SERPL-MCNC: 0.78 MG/DL (ref 0.5–1.3)
EGFRCR SERPLBLD CKD-EPI 2021: >90 ML/MIN/1.73M*2
ERYTHROCYTE [DISTWIDTH] IN BLOOD BY AUTOMATED COUNT: 17.2 % (ref 11.5–14.5)
GLUCOSE SERPL-MCNC: 131 MG/DL (ref 74–99)
HCT VFR BLD AUTO: 38.5 % (ref 41–52)
HGB BLD-MCNC: 11.9 G/DL (ref 13.5–17.5)
MCH RBC QN AUTO: 25.4 PG (ref 26–34)
MCHC RBC AUTO-ENTMCNC: 30.9 G/DL (ref 32–36)
MCV RBC AUTO: 82 FL (ref 80–100)
NRBC BLD-RTO: 0 /100 WBCS (ref 0–0)
PLATELET # BLD AUTO: 210 X10*3/UL (ref 150–450)
POTASSIUM SERPL-SCNC: 3.7 MMOL/L (ref 3.5–5.3)
RBC # BLD AUTO: 4.68 X10*6/UL (ref 4.5–5.9)
SODIUM SERPL-SCNC: 139 MMOL/L (ref 136–145)
WBC # BLD AUTO: 8.9 X10*3/UL (ref 4.4–11.3)

## 2025-08-01 PROCEDURE — 36415 COLL VENOUS BLD VENIPUNCTURE: CPT | Mod: OUT | Performed by: INTERNAL MEDICINE

## 2025-08-01 PROCEDURE — 80048 BASIC METABOLIC PNL TOTAL CA: CPT | Mod: OUT | Performed by: INTERNAL MEDICINE

## 2025-08-01 PROCEDURE — 85027 COMPLETE CBC AUTOMATED: CPT | Mod: OUT | Performed by: INTERNAL MEDICINE
